# Patient Record
Sex: FEMALE | Race: WHITE | NOT HISPANIC OR LATINO | Employment: OTHER | URBAN - METROPOLITAN AREA
[De-identification: names, ages, dates, MRNs, and addresses within clinical notes are randomized per-mention and may not be internally consistent; named-entity substitution may affect disease eponyms.]

---

## 2017-01-10 ENCOUNTER — HOSPITAL ENCOUNTER (EMERGENCY)
Facility: HOSPITAL | Age: 62
Discharge: HOME | End: 2017-01-10
Attending: EMERGENCY MEDICINE
Payer: MEDICAID

## 2017-01-10 ENCOUNTER — TELEPHONE (OUTPATIENT)
Dept: FAMILY MEDICINE | Facility: CLINIC | Age: 62
End: 2017-01-10

## 2017-01-10 VITALS
BODY MASS INDEX: 27.05 KG/M2 | TEMPERATURE: 98 F | HEART RATE: 61 BPM | SYSTOLIC BLOOD PRESSURE: 136 MMHG | RESPIRATION RATE: 16 BRPM | WEIGHT: 147 LBS | DIASTOLIC BLOOD PRESSURE: 65 MMHG | OXYGEN SATURATION: 100 % | HEIGHT: 62 IN

## 2017-01-10 DIAGNOSIS — R52 PAIN: ICD-10-CM

## 2017-01-10 DIAGNOSIS — M54.9 CHRONIC BILATERAL BACK PAIN, UNSPECIFIED BACK LOCATION: Primary | ICD-10-CM

## 2017-01-10 DIAGNOSIS — G89.29 CHRONIC BILATERAL BACK PAIN, UNSPECIFIED BACK LOCATION: Primary | ICD-10-CM

## 2017-01-10 PROCEDURE — 96372 THER/PROPH/DIAG INJ SC/IM: CPT

## 2017-01-10 PROCEDURE — 99283 EMERGENCY DEPT VISIT LOW MDM: CPT | Mod: 25

## 2017-01-10 PROCEDURE — 63600175 PHARM REV CODE 636 W HCPCS: Performed by: EMERGENCY MEDICINE

## 2017-01-10 RX ORDER — OXYCODONE AND ACETAMINOPHEN 5; 325 MG/1; MG/1
1 TABLET ORAL EVERY 4 HOURS PRN
Qty: 18 TABLET | Refills: 0 | Status: SHIPPED | OUTPATIENT
Start: 2017-01-10 | End: 2017-01-18 | Stop reason: SDUPTHER

## 2017-01-10 RX ORDER — METHOCARBAMOL 750 MG/1
1500 TABLET, FILM COATED ORAL 3 TIMES DAILY
Qty: 30 TABLET | Refills: 0 | Status: SHIPPED | OUTPATIENT
Start: 2017-01-10 | End: 2017-01-15

## 2017-01-10 RX ORDER — KETOROLAC TROMETHAMINE 30 MG/ML
30 INJECTION, SOLUTION INTRAMUSCULAR; INTRAVENOUS
Status: COMPLETED | OUTPATIENT
Start: 2017-01-10 | End: 2017-01-10

## 2017-01-10 RX ADMIN — KETOROLAC TROMETHAMINE 30 MG: 30 INJECTION, SOLUTION INTRAMUSCULAR at 03:01

## 2017-01-10 NOTE — DISCHARGE INSTRUCTIONS
Take your medications as prescribed.  Follow up with [facility] neurosurgery your scheduled appointment.  Follow up with your primary care physician in the interim.  Please refer to the additional material providing further information including when return to the emergency department.  Back Pain (Acute or Chronic)    Back pain is one of the most common problems. The good news is that most people feel better in 1 to 2 weeks, and most of the rest in 1 to 2 months. Most people can remain active.  People experience and describe pain differently; not everyone is the same.  · The pain can be sharp, stabbing, shooting, aching, cramping or burning.  · Movement, standing, bending, lifting, sitting, or walking may worsen pain.  · It can be localized to one spot or area, or it can be more generalized.  · It can spread or radiate upwards, to the front, or go down your arms or legs (sciatica).  · It can cause muscle spasm.  Most of the time, mechanical problems with the muscles or spine cause the pain. Mechanical problems are usually caused by an injury to the muscles or ligaments. While illness can cause back pain, it is usually not caused by a serious illness. Mechanical problems include:   · Physical activity such as sports, exercise, work, or normal activity  · Overexertion, lifting, pushing, pulling incorrectly or too aggressively  · Sudden twisting, bending, or stretching from an accident, or accidental movement  · Poor posture  · Stretching or moving wrong, without noticing pain at the time  · Poor coordination, lack of regular exercise (check with your doctor about this)  · Spinal disc disease or arthritis  · Stress  Pain can also be related to pregnancy, or illness like appendicitis, bladder or kidney infections, pelvic infections, and many other things.  Acute back pain usually gets better in 1 to 2 weeks. Back pain related to disk disease, arthritis in the spinal joints or spinal stenosis (narrowing of the spinal canal) can  become chronic and last for months or years.  Unless you had a physical injury (for example, a car accident or fall) X-rays are usually not needed for the initial evaluation of back pain. If pain continues and does not respond to medical treatment, X-rays and other tests may be needed.  Home care  Try these home care recommendations:  · When in bed, try to find a position of comfort. A firm mattress is best. Try lying flat on your back with pillows under your knees. You can also try lying on your side with your knees bent up towards your chest and a pillow between your knees.  · At first, do not try to stretch out the sore spots. If there is a strain, it is not like the good soreness you get after exercising without an injury. In this case, stretching may make it worse.  · Avoid prolong sitting, long car rides, or travel. This puts more stress on the lower back than standing or walking.  · During the first 24 to 72 hours after an acute injury or flare up of chronic back pain, apply an ice pack to the painful area for 20 minutes and then remove it for 20 minutes. Do this over a period of 60 to 90 minutes or several times a day. This will reduce swelling and pain. Wrap the ice pack in a thin towel or plastic to protect your skin.  · You can start with ice, then switch to heat. Heat (hot shower, hot bath, or heating pad) reduces pain and works well for muscle spasms. Heat can be applied to the painful area for 20 minutes then remove it for 20 minutes. Do this over a period of 60 to 90 minutes or several times a day. Do not sleep on a heating pad. It can lead to skin burns or tissue damage.  · You can alternate ice and heat therapy. Talk with your doctor about the best treatment for your back pain.  · Therapeutic massage can help relax the back muscles without stretching them.  · Be aware of safe lifting methods and do not lift anything without stretching first.  Medicines  Talk to your doctor before using medicine,  especially if you have other medical problems or are taking other medicines.  · You may use over-the-counter medicine as directed on the bottle to control pain, unless another pain medicine was prescribed. If you have chronic conditions like diabetes, liver or kidney disease, stomach ulcers, or gastrointestinal bleeding, or are taking blood thinners, talk to your doctor before taking any medicine.  · Be careful if you are given a prescription medicines, narcotics, or medicine for muscle spasms. They can cause drowsiness, affect your coordination, reflexes, and judgement. Do not drive or operate heavy machinery.  Follow-up care  Follow up with your healthcare provider, or as advised.   A radiologist will review any X-rays that were taken. Your provide will notify you of any new findings that may affect your care.  Call 911  Call emergency services if any of the following occur:  · Trouble breathing  · Confusion  · Very drowsy or trouble awakening  · Fainting or loss of consciousness  · Rapid or very slow heart rate  · Loss of bowel or bladder control  When to seek medical advice  Call your healthcare provider right away if any of these occur:   · Pain becomes worse or spreads to your legs  · Weakness or numbness in one or both legs  · Numbness in the groin or genital area  © 6741-1906 [employer]. [address] [place] [address], [place] [address]. All rights reserved. This information is not intended as a substitute for professional medical care. Always follow your healthcare professional's instructions.

## 2017-01-10 NOTE — TELEPHONE ENCOUNTER
----- Message from Shannon Covarrubias sent at 1/10/2017 10:40 AM CST -----  Contact: [phone] /self   Patient is in pain and would like to know if she needs to come in or go to the ER. Please advise.

## 2017-01-10 NOTE — ED PROVIDER NOTES
Encounter Date: 1/10/2017       History     Chief Complaint   Patient presents with    Back Pain     back pain radiating down legs for 3-4 years getting worse.     Review of patient's allergies indicates:   Allergen Reactions    Penicillins      Other reaction(s): Hives    Sulfa (sulfonamide antibiotics)      Other reaction(s): Hives     HPI Comments: CHIEF COMPLAINT: Low lumbar back pain    HISTORY OF PRESENT ILLNESS: This is a 61-year-old female who presents to the emergency department complaint of low lumbar back pain.  She has had issues with chronic back pain for several years.  She reports that her pain is getting worse.  It hurts more when she tried to get up.  She has no numbness or tingling in the legs.  The pain does radiate down both legs.  She has no bowel or bladder incontinence.  No sensation changes.  She has an appointment on the 30th with a spine doctor at [facility].  She takes [place] at home and is not getting any relief.  She does not take an anti-inflammatory because she says it makes her vomit. She called her primary care physician today who instructed her to come to the emergency department.    REVIEW OF SYSTEMS:  Constitutional: No fever, no chills.  Eyes: No discharge. No pain.  HENT: No nasal drainage. No ear ache. No sore throat.  Cardiovascular: No chest pain, no palpitations.  Respiratory: No cough, no shortness of breath.  Gastrointestinal: No abdominal pain, no vomiting. No diarrhea.  Genitourinary: No hematuria, dysuria, urgency.  Musculoskeletal: See above.  Skin: No rashes, no lesions.  Neurological: No headache, no focal weakness.    ALLERGIES reviewed  Family history reviewed  Home medications reviewed  Problem list reviewed        The history is provided by the patient.     Past Medical History   Diagnosis Date    Colon polyp     Diabetes mellitus     GERD (gastroesophageal reflux disease)     Hepatitis C      Hepatitis C, genotype 1a, treatment naive    Hip fracture      "Hypothyroidism     Macrocytosis     Pulmonary nodule      0.6 RLL nodule     No past medical history pertinent negatives.  Past Surgical History   Procedure Laterality Date     section      Cholecystectomy      Hysterectomy       Uterus and both ovaries    Incisional hernia repair       x 2    Total hip arthroplasty       left hip    Rotator cuff repair       right    Liver biopsy  2003     autoimmune hepatitis    Colonoscopy  3/31/10     2 polyps, tubular adenoma    Upper gastrointestinal endoscopy  3/24/10     normal    Stomach surgery       "took lymph node off of liver"     Family History   Problem Relation Age of Onset    Diabetes Mellitus Mother     Liver cancer Sister     Pancreatic cancer Brother     Liver disease Neg Hx      Social History   Substance Use Topics    Smoking status: Current Every Day Smoker     Packs/day: 2.00     Years: 45.00     Types: Cigarettes    Smokeless tobacco: Never Used    Alcohol use No      Comment: used to drink heavily, stopped in      Review of Systems   All other systems reviewed and are negative.      Physical Exam   Initial Vitals   BP Pulse Resp Temp SpO2   01/10/17 1417 01/10/17 1417 01/10/17 1417 01/10/17 1417 01/10/17 1417   147/73 77 16 98.2 °F (36.8 °C) 96 %     Physical Exam    Nursing note and vitals reviewed.  Constitutional: She appears well-developed and well-nourished.   HENT:   Head: Normocephalic and atraumatic.   Nose: Nose normal.   Mouth/Throat: Oropharynx is clear and moist.   Eyes: Conjunctivae and EOM are normal. Pupils are equal, round, and reactive to light. No scleral icterus.   Neck: Normal range of motion. Neck supple. No JVD present.   Cardiovascular: Normal rate, regular rhythm, normal heart sounds and intact distal pulses. Exam reveals no gallop and no friction rub.    No murmur heard.  Pulmonary/Chest: Breath sounds normal. No stridor. No respiratory distress. She has no wheezes. She exhibits no " tenderness.   Abdominal: Soft. Bowel sounds are normal. She exhibits no distension and no mass. There is no tenderness. There is no rebound and no guarding.   Musculoskeletal: Normal range of motion. She exhibits no edema.   The patient is nontender to palpation at the midline of the C, T, L, sacral spine.  No step-off.  No crepitus.  She has some paraspinal tenderness along the musculature of the right lower lumbar region.  She has full flexion and full rotation both directions at the waist.  Able to bear weight without any difficulty. + straight leg raise bilaterally.    Neurological: She is alert and oriented to person, place, and time. She has normal strength. No cranial nerve deficit.   Skin: Skin is warm and dry. No rash noted. No pallor.   Psychiatric: She has a normal mood and affect. Thought content normal.         ED Course   Procedures  Labs Reviewed - No data to display       X-Rays: Other Radiology Reports:   X-Ray Lumbar Spine Ap And Lateral (Final result) Result time: 01/10/17 17:02:04    Final result by Tiffanie Schulte MD (01/10/17 17:02:04)    Impression:        As above.      Electronically signed by: TIFFANIE SCHULTE MD  Date: 01/10/17  Time: 17:02       Narrative:      Lumbar spine radiographs    Images: 3    Comparison: 6/2/15    Findings:    Alignment: There is grade 1 anterolisthesis at L4-L5.    Vertebrae: Vertebral body heights are maintained.  No suspicious lytic or blastic lesions.  Discs and facets: Mild disc height loss noted throughout the lower lumbar spine.  There is severe lower lumbar facet arthropathy.  Soft tissues: Vascular calcifications and upper abdominal surgical clips noted.          Medical Decision Making:   Differential Diagnosis:   Strain, sprain, fracture, dislocation, degenerative disc disease, malignancy,  abscess, shingles, UTI.    Clinical Tests:   Radiological Study: Ordered and Reviewed  ED Management:  The patient presents to the emergency department stay with her  chronic back pain.  Her back pain has been with her for many years now beginning worse over the last 6 months.  X-ray today does not show any signs of any severe changes that would need emergent intervention.  She has an appointment with neurosurgery on the 30th which she will need to maintain.  She receives pain control through Dr. Dejesus.  Today we will change her over to Percocet in an attempt to see if this will help her better than her Norco and have her continue with her interventions as scheduled.  The patient is comfortable with this plan and comfortable going home at this time.                   ED Course    The patient has received a course of Toradol here in the emergency department.  Awaiting results of x-ray.    Past records reviewed.    I discussed the results with the patient.  I discussed management options including changing her over to Percocet at home from her [place].  The patient would like to do this.  I discussed with her that they need to follow up with neurosurgery as scheduled and that if she has any need for the interim she should follow up with her primary care physician.  I did also discuss with her the need to return if she develops any neurovascular compromise such as numbness or tingling to the legs, bowel or bladder incontinence, or any other concerns.  The patient understands and is comfortable going home at this time.  Clinical Impression:   The primary encounter diagnosis was Chronic bilateral back pain, unspecified back location. A diagnosis of Pain was also pertinent to this visit.    Disposition:   Disposition: Discharged  Condition: Stable       Ric Carlos MD  01/10/17 6023

## 2017-01-10 NOTE — ED NOTES
Pt c/o chronic back pain x 4-5 years.  Pt c/o lower back pain that radiates down bilateral lower legs.  Pt states that she takes her home meds for pain which usually controls pain.  Pt states pain worsened last night.

## 2017-01-10 NOTE — TELEPHONE ENCOUNTER
----- Message from Divine Roche sent at 1/10/2017  1:09 PM CST -----  Contact: [phone]  Patient states she left a message this morning and would like to speak with you,She would like to know which hospital she should go to

## 2017-01-10 NOTE — TELEPHONE ENCOUNTER
Returned patient's call. Patient stated she is currently on her way to the ER. She  Is having severe pain in her back and legs.

## 2017-01-10 NOTE — ED AVS SNAPSHOT
[facility]  [address]  [place] [address]               Thom Krishna   1/10/2017  2:45 PM   ED    Description:  Female : 1955   Department:  [facility]           Your Care was Coordinated By:     Provider Role From To    Ric Carlos MD Attending Provider 01/10/17 6378 --      Reason for Visit     Back Pain           Diagnoses this Visit        Comments    Chronic bilateral back pain, unspecified back location    -  Primary     Pain           ED Disposition     ED Disposition Condition Comment    Discharge             To Do List           Follow-up Information     Follow up with [facility].    Why:  Keep your appointment.     Contact information:    [facility]        Follow up with Brandy Dejesus MD.    Specialty:  Family Medicine    Why:  If symptoms worsen    Contact information:    [address]  [address]  [place] [address]  [phone]         These Medications        Disp Refills Start End    oxycodone-acetaminophen (PERCOCET) 5-325 mg per tablet 18 tablet 0 1/10/2017     Take 1 tablet by mouth every 4 (four) hours as needed for Pain. - Oral    [place] [address] [place] [address] Ph #: [phone]       methocarbamol (ROBAXIN) 750 MG Tab 30 tablet 0 1/10/2017 1/15/2017    Take 2 tablets (1,500 mg total) by mouth 3 (three) times daily. - Oral    [facility] [address] [facility] [address] Ph #: [phone]         [facility]     [facility] -  Assistance  Registered nurses in the [facility] provide clinical advisement, health education, appointment booking, and other advisory services.  Call for this free service at [phone].             Medications           Message regarding Medications     Verify the changes and/or additions to your medication regime listed below  are the same as discussed with your clinician today.  If any of these changes or additions are incorrect, please notify your healthcare provider.        START taking these NEW medications        Refills    oxycodone-acetaminophen (PERCOCET) 5-325 mg per tablet 0    Sig: Take 1 tablet by mouth every 4 (four) hours as needed for Pain.    Class: Print    Route: Oral    methocarbamol (ROBAXIN) 750 MG Tab 0    Sig: Take 2 tablets (1,500 mg total) by mouth 3 (three) times daily.    Class: Normal    Route: Oral      These medications were administered today        Dose Freq    ketorolac injection 30 mg 30 mg ED 1 Time    Sig: Inject 30 mg into the muscle ED 1 Time.    Class: Normal    Route: Intramuscular           Verify that the below list of medications is an accurate representation of the medications you are currently taking.  If none reported, the list may be blank. If incorrect, please contact your healthcare provider. Carry this list with you in case of emergency.           Current Medications     alprazolam (XANAX) 1 MG tablet TAKE 1 TABLET BY MOUTH TWICE DAILY AS NEEDED FOR ANXIETY    azathioprine (IMURAN) 50 mg Tab take 1 tablet by mouth once daily    blood-glucose meter (FREESTYLE LITE METER) kit Test tid please dispense test strips and lancets    hydrocodone-acetaminophen 10-325mg (NORCO)  mg Tab Take 1 tablet by mouth every 4 to 6 hours as needed.    levothyroxine (SYNTHROID) 50 MCG tablet Take 1 tablet (50 mcg total) by mouth once daily.    methocarbamol (ROBAXIN) 750 MG Tab Take 2 tablets (1,500 mg total) by mouth 3 (three) times daily.    ondansetron (ZOFRAN-ODT) 8 MG TbDL Take 1 tablet (8 mg total) by mouth 3 (three) times daily. Take as needed for nausea    oxycodone-acetaminophen (PERCOCET) 5-325 mg per tablet Take 1 tablet by mouth every 4 (four) hours as needed for Pain.    pantoprazole (PROTONIX) 40 MG tablet Take 1 tablet (40 mg total) by mouth once daily.           Clinical Reference  "Information           Your Vitals Were     BP Pulse Temp Resp Height Weight    136/65 (BP Location: Right arm, Patient Position: Sitting, BP Method: Automatic) 61 98.2 °F (36.8 °C) (Oral) 16 5' 2" (1.575 m) 66.7 kg (147 lb)    SpO2 BMI             100% 26.89 kg/m2         Allergies as of 1/10/2017        Reactions    Penicillins     Other reaction(s): Hives    Sulfa (Sulfonamide Antibiotics)     Other reaction(s): Hives      Immunizations Administered on Date of Encounter - 1/10/2017     None      ED Micro, Lab, POCT     None      ED Imaging Orders     Start Ordered       Status Ordering Provider    01/10/17 1604 01/10/17 1604    1 time imaging,   Status:  Canceled      Canceled     01/10/17 1502 01/10/17 1501  X-Ray Lumbar Spine Ap And Lateral  1 time imaging      Final result         Discharge Instructions       Take your medications as prescribed.  Follow up with [facility]surgery your scheduled appointment.  Follow up with your primary care physician in the interim.  Please refer to the additional material providing further information including when return to the emergency department.  Back Pain (Acute or Chronic)    Back pain is one of the most common problems. The good news is that most people feel better in 1 to 2 weeks, and most of the rest in 1 to 2 months. Most people can remain active.  People experience and describe pain differently; not everyone is the same.  · The pain can be sharp, stabbing, shooting, aching, cramping or burning.  · Movement, standing, bending, lifting, sitting, or walking may worsen pain.  · It can be localized to one spot or area, or it can be more generalized.  · It can spread or radiate upwards, to the front, or go down your arms or legs (sciatica).  · It can cause muscle spasm.  Most of the time, mechanical problems with the muscles or spine cause the pain. Mechanical problems are usually caused by an injury to the muscles or ligaments. While illness can cause back pain, it is " usually not caused by a serious illness. Mechanical problems include:   · Physical activity such as sports, exercise, work, or normal activity  · Overexertion, lifting, pushing, pulling incorrectly or too aggressively  · Sudden twisting, bending, or stretching from an accident, or accidental movement  · Poor posture  · Stretching or moving wrong, without noticing pain at the time  · Poor coordination, lack of regular exercise (check with your doctor about this)  · Spinal disc disease or arthritis  · Stress  Pain can also be related to pregnancy, or illness like appendicitis, bladder or kidney infections, pelvic infections, and many other things.  Acute back pain usually gets better in 1 to 2 weeks. Back pain related to disk disease, arthritis in the spinal joints or spinal stenosis (narrowing of the spinal canal) can become chronic and last for months or years.  Unless you had a physical injury (for example, a car accident or fall) X-rays are usually not needed for the initial evaluation of back pain. If pain continues and does not respond to medical treatment, X-rays and other tests may be needed.  Home care  Try these home care recommendations:  · When in bed, try to find a position of comfort. A firm mattress is best. Try lying flat on your back with pillows under your knees. You can also try lying on your side with your knees bent up towards your chest and a pillow between your knees.  · At first, do not try to stretch out the sore spots. If there is a strain, it is not like the good soreness you get after exercising without an injury. In this case, stretching may make it worse.  · Avoid prolong sitting, long car rides, or travel. This puts more stress on the lower back than standing or walking.  · During the first 24 to 72 hours after an acute injury or flare up of chronic back pain, apply an ice pack to the painful area for 20 minutes and then remove it for 20 minutes. Do this over a period of 60 to 90 minutes  or several times a day. This will reduce swelling and pain. Wrap the ice pack in a thin towel or plastic to protect your skin.  · You can start with ice, then switch to heat. Heat (hot shower, hot bath, or heating pad) reduces pain and works well for muscle spasms. Heat can be applied to the painful area for 20 minutes then remove it for 20 minutes. Do this over a period of 60 to 90 minutes or several times a day. Do not sleep on a heating pad. It can lead to skin burns or tissue damage.  · You can alternate ice and heat therapy. Talk with your doctor about the best treatment for your back pain.  · Therapeutic massage can help relax the back muscles without stretching them.  · Be aware of safe lifting methods and do not lift anything without stretching first.  Medicines  Talk to your doctor before using medicine, especially if you have other medical problems or are taking other medicines.  · You may use over-the-counter medicine as directed on the bottle to control pain, unless another pain medicine was prescribed. If you have chronic conditions like diabetes, liver or kidney disease, stomach ulcers, or gastrointestinal bleeding, or are taking blood thinners, talk to your doctor before taking any medicine.  · Be careful if you are given a prescription medicines, narcotics, or medicine for muscle spasms. They can cause drowsiness, affect your coordination, reflexes, and judgement. Do not drive or operate heavy machinery.  Follow-up care  Follow up with your healthcare provider, or as advised.   A radiologist will review any X-rays that were taken. Your provide will notify you of any new findings that may affect your care.  Call 911  Call emergency services if any of the following occur:  · Trouble breathing  · Confusion  · Very drowsy or trouble awakening  · Fainting or loss of consciousness  · Rapid or very slow heart rate  · Loss of bowel or bladder control  When to seek medical advice  Call your healthcare  provider right away if any of these occur:   · Pain becomes worse or spreads to your legs  · Weakness or numbness in one or both legs  · Numbness in the groin or genital area  © 3095-7983 [employer]. [address] [place] [address], [place] [address]. All rights reserved. This information is not intended as a substitute for professional medical care. Always follow your healthcare professional's instructions.          Your Scheduled Appointments     Jan 16, 2017  9:00 AM CST   Fasting Lab with APPOINTMENT LAB, TRINI LUNDBERG   [facility]    [address]  [place] [address] [phone]            Jan 27, 2017  8:40 AM CST   Established Patient Visit with Brandy Dejesus MD   [facility]    [address]  [place] [address] [phone]              [facility] Sign-Up     Activating your [facility] account is as easy as 1-2-3!     1) Visit [email or web address], select Sign Up Now, enter this activation code and your date of birth, then select Next.  [email or web address]  Expires: 2/24/2017  5:22 PM      2) Create a username and password to use when you visit [facility] in the future and select a security question in case you lose your password and select Next.    3) Enter your e-mail address and click Sign Up!    Additional Information  If you have questions, please e-mail [email or web address] or call [phone] to talk to our [facility] staff. Remember, [facility] is NOT to be used for urgent needs. For medical emergencies, dial 911.         Smoking Cessation     If you would like to quit smoking:   You may be eligible for free services if you are a [place] resident and started smoking cigarettes before September 1, 1988.  Call the Smoking Cessation Trust (SCT) toll free at (019) 512-2858 or (790) 280-7096.   Call 6-878-QUIT-NOW if you do not meet the above criteria.             [facility] complies with  applicable Federal civil rights laws and does not discriminate on the basis of race, color, national origin, age, disability, or sex.        Language Assistance Services     ATTENTION: Language assistance services are available, free of charge. Please call [phone].      ATENCIÓN: Si habla tejal, tiene a singh disposición servicios gratuitos de asistencia lingüística. Llame al [phone].     CHÚ Ý: N?u b?n nói Ti?ng Vi?t, có các d?ch v? h? tr? ngôn ng? mi?n phí dành cho b?n. G?i s? [phone].

## 2017-01-11 ENCOUNTER — TELEPHONE (OUTPATIENT)
Dept: FAMILY MEDICINE | Facility: CLINIC | Age: 62
End: 2017-01-11

## 2017-01-11 NOTE — TELEPHONE ENCOUNTER
----- Message from Tessie Meeks sent at 1/11/2017  8:59 AM CST -----  Contact: [phone]  self  Patient wants to inform you of what the doctor said at the hospital. Patient is requesting a return call.

## 2017-01-12 ENCOUNTER — TELEPHONE (OUTPATIENT)
Dept: FAMILY MEDICINE | Facility: CLINIC | Age: 62
End: 2017-01-12

## 2017-01-12 NOTE — TELEPHONE ENCOUNTER
Returned patients call. She stated at the ER she had xrays taken and he told her she needs surgery. Patient stated he still has her appointment on the 30th with christina. She also stated she was prescribes a stronger pain medication for the pain.

## 2017-01-17 ENCOUNTER — LAB VISIT (OUTPATIENT)
Dept: LAB | Facility: HOSPITAL | Age: 62
End: 2017-01-17
Attending: FAMILY MEDICINE
Payer: MEDICAID

## 2017-01-17 ENCOUNTER — TELEPHONE (OUTPATIENT)
Dept: FAMILY MEDICINE | Facility: CLINIC | Age: 62
End: 2017-01-17

## 2017-01-17 DIAGNOSIS — E11.49 DIABETES MELLITUS TYPE 2 WITH NEUROLOGICAL MANIFESTATIONS: ICD-10-CM

## 2017-01-17 DIAGNOSIS — M54.12 CERVICAL RADICULOPATHY: ICD-10-CM

## 2017-01-17 DIAGNOSIS — E03.9 ACQUIRED HYPOTHYROIDISM: ICD-10-CM

## 2017-01-17 DIAGNOSIS — K75.4 AUTOIMMUNE HEPATITIS: ICD-10-CM

## 2017-01-17 DIAGNOSIS — G89.29 OTHER CHRONIC PAIN: Primary | ICD-10-CM

## 2017-01-17 LAB
ALBUMIN SERPL BCP-MCNC: 3.4 G/DL
ALP SERPL-CCNC: 141 U/L
ALT SERPL W/O P-5'-P-CCNC: 50 U/L
ANION GAP SERPL CALC-SCNC: 7 MMOL/L
AST SERPL-CCNC: 44 U/L
BASOPHILS # BLD AUTO: 0.02 K/UL
BASOPHILS NFR BLD: 0.4 %
BILIRUB SERPL-MCNC: 0.5 MG/DL
BUN SERPL-MCNC: 12 MG/DL
CALCIUM SERPL-MCNC: 9.1 MG/DL
CHLORIDE SERPL-SCNC: 107 MMOL/L
CHOLEST/HDLC SERPL: 3.7 {RATIO}
CO2 SERPL-SCNC: 26 MMOL/L
CREAT SERPL-MCNC: 0.9 MG/DL
DIFFERENTIAL METHOD: ABNORMAL
EOSINOPHIL # BLD AUTO: 0.2 K/UL
EOSINOPHIL NFR BLD: 3.6 %
ERYTHROCYTE [DISTWIDTH] IN BLOOD BY AUTOMATED COUNT: 15.3 %
EST. GFR  (AFRICAN AMERICAN): >60 ML/MIN/1.73 M^2
EST. GFR  (NON AFRICAN AMERICAN): >60 ML/MIN/1.73 M^2
ESTIMATED AVG GLUCOSE: 103 MG/DL
GLUCOSE SERPL-MCNC: 89 MG/DL
HBA1C MFR BLD HPLC: 5.2 %
HCT VFR BLD AUTO: 44.6 %
HDL/CHOLESTEROL RATIO: 27.2 %
HDLC SERPL-MCNC: 184 MG/DL
HDLC SERPL-MCNC: 50 MG/DL
HGB BLD-MCNC: 14.5 G/DL
LDLC SERPL CALC-MCNC: 112.6 MG/DL
LYMPHOCYTES # BLD AUTO: 1.7 K/UL
LYMPHOCYTES NFR BLD: 38.3 %
MCH RBC QN AUTO: 33.7 PG
MCHC RBC AUTO-ENTMCNC: 32.5 %
MCV RBC AUTO: 104 FL
MONOCYTES # BLD AUTO: 0.6 K/UL
MONOCYTES NFR BLD: 13.5 %
NEUTROPHILS # BLD AUTO: 2 K/UL
NEUTROPHILS NFR BLD: 44.2 %
NONHDLC SERPL-MCNC: 134 MG/DL
PLATELET # BLD AUTO: 184 K/UL
PMV BLD AUTO: 11.6 FL
POTASSIUM SERPL-SCNC: 4.2 MMOL/L
PROT SERPL-MCNC: 6.8 G/DL
RBC # BLD AUTO: 4.3 M/UL
SODIUM SERPL-SCNC: 140 MMOL/L
T4 FREE SERPL-MCNC: 1.2 NG/DL
TRIGL SERPL-MCNC: 107 MG/DL
TSH SERPL DL<=0.005 MIU/L-ACNC: 4.42 UIU/ML
WBC # BLD AUTO: 4.46 K/UL

## 2017-01-17 PROCEDURE — 85025 COMPLETE CBC W/AUTO DIFF WBC: CPT

## 2017-01-17 PROCEDURE — 84439 ASSAY OF FREE THYROXINE: CPT

## 2017-01-17 PROCEDURE — 36415 COLL VENOUS BLD VENIPUNCTURE: CPT

## 2017-01-17 PROCEDURE — 80053 COMPREHEN METABOLIC PANEL: CPT

## 2017-01-17 PROCEDURE — 84443 ASSAY THYROID STIM HORMONE: CPT

## 2017-01-17 PROCEDURE — 83036 HEMOGLOBIN GLYCOSYLATED A1C: CPT

## 2017-01-17 PROCEDURE — 80061 LIPID PANEL: CPT

## 2017-01-17 NOTE — TELEPHONE ENCOUNTER
----- Message from Alba Govea sent at 1/17/2017 10:55 AM CST -----  Contact: self, [phone]  Patient called in requesting to speak with you regarding her hospital visit.  Please advise.

## 2017-01-17 NOTE — TELEPHONE ENCOUNTER
Returned patients call. Patient stated she went to the ER at [facility]. She was given a Toradol shot and muscle relaxer's. She stated she needs an earlier appointment. She tried to talk with neurology and they will not move her appt up sooner than the 30 th due to no availability. She stated she was told at [facility] she needed a stronger pain med to help manage the pain. She was given Percocet at that time and they helped better than the Norco. She wants to know if those pain meds can be called in until she is able to see the [facility] physician. Please advise.

## 2017-01-18 RX ORDER — OXYCODONE AND ACETAMINOPHEN 5; 325 MG/1; MG/1
1 TABLET ORAL EVERY 4 HOURS PRN
Qty: 50 TABLET | Refills: 0 | Status: SHIPPED | OUTPATIENT
Start: 2017-01-18 | End: 2017-01-27 | Stop reason: SINTOL

## 2017-01-18 NOTE — TELEPHONE ENCOUNTER
Returned patients call. Patient stated she is in severe pain and she is having trouble walking. She wants to know if her new pain medication will be approved prior to her upcoming appointment. Please advise.

## 2017-01-18 NOTE — TELEPHONE ENCOUNTER
Yes I am printing a prescription for percocet thanks. please tell her I said good luck with her neurosurgery appointment!

## 2017-01-18 NOTE — TELEPHONE ENCOUNTER
----- Message from Mathew Middleton sent at 1/18/2017  1:16 PM CST -----  Contact: [phone]/self   Pt requesting to speak with the nurse. Pt states she needs a call back ASAP.    Please advise

## 2017-01-18 NOTE — TELEPHONE ENCOUNTER
----- Message from Julia Morales sent at 1/18/2017  9:52 AM CST -----  Contact: [phone]/ self   Pt would like to follow up on the conversation she had with the nurse yesterday . Please advise

## 2017-01-21 ENCOUNTER — TELEPHONE (OUTPATIENT)
Dept: FAMILY MEDICINE | Facility: CLINIC | Age: 62
End: 2017-01-21

## 2017-01-21 DIAGNOSIS — E03.9 ACQUIRED HYPOTHYROIDISM: Primary | ICD-10-CM

## 2017-01-21 DIAGNOSIS — R74.8 ELEVATED LIVER ENZYMES: ICD-10-CM

## 2017-01-21 RX ORDER — LEVOTHYROXINE SODIUM 75 UG/1
75 TABLET ORAL DAILY
Qty: 90 TABLET | Refills: 3 | Status: SHIPPED | OUTPATIENT
Start: 2017-01-21 | End: 2018-01-12 | Stop reason: SDUPTHER

## 2017-01-21 NOTE — TELEPHONE ENCOUNTER
Please notify of 2 issues with labs    1) need to mildly increase thyroid medication-- I'm assuming she's been taking her 50 mcg dose daily, so I am increasing to the 75 mcg dose    2) increase in liver enzymes that we will discuss in detail at her upcoming office visit.     I am sending in the new dose of thyroid medication and ordering follow up labs for thyroid and liver enzymes to be done in 8 weeks, and we will discuss at her office visit thanks.

## 2017-01-23 ENCOUNTER — TELEPHONE (OUTPATIENT)
Dept: FAMILY MEDICINE | Facility: CLINIC | Age: 62
End: 2017-01-23

## 2017-01-23 DIAGNOSIS — F41.9 ANXIETY: ICD-10-CM

## 2017-01-23 RX ORDER — ALPRAZOLAM 1 MG/1
1 TABLET ORAL 2 TIMES DAILY
Qty: 45 TABLET | Refills: 5 | Status: SHIPPED | OUTPATIENT
Start: 2017-01-23 | End: 2017-08-03 | Stop reason: SDUPTHER

## 2017-01-23 RX ORDER — ALPRAZOLAM 1 MG/1
TABLET ORAL
Qty: 45 TABLET | Refills: 0 | Status: SHIPPED | OUTPATIENT
Start: 2017-01-23 | End: 2017-01-23 | Stop reason: SDUPTHER

## 2017-01-24 ENCOUNTER — TELEPHONE (OUTPATIENT)
Dept: FAMILY MEDICINE | Facility: CLINIC | Age: 62
End: 2017-01-24

## 2017-01-24 NOTE — TELEPHONE ENCOUNTER
----- Message from Dorene Fierro sent at 1/24/2017  3:43 PM CST -----  Contact: Self [phone]  Patient is calling to talk to nurse concerning her medication why was it denied. Please advice

## 2017-01-24 NOTE — TELEPHONE ENCOUNTER
Called patient to inform her that her prescription has been sent to the pharmacy. Patient verbalized understanding.

## 2017-01-26 ENCOUNTER — TELEPHONE (OUTPATIENT)
Dept: FAMILY MEDICINE | Facility: CLINIC | Age: 62
End: 2017-01-26

## 2017-01-27 ENCOUNTER — OFFICE VISIT (OUTPATIENT)
Dept: FAMILY MEDICINE | Facility: CLINIC | Age: 62
End: 2017-01-27
Payer: MEDICAID

## 2017-01-27 ENCOUNTER — TELEPHONE (OUTPATIENT)
Dept: FAMILY MEDICINE | Facility: CLINIC | Age: 62
End: 2017-01-27

## 2017-01-27 VITALS
SYSTOLIC BLOOD PRESSURE: 131 MMHG | HEART RATE: 60 BPM | WEIGHT: 143.5 LBS | TEMPERATURE: 98 F | BODY MASS INDEX: 26.41 KG/M2 | DIASTOLIC BLOOD PRESSURE: 69 MMHG | HEIGHT: 62 IN

## 2017-01-27 DIAGNOSIS — M48.02 FORAMINAL STENOSIS OF CERVICAL REGION: ICD-10-CM

## 2017-01-27 DIAGNOSIS — G89.29 OTHER CHRONIC PAIN: ICD-10-CM

## 2017-01-27 DIAGNOSIS — F41.9 ANXIETY AND DEPRESSION: ICD-10-CM

## 2017-01-27 DIAGNOSIS — F32.A ANXIETY AND DEPRESSION: ICD-10-CM

## 2017-01-27 DIAGNOSIS — B19.20 HEPATITIS C VIRUS INFECTION WITHOUT HEPATIC COMA, UNSPECIFIED CHRONICITY: ICD-10-CM

## 2017-01-27 DIAGNOSIS — K75.4 AUTOIMMUNE HEPATITIS: ICD-10-CM

## 2017-01-27 DIAGNOSIS — F17.200 SMOKER UNMOTIVATED TO QUIT: ICD-10-CM

## 2017-01-27 DIAGNOSIS — R74.8 ELEVATED LIVER ENZYMES: ICD-10-CM

## 2017-01-27 DIAGNOSIS — K21.9 GASTROESOPHAGEAL REFLUX DISEASE, ESOPHAGITIS PRESENCE NOT SPECIFIED: ICD-10-CM

## 2017-01-27 DIAGNOSIS — E11.49 DIABETES MELLITUS TYPE 2 WITH NEUROLOGICAL MANIFESTATIONS: ICD-10-CM

## 2017-01-27 DIAGNOSIS — M51.36 DDD (DEGENERATIVE DISC DISEASE), LUMBAR: ICD-10-CM

## 2017-01-27 DIAGNOSIS — E03.9 ACQUIRED HYPOTHYROIDISM: ICD-10-CM

## 2017-01-27 DIAGNOSIS — M50.30 DDD (DEGENERATIVE DISC DISEASE), CERVICAL: Primary | ICD-10-CM

## 2017-01-27 PROCEDURE — 99213 OFFICE O/P EST LOW 20 MIN: CPT | Mod: PBBFAC,PO | Performed by: FAMILY MEDICINE

## 2017-01-27 PROCEDURE — 99999 PR PBB SHADOW E&M-EST. PATIENT-LVL III: CPT | Mod: PBBFAC,,, | Performed by: FAMILY MEDICINE

## 2017-01-27 PROCEDURE — 99214 OFFICE O/P EST MOD 30 MIN: CPT | Mod: S$PBB,,, | Performed by: FAMILY MEDICINE

## 2017-01-27 RX ORDER — HYDROCODONE BITARTRATE AND ACETAMINOPHEN 10; 325 MG/1; MG/1
1 TABLET ORAL EVERY 6 HOURS PRN
Qty: 120 TABLET | Refills: 0 | Status: SHIPPED | OUTPATIENT
Start: 2017-01-27 | End: 2017-02-20 | Stop reason: SDUPTHER

## 2017-01-27 NOTE — PROGRESS NOTES
" Office Visit    Patient Name: Thom Krishna    : 1955  MRN: [id number]    Subjective:  Thom is a 61 y.o. female who presents today for:    Follow-up (3 month)    Thom is here for 3 month follow up.  Last seen by me  10/28/2016 and recent labs showed elevated TSH so synthroid increased from 50--> 75 mcg daily.  LFTs also elevated.  Repeat labs ordered to have TSH and LFTs ordered for in 3 months. She is on chronic narcotics for significant pain related to DDD disc disease of the spine, especially C-spine and she has and appointment with [facility] 2017.  Called and confirmed that appointment and they stated that nothing was needed from us, no imaging reports etc. Needs to get back in with her liver specialist Dr Jesus through [facility] to discuss treatment of her autoimmune hepatitis and hepatitis C especially in light of recently elevated liver enzymes.     Past Medical History  Past Medical History   Diagnosis Date    Colon polyp     Diabetes mellitus     GERD (gastroesophageal reflux disease)     Hepatitis C      Hepatitis C, genotype 1a, treatment naive    Hip fracture     Hypothyroidism     Macrocytosis     Pulmonary nodule      0.6 RLL nodule       Past Surgical History  Past Surgical History   Procedure Laterality Date     section      Cholecystectomy      Hysterectomy       Uterus and both ovaries    Incisional hernia repair       x 2    Total hip arthroplasty       left hip    Rotator cuff repair       right    Liver biopsy  2003     autoimmune hepatitis    Colonoscopy  3/31/10     2 polyps, tubular adenoma    Upper gastrointestinal endoscopy  3/24/10     normal    Stomach surgery       "took lymph node off of liver"       Family History  Family History   Problem Relation Age of Onset    Diabetes Mellitus Mother     Liver cancer Sister     Pancreatic cancer Brother     Liver disease Neg Hx        Social History  Social History " "    Social History    Marital status: Significant Other     Spouse name: N/A    Number of children: N/A    Years of education: N/A     Occupational History    Not on file.     Social History Main Topics    Smoking status: Current Every Day Smoker     Packs/day: 2.00     Years: 45.00     Types: Cigarettes    Smokeless tobacco: Never Used    Alcohol use No      Comment: used to drink heavily, stopped in 1990's    Drug use: No    Sexual activity: Not on file     Other Topics Concern    Not on file     Social History Narrative       Current Medications  Medications reviewed and updated.     Allergies   Review of patient's allergies indicates:   Allergen Reactions    Penicillins      Other reaction(s): Hives    Sulfa (sulfonamide antibiotics)      Other reaction(s): Hives       Review of Systems (Pertinent positives)  Review of Systems   Gastrointestinal: Positive for nausea (from percocet-- requests to go back to norco).   Musculoskeletal: Positive for back pain and neck pain.   Psychiatric/Behavioral: The patient is nervous/anxious.        Visit Vitals    /69 (BP Location: Right arm, Patient Position: Sitting, BP Method: Automatic)    Pulse 60    Temp 98 °F (36.7 °C) (Oral)    Ht 5' 2" (1.575 m)    Wt 65.1 kg (143 lb 8.3 oz)    BMI 26.25 kg/m2       Physical Exam   Constitutional: She is oriented to person, place, and time. She appears well-developed and well-nourished. No distress.   HENT:   Head: Normocephalic and atraumatic.   Eyes: Conjunctivae are normal.   Cardiovascular: Normal rate and regular rhythm.    Pulmonary/Chest: Effort normal and breath sounds normal.   Musculoskeletal: She exhibits no edema.   Neurological: She is alert and oriented to person, place, and time.   Skin: Skin is warm and dry.   Psychiatric: Her mood appears anxious.   Vitals reviewed.        Assessment/Plan:  Thom Krishna is a 61 y.o. female who presents today for :    Thom was seen today for " follow-up.    Diagnoses and all orders for this visit:    DDD (degenerative disc disease), cervical  Comments:  has appointment with Neurosurgery January 30, 2017  Orders:  -     hydrocodone-acetaminophen 10-325mg (NORCO)  mg Tab; Take 1 tablet by mouth every 6 (six) hours as needed for Pain.    DDD (degenerative disc disease), lumbar  -     hydrocodone-acetaminophen 10-325mg (NORCO)  mg Tab; Take 1 tablet by mouth every 6 (six) hours as needed for Pain.    Foraminal stenosis of cervical region    Elevated liver enzymes  Comments:  will contact Dr Perez with [facility] regarding further treatment of Hepatitis C and autoimmune hepatitis    Autoimmune hepatitis    Acquired hypothyroidism  Comments:  on higher dose of thyroid med and will rechek in 3 months    Hepatitis C virus infection without hepatic coma, unspecified chronicity    Gastroesophageal reflux disease, esophagitis presence not specified    Anxiety and depression    Diabetes mellitus type 2 with neurological manifestations  Comments:  no medications required, A1c monitoring    Other chronic pain  Comments:  would prefer Norco over Percocet  Orders:  -     hydrocodone-acetaminophen 10-325mg (NORCO)  mg Tab; Take 1 tablet by mouth every 6 (six) hours as needed for Pain.    Smoker unmotivated to quit            ICD-10-CM ICD-9-CM    1. DDD (degenerative disc disease), cervical M50.30 722.4 hydrocodone-acetaminophen 10-325mg (NORCO)  mg Tab    has appointment with Neurosurgery January 30, 2017   2. DDD (degenerative disc disease), lumbar M51.36 722.52 hydrocodone-acetaminophen 10-325mg (NORCO)  mg Tab   3. Foraminal stenosis of cervical region M99.81 723.0    4. Elevated liver enzymes R74.8 790.5     will contact Dr Perez with [facility] regarding further treatment of Hepatitis C and autoimmune hepatitis   5. Autoimmune hepatitis K75.4 571.42    6. Acquired hypothyroidism E03.9 244.9     on higher dose of thyroid med and will  rechek in 3 months   7. Hepatitis C virus infection without hepatic coma, unspecified chronicity B19.20 070.70    8. Gastroesophageal reflux disease, esophagitis presence not specified K21.9 530.81    9. Anxiety and depression F41.9 300.00     F32.9 311    10. Diabetes mellitus type 2 with neurological manifestations E11.49 250.60     no medications required, A1c monitoring   11. Other chronic pain G89.29 338.29 hydrocodone-acetaminophen 10-325mg (NORCO)  mg Tab    would prefer Norco over Percocet   12. Smoker unmotivated to quit F17.200 305.1        Return in about 8 weeks (around 3/24/2017) for to follow up on lab results-- thryoid and liver enzymes and on specialist appointments .

## 2017-01-27 NOTE — TELEPHONE ENCOUNTER
----- Message from Alba Xuan sent at 1/27/2017  1:18 PM CST -----  Contact: self, [phone]  Patient called in requesting to speak with you regarding her NORCO prescription. States her insurance is waiting for your office to call in order for her to get her refill. Please advise.

## 2017-01-27 NOTE — MR AVS SNAPSHOT
[address]  [place] [address]  Phone: [phone]  Fax: [phone]                  Thom Krishna   2017 8:40 AM   Office Visit    Description:  Female : 1955   Provider:  Brandy Dejesus MD   Department:  [facility]           Reason for Visit     Follow-up           Diagnoses this Visit        Comments    DDD (degenerative disc disease), cervical    -  Primary has appointment with Neurosurgery 2017    DDD (degenerative disc disease), lumbar         Foraminal stenosis of cervical region         Elevated liver enzymes     will contact Dr Perez with [facility] regarding further treatment of Hepatitis C and autoimmune hepatitis    Autoimmune hepatitis         Acquired hypothyroidism     on higher dose of thyroid med and will rechek in 3 months    Gastroesophageal reflux disease, esophagitis presence not specified         Anxiety and depression         Diabetes mellitus type 2 with neurological manifestations     no medications required, A1c monitoring    Other chronic pain     would prefer Norco over Percocet    Smoker unmotivated to quit         Hepatitis C virus infection without hepatic coma, unspecified chronicity                To Do List           Future Appointments        Provider Department Dept Phone    3/16/2017 9:00 AM APPOINTMENT LAB, [facility] [phone]    3/29/2017 10:20 AM Brandy Dejesus MD [facility] [phone]      Goals (5 Years of Data)     None      Follow-Up and Disposition     Return in about 8 weeks (around 3/24/2017) for to follow up on lab results-- thryoid and liver enzymes and on specialist appointments .       These Medications        Disp Refills Start End    hydrocodone-acetaminophen 10-325mg (NORCO)  mg Tab 120 tablet 0 2017     Take 1 tablet by mouth every 6 (six) hours as needed for Pain. - Oral    Pharmacy: [facility]. - [place] [facility] - [address] [facility].  #: [phone]         [facility]     [facility] Nurse Care Line - 24/7 Assistance  Registered nurses in the [facility] Center provide clinical advisement, health education, appointment booking, and other advisory services.  Call for this free service at [phone].             Medications           Message regarding Medications     Verify the changes and/or additions to your medication regime listed below are the same as discussed with your clinician today.  If any of these changes or additions are incorrect, please notify your healthcare provider.        START taking these NEW medications        Refills    hydrocodone-acetaminophen 10-325mg (NORCO)  mg Tab 0    Sig: Take 1 tablet by mouth every 6 (six) hours as needed for Pain.    Class: Print    Route: Oral      STOP taking these medications     oxycodone-acetaminophen (PERCOCET) 5-325 mg per tablet Take 1 tablet by mouth every 4 (four) hours as needed for Pain.           Verify that the below list of medications is an accurate representation of the medications you are currently taking.  If none reported, the list may be blank. If incorrect, please contact your healthcare provider. Carry this list with you in case of emergency.           Current Medications     alprazolam (XANAX) 1 MG tablet Take 1 tablet (1 mg total) by mouth 2 (two) times daily.    azathioprine (IMURAN) 50 mg Tab take 1 tablet by mouth once daily    blood-glucose meter (FREESTYLE LITE METER) kit Test tid please dispense test strips and lancets    levothyroxine (SYNTHROID) 75 MCG tablet Take 1 tablet (75 mcg total) by mouth once daily.    ondansetron (ZOFRAN-ODT) 8 MG TbDL Take 1 tablet (8 mg total) by mouth 3 (three) times daily. Take as needed for nausea    pantoprazole (PROTONIX) 40 MG tablet Take 1 tablet (40 mg total) by mouth once daily.    hydrocodone-acetaminophen 10-325mg (NORCO)  mg  "Tab Take 1 tablet by mouth every 6 (six) hours as needed for Pain.           Clinical Reference Information           Vital Signs - Last Recorded  Most recent update: 1/27/2017  8:28 AM by Alexus Wyman LPN    BP Pulse Temp Ht Wt BMI    131/69 (BP Location: Right arm, Patient Position: Sitting, BP Method: Automatic) 60 98 °F (36.7 °C) (Oral) 5' 2" (1.575 m) 65.1 kg (143 lb 8.3 oz) 26.25 kg/m2      Blood Pressure          Most Recent Value    BP  131/69      Allergies as of 1/27/2017     Penicillins    Sulfa (Sulfonamide Antibiotics)      Immunizations Administered on Date of Encounter - 1/27/2017     None      [facility] Sign-Up     Activating your [facility] account is as easy as 1-2-3!     1) Visit [employer] ([email or web address], select Sign Up Now, enter this activation code and your date of birth, then select Next.  [id number]  Expires: 2/24/2017  5:22 PM      2) Create a username and password to use when you visit [facility] in the future and select a security question in case you lose your password and select Next.    3) Enter your e-mail address and click Sign Up!    Additional Information  If you have questions, please e-mail [email or web address] or call [phone] to talk to our [facility] staff. Remember, [facility] is NOT to be used for urgent needs. For medical emergencies, dial 911.         Smoking Cessation     If you would like to quit smoking:   You may be eligible for free services if you are a [place] resident and started smoking cigarettes before September 1, 1988.  Call the Smoking Cessation Trust (SCT) toll free at (613) 402-7163 or (423) 866-3695.   Call 8-800QUIT-NOW if you do not meet the above criteria.            "

## 2017-01-27 NOTE — TELEPHONE ENCOUNTER
Called patient her sister answered the phone. She gave me another number to call the patient on. I spoke with the patient and informed her the prior authorization will be submitted today.

## 2017-01-27 NOTE — TELEPHONE ENCOUNTER
Called patient to inform her that the prior authorization has been submitted. It has been put in as urgent so she will know something within 24hrs. She verbalized understanding.

## 2017-01-30 ENCOUNTER — TELEPHONE (OUTPATIENT)
Dept: FAMILY MEDICINE | Facility: CLINIC | Age: 62
End: 2017-01-30

## 2017-01-30 NOTE — TELEPHONE ENCOUNTER
----- Message from Fabiola Moise sent at 1/30/2017  2:36 PM CST -----  Patient no. [phone]   Patient returned your call.

## 2017-01-30 NOTE — TELEPHONE ENCOUNTER
returned patients call. Patient stated she went to [facility] for her appointment. She has to go back on the 27th of next month for additional imaging. March 13th she is going back for a follow up. They informed her they were perform surgery, she will have a dheeraj put in her back. They told her she will be on bed rest 4 weeks after surgery.

## 2017-01-30 NOTE — TELEPHONE ENCOUNTER
----- Message from Mathew Middleton sent at 1/30/2017 10:30 AM CST -----  Contact: [phone]/self  Pt would like to speak with the nurse about test results being faxed to another Dr.  Please advise

## 2017-02-01 ENCOUNTER — TELEPHONE (OUTPATIENT)
Dept: FAMILY MEDICINE | Facility: CLINIC | Age: 62
End: 2017-02-01

## 2017-02-01 NOTE — TELEPHONE ENCOUNTER
----- Message from Julia Morales sent at 2/1/2017  8:07 AM CST -----  Contact: [phone]/ self   Pt called starting the correct fax number for the liver doctor its [phone]. Please advise

## 2017-02-20 DIAGNOSIS — M50.30 DDD (DEGENERATIVE DISC DISEASE), CERVICAL: ICD-10-CM

## 2017-02-20 DIAGNOSIS — G89.29 OTHER CHRONIC PAIN: ICD-10-CM

## 2017-02-20 DIAGNOSIS — M51.36 DDD (DEGENERATIVE DISC DISEASE), LUMBAR: ICD-10-CM

## 2017-02-20 RX ORDER — HYDROCODONE BITARTRATE AND ACETAMINOPHEN 10; 325 MG/1; MG/1
1 TABLET ORAL EVERY 6 HOURS PRN
Qty: 120 TABLET | Refills: 0 | Status: SHIPPED | OUTPATIENT
Start: 2017-02-25 | End: 2017-03-20 | Stop reason: SDUPTHER

## 2017-02-20 NOTE — TELEPHONE ENCOUNTER
Called patient to inform her that her prescription is ready for . Patient verbalized understanding.

## 2017-02-20 NOTE — TELEPHONE ENCOUNTER
Patient is requesting a refill on her pain medication. I informed her that she is 6 days early. She stated she understood. She would like to have it post dated. She just won't have transportation later to pick it up. Please advise

## 2017-03-02 RX ORDER — AZATHIOPRINE 50 MG/1
TABLET ORAL
Qty: 30 TABLET | Refills: 3 | Status: SHIPPED | OUTPATIENT
Start: 2017-03-02 | End: 2017-07-17 | Stop reason: SDUPTHER

## 2017-03-13 ENCOUNTER — TELEPHONE (OUTPATIENT)
Dept: FAMILY MEDICINE | Facility: CLINIC | Age: 62
End: 2017-03-13

## 2017-03-13 DIAGNOSIS — F17.200 SMOKER UNMOTIVATED TO QUIT: Primary | ICD-10-CM

## 2017-03-13 NOTE — TELEPHONE ENCOUNTER
----- Message from Alba Govea sent at 3/13/2017 10:33 AM CDT -----  Contact: self, [phone]  Patient called in requesting to speak with you regarding her surgery.  Please advise.

## 2017-03-13 NOTE — TELEPHONE ENCOUNTER
Returned patients call. Patient stated she is having the back surgery on the 27th of next month. She was told she has to quit smoking prior to the surgery. She would like to be referred to the smoking cessation program. Please advise. DANIEL: The patient gave the phone to her mother and she said Anurag went to the ER last week. He had an infection in his colon and bladder. He has been prescribed antibiotics and pain meds and is doing much better. She just wanted to inform you.

## 2017-03-20 ENCOUNTER — TELEPHONE (OUTPATIENT)
Dept: FAMILY MEDICINE | Facility: CLINIC | Age: 62
End: 2017-03-20

## 2017-03-20 DIAGNOSIS — M51.36 DDD (DEGENERATIVE DISC DISEASE), LUMBAR: ICD-10-CM

## 2017-03-20 DIAGNOSIS — M50.30 DDD (DEGENERATIVE DISC DISEASE), CERVICAL: ICD-10-CM

## 2017-03-20 DIAGNOSIS — G89.29 OTHER CHRONIC PAIN: ICD-10-CM

## 2017-03-20 RX ORDER — HYDROCODONE BITARTRATE AND ACETAMINOPHEN 10; 325 MG/1; MG/1
1 TABLET ORAL EVERY 6 HOURS PRN
Qty: 120 TABLET | Refills: 0 | Status: SHIPPED | OUTPATIENT
Start: 2017-03-20 | End: 2017-07-06
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
## 2017-03-20 NOTE — TELEPHONE ENCOUNTER
----- Message from Aniyah Dumont sent at 3/20/2017  8:23 AM CDT -----  Contact: self/[phone]  Patient is requesting a refill on hydrocodone-acetaminophen 10-325mg (NORCO)  mg Tab.  Please advise

## 2017-03-27 ENCOUNTER — LAB VISIT (OUTPATIENT)
Dept: LAB | Facility: HOSPITAL | Age: 62
End: 2017-03-27
Attending: FAMILY MEDICINE
Payer: MEDICAID

## 2017-03-27 ENCOUNTER — TELEPHONE (OUTPATIENT)
Dept: FAMILY MEDICINE | Facility: CLINIC | Age: 62
End: 2017-03-27

## 2017-03-27 ENCOUNTER — OFFICE VISIT (OUTPATIENT)
Dept: FAMILY MEDICINE | Facility: CLINIC | Age: 62
End: 2017-03-27
Payer: MEDICAID

## 2017-03-27 VITALS
HEIGHT: 62 IN | SYSTOLIC BLOOD PRESSURE: 111 MMHG | OXYGEN SATURATION: 97 % | BODY MASS INDEX: 27.18 KG/M2 | HEART RATE: 81 BPM | DIASTOLIC BLOOD PRESSURE: 67 MMHG | WEIGHT: 147.69 LBS

## 2017-03-27 DIAGNOSIS — B19.20 HEPATITIS C VIRUS INFECTION WITHOUT HEPATIC COMA, UNSPECIFIED CHRONICITY: ICD-10-CM

## 2017-03-27 DIAGNOSIS — G89.29 OTHER CHRONIC PAIN: ICD-10-CM

## 2017-03-27 DIAGNOSIS — F32.A ANXIETY AND DEPRESSION: ICD-10-CM

## 2017-03-27 DIAGNOSIS — F41.9 ANXIETY AND DEPRESSION: ICD-10-CM

## 2017-03-27 DIAGNOSIS — M51.36 DDD (DEGENERATIVE DISC DISEASE), LUMBAR: Primary | ICD-10-CM

## 2017-03-27 DIAGNOSIS — E03.9 ACQUIRED HYPOTHYROIDISM: ICD-10-CM

## 2017-03-27 DIAGNOSIS — Z72.0 TOBACCO ABUSE: ICD-10-CM

## 2017-03-27 DIAGNOSIS — K75.4 AUTOIMMUNE HEPATITIS: ICD-10-CM

## 2017-03-27 DIAGNOSIS — R74.8 ELEVATED LIVER ENZYMES: ICD-10-CM

## 2017-03-27 DIAGNOSIS — M50.30 DDD (DEGENERATIVE DISC DISEASE), CERVICAL: ICD-10-CM

## 2017-03-27 LAB
ALBUMIN SERPL BCP-MCNC: 3.9 G/DL
ALP SERPL-CCNC: 112 U/L
ALT SERPL W/O P-5'-P-CCNC: 23 U/L
ANION GAP SERPL CALC-SCNC: 10 MMOL/L
AST SERPL-CCNC: 30 U/L
BILIRUB SERPL-MCNC: 0.3 MG/DL
BUN SERPL-MCNC: 14 MG/DL
CALCIUM SERPL-MCNC: 9 MG/DL
CHLORIDE SERPL-SCNC: 108 MMOL/L
CO2 SERPL-SCNC: 23 MMOL/L
CREAT SERPL-MCNC: 1 MG/DL
EST. GFR  (AFRICAN AMERICAN): >60 ML/MIN/1.73 M^2
EST. GFR  (NON AFRICAN AMERICAN): >60 ML/MIN/1.73 M^2
GLUCOSE SERPL-MCNC: 88 MG/DL
POTASSIUM SERPL-SCNC: 4 MMOL/L
PROT SERPL-MCNC: 7.6 G/DL
SODIUM SERPL-SCNC: 141 MMOL/L
T4 FREE SERPL-MCNC: 1.48 NG/DL
TSH SERPL DL<=0.005 MIU/L-ACNC: 1.18 UIU/ML

## 2017-03-27 PROCEDURE — 84439 ASSAY OF FREE THYROXINE: CPT

## 2017-03-27 PROCEDURE — 36415 COLL VENOUS BLD VENIPUNCTURE: CPT

## 2017-03-27 PROCEDURE — 99213 OFFICE O/P EST LOW 20 MIN: CPT | Mod: PBBFAC,PO | Performed by: FAMILY MEDICINE

## 2017-03-27 PROCEDURE — 80053 COMPREHEN METABOLIC PANEL: CPT

## 2017-03-27 PROCEDURE — 99214 OFFICE O/P EST MOD 30 MIN: CPT | Mod: S$PBB,,, | Performed by: FAMILY MEDICINE

## 2017-03-27 PROCEDURE — 99999 PR PBB SHADOW E&M-EST. PATIENT-LVL III: CPT | Mod: PBBFAC,,, | Performed by: FAMILY MEDICINE

## 2017-03-27 PROCEDURE — 84443 ASSAY THYROID STIM HORMONE: CPT
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
## 2017-03-27 NOTE — MR AVS SNAPSHOT
[address]  [place] [address]  Phone: [phone]  Fax: [phone]                  Thom Krishna   3/27/2017 2:40 PM   Office Visit    Description:  Female : 1955   Provider:  Brandy Dejesus MD   Department:  [facility]           Reason for Visit     Follow-up           Diagnoses this Visit        Comments    DDD (degenerative disc disease), lumbar    -  [facility]    DDD (degenerative disc disease), cervical         Autoimmune hepatitis         Hepatitis C virus infection without hepatic coma, unspecified chronicity     plans treatment with [facility] after her back surgery    Acquired hypothyroidism     will have labs rechecked today on new dose of medications    Anxiety and depression         Tobacco abuse     about to start smoking cessation classes-- needs to quit prior to back surgery           To Do List           Future Appointments        Provider Department Dept Phone    3/27/2017 3:40 PM APPOINTMENT LAB, [place] [facility] [phone]      Goals (5 Years of Data)     None      [facility]     [facility] Care [facility] -  Assistance  Registered nurses in the [facility] provide clinical advisement, health education, appointment booking, and other advisory services.  Call for this free service at [phone].             Medications           Message regarding Medications     Verify the changes and/or additions to your medication regime listed below are the same as discussed with your clinician today.  If any of these changes or additions are incorrect, please notify your healthcare provider.             Verify that the below list of medications is an accurate representation of the medications you are currently taking.  If none reported, the list may be blank. If incorrect, please contact your healthcare provider.  "Carry this list with you in case of emergency.           Current Medications     alprazolam (XANAX) 1 MG tablet Take 1 tablet (1 mg total) by mouth 2 (two) times daily.    azathioprine (IMURAN) 50 mg Tab take 1 tablet by mouth once daily    hydrocodone-acetaminophen 10-325mg (NORCO)  mg Tab Take 1 tablet by mouth every 6 (six) hours as needed for Pain.    levothyroxine (SYNTHROID) 75 MCG tablet Take 1 tablet (75 mcg total) by mouth once daily.    ondansetron (ZOFRAN-ODT) 8 MG TbDL Take 1 tablet (8 mg total) by mouth 3 (three) times daily. Take as needed for nausea    pantoprazole (PROTONIX) 40 MG tablet Take 1 tablet (40 mg total) by mouth once daily.    blood-glucose meter (FREESTYLE LITE METER) kit Test tid please dispense test strips and lancets           Clinical Reference Information           Your Vitals Were     BP Pulse Height Weight SpO2 BMI    111/67 81 5' 2" (1.575 m) 67 kg (147 lb 11.3 oz) 97% 27.02 kg/m2      Blood Pressure          Most Recent Value    BP  111/67      Allergies as of 3/27/2017     Penicillins    Sulfa (Sulfonamide Antibiotics)      Immunizations Administered on Date of Encounter - 3/27/2017     None      [facility] Sign-Up     Activating your [facility] account is as easy as 1-2-3!     1) Visit [email or web address], select Sign Up Now, enter this activation code and your date of birth, then select Next.  [email or web address]  Expires: 5/11/2017  2:58 PM      2) Create a username and password to use when you visit [facility] in the future and select a security question in case you lose your password and select Next.    3) Enter your e-mail address and click Sign Up!    Additional Information  If you have questions, please e-mail [email or web address] or call [phone] to talk to our [facility] staff. Remember, [facility] is NOT to be used for urgent needs. For medical emergencies, dial 911.         Smoking Cessation     If you would like to quit smoking:   You may be eligible for free " services if you are a [place] resident and started smoking cigarettes before September 1, 1988.  Call the Smoking Cessation Trust (SCT) toll free at (378) 804-9072 or (347) 958-1433.   Call 1-800-QUIT-NOW if you do not meet the above criteria.            Language Assistance Services     ATTENTION: Language assistance services are available, free of charge. Please call [phone].      ATENCIÓN: Si habla jonathanañol, tiene a singh disposición servicios gratuitos de asistencia lingüística. Llame al [phone].     CHÚ Ý: N?u b?n nói Ti?ng Vi?t, có các d?ch v? h? tr? ngôn ng? mi?n phí dành cho b?n. G?i s? [phone].         [facility] complies with applicable Federal civil rights laws and does not discriminate on the basis of race, color, national origin, age, disability, or sex.

## 2017-04-06 ENCOUNTER — TELEPHONE (OUTPATIENT)
Dept: FAMILY MEDICINE | Facility: CLINIC | Age: 62
End: 2017-04-06

## 2017-04-11 ENCOUNTER — TELEPHONE (OUTPATIENT)
Dept: FAMILY MEDICINE | Facility: CLINIC | Age: 62
End: 2017-04-11
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
## 2017-04-17 ENCOUNTER — TELEPHONE (OUTPATIENT)
Dept: FAMILY MEDICINE | Facility: CLINIC | Age: 62
End: 2017-04-17

## 2017-04-17 NOTE — TELEPHONE ENCOUNTER
Returned patient's call. She stated she is scheduled to have the back surgery on the 27th. She is requesting stronger pain medication in the meantime. Please advise.

## 2017-04-19 RX ORDER — OXYCODONE AND ACETAMINOPHEN 5; 325 MG/1; MG/1
1 TABLET ORAL EVERY 4 HOURS PRN
Qty: 30 TABLET | Refills: 0 | Status: SHIPPED | OUTPATIENT
Start: 2017-04-19 | End: 2017-05-02 | Stop reason: SDUPTHER

## 2017-04-20 ENCOUNTER — TELEPHONE (OUTPATIENT)
Dept: FAMILY MEDICINE | Facility: CLINIC | Age: 62
End: 2017-04-20

## 2017-04-20 NOTE — TELEPHONE ENCOUNTER
Called patient to inform her that she can  her prescription tomorrow. Left a voicemail requesting a call back

## 2017-04-20 NOTE — TELEPHONE ENCOUNTER
----- Message from Julia Morales sent at 4/20/2017  9:18 AM CDT -----  Contact: [phone]/ self   Pt requesting to speak with you in regarding her medication  Please advise

## 2017-04-21 ENCOUNTER — TELEPHONE (OUTPATIENT)
Dept: FAMILY MEDICINE | Facility: CLINIC | Age: 62
End: 2017-04-21

## 2017-04-24 RX ORDER — ONDANSETRON 8 MG/1
TABLET, ORALLY DISINTEGRATING ORAL
Qty: 20 TABLET | Refills: 2 | Status: SHIPPED | OUTPATIENT
Start: 2017-04-24 | End: 2017-12-29 | Stop reason: SDUPTHER

## 2017-04-26 ENCOUNTER — TELEPHONE (OUTPATIENT)
Dept: FAMILY MEDICINE | Facility: CLINIC | Age: 62
End: 2017-04-26

## 2017-04-26 NOTE — TELEPHONE ENCOUNTER
----- Message from Fabiola Moise sent at 4/26/2017  9:59 AM CDT -----  No. [phone]   Patient's surgery for Thursday, 4/27/17 at [facility] is on hold because of insurance.  Patient would like you to call the hospital.  She said she really needs to have the surgery.

## 2017-05-01 ENCOUNTER — TELEPHONE (OUTPATIENT)
Dept: FAMILY MEDICINE | Facility: CLINIC | Age: 62
End: 2017-05-01

## 2017-05-01 DIAGNOSIS — M50.30 DDD (DEGENERATIVE DISC DISEASE), CERVICAL: ICD-10-CM

## 2017-05-01 DIAGNOSIS — G89.29 OTHER CHRONIC PAIN: ICD-10-CM

## 2017-05-01 DIAGNOSIS — M51.36 DDD (DEGENERATIVE DISC DISEASE), LUMBAR: ICD-10-CM

## 2017-05-01 RX ORDER — HYDROCODONE BITARTRATE AND ACETAMINOPHEN 10; 325 MG/1; MG/1
1 TABLET ORAL EVERY 6 HOURS PRN
Qty: 120 TABLET | Refills: 0 | Status: CANCELLED | OUTPATIENT
Start: 2017-05-01

## 2017-05-02 RX ORDER — OXYCODONE AND ACETAMINOPHEN 5; 325 MG/1; MG/1
1 TABLET ORAL EVERY 4 HOURS PRN
Qty: 30 TABLET | Refills: 0 | Status: SHIPPED | OUTPATIENT
Start: 2017-05-02 | End: 2017-07-06 | Stop reason: SDUPTHER

## 2017-05-02 NOTE — TELEPHONE ENCOUNTER
----- Message from Divine Roche sent at 5/2/2017  8:21 AM CDT -----  Contact: [phone]  Patient would like to know the status of her pain meds. That was requested

## 2017-05-02 NOTE — TELEPHONE ENCOUNTER
Spoke with the patient and informed her that her prescription is ready for . I informed her that this will be the last prescription for Percocet. She verbalized understanding. She stated her surgery is scheduled for June 1st. It was pushed pack due to issues with the insurance.

## 2017-05-29 ENCOUNTER — CLINICAL SUPPORT (OUTPATIENT)
Dept: SMOKING CESSATION | Facility: CLINIC | Age: 62
End: 2017-05-29
Payer: COMMERCIAL

## 2017-05-29 DIAGNOSIS — F17.200 NICOTINE DEPENDENCE: Primary | ICD-10-CM

## 2017-05-29 PROCEDURE — 99404 PREV MED CNSL INDIV APPRX 60: CPT | Mod: S$GLB,,,

## 2017-05-29 PROCEDURE — 99999 PR PBB SHADOW E&M-EST. PATIENT-LVL I: CPT | Mod: PBBFAC,,,

## 2017-05-29 RX ORDER — IBUPROFEN 200 MG
1 TABLET ORAL DAILY
Qty: 28 PATCH | Refills: 0 | Status: SHIPPED | OUTPATIENT
Start: 2017-05-29 | End: 2017-06-28

## 2017-05-29 RX ORDER — DM/P-EPHED/ACETAMINOPH/DOXYLAM 30-7.5/3
2 LIQUID (ML) ORAL
Qty: 108 LOZENGE | Refills: 0 | Status: SHIPPED | OUTPATIENT
Start: 2017-05-29 | End: 2017-06-28

## 2017-05-29 NOTE — PROGRESS NOTES
See Tobacco Cessation Intake Form for patient assessment and recommendations.  Exhaled carbon monoxide level was 9 ppm per [employer].

## 2017-05-29 NOTE — Clinical Note
Pt seen at intake today. She currently smokes 20 cigs/day. Discussed tobacco cessation medication of 14 mg nicotine patch QD and 2 mg nicotine lozenge PRN (1-2 per hour in place of cigarettes). Pt started on rate reduction and wait time of 15 min prior to smoking. Exhaled carbon monoxide level was 9 (0-6 non-smoker). Will follow up by phone she in having surgery this Thursday.

## 2017-05-30 ENCOUNTER — TELEPHONE (OUTPATIENT)
Dept: SMOKING CESSATION | Facility: CLINIC | Age: 62
End: 2017-05-30

## 2017-05-30 NOTE — TELEPHONE ENCOUNTER
Pt called regarding her nicotine patches. She was worried that they would cancel her surgery if she was using them. I told they would not, but be sure to tell them she is using them. She verbalized understanding.

## 2017-06-08 ENCOUNTER — TELEPHONE (OUTPATIENT)
Dept: FAMILY MEDICINE | Facility: CLINIC | Age: 62
End: 2017-06-08

## 2017-06-08 NOTE — TELEPHONE ENCOUNTER
----- Message from Kat Medley sent at 6/8/2017 10:18 AM CDT -----  Contact: Gabriel from [employer]/[phone], ext. [phone]  Gabriel called to check the status of a request that was sent over to your office for the patient's diabetic shoes.    Please call her at [phone], ext. [phone] to advise.

## 2017-06-08 NOTE — TELEPHONE ENCOUNTER
Returned Gabriel's call with [employer]. I informed her that I did not receive the request for the diabetic shoes. She stated she will fax it again.

## 2017-06-17 ENCOUNTER — TELEPHONE (OUTPATIENT)
Dept: FAMILY MEDICINE | Facility: CLINIC | Age: 62
End: 2017-06-17

## 2017-06-17 NOTE — TELEPHONE ENCOUNTER
----- Message from Jessie Baum sent at 6/15/2017  4:46 PM CDT -----  Contact: Lyla from [employer]/[phone] Ext.[phone]  Lyla would like to speak to you regarding patient's diabetic shoes. Please advise

## 2017-06-21 RX ORDER — OXYCODONE AND ACETAMINOPHEN 10; 325 MG/1; MG/1
TABLET ORAL
Refills: 0 | COMMUNITY
Start: 2017-06-05 | End: 2017-07-06 | Stop reason: SDUPTHER

## 2017-06-21 RX ORDER — DIAZEPAM 5 MG/1
TABLET ORAL
Refills: 0 | COMMUNITY
Start: 2017-06-05 | End: 2017-08-04

## 2017-06-21 RX ORDER — ELBASVIR AND GRAZOPREVIR 50; 100 MG/1; MG/1
TABLET, FILM COATED ORAL
COMMUNITY
Start: 2017-05-18 | End: 2017-12-29

## 2017-06-22 ENCOUNTER — TELEPHONE (OUTPATIENT)
Dept: FAMILY MEDICINE | Facility: CLINIC | Age: 62
End: 2017-06-22

## 2017-06-22 NOTE — TELEPHONE ENCOUNTER
----- Message from Mathew Middleton sent at 6/22/2017 11:04 AM CDT -----  Contact: [phone]/self  Patient is returning your call. Please advise

## 2017-06-22 NOTE — TELEPHONE ENCOUNTER
Called patient to inform her that the request for Pt orders has been sent to Dr. Dejesus. I was unable to leave a voicemail due to one not being set up.

## 2017-06-22 NOTE — TELEPHONE ENCOUNTER
Returned patient's call. She stated that [facility] is too far for her to go for the therapy. She would [facility]. i informed her that the request has been forwarded to the doctor. She verbalized understanding.

## 2017-06-23 ENCOUNTER — TELEPHONE (OUTPATIENT)
Dept: FAMILY MEDICINE | Facility: CLINIC | Age: 62
End: 2017-06-23

## 2017-06-23 DIAGNOSIS — G89.29 CHRONIC NECK AND BACK PAIN: ICD-10-CM

## 2017-06-23 DIAGNOSIS — M50.30 DDD (DEGENERATIVE DISC DISEASE), CERVICAL: Primary | ICD-10-CM

## 2017-06-23 DIAGNOSIS — M54.2 CHRONIC NECK AND BACK PAIN: ICD-10-CM

## 2017-06-23 DIAGNOSIS — M54.9 CHRONIC NECK AND BACK PAIN: ICD-10-CM

## 2017-06-23 DIAGNOSIS — M51.36 DDD (DEGENERATIVE DISC DISEASE), LUMBAR: ICD-10-CM

## 2017-06-23 NOTE — TELEPHONE ENCOUNTER
----- Message from Zina Shukla MA sent at 6/22/2017 11:01 AM CDT -----  Contact: self, [phone]  Can you put in PT orders  ----- Message -----  From: Alba Govea  Sent: 6/22/2017  10:18 AM  To: [facility] Staff    Patient called in requesting to speak with you, would like to know what she needs to do to go to physical therapy at [facility]. Please advise.

## 2017-06-27 ENCOUNTER — TELEPHONE (OUTPATIENT)
Dept: FAMILY MEDICINE | Facility: CLINIC | Age: 62
End: 2017-06-27

## 2017-06-29 ENCOUNTER — TELEPHONE (OUTPATIENT)
Dept: FAMILY MEDICINE | Facility: CLINIC | Age: 62
End: 2017-06-29

## 2017-06-29 NOTE — TELEPHONE ENCOUNTER
----- Message from Julia Morales sent at 6/29/2017 11:34 AM CDT -----  Contact: [phone]/ext Diego/Janel with [employer]   Ms Islas its requesting pt's chart notes and doctors orders for pt's diabetic shoes fax [phone] . Please advise

## 2017-07-03 ENCOUNTER — TELEPHONE (OUTPATIENT)
Dept: FAMILY MEDICINE | Facility: CLINIC | Age: 62
End: 2017-07-03

## 2017-07-03 NOTE — TELEPHONE ENCOUNTER
----- Message from Malathi Atwood sent at 7/3/2017  9:56 AM CDT -----  Contact: Self/ [phone]  Patient would like to speak with you about a personal matter. Please advise.

## 2017-07-03 NOTE — TELEPHONE ENCOUNTER
----- Message from Dorene Fierro sent at 7/3/2017  9:36 AM CDT -----  Contact: Lyla Calling from [facility] [phone] Ext [phone]  Calling to get Diabetics foot orders and is requesting to see if you received the fax they sent. Please advice

## 2017-07-03 NOTE — TELEPHONE ENCOUNTER
Returned patient's call. Patient stated she was given a prescription of pain medication that stated take 1 every 4 hours. It was written for 60 pill on the 19th of June. She called in a refill with the  who performed the surgery. He stated she is only to take one a day. She stated there is no way due to the pain from the surgery. She doesn't have an appointment with him until the 07/24. She is asking that she have medication called in until her appointment.  Please advise.

## 2017-07-05 ENCOUNTER — CLINICAL SUPPORT (OUTPATIENT)
Dept: REHABILITATION | Facility: HOSPITAL | Age: 62
End: 2017-07-05
Attending: FAMILY MEDICINE
Payer: MEDICAID

## 2017-07-05 DIAGNOSIS — M50.30 DDD (DEGENERATIVE DISC DISEASE), CERVICAL: ICD-10-CM

## 2017-07-05 DIAGNOSIS — G89.29 OTHER CHRONIC PAIN: ICD-10-CM

## 2017-07-05 DIAGNOSIS — M51.36 DDD (DEGENERATIVE DISC DISEASE), LUMBAR: ICD-10-CM

## 2017-07-05 DIAGNOSIS — R26.2 DIFFICULTY WALKING: Primary | ICD-10-CM

## 2017-07-05 DIAGNOSIS — R53.1 WEAKNESS: ICD-10-CM

## 2017-07-05 PROCEDURE — 97110 THERAPEUTIC EXERCISES: CPT

## 2017-07-05 PROCEDURE — 97162 PT EVAL MOD COMPLEX 30 MIN: CPT

## 2017-07-05 PROCEDURE — G8979 MOBILITY GOAL STATUS: HCPCS | Mod: CK

## 2017-07-05 PROCEDURE — G8978 MOBILITY CURRENT STATUS: HCPCS | Mod: CL

## 2017-07-05 NOTE — TELEPHONE ENCOUNTER
I spoke to patient telling her that she has been receiving her scripts for Percocet from physician at [facility] and that she can only receive from one provider. She explained that the physician there is refusing to fill her script until July 24th. She said she is completely out and wants either Percocet or Norco.

## 2017-07-05 NOTE — TELEPHONE ENCOUNTER
----- Message from Dorene Fierro sent at 7/5/2017 11:06 AM CDT -----  Contact: Self [phone]  Patient is calling to get a written RX on her medication oxycodone-acetaminophen (PERCOCET)  mg per tablet

## 2017-07-05 NOTE — PROGRESS NOTES
Physical Therapy Evaluation    Name: Thom Krishna  Clinic Number: [id number]    Diagnosis:   Encounter Diagnoses   Name Primary?   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    azathioprine (IMURAN) 50 mg Tab take 1 tablet by mouth once daily    blood-glucose meter (FREESTYLE LITE METER) kit Test tid please dispense test strips and lancets    diazePAM (VALIUM) 5 MG tablet take 1 tablet by mouth every 6 hours if needed for muscle spasm FOR UP TO 10 DAYS    hydrocodone-acetaminophen 10-325mg (NORCO)  mg Tab Take 1 tablet by mouth every 6 (six) hours as needed for Pain.    levothyroxine (SYNTHROID) 75 MCG tablet Take 1 tablet (75 mcg total) by mouth once daily.    ondansetron (ZOFRAN-ODT) 8 MG TbDL dissolve 1 tablet ON TONGUE three times a day if needed for nausea    oxycodone-acetaminophen (PERCOCET)  mg per tablet take 1 tablet by mouth every 6 hours if needed for pain FOR UP TO 10 DAYS    oxycodone-acetaminophen (PERCOCET) 5-325 mg per tablet Take 1 tablet by mouth every 4 (four) hours as needed for Pain.    pantoprazole (PROTONIX) 40 MG tablet Take 1 tablet (40 mg total) by mouth once daily.    ZEPATIER  mg Tab      No current facility-administered medications for this visit.      Review of patient's allergies indicates:   Allergen Reactions    Penicillins      Other reaction(s): Hives    Sulfa  (sulfonamide antibiotics)      Other reaction(s): Hives       Precautions: No bending, lifting, twisting, driving    Evaluation Date: 7/5/176  Visit # authorized: ***  Authorization period: ***  Plan of care Expiration: ***    Subjective   PSH: 2015: left CARLA  PLOF: sedentary lifestyle    Occupation: Pt is disabled    Primary concern/ Chief complaints:  Thom is a 62 y.o. female that presents to [facility] secondary to neck/back pain. Injury/surgery occurred approximately: ~4/2017; 6/1/17: lumbar spine sx. Pt. presents with the following co-morbidities and personal factors that directly impact *** plan of care: ***.   X-ray/MRI was taken and revealed Mild disc height loss noted throughout the lower lumbar spine.  There is severe lower lumbar facet arthropathy. Pt reports numbness and tingling in bilateral LEs right greater than left.  Pt denies SOB and/or pain radiating from back to front or along rib cage.    Red flags:  Pt. denies bowel/bladder symptoms (urinary retention/fecal incontinence). Recent weight loss? No; Constant/Night pain that is unchanging with change of position? No; PMH of CA? No    Vertebral artery symptoms: denies symptoms    Onset/KELECHI: sudden: 6/1/17: pt. underwent lumbar spine sx: no records found, fusion sx per pt. report; indicated for HNP    Previous treatment: medication.    Pain Scale: Thom rates pain on a scale of 0-10 to be 8 at worst; 8 currently; 6 at best .    Aggravating Factors:***  Relieving Factors: sidelying, medication    ADLs: Pt has a decrease ability to perform ADLs such as ***.    Patient Goals: Pt would like to decrease pain and increase function so *** can return to *** normal daily activities.    Objective     Observation: supine: right ilial anterior/left ilial posterior     Posture: subcranial hyperextension, cervical flattened lordosis, FHP, mild Tspine kyphosis, rolled anterior/IR shoulders, shoulder protraction, and excessive hip/knee  flexion    Cervical ROM: (measured in degrees) ***   Degrees Quality   Flexion 55    Extension     Right Rotation 45    Left Rotation 40    Right SB 25 bilateral ERP   Left SB 25 bilateral ERP     Shoulder Active/ Passive ROM: (measured in degrees) ***  Shoulder Right UE Left UE   Flexion  limited as follows: 115 limited as follows: 115   Abduction limited as follows: 90 limited as follows: 90   ER limited as follows: C6 limited as follows: unable to due to pain   IR limited as follows: L5 limited as follows: L1     Lumbar ROM: (measured in degrees) ***   Degrees Quality   Flexion 10      Extension       Left Side Bending 5    Right Side Bending 5    Left rotation     Right Rotation       Active/Passive Hip ROM: (measured in degrees) ***   RLE LLE   Flexion / /   Abduction / /   Extension / /   ER / /   IR / /   Sensation: Dermatomes: Intact bilateral UEs    Dermatomes: (impaired/normal)     RLE LLE   L2 Intact Intact   L3 Intact Impaired: numbness   L4 Intact Intact   L5 Intact Intact   S1 Intact; pain Intact     Reflexes: C5/T1/L3/S1: 2 bilaterally    Strength: (measured in neutral spine, gradin-5 out of 5) ***  Cervical MMT   Flexion 3/5   Extension 3/5   Right Side Bend 3/5   Left Side Bend 3/5   Upper trap. 3/5     Upper Extremity Strength: (grading 1-5 out of 5) ***     Right UE Left UE   Shoulder Flexion: 3-/5 3-/5   Shoulder Abduction: 3-/5 3-/5   Shoulder ER: by side 3-/5 3-/5   Shoulder IR: by side 3-/5 3-/5        Elbow Flexion: 4-/5 4-/5   Elbow Extension: 4-/5 4-/5        Wrist flexion: {AMB PT VESTIBULAR STRENGTH:89136} {AMB PT VESTIBULAR STRENGTH:50811}   Wrist extension: {AMB PT VESTIBULAR STRENGTH:39008} {AMB PT VESTIBULAR STRENGTH:59958}         #1      #2      #3     EPL: 3+/5 3+/5        Lower Trap: seated {AMB PT VESTIBULAR STRENGTH:84783} {AMB PT VESTIBULAR STRENGTH:35499}   Middle Trap: seated 3/5 3/5   Rhomboids: 3/5 3/5     Lower Extremity Strength (graded 0-5 out of 5)   RLE  LLE   Hip flexion 3-/5 3-5   Hip ER 3-/5 3-/5   Hip IR 3-/5 3-/5   Knee extension 4-/5 4-/5   Ankle dorsiflexion 4-/5 4-/5   Great toe extension 4/5 4/5   Posterior fibers of gluteus medius 3/5 3/5   Hip extension 3-/5 3-/5   Knee flexion 3+/5 3+/5   Plantarflexion unable to assess unable to assess     Cervical Spine Special Tests: ((+): positive; (-): negative) ***  Compression    Spurling's A    Spurling's B    Distraction    Deep neck flexor test    Lateral Flexion Alar Ligament    First Rib      Lumbar Spine Special Tests: ((+): pos.; (-): neg.) ***  · Quadrant test:    · Slump Test:   · SLR Test:   · ASLR Test:   · Pirformis Test:     Flexibility: ***    Cervical musculature flexibility: (restriction: mild, mod.: moderate, severe grading) ***  Muscle Flexibility   suboccipitals    upper trapezius    levator scapula    middle scalenes    posterior scalenes      LE flexibility: (measured in degrees)  · Popliteal Angle: R = 60 degrees ; L = 60 degrees  · Kenny's test: R =  ; L =   · Rah test: R =  ; L =   · Ely's test: R =  dgrees ; L =  degrees    Palpation for condition: ***  · Position:       · Warmth:   · Swelling:   · Texture:     Joint Mobility: (graded 0-6 out of 6) ***    Outcome measures:   Functional Status Measures:    Intake Score     Pts Physical FS Primary Measure      ***                          Risk Adjustment Statistical FOTO     ***        PT reviewed FOTO scores for Thom Krishna on 07/05/2017.   FOTO scores were entered into EPIC - see media section.    PT Evaluation Completed? {YES:79102}  Discussed Plan of Care with patient: {YES:74877}    History  Co-morbidities and personal factors that may impact the plan of care Examination  Body Structures and Functions, activity limitations and participation restrictions that may impact the plan of care Clinical Presentation   Decision Making/ Complexity Score   Co-morbidities:   ***            Personal Factors:   *** Body Regions: ***    Body  Systems: ***      Activity limitations: ***      Participation Restrictions: ***       ***   ***     Clinical Presentation/complexity category  ***    TREATMENT:  Therapeutic exercise: Thom received therapeutic exercises to develop strength and endurance, flexibility for *** minutes including: ***    Manual therapy: Thom  received the following manual therapy techniques x *** min. to include Joint and Soft tissue Mobilization were applied to the: *** to include: ***     Modalities: Pt. received cold pack x 10 min. to *** following treatment.    Pt. Education: Instructed pt. regarding: HEP including proper form/technique; body mechanics, and posture re-education x ***min.. Pt demonstrated and verbalized good understanding of the education provided. Thom demonstrated good return demonstration of activities.  · No cultural, environmental, or spiritual barriers identified to treatment or learning.    Medical necessity is demonstrated by the following IMPAIRMENTS/PROBLEM LIST:   1) Pain limiting function   2) Postural dysfunction   3) Longus colli/suboccipital tightness   4) Upper trapezius/levator scapula tightness   5) Longus colli/scapula stabilizer/core/lumbar/hip weakness ***   6) Decreased cervical/shoulder/lumbar/hip ROM ***   7) Decreased cervical/thoracic/lumbar/hip joint mobility***   8) Decreased UT/levator scapula/scalenes soft tissue extensibility***   9) Decreased piriformis/HS/hip flexor extensibility   10) Lack of HEP   11) *** paresthesia     GOALS:   Short Term Goals: *** weeks  1. Pt. to report decreased *** pain  </=  ***/10 at worst to increase tolerance for upright unsupported sitting  2. Pt. to demonstrate proper cervical and scapula retraction requiring min. to no verbal cues from PT  3. Increase *** lower cervical joint mobility to ***/6 to promote greater ease with self care skills  4. Increase thoracic joint mobility to ***/6 to promote greater ease with self care skills  5. Increase  cervical rotation AROM to *** degrees to promote greater ease with driving  6. Pt. to demonstrate increased MMT for cervical paraspinals to ***/5 to improve endurance with upright unsupported sitting posture.  7. Pt. to demonstrate increased MMT for bilateral sh. flexion to ***/5 to improve tolerance for reaching/lifting [place]  8. Pt. to demonstrate increased MMT for mid-trap/lower trap strength to ***/5 to improve scapula stability during ADL and work activities.   9. Increased MMT for core/lumbar paraspinals to ***/5 to increase endurance during prolonged sitting/standing.  10. Increased MMT for gluteus medius to ***/5 to increase hip stability during ADL and work activities.   11. Pt to tolerate HEP to improve ROM and independence with ADLs  12 Pt. to report a decrease in UE paresthesia by 25% ***    Long Term Goals: *** weeks  1. Pt. to report decreased *** pain </ =  ***/10 at worst to increase tolerance for upright unsupported sitting posture  2. Pt. to demonstrate proper cervical and scapula retraction requiring no verbal cues from PT  3. Increase *** lower cervical joint mobility to ***/6 to promote greater ease with self care skills  4. Increase thoracic joint mobility to ***/6 to promote greater ease with self care skills  5. Increase cervical rotation AROM to *** degrees to promote greater ease with driving  6. Pt. to demonstrate increased MMT for cervical paraspinals to ***/5 to improve endurance during upright unsupported sitting posture  7. Pt. to demonstrate increased MMT for B UE elevation to ***/5 to improve tolerance for ADL and work activities.   8. Pt. to demonstrate increased MMT for mid-trap/lower trap strength to ***/5 to improve scapula stability during ADL and work activities.   9. Increased MMT for core/lumbar paraspinals to ***/5 to increase endurance during prolonged sitting/standing.  10. Increased MMT for gluteus medius to ***/5 to increase tolerance for ADL and work activities.   11. Pt  to be independent with HEP to improve ROM and independence with ADL's  12. Pt. to report a decrease in UE paresthesia by at least 50% ***    Assessment     This is a 62 y.o. female referred to outpatient physical therapy who presents with a medical diagnosis of *** and PT diagnosis of *** demonstrating joint dysfunction and functional limitation as described above. Level of complexity is ***;  based on patient's past medical history including the above co-morbidities and personal factors; functional limitations, and clinical presentation directly impacting his/her plan of care.     Patient was in agreement with set goals. Pt was given a HEP. Pt verbally understood the instructions and demonstrated proper form/technique. Pt was advised to perform these exercises free of pain and to discontinue use if symptoms persist/worsen. Pt will benefit from physical therapy services in order to maximize painfree functional independence.    Plan     Pt will be treated by physical therapy 1-3 times a week for *** weeks for pt. education, HEP, therapeutic exercises, neuromuscular re-education, joint/soft tissue mobilizations, and modalities prn to achieve established goals. Thom may at times be seen by a PTA as part of the Rehab Team.     I certify the need for these services furnished under this plan of treatment and while under my care.______________________________ Physician/Referring Practitioner  Date of Signature

## 2017-07-05 NOTE — PROGRESS NOTES
Physical Therapy Evaluation    Name: Thom Krishna  Clinic Number: [id number]    Diagnosis:   Encounter Diagnoses   Name Primary?    Difficulty walking Yes    Weakness      Physician: Brandy Dejesus MD  Treatment Orders: PT Eval and Treat    History     Past Medical History:   Diagnosis Date    Anxiety and depression 7/21/2015    Cervical radiculopathy 4/8/2016    Colon polyps 4/27/2015    Diabetes mellitus type 2 with neurological manifestations 11/6/2015    no current medications, only one episode of elevated sugars with acute illness, will monitor     Hip fracture     Macrocytosis 7/21/2015     Current Outpatient Prescriptions   Medication Sig    alprazolam (XANAX) 1 MG tablet Take 1 tablet (1 mg total) by mouth 2 (two) times daily.    azathioprine (IMURAN) 50 mg Tab take 1 tablet by mouth once daily    blood-glucose meter (FREESTYLE LITE METER) kit Test tid please dispense test strips and lancets    diazePAM (VALIUM) 5 MG tablet take 1 tablet by mouth every 6 hours if needed for muscle spasm FOR UP TO 10 DAYS    hydrocodone-acetaminophen 10-325mg (NORCO)  mg Tab Take 1 tablet by mouth every 6 (six) hours as needed for Pain.    levothyroxine (SYNTHROID) 75 MCG tablet Take 1 tablet (75 mcg total) by mouth once daily.    ondansetron (ZOFRAN-ODT) 8 MG TbDL dissolve 1 tablet ON TONGUE three times a day if needed for nausea    oxycodone-acetaminophen (PERCOCET)  mg per tablet take 1 tablet by mouth every 6 hours if needed for pain FOR UP TO 10 DAYS    oxycodone-acetaminophen (PERCOCET) 5-325 mg per tablet Take 1 tablet by mouth every 4 (four) hours as needed for Pain.    pantoprazole (PROTONIX) 40 MG tablet Take 1 tablet (40 mg total) by mouth once daily.    ZEPATIER  mg Tab      No current facility-administered medications for this visit.      Review of patient's allergies indicates:   Allergen Reactions    Penicillins      Other reaction(s): Hives    Sulfa (sulfonamide  antibiotics)      Other reaction(s): Hives       Precautions: No bending, lifting, twisting, driving    Evaluation Date: 7/5/176  Visit # authorized: 1/1  Authorization period: 12/31/17  Plan of care Expiration: 9/4/17    Subjective   PSH: 2015: left CARLA  PLOF: sedentary lifestyle    Occupation: Pt is disabled    Primary concern/ Chief complaints:  Thom is a 62 y.o. female that presents to [facility] secondary to neck/back pain. Injury/surgery occurred approximately: ~4/2017; 6/1/17: lumbar spine sx. Pt. presents with the following co-morbidities and personal factors that directly impact her plan of care: low pain david. and PMH of left CRALA.   X-ray/MRI was taken and revealed Mild disc height loss noted throughout the lower lumbar spine.  There is severe lower lumbar facet arthropathy. Pt reports numbness and tingling in bilateral LEs right greater than left.  Pt denies SOB and/or pain radiating from back to front or along rib cage.    Red flags:  Pt. denies bowel/bladder symptoms (urinary retention/fecal incontinence). Recent weight loss? No; Constant/Night pain that is unchanging with change of position? No; PMH of CA? No    Vertebral artery symptoms: denies symptoms    Onset/KELECHI: sudden: 6/1/17: pt. underwent lumbar spine sx: no records found, fusion sx per pt. report; indicated for HNP    Previous treatment: medication.    Pain Scale: Thom rates pain on a scale of 0-10 to be 8 at worst; 8 currently; 6 at best .    Aggravating Factors:quite a bit of difficulty: looking up to see a bird, lowering a lightweight object from th top of shelf of closet, looking down to see shoes; moderate difficulty: combing/brushing hair and performing personal activities like washing hair  Relieving Factors: sidelying, medication    ADLs: Pt has a decrease ability to perform ADLs such as see above.    Patient Goals: Pt would like to decrease pain and increase function so she can return to her normal daily  activities with manageable symptoms    Objective     Observation: supine: right ilial anterior/left ilial posterior     Posture: subcranial hyperextension, cervical flattened lordosis, FHP, mild Tspine kyphosis, rolled anterior/IR shoulders, shoulder protraction, and excessive hip/knee flexion    Cervical ROM: (measured in degrees)    Degrees Quality   Flexion 55    Extension     Right Rotation 45    Left Rotation 40    Right SB 25 bilateral ERP   Left SB 25 bilateral ERP     Shoulder Active/ Passive ROM: (measured in degrees)   Shoulder Right UE Left UE   Flexion  limited as follows: 115 limited as follows: 115   Abduction limited as follows: 90 limited as follows: 90   ER limited as follows: C6 limited as follows: unable to due to pain   IR limited as follows: L5 limited as follows: L1     Lumbar ROM: (measured in degrees)    Degrees Quality   Flexion 10      Left Side Bending 5    Right Side Bending 5      Sensation: Dermatomes: Intact bilateral UEs    Dermatomes: (impaired/normal)     RLE LLE   L2 Intact Intact   L3 Intact Impaired: numbness   L4 Intact Intact   L5 Intact Intact   S1 Intact; pain Intact     Reflexes: C5/T1/L3/S1: 2 bilaterally    Strength: (measured in neutral spine, gradin-5 out of 5)   Cervical MMT   Flexion 3/5   Extension 3/5   Right Side Bend 3/5   Left Side Bend 3/5   Upper trap. 3/5     Upper Extremity Strength: (grading 1-5 out of 5)      Right UE Left UE   Shoulder Flexion: 3-/5 3-/5   Shoulder Abduction: 3-/5 3-/5   Shoulder ER: by side 3-/5 3-/5   Shoulder IR: by side 3-/5 3-/5        Elbow Flexion: 4-/5 4-/5   Elbow Extension: 4-/5 4-/5             Gross grasp 3 3   EPL: 3+/5 3+/5        Lower Trap: seated NT NT   Middle Trap: seated 3/5 3/5   Rhomboids: 3/5 3/5     Lower Extremity Strength (graded 0-5 out of 5)   RLE LLE   Hip flexion 3-/5 3-5   Hip ER 3-/5 3-/5   Hip IR 3-/5 3-/5   Knee extension 4-/5 4-/5   Ankle dorsiflexion 4-/5 4-/5   Great toe extension 4/5 4/5   Posterior  fibers of gluteus medius 3/5 3/5   Hip extension 3-/5 3-/5   Knee flexion 3+/5 3+/5   Plantarflexion unable to assess unable to assess       Lumbar Spine Special Tests: ((+): pos.; (-): neg.)   · Slump Test: +right  · SLR Test: 60 deg. bilaterally  · ASLR Test: +  · Pirformis Test: moderate restriction    Flexibility:     Cervical musculature flexibility: (restriction: mild, mod.: moderate, severe grading)   Muscle Flexibility   suboccipitals mod.   upper trapezius mod.   levator scapula mod.   middle scalenes mod.   posterior scalenes mod.     LE flexibility: (measured in degrees)  · Popliteal Angle: R = 60 degrees ; L = 60 degrees    Palpation for condition: see observation  · Position: see above  · Warmth: normal  · Swelling: none  · Texture: hypertonic piriformis/hamstrings/sciatic nerve    Joint Mobility: (graded 0-6 out of 6) NT    Outcome measures:   Functional Status Measures:    Intake Score     Pts Physical FS Primary Measure      37                          Risk Adjustment Statistical FOTO     36        PT reviewed FOTO scores for Thom Krishna on 07/05/2017.   FOTO scores were entered into EPIC - see media section.    PT Evaluation Completed? Yes  Discussed Plan of Care with patient: Yes    History  Co-morbidities and personal factors that may impact the plan of care Examination  Body Structures and Functions, activity limitations and participation restrictions that may impact the plan of care Clinical Presentation   Decision Making/ Complexity Score   Co-morbidities:   left CARLA            Personal Factors:   poor pain david. Body Regions: hip/SIJ/lumbar spine/cervical    Body Systems: Decreased AROM; pelvic dysfunction; core hip lumbopelvic weakness; poor posture; pain with transitional activities, decrease exercise ability, and pain with ADLs.     Activity limitations: prolonged sitting/standing    Participation Restrictions: community ambulation evolving with changing clinical characteristics   moderate      Clinical Presentation/complexity category  Moderate complexity category: pt. has 1-2 personal factors and/or co-morbidities directly  impacting POC, 3 or more body system impairments/functional limitations/participation restrictions; as well as, condition is evolving with changing clinical characteristics.    TREATMENT:  Therapeutic exercise: Thom received therapeutic exercises to develop strength and endurance, flexibility for 10 minutes including: heel slides, chin tucks, posture education    Pt. Education: Instructed pt. regarding: HEP including proper form/technique; body mechanics, and posture re-education x 5min.. Pt demonstrated and verbalized good understanding of the education provided. Thom demonstrated good return demonstration of activities.  · No cultural, environmental, or spiritual barriers identified to treatment or learning.    Medical necessity is demonstrated by the following IMPAIRMENTS/PROBLEM LIST:   1) Pain limiting function   2) Postural dysfunction   3) Longus colli/suboccipital tightness   4) Upper trapezius/levator scapula tightness   5) Longus colli/scapula stabilizer/core/lumbar/hip weakness    6) Decreased cervical/shoulder/lumbar/hip ROM    7) Decreased cervical/thoracic/lumbar/hip joint mobility   8) Decreased UT/levator scapula/scalenes soft tissue extensibility   9) Decreased piriformis/HS/hip flexor extensibility   10) Lack of HEP   11) bilateral LE paresthesia     GOALS:   Short Term Goals: 4 weeks  1. Pt. to report decreased cervical/lumbar pain  </=  5/10 at worst to increase tolerance for upright unsupported sitting  2. Pt. to demonstrate proper cervical and scapula retraction requiring min. to no verbal cues from PT  3. Increase bilateral lower cervical joint mobility to 2+/6 to promote greater ease with self care skills  4. Increase thoracic joint mobility to 2+/6 to promote greater ease with self care skills  5. Increase bilateral cervical rotation AROM to 55 degrees  to promote greater ease with driving  6. Pt. to demonstrate increased MMT for cervical paraspinals to 3/5 to improve endurance with upright unsupported sitting posture.  7. Pt. to demonstrate increased MMT for bilateral sh. flexion to 3/5 to improve tolerance for reaching/lifting OH  8. Increased MMT for core/lumbar paraspinals to 3/5 to increase endurance during prolonged sitting/standing.  9. Increased MMT for bilateral gluteus medius to 3+/5 to increase hip stability during ADL and work activities.   10. Pt to tolerate HEP to improve ROM and independence with ADLs  11 Pt. to report a decrease in bilateral LE paresthesia by 25% community ambulation    Long Term Goals: 8 weeks  1. Pt. to report decreased cervical/lumbar pain </ =  2/10 at worst to increase tolerance for upright unsupported sitting posture  2. Pt. to demonstrate proper cervical and scapula retraction requiring no verbal cues from PT  3. Increase bilateral lower cervical joint mobility to 3-/6 to promote greater ease with self care skills  4. Increase thoracic joint mobility to 3-/6 to promote greater ease with self care skills  5. Increase bilateral cervical rotation AROM to 65 degrees to promote greater ease with driving  6. Pt. to demonstrate increased MMT for cervical paraspinals to 3+/5 to improve endurance during upright unsupported sitting posture  7. Pt. to demonstrate increased MMT for B UE elevation to 4-/5 to improve tolerance for ADL and work activities.   8. Pt. to demonstrate increased MMT for mid-trap/lower trap strength to 3/5 to improve scapula stability during ADL and work activities.   9. Increased MMT for core/lumbar paraspinals to 3+/5 to increase endurance during prolonged sitting/standing.  10. Increased MMT for bilateral gluteus medius to 4/5 to increase tolerance for ADL and work activities.   11. Pt to be independent with HEP to improve ROM and independence with ADL's  12. Pt. to report a decrease in bilateral LE paresthesia  by at least 50% moderate lifting tasks    Assessment     This is a 62 y.o. female referred to outpatient physical therapy who presents with a medical diagnosis of chronic cervical/lumbar spine pain and PT diagnosis of weakness, difficulty walking demonstrating joint dysfunction and functional limitation as described above. Level of complexity is moderate;  based on patient's past medical history including the above co-morbidities and personal factors; functional limitations, and clinical presentation directly impacting his/her plan of care.     Patient was in agreement with set goals. Pt was given a HEP. Pt verbally understood the instructions and demonstrated proper form/technique. Pt was advised to perform these exercises free of pain and to discontinue use if symptoms persist/worsen. Pt will benefit from physical therapy services in order to maximize painfree functional independence.    Plan     Pt will be treated by physical therapy 1-3 times a week for 8 weeks for pt. education, HEP, therapeutic exercises, neuromuscular re-education, joint/soft tissue mobilizations, and modalities prn to achieve established goals. Thom may at times be seen by a PTA as part of the Rehab Team.     I certify the need for these services furnished under this plan of treatment and while under my care.______________________________ Physician/Referring Practitioner  Date of Signature

## 2017-07-05 NOTE — TELEPHONE ENCOUNTER
----- Message from Alba Govea sent at 7/5/2017  1:05 PM CDT -----  Contact: self, [phone]  Patient called in requesting to speak with you about why her doctor will not provide her with more medication. States she was told she had to go to the emergency room every time. Please advise.

## 2017-07-06 RX ORDER — OXYCODONE AND ACETAMINOPHEN 5; 325 MG/1; MG/1
1 TABLET ORAL EVERY 4 HOURS PRN
Qty: 20 TABLET | Refills: 0 | Status: SHIPPED | OUTPATIENT
Start: 2017-07-06 | End: 2017-07-14 | Stop reason: SDUPTHER

## 2017-07-06 NOTE — PLAN OF CARE
Physical Therapy Evaluation    Name: Thom Krishna  Clinic Number: [id number]    Diagnosis:   Encounter Diagnoses   Name Primary?    Difficulty walking Yes    Weakness      Physician: Brandy Dejesus MD  Treatment Orders: PT Eval and Treat    History     Past Medical History:   Diagnosis Date    Anxiety and depression 7/21/2015    Cervical radiculopathy 4/8/2016    Colon polyps 4/27/2015    Diabetes mellitus type 2 with neurological manifestations 11/6/2015    no current medications, only one episode of elevated sugars with acute illness, will monitor     Hip fracture     Macrocytosis 7/21/2015     Current Outpatient Prescriptions   Medication Sig    alprazolam (XANAX) 1 MG tablet Take 1 tablet (1 mg total) by mouth 2 (two) times daily.    azathioprine (IMURAN) 50 mg Tab take 1 tablet by mouth once daily    blood-glucose meter (FREESTYLE LITE METER) kit Test tid please dispense test strips and lancets    diazePAM (VALIUM) 5 MG tablet take 1 tablet by mouth every 6 hours if needed for muscle spasm FOR UP TO 10 DAYS    hydrocodone-acetaminophen 10-325mg (NORCO)  mg Tab Take 1 tablet by mouth every 6 (six) hours as needed for Pain.    levothyroxine (SYNTHROID) 75 MCG tablet Take 1 tablet (75 mcg total) by mouth once daily.    ondansetron (ZOFRAN-ODT) 8 MG TbDL dissolve 1 tablet ON TONGUE three times a day if needed for nausea    oxycodone-acetaminophen (PERCOCET)  mg per tablet take 1 tablet by mouth every 6 hours if needed for pain FOR UP TO 10 DAYS    oxycodone-acetaminophen (PERCOCET) 5-325 mg per tablet Take 1 tablet by mouth every 4 (four) hours as needed for Pain.    pantoprazole (PROTONIX) 40 MG tablet Take 1 tablet (40 mg total) by mouth once daily.    ZEPATIER  mg Tab      No current facility-administered medications for this visit.      Review of patient's allergies indicates:   Allergen Reactions    Penicillins      Other reaction(s): Hives    Sulfa (sulfonamide  antibiotics)      Other reaction(s): Hives       Precautions: No bending, lifting, twisting, driving    Evaluation Date: 7/5/176  Visit # authorized: 1/1  Authorization period: 12/31/17  Plan of care Expiration: 9/4/17    Subjective   PSH: 2015: left CARLA  PLOF: sedentary lifestyle    Occupation: Pt is disabled    Primary concern/ Chief complaints:  Thom is a 62 y.o. female that presents to [facility] secondary to neck/back pain. Injury/surgery occurred approximately: ~4/2017; 6/1/17: lumbar spine sx. Pt. presents with the following co-morbidities and personal factors that directly impact her plan of care: low pain david. and PMH of left CARLA.   X-ray/MRI was taken and revealed Mild disc height loss noted throughout the lower lumbar spine.  There is severe lower lumbar facet arthropathy. Pt reports numbness and tingling in bilateral LEs right greater than left.  Pt denies SOB and/or pain radiating from back to front or along rib cage.    Red flags:  Pt. denies bowel/bladder symptoms (urinary retention/fecal incontinence). Recent weight loss? No; Constant/Night pain that is unchanging with change of position? No; PMH of CA? No    Vertebral artery symptoms: denies symptoms    Onset/KELECHI: sudden: 6/1/17: pt. underwent lumbar spine sx: no records found, fusion sx per pt. report; indicated for HNP    Previous treatment: medication.    Pain Scale: Thom rates pain on a scale of 0-10 to be 8 at worst; 8 currently; 6 at best .    Aggravating Factors:quite a bit of difficulty: looking up to see a bird, lowering a lightweight object from th top of shelf of closet, looking down to see shoes; moderate difficulty: combing/brushing hair and performing personal activities like washing hair  Relieving Factors: sidelying, medication    ADLs: Pt has a decrease ability to perform ADLs such as see above.    Patient Goals: Pt would like to decrease pain and increase function so she can return to her normal daily  activities with manageable symptoms    Objective     Observation: supine: right ilial anterior/left ilial posterior     Posture: subcranial hyperextension, cervical flattened lordosis, FHP, mild Tspine kyphosis, rolled anterior/IR shoulders, shoulder protraction, and excessive hip/knee flexion    Cervical ROM: (measured in degrees)    Degrees Quality   Flexion 55    Extension     Right Rotation 45    Left Rotation 40    Right SB 25 bilateral ERP   Left SB 25 bilateral ERP     Shoulder Active/ Passive ROM: (measured in degrees)   Shoulder Right UE Left UE   Flexion  limited as follows: 115 limited as follows: 115   Abduction limited as follows: 90 limited as follows: 90   ER limited as follows: C6 limited as follows: unable to due to pain   IR limited as follows: L5 limited as follows: L1     Lumbar ROM: (measured in degrees)    Degrees Quality   Flexion 10      Left Side Bending 5    Right Side Bending 5      Sensation: Dermatomes: Intact bilateral UEs    Dermatomes: (impaired/normal)     RLE LLE   L2 Intact Intact   L3 Intact Impaired: numbness   L4 Intact Intact   L5 Intact Intact   S1 Intact; pain Intact     Reflexes: C5/T1/L3/S1: 2 bilaterally    Strength: (measured in neutral spine, gradin-5 out of 5)   Cervical MMT   Flexion 3/5   Extension 3/5   Right Side Bend 3/5   Left Side Bend 3/5   Upper trap. 3/5     Upper Extremity Strength: (grading 1-5 out of 5)      Right UE Left UE   Shoulder Flexion: 3-/5 3-/5   Shoulder Abduction: 3-/5 3-/5   Shoulder ER: by side 3-/5 3-/5   Shoulder IR: by side 3-/5 3-/5        Elbow Flexion: 4-/5 4-/5   Elbow Extension: 4-/5 4-/5             Gross grasp 3 3   EPL: 3+/5 3+/5        Lower Trap: seated NT NT   Middle Trap: seated 3/5 3/5   Rhomboids: 3/5 3/5     Lower Extremity Strength (graded 0-5 out of 5)   RLE LLE   Hip flexion 3-/5 3-5   Hip ER 3-/5 3-/5   Hip IR 3-/5 3-/5   Knee extension 4-/5 4-/5   Ankle dorsiflexion 4-/5 4-/5   Great toe extension 4/5 4/5   Posterior  fibers of gluteus medius 3/5 3/5   Hip extension 3-/5 3-/5   Knee flexion 3+/5 3+/5   Plantarflexion unable to assess unable to assess       Lumbar Spine Special Tests: ((+): pos.; (-): neg.)   · Slump Test: +right  · SLR Test: 60 deg. bilaterally  · ASLR Test: +  · Pirformis Test: moderate restriction    Flexibility:     Cervical musculature flexibility: (restriction: mild, mod.: moderate, severe grading)   Muscle Flexibility   suboccipitals mod.   upper trapezius mod.   levator scapula mod.   middle scalenes mod.   posterior scalenes mod.     LE flexibility: (measured in degrees)  · Popliteal Angle: R = 60 degrees ; L = 60 degrees    Palpation for condition: see observation  · Position: see above  · Warmth: normal  · Swelling: none  · Texture: hypertonic piriformis/hamstrings/sciatic nerve    Joint Mobility: (graded 0-6 out of 6) NT    Outcome measures:   Functional Status Measures:    Intake Score     Pts Physical FS Primary Measure      37                          Risk Adjustment Statistical FOTO     36        PT reviewed FOTO scores for Thom Krishna on 07/05/2017.   FOTO scores were entered into EPIC - see media section.    PT Evaluation Completed? Yes  Discussed Plan of Care with patient: Yes    History  Co-morbidities and personal factors that may impact the plan of care Examination  Body Structures and Functions, activity limitations and participation restrictions that may impact the plan of care Clinical Presentation   Decision Making/ Complexity Score   Co-morbidities:   left CARLA            Personal Factors:   poor pain david. Body Regions: hip/SIJ/lumbar spine/cervical    Body Systems: Decreased AROM; pelvic dysfunction; core hip lumbopelvic weakness; poor posture; pain with transitional activities, decrease exercise ability, and pain with ADLs.     Activity limitations: prolonged sitting/standing    Participation Restrictions: community ambulation evolving with changing clinical characteristics   moderate      Clinical Presentation/complexity category  Moderate complexity category: pt. has 1-2 personal factors and/or co-morbidities directly  impacting POC, 3 or more body system impairments/functional limitations/participation restrictions; as well as, condition is evolving with changing clinical characteristics.    TREATMENT:  Therapeutic exercise: Thom received therapeutic exercises to develop strength and endurance, flexibility for 10 minutes including: heel slides, chin tucks, posture education    Pt. Education: Instructed pt. regarding: HEP including proper form/technique; body mechanics, and posture re-education x 5min.. Pt demonstrated and verbalized good understanding of the education provided. Thom demonstrated good return demonstration of activities.  · No cultural, environmental, or spiritual barriers identified to treatment or learning.    Medical necessity is demonstrated by the following IMPAIRMENTS/PROBLEM LIST:   1) Pain limiting function   2) Postural dysfunction   3) Longus colli/suboccipital tightness   4) Upper trapezius/levator scapula tightness   5) Longus colli/scapula stabilizer/core/lumbar/hip weakness    6) Decreased cervical/shoulder/lumbar/hip ROM    7) Decreased cervical/thoracic/lumbar/hip joint mobility   8) Decreased UT/levator scapula/scalenes soft tissue extensibility   9) Decreased piriformis/HS/hip flexor extensibility   10) Lack of HEP   11) bilateral LE paresthesia     GOALS:   Short Term Goals: 4 weeks  1. Pt. to report decreased cervical/lumbar pain  </=  5/10 at worst to increase tolerance for upright unsupported sitting  2. Pt. to demonstrate proper cervical and scapula retraction requiring min. to no verbal cues from PT  3. Increase bilateral lower cervical joint mobility to 2+/6 to promote greater ease with self care skills  4. Increase thoracic joint mobility to 2+/6 to promote greater ease with self care skills  5. Increase bilateral cervical rotation AROM to 55 degrees  to promote greater ease with driving  6. Pt. to demonstrate increased MMT for cervical paraspinals to 3/5 to improve endurance with upright unsupported sitting posture.  7. Pt. to demonstrate increased MMT for bilateral sh. flexion to 3/5 to improve tolerance for reaching/lifting OH  8. Increased MMT for core/lumbar paraspinals to 3/5 to increase endurance during prolonged sitting/standing.  9. Increased MMT for bilateral gluteus medius to 3+/5 to increase hip stability during ADL and work activities.   10. Pt to tolerate HEP to improve ROM and independence with ADLs  11 Pt. to report a decrease in bilateral LE paresthesia by 25% community ambulation    Long Term Goals: 8 weeks  1. Pt. to report decreased cervical/lumbar pain </ =  2/10 at worst to increase tolerance for upright unsupported sitting posture  2. Pt. to demonstrate proper cervical and scapula retraction requiring no verbal cues from PT  3. Increase bilateral lower cervical joint mobility to 3-/6 to promote greater ease with self care skills  4. Increase thoracic joint mobility to 3-/6 to promote greater ease with self care skills  5. Increase bilateral cervical rotation AROM to 65 degrees to promote greater ease with driving  6. Pt. to demonstrate increased MMT for cervical paraspinals to 3+/5 to improve endurance during upright unsupported sitting posture  7. Pt. to demonstrate increased MMT for B UE elevation to 4-/5 to improve tolerance for ADL and work activities.   8. Pt. to demonstrate increased MMT for mid-trap/lower trap strength to 3/5 to improve scapula stability during ADL and work activities.   9. Increased MMT for core/lumbar paraspinals to 3+/5 to increase endurance during prolonged sitting/standing.  10. Increased MMT for bilateral gluteus medius to 4/5 to increase tolerance for ADL and work activities.   11. Pt to be independent with HEP to improve ROM and independence with ADL's  12. Pt. to report a decrease in bilateral LE paresthesia  by at least 50% moderate lifting tasks    Assessment     This is a 62 y.o. female referred to outpatient physical therapy who presents with a medical diagnosis of chronic cervical/lumbar spine pain and PT diagnosis of weakness, difficulty walking demonstrating joint dysfunction and functional limitation as described above. Level of complexity is moderate;  based on patient's past medical history including the above co-morbidities and personal factors; functional limitations, and clinical presentation directly impacting his/her plan of care.     Patient was in agreement with set goals. Pt was given a HEP. Pt verbally understood the instructions and demonstrated proper form/technique. Pt was advised to perform these exercises free of pain and to discontinue use if symptoms persist/worsen. Pt will benefit from physical therapy services in order to maximize painfree functional independence.    Plan     Pt will be treated by physical therapy 1-3 times a week for 8 weeks for pt. education, HEP, therapeutic exercises, neuromuscular re-education, joint/soft tissue mobilizations, and modalities prn to achieve established goals. Thom may at times be seen by a PTA as part of the Rehab Team.     I certify the need for these services furnished under this plan of treatment and while under my care.______________________________ Physician/Referring Practitioner  Date of Signature

## 2017-07-06 NOTE — TELEPHONE ENCOUNTER
Please let Thom know how important it is that she discuss her level of pain with her surgeon.  She may need to also try some non-narcotic pain medications like gabapentin to help with her pain. Also things like Elavil can help with chronic pain and sleep.  I didn't get to review everything yesterday, but I have reviewed the situation/  and am ok with #20 percocet to get her through until her appointment with [facility].  I am sending this to Grecia to see if she can sign off on the RX today since I am not in the office to print the RX thanks.     Cinnamon can you please fill this for Thom Krishna for me? Last favor, I promise :)

## 2017-07-07 ENCOUNTER — TELEPHONE (OUTPATIENT)
Dept: FAMILY MEDICINE | Facility: CLINIC | Age: 62
End: 2017-07-07

## 2017-07-07 NOTE — TELEPHONE ENCOUNTER
----- Message from Kat Medley sent at 7/7/2017 10:32 AM CDT -----  Contact: Bonita from [employer]/[phone], ext. [phone]  Bonita called to find out status of request sent over for doctor's chart notes and orders for diabetic shoes.    Please call and advise.

## 2017-07-07 NOTE — TELEPHONE ENCOUNTER
Called Bonita, patient referred to podiatry in 2016 and never followed through. We have no documentation that she followed through. We have to DX to qualify her for Diabetic shoes.

## 2017-07-12 ENCOUNTER — TELEPHONE (OUTPATIENT)
Dept: FAMILY MEDICINE | Facility: CLINIC | Age: 62
End: 2017-07-12

## 2017-07-12 DIAGNOSIS — G89.29 CHRONIC NECK AND BACK PAIN: Primary | ICD-10-CM

## 2017-07-12 DIAGNOSIS — M54.9 CHRONIC NECK AND BACK PAIN: Primary | ICD-10-CM

## 2017-07-12 DIAGNOSIS — M54.2 CHRONIC NECK AND BACK PAIN: Primary | ICD-10-CM

## 2017-07-12 NOTE — TELEPHONE ENCOUNTER
Spoke to patient-patient stated she was told her insurance would not cover her visits through [facility]. Patient stated she was advised to schedule therapy at  [facility]. Upon further research, patient's referral  shows authorization for 5 visits. It also showed appointments scheduled. Informed patient she was approved through her insurance.

## 2017-07-12 NOTE — TELEPHONE ENCOUNTER
"Thom is requesting a referral to continue physical therapy at "[facility]." following her recent neck  Surgery.     I am assuming that she is referring to the [facility] system.      Oma, I have never referred someone for physical therapy through them, so could you please look at this order and see if it looks okay?  Should I add anything to the text of the order or does it need to be changed?      Thank you!  "

## 2017-07-12 NOTE — TELEPHONE ENCOUNTER
----- Message from Bárbara Medley sent at 7/12/2017 10:34 AM CDT -----  Contact: [phone]  Pt requesting a nurse call back. Please advise.

## 2017-07-12 NOTE — TELEPHONE ENCOUNTER
Returned patients call. She stated she tried to have PT switched to [facility] but there was a problem with coverage through her insurance company. She has to stick with [facility] but they are requesting a referral. She also is requesting a refill on her pain meds. She stated they aren't prescribing her the medication. She stated with the recent surgery she needs something. She would like to continue with you for the management of her pain. Please advise.

## 2017-07-13 ENCOUNTER — TELEPHONE (OUTPATIENT)
Dept: FAMILY MEDICINE | Facility: CLINIC | Age: 62
End: 2017-07-13

## 2017-07-13 ENCOUNTER — TELEPHONE (OUTPATIENT)
Dept: REHABILITATION | Facility: HOSPITAL | Age: 62
End: 2017-07-13

## 2017-07-13 DIAGNOSIS — G89.29 OTHER CHRONIC PAIN: ICD-10-CM

## 2017-07-13 DIAGNOSIS — M50.30 DDD (DEGENERATIVE DISC DISEASE), CERVICAL: ICD-10-CM

## 2017-07-13 DIAGNOSIS — M51.36 DDD (DEGENERATIVE DISC DISEASE), LUMBAR: ICD-10-CM

## 2017-07-14 ENCOUNTER — CLINICAL SUPPORT (OUTPATIENT)
Dept: REHABILITATION | Facility: HOSPITAL | Age: 62
End: 2017-07-14
Attending: FAMILY MEDICINE
Payer: MEDICAID

## 2017-07-14 DIAGNOSIS — R53.1 WEAKNESS: ICD-10-CM

## 2017-07-14 DIAGNOSIS — R26.2 DIFFICULTY WALKING: Primary | ICD-10-CM

## 2017-07-14 PROCEDURE — 97110 THERAPEUTIC EXERCISES: CPT

## 2017-07-14 RX ORDER — OXYCODONE AND ACETAMINOPHEN 5; 325 MG/1; MG/1
1 TABLET ORAL 2 TIMES DAILY PRN
Qty: 60 TABLET | Refills: 0 | Status: SHIPPED | OUTPATIENT
Start: 2017-07-14 | End: 2017-07-14 | Stop reason: SDUPTHER

## 2017-07-14 RX ORDER — OXYCODONE AND ACETAMINOPHEN 5; 325 MG/1; MG/1
1 TABLET ORAL 2 TIMES DAILY PRN
Qty: 60 TABLET | Refills: 0 | Status: SHIPPED | OUTPATIENT
Start: 2017-08-13 | End: 2017-08-07 | Stop reason: SDUPTHER

## 2017-07-14 NOTE — PROGRESS NOTES
"                                                    Physical Therapy Progress Note     Name: Thom Krishna  Clinic Number: [id number]  Diagnosis:   Encounter Diagnoses   Name Primary?    Difficulty walking Yes    Weakness      Physician: Brandy Dejesus MD  Treatment Orders: PT Evaluation and Treatment  Past Medical History:   Diagnosis Date    Anxiety and depression 7/21/2015    Cervical radiculopathy 4/8/2016    Colon polyps 4/27/2015    Diabetes mellitus type 2 with neurological manifestations 11/6/2015    no current medications, only one episode of elevated sugars with acute illness, will monitor     Hip fracture     Macrocytosis 7/21/2015       Precautions: standard    Evaluation date: 7/05/17  Visit #: 2/6  Authorization period expiration: 8/19/17    Subjective     Pt reports: "I feel pain from my head to my feet."    Pain Scale: before treatment: 8/10 currently; after treatment: NT    Objective   Therapeutic exercise: Thom Krishna received therapeutic exercises to develop LLE strengthening/flexibility and hip stabilization for 50 minutes including:    Supine:  Chink tucks  Cervical rotation 15x  DKTC w/ physio ball 20x  PPT w/ TA activation 10x  Knee fall outs w/ TA activation 8x    Sitting:  Scapular retractions 15x      Manual therapy: Thom Krishna received the following manual therapy techniques for 0 minutes: np      Written Home Exercises Provided:   Pt demo good understanding of the education provided during the initial evaluation. Thom Krishna demonstrated good return demonstration of activities.      Pt. education:  · Posture reeducation, body mechanics, HEP,activity modification/avoidance  · No spiritual or educational barriers to learning provided  · Pt has no cultural, educational or language barriers to learning provided.    Assessment   Pt arrived with significant amount of pain from neck extending to distal BLEs.  Pt decline various lower level therapeutic exercises secondary to pain.  " Pt sched to see PT of record during the next tx session. Pt will continue to benefit from skilled outpatient physical therapy to address the remaining functional deficits, provide pt/family education, and to maximize pt's level of independence in the home and community environment.      Anticipated barriers to physical therapy: None    Medical necessity is demonstrated by the following IMPAIRMENTS/PROBLEM LIST:                        1) Pain limiting function                        2) Postural dysfunction                        3) Longus colli/suboccipital tightness                        4) Upper trapezius/levator scapula tightness                        5) Longus colli/scapula stabilizer/core/lumbar/hip weakness                         6) Decreased cervical/shoulder/lumbar/hip ROM                         7) Decreased cervical/thoracic/lumbar/hip joint mobility                        8) Decreased UT/levator scapula/scalenes soft tissue extensibility                        9) Decreased piriformis/HS/hip flexor extensibility                        10) Lack of HEP                        11) bilateral LE paresthesia      GOALS:   Short Term Goals: 4 weeks  1. Pt. to report decreased cervical/lumbar pain  </=  5/10 at worst to increase tolerance for upright unsupported sitting  2. Pt. to demonstrate proper cervical and scapula retraction requiring min. to no verbal cues from PT  3. Increase bilateral lower cervical joint mobility to 2+/6 to promote greater ease with self care skills  4. Increase thoracic joint mobility to 2+/6 to promote greater ease with self care skills  5. Increase bilateral cervical rotation AROM to 55 degrees to promote greater ease with driving  6. Pt. to demonstrate increased MMT for cervical paraspinals to 3/5 to improve endurance with upright unsupported sitting posture.  7. Pt. to demonstrate increased MMT for bilateral sh. flexion to 3/5 to improve tolerance for reaching/lifting OH  8.  Increased MMT for core/lumbar paraspinals to 3/5 to increase endurance during prolonged sitting/standing.  9. Increased MMT for bilateral gluteus medius to 3+/5 to increase hip stability during ADL and work activities.   10. Pt to tolerate HEP to improve ROM and independence with ADLs  11 Pt. to report a decrease in bilateral LE paresthesia by 25% community ambulation     Long Term Goals: 8 weeks  1. Pt. to report decreased cervical/lumbar pain </ =  2/10 at worst to increase tolerance for upright unsupported sitting posture  2. Pt. to demonstrate proper cervical and scapula retraction requiring no verbal cues from PT  3. Increase bilateral lower cervical joint mobility to 3-/6 to promote greater ease with self care skills  4. Increase thoracic joint mobility to 3-/6 to promote greater ease with self care skills  5. Increase bilateral cervical rotation AROM to 65 degrees to promote greater ease with driving  6. Pt. to demonstrate increased MMT for cervical paraspinals to 3+/5 to improve endurance during upright unsupported sitting posture  7. Pt. to demonstrate increased MMT for B UE elevation to 4-/5 to improve tolerance for ADL and work activities.   8. Pt. to demonstrate increased MMT for mid-trap/lower trap strength to 3/5 to improve scapula stability during ADL and work activities.   9. Increased MMT for core/lumbar paraspinals to 3+/5 to increase endurance during prolonged sitting/standing.  10. Increased MMT for bilateral gluteus medius to 4/5 to increase tolerance for ADL and work activities.   11. Pt to be independent with HEP to improve ROM and independence with ADL's  12. Pt. to report a decrease in bilateral LE paresthesia by at least 50% moderate lifting tasks       · Pt's spiritual, cultural and educational needs considered and pt agreeable to plan of care and goals as stated below:        Plan   Pt will be treated by physical therapy 1-3 times a week for 8 weeks for pt. education, HEP, therapeutic  exercises, neuromuscular re-education, joint/soft tissue mobilizations, and modalities prn to achieve established goals. Thom may at times be seen by a PTA as part of the Rehab Team.

## 2017-07-17 RX ORDER — AZATHIOPRINE 50 MG/1
TABLET ORAL
Qty: 30 TABLET | Refills: 5 | Status: SHIPPED | OUTPATIENT
Start: 2017-07-17 | End: 2018-01-02 | Stop reason: SDUPTHER

## 2017-07-21 ENCOUNTER — CLINICAL SUPPORT (OUTPATIENT)
Dept: REHABILITATION | Facility: HOSPITAL | Age: 62
End: 2017-07-21
Attending: FAMILY MEDICINE
Payer: MEDICAID

## 2017-07-21 DIAGNOSIS — R53.1 WEAKNESS: ICD-10-CM

## 2017-07-21 DIAGNOSIS — R26.2 DIFFICULTY WALKING: Primary | ICD-10-CM

## 2017-07-21 PROCEDURE — 97110 THERAPEUTIC EXERCISES: CPT

## 2017-07-21 NOTE — PROGRESS NOTES
"                                                    Physical Therapy Progress Note     Name: Thom Krishna  Clinic Number: [id number]  Diagnosis:   Encounter Diagnoses   Name Primary?    Difficulty walking Yes    Weakness      Physician: Brandy Dejesus MD  Treatment Orders: PT Evaluation and Treatment  Past Medical History:   Diagnosis Date    Anxiety and depression 7/21/2015    Cervical radiculopathy 4/8/2016    Colon polyps 4/27/2015    Diabetes mellitus type 2 with neurological manifestations 11/6/2015    no current medications, only one episode of elevated sugars with acute illness, will monitor     Hip fracture     Macrocytosis 7/21/2015       Precautions: standard    Evaluation date: 7/05/17  Visit #: 3/6  Authorization period expiration: 8/19/17    Subjective     Pt reports: "I'm still in a lot of pain; less than last visit, because I didn't take my medication.    Pain Scale: before treatment: 7/10 currently; after treatment: NT    Objective   Therapeutic exercise: Thom Krishna received therapeutic exercises to develop LLE strengthening/flexibility and hip stabilization for 50 minutes including:    Supine:  · Chink tucks: 2x8  · Cervical rotation with beach ball: 2x8  · wand flexion AAROM: 2x8  · PPT w/ TA activation with ball hip adduction: 10x  · bilateral piriformis stretch: 3x30 sec. (could not do due to pain)  · SKTC: 3x20 sec.  · bilateral sciatic nerve glide: 3x10  · LTR with diaphragmatic breathing: 2x8  · Knee fall outs w/ TA activation and diaphragmatic breathing: 2x8  Sitting:  Scapular retractions 15x    Manual therapy: Thom Krishna received the following manual therapy techniques for 0 minutes: np    Modalities: Pt. received heat pack x 10 min. to lumbar spine following treatment.    Written Home Exercises Provided:   Pt demo good understanding of the education provided during the initial evaluation. Thom Krishna demonstrated good return demonstration of activities.      Pt. " education:  · Posture reeducation, body mechanics, HEP,activity modification/avoidance  · No spiritual or educational barriers to learning provided  · Pt has no cultural, educational or language barriers to learning provided.    Assessment   Pt. still demonstrated poor david. to exercise. Pt. required recurrent verbal/tactile cues for proper exercise technique due to poor david. Will continue to progress as david.    Pt will continue to benefit from skilled outpatient physical therapy to address the remaining functional deficits, provide pt/family education, and to maximize pt's level of independence in the home and community environment.      Anticipated barriers to physical therapy:poor pain david.  Pt's spiritual, cultural and educational needs considered and pt agreeable to plan of care and goals as stated below:   Medical necessity is demonstrated by the following IMPAIRMENTS/PROBLEM LIST:                        1) Pain limiting function                        2) Postural dysfunction                        3) Longus colli/suboccipital tightness                        4) Upper trapezius/levator scapula tightness                        5) Longus colli/scapula stabilizer/core/lumbar/hip weakness                         6) Decreased cervical/shoulder/lumbar/hip ROM                         7) Decreased cervical/thoracic/lumbar/hip joint mobility                        8) Decreased UT/levator scapula/scalenes soft tissue extensibility                        9) Decreased piriformis/HS/hip flexor extensibility                        10) Lack of HEP                        11) bilateral LE paresthesia      GOALS:   Short Term Goals: 4 weeks  1. Pt. to report decreased cervical/lumbar pain  </=  5/10 at worst to increase tolerance for upright unsupported sitting  2. Pt. to demonstrate proper cervical and scapula retraction requiring min. to no verbal cues from PT  3. Increase bilateral lower cervical joint mobility to 2+/6 to  promote greater ease with self care skills  4. Increase thoracic joint mobility to 2+/6 to promote greater ease with self care skills  5. Increase bilateral cervical rotation AROM to 55 degrees to promote greater ease with driving  6. Pt. to demonstrate increased MMT for cervical paraspinals to 3/5 to improve endurance with upright unsupported sitting posture.  7. Pt. to demonstrate increased MMT for bilateral sh. flexion to 3/5 to improve tolerance for reaching/lifting OH  8. Increased MMT for core/lumbar paraspinals to 3/5 to increase endurance during prolonged sitting/standing.  9. Increased MMT for bilateral gluteus medius to 3+/5 to increase hip stability during ADL and work activities.   10. Pt to tolerate HEP to improve ROM and independence with ADLs  11 Pt. to report a decrease in bilateral LE paresthesia by 25% community ambulation     Long Term Goals: 8 weeks  1. Pt. to report decreased cervical/lumbar pain </ =  2/10 at worst to increase tolerance for upright unsupported sitting posture  2. Pt. to demonstrate proper cervical and scapula retraction requiring no verbal cues from PT  3. Increase bilateral lower cervical joint mobility to 3-/6 to promote greater ease with self care skills  4. Increase thoracic joint mobility to 3-/6 to promote greater ease with self care skills  5. Increase bilateral cervical rotation AROM to 65 degrees to promote greater ease with driving  6. Pt. to demonstrate increased MMT for cervical paraspinals to 3+/5 to improve endurance during upright unsupported sitting posture  7. Pt. to demonstrate increased MMT for B UE elevation to 4-/5 to improve tolerance for ADL and work activities.   8. Pt. to demonstrate increased MMT for mid-trap/lower trap strength to 3/5 to improve scapula stability during ADL and work activities.   9. Increased MMT for core/lumbar paraspinals to 3+/5 to increase endurance during prolonged sitting/standing.  10. Increased MMT for bilateral gluteus medius  to 4/5 to increase tolerance for ADL and work activities.   11. Pt to be independent with HEP to improve ROM and independence with ADL's  12. Pt. to report a decrease in bilateral LE paresthesia by at least 50% moderate lifting tasks      Plan   Pt will be treated by physical therapy 1-3 times a week for 8 weeks for pt. education, HEP, therapeutic exercises, neuromuscular re-education, joint/soft tissue mobilizations, and modalities prn to achieve established goals. Thom may at times be seen by a PTA as part of the Rehab Team.

## 2017-07-25 ENCOUNTER — CLINICAL SUPPORT (OUTPATIENT)
Dept: REHABILITATION | Facility: HOSPITAL | Age: 62
End: 2017-07-25
Attending: FAMILY MEDICINE
Payer: MEDICAID

## 2017-07-25 DIAGNOSIS — R26.2 DIFFICULTY WALKING: Primary | ICD-10-CM

## 2017-07-25 DIAGNOSIS — R53.1 WEAKNESS: ICD-10-CM

## 2017-07-25 PROCEDURE — 97110 THERAPEUTIC EXERCISES: CPT

## 2017-07-25 NOTE — PROGRESS NOTES
"                                                    Physical Therapy Progress Note     Name: Thom Krishna  Clinic Number: [id number]  Diagnosis:   Encounter Diagnoses   Name Primary?    Difficulty walking Yes    Weakness      Physician: Brandy Dejesus MD  Treatment Orders: PT Evaluation and Treatment  Past Medical History:   Diagnosis Date    Anxiety and depression 7/21/2015    Cervical radiculopathy 4/8/2016    Colon polyps 4/27/2015    Diabetes mellitus type 2 with neurological manifestations 11/6/2015    no current medications, only one episode of elevated sugars with acute illness, will monitor     Hip fracture     Macrocytosis 7/21/2015       Precautions: standard    Evaluation date: 7/05/17  Visit #: 4/6  Authorization period expiration: 8/19/17    Subjective     Pt reports: "I"m still in a lot of pain even with the medication." States she is really active with intermittent compliance with exercise regimen.    Pain Scale: before treatment: 7/10 currently; after treatment: NT    Objective   Therapeutic exercise: Thom Krishna received therapeutic exercises to develop LLE strengthening/flexibility and hip stabilization for 50 minutes including:    Supine:  · Chink tucks: 2x8  · Cervical rotation with beach ball: 2x8  · wand flexion AAROM: 2x8  · SKTC: 3x20 sec.  · PPT w/ TA activation with ball hip adduction: 10x  · bilateral piriformis stretch: 3x30 sec. (could not do due to pain)  · bilateral sciatic nerve glide: 3x10 (could not do due to pain)  · heel slides: 2x10 ea. leg  · LTR with diaphragmatic breathing: 2x9  · Knee fall outs w/ TA activation and diaphragmatic breathing: 2x9  Sitting:  Scapular retractions 20x    Manual therapy: Thom Krishna received the following manual therapy techniques for 10 minutes:   Joint mobilization:  · long axis hip distraction with oscillations during passive hip abduction/adduction bilaterally    Modalities: Pt. received heat pack x 10 min. to lumbar spine following " treatment.    Written Home Exercises Provided:   Pt demo good understanding of the education provided during the initial evaluation. Thom Krishna demonstrated good return demonstration of activities.      Pt. education:  · Posture reeducation, body mechanics, HEP,activity modification/avoidance  · No spiritual or educational barriers to learning provided  · Pt has no cultural, educational or language barriers to learning provided.    Assessment   Pt. questionable about what she is doing in therapy. She did not understand the purpose of supine exercises and the goals we would like to achieve. PT explained in great detail the need for slow progression starting in supine due to her extremely poor david. to exercise. Pt. c/o pain throughout tx and states that exercises cause her pain. PT explained the need to condition her muscles in hopes to achieve increased endurance, provide stability, exercise/activity david., and shock absorption. PT also informed her that if she didn't do exercise she will further deteriorate in strength and joint protection. Pt. did not full understand PT's education and will need reinforcement to understood the purpose and goals set out by PT. Will continue to progress as david.    Pt will continue to benefit from skilled outpatient physical therapy to address the remaining functional deficits, provide pt/family education, and to maximize pt's level of independence in the home and community environment.      Anticipated barriers to physical therapy:poor pain david.  Pt's spiritual, cultural and educational needs considered and pt agreeable to plan of care and goals as stated below:   Medical necessity is demonstrated by the following IMPAIRMENTS/PROBLEM LIST:                        1) Pain limiting function                        2) Postural dysfunction                        3) Longus colli/suboccipital tightness                        4) Upper trapezius/levator scapula tightness                         5) Longus colli/scapula stabilizer/core/lumbar/hip weakness                         6) Decreased cervical/shoulder/lumbar/hip ROM                         7) Decreased cervical/thoracic/lumbar/hip joint mobility                        8) Decreased UT/levator scapula/scalenes soft tissue extensibility                        9) Decreased piriformis/HS/hip flexor extensibility                        10) Lack of HEP                        11) bilateral LE paresthesia      GOALS:   Short Term Goals: 4 weeks  1. Pt. to report decreased cervical/lumbar pain  </=  5/10 at worst to increase tolerance for upright unsupported sitting  2. Pt. to demonstrate proper cervical and scapula retraction requiring min. to no verbal cues from PT  3. Increase bilateral lower cervical joint mobility to 2+/6 to promote greater ease with self care skills  4. Increase thoracic joint mobility to 2+/6 to promote greater ease with self care skills  5. Increase bilateral cervical rotation AROM to 55 degrees to promote greater ease with driving  6. Pt. to demonstrate increased MMT for cervical paraspinals to 3/5 to improve endurance with upright unsupported sitting posture.  7. Pt. to demonstrate increased MMT for bilateral sh. flexion to 3/5 to improve tolerance for reaching/lifting OH  8. Increased MMT for core/lumbar paraspinals to 3/5 to increase endurance during prolonged sitting/standing.  9. Increased MMT for bilateral gluteus medius to 3+/5 to increase hip stability during ADL and work activities.   10. Pt to tolerate HEP to improve ROM and independence with ADLs  11 Pt. to report a decrease in bilateral LE paresthesia by 25% community ambulation     Long Term Goals: 8 weeks  1. Pt. to report decreased cervical/lumbar pain </ =  2/10 at worst to increase tolerance for upright unsupported sitting posture  2. Pt. to demonstrate proper cervical and scapula retraction requiring no verbal cues from PT  3. Increase bilateral lower cervical joint  mobility to 3-/6 to promote greater ease with self care skills  4. Increase thoracic joint mobility to 3-/6 to promote greater ease with self care skills  5. Increase bilateral cervical rotation AROM to 65 degrees to promote greater ease with driving  6. Pt. to demonstrate increased MMT for cervical paraspinals to 3+/5 to improve endurance during upright unsupported sitting posture  7. Pt. to demonstrate increased MMT for B UE elevation to 4-/5 to improve tolerance for ADL and work activities.   8. Pt. to demonstrate increased MMT for mid-trap/lower trap strength to 3/5 to improve scapula stability during ADL and work activities.   9. Increased MMT for core/lumbar paraspinals to 3+/5 to increase endurance during prolonged sitting/standing.  10. Increased MMT for bilateral gluteus medius to 4/5 to increase tolerance for ADL and work activities.   11. Pt to be independent with HEP to improve ROM and independence with ADL's  12. Pt. to report a decrease in bilateral LE paresthesia by at least 50% moderate lifting tasks      Plan   Pt will be treated by physical therapy 1-3 times a week for 8 weeks for pt. education, HEP, therapeutic exercises, neuromuscular re-education, joint/soft tissue mobilizations, and modalities prn to achieve established goals. Thom may at times be seen by a PTA as part of the Rehab Team.

## 2017-07-27 DIAGNOSIS — E11.9 TYPE 2 DIABETES MELLITUS WITHOUT COMPLICATION: ICD-10-CM

## 2017-07-28 ENCOUNTER — CLINICAL SUPPORT (OUTPATIENT)
Dept: REHABILITATION | Facility: HOSPITAL | Age: 62
End: 2017-07-28
Attending: FAMILY MEDICINE
Payer: MEDICAID

## 2017-07-28 DIAGNOSIS — R26.2 DIFFICULTY WALKING: Primary | ICD-10-CM

## 2017-07-28 DIAGNOSIS — R53.1 WEAKNESS: ICD-10-CM

## 2017-07-28 PROCEDURE — 97110 THERAPEUTIC EXERCISES: CPT

## 2017-07-28 PROCEDURE — 97140 MANUAL THERAPY 1/> REGIONS: CPT

## 2017-07-28 NOTE — PROGRESS NOTES
Physical Therapy Progress Note     Name: Thom Krishna  Clinic Number: [id number]  Diagnosis:   Encounter Diagnoses   Name Primary?    Difficulty walking Yes    Weakness      Physician: Brandy Dejesus MD  Treatment Orders: PT Evaluation and Treatment  Past Medical History:   Diagnosis Date    Anxiety and depression 2015    Cervical radiculopathy 2016    Colon polyps 2015    Diabetes mellitus type 2 with neurological manifestations 2015    no current medications, only one episode of elevated sugars with acute illness, will monitor     Hip fracture     Macrocytosis 2015       Precautions: standard    Evaluation date: 17  Visit #:   Authorization period expiration: 17    Subjective     Pt states having some neck and back pn described as 5/10.      Pain Scale: before treatment: 5/10 currently; after treatment: NT    Objective   Therapeutic exercise: Thom Krishna received therapeutic exercises to develop LLE strengthening/flexibility and hip stabilization for 30 minutes including:    Supine:  · Chink tucks: 3x10  · Cervical rotation with beach ball: 3x10  · wand flexion AAROM: 2 x 10  · SKTC: 3x20 sec. np 2* pn   · PPT w/ TA activation with ball hip adduction: 3 x 10  · bilateral piriformis stretch: 3x30 sec. (could not do due to pain) NP  · bilateral sciatic nerve glide: 3x10 (could not do due to pain) NP  · heel slides: 2x10 ea. leg  · LTR with diaphragmatic breathin min as david (increased pn)  · Knee fall outs w/ TA activation and diaphragmatic breathing: 2x9 NP 2* pn w/ LTR   Sitting:  Scapular retractions 30x    Manual therapy: Thom Krishna received the following manual therapy techniques for 10 minutes:   Joint mobilization:  · long axis hip distraction with oscillations during passive hip abduction/adduction bilaterally    Modalities: Pt. received heat pack x 10 min. to lumbar spine following treatment.    Written  Home Exercises Provided:   Pt demo good understanding of the education provided during the initial evaluation. Thom Krishna demonstrated good return demonstration of activities.      Pt. education:  · Posture reeducation, body mechanics, HEP,activity modification/avoidance  · No spiritual or educational barriers to learning provided  · Pt has no cultural, educational or language barriers to learning provided.    Assessment   Pt david to tx was fair.  Pt displayed increased endurance during therex w/ Vcs for technique.  Pt edu on and instructed to cont HEP.  Cont to progress as david.      Pt will continue to benefit from skilled outpatient physical therapy to address the remaining functional deficits, provide pt/family education, and to maximize pt's level of independence in the home and community environment.      Anticipated barriers to physical therapy:poor pain david.  Pt's spiritual, cultural and educational needs considered and pt agreeable to plan of care and goals as stated below:   Medical necessity is demonstrated by the following IMPAIRMENTS/PROBLEM LIST:                        1) Pain limiting function                        2) Postural dysfunction                        3) Longus colli/suboccipital tightness                        4) Upper trapezius/levator scapula tightness                        5) Longus colli/scapula stabilizer/core/lumbar/hip weakness                         6) Decreased cervical/shoulder/lumbar/hip ROM                         7) Decreased cervical/thoracic/lumbar/hip joint mobility                        8) Decreased UT/levator scapula/scalenes soft tissue extensibility                        9) Decreased piriformis/HS/hip flexor extensibility                        10) Lack of HEP                        11) bilateral LE paresthesia      GOALS:   Short Term Goals: 4 weeks  1. Pt. to report decreased cervical/lumbar pain  </=  5/10 at worst to increase tolerance for upright unsupported  sitting  2. Pt. to demonstrate proper cervical and scapula retraction requiring min. to no verbal cues from PT  3. Increase bilateral lower cervical joint mobility to 2+/6 to promote greater ease with self care skills  4. Increase thoracic joint mobility to 2+/6 to promote greater ease with self care skills  5. Increase bilateral cervical rotation AROM to 55 degrees to promote greater ease with driving  6. Pt. to demonstrate increased MMT for cervical paraspinals to 3/5 to improve endurance with upright unsupported sitting posture.  7. Pt. to demonstrate increased MMT for bilateral sh. flexion to 3/5 to improve tolerance for reaching/lifting OH  8. Increased MMT for core/lumbar paraspinals to 3/5 to increase endurance during prolonged sitting/standing.  9. Increased MMT for bilateral gluteus medius to 3+/5 to increase hip stability during ADL and work activities.   10. Pt to tolerate HEP to improve ROM and independence with ADLs  11 Pt. to report a decrease in bilateral LE paresthesia by 25% community ambulation     Long Term Goals: 8 weeks  1. Pt. to report decreased cervical/lumbar pain </ =  2/10 at worst to increase tolerance for upright unsupported sitting posture  2. Pt. to demonstrate proper cervical and scapula retraction requiring no verbal cues from PT  3. Increase bilateral lower cervical joint mobility to 3-/6 to promote greater ease with self care skills  4. Increase thoracic joint mobility to 3-/6 to promote greater ease with self care skills  5. Increase bilateral cervical rotation AROM to 65 degrees to promote greater ease with driving  6. Pt. to demonstrate increased MMT for cervical paraspinals to 3+/5 to improve endurance during upright unsupported sitting posture  7. Pt. to demonstrate increased MMT for B UE elevation to 4-/5 to improve tolerance for ADL and work activities.   8. Pt. to demonstrate increased MMT for mid-trap/lower trap strength to 3/5 to improve scapula stability during ADL and  work activities.   9. Increased MMT for core/lumbar paraspinals to 3+/5 to increase endurance during prolonged sitting/standing.  10. Increased MMT for bilateral gluteus medius to 4/5 to increase tolerance for ADL and work activities.   11. Pt to be independent with HEP to improve ROM and independence with ADL's  12. Pt. to report a decrease in bilateral LE paresthesia by at least 50% moderate lifting tasks      Plan   Pt will be treated by physical therapy 1-3 times a week for 8 weeks for pt. education, HEP, therapeutic exercises, neuromuscular re-education, joint/soft tissue mobilizations, and modalities prn to achieve established goals. Thom may at times be seen by a PTA as part of the Rehab Team.

## 2017-08-03 DIAGNOSIS — F41.9 ANXIETY: ICD-10-CM

## 2017-08-03 RX ORDER — ALPRAZOLAM 1 MG/1
TABLET ORAL
Qty: 45 TABLET | Refills: 5 | Status: SHIPPED | OUTPATIENT
Start: 2017-08-03 | End: 2017-08-04 | Stop reason: SDUPTHER

## 2017-08-04 DIAGNOSIS — F41.9 ANXIETY: ICD-10-CM

## 2017-08-04 RX ORDER — ALPRAZOLAM 1 MG/1
TABLET ORAL
Qty: 45 TABLET | Refills: 5 | Status: SHIPPED | OUTPATIENT
Start: 2017-08-04 | End: 2018-01-22 | Stop reason: SDUPTHER

## 2017-08-04 NOTE — TELEPHONE ENCOUNTER
----- Message from Shannon Covarrubias sent at 8/4/2017 12:17 PM CDT -----  Contact: [phone]/ self   .Patient says that the pharmacist did not get her prescription for xanax and she would like it to be called it again. Please advise.

## 2017-08-04 NOTE — TELEPHONE ENCOUNTER
----- Message from Esther Dejesus sent at 8/3/2017  4:30 PM CDT -----  Contact: [phone]/self  Patient requesting to speak with you regarding medication. Please advise.

## 2017-08-04 NOTE — TELEPHONE ENCOUNTER
Called pharmacy to confirm receipt of prescription for xanax. Pharmacy stated there was an issue with there fax.  I gave a verbal.

## 2017-08-07 DIAGNOSIS — G89.29 OTHER CHRONIC PAIN: ICD-10-CM

## 2017-08-07 DIAGNOSIS — M51.36 DDD (DEGENERATIVE DISC DISEASE), LUMBAR: ICD-10-CM

## 2017-08-07 DIAGNOSIS — M50.30 DDD (DEGENERATIVE DISC DISEASE), CERVICAL: ICD-10-CM

## 2017-08-07 RX ORDER — OXYCODONE AND ACETAMINOPHEN 5; 325 MG/1; MG/1
1 TABLET ORAL 2 TIMES DAILY PRN
Qty: 60 TABLET | Refills: 0 | Status: SHIPPED | OUTPATIENT
Start: 2017-08-13 | End: 2017-08-08 | Stop reason: SDUPTHER

## 2017-08-07 NOTE — TELEPHONE ENCOUNTER
Returned patient's call. Patient stated due to the back pain she has been taking 2 pain pills a day instead of the one. She is due Saturday for her refill she would like to know if she can receive it sooner. Please advise.

## 2017-08-08 ENCOUNTER — TELEPHONE (OUTPATIENT)
Dept: FAMILY MEDICINE | Facility: CLINIC | Age: 62
End: 2017-08-08

## 2017-08-08 ENCOUNTER — CLINICAL SUPPORT (OUTPATIENT)
Dept: REHABILITATION | Facility: HOSPITAL | Age: 62
End: 2017-08-08
Attending: FAMILY MEDICINE
Payer: MEDICAID

## 2017-08-08 DIAGNOSIS — M51.36 DDD (DEGENERATIVE DISC DISEASE), LUMBAR: ICD-10-CM

## 2017-08-08 DIAGNOSIS — R53.1 WEAKNESS: ICD-10-CM

## 2017-08-08 DIAGNOSIS — M50.30 DDD (DEGENERATIVE DISC DISEASE), CERVICAL: ICD-10-CM

## 2017-08-08 DIAGNOSIS — G89.29 OTHER CHRONIC PAIN: ICD-10-CM

## 2017-08-08 DIAGNOSIS — R26.2 DIFFICULTY WALKING: Primary | ICD-10-CM

## 2017-08-08 PROCEDURE — G8979 MOBILITY GOAL STATUS: HCPCS | Mod: CK

## 2017-08-08 PROCEDURE — G8978 MOBILITY CURRENT STATUS: HCPCS | Mod: CL

## 2017-08-08 PROCEDURE — 97110 THERAPEUTIC EXERCISES: CPT

## 2017-08-08 RX ORDER — OXYCODONE AND ACETAMINOPHEN 5; 325 MG/1; MG/1
1 TABLET ORAL 2 TIMES DAILY PRN
Qty: 60 TABLET | Refills: 0 | Status: SHIPPED | OUTPATIENT
Start: 2017-08-08 | End: 2017-08-28 | Stop reason: ALTCHOICE

## 2017-08-08 NOTE — TELEPHONE ENCOUNTER
----- Message from Ann-Marie Caldwell sent at 8/8/2017  3:20 PM CDT -----  Contact: self/[phone]  She is calling to see if you have gotten the authorizations for her medications.

## 2017-08-08 NOTE — TELEPHONE ENCOUNTER
Returned patient's call. She was calling to see the pharmacy sent a prior authorization for her medication.

## 2017-08-08 NOTE — TELEPHONE ENCOUNTER
Returned Franck's call with [facility] pharmacy. He was calling to verify that it was okay to fill the patient's prescription.

## 2017-08-08 NOTE — TELEPHONE ENCOUNTER
----- Message from Shamika Deal sent at 8/8/2017  3:38 PM CDT -----  Contact: [phone] / Franck Elizalde  Requesting to speak with you regarding the patients prescription for percocet. Please advise.

## 2017-08-08 NOTE — TELEPHONE ENCOUNTER
----- Message from Ann-Marie Caldwell sent at 8/8/2017  2:23 PM CDT -----  Contact: [facility]/[phone]  Patient states that the doctor approved an early refill on the oxycodone-acetaminophen (PERCOCET) 5-325 mg per tablet.

## 2017-08-08 NOTE — PROGRESS NOTES
Physical Therapy Reassessment     Name: Thom Krishna  Clinic Number: [id number]  Diagnosis:   Encounter Diagnoses   Name Primary?    Difficulty walking Yes    Weakness      Physician: Brandy Dejesus MD  Treatment Orders: PT Evaluation and Treatment  Past Medical History:   Diagnosis Date    Anxiety and depression 7/21/2015    Cervical radiculopathy 4/8/2016    Colon polyps 4/27/2015    Diabetes mellitus type 2 with neurological manifestations 11/6/2015    no current medications, only one episode of elevated sugars with acute illness, will monitor     Hip fracture     Macrocytosis 7/21/2015       Precautions: standard    Evaluation date: 7/05/17  Visit #: 6/6  Authorization period expiration: 8/19/17    Subjective     Pt states that her pain is 9/10 in both neck/low back. Pt. notes minimal change in function and pain management since SOC.    Pain Scale: before treatment: 9/10 currently; after treatment: NT    Objective   Functional Status Measures:    Intake Score   8/8/17  Pts Physical FS Primary Measure     37    30                           Risk Adjustment Statistical FOTO  36        PT reviewed FOTO scores for Thom Krishna on 8/8/17.   FOTO scores were entered into [facility] - see media section.    Therapeutic exercise: Thom Krishna received therapeutic exercises to develop LLE strengthening/flexibility and hip stabilization for 30 minutes including:  Cervical ROM: (measured in degrees)     Degrees Quality   Flexion 45  decreased   Extension       Right Rotation 45     Left Rotation 40     Right SB 10 decreased    Left SB 20 decreased       Shoulder Active/ Passive ROM: (measured in degrees)   Shoulder Right UE Left UE   Flexion  limited as follows: 115 limited as follows: 115   Abduction limited as follows: 90 limited as follows: 90   ER limited as follows: C6 limited as follows: unable to due to pain   IR limited as follows: L5 limited as follows: L1       Lumbar ROM: (measured in degrees)     Degrees Quality   Flexion 10     Left Side Bending 5     Right Side Bending 5        Sensation: Dermatomes: Intact bilateral UEs     Dermatomes: (impaired/normal)      RLE LLE   L2 Intact Intact   L3 Intact Intact   L4 Intact Intact   L5 Intact Intact   S1 Intact Intact      Reflexes: C5/T1/L3/S1: 2 bilaterally     Strength: (measured in neutral spine, gradin-5 out of 5)   Cervical MMT   Flexion 3/5   Extension 3/5   Right Side Bend 3/5   Left Side Bend 3/5   Upper trap. 3/5      Upper Extremity Strength: (grading 1-5 out of 5)        Right UE Left UE   Shoulder Flexion: 3-/5 pain 3-/5 pain   Shoulder Abduction: 3-/5 pain 3-/5 pain   Shoulder ER: by side 3-/5 3-/5   Shoulder IR: by side 3-/5 3-/5           Elbow Flexion: 4-/5 4-/5   Elbow Extension: 4-/5 4-/5                   Gross grasp 4 4   EPL: 3+/5 3+/5           Lower Trap: seated NT NT   Middle Trap: seated 3/5 pain 3/5 pain   Rhomboids: 3/5 pain 3/5 pain      Lower Extremity Strength (graded 0-5 out of 5)    RLE LLE   Hip flexion 3-/5 3-5   Hip ER 3-/5 3-/5   Hip IR 3/5 increased 3/5 increased   Knee extension 4/5 increased 4/5 increased   Ankle dorsiflexion 4/5 increased 4/5 increased   Great toe extension 4/5 4/5   Posterior fibers of gluteus medius 3/5 3/5   Hip extension 3-/5 3-/5   Knee flexion 3+/5 3+/5   Plantarflexion unable to assess unable to assess         Lumbar Spine Special Tests: ((+): pos.; (-): neg.)   · Slump Test: +right  · SLR Test: 60 deg. bilaterally  · ASLR Test: +  · Pirformis Test: moderate restriction     Flexibility:      Cervical musculature flexibility: (restriction: mild, mod.: moderate, severe grading)   Muscle Flexibility   suboccipitals mod.   upper trapezius mod.   levator scapula mod.   middle scalenes mod.   posterior scalenes mod.      LE flexibility: (measured in degrees)  · Popliteal Angle: R = 60 degrees ; L = 60 degrees     Palpation for condition: see  observation  · Position: see above  · Warmth: normal  · Swelling: none  · Texture: hypertonic piriformis/hamstrings/sciatic nerve     Joint Mobility: (graded 0-6 out of 6) NT    Supine:  · Chink tucks: 3x10  · Cervical rotation with beach ball: 3x10  · wand flexion AAROM: 2 x 10  · SKTC: 3x20 sec. np 2* pn   · PPT w/ TA activation with ball hip adduction: 3 x 10  · bilateral piriformis stretch: 3x30 sec. (could not do due to pain)   · bilateral sciatic nerve glide: 3x10 (could not do due to pain)   · heel slides: 2x10 ea. bilaterally  · LTR with diaphragmatic breathin min as david (increased pn)  · Knee fall outs w/ TA activation and diaphragmatic breathing with peach TB 2x10  Sitting:  · sit to stand: 2x8  · Scapular retractions 30x    Manual therapy: Thom Krishna received the following manual therapy techniques for 10 minutes: NP  Joint mobilization:  · long axis hip distraction with oscillations during passive hip abduction/adduction bilaterally    Modalities: Pt. received heat pack x 10 min. to lumbar spine following treatment.    Written Home Exercises Provided:   Pt demo good understanding of the education provided during the initial evaluation. Thom Krishna demonstrated good return demonstration of activities.      Pt. education:  · Posture reeducation, body mechanics, HEP,activity modification/avoidance  · No spiritual or educational barriers to learning provided  · Pt has no cultural, educational or language barriers to learning provided.    Assessment   Pt is greatly challenged with exercise progression due to lack of david. Pt. reports pain with most exercise and resists initiation of additional exercise. Pt. states she is motivated to get better and wants to comply with MD recommendations of continuing tx; however, her pain limits her ability to participate. Pt. reports her FOTO survey score to be lower than IE; however, she notes that she has made some progress with tx. Pt. is not a candidate for  aquatic therapy due to her PMH of Hepatitis C; which may have been a more david. option for her. PT may consider referring back to MD for pain management if pt.'s david. does not improve next visit.    Pt will continue to benefit from skilled outpatient physical therapy to address the remaining functional deficits, provide pt/family education, assist with symptom management, and to maximize pt's level of independence in the home and community environment.      Anticipated barriers to physical therapy:poor pain david.  Pt's spiritual, cultural and educational needs considered and pt agreeable to plan of care and goals as stated below:   Medical necessity is demonstrated by the following IMPAIRMENTS/PROBLEM LIST:                        1) Pain limiting function                        2) Postural dysfunction                        3) Longus colli/suboccipital tightness                        4) Upper trapezius/levator scapula tightness                        5) Longus colli/scapula stabilizer/core/lumbar/hip weakness                         6) Decreased cervical/shoulder/lumbar/hip ROM                         7) Decreased cervical/thoracic/lumbar/hip joint mobility                        8) Decreased UT/levator scapula/scalenes soft tissue extensibility                        9) Decreased piriformis/HS/hip flexor extensibility                        10) Lack of HEP                        11) bilateral LE paresthesia      GOALS:   Short Term Goals: 4 weeks  1. Pt. to report decreased cervical/lumbar pain  </=  5/10 at worst to increase tolerance for upright unsupported sitting  2. Pt. to demonstrate proper cervical and scapula retraction requiring min. to no verbal cues from PT  3. Increase bilateral lower cervical joint mobility to 2+/6 to promote greater ease with self care skills  4. Increase thoracic joint mobility to 2+/6 to promote greater ease with self care skills  5. Increase bilateral cervical rotation AROM to 55  degrees to promote greater ease with driving  6. Pt. to demonstrate increased MMT for cervical paraspinals to 3/5 to improve endurance with upright unsupported sitting posture.  7. Pt. to demonstrate increased MMT for bilateral sh. flexion to 3/5 to improve tolerance for reaching/lifting [place]  8. Increased MMT for core/lumbar paraspinals to 3/5 to increase endurance during prolonged sitting/standing.  9. Increased MMT for bilateral gluteus medius to 3+/5 to increase hip stability during ADL and work activities.   10. Pt to tolerate HEP to improve ROM and independence with ADLs  11 Pt. to report a decrease in bilateral LE paresthesia by 25% community ambulation     Long Term Goals: 8 weeks  1. Pt. to report decreased cervical/lumbar pain </ =  2/10 at worst to increase tolerance for upright unsupported sitting posture  2. Pt. to demonstrate proper cervical and scapula retraction requiring no verbal cues from PT  3. Increase bilateral lower cervical joint mobility to 3-/6 to promote greater ease with self care skills  4. Increase thoracic joint mobility to 3-/6 to promote greater ease with self care skills  5. Increase bilateral cervical rotation AROM to 65 degrees to promote greater ease with driving  6. Pt. to demonstrate increased MMT for cervical paraspinals to 3+/5 to improve endurance during upright unsupported sitting posture  7. Pt. to demonstrate increased MMT for B UE elevation to 4-/5 to improve tolerance for ADL and work activities.   8. Pt. to demonstrate increased MMT for mid-trap/lower trap strength to 3/5 to improve scapula stability during ADL and work activities.   9. Increased MMT for core/lumbar paraspinals to 3+/5 to increase endurance during prolonged sitting/standing.  10. Increased MMT for bilateral gluteus medius to 4/5 to increase tolerance for ADL and work activities.   11. Pt to be independent with HEP to improve ROM and independence with ADL's  12. Pt. to report a decrease in bilateral LE  paresthesia by at least 50% moderate lifting tasks      Plan   Pt will be treated by physical therapy 1-3 times a week for 8 weeks for pt. education, HEP, therapeutic exercises, neuromuscular re-education, joint/soft tissue mobilizations, and modalities prn to achieve established goals. Thom may at times be seen by a PTA as part of the Rehab Team.

## 2017-08-08 NOTE — TELEPHONE ENCOUNTER
Called patient to inform her the requested prescription is ready for . She verbalized understanding.

## 2017-08-10 ENCOUNTER — TELEPHONE (OUTPATIENT)
Dept: FAMILY MEDICINE | Facility: CLINIC | Age: 62
End: 2017-08-10

## 2017-08-28 ENCOUNTER — TELEPHONE (OUTPATIENT)
Dept: FAMILY MEDICINE | Facility: CLINIC | Age: 62
End: 2017-08-28

## 2017-08-28 DIAGNOSIS — M50.30 DDD (DEGENERATIVE DISC DISEASE), CERVICAL: ICD-10-CM

## 2017-08-28 DIAGNOSIS — M54.9 CHRONIC NECK AND BACK PAIN: Primary | ICD-10-CM

## 2017-08-28 DIAGNOSIS — G89.29 CHRONIC NECK AND BACK PAIN: Primary | ICD-10-CM

## 2017-08-28 DIAGNOSIS — Z98.890 H/O CERVICAL SPINE SURGERY: ICD-10-CM

## 2017-08-28 DIAGNOSIS — M54.2 CHRONIC NECK AND BACK PAIN: Primary | ICD-10-CM

## 2017-08-28 RX ORDER — HYDROCODONE BITARTRATE AND ACETAMINOPHEN 10; 325 MG/1; MG/1
1 TABLET ORAL 2 TIMES DAILY PRN
Qty: 60 TABLET | Refills: 0 | Status: SHIPPED | OUTPATIENT
Start: 2017-08-28 | End: 2017-09-20 | Stop reason: ALTCHOICE

## 2017-08-28 NOTE — TELEPHONE ENCOUNTER
----- Message from Divine Roche sent at 8/28/2017 10:15 AM CDT -----  Contact: [phone]  Patient would like to speak with you regarding her meds.

## 2017-08-28 NOTE — TELEPHONE ENCOUNTER
Called patient to inform her that her prescription is ready for . She would like additional orders for PT. Please advise.

## 2017-08-28 NOTE — TELEPHONE ENCOUNTER
I printed 1 month of the twice daily Norco to try. She should let us know how this works for her. Does she need a new PT order?  Maybe this would help too?

## 2017-08-28 NOTE — TELEPHONE ENCOUNTER
Returned patients call. She stated the Percocet 5 mg twice a day isn't hasn't been helping her pain. She would like to know if she can go back to taking the Norco 10 mg. Please advise.

## 2017-09-14 ENCOUNTER — CLINICAL SUPPORT (OUTPATIENT)
Dept: REHABILITATION | Facility: HOSPITAL | Age: 62
End: 2017-09-14
Attending: FAMILY MEDICINE
Payer: MEDICAID

## 2017-09-14 DIAGNOSIS — M54.42 CHRONIC BILATERAL LOW BACK PAIN WITH BILATERAL SCIATICA: Primary | ICD-10-CM

## 2017-09-14 DIAGNOSIS — M54.41 CHRONIC BILATERAL LOW BACK PAIN WITH BILATERAL SCIATICA: Primary | ICD-10-CM

## 2017-09-14 DIAGNOSIS — G89.29 CHRONIC NECK PAIN: ICD-10-CM

## 2017-09-14 DIAGNOSIS — R53.1 DECREASED STRENGTH: ICD-10-CM

## 2017-09-14 DIAGNOSIS — M54.2 CHRONIC NECK PAIN: ICD-10-CM

## 2017-09-14 DIAGNOSIS — G89.29 CHRONIC BILATERAL LOW BACK PAIN WITH BILATERAL SCIATICA: Primary | ICD-10-CM

## 2017-09-14 DIAGNOSIS — R29.3 POOR POSTURE: ICD-10-CM

## 2017-09-14 PROCEDURE — 97163 PT EVAL HIGH COMPLEX 45 MIN: CPT | Mod: PN

## 2017-09-14 NOTE — PLAN OF CARE
"  TIME RECORD    Date: 09/14/2017    Start Time:  1012  Stop Time:  1155    PROCEDURES:    TIMED  Procedure Min.   TE 3' NC                     UNTIMED  Procedure Min.   IE 43'         Total Timed Minutes:  0  Total Timed Units:    Total Untimed Units:  1  Charges Billed/# of units:  HCE-1    OUTPATIENT PHYSICAL THERAPY   PATIENT EVALUATION  Onset Date: June 2017  Primary Diagnosis:   1. Chronic bilateral low back pain with bilateral sciatica     2. Chronic neck pain     3. Poor posture     4. Decreased strength       Treatment Diagnosis: Chronic B radicular low back pain, Chronic B radicular neck pain, poor posture, decreased LE and UE strength  Past Medical History:   Diagnosis Date    Anxiety and depression 7/21/2015    Cervical radiculopathy 4/8/2016    Colon polyps 4/27/2015    Diabetes mellitus type 2 with neurological manifestations 11/6/2015    no current medications, only one episode of elevated sugars with acute illness, will monitor     Hip fracture     Macrocytosis 7/21/2015     Precautions: Standard, Hep C, Allergies-Penicillins  Prior Therapy: ~1 month ago for neck and low back at [facility].  Medications: Thom Krishna has a current medication list which includes the following prescription(s): alprazolam, azathioprine, blood-glucose meter, hydrocodone-acetaminophen 10-325mg, levothyroxine, ondansetron, pantoprazole, and zepatier.  Nutrition:  Overweight  History of Present Illness: Chronic low back and neck pain  Prior Level of Function: Independent  Social History: Disability  Place of Residence (Steps/Adaptations): Pt lives with 86 y.o. sister and nephew; pt owns SPC and RW, uses [employer] for community ambulation  Functional Deficits Leading to Referral/Nature of Injury: Chronic low back and neck pain  Patient Therapy Goals: Decrease pain, improve mobility    Subjective     Thom Krishna reports pain in low back and neck, > in low back. Pt had surgery in June 2017, pt reports "rods placed in low " "back." Since then, low back pain radiating into legs has gotten worse. Pt experiences numbness and tingling through B LE to feet as well as a burning sensation in this area. Pain increases with walking, standing, and prolonged sitting. Pt unable to give a distance limit of walking, but reports trouble with walking from lobby to treatment room (~200 ft). Pt also reports being unable to drive long distances due to long sitting periods. Pain decreases with pain medication and lying on L side. Pt reports neck pain that travels to low back and to B fingers. Neck pain increases with neck movement, lifting and overarm movements. B UE experience numbness/tingling with increased neck pain. Medicine improves neck pain.    No recent falls reported within the last 6 months. No reports of incontinence, bowel or bladder issues.    Pain:  Location: low back and B LE  Description: Burning and Sharp  Pain Scale: 4/10 at best 8/10 now  10/10 at worst  Location: neck  Description: Burning and Sharp  Pain Scale: 7/10 at best 7/10 now  9/10 at worst    Objective     Posture: Sitting: forward head, slumped sitting, forward head  Standing: forward and L lateral lean  Palpation: TTP to B UT, levator scap; pt yelled with light touch along spine, greatest pain over thoracic spine  Sensation: Impaired L4-S1 L  DTRs:  Range of Motion/Strength:     Cervical AROM: Pain/Dysfunction with Movement:   Flexion 50 deg pain in midline of lower cervical spine   Extension 40 deg pain in B shoulders   Right side bending 40 deg pulling in neck; combined with rotation   Left side bending 30 deg pulling in neck, reported as most significant pain; combined with rotation   Right rotation 48 deg pain lateral to lower cervical spine B; combined with flexion   Left rotation 44 deg pain lateral to lower cervical spine B; combined with flexion     UE Myotomes:   Shoulder elevation: 4+/5 pain in neck and low back B  Shoulder flexion: 4-/5 B, pain in neck and " shoulders B  Shoulder abduction: 4-5 pain in shoulder to proximal arm B  Elbow flexion: 4+/5 R 4/5 L with pain in bicep  Elbow extension: 4+/5 R, 4/5 L with pain in bicep  Wrist flexion: 4+/5 R, 4/5 L with pain in forearm  Wrist extension: 5/5 B  Finger abduction: 4+/5 B  Thumb extension: 4+/5 R, 4/5 L    Lumbar AROM: Pain/Dysfunction with Movement:   Flexion NT   Extension NT   Right side bending NT   Left side bending NT   Right rotation NT   Left rotation NT     LE Myotomes:  Hip flexion: 4/5 B, extreme pain in B low back and LE   Knee flexion: 4-/5 L, extreme pain in B low back and L LE resulting pt crying  Knee extension: NT  Ankle DF: NT  Ankle eversion: NT  Great toe extension: NT    Gait: without AD  Analysis: forward and lateral trunk lean, decreased stance time on L LE, waddling gait pattern  Transfers: Independent  Special Tests: NT    Functional Limitation Reports: G codes  Tool: FOTO Neck SURVEY  Category: Changing & maintaining body position ()  Limitation: 61%  Current: CL = least 60% but < 80% impaired, limited or restricted  Goal: CJ = at least 20% but < 40% impaired, limited or restricted    Functional Limitation Reports: G codes  Tool: FOTO Lumbar Spine SURVEY  Category: Mobility ()  Limitation: 70%  Current: CL 60%<80%  Goal: CK 40%<60%    Treatment: Pt instructed on deep breathing at end of objective measurements x3' NC    Assessment       Initial Assessment (Pertinent finding, problem list and factors affecting outcome): Pt is a 63 yo female presenting to PT with pain, decreased cervical ROM, decreased UE and LE myotome strength, impaired sensation, impaired balance and gait, and functional deficits with standing, walking, and sitting. Pt had extreme pain with minimal palpation, ROM tests, and strength testing with resistance increasing to moderate at most. Pain with L knee extension myotome test resulted in increased emotional lability and prompted termination of formal objective  testing. Pt not suitable for outpatient PT at this time as it may be too aggressive with current levels of pain. Pt may benefit from referral to pain management MD to address pain dominance in both neck and low back and it's impact on function.      History  Co-morbidities and personal factors that may impact the plan of care Examination  Body Structures and Functions, activity limitations and participation restrictions that may impact the plan of care Clinical Presentation   Decision Making/ Complexity Score   Co-morbidities:   Type 2 Diabetes  Hep C  HTN  GI disease    Personal Factors:   Anxiety  Depression    high Body Regions: , neck, UE, trunk, back, LE    Body Systems: musculoskeletal - posture, ROM, strength; neuromuscular - sensation, balance, gait    Activity limitations:     Participation Restrictions:     high     FOTO Lumbar Spine Survey: 70% limitation    Unstable clinical presentation with unpredictable clinical characteristics     high   high       Rehab Potiential: poor  Barriers to Rehab: Pain dominance, comorbidities    Plan     Certification Period: 9/14/17 to 9/14/17  Recommended Treatment Plan: D/c from outpatient PT  Other Recommendations: Refer to pain management specialist      Therapist: Sherita Santiago, PT    I CERTIFY THE NEED FOR THESE SERVICES FURNISHED UNDER THIS PLAN OF TREATMENT AND WHILE UNDER MY CARE    Physician's comments: ________________________________________________________________________________________________________________________________________________      Physician's Name: ___________________________________

## 2017-09-15 ENCOUNTER — TELEPHONE (OUTPATIENT)
Dept: FAMILY MEDICINE | Facility: CLINIC | Age: 62
End: 2017-09-15

## 2017-09-15 PROBLEM — M54.41 CHRONIC BILATERAL LOW BACK PAIN WITH BILATERAL SCIATICA: Status: RESOLVED | Noted: 2017-09-14 | Resolved: 2017-09-15

## 2017-09-15 PROBLEM — G89.29 CHRONIC BILATERAL LOW BACK PAIN WITH BILATERAL SCIATICA: Status: RESOLVED | Noted: 2017-09-14 | Resolved: 2017-09-15

## 2017-09-15 PROBLEM — M54.2 CHRONIC NECK PAIN: Status: RESOLVED | Noted: 2017-09-14 | Resolved: 2017-09-15

## 2017-09-15 PROBLEM — R53.1 DECREASED STRENGTH: Status: RESOLVED | Noted: 2017-09-14 | Resolved: 2017-09-15

## 2017-09-15 PROBLEM — G89.29 CHRONIC NECK PAIN: Status: RESOLVED | Noted: 2017-09-14 | Resolved: 2017-09-15

## 2017-09-15 PROBLEM — R29.3 POOR POSTURE: Status: RESOLVED | Noted: 2017-09-14 | Resolved: 2017-09-15

## 2017-09-15 PROBLEM — M54.42 CHRONIC BILATERAL LOW BACK PAIN WITH BILATERAL SCIATICA: Status: RESOLVED | Noted: 2017-09-14 | Resolved: 2017-09-15

## 2017-09-15 NOTE — TELEPHONE ENCOUNTER
----- Message from Malathi Atwood sent at 9/15/2017 12:40 PM CDT -----  Contact: Self/ [phone]  Patient would like to speak with you about getting an appointment for the same day she has for her nephew and sister. Please advise.

## 2017-09-15 NOTE — TELEPHONE ENCOUNTER
Returned patient's call, she stated she went to therapy yesterday. She was told that she is in bad shape. The informed her that they were going to contact her PCP to inform her that therapy isn't beneficial right now. She stated the spine doctor gave her a prescription for pain medication. She hasn't filled it because she is seeing Dr. Dejesus for pain medication. He wrote the prescription for percocet 10 MG, quantity 60. She is currently prescribed Norco. She would like to know if she can switch to Percocet. If so she wants to know if she should fill the prescription he gave or have another written from Dr. Dejesus. She was advised that there may be something else going on with her spine. She is scheduled for another MRI. Please advise.

## 2017-09-20 ENCOUNTER — TELEPHONE (OUTPATIENT)
Dept: FAMILY MEDICINE | Facility: CLINIC | Age: 62
End: 2017-09-20

## 2017-09-20 RX ORDER — OXYCODONE AND ACETAMINOPHEN 10; 325 MG/1; MG/1
1 TABLET ORAL 2 TIMES DAILY PRN
Qty: 60 TABLET | Refills: 0 | Status: SHIPPED | OUTPATIENT
Start: 2017-09-20 | End: 2017-10-06 | Stop reason: SDUPTHER

## 2017-09-20 NOTE — TELEPHONE ENCOUNTER
I am okay with changing from [place] to Percocet.  I printed a prescription for 60 Percocet, but she should try to limit their use to only as needed.  I would also like for you to please schedule her for a 40 minute office visit so we can see how she is doing, as I understand a lot has happened to her since her spine surgery.  I would like to discuss whether alternative medications to help with nerve pain and mood may benefit if taken in addition to the Percocet.  I believe her insurance is a type of Medicaid, and sadly they do not cover her pain management services, because I do think that this would be a good idea as did her physical therapist.

## 2017-09-20 NOTE — TELEPHONE ENCOUNTER
Spoke with patient and informed her that her prescription is ready for  also scheduled for an appointment to discuss other options.

## 2017-09-20 NOTE — TELEPHONE ENCOUNTER
----- Message from Mathew Middleton sent at 9/20/2017 10:48 AM CDT -----  Contact: [phone]  Pt calling in - she's not sure if you called her or not. Please call and advise.

## 2017-09-20 NOTE — TELEPHONE ENCOUNTER
Returned patient's call, she kept the appointment on 10/06 so she can be the same day as her brother. The other appointment was cancelled.

## 2017-09-22 ENCOUNTER — TELEPHONE (OUTPATIENT)
Dept: FAMILY MEDICINE | Facility: CLINIC | Age: 62
End: 2017-09-22

## 2017-09-22 NOTE — TELEPHONE ENCOUNTER
Returned patient's pharmacy's call. Was informed it is 4 days to soon for the patient to obtain her pain medication. I informed the pharmacist that I advised the patient to wait until the due date to fill the medication because she still had the other pain medication. Pharmacist verbalized understanding.

## 2017-09-22 NOTE — TELEPHONE ENCOUNTER
----- Message from Dorene Fierro sent at 9/21/2017 11:58 AM CDT -----  Contact: Latasha Calling from [facility] [phone]  Calling to talk to nurse concerning her controled medication. Please advice

## 2017-09-24 ENCOUNTER — HOSPITAL ENCOUNTER (EMERGENCY)
Facility: HOSPITAL | Age: 62
Discharge: HOME | End: 2017-09-24
Attending: EMERGENCY MEDICINE
Payer: MEDICAID

## 2017-09-24 VITALS
OXYGEN SATURATION: 97 % | SYSTOLIC BLOOD PRESSURE: 132 MMHG | RESPIRATION RATE: 16 BRPM | HEART RATE: 60 BPM | DIASTOLIC BLOOD PRESSURE: 68 MMHG

## 2017-09-24 DIAGNOSIS — R11.2 NON-INTRACTABLE VOMITING WITH NAUSEA, UNSPECIFIED VOMITING TYPE: ICD-10-CM

## 2017-09-24 DIAGNOSIS — R07.9 CHEST PAIN, UNSPECIFIED TYPE: Primary | ICD-10-CM

## 2017-09-24 DIAGNOSIS — R10.9 ABDOMINAL PAIN, UNSPECIFIED LOCATION: ICD-10-CM

## 2017-09-24 LAB
ALBUMIN SERPL BCP-MCNC: 3.9 G/DL
ALP SERPL-CCNC: 154 U/L
ALT SERPL W/O P-5'-P-CCNC: 10 U/L
ANION GAP SERPL CALC-SCNC: 13 MMOL/L
AST SERPL-CCNC: 20 U/L
BASOPHILS # BLD AUTO: 0.02 K/UL
BASOPHILS NFR BLD: 0.5 %
BILIRUB SERPL-MCNC: 0.4 MG/DL
BNP SERPL-MCNC: 111 PG/ML
BUN SERPL-MCNC: 14 MG/DL
CALCIUM SERPL-MCNC: 9.2 MG/DL
CHLORIDE SERPL-SCNC: 105 MMOL/L
CO2 SERPL-SCNC: 21 MMOL/L
CREAT SERPL-MCNC: 1 MG/DL
DIFFERENTIAL METHOD: ABNORMAL
EOSINOPHIL # BLD AUTO: 0.1 K/UL
EOSINOPHIL NFR BLD: 1.4 %
ERYTHROCYTE [DISTWIDTH] IN BLOOD BY AUTOMATED COUNT: 14.9 %
EST. GFR  (AFRICAN AMERICAN): >60 ML/MIN/1.73 M^2
EST. GFR  (NON AFRICAN AMERICAN): 60 ML/MIN/1.73 M^2
GLUCOSE SERPL-MCNC: 91 MG/DL
HCT VFR BLD AUTO: 41.7 %
HGB BLD-MCNC: 14 G/DL
LIPASE SERPL-CCNC: 17 U/L
LYMPHOCYTES # BLD AUTO: 1.7 K/UL
LYMPHOCYTES NFR BLD: 37.3 %
MCH RBC QN AUTO: 32.6 PG
MCHC RBC AUTO-ENTMCNC: 33.6 G/DL
MCV RBC AUTO: 97 FL
MONOCYTES # BLD AUTO: 0.4 K/UL
MONOCYTES NFR BLD: 9.7 %
NEUTROPHILS # BLD AUTO: 2.3 K/UL
NEUTROPHILS NFR BLD: 51.1 %
PLATELET # BLD AUTO: 161 K/UL
PMV BLD AUTO: 10.7 FL
POTASSIUM SERPL-SCNC: 3.8 MMOL/L
PROT SERPL-MCNC: 8 G/DL
RBC # BLD AUTO: 4.3 M/UL
SODIUM SERPL-SCNC: 139 MMOL/L
TROPONIN I SERPL DL<=0.01 NG/ML-MCNC: 0.05 NG/ML
TROPONIN I SERPL DL<=0.01 NG/ML-MCNC: 0.06 NG/ML
WBC # BLD AUTO: 4.42 K/UL

## 2017-09-24 PROCEDURE — 83690 ASSAY OF LIPASE: CPT

## 2017-09-24 PROCEDURE — 96365 THER/PROPH/DIAG IV INF INIT: CPT

## 2017-09-24 PROCEDURE — 93005 ELECTROCARDIOGRAM TRACING: CPT

## 2017-09-24 PROCEDURE — 83880 ASSAY OF NATRIURETIC PEPTIDE: CPT

## 2017-09-24 PROCEDURE — 94640 AIRWAY INHALATION TREATMENT: CPT

## 2017-09-24 PROCEDURE — 84484 ASSAY OF TROPONIN QUANT: CPT | Mod: 91

## 2017-09-24 PROCEDURE — 25000242 PHARM REV CODE 250 ALT 637 W/ HCPCS: Performed by: EMERGENCY MEDICINE

## 2017-09-24 PROCEDURE — 99285 EMERGENCY DEPT VISIT HI MDM: CPT | Mod: 25

## 2017-09-24 PROCEDURE — 25500020 PHARM REV CODE 255: Performed by: EMERGENCY MEDICINE

## 2017-09-24 PROCEDURE — 85025 COMPLETE CBC W/AUTO DIFF WBC: CPT

## 2017-09-24 PROCEDURE — 96375 TX/PRO/DX INJ NEW DRUG ADDON: CPT

## 2017-09-24 PROCEDURE — 25000003 PHARM REV CODE 250: Performed by: EMERGENCY MEDICINE

## 2017-09-24 PROCEDURE — 93010 ELECTROCARDIOGRAM REPORT: CPT | Mod: ,,, | Performed by: INTERNAL MEDICINE

## 2017-09-24 PROCEDURE — 80053 COMPREHEN METABOLIC PANEL: CPT

## 2017-09-24 PROCEDURE — 63600175 PHARM REV CODE 636 W HCPCS: Performed by: EMERGENCY MEDICINE

## 2017-09-24 PROCEDURE — 93010 ELECTROCARDIOGRAM REPORT: CPT | Mod: 77,,, | Performed by: INTERNAL MEDICINE

## 2017-09-24 RX ORDER — PROMETHAZINE HYDROCHLORIDE 25 MG/1
25 TABLET ORAL EVERY 6 HOURS PRN
Qty: 20 TABLET | Refills: 0 | Status: SHIPPED | OUTPATIENT
Start: 2017-09-24 | End: 2017-10-06

## 2017-09-24 RX ORDER — ASPIRIN 325 MG
325 TABLET ORAL
Status: COMPLETED | OUTPATIENT
Start: 2017-09-24 | End: 2017-09-24

## 2017-09-24 RX ORDER — ONDANSETRON 2 MG/ML
4 INJECTION INTRAMUSCULAR; INTRAVENOUS
Status: COMPLETED | OUTPATIENT
Start: 2017-09-24 | End: 2017-09-24

## 2017-09-24 RX ORDER — IPRATROPIUM BROMIDE AND ALBUTEROL SULFATE 2.5; .5 MG/3ML; MG/3ML
3 SOLUTION RESPIRATORY (INHALATION)
Status: COMPLETED | OUTPATIENT
Start: 2017-09-24 | End: 2017-09-24

## 2017-09-24 RX ADMIN — PROMETHAZINE HYDROCHLORIDE 12.5 MG: 25 INJECTION INTRAMUSCULAR; INTRAVENOUS at 09:09

## 2017-09-24 RX ADMIN — IPRATROPIUM BROMIDE AND ALBUTEROL SULFATE 3 ML: .5; 3 SOLUTION RESPIRATORY (INHALATION) at 09:09

## 2017-09-24 RX ADMIN — ONDANSETRON 4 MG: 2 INJECTION INTRAMUSCULAR; INTRAVENOUS at 07:09

## 2017-09-24 RX ADMIN — IOHEXOL 75 ML: 350 INJECTION, SOLUTION INTRAVENOUS at 09:09

## 2017-09-24 RX ADMIN — ASPIRIN 325 MG ORAL TABLET 325 MG: 325 PILL ORAL at 05:09

## 2017-09-24 NOTE — ED PROVIDER NOTES
"Encounter Date: 2017       History     Chief Complaint   Patient presents with    Chest Pain     that began at 1300 this afternoon, described as sharp and intermitent     The patient presents with chest pain, nausea and shortness of breath.  The symptoms started approximately 1 PM today, around 4 and half hours ago.  The pain is intermittent.  It is described as sharp.  It is left-sided and radiates down the right arm.  She denied abdominal pain, but reported abdominal tenderness on my exam during initial evaluation.  She denies diarrhea.  No fevers.  No cough.  She reports intermittent similar chest pain off-and-on for the past few months, but has not discussed this with her physician, and the pain was worse today.      The history is provided by the patient.     Review of patient's allergies indicates:   Allergen Reactions    Penicillins      Other reaction(s): Hives    Sulfa (sulfonamide antibiotics)      Other reaction(s): Hives     Past Medical History:   Diagnosis Date    Anxiety and depression 2015    Cervical radiculopathy 2016    Colon polyps 2015    Diabetes mellitus type 2 with neurological manifestations 2015    no current medications, only one episode of elevated sugars with acute illness, will monitor     Hip fracture     Macrocytosis 2015     Past Surgical History:   Procedure Laterality Date     SECTION      CHOLECYSTECTOMY      COLONOSCOPY  3/31/10    2 polyps, tubular adenoma    HYSTERECTOMY      Uterus and both ovaries    INCISIONAL HERNIA REPAIR      x 2    LIVER BIOPSY  2003    autoimmune hepatitis    ROTATOR CUFF REPAIR      right    STOMACH SURGERY      "took lymph node off of liver"    TOTAL HIP ARTHROPLASTY      left hip    UPPER GASTROINTESTINAL ENDOSCOPY  3/24/10    normal     Family History   Problem Relation Age of Onset    Diabetes Mellitus Mother     Liver cancer Sister     Pancreatic cancer Brother     Liver " disease Neg Hx      Social History   Substance Use Topics    Smoking status: Current Every Day Smoker     Packs/day: 2.00     Years: 45.00     Types: Cigarettes    Smokeless tobacco: Never Used    Alcohol use No      Comment: used to drink heavily, stopped in 1990's     Review of Systems   Constitutional: Negative for chills and fever.   HENT: Negative for sore throat.    Respiratory: Positive for shortness of breath. Negative for cough.    Cardiovascular: Positive for chest pain.   Gastrointestinal: Positive for abdominal pain and nausea.   Genitourinary: Negative for dysuria.   Musculoskeletal: Negative for back pain.   Skin: Negative for rash.   Neurological: Negative for weakness.   Hematological: Does not bruise/bleed easily.       Physical Exam     Initial Vitals   BP Pulse Resp Temp SpO2   -- -- -- -- --      MAP       --         Physical Exam    Nursing note and vitals reviewed.  Constitutional: She appears well-developed and well-nourished. She is not diaphoretic. She appears distressed.   HENT:   Mouth/Throat: Oropharynx is clear and moist.   Eyes: EOM are normal. Pupils are equal, round, and reactive to light. No scleral icterus.   Neck: Normal range of motion.   Cardiovascular: Normal rate, regular rhythm, normal heart sounds and intact distal pulses. Exam reveals no gallop and no friction rub.    No murmur heard.  Pulmonary/Chest: Breath sounds normal. No respiratory distress. She has no wheezes. She has no rhonchi. She has no rales. She exhibits no tenderness.   Abdominal: Soft. Bowel sounds are normal. She exhibits no distension. There is tenderness. There is guarding. There is no rebound.   There is tenderness and guarding in the epigastrium and left mid quadrant.   Musculoskeletal: Normal range of motion.   Neurological: She is alert and oriented to person, place, and time. She has normal strength. No cranial nerve deficit or sensory deficit.   Skin: Skin is warm and dry. Rash noted. No erythema.    Purpura on the bilateral lower extremities.   Psychiatric: She has a normal mood and affect.         ED Course   Procedures  Labs Reviewed   CBC W/ AUTO DIFFERENTIAL - Abnormal; Notable for the following:        Result Value    MCH 32.6 (*)     RDW 14.9 (*)     All other components within normal limits   COMPREHENSIVE METABOLIC PANEL - Abnormal; Notable for the following:     CO2 21 (*)     Alkaline Phosphatase 154 (*)     All other components within normal limits   TROPONIN I - Abnormal; Notable for the following:     Troponin I 0.057 (*)     All other components within normal limits   TROPONIN I - Abnormal; Notable for the following:     Troponin I 0.047 (*)     All other components within normal limits   B-TYPE NATRIURETIC PEPTIDE - Abnormal; Notable for the following:      (*)     All other components within normal limits   LIPASE     EKG Readings: (Independently Interpreted)   Normal sinus rhythm, rate 94, no acute ischemic changes.       X-Rays:   Independently Interpreted Readings:   Chest X-Ray: No acute process     Medical Decision Making:   Initial Assessment:   My differential includes acute MI, ACS, peptic ulcer disease, AAA.              Attending Attestation:             Attending ED Notes:   6:33 PM   The patient had a recurrent episode of chest pain.  Repeat EKG at that time is unchanged.  Heart rate was 73.  No acute ischemia.    10:26 PM   The patient's initial troponin was slightly elevated, but on review of her medical records it is always slightly elevated.  Repeat troponin 3 hours later was actually decreased.  I do not think this patient is ACS or acute MI.  2 EKGs showed no ischemia.  She continued to complain of some chest pain and had mild left upper quadrant tenderness without rebound or guarding.  I performed an abdominal CT.  There is a small hiatal hernia with gastritis, but no other acute findings.  The patient will be discharged home with chest pain and abdominal pain  precautions and Zofran for her nausea.  She already has protonic sent home.  I recommended dietary modification and close follow-up.          ED Course      Clinical Impression:   The primary encounter diagnosis was Chest pain, unspecified type. Diagnoses of Abdominal pain, unspecified location and Non-intractable vomiting with nausea, unspecified vomiting type were also pertinent to this visit.                           Emerson Butler MD  09/24/17 [id number]

## 2017-09-24 NOTE — ED TRIAGE NOTES
"The patient complains of chest pain, nausea and shortness of breath.  The symptoms started approximately 1 PM today, .  The pain is intermittent.  It is described as sharp.  It is left-sided and radiates down the right arm.  She denied abdominal pain, but reported abdominal on  Palpation .  She denies diarrhea.  No fevers.  No cough.  She reports intermittent similar chest pain off-and-on for the past few months, but has not discussed this with her physician, and the pain was worse today.pt took zofran and states " once zofran wore off pain increased"   "

## 2017-09-25 NOTE — ED NOTES
Pt family at bedside, pt became angry at RN after Rn suggested pt not drive own car home due to medications administered, RN spoke to pt's son, whom said he would come get pt. Pt daughter in law at bedside.  Pt walked out of ED with slow but steady gait

## 2017-09-26 DIAGNOSIS — R07.9 CHEST PAIN: Primary | ICD-10-CM

## 2017-10-05 RX ORDER — OXYCODONE AND ACETAMINOPHEN 5; 325 MG/1; MG/1
1 TABLET ORAL 2 TIMES DAILY
Refills: 0 | COMMUNITY
Start: 2017-07-14 | End: 2017-10-06 | Stop reason: SDUPTHER

## 2017-10-05 RX ORDER — HYDROCODONE BITARTRATE AND ACETAMINOPHEN 10; 325 MG/1; MG/1
1 TABLET ORAL 2 TIMES DAILY
Refills: 0 | COMMUNITY
Start: 2017-08-29 | End: 2017-10-06

## 2017-10-06 ENCOUNTER — LAB VISIT (OUTPATIENT)
Dept: LAB | Facility: HOSPITAL | Age: 62
End: 2017-10-06
Attending: FAMILY MEDICINE
Payer: MEDICAID

## 2017-10-06 ENCOUNTER — OFFICE VISIT (OUTPATIENT)
Dept: FAMILY MEDICINE | Facility: CLINIC | Age: 62
End: 2017-10-06
Payer: MEDICAID

## 2017-10-06 VITALS
HEIGHT: 62 IN | HEART RATE: 73 BPM | SYSTOLIC BLOOD PRESSURE: 144 MMHG | OXYGEN SATURATION: 98 % | BODY MASS INDEX: 28.2 KG/M2 | DIASTOLIC BLOOD PRESSURE: 81 MMHG | WEIGHT: 153.25 LBS

## 2017-10-06 DIAGNOSIS — E03.9 ACQUIRED HYPOTHYROIDISM: ICD-10-CM

## 2017-10-06 DIAGNOSIS — E53.8 VITAMIN B12 DEFICIENCY: ICD-10-CM

## 2017-10-06 DIAGNOSIS — G89.4 CHRONIC PAIN SYNDROME: ICD-10-CM

## 2017-10-06 DIAGNOSIS — E11.49 DIABETES MELLITUS TYPE 2 WITH NEUROLOGICAL MANIFESTATIONS: ICD-10-CM

## 2017-10-06 DIAGNOSIS — M54.2 CHRONIC NECK AND BACK PAIN: Primary | ICD-10-CM

## 2017-10-06 DIAGNOSIS — E78.1 HYPERTRIGLYCERIDEMIA: ICD-10-CM

## 2017-10-06 DIAGNOSIS — Z98.1 HISTORY OF FUSION OF LUMBAR SPINE: ICD-10-CM

## 2017-10-06 DIAGNOSIS — G89.29 CHRONIC NECK AND BACK PAIN: Primary | ICD-10-CM

## 2017-10-06 DIAGNOSIS — F41.9 ANXIETY AND DEPRESSION: ICD-10-CM

## 2017-10-06 DIAGNOSIS — E11.9 TYPE 2 DIABETES MELLITUS WITHOUT COMPLICATION: ICD-10-CM

## 2017-10-06 DIAGNOSIS — F32.A ANXIETY AND DEPRESSION: ICD-10-CM

## 2017-10-06 DIAGNOSIS — Z79.899 MEDICATION MANAGEMENT: ICD-10-CM

## 2017-10-06 DIAGNOSIS — K75.4 AUTOIMMUNE HEPATITIS: ICD-10-CM

## 2017-10-06 DIAGNOSIS — B19.20 HEPATITIS C VIRUS INFECTION WITHOUT HEPATIC COMA, UNSPECIFIED CHRONICITY: ICD-10-CM

## 2017-10-06 DIAGNOSIS — Z72.0 TOBACCO ABUSE: ICD-10-CM

## 2017-10-06 DIAGNOSIS — G47.01 INSOMNIA DUE TO MEDICAL CONDITION: ICD-10-CM

## 2017-10-06 DIAGNOSIS — M54.9 CHRONIC NECK AND BACK PAIN: Primary | ICD-10-CM

## 2017-10-06 DIAGNOSIS — K21.9 GASTROESOPHAGEAL REFLUX DISEASE, ESOPHAGITIS PRESENCE NOT SPECIFIED: ICD-10-CM

## 2017-10-06 LAB
25(OH)D3+25(OH)D2 SERPL-MCNC: 7 NG/ML
CHOLEST SERPL-MCNC: 192 MG/DL
CHOLEST/HDLC SERPL: 3.7 {RATIO}
ESTIMATED AVG GLUCOSE: 105 MG/DL
ESTIMATED AVG GLUCOSE: 105 MG/DL
HBA1C MFR BLD HPLC: 5.3 %
HBA1C MFR BLD HPLC: 5.3 %
HDLC SERPL-MCNC: 52 MG/DL
HDLC SERPL: 27.1 %
LDLC SERPL CALC-MCNC: 120 MG/DL
NONHDLC SERPL-MCNC: 140 MG/DL
TRIGL SERPL-MCNC: 100 MG/DL
TSH SERPL DL<=0.005 MIU/L-ACNC: 1.41 UIU/ML
VIT B12 SERPL-MCNC: 395 PG/ML

## 2017-10-06 PROCEDURE — 36415 COLL VENOUS BLD VENIPUNCTURE: CPT

## 2017-10-06 PROCEDURE — 99214 OFFICE O/P EST MOD 30 MIN: CPT | Mod: PBBFAC,PO | Performed by: FAMILY MEDICINE

## 2017-10-06 PROCEDURE — 84443 ASSAY THYROID STIM HORMONE: CPT

## 2017-10-06 PROCEDURE — 82306 VITAMIN D 25 HYDROXY: CPT

## 2017-10-06 PROCEDURE — 83036 HEMOGLOBIN GLYCOSYLATED A1C: CPT

## 2017-10-06 PROCEDURE — 82607 VITAMIN B-12: CPT

## 2017-10-06 PROCEDURE — 80061 LIPID PANEL: CPT

## 2017-10-06 PROCEDURE — 99999 PR PBB SHADOW E&M-EST. PATIENT-LVL IV: CPT | Mod: PBBFAC,,, | Performed by: FAMILY MEDICINE

## 2017-10-06 PROCEDURE — 99214 OFFICE O/P EST MOD 30 MIN: CPT | Mod: S$PBB,,, | Performed by: FAMILY MEDICINE

## 2017-10-06 RX ORDER — OXYCODONE AND ACETAMINOPHEN 10; 325 MG/1; MG/1
1 TABLET ORAL 2 TIMES DAILY PRN
Qty: 60 TABLET | Refills: 0 | Status: SHIPPED | OUTPATIENT
Start: 2017-10-20 | End: 2017-10-06 | Stop reason: SDUPTHER

## 2017-10-06 RX ORDER — OXYCODONE AND ACETAMINOPHEN 10; 325 MG/1; MG/1
1 TABLET ORAL 3 TIMES DAILY PRN
Qty: 60 TABLET | Refills: 0 | Status: SHIPPED | OUTPATIENT
Start: 2017-11-20 | End: 2017-10-25 | Stop reason: SDUPTHER

## 2017-10-06 RX ORDER — AMITRIPTYLINE HYDROCHLORIDE 50 MG/1
50 TABLET, FILM COATED ORAL NIGHTLY
Qty: 30 TABLET | Refills: 11 | Status: SHIPPED | OUTPATIENT
Start: 2017-10-06 | End: 2017-12-29

## 2017-10-06 NOTE — PATIENT INSTRUCTIONS
Add 50 mg of amitriptyline (Elavil) nightly to help with chronic pain management and hopefully decrease need for Percocet.  Continue Percocet as needed and follow-up with [place] neurosurgery due to ongoing neck and back pain with outstanding imaging studies.  Referral placed for functional restoration clinic at [facility], but unsure if patient can be accepted due to Medicaid.  Blood work today for monitoring of chronic conditions.

## 2017-10-06 NOTE — PROGRESS NOTES
Office Visit    Patient Name: Thom Krishna    : 1955  MRN: [id number]    Subjective:  Thom is a 62 y.o. female who presents today for:    Follow-up    62-year-old patient of mine with degenerative disc disease of the spine, status post lumbar spine fusion by [place] here for six-month follow-up.  She has had trouble with chronic pain management ever since her lumbar fusion she is requiring daily opioids to control the pain.  Currently taking Percocet.  She has tried outpatient physical therapy but most recently when she attempted to do this, the therapist felt that she was in too much pain and to emotionally distraught to engage in a meaningful way.  We have had trouble getting records from [facility], but the patient reports no benefit to her back pain or sciatica from her lumbar fusion, which of course she finds very depressing.  She reports that she has multiple MRIs outstanding and has a follow-up pending with neurosurgery.  She is reporting trouble sleeping due to the pain and also reports that 2 Percocets daily as  often not enough for adequate pain control.  Due to her Medicaid insurance, we have been unable to procure her pain management services.    Additional medical problems include hepatitis C for which she is being treated by Dr. Valery Sevilla.  Also has underlying anxiety and depression, hypothyroidism, and history of previous diabetes and B12 deficiency, though recent labs for these conditions have been unremarkable.    Seen in ER 2017 for chest pain,  with fortunately unremarkable cardiac evaluation.     Past Medical History  Past Medical History:   Diagnosis Date    Anxiety and depression 2015    Cervical radiculopathy 2016    Colon polyps 2015    Diabetes mellitus type 2 with neurological manifestations 2015    no current medications, only one episode of elevated sugars with acute illness, will monitor     Hip fracture     Macrocytosis 2015  "      Past Surgical History  Past Surgical History:   Procedure Laterality Date     SECTION      CHOLECYSTECTOMY      COLONOSCOPY  3/31/10    2 polyps, tubular adenoma    HYSTERECTOMY      Uterus and both ovaries    INCISIONAL HERNIA REPAIR      x 2    LIVER BIOPSY  2003    autoimmune hepatitis    ROTATOR CUFF REPAIR      right    STOMACH SURGERY      "took lymph node off of liver"    TOTAL HIP ARTHROPLASTY      left hip    UPPER GASTROINTESTINAL ENDOSCOPY  3/24/10    normal       Family History  Family History   Problem Relation Age of Onset    Diabetes Mellitus Mother     Liver cancer Sister     Pancreatic cancer Brother     Liver disease Neg Hx        Social History  Social History     Social History    Marital status: Significant Other     Spouse name: N/A    Number of children: N/A    Years of education: N/A     Occupational History    Not on file.     Social History Main Topics    Smoking status: Current Every Day Smoker     Packs/day: 2.00     Years: 45.00     Types: Cigarettes    Smokeless tobacco: Never Used    Alcohol use No      Comment: used to drink heavily, stopped in     Drug use: No    Sexual activity: Not on file     Other Topics Concern    Not on file     Social History Narrative    No narrative on file       Current Medications  Medications reviewed and updated.     Allergies   Review of patient's allergies indicates:   Allergen Reactions    Penicillins      Other reaction(s): Hives    Sulfa (sulfonamide antibiotics)      Other reaction(s): Hives       Review of Systems (Pertinent positives)  Review of Systems   Musculoskeletal: Positive for back pain and neck pain.   Psychiatric/Behavioral: Positive for dysphoric mood. The patient is nervous/anxious.        BP (!) 144/81   Pulse 73   Ht 5' 2" (1.575 m)   Wt 69.5 kg (153 lb 3.5 oz)   SpO2 98%   BMI 28.02 kg/m²     Physical Exam   Constitutional: She is oriented to person, place, and time. " She appears well-developed and well-nourished. No distress.   HENT:   Head: Normocephalic and atraumatic.   Eyes: Conjunctivae are normal.   Cardiovascular: Normal rate and regular rhythm.    Pulmonary/Chest: Effort normal and breath sounds normal.   Musculoskeletal: She exhibits no edema.   Neurological: She is alert and oriented to person, place, and time.   Skin: Skin is warm and dry.   Psychiatric: She exhibits a depressed mood.   Vitals reviewed.        Assessment/Plan:  Thom Krishna is a 62 y.o. female who presents today for :    Thom was seen today for follow-up.    Diagnoses and all orders for this visit:    Chronic neck and back pain  -     Ambulatory referral to [facility]  -     Discontinue: oxycodone-acetaminophen (PERCOCET)  mg per tablet; Take 1 tablet by mouth 2 (two) times daily as needed for Pain.  -     oxycodone-acetaminophen (PERCOCET)  mg per tablet; Take 1 tablet by mouth 3 (three) times daily as needed for Pain.    Acquired hypothyroidism  -     TSH; Future    Hypertriglyceridemia  -     Lipid panel; Future    Hepatitis C virus infection without hepatic coma, unspecified chronicity    Gastroesophageal reflux disease, esophagitis presence not specified    Diabetes mellitus type 2 with neurological manifestations  -     Hemoglobin A1c; Future  -     Lipid panel; Future    Anxiety and depression  -     Ambulatory referral to [facility]    Autoimmune hepatitis    Vitamin B12 deficiency  -     Vitamin B12; Future    Tobacco abuse    Medication management  -     Hemoglobin A1c; Future  -     Lipid panel; Future  -     TSH; Future  -     Vitamin D; Future  -     Vitamin B12; Future    History of fusion of lumbar spine  -     Ambulatory referral to [facility]  -     Discontinue: oxycodone-acetaminophen (PERCOCET)  mg per tablet; Take 1 tablet by mouth 2 (two) times daily as needed for Pain.  -     oxycodone-acetaminophen  (PERCOCET)  mg per tablet; Take 1 tablet by mouth 3 (three) times daily as needed for Pain.    Chronic pain syndrome  -     amitriptyline (ELAVIL) 50 MG tablet; Take 1 tablet (50 mg total) by mouth every evening.    Insomnia due to medical condition  -     amitriptyline (ELAVIL) 50 MG tablet; Take 1 tablet (50 mg total) by mouth every evening.            ICD-10-CM ICD-9-CM    1. Chronic neck and back pain M54.2 723.1 Ambulatory referral to [facility]    M54.9 724.5 oxycodone-acetaminophen (PERCOCET)  mg per tablet      DISCONTINUED: oxycodone-acetaminophen (PERCOCET)  mg per tablet   2. Acquired hypothyroidism E03.9 244.9 TSH   3. Hypertriglyceridemia E78.1 272.1 Lipid panel   4. Hepatitis C virus infection without hepatic coma, unspecified chronicity B19.20 070.70    5. Gastroesophageal reflux disease, esophagitis presence not specified K21.9 530.81    6. Diabetes mellitus type 2 with neurological manifestations E11.49 250.60 Hemoglobin A1c      Lipid panel   7. Anxiety and depression F41.8 300.00 Ambulatory referral to [facility]     311    8. Autoimmune hepatitis K75.4 571.42    9. Vitamin B12 deficiency E53.8 266.2 Vitamin B12   10. Tobacco abuse Z72.0 305.1    11. Medication management Z79.899 V58.69 Hemoglobin A1c      Lipid panel      TSH      Vitamin D      Vitamin B12   12. History of fusion of lumbar spine Z98.1 V45.4 Ambulatory referral to [facility]      oxycodone-acetaminophen (PERCOCET)  mg per tablet      DISCONTINUED: oxycodone-acetaminophen (PERCOCET)  mg per tablet   13. Chronic pain syndrome G89.4 338.4 amitriptyline (ELAVIL) 50 MG tablet   14. Insomnia due to medical condition G47.01 327.01 amitriptyline (ELAVIL) 50 MG tablet       Patient Instructions   Add 50 mg of amitriptyline (Elavil) nightly to help with chronic pain management and hopefully decrease need for Percocet.  Continue Percocet as needed and  follow-up with [place] neurosurgery due to ongoing neck and back pain with outstanding imaging studies.  Referral placed for functional restoration clinic at [facility], but unsure if patient can be accepted due to Medicaid.  Blood work today for monitoring of chronic conditions.        Return in about 6 months (around 4/6/2018) for return as needed for new concerns.

## 2017-10-08 ENCOUNTER — TELEPHONE (OUTPATIENT)
Dept: FAMILY MEDICINE | Facility: CLINIC | Age: 62
End: 2017-10-08

## 2017-10-08 DIAGNOSIS — E55.9 VITAMIN D DEFICIENCY: ICD-10-CM

## 2017-10-08 RX ORDER — ERGOCALCIFEROL 1.25 MG/1
50000 CAPSULE ORAL
Qty: 15 CAPSULE | Refills: 3 | Status: SHIPPED | OUTPATIENT
Start: 2017-10-08 | End: 2018-05-08

## 2017-10-08 NOTE — TELEPHONE ENCOUNTER
Please notify that labs all look good except for very low vitamin D. I have sent a prescription weekly supplement to take every week to [facility]. Otherwise no concerns

## 2017-10-09 ENCOUNTER — TELEPHONE (OUTPATIENT)
Dept: FAMILY MEDICINE | Facility: CLINIC | Age: 62
End: 2017-10-09

## 2017-10-09 NOTE — TELEPHONE ENCOUNTER
----- Message from Alba Govea sent at 10/9/2017  2:02 PM CDT -----  Contact: self, [phone]  Patient called in returning your call. Please advise.

## 2017-10-11 NOTE — TELEPHONE ENCOUNTER
----- Message from Divine Roche sent at 4/17/2017 10:07 AM CDT -----  Contact: [phone]  Patient is requesting to speak with you regarding meds.   16

## 2017-10-19 ENCOUNTER — TELEPHONE (OUTPATIENT)
Dept: FAMILY MEDICINE | Facility: CLINIC | Age: 62
End: 2017-10-19

## 2017-10-19 NOTE — TELEPHONE ENCOUNTER
----- Message from Malathi Atwood sent at 10/19/2017  2:10 PM CDT -----  Contact: Self/ [phone]  Patient would like a pre authorization sent to the pharmacy for a refill of oxycodone-acetaminophen (PERCOCET)  mg per tablet. Please advise.

## 2017-10-23 ENCOUNTER — TELEPHONE (OUTPATIENT)
Dept: FAMILY MEDICINE | Facility: CLINIC | Age: 62
End: 2017-10-23

## 2017-10-23 DIAGNOSIS — Z98.1 HISTORY OF FUSION OF LUMBAR SPINE: ICD-10-CM

## 2017-10-23 DIAGNOSIS — M54.2 CHRONIC NECK AND BACK PAIN: ICD-10-CM

## 2017-10-23 DIAGNOSIS — M54.9 CHRONIC NECK AND BACK PAIN: ICD-10-CM

## 2017-10-23 DIAGNOSIS — G89.29 CHRONIC NECK AND BACK PAIN: ICD-10-CM

## 2017-10-23 NOTE — TELEPHONE ENCOUNTER
----- Message from Julia Morales sent at 10/23/2017 11:38 AM CDT -----  Contact: [phone]/ self   Pt requesting to speak with you in regarding her medications  . Please advise

## 2017-10-23 NOTE — TELEPHONE ENCOUNTER
Pharmacy will not fill her Percocet until the 27th, she would like for you to override this request so she can get it now.

## 2017-10-25 RX ORDER — OXYCODONE AND ACETAMINOPHEN 10; 325 MG/1; MG/1
1 TABLET ORAL 3 TIMES DAILY PRN
Qty: 60 TABLET | Refills: 0 | Status: SHIPPED | OUTPATIENT
Start: 2017-10-25 | End: 2017-12-08 | Stop reason: SDUPTHER

## 2017-10-30 ENCOUNTER — TELEPHONE (OUTPATIENT)
Dept: FAMILY MEDICINE | Facility: CLINIC | Age: 62
End: 2017-10-30

## 2017-10-30 NOTE — TELEPHONE ENCOUNTER
Called patient to inform her that her prescription is ready for , she stated she already picked up the prescription.

## 2017-10-31 DIAGNOSIS — K21.9 GASTROESOPHAGEAL REFLUX DISEASE, ESOPHAGITIS PRESENCE NOT SPECIFIED: ICD-10-CM

## 2017-10-31 RX ORDER — PANTOPRAZOLE SODIUM 40 MG/1
TABLET, DELAYED RELEASE ORAL
Qty: 90 TABLET | Refills: 3 | Status: SHIPPED | OUTPATIENT
Start: 2017-10-31 | End: 2018-09-12 | Stop reason: SDUPTHER

## 2017-11-03 DIAGNOSIS — E11.9 TYPE 2 DIABETES MELLITUS WITHOUT COMPLICATION: ICD-10-CM

## 2017-12-08 ENCOUNTER — TELEPHONE (OUTPATIENT)
Dept: OBSTETRICS AND GYNECOLOGY | Facility: CLINIC | Age: 62
End: 2017-12-08

## 2017-12-08 DIAGNOSIS — G89.29 CHRONIC NECK AND BACK PAIN: ICD-10-CM

## 2017-12-08 DIAGNOSIS — M54.9 CHRONIC NECK AND BACK PAIN: ICD-10-CM

## 2017-12-08 DIAGNOSIS — M54.2 CHRONIC NECK AND BACK PAIN: ICD-10-CM

## 2017-12-08 DIAGNOSIS — Z98.1 HISTORY OF FUSION OF LUMBAR SPINE: ICD-10-CM

## 2017-12-08 NOTE — TELEPHONE ENCOUNTER
----- Message from Shannon Covarrubias sent at 12/7/2017  4:07 PM CST -----  Contact: [phone]/self   Patient requesting to speak with you regarding getting a new prescription written for percocet  mg per tablet. Please call patient when it is ready for pickup. Please advise.

## 2017-12-11 ENCOUNTER — TELEPHONE (OUTPATIENT)
Dept: FAMILY MEDICINE | Facility: CLINIC | Age: 62
End: 2017-12-11

## 2017-12-11 RX ORDER — OXYCODONE AND ACETAMINOPHEN 10; 325 MG/1; MG/1
1 TABLET ORAL 3 TIMES DAILY PRN
Qty: 60 TABLET | Refills: 0 | Status: SHIPPED | OUTPATIENT
Start: 2017-12-11 | End: 2018-01-26

## 2017-12-11 NOTE — TELEPHONE ENCOUNTER
----- Message from Bryan Flynn sent at 12/11/2017  1:04 PM CST -----  Contact: [phone] self  Patient is requesting to have a new prescription written for oxyCODONE-acetaminophen (PERCOCET). Please call patient when it is ready for pickup. Please advise.

## 2017-12-29 ENCOUNTER — LAB VISIT (OUTPATIENT)
Dept: LAB | Facility: HOSPITAL | Age: 62
End: 2017-12-29
Attending: FAMILY MEDICINE
Payer: MEDICAID

## 2017-12-29 ENCOUNTER — TELEPHONE (OUTPATIENT)
Dept: FAMILY MEDICINE | Facility: CLINIC | Age: 62
End: 2017-12-29

## 2017-12-29 ENCOUNTER — OFFICE VISIT (OUTPATIENT)
Dept: FAMILY MEDICINE | Facility: CLINIC | Age: 62
End: 2017-12-29
Payer: MEDICAID

## 2017-12-29 VITALS
HEART RATE: 73 BPM | WEIGHT: 151.88 LBS | SYSTOLIC BLOOD PRESSURE: 144 MMHG | DIASTOLIC BLOOD PRESSURE: 73 MMHG | TEMPERATURE: 99 F | OXYGEN SATURATION: 99 % | BODY MASS INDEX: 27.78 KG/M2

## 2017-12-29 DIAGNOSIS — M50.30 DDD (DEGENERATIVE DISC DISEASE), CERVICAL: ICD-10-CM

## 2017-12-29 DIAGNOSIS — K21.9 GASTROESOPHAGEAL REFLUX DISEASE, ESOPHAGITIS PRESENCE NOT SPECIFIED: ICD-10-CM

## 2017-12-29 DIAGNOSIS — K52.9 GASTROENTERITIS: ICD-10-CM

## 2017-12-29 DIAGNOSIS — G89.29 CHRONIC NECK AND BACK PAIN: Primary | ICD-10-CM

## 2017-12-29 DIAGNOSIS — F11.20 NARCOTIC DEPENDENCE: ICD-10-CM

## 2017-12-29 DIAGNOSIS — M54.2 CHRONIC NECK AND BACK PAIN: Primary | ICD-10-CM

## 2017-12-29 DIAGNOSIS — M51.36 DDD (DEGENERATIVE DISC DISEASE), LUMBAR: ICD-10-CM

## 2017-12-29 DIAGNOSIS — E11.9 TYPE 2 DIABETES MELLITUS WITHOUT COMPLICATION: ICD-10-CM

## 2017-12-29 DIAGNOSIS — M54.9 CHRONIC NECK AND BACK PAIN: Primary | ICD-10-CM

## 2017-12-29 LAB
CREAT UR-MCNC: 33 MG/DL
MICROALBUMIN UR DL<=1MG/L-MCNC: <2.5 UG/ML
MICROALBUMIN/CREATININE RATIO: NORMAL UG/MG

## 2017-12-29 PROCEDURE — 99214 OFFICE O/P EST MOD 30 MIN: CPT | Mod: S$PBB,,, | Performed by: FAMILY MEDICINE

## 2017-12-29 PROCEDURE — 99999 PR PBB SHADOW E&M-EST. PATIENT-LVL III: CPT | Mod: PBBFAC,,, | Performed by: FAMILY MEDICINE

## 2017-12-29 PROCEDURE — 82043 UR ALBUMIN QUANTITATIVE: CPT

## 2017-12-29 PROCEDURE — 99213 OFFICE O/P EST LOW 20 MIN: CPT | Mod: PBBFAC,PO | Performed by: FAMILY MEDICINE

## 2017-12-29 RX ORDER — HYDROCODONE BITARTRATE AND ACETAMINOPHEN 10; 325 MG/1; MG/1
1 TABLET ORAL EVERY 4 HOURS PRN
Qty: 90 TABLET | Refills: 0 | Status: SHIPPED | OUTPATIENT
Start: 2017-12-29 | End: 2018-01-26 | Stop reason: SDUPTHER

## 2017-12-29 RX ORDER — ONDANSETRON 8 MG/1
TABLET, ORALLY DISINTEGRATING ORAL
Qty: 20 TABLET | Refills: 2 | Status: SHIPPED | OUTPATIENT
Start: 2017-12-29 | End: 2018-11-23 | Stop reason: SDUPTHER

## 2017-12-29 NOTE — TELEPHONE ENCOUNTER
----- Message from Alba Govea sent at 12/29/2017  9:06 AM CST -----  Contact: self, [phone]  Patient requests to be seen today. Appointment was offered with another provider but patient states she just sees you. Please advise.

## 2017-12-29 NOTE — PROGRESS NOTES
" Office Visit    Patient Name: Thom Krishna    : 1955  MRN: [id number]    Subjective:  Thom is a 62 y.o. female who presents today for:    Abdominal Pain (also has pain in back); Diarrhea; and Medication Refill    63 yo female with history of Hep C and cirrhosis, chronic pain with Narcotic dependence, anxiety and depression here with GI concerns over the last 1-2 days.     Abdominal pain and diarrhea since yesterday and then also had a previous episode like this 1 month ago. Has very loose stools with fecal incontinence. Took imodium last night and then again this morning but her diarrhea has persisted-- has had 8 watery stools since this started 24 hours ago.  Her symptoms are associated with nausea and generalized malaise.  Some generalized abdominal cramping and gas/bloating.  She has had an exacerbation of her acid reflux with this recent diarrhea, but she continues to take Protonix as prescribed.  No fevers. No sick contacts.  No recent antibiotic use.    Past Medical History  Past Medical History:   Diagnosis Date    Anxiety and depression 2015    Cervical radiculopathy 2016    Colon polyps 2015    Diabetes mellitus type 2 with neurological manifestations 2015    no current medications, only one episode of elevated sugars with acute illness, will monitor     Hip fracture     Macrocytosis 2015       Past Surgical History  Past Surgical History:   Procedure Laterality Date     SECTION      CHOLECYSTECTOMY      COLONOSCOPY  3/31/10    2 polyps, tubular adenoma    HYSTERECTOMY      Uterus and both ovaries    INCISIONAL HERNIA REPAIR      x 2    LIVER BIOPSY  2003    autoimmune hepatitis    ROTATOR CUFF REPAIR      right    STOMACH SURGERY      "took lymph node off of liver"    TOTAL HIP ARTHROPLASTY      left hip    UPPER GASTROINTESTINAL ENDOSCOPY  3/24/10    normal       Family History  Family History   Problem Relation Age of Onset    Diabetes " Mellitus Mother     Liver cancer Sister     Pancreatic cancer Brother     Liver disease Neg Hx        Social History  Social History     Social History    Marital status: Significant Other     Spouse name: N/A    Number of children: N/A    Years of education: N/A     Occupational History    Not on file.     Social History Main Topics    Smoking status: Current Every Day Smoker     Packs/day: 2.00     Years: 45.00     Types: Cigarettes    Smokeless tobacco: Never Used    Alcohol use No      Comment: used to drink heavily, stopped in 1990's    Drug use: No    Sexual activity: Not on file     Other Topics Concern    Not on file     Social History Narrative    No narrative on file       Current Medications  Medications reviewed and updated.     Allergies   Review of patient's allergies indicates:   Allergen Reactions    Penicillins      Other reaction(s): Hives    Sulfa (sulfonamide antibiotics)      Other reaction(s): Hives       Review of Systems (Pertinent positives)  Review of Systems   Constitutional: Positive for fatigue. Negative for fever.   HENT: Negative for sore throat.    Respiratory: Negative for cough.    Gastrointestinal: Positive for abdominal distention, abdominal pain, diarrhea and nausea.   Musculoskeletal: Positive for back pain and neck pain.       BP (!) 144/73 (BP Location: Right arm, Patient Position: Sitting)   Pulse 73   Temp 98.5 °F (36.9 °C) (Oral)   Wt 68.9 kg (151 lb 14.4 oz)   SpO2 99%   BMI 27.78 kg/m²     Physical Exam   Constitutional: She is oriented to person, place, and time. She appears well-developed and well-nourished. No distress.   HENT:   Head: Normocephalic and atraumatic.   Eyes: Conjunctivae are normal.   Cardiovascular: Normal rate and regular rhythm.    Pulmonary/Chest: Effort normal and breath sounds normal.   Abdominal: Soft. Bowel sounds are normal. She exhibits distension. There is tenderness. There is no rebound and no guarding.   Musculoskeletal:  She exhibits no edema.   Neurological: She is alert and oriented to person, place, and time.   Skin: Skin is warm and dry.   Psychiatric: She has a normal mood and affect.   Vitals reviewed.        Assessment/Plan:  Thom Krishna is a 62 y.o. female who presents today for :    Thom was seen today for abdominal pain, diarrhea and medication refill.    Diagnoses and all orders for this visit:    Chronic neck and back pain  Comments:  Status post lumbar fusion by [facility].  She has completed physical therapy.  She is dependent on chronic narcotics for management of chronic pain.  Orders:  -     hydrocodone-acetaminophen 10-325mg (NORCO)  mg Tab; Take 1 tablet by mouth every 4 (four) hours as needed for Pain.    DDD (degenerative disc disease), cervical  -     hydrocodone-acetaminophen 10-325mg (NORCO)  mg Tab; Take 1 tablet by mouth every 4 (four) hours as needed for Pain.    DDD (degenerative disc disease), lumbar  -     hydrocodone-acetaminophen 10-325mg (NORCO)  mg Tab; Take 1 tablet by mouth every 4 (four) hours as needed for Pain.    Gastroenteritis  Comments:  Advised Imodium, Zofran, liquids, rest.  Observe symptoms over the next 5-7 days, but notify if symptoms do not improve or worsen. CBC/CMP ordered  Orders:  -     CBC auto differential; Future  -     Comprehensive metabolic panel; Future  -     ondansetron (ZOFRAN-ODT) 8 MG TbDL; dissolve 1 tablet ON TONGUE three times a day if needed for nausea    Gastroesophageal reflux disease, esophagitis presence not specified  Comments:  continue protonix    Narcotic dependence  Comments:  She has not run out of her pain medication, and I therefore do not suspect that withdrawal is contributing to her gastrointestinal distress at this time.            ICD-10-CM ICD-9-CM    1. Chronic neck and back pain M54.2 723.1 hydrocodone-acetaminophen 10-325mg (NORCO)  mg Tab    M54.9 724.5     Status post lumbar fusion by [facility].  She has completed physical  therapy.  She is dependent on chronic narcotics for management of chronic pain.   2. DDD (degenerative disc disease), cervical M50.30 722.4 hydrocodone-acetaminophen 10-325mg (NORCO)  mg Tab   3. DDD (degenerative disc disease), lumbar M51.36 722.52 hydrocodone-acetaminophen 10-325mg (NORCO)  mg Tab   4. Gastroenteritis K52.9 558.9 CBC auto differential      Comprehensive metabolic panel      ondansetron (ZOFRAN-ODT) 8 MG TbDL    Advised Imodium, Zofran, liquids, rest.  Observe symptoms over the next 5-7 days, but notify if symptoms do not improve or worsen. CBC/CMP ordered   5. Gastroesophageal reflux disease, esophagitis presence not specified K21.9 530.81     continue protonix   6. Narcotic dependence F11.20 304.90     She has not run out of her pain medication, and I therefore do not suspect that withdrawal is contributing to her gastrointestinal distress at this time.       There are no Patient Instructions on file for this visit.      Return for return as needed for new concerns.

## 2018-01-02 ENCOUNTER — TELEPHONE (OUTPATIENT)
Dept: FAMILY MEDICINE | Facility: CLINIC | Age: 63
End: 2018-01-02

## 2018-01-02 RX ORDER — AZATHIOPRINE 50 MG/1
TABLET ORAL
Qty: 30 TABLET | Refills: 5 | Status: SHIPPED | OUTPATIENT
Start: 2018-01-02 | End: 2018-08-21 | Stop reason: ALTCHOICE

## 2018-01-02 NOTE — TELEPHONE ENCOUNTER
Please notify that liver enzymes look good and there is no white blood count elevation (which could indicate a more severe infection). Please ensure that she is starting to feel better because if her diarrhea is persisting then we will do stool testing.  Patient stated that she is no longer experiencing the diarrhea.  Patient stated stomach still bothers her from time to time.

## 2018-01-08 DIAGNOSIS — Z13.5 DIABETIC RETINOPATHY SCREENING: ICD-10-CM

## 2018-01-12 RX ORDER — LEVOTHYROXINE SODIUM 75 UG/1
TABLET ORAL
Qty: 90 TABLET | Refills: 3 | Status: SHIPPED | OUTPATIENT
Start: 2018-01-12 | End: 2019-03-13 | Stop reason: SDUPTHER

## 2018-01-22 DIAGNOSIS — F41.9 ANXIETY: ICD-10-CM

## 2018-01-23 RX ORDER — ALPRAZOLAM 1 MG/1
1 TABLET ORAL 2 TIMES DAILY PRN
Qty: 45 TABLET | Refills: 5 | Status: SHIPPED | OUTPATIENT
Start: 2018-01-23 | End: 2018-07-11 | Stop reason: SDUPTHER

## 2018-01-26 DIAGNOSIS — M51.36 DDD (DEGENERATIVE DISC DISEASE), LUMBAR: ICD-10-CM

## 2018-01-26 DIAGNOSIS — M54.9 CHRONIC NECK AND BACK PAIN: ICD-10-CM

## 2018-01-26 DIAGNOSIS — M54.2 CHRONIC NECK AND BACK PAIN: ICD-10-CM

## 2018-01-26 DIAGNOSIS — M50.30 DDD (DEGENERATIVE DISC DISEASE), CERVICAL: ICD-10-CM

## 2018-01-26 DIAGNOSIS — G89.29 CHRONIC NECK AND BACK PAIN: ICD-10-CM

## 2018-01-26 RX ORDER — HYDROCODONE BITARTRATE AND ACETAMINOPHEN 10; 325 MG/1; MG/1
1 TABLET ORAL EVERY 4 HOURS PRN
Qty: 90 TABLET | Refills: 0 | Status: SHIPPED | OUTPATIENT
Start: 2018-01-26 | End: 2018-01-30 | Stop reason: SDUPTHER

## 2018-01-26 NOTE — TELEPHONE ENCOUNTER
----- Message from Bryan Flynn sent at 1/25/2018 10:49 AM CST -----  Contact: [phone]  Patient is requesting to have a new prescription written for hydrocodone-acetaminophen 10-325mg (NORCO)  mg Tab. Please call patient when it is ready for pickup. Please advise.

## 2018-01-29 RX ORDER — OXYCODONE AND ACETAMINOPHEN 10; 325 MG/1; MG/1
TABLET ORAL
Refills: 0 | COMMUNITY
Start: 2017-12-14 | End: 2018-01-30

## 2018-01-30 ENCOUNTER — TELEPHONE (OUTPATIENT)
Dept: FAMILY MEDICINE | Facility: CLINIC | Age: 63
End: 2018-01-30

## 2018-01-30 ENCOUNTER — LAB VISIT (OUTPATIENT)
Dept: LAB | Facility: HOSPITAL | Age: 63
End: 2018-01-30
Attending: FAMILY MEDICINE
Payer: MEDICAID

## 2018-01-30 ENCOUNTER — OFFICE VISIT (OUTPATIENT)
Dept: FAMILY MEDICINE | Facility: CLINIC | Age: 63
End: 2018-01-30
Payer: MEDICAID

## 2018-01-30 ENCOUNTER — OUTPATIENT CASE MANAGEMENT (OUTPATIENT)
Dept: ADMINISTRATIVE | Facility: OTHER | Age: 63
End: 2018-01-30

## 2018-01-30 VITALS
DIASTOLIC BLOOD PRESSURE: 76 MMHG | TEMPERATURE: 98 F | HEART RATE: 64 BPM | OXYGEN SATURATION: 98 % | SYSTOLIC BLOOD PRESSURE: 132 MMHG | HEIGHT: 62 IN | BODY MASS INDEX: 27.3 KG/M2 | WEIGHT: 148.38 LBS

## 2018-01-30 DIAGNOSIS — M54.2 CHRONIC NECK AND BACK PAIN: ICD-10-CM

## 2018-01-30 DIAGNOSIS — F41.9 ANXIETY AND DEPRESSION: ICD-10-CM

## 2018-01-30 DIAGNOSIS — M54.12 LEFT CERVICAL RADICULOPATHY: ICD-10-CM

## 2018-01-30 DIAGNOSIS — F32.A ANXIETY AND DEPRESSION: ICD-10-CM

## 2018-01-30 DIAGNOSIS — G89.29 CHRONIC NECK AND BACK PAIN: ICD-10-CM

## 2018-01-30 DIAGNOSIS — R10.84 GENERALIZED ABDOMINAL PAIN: Primary | ICD-10-CM

## 2018-01-30 DIAGNOSIS — K59.09 CHRONIC CONSTIPATION: ICD-10-CM

## 2018-01-30 DIAGNOSIS — Z59.9 FINANCIAL PROBLEMS: ICD-10-CM

## 2018-01-30 DIAGNOSIS — M51.36 DDD (DEGENERATIVE DISC DISEASE), LUMBAR: ICD-10-CM

## 2018-01-30 DIAGNOSIS — M54.9 CHRONIC NECK AND BACK PAIN: ICD-10-CM

## 2018-01-30 DIAGNOSIS — E11.49 DIABETES MELLITUS TYPE 2 WITH NEUROLOGICAL MANIFESTATIONS: ICD-10-CM

## 2018-01-30 DIAGNOSIS — K75.4 AUTOIMMUNE HEPATITIS: ICD-10-CM

## 2018-01-30 DIAGNOSIS — R31.9 HEMATURIA, UNSPECIFIED TYPE: ICD-10-CM

## 2018-01-30 DIAGNOSIS — M50.30 DDD (DEGENERATIVE DISC DISEASE), CERVICAL: ICD-10-CM

## 2018-01-30 LAB
BILIRUB UR QL STRIP: NEGATIVE
CLARITY UR: CLEAR
COLOR UR: YELLOW
GLUCOSE UR QL STRIP: NEGATIVE
HGB UR QL STRIP: NEGATIVE
KETONES UR QL STRIP: NEGATIVE
LEUKOCYTE ESTERASE UR QL STRIP: NEGATIVE
NITRITE UR QL STRIP: NEGATIVE
PH UR STRIP: 6 [PH] (ref 5–8)
PROT UR QL STRIP: NEGATIVE
SP GR UR STRIP: <=1.005 (ref 1–1.03)
URN SPEC COLLECT METH UR: ABNORMAL
UROBILINOGEN UR STRIP-ACNC: NEGATIVE EU/DL

## 2018-01-30 PROCEDURE — 99999 PR PBB SHADOW E&M-EST. PATIENT-LVL IV: CPT | Mod: PBBFAC,,, | Performed by: FAMILY MEDICINE

## 2018-01-30 PROCEDURE — 99214 OFFICE O/P EST MOD 30 MIN: CPT | Mod: S$PBB,,, | Performed by: FAMILY MEDICINE

## 2018-01-30 PROCEDURE — 81003 URINALYSIS AUTO W/O SCOPE: CPT

## 2018-01-30 PROCEDURE — 87086 URINE CULTURE/COLONY COUNT: CPT

## 2018-01-30 PROCEDURE — 99214 OFFICE O/P EST MOD 30 MIN: CPT | Mod: PBBFAC,PO | Performed by: FAMILY MEDICINE

## 2018-01-30 RX ORDER — HYDROCODONE BITARTRATE AND ACETAMINOPHEN 10; 325 MG/1; MG/1
1 TABLET ORAL EVERY 4 HOURS PRN
Qty: 90 TABLET | Refills: 0 | Status: SHIPPED | OUTPATIENT
Start: 2018-02-26 | End: 2018-01-30 | Stop reason: SDUPTHER

## 2018-01-30 RX ORDER — AMOXICILLIN 250 MG
1 CAPSULE ORAL 3 TIMES DAILY
Qty: 90 TABLET | Refills: 3 | Status: SHIPPED | OUTPATIENT
Start: 2018-01-30 | End: 2018-05-08

## 2018-01-30 RX ORDER — GABAPENTIN 300 MG/1
300 CAPSULE ORAL 3 TIMES DAILY PRN
Qty: 90 CAPSULE | Refills: 11 | Status: SHIPPED | OUTPATIENT
Start: 2018-01-30 | End: 2018-05-08

## 2018-01-30 RX ORDER — VENLAFAXINE HYDROCHLORIDE 75 MG/1
75 CAPSULE, EXTENDED RELEASE ORAL DAILY
Qty: 90 CAPSULE | Refills: 3 | Status: SHIPPED | OUTPATIENT
Start: 2018-01-30 | End: 2018-08-03 | Stop reason: SINTOL

## 2018-01-30 RX ORDER — HYDROCODONE BITARTRATE AND ACETAMINOPHEN 10; 325 MG/1; MG/1
1 TABLET ORAL EVERY 4 HOURS PRN
Qty: 90 TABLET | Refills: 0 | Status: SHIPPED | OUTPATIENT
Start: 2018-03-26 | End: 2018-04-23 | Stop reason: SDUPTHER

## 2018-01-30 SDOH — SOCIAL DETERMINANTS OF HEALTH (SDOH): PROBLEM RELATED TO HOUSING AND ECONOMIC CIRCUMSTANCES, UNSPECIFIED: Z59.9

## 2018-01-30 NOTE — TELEPHONE ENCOUNTER
----- Message from Kat Medley sent at 1/30/2018  8:29 AM CST -----  Contact: self/[phone]  Patient called to speak with your office about her appointment today.      Please call and advise as soon as possible.

## 2018-01-30 NOTE — PROGRESS NOTES
Office Visit    Patient Name: Thom Krishna    : 1955  MRN: [id number]    Subjective:  Thom is a 62 y.o. female who presents today for:    Follow-up and Abdominal Pain    62-year-old patient of mine here for follow-up of chronic pain and to discuss some ongoing abdominal pain.  She saw me one month ago for some very severe diarrhea and abdominal pain.  It was felt at that time that she likely had some gastroenteritis.  CBC and CMP performed at that time were unremarkable and fortunately the diarrhea did subside. Today she is complaining of some persistent right upper quadrant pain.  She has a history of autoimmune hepatitis and cirrhosis that is being followed by Dr. Valery Sevilla hepatology.  She has received treatment for hepatitis C.  She is suffering from chronic constipation associated with long-term use of opioid pain medication.  He has some nausea that is relieved with Zofran but no vomiting.  She denies symptoms of ascites.    In terms of her chronic pain, if he does have a history of degenerative disc disease of the cervical and lumbar spine.  He reports worsening neck pain with burning pain that is extending down her left arm and into her hand.  She reports some numbness and weakness in her hand as well.  She has Medicaid and no Medicare supplemental insurance at this time, she has been unable to go to pain management to be evaluated for the potential effectiveness of injections.  She is on long-term opioids, but we have kept her on a max of 3 Norco pills daily which she is adhering to.  She is open to trying gabapentin for her nerve pain, though in the past it has not helped that much.    She is also complaining today of some intermittent) vaginal discharge that only seems to occur after urinating.  Previous workup has included urine studies and a pelvic ultrasound that was fortunately unremarkable.  She has had a hysterectomy with oophorectomy.  She denies dysuria, vaginal discharge.  She does  "have some vaginal dryness but she does not believe that this is associated with the bleeding.    Because of all of these above issues and especially her worsening chronic pain she is experiencing depression and anxiety.  She has been on antidepressants and antianxiety medications in the past and is open to restarting one.  She thinks he may have tried Effexor in the past that is open to a repeat trial as this is my recommendation.    Past Medical History  Past Medical History:   Diagnosis Date    Anxiety and depression 2015    Cervical radiculopathy 2016    Colon polyps 2015    Diabetes mellitus type 2 with neurological manifestations 2015    no current medications, only one episode of elevated sugars with acute illness, will monitor     Hip fracture     Macrocytosis 2015       Past Surgical History  Past Surgical History:   Procedure Laterality Date     SECTION      CHOLECYSTECTOMY      COLONOSCOPY  3/31/10    2 polyps, tubular adenoma    HYSTERECTOMY      Uterus and both ovaries    INCISIONAL HERNIA REPAIR      x 2    LIVER BIOPSY  2003    autoimmune hepatitis    ROTATOR CUFF REPAIR      right    STOMACH SURGERY      "took lymph node off of liver"    TOTAL HIP ARTHROPLASTY      left hip    UPPER GASTROINTESTINAL ENDOSCOPY  3/24/10    normal       Family History  Family History   Problem Relation Age of Onset    Diabetes Mellitus Mother     Liver cancer Sister     Pancreatic cancer Brother     Liver disease Neg Hx        Social History  Social History     Social History    Marital status: Significant Other     Spouse name: N/A    Number of children: N/A    Years of education: N/A     Occupational History    Not on file.     Social History Main Topics    Smoking status: Current Every Day Smoker     Packs/day: 2.00     Years: 45.00     Types: Cigarettes    Smokeless tobacco: Never Used    Alcohol use No      Comment: used to drink heavily, " "stopped in 1990's    Drug use: No    Sexual activity: Not on file     Other Topics Concern    Not on file     Social History Narrative    No narrative on file       Current Medications  Medications reviewed and updated.     Allergies   Review of patient's allergies indicates:   Allergen Reactions    Penicillins      Other reaction(s): Hives    Sulfa (sulfonamide antibiotics)      Other reaction(s): Hives       Review of Systems (Pertinent positives)  Review of Systems   Constitutional: Negative for fever.   Gastrointestinal: Positive for abdominal pain, constipation and nausea. Negative for vomiting.   Genitourinary: Positive for hematuria and vaginal discharge (unclear source of blood tinged discharg). Negative for dysuria.   Musculoskeletal: Positive for back pain and neck pain.   Psychiatric/Behavioral: Positive for dysphoric mood. The patient is nervous/anxious.        /76 (BP Location: Right arm, Patient Position: Sitting)   Pulse 64   Temp 98 °F (36.7 °C) (Oral)   Ht 5' 2" (1.575 m)   Wt 67.3 kg (148 lb 5.9 oz)   SpO2 98%   BMI 27.14 kg/m²     Physical Exam   Constitutional: She is oriented to person, place, and time. She appears well-developed and well-nourished. No distress.   HENT:   Head: Normocephalic and atraumatic.   Eyes: Conjunctivae are normal.   Cardiovascular: Normal rate and regular rhythm.    Pulmonary/Chest: Effort normal and breath sounds normal.   Abdominal: Soft. Normal appearance. She exhibits distension. She exhibits no fluid wave. There is generalized tenderness and tenderness in the right upper quadrant. There is no rebound and no guarding.   Musculoskeletal: She exhibits no edema.   Decreased  strength of left hand compared to the right 4/5 compared to 5/5   Neurological: She is alert and oriented to person, place, and time.   Skin: Skin is warm and dry.   Psychiatric: She exhibits a depressed mood.   Vitals reviewed.        Assessment/Plan:  Thom Krishna is a 62 " y.o. female who presents today for :    Thom was seen today for follow-up and abdominal pain.    Diagnoses and all orders for this visit:    Generalized abdominal pain  Comments:  FORTUNATELY DIARRHEA RESOLVED BUT SHE IS NOW CONSTIPATED FROM HER CHRONIC PAIN PILLS. TREAT THIS & F/U w/ Hepatology as scheduled   Orders:  -     senna-docusate 8.6-50 mg (SENNA WITH DOCUSATE SODIUM) 8.6-50 mg per tablet; Take 1 tablet by mouth 3 (three) times daily. TAKE 3 TIMES DAILY WITH PAIN PILLS    Chronic neck and back pain  -     gabapentin (NEURONTIN) 300 MG capsule; Take 1 capsule (300 mg total) by mouth 3 (three) times daily as needed.  -     Discontinue: hydrocodone-acetaminophen 10-325mg (NORCO)  mg Tab; Take 1 tablet by mouth every 4 (four) hours as needed for Pain.  -     hydrocodone-acetaminophen 10-325mg (NORCO)  mg Tab; Take 1 tablet by mouth every 4 (four) hours as needed for Pain.  -     Ambulatory referral to Outpatient Case Management    Left cervical radiculopathy  -     gabapentin (NEURONTIN) 300 MG capsule; Take 1 capsule (300 mg total) by mouth 3 (three) times daily as needed.  -     Ambulatory referral to Outpatient Case Management    Diabetes mellitus type 2 with neurological manifestations  -     gabapentin (NEURONTIN) 300 MG capsule; Take 1 capsule (300 mg total) by mouth 3 (three) times daily as needed.    Anxiety and depression  -     Ambulatory referral to Outpatient Case Management  -     venlafaxine (EFFEXOR-XR) 75 MG 24 hr capsule; Take 1 capsule (75 mg total) by mouth once daily.    Autoimmune hepatitis  Comments:  FOLLOW UP WITH LIVER SPECIALIST AT [facility] DR GUEVARA TOMORROW AS SCHEDULED  Orders:  -     Ambulatory referral to Outpatient Case Management    Chronic constipation  -     senna-docusate 8.6-50 mg (SENNA WITH DOCUSATE SODIUM) 8.6-50 mg per tablet; Take 1 tablet by mouth 3 (three) times daily. TAKE 3 TIMES DAILY WITH PAIN PILLS    Chronic neck and back pain  Comments:  Status  post lumbar fusion by [facility].  She has completed physical therapy.  She is dependent on chronic narcotics for management of chronic pain.  Orders:  -     gabapentin (NEURONTIN) 300 MG capsule; Take 1 capsule (300 mg total) by mouth 3 (three) times daily as needed.  -     Discontinue: hydrocodone-acetaminophen 10-325mg (NORCO)  mg Tab; Take 1 tablet by mouth every 4 (four) hours as needed for Pain.  -     hydrocodone-acetaminophen 10-325mg (NORCO)  mg Tab; Take 1 tablet by mouth every 4 (four) hours as needed for Pain.  -     Ambulatory referral to Outpatient Case Management    DDD (degenerative disc disease), cervical  -     Discontinue: hydrocodone-acetaminophen 10-325mg (NORCO)  mg Tab; Take 1 tablet by mouth every 4 (four) hours as needed for Pain.  -     hydrocodone-acetaminophen 10-325mg (NORCO)  mg Tab; Take 1 tablet by mouth every 4 (four) hours as needed for Pain.  -     Ambulatory referral to Outpatient Case Management    DDD (degenerative disc disease), lumbar  -     Discontinue: hydrocodone-acetaminophen 10-325mg (NORCO)  mg Tab; Take 1 tablet by mouth every 4 (four) hours as needed for Pain.  -     hydrocodone-acetaminophen 10-325mg (NORCO)  mg Tab; Take 1 tablet by mouth every 4 (four) hours as needed for Pain.  -     Ambulatory referral to Outpatient Case Management    Financial problems  -     Ambulatory referral to Outpatient Case Management    Hematuria, unspecified type  Comments:  Start with UA/culture.  If this is unremarkable the next step is a pelvic exam as unclear source of the bloody discharg Previous pelvic ultrasound unremarkable.  Orders:  -     Urinalysis; Future  -     Urine culture; Future            ICD-10-CM ICD-9-CM    1. Generalized abdominal pain R10.84 789.07 senna-docusate 8.6-50 mg (SENNA WITH DOCUSATE SODIUM) 8.6-50 mg per tablet    FORTUNATELY DIARRHEA RESOLVED BUT SHE IS NOW CONSTIPATED FROM HER CHRONIC PAIN PILLS. TREAT THIS & F/U w/  Hepatology as scheduled    2. Chronic neck and back pain M54.2 723.1 gabapentin (NEURONTIN) 300 MG capsule    M54.9 724.5 hydrocodone-acetaminophen 10-325mg (NORCO)  mg Tab      Ambulatory referral to Outpatient Case Management      DISCONTINUED: hydrocodone-acetaminophen 10-325mg (NORCO)  mg Tab   3. Left cervical radiculopathy M54.12 723.4 gabapentin (NEURONTIN) 300 MG capsule      Ambulatory referral to Outpatient Case Management   4. Diabetes mellitus type 2 with neurological manifestations E11.49 250.60 gabapentin (NEURONTIN) 300 MG capsule   5. Anxiety and depression F41.8 300.00 Ambulatory referral to Outpatient Case Management     311 venlafaxine (EFFEXOR-XR) 75 MG 24 hr capsule   6. Autoimmune hepatitis K75.4 571.42 Ambulatory referral to Outpatient Case Management    FOLLOW UP WITH LIVER SPECIALIST AT [facility] DR GUEVARA TOMORROW AS SCHEDULED   7. Chronic constipation K59.09 564.00 senna-docusate 8.6-50 mg (SENNA WITH DOCUSATE SODIUM) 8.6-50 mg per tablet   8. Chronic neck and back pain M54.2 723.1 gabapentin (NEURONTIN) 300 MG capsule    M54.9 724.5 hydrocodone-acetaminophen 10-325mg (NORCO)  mg Tab      Ambulatory referral to Outpatient Case Management      DISCONTINUED: hydrocodone-acetaminophen 10-325mg (NORCO)  mg Tab    Status post lumbar fusion by [facility].  She has completed physical therapy.  She is dependent on chronic narcotics for management of chronic pain.   9. DDD (degenerative disc disease), cervical M50.30 722.4 hydrocodone-acetaminophen 10-325mg (NORCO)  mg Tab      Ambulatory referral to Outpatient Case Management      DISCONTINUED: hydrocodone-acetaminophen 10-325mg (NORCO)  mg Tab   10. DDD (degenerative disc disease), lumbar M51.36 722.52 hydrocodone-acetaminophen 10-325mg (NORCO)  mg Tab      Ambulatory referral to Outpatient Case Management      DISCONTINUED: hydrocodone-acetaminophen 10-325mg (NORCO)  mg Tab   11. Financial problems  Z59.8 V60.2 Ambulatory referral to Outpatient Case Management   12. Hematuria, unspecified type R31.9 599.70 Urinalysis      Urine culture    Start with UA/culture.  If this is unremarkable the next step is a pelvic exam as unclear source of the bloody discharg Previous pelvic ultrasound unremarkable.       Patient Instructions   START TAKING PERICOLACE PILL EVERY TIME A PAIN PILL IS TAKING TO TREAT CONSTIPATION.  Effexor daily with breakfast to help with depression and anxiety.  Gabapentin as needed for nerve pain.        Follow-up in about 3 months (around 4/30/2018) for return as needed for new concerns.

## 2018-01-30 NOTE — PATIENT INSTRUCTIONS
START TAKING PERICOLACE PILL EVERY TIME A PAIN PILL IS TAKING TO TREAT CONSTIPATION.  Effexor daily with breakfast to help with depression and anxiety.  Gabapentin as needed for nerve pain.

## 2018-01-30 NOTE — PROGRESS NOTES
Please note the following patients information has been forwarded to Medicaid for Case Management.    Please contact Outpatient Complex Care Management at ext [phone] with any questions.    Thank you,    Marci Montgomery, [facility] [place]  Outpatient Complex Care Mgmt  Ext [phone]

## 2018-01-30 NOTE — TELEPHONE ENCOUNTER
----- Message from Marci Montgomery sent at 1/30/2018  4:10 PM CST -----  Please note the following patients information has been forwarded to Medicaid for Case Management.     Please contact Outpatient Complex Care Management at ext [phone] with any questions.     Thank you,     Marci Montgomery, [facility] [place]  [place] Mgmt  Ext [phone]

## 2018-01-31 LAB
BACTERIA UR CULT: NORMAL
BACTERIA UR CULT: NORMAL

## 2018-02-05 ENCOUNTER — TELEPHONE (OUTPATIENT)
Dept: FAMILY MEDICINE | Facility: CLINIC | Age: 63
End: 2018-02-05

## 2018-02-05 NOTE — TELEPHONE ENCOUNTER
Called patient to review results: no evidence of blood in urine-- will need pelvic exam if any bleeding from her vaginal area persists. Left a message requesting a call back.

## 2018-02-20 ENCOUNTER — DOCUMENTATION ONLY (OUTPATIENT)
Dept: REHABILITATION | Facility: HOSPITAL | Age: 63
End: 2018-02-20

## 2018-02-20 DIAGNOSIS — R53.1 WEAKNESS: ICD-10-CM

## 2018-02-20 DIAGNOSIS — R26.2 DIFFICULTY WALKING: Primary | ICD-10-CM

## 2018-02-20 NOTE — PROGRESS NOTES
PHYSICAL THERAPY DISCHARGE SUMMARY     Name: Thom Krishna  Clinic Number: [id number]    Diagnosis:   Encounter Diagnoses   Name Primary?    Difficulty walking Yes    Weakness      Physician: Brandy Dejesus  Treatment Orders: Therapeutic exercise  Past Medical History:   Diagnosis Date    Anxiety and depression 7/21/2015    Cervical radiculopathy 4/8/2016    Colon polyps 4/27/2015    Diabetes mellitus type 2 with neurological manifestations 11/6/2015    no current medications, only one episode of elevated sugars with acute illness, will monitor     Hip fracture     Macrocytosis 7/21/2015       Initial visit: 7/5/17  Date of Last visit: 8/8/17  Date of Discharge Note:  2/20/18  Total Visits Received: 6  Missed Visits: 4  ASSESSMENT   Status Towards Goals Met:  Not met due to non-compliance    Goals Not achieved and why: see above    Discharge reason : Pt has not re-scheduled further follow-up sessions    PLAN   This patient is discharged from Physical Therapy Services.       Medical necessity is demonstrated by the following IMPAIRMENTS/PROBLEM LIST:                        1) Pain limiting function                        2) Postural dysfunction                        3) Longus colli/suboccipital tightness                        4) Upper trapezius/levator scapula tightness                        5) Longus colli/scapula stabilizer/core/lumbar/hip weakness                         6) Decreased cervical/shoulder/lumbar/hip ROM                         7) Decreased cervical/thoracic/lumbar/hip joint mobility                        8) Decreased UT/levator scapula/scalenes soft tissue extensibility                        9) Decreased piriformis/HS/hip flexor extensibility                        10) Lack of HEP                        11) bilateral LE paresthesia      GOALS:   Short Term Goals: 4 weeks  1. Pt. to report decreased cervical/lumbar pain  </=  5/10 at worst to increase tolerance for upright unsupported  sitting  2. Pt. to demonstrate proper cervical and scapula retraction requiring min. to no verbal cues from PT  3. Increase bilateral lower cervical joint mobility to 2+/6 to promote greater ease with self care skills  4. Increase thoracic joint mobility to 2+/6 to promote greater ease with self care skills  5. Increase bilateral cervical rotation AROM to 55 degrees to promote greater ease with driving  6. Pt. to demonstrate increased MMT for cervical paraspinals to 3/5 to improve endurance with upright unsupported sitting posture.  7. Pt. to demonstrate increased MMT for bilateral sh. flexion to 3/5 to improve tolerance for reaching/lifting [place]  8. Increased MMT for core/lumbar paraspinals to 3/5 to increase endurance during prolonged sitting/standing.  9. Increased MMT for bilateral gluteus medius to 3+/5 to increase hip stability during ADL and work activities.   10. Pt to tolerate HEP to improve ROM and independence with ADLs  11 Pt. to report a decrease in bilateral LE paresthesia by 25% community ambulation     Long Term Goals: 8 weeks  1. Pt. to report decreased cervical/lumbar pain </ =  2/10 at worst to increase tolerance for upright unsupported sitting posture  2. Pt. to demonstrate proper cervical and scapula retraction requiring no verbal cues from PT  3. Increase bilateral lower cervical joint mobility to 3-/6 to promote greater ease with self care skills  4. Increase thoracic joint mobility to 3-/6 to promote greater ease with self care skills  5. Increase bilateral cervical rotation AROM to 65 degrees to promote greater ease with driving  6. Pt. to demonstrate increased MMT for cervical paraspinals to 3+/5 to improve endurance during upright unsupported sitting posture  7. Pt. to demonstrate increased MMT for B UE elevation to 4-/5 to improve tolerance for ADL and work activities.   8. Pt. to demonstrate increased MMT for mid-trap/lower trap strength to 3/5 to improve scapula stability during ADL and  work activities.   9. Increased MMT for core/lumbar paraspinals to 3+/5 to increase endurance during prolonged sitting/standing.  10. Increased MMT for bilateral gluteus medius to 4/5 to increase tolerance for ADL and work activities.   11. Pt to be independent with HEP to improve ROM and independence with ADL's  12. Pt. to report a decrease in bilateral LE paresthesia by at least 50% moderate lifting tasks

## 2018-03-14 ENCOUNTER — TELEPHONE (OUTPATIENT)
Dept: FAMILY MEDICINE | Facility: CLINIC | Age: 63
End: 2018-03-14

## 2018-03-14 NOTE — TELEPHONE ENCOUNTER
----- Message from Billie Breaux sent at 3/14/2018 10:49 AM CDT -----  Pt asked to change pharmacy.   [facility]  [address] [place] [id number]  Phone # [phone]  Fax # [phone]

## 2018-04-14 ENCOUNTER — CLINICAL SUPPORT (OUTPATIENT)
Dept: SMOKING CESSATION | Facility: CLINIC | Age: 63
End: 2018-04-14
Payer: COMMERCIAL

## 2018-04-14 DIAGNOSIS — F17.200 NICOTINE DEPENDENCE: Primary | ICD-10-CM

## 2018-04-14 PROCEDURE — 99407 BEHAV CHNG SMOKING > 10 MIN: CPT | Mod: S$GLB,,,

## 2018-04-18 ENCOUNTER — TELEPHONE (OUTPATIENT)
Dept: FAMILY MEDICINE | Facility: CLINIC | Age: 63
End: 2018-04-18

## 2018-04-18 DIAGNOSIS — M54.2 CHRONIC NECK AND BACK PAIN: ICD-10-CM

## 2018-04-18 DIAGNOSIS — M51.36 DDD (DEGENERATIVE DISC DISEASE), LUMBAR: ICD-10-CM

## 2018-04-18 DIAGNOSIS — G89.29 CHRONIC NECK AND BACK PAIN: ICD-10-CM

## 2018-04-18 DIAGNOSIS — M50.30 DDD (DEGENERATIVE DISC DISEASE), CERVICAL: ICD-10-CM

## 2018-04-18 DIAGNOSIS — M54.9 CHRONIC NECK AND BACK PAIN: ICD-10-CM

## 2018-04-18 NOTE — TELEPHONE ENCOUNTER
Called pt to inform her it is too soon for her Norco prescription. I was unable to leave a voicemail. Phone was continuously busy, called numerous times.

## 2018-04-18 NOTE — TELEPHONE ENCOUNTER
----- Message from Alba Xuan sent at 4/18/2018 12:06 PM CDT -----  Contact: self, [phone]  Patient requests her [place] prescription refill sent to pharmacy. Please advise.

## 2018-04-23 DIAGNOSIS — M51.36 DDD (DEGENERATIVE DISC DISEASE), LUMBAR: ICD-10-CM

## 2018-04-23 DIAGNOSIS — M54.9 CHRONIC NECK AND BACK PAIN: ICD-10-CM

## 2018-04-23 DIAGNOSIS — M54.2 CHRONIC NECK AND BACK PAIN: ICD-10-CM

## 2018-04-23 DIAGNOSIS — G89.29 CHRONIC NECK AND BACK PAIN: ICD-10-CM

## 2018-04-23 DIAGNOSIS — M50.30 DDD (DEGENERATIVE DISC DISEASE), CERVICAL: ICD-10-CM

## 2018-04-23 RX ORDER — HYDROCODONE BITARTRATE AND ACETAMINOPHEN 10; 325 MG/1; MG/1
1 TABLET ORAL EVERY 4 HOURS PRN
Qty: 90 TABLET | Refills: 0 | Status: SHIPPED | OUTPATIENT
Start: 2018-04-23 | End: 2018-05-08 | Stop reason: SDUPTHER

## 2018-04-23 NOTE — TELEPHONE ENCOUNTER
----- Message from Tatyana Ashraf sent at 4/23/2018  8:46 AM CDT -----  Contact: [phone]/self  Patient is requesting to have a refill of hydrocodone-acetaminophen 10-325mg (NORCO)  mg Tab. Please call her when rx is ready to .

## 2018-04-23 NOTE — TELEPHONE ENCOUNTER
----- Message from Julia Morales sent at 4/23/2018  1:57 PM CDT -----  Contact: [phone]/ self   Pt wants to know if rx Norco its ready . Please advise

## 2018-05-04 DIAGNOSIS — E11.9 TYPE 2 DIABETES MELLITUS WITHOUT COMPLICATION: ICD-10-CM

## 2018-05-08 ENCOUNTER — OFFICE VISIT (OUTPATIENT)
Dept: FAMILY MEDICINE | Facility: CLINIC | Age: 63
End: 2018-05-08
Payer: MEDICAID

## 2018-05-08 VITALS
TEMPERATURE: 98 F | OXYGEN SATURATION: 99 % | BODY MASS INDEX: 26.98 KG/M2 | HEART RATE: 58 BPM | HEIGHT: 62 IN | SYSTOLIC BLOOD PRESSURE: 140 MMHG | DIASTOLIC BLOOD PRESSURE: 70 MMHG | WEIGHT: 146.63 LBS

## 2018-05-08 DIAGNOSIS — M50.30 DDD (DEGENERATIVE DISC DISEASE), CERVICAL: ICD-10-CM

## 2018-05-08 DIAGNOSIS — M54.41 CHRONIC BILATERAL LOW BACK PAIN WITH BILATERAL SCIATICA: Primary | ICD-10-CM

## 2018-05-08 DIAGNOSIS — G89.29 CHRONIC NECK AND BACK PAIN: ICD-10-CM

## 2018-05-08 DIAGNOSIS — E55.9 VITAMIN D DEFICIENCY: ICD-10-CM

## 2018-05-08 DIAGNOSIS — M54.9 CHRONIC NECK AND BACK PAIN: ICD-10-CM

## 2018-05-08 DIAGNOSIS — G89.29 CHRONIC BILATERAL LOW BACK PAIN WITH BILATERAL SCIATICA: Primary | ICD-10-CM

## 2018-05-08 DIAGNOSIS — M51.36 DDD (DEGENERATIVE DISC DISEASE), LUMBAR: ICD-10-CM

## 2018-05-08 DIAGNOSIS — M54.2 CHRONIC NECK AND BACK PAIN: ICD-10-CM

## 2018-05-08 DIAGNOSIS — M54.42 CHRONIC BILATERAL LOW BACK PAIN WITH BILATERAL SCIATICA: Primary | ICD-10-CM

## 2018-05-08 DIAGNOSIS — Z12.31 ENCOUNTER FOR SCREENING MAMMOGRAM FOR BREAST CANCER: ICD-10-CM

## 2018-05-08 DIAGNOSIS — M81.0 POSTMENOPAUSAL OSTEOPOROSIS: ICD-10-CM

## 2018-05-08 PROCEDURE — 99214 OFFICE O/P EST MOD 30 MIN: CPT | Mod: PBBFAC,PO | Performed by: FAMILY MEDICINE

## 2018-05-08 PROCEDURE — 99999 PR PBB SHADOW E&M-EST. PATIENT-LVL IV: CPT | Mod: PBBFAC,,, | Performed by: FAMILY MEDICINE

## 2018-05-08 PROCEDURE — 99214 OFFICE O/P EST MOD 30 MIN: CPT | Mod: S$PBB,,, | Performed by: FAMILY MEDICINE

## 2018-05-08 RX ORDER — HYDROCODONE BITARTRATE AND ACETAMINOPHEN 10; 325 MG/1; MG/1
1 TABLET ORAL EVERY 4 HOURS PRN
Qty: 90 TABLET | Refills: 0 | Status: ON HOLD | OUTPATIENT
Start: 2018-07-23 | End: 2018-07-07 | Stop reason: HOSPADM

## 2018-05-08 RX ORDER — KETOROLAC TROMETHAMINE 30 MG/ML
30 INJECTION, SOLUTION INTRAMUSCULAR; INTRAVENOUS
Status: COMPLETED | OUTPATIENT
Start: 2018-05-08 | End: 2018-05-08

## 2018-05-08 RX ORDER — HYDROCODONE BITARTRATE AND ACETAMINOPHEN 10; 325 MG/1; MG/1
1 TABLET ORAL EVERY 4 HOURS PRN
Qty: 90 TABLET | Refills: 0 | Status: SHIPPED | OUTPATIENT
Start: 2018-06-23 | End: 2018-05-08 | Stop reason: SDUPTHER

## 2018-05-08 RX ORDER — ERGOCALCIFEROL 1.25 MG/1
50000 CAPSULE ORAL
Qty: 15 CAPSULE | Refills: 3 | Status: SHIPPED | OUTPATIENT
Start: 2018-05-08 | End: 2019-06-26

## 2018-05-08 RX ORDER — HYDROCODONE BITARTRATE AND ACETAMINOPHEN 10; 325 MG/1; MG/1
1 TABLET ORAL EVERY 4 HOURS PRN
Qty: 90 TABLET | Refills: 0 | Status: SHIPPED | OUTPATIENT
Start: 2018-05-23 | End: 2018-05-08 | Stop reason: SDUPTHER

## 2018-05-08 RX ADMIN — KETOROLAC TROMETHAMINE 30 MG: 30 INJECTION, SOLUTION INTRAMUSCULAR; INTRAVENOUS at 10:05

## 2018-05-08 NOTE — PROGRESS NOTES
Office Visit    Patient Name: Thom Krishna    : 1955  MRN: [id number]    Subjective:  Thom is a 62 y.o. female who presents today for:    Follow-up    62-year-old female patient of mine with history of significant chronic pain that is primarily secondary to degenerative disc issues of the lumbar spine that is causing bilateral sciatic nerve issues who is here today to discuss ongoing pain management.  She did have a low back surgery through [facility] but unfortunately this did not help with any of her symptoms.  She is continuing to report significant low back pain radiating into both legs, that she describes as burning and at times nearly  intolerable.  The NORCO does help to take the edge off but it is not working as well.  We have tried a wide variety of adjunct medications including Neurontin, amitriptyline, muscle relaxers, antidepressant the Cymbalta that also have a chronic pain medication, without good results and patient reports that often these medications make her feel ill with nausea and vomiting.  Unfortunately she did not tolerate the Effexor that I recently prescribed with the hopes that it would help with her mood and chronic pain.    In the next few months she should qualify for Medicare, and the hope is that she can then get an updated MRI of her lumbar spine and follow-up with [facility] pain management.     She has osteoporosis of the lumbar spine that has not been followed up on in a long time.  She is open to an updated DEXA scan.  She has not been compliant with vitamin D and her last level was very low at 7.      Past Medical History  Past Medical History:   Diagnosis Date    Anxiety and depression 2015    Cervical radiculopathy 2016    Colon polyps 2015    Diabetes mellitus type 2 with neurological manifestations 2015    no current medications, only one episode of elevated sugars with acute illness, will monitor     Hip fracture     Macrocytosis 2015       Past  "Surgical History  Past Surgical History:   Procedure Laterality Date     SECTION      CHOLECYSTECTOMY      COLONOSCOPY  3/31/10    2 polyps, tubular adenoma    HYSTERECTOMY      Uterus and both ovaries    INCISIONAL HERNIA REPAIR      x 2    LIVER BIOPSY  2003    autoimmune hepatitis    ROTATOR CUFF REPAIR      right    STOMACH SURGERY      "took lymph node off of liver"    TOTAL HIP ARTHROPLASTY      left hip    UPPER GASTROINTESTINAL ENDOSCOPY  3/24/10    normal       Family History  Family History   Problem Relation Age of Onset    Diabetes Mellitus Mother     Liver cancer Sister     Pancreatic cancer Brother     Liver disease Neg Hx        Social History  Social History     Social History    Marital status: Significant Other     Spouse name: N/A    Number of children: N/A    Years of education: N/A     Occupational History    Not on file.     Social History Main Topics    Smoking status: Current Every Day Smoker     Packs/day: 2.00     Years: 45.00     Types: Cigarettes    Smokeless tobacco: Never Used    Alcohol use No      Comment: used to drink heavily, stopped in     Drug use: No    Sexual activity: Not on file     Other Topics Concern    Not on file     Social History Narrative    No narrative on file       Current Medications  Medications reviewed and updated.     Allergies   Review of patient's allergies indicates:   Allergen Reactions    Penicillins      Other reaction(s): Hives    Sulfa (sulfonamide antibiotics)      Other reaction(s): Hives       Review of Systems (Pertinent positives)  Review of Systems   Gastrointestinal: Negative for constipation.   Musculoskeletal: Positive for back pain.   Psychiatric/Behavioral: Positive for dysphoric mood and sleep disturbance. The patient is nervous/anxious.        BP (!) 140/70   Pulse (!) 58   Temp 98.1 °F (36.7 °C) (Oral)   Ht 5' 2" (1.575 m)   Wt 66.5 kg (146 lb 9.7 oz)   SpO2 99%   BMI 26.81 kg/m² "     Physical Exam   Constitutional: She is oriented to person, place, and time. She appears well-developed and well-nourished. No distress.   HENT:   Head: Normocephalic and atraumatic.   Eyes: Conjunctivae are normal.   Cardiovascular: Normal rate and regular rhythm.    Pulmonary/Chest: Effort normal and breath sounds normal.   Musculoskeletal: She exhibits no edema.   Neurological: She is alert and oriented to person, place, and time.   Skin: Skin is warm and dry.   Psychiatric: She exhibits a depressed mood.   Vitals reviewed.        Assessment/Plan:  Thom Krishna is a 62 y.o. female who presents today for :    Thom was seen today for follow-up.    Diagnoses and all orders for this visit:    Chronic bilateral low back pain with bilateral sciatica  -     ketorolac injection 30 mg; Inject 1 mL (30 mg total) into the muscle one time.    DDD (degenerative disc disease), cervical  -     Discontinue: hydrocodone-acetaminophen 10-325mg (NORCO)  mg Tab; Take 1 tablet by mouth every 4 (four) hours as needed for Pain.  -     Discontinue: hydrocodone-acetaminophen 10-325mg (NORCO)  mg Tab; Take 1 tablet by mouth every 4 (four) hours as needed for Pain.  -     hydrocodone-acetaminophen 10-325mg (NORCO)  mg Tab; Take 1 tablet by mouth every 4 (four) hours as needed for Pain.    DDD (degenerative disc disease), lumbar  -     Discontinue: hydrocodone-acetaminophen 10-325mg (NORCO)  mg Tab; Take 1 tablet by mouth every 4 (four) hours as needed for Pain.  -     Discontinue: hydrocodone-acetaminophen 10-325mg (NORCO)  mg Tab; Take 1 tablet by mouth every 4 (four) hours as needed for Pain.  -     hydrocodone-acetaminophen 10-325mg (NORCO)  mg Tab; Take 1 tablet by mouth every 4 (four) hours as needed for Pain.    Chronic neck and back pain  -     Discontinue: hydrocodone-acetaminophen 10-325mg (NORCO)  mg Tab; Take 1 tablet by mouth every 4 (four) hours as needed for Pain.  -      Discontinue: hydrocodone-acetaminophen 10-325mg (NORCO)  mg Tab; Take 1 tablet by mouth every 4 (four) hours as needed for Pain.  -     hydrocodone-acetaminophen 10-325mg (NORCO)  mg Tab; Take 1 tablet by mouth every 4 (four) hours as needed for Pain.  -     ketorolac injection 30 mg; Inject 1 mL (30 mg total) into the muscle one time.    Postmenopausal osteoporosis  -     DXA Bone Density Spine And Hip; Future  -     ergocalciferol (ERGOCALCIFEROL) 50,000 unit Cap; Take 1 capsule (50,000 Units total) by mouth every 7 days.    Encounter for screening mammogram for breast cancer  -     Mammo Digital Screening Bilat with CAD; Future    Vitamin D deficiency  -     ergocalciferol (ERGOCALCIFEROL) 50,000 unit Cap; Take 1 capsule (50,000 Units total) by mouth every 7 days.            ICD-10-CM ICD-9-CM    1. Chronic bilateral low back pain with bilateral sciatica M54.42 724.2 ketorolac injection 30 mg    M54.41 724.3     G89.29 338.29    2. DDD (degenerative disc disease), cervical M50.30 722.4 hydrocodone-acetaminophen 10-325mg (NORCO)  mg Tab      DISCONTINUED: hydrocodone-acetaminophen 10-325mg (NORCO)  mg Tab      DISCONTINUED: hydrocodone-acetaminophen 10-325mg (NORCO)  mg Tab   3. DDD (degenerative disc disease), lumbar M51.36 722.52 hydrocodone-acetaminophen 10-325mg (NORCO)  mg Tab      DISCONTINUED: hydrocodone-acetaminophen 10-325mg (NORCO)  mg Tab      DISCONTINUED: hydrocodone-acetaminophen 10-325mg (NORCO)  mg Tab   4. Chronic neck and back pain M54.2 723.1 hydrocodone-acetaminophen 10-325mg (NORCO)  mg Tab    M54.9 724.5 ketorolac injection 30 mg      DISCONTINUED: hydrocodone-acetaminophen 10-325mg (NORCO)  mg Tab      DISCONTINUED: hydrocodone-acetaminophen 10-325mg (NORCO)  mg Tab   5. Postmenopausal osteoporosis M81.0 733.01 DXA Bone Density Spine And Hip      ergocalciferol (ERGOCALCIFEROL) 50,000 unit Cap   6. Encounter for screening  mammogram for breast cancer Z12.31 V76.12 Mammo Digital Screening Bilat with CAD   7. Vitamin D deficiency E55.9 268.9 ergocalciferol (ERGOCALCIFEROL) 50,000 unit Cap       Patient Instructions   Start daily calcium tablet CITRICAL PLUS D   2 Pills daily with breakfast PLUS ADD WEEKLY VITAMIN D.  MAMMOGRAM AND BONE DENSITY AS ORDERED AND FOLLOW UP IN 3 MONTHS.         Follow-up in about 3 months (around 8/8/2018) for return as needed for new concerns.

## 2018-05-08 NOTE — PATIENT INSTRUCTIONS
Start daily calcium tablet CITRICAL PLUS D   2 Pills daily with breakfast PLUS ADD WEEKLY VITAMIN D.  MAMMOGRAM AND BONE DENSITY AS ORDERED AND FOLLOW UP IN 3 MONTHS.

## 2018-06-26 ENCOUNTER — TELEPHONE (OUTPATIENT)
Dept: FAMILY MEDICINE | Facility: CLINIC | Age: 63
End: 2018-06-26

## 2018-06-26 DIAGNOSIS — M54.42 CHRONIC BILATERAL LOW BACK PAIN WITH BILATERAL SCIATICA: Primary | ICD-10-CM

## 2018-06-26 DIAGNOSIS — G89.29 CHRONIC BILATERAL LOW BACK PAIN WITH BILATERAL SCIATICA: Primary | ICD-10-CM

## 2018-06-26 DIAGNOSIS — M54.12 CERVICAL RADICULOPATHY: ICD-10-CM

## 2018-06-26 DIAGNOSIS — M54.41 CHRONIC BILATERAL LOW BACK PAIN WITH BILATERAL SCIATICA: Primary | ICD-10-CM

## 2018-06-26 DIAGNOSIS — M54.2 NECK PAIN: ICD-10-CM

## 2018-06-26 NOTE — TELEPHONE ENCOUNTER
Here is referral to Dr Gu.  She has MRIs from 2016 that I think should be good for initial consult.  He can order updated ones if he feels that it necessary. Thanks

## 2018-06-26 NOTE — TELEPHONE ENCOUNTER
patient stated she would like to take your advise and no longer go to [facility]. She is requesting a new pain management referral, please advise.

## 2018-06-26 NOTE — TELEPHONE ENCOUNTER
----- Message from Kat Medley sent at 6/26/2018 10:22 AM CDT -----  Contact: self/[phone]  Patient called to speak with your office about a pain management doctor the doctor told her about previously.    Please call and advise.

## 2018-06-27 ENCOUNTER — TELEPHONE (OUTPATIENT)
Dept: FAMILY MEDICINE | Facility: CLINIC | Age: 63
End: 2018-06-27

## 2018-06-27 NOTE — TELEPHONE ENCOUNTER
Called pt to inform the patient that pain management does not except her insurance. She said she no longer has medicaid and was informed they do except medicare. She said she would call and see.

## 2018-06-28 ENCOUNTER — TELEPHONE (OUTPATIENT)
Dept: FAMILY MEDICINE | Facility: CLINIC | Age: 63
End: 2018-06-28

## 2018-06-28 NOTE — TELEPHONE ENCOUNTER
Spoke to pt and states that she now has Medicare and her Medicare was effective on Friday. Pt requesting a new referral for Pain Management since her previous referral was closed. Pls advise.

## 2018-06-28 NOTE — TELEPHONE ENCOUNTER
----- Message from Yulissa Toro sent at 6/28/2018  9:51 AM CDT -----  Contact: Self/ [phone]  Patient called in to speak about getting a referral to see pain management.    Please call and advise.

## 2018-06-29 ENCOUNTER — TELEPHONE (OUTPATIENT)
Dept: FAMILY MEDICINE | Facility: CLINIC | Age: 63
End: 2018-06-29

## 2018-06-29 NOTE — TELEPHONE ENCOUNTER
----- Message from Yulissa Toro sent at 6/29/2018  1:07 PM CDT -----  Contact: Self/ [phone]  Patient called in returning your call. Please call and advise.

## 2018-06-29 NOTE — TELEPHONE ENCOUNTER
Left msg for CB to inform pt to call and update her insurance information and let Dr. Dejesus know when she does so that she can put in a new referral for Pain Management under his new insurance Medicare.

## 2018-07-03 ENCOUNTER — TELEPHONE (OUTPATIENT)
Dept: FAMILY MEDICINE | Facility: CLINIC | Age: 63
End: 2018-07-03

## 2018-07-03 DIAGNOSIS — G89.29 CHRONIC NECK AND BACK PAIN: ICD-10-CM

## 2018-07-03 DIAGNOSIS — M54.2 CHRONIC NECK AND BACK PAIN: ICD-10-CM

## 2018-07-03 DIAGNOSIS — G89.29 CHRONIC BILATERAL LOW BACK PAIN WITH BILATERAL SCIATICA: Primary | ICD-10-CM

## 2018-07-03 DIAGNOSIS — M54.12 LEFT CERVICAL RADICULOPATHY: ICD-10-CM

## 2018-07-03 DIAGNOSIS — M54.41 CHRONIC BILATERAL LOW BACK PAIN WITH BILATERAL SCIATICA: Primary | ICD-10-CM

## 2018-07-03 DIAGNOSIS — M54.42 CHRONIC BILATERAL LOW BACK PAIN WITH BILATERAL SCIATICA: Primary | ICD-10-CM

## 2018-07-03 DIAGNOSIS — M50.30 DDD (DEGENERATIVE DISC DISEASE), CERVICAL: ICD-10-CM

## 2018-07-03 DIAGNOSIS — M54.9 CHRONIC NECK AND BACK PAIN: ICD-10-CM

## 2018-07-03 NOTE — TELEPHONE ENCOUNTER
----- Message from Esther Dejesus sent at 7/3/2018  9:33 AM CDT -----  Contact: [phone]/self  Patient called in returning your call. Please advise.

## 2018-07-06 ENCOUNTER — TELEPHONE (OUTPATIENT)
Dept: FAMILY MEDICINE | Facility: CLINIC | Age: 63
End: 2018-07-06

## 2018-07-06 DIAGNOSIS — Z12.11 COLON CANCER SCREENING: Primary | ICD-10-CM

## 2018-07-06 PROBLEM — K59.09 OTHER CONSTIPATION: Status: ACTIVE | Noted: 2018-07-06

## 2018-07-06 PROBLEM — R10.13 EPIGASTRIC PAIN: Status: ACTIVE | Noted: 2018-07-06

## 2018-07-06 NOTE — TELEPHONE ENCOUNTER
Patient is requesting orders for a colonoscopy to be done at an [facility] facility on the [facility].  Please advise.

## 2018-07-06 NOTE — TELEPHONE ENCOUNTER
----- Message from Alice Arita sent at 7/6/2018 10:02 AM CDT -----  Contact: [phone]/self  Patient requesting to speak with you regarding a referral to have her endoscopy on the [facility]. Please call and advise.

## 2018-07-11 DIAGNOSIS — F41.9 ANXIETY: ICD-10-CM

## 2018-07-11 RX ORDER — ALPRAZOLAM 1 MG/1
TABLET ORAL
Qty: 45 TABLET | Refills: 5 | Status: SHIPPED | OUTPATIENT
Start: 2018-07-11 | End: 2018-11-24 | Stop reason: SDUPTHER

## 2018-07-17 ENCOUNTER — TELEPHONE (OUTPATIENT)
Dept: FAMILY MEDICINE | Facility: CLINIC | Age: 63
End: 2018-07-17

## 2018-07-17 DIAGNOSIS — Z12.11 SCREENING FOR COLON CANCER: Primary | ICD-10-CM

## 2018-07-18 NOTE — TELEPHONE ENCOUNTER
----- Message from Zina Shukla MA sent at 7/17/2018  2:19 PM CDT -----  Do you want to see her prior to her scheduled appt?  ----- Message -----  From: Fabiola Moise  Sent: 7/17/2018  11:26 AM  To: Oleg HERNÁNDEZ Staff    No. [phone]    Patient was discharged from the hospital on 7/7/18.  She has an appointment on 8/21/18.  Does she need a hospital follow up sooner.   Also, patient is ready to schedule a colonoscopy.    Please call.

## 2018-07-18 NOTE — TELEPHONE ENCOUNTER
I don't need to see her sooner if she's feeling ok, but it would be good if she could get the colonoscopy before that august appointment with me. I went ahead and put in orders for her to have this done in [place] since she is living on the [facility] now, but she can request a different location. Let me know if I need to change the order thanks

## 2018-07-19 ENCOUNTER — TELEPHONE (OUTPATIENT)
Dept: FAMILY MEDICINE | Facility: CLINIC | Age: 63
End: 2018-07-19

## 2018-07-19 NOTE — TELEPHONE ENCOUNTER
Returned patient's call to review recommendations, she verbalized understanding. She also said she needs a referral for gastro, please advise.

## 2018-07-19 NOTE — TELEPHONE ENCOUNTER
----- Message from Malathi Atwood sent at 7/19/2018  3:01 PM CDT -----  Contact: Self. [phone]  Patient is returning your call.  Please advise.

## 2018-07-20 NOTE — TELEPHONE ENCOUNTER
She should have her colonoscopy first and then I will place a referral to follow up with the gastro doctor who does her scope.
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
## 2018-07-27 ENCOUNTER — OFFICE VISIT (OUTPATIENT)
Dept: PAIN MEDICINE | Facility: CLINIC | Age: 63
End: 2018-07-27
Payer: MEDICARE

## 2018-07-27 VITALS — BODY MASS INDEX: 26.52 KG/M2 | WEIGHT: 145 LBS

## 2018-07-27 DIAGNOSIS — G89.4 CHRONIC PAIN SYNDROME: ICD-10-CM

## 2018-07-27 DIAGNOSIS — F41.9 ANXIETY AND DEPRESSION: ICD-10-CM

## 2018-07-27 DIAGNOSIS — M54.2 CHRONIC NECK AND BACK PAIN: Primary | ICD-10-CM

## 2018-07-27 DIAGNOSIS — G89.29 CHRONIC NECK AND BACK PAIN: Primary | ICD-10-CM

## 2018-07-27 DIAGNOSIS — M96.1 FAILED BACK SURGICAL SYNDROME: ICD-10-CM

## 2018-07-27 DIAGNOSIS — M43.10 SPONDYLOLISTHESIS, UNSPECIFIED SPINAL REGION: ICD-10-CM

## 2018-07-27 DIAGNOSIS — F32.A ANXIETY AND DEPRESSION: ICD-10-CM

## 2018-07-27 DIAGNOSIS — M54.9 CHRONIC NECK AND BACK PAIN: Primary | ICD-10-CM

## 2018-07-27 PROCEDURE — 99212 OFFICE O/P EST SF 10 MIN: CPT | Mod: PBBFAC,PO | Performed by: PAIN MEDICINE

## 2018-07-27 PROCEDURE — 99204 OFFICE O/P NEW MOD 45 MIN: CPT | Mod: S$PBB,,, | Performed by: PAIN MEDICINE

## 2018-07-27 PROCEDURE — 99999 PR PBB SHADOW E&M-EST. PATIENT-LVL II: CPT | Mod: PBBFAC,,, | Performed by: PAIN MEDICINE

## 2018-07-27 RX ORDER — HYDROCODONE BITARTRATE AND ACETAMINOPHEN 10; 325 MG/1; MG/1
1 TABLET ORAL EVERY 6 HOURS PRN
COMMUNITY
End: 2018-08-21 | Stop reason: SDUPTHER

## 2018-07-27 NOTE — LETTER
July 27, 2018      Gerry Díaz MD  [address]  [address]  [facility] [address]           [facility]  [address]  [facility] [address]  Phone: [phone]          Patient: Thom Krishna   MR Number: [id number]   YOB: 1955   Date of Visit: 7/27/2018       Dear Dr. Gerry Díaz:    Thank you for referring Thom Krishna to me for evaluation. Attached you will find relevant portions of my assessment and plan of care.    If you have questions, please do not hesitate to call me. I look forward to following Thom Krishna along with you.    Sincerely,    Roxana Gu Jr., MD    Enclosure  CC:  No Recipients    If you would like to receive this communication electronically, please contact [email or web address] or [phone] to request more information on [employer] Link access.    For providers and/or their staff who would like to refer a patient to [facility], please contact us through our one-stop-shop provider referral line, [facility], at [phone].    If you feel you have received this communication in error or would no longer like to receive these types of communications, please e-mail [email or web address]

## 2018-07-30 ENCOUNTER — TELEPHONE (OUTPATIENT)
Dept: PAIN MEDICINE | Facility: CLINIC | Age: 63
End: 2018-07-30

## 2018-07-30 NOTE — TELEPHONE ENCOUNTER
----- Message from Billie Quiñones MA sent at 7/30/2018  8:39 AM CDT -----  This patient wants to be scheduled in [place].    1st:  Cervical NORMAN, Caudal with catheter to target L4-5  2nd:  Cervical NORMAN    These consents have been scanned together into MEDIA,    Please reach out to her for scheduling.     Thanks

## 2018-07-31 DIAGNOSIS — M54.16 LUMBAR RADICULOPATHY: Primary | ICD-10-CM

## 2018-07-31 RX ORDER — ALPRAZOLAM 0.5 MG/1
1 TABLET, ORALLY DISINTEGRATING ORAL ONCE AS NEEDED
Status: CANCELLED | OUTPATIENT
Start: 2018-07-31 | End: 2018-07-31

## 2018-08-03 RX ORDER — BISACODYL 5 MG
5 TABLET, DELAYED RELEASE (ENTERIC COATED) ORAL DAILY PRN
COMMUNITY
End: 2020-06-26

## 2018-08-06 DIAGNOSIS — M51.36 DDD (DEGENERATIVE DISC DISEASE), LUMBAR: Primary | ICD-10-CM

## 2018-08-07 ENCOUNTER — HOSPITAL ENCOUNTER (OUTPATIENT)
Facility: HOSPITAL | Age: 63
Discharge: HOME | End: 2018-08-07
Attending: PAIN MEDICINE | Admitting: PAIN MEDICINE
Payer: MEDICARE

## 2018-08-07 ENCOUNTER — HOSPITAL ENCOUNTER (OUTPATIENT)
Dept: RADIOLOGY | Facility: HOSPITAL | Age: 63
Discharge: HOME | End: 2018-08-07
Attending: PAIN MEDICINE
Payer: MEDICARE

## 2018-08-07 ENCOUNTER — SURGERY (OUTPATIENT)
Age: 63
End: 2018-08-07

## 2018-08-07 VITALS
HEIGHT: 62 IN | TEMPERATURE: 98 F | RESPIRATION RATE: 18 BRPM | DIASTOLIC BLOOD PRESSURE: 58 MMHG | WEIGHT: 145 LBS | HEART RATE: 62 BPM | SYSTOLIC BLOOD PRESSURE: 106 MMHG | BODY MASS INDEX: 26.68 KG/M2 | OXYGEN SATURATION: 100 %

## 2018-08-07 DIAGNOSIS — M51.36 DDD (DEGENERATIVE DISC DISEASE), LUMBAR: ICD-10-CM

## 2018-08-07 DIAGNOSIS — M54.16 LUMBAR RADICULOPATHY: Primary | ICD-10-CM

## 2018-08-07 PROCEDURE — 25500020 PHARM REV CODE 255: Mod: PO | Performed by: PAIN MEDICINE

## 2018-08-07 PROCEDURE — 62323 NJX INTERLAMINAR LMBR/SAC: CPT | Mod: ,,, | Performed by: PAIN MEDICINE

## 2018-08-07 PROCEDURE — 76000 FLUOROSCOPY <1 HR PHYS/QHP: CPT | Mod: TC,PO

## 2018-08-07 PROCEDURE — A4216 STERILE WATER/SALINE, 10 ML: HCPCS | Mod: PO | Performed by: PAIN MEDICINE

## 2018-08-07 PROCEDURE — 25000003 PHARM REV CODE 250: Mod: PO | Performed by: PAIN MEDICINE

## 2018-08-07 PROCEDURE — 62323 NJX INTERLAMINAR LMBR/SAC: CPT | Mod: PO | Performed by: PAIN MEDICINE

## 2018-08-07 PROCEDURE — 63600175 PHARM REV CODE 636 W HCPCS: Mod: PO | Performed by: PAIN MEDICINE

## 2018-08-07 RX ORDER — ALPRAZOLAM 0.5 MG/1
1 TABLET, ORALLY DISINTEGRATING ORAL ONCE AS NEEDED
Status: DISCONTINUED | OUTPATIENT
Start: 2018-08-07 | End: 2018-08-07

## 2018-08-07 RX ORDER — LIDOCAINE HYDROCHLORIDE 20 MG/ML
INJECTION, SOLUTION EPIDURAL; INFILTRATION; INTRACAUDAL; PERINEURAL
Status: DISCONTINUED | OUTPATIENT
Start: 2018-08-07 | End: 2018-08-07 | Stop reason: HOSPADM

## 2018-08-07 RX ORDER — MIDAZOLAM HYDROCHLORIDE 1 MG/ML
INJECTION INTRAMUSCULAR; INTRAVENOUS
Status: DISCONTINUED | OUTPATIENT
Start: 2018-08-07 | End: 2018-08-07 | Stop reason: HOSPADM

## 2018-08-07 RX ORDER — SODIUM CHLORIDE 9 MG/ML
INJECTION, SOLUTION INTRAMUSCULAR; INTRAVENOUS; SUBCUTANEOUS
Status: DISCONTINUED | OUTPATIENT
Start: 2018-08-07 | End: 2018-08-07 | Stop reason: HOSPADM

## 2018-08-07 RX ORDER — SODIUM CHLORIDE, SODIUM LACTATE, POTASSIUM CHLORIDE, CALCIUM CHLORIDE 600; 310; 30; 20 MG/100ML; MG/100ML; MG/100ML; MG/100ML
INJECTION, SOLUTION INTRAVENOUS CONTINUOUS
Status: DISCONTINUED | OUTPATIENT
Start: 2018-08-07 | End: 2018-08-07 | Stop reason: HOSPADM

## 2018-08-07 RX ORDER — LIDOCAINE HYDROCHLORIDE 10 MG/ML
1 INJECTION, SOLUTION EPIDURAL; INFILTRATION; INTRACAUDAL; PERINEURAL ONCE
Status: COMPLETED | OUTPATIENT
Start: 2018-08-07 | End: 2018-08-07

## 2018-08-07 RX ORDER — DEXAMETHASONE SODIUM PHOSPHATE 4 MG/ML
INJECTION, SOLUTION INTRA-ARTICULAR; INTRALESIONAL; INTRAMUSCULAR; INTRAVENOUS; SOFT TISSUE
Status: DISCONTINUED | OUTPATIENT
Start: 2018-08-07 | End: 2018-08-07 | Stop reason: HOSPADM

## 2018-08-07 RX ORDER — FENTANYL CITRATE 50 UG/ML
INJECTION, SOLUTION INTRAMUSCULAR; INTRAVENOUS
Status: DISCONTINUED | OUTPATIENT
Start: 2018-08-07 | End: 2018-08-07 | Stop reason: HOSPADM

## 2018-08-07 RX ADMIN — LIDOCAINE HYDROCHLORIDE 5 ML: 20 INJECTION, SOLUTION EPIDURAL; INFILTRATION; INTRACAUDAL; PERINEURAL at 10:08

## 2018-08-07 RX ADMIN — LIDOCAINE HYDROCHLORIDE: 10 INJECTION, SOLUTION EPIDURAL; INFILTRATION; INTRACAUDAL; PERINEURAL at 10:08

## 2018-08-07 RX ADMIN — SODIUM CHLORIDE, SODIUM LACTATE, POTASSIUM CHLORIDE, AND CALCIUM CHLORIDE: .6; .31; .03; .02 INJECTION, SOLUTION INTRAVENOUS at 10:08

## 2018-08-07 RX ADMIN — SODIUM CHLORIDE 5 ML: 9 INJECTION INTRAMUSCULAR; INTRAVENOUS; SUBCUTANEOUS at 10:08

## 2018-08-07 RX ADMIN — MIDAZOLAM HYDROCHLORIDE 1 MG: 1 INJECTION, SOLUTION INTRAMUSCULAR; INTRAVENOUS at 10:08

## 2018-08-07 RX ADMIN — IOHEXOL 3 ML: 300 INJECTION, SOLUTION INTRAVENOUS at 10:08

## 2018-08-07 RX ADMIN — DEXAMETHASONE SODIUM PHOSPHATE 20 MG: 4 INJECTION, SOLUTION INTRAMUSCULAR; INTRAVENOUS at 10:08

## 2018-08-07 RX ADMIN — FENTANYL CITRATE 25 MCG: 50 INJECTION, SOLUTION INTRAMUSCULAR; INTRAVENOUS at 10:08

## 2018-08-07 NOTE — OP NOTE
"Procedure Note    Pre-operative Diagnosis: Lumbar radiculopathy  Post-operative Diagnosis: Lumbar radiculopathy  Procedure Date: 08/07/2018  Procedure: (1) Caudal Epidural Steroid Injection and (2) Intraoperative fluoroscopy        Anesthesia: None    Indications: To alleviate pain and suffering, and reduce functional impairment associated with Lumbar radiculopathy.      The patients history and physical exam were reviewed. The risks, benefits and alternatives to the procedure were discussed, and all questions were answered to the patients satisfaction. Special attention was given to her bleeding risk during the consenting process due to her use of warfarin, which was not stopped for this procedure.  The patient agreed to proceed, and written informed consent was verified.    Procedure in Detail: The patient was brought into the procedure room and placed in the prone position on the fluoroscopy table. The area of the sacrum and coccyx was prepped with Chloraprep and draped in a sterile manner. The sacral hiatus was identified by palpation and lateral fluoroscopy.  The skin and subcutaneous tissues were anesthetized with 1 mL of Lidocaine 1%.  A 22G 3.5" needle was inserted inferiorly to the sacral hiatus with cephalad angulation and advanced into through the sacro-coxxygeal ligament.  Injection of 1 mL of iodinated contrast revealed appropriate sacral epidurogram without evidence of direct vascular uptake.    A 10 mL mixture consisting of saline, 5 mL 1% Lidocaine and 20 mg of Dexamethasone was injected slowly and without resistance.  The needle was removed and a bandage applied to puncture site.    Disposition: The patient tolerated the procedure well, and there were no apparent complications. Vital signs remained stable throughout the procedure. The patient was taken to the recovery area where written discharge instructions for the procedure were given.     EBL: nil    Follow-up: in clinic as " scheduled      Roxana Gu Jr, MD  Interventional Pain Medicine / Anesthesiology

## 2018-08-07 NOTE — PLAN OF CARE
Vital signs stable. All questions answered. Denies recent fever or illness. Patient states ready for planned procedure.

## 2018-08-07 NOTE — DISCHARGE SUMMARY
[facility]  Discharge Note  Short Stay     Admit Date: 8/7/2018    Discharge Date: 8/7/2018     Attending Physician: Roxana Gu Jr, MD    Diagnoses:  Active Hospital Problems    Diagnosis  POA    *Lumbar radiculopathy [M54.16]  Yes      Resolved Hospital Problems    Diagnosis Date Resolved POA   No resolved problems to display.     Discharged Condition: Good     Hospital Course: Patient was admitted for an outpatient interventional pain management procedure and tolerated the procedure well with no complications.     Final Diagnoses: Same as principal problem.     Disposition: Home or Self Care     Follow up/Patient Instructions:    Follow-up Information     Roxana Gu Jr, MD. Go in 1 month.    Specialty:  Pain Medicine  Why:  Post-procedural Follow Up As Scheduled, Call to make an appointment if you do not have one  Contact information:  [address] [facility] [address] [place] [address]  [phone]                   Reconciled Medications:     Medication List      CONTINUE taking these medications    ALPRAZolam 1 MG tablet  Commonly known as:  XANAX  TAKE ONE TABLET BY MOUTH TWICE DAILY AS NEEDED     azaTHIOprine 50 mg Tab  Commonly known as:  IMURAN  take 1 tablet by mouth once daily     bisacodyl 5 mg EC tablet  Commonly known as:  DULCOLAX  Take 5 mg by mouth daily as needed for Constipation.     ergocalciferol 50,000 unit Cap  Commonly known as:  ERGOCALCIFEROL  Take 1 capsule (50,000 Units total) by mouth every 7 days.     HYDROcodone-acetaminophen  mg per tablet  Commonly known as:  NORCO  Take 1 tablet by mouth every 6 (six) hours as needed for Pain.     levothyroxine 75 MCG tablet  Commonly known as:  SYNTHROID  take 1 tablet by mouth once daily     ondansetron 8 MG Tbdl  Commonly known as:  ZOFRAN-ODT  dissolve 1 tablet ON TONGUE three times a day if needed for nausea     pantoprazole 40 MG tablet  Commonly known as:  PROTONIX  take 1 tablet by mouth once daily     polyethylene glycol  17 gram/dose powder  Commonly known as:  GLYCOLAX  Take 17 g by mouth daily as needed (constipation).            Discharge Procedure Orders (must include Diet, Follow-up, Activity)  Call MD for:  temperature >100.4     Call MD for:  severe uncontrolled pain     Call MD for:  redness, tenderness, or signs of infection (pain, swelling, redness, odor or green/yellow discharge around incision site)     Call MD for:  difficulty breathing or increased cough     Call MD for:  severe persistent headache     Call MD for:  worsening rash     Remove dressing in 24 hours         Roxana Gu Jr, MD  Interventional Pain Medicine / Anesthesiology

## 2018-08-21 ENCOUNTER — OFFICE VISIT (OUTPATIENT)
Dept: FAMILY MEDICINE | Facility: CLINIC | Age: 63
End: 2018-08-21
Payer: MEDICARE

## 2018-08-21 VITALS
HEIGHT: 62 IN | WEIGHT: 147.5 LBS | DIASTOLIC BLOOD PRESSURE: 69 MMHG | BODY MASS INDEX: 27.14 KG/M2 | OXYGEN SATURATION: 99 % | HEART RATE: 62 BPM | SYSTOLIC BLOOD PRESSURE: 113 MMHG | TEMPERATURE: 98 F

## 2018-08-21 DIAGNOSIS — M54.16 LUMBAR RADICULOPATHY: ICD-10-CM

## 2018-08-21 DIAGNOSIS — E78.1 HYPERTRIGLYCERIDEMIA: ICD-10-CM

## 2018-08-21 DIAGNOSIS — Z12.31 ENCOUNTER FOR SCREENING MAMMOGRAM FOR BREAST CANCER: ICD-10-CM

## 2018-08-21 DIAGNOSIS — K75.4 AUTOIMMUNE HEPATITIS: ICD-10-CM

## 2018-08-21 DIAGNOSIS — E11.49 DIABETES MELLITUS TYPE 2 WITH NEUROLOGICAL MANIFESTATIONS: ICD-10-CM

## 2018-08-21 DIAGNOSIS — M54.2 CHRONIC NECK AND BACK PAIN: Primary | ICD-10-CM

## 2018-08-21 DIAGNOSIS — K63.5 POLYP OF COLON, UNSPECIFIED PART OF COLON, UNSPECIFIED TYPE: ICD-10-CM

## 2018-08-21 DIAGNOSIS — B19.20 HEPATITIS C VIRUS INFECTION WITHOUT HEPATIC COMA, UNSPECIFIED CHRONICITY: ICD-10-CM

## 2018-08-21 DIAGNOSIS — F11.288 OPIOID DEPENDENCE WITH OTHER OPIOID-INDUCED DISORDER: ICD-10-CM

## 2018-08-21 DIAGNOSIS — Z72.0 TOBACCO ABUSE: ICD-10-CM

## 2018-08-21 DIAGNOSIS — K21.9 GASTROESOPHAGEAL REFLUX DISEASE, ESOPHAGITIS PRESENCE NOT SPECIFIED: ICD-10-CM

## 2018-08-21 DIAGNOSIS — M54.9 CHRONIC NECK AND BACK PAIN: Primary | ICD-10-CM

## 2018-08-21 DIAGNOSIS — G89.29 CHRONIC NECK AND BACK PAIN: Primary | ICD-10-CM

## 2018-08-21 DIAGNOSIS — Z78.0 POSTMENOPAUSAL: ICD-10-CM

## 2018-08-21 DIAGNOSIS — M96.1 FAILED BACK SURGICAL SYNDROME: ICD-10-CM

## 2018-08-21 DIAGNOSIS — Z79.899 MEDICATION MANAGEMENT: ICD-10-CM

## 2018-08-21 DIAGNOSIS — F41.9 ANXIETY AND DEPRESSION: ICD-10-CM

## 2018-08-21 DIAGNOSIS — F32.A ANXIETY AND DEPRESSION: ICD-10-CM

## 2018-08-21 DIAGNOSIS — E55.9 VITAMIN D DEFICIENCY: ICD-10-CM

## 2018-08-21 DIAGNOSIS — G89.4 CHRONIC PAIN SYNDROME: ICD-10-CM

## 2018-08-21 DIAGNOSIS — K59.09 OTHER CONSTIPATION: ICD-10-CM

## 2018-08-21 PROCEDURE — 99213 OFFICE O/P EST LOW 20 MIN: CPT | Mod: PBBFAC,PO | Performed by: FAMILY MEDICINE

## 2018-08-21 PROCEDURE — 99214 OFFICE O/P EST MOD 30 MIN: CPT | Mod: S$GLB,,, | Performed by: FAMILY MEDICINE

## 2018-08-21 PROCEDURE — 99999 PR PBB SHADOW E&M-EST. PATIENT-LVL III: CPT | Mod: PBBFAC,,, | Performed by: FAMILY MEDICINE

## 2018-08-21 RX ORDER — HYDROCODONE BITARTRATE AND ACETAMINOPHEN 10; 325 MG/1; MG/1
1 TABLET ORAL EVERY 6 HOURS PRN
Qty: 90 TABLET | Refills: 0 | Status: SHIPPED | OUTPATIENT
Start: 2018-09-20 | End: 2018-08-21 | Stop reason: SDUPTHER

## 2018-08-21 RX ORDER — HYDROCODONE BITARTRATE AND ACETAMINOPHEN 10; 325 MG/1; MG/1
1 TABLET ORAL EVERY 6 HOURS PRN
Qty: 90 TABLET | Refills: 0 | Status: SHIPPED | OUTPATIENT
Start: 2018-08-21 | End: 2018-08-21 | Stop reason: SDUPTHER

## 2018-08-21 RX ORDER — HYDROCODONE BITARTRATE AND ACETAMINOPHEN 10; 325 MG/1; MG/1
1 TABLET ORAL EVERY 6 HOURS PRN
Qty: 90 TABLET | Refills: 0 | Status: SHIPPED | OUTPATIENT
Start: 2018-10-20 | End: 2018-11-27 | Stop reason: SDUPTHER

## 2018-08-21 NOTE — PROGRESS NOTES
Office Visit    Patient Name: Thom Krishna    : 1955  MRN: [id number]    Subjective:  Thom is a 63 y.o. female who presents today for:    Leg Pain and Back Pain    2018    62-year-old female patient of mine most recently seen by me on 2018 and with history of significant chronic pain that is primarily secondary to degenerative disc issues of the lumbar spine that is causing bilateral sciatic nerve issues who is here today to for follow up of pain management.  She did have a low back surgery through [facility] but unfortunately this did not help with any of her symptoms.  She is continued to report significant low back pain radiating into both legs, that she described as burning and at times nearly  intolerable.  NORCO has helped to take the edge off but it's effectiveness has been decreasing.     We have tried a wide variety of adjunct medications including Neurontin, amitriptyline, muscle relaxers, antidepressant the Cymbalta that also have a chronic pain medication, without good results and patient reports that often these medications make her feel ill with nausea and vomiting.  Unfortunately she did not tolerate the Effexor that I recently prescribed with the hopes that it would help with her mood and chronic pain.    Saw pain management 2018:   Procedures (NORMAN performed 2018):               - Caudal NORMAN c catheter to target L4-5 level              - Handout given to patient for SCS stim trial ([facility])     She has osteoporosis of the lumbar spine that has not been followed up on in a long time.  She is open to an updated DEXA scan.  She has not been compliant with vitamin D and her last level was very low at 7.    She had her injection with Dr Gu on 2018 and had some temporary relief of the burning in her legs but this has returned.  She does not have a follow-up with him scheduled, and she would like to see if she can see him in [place].  She is also due for EGD and colonoscopy (h/o  "colon polyps) and has had trouble scheduling those-she would like those in coming down as well if possible.  She does continue to suffer with some opioid induced constipation.  Following with Dr. Perez with [facility] for her autoimmune hepatitis with history of hepatitis C and cirrhosis of the liver. She is not drinking any alcohol but she is continuing to smoke 1-2 ppd.           Past Medical History  Past Medical History:   Diagnosis Date    Anxiety and depression 2015    Arthritis     Cervical radiculopathy 2016    Cirrhosis of liver     Colon polyps 2015    Diabetes mellitus type 2 with neurological manifestations 2015    no current medications, only one episode of elevated sugars with acute illness, will monitor     Encounter for blood transfusion     Hip fracture     Macrocytosis 2015    Thyroid disease     Transfusion reaction     hep c       Past Surgical History  Past Surgical History:   Procedure Laterality Date    APPENDECTOMY      BACK SURGERY       SECTION      CHOLECYSTECTOMY      COLONOSCOPY  3/31/10    2 polyps, tubular adenoma    HERNIA REPAIR      HYSTERECTOMY      Uterus and both ovaries    INCISIONAL HERNIA REPAIR      x 2    JOINT REPLACEMENT      LIVER BIOPSY  2003    autoimmune hepatitis    ROTATOR CUFF REPAIR      right    STOMACH SURGERY      "took lymph node off of liver"    TOTAL HIP ARTHROPLASTY      left hip    UPPER GASTROINTESTINAL ENDOSCOPY  3/24/10    normal       Family History  Family History   Problem Relation Age of Onset    Diabetes Mellitus Mother     Liver cancer Sister     Pancreatic cancer Brother     Liver disease Neg Hx        Social History  Social History     Socioeconomic History    Marital status: Significant Other     Spouse name: Not on file    Number of children: Not on file    Years of education: Not on file    Highest education level: Not on file   Social Needs    Financial resource " "strain: Not on file    Food insecurity - worry: Not on file    Food insecurity - inability: Not on file    Transportation needs - medical: Not on file    Transportation needs - non-medical: Not on file   Occupational History    Not on file   Tobacco Use    Smoking status: Current Every Day Smoker     Packs/day: 2.00     Years: 45.00     Pack years: 90.00     Types: Cigarettes    Smokeless tobacco: Never Used   Substance and Sexual Activity    Alcohol use: No     Comment: used to drink heavily, stopped in 1990's    Drug use: No    Sexual activity: Not on file   Other Topics Concern    Not on file   Social History Narrative    Not on file       Current Medications  Medications reviewed and updated.     Allergies   Review of patient's allergies indicates:   Allergen Reactions    Penicillins      Other reaction(s): Hives    Sulfa (sulfonamide antibiotics)      Other reaction(s): Hives       Review of Systems (Pertinent positives)  Review of Systems   Constitutional: Negative for fever and unexpected weight change.   Gastrointestinal: Positive for constipation. Negative for vomiting.   Musculoskeletal: Positive for arthralgias and back pain.       /69 (BP Location: Right arm, Patient Position: Sitting, BP Method: Large (Automatic))   Pulse 62   Temp 97.5 °F (36.4 °C) (Oral)   Ht 5' 2" (1.575 m)   Wt 66.9 kg (147 lb 7.8 oz)   SpO2 99%   BMI 26.98 kg/m²     Physical Exam   Constitutional: She is oriented to person, place, and time. She appears well-developed and well-nourished. No distress.   HENT:   Head: Normocephalic and atraumatic.   Eyes: Conjunctivae are normal.   Cardiovascular: Normal rate and regular rhythm.   Pulmonary/Chest: Effort normal and breath sounds normal.   Musculoskeletal: She exhibits no edema.   Neurological: She is alert and oriented to person, place, and time.   Skin: Skin is warm and dry.   Psychiatric: She has a normal mood and affect.   Vitals " reviewed.        Assessment/Plan:  Thom Krishna is a 63 y.o. female who presents today for :    Thom was seen today for leg pain and back pain.    Diagnoses and all orders for this visit:    Chronic neck and back pain  -     Discontinue: HYDROcodone-acetaminophen (NORCO)  mg per tablet; Take 1 tablet by mouth every 6 (six) hours as needed for Pain.  -     Discontinue: HYDROcodone-acetaminophen (NORCO)  mg per tablet; Take 1 tablet by mouth every 6 (six) hours as needed for Pain.  -     HYDROcodone-acetaminophen (NORCO)  mg per tablet; Take 1 tablet by mouth every 6 (six) hours as needed for Pain.    Lumbar radiculopathy  -     Discontinue: HYDROcodone-acetaminophen (NORCO)  mg per tablet; Take 1 tablet by mouth every 6 (six) hours as needed for Pain.  -     Discontinue: HYDROcodone-acetaminophen (NORCO)  mg per tablet; Take 1 tablet by mouth every 6 (six) hours as needed for Pain.  -     HYDROcodone-acetaminophen (NORCO)  mg per tablet; Take 1 tablet by mouth every 6 (six) hours as needed for Pain.    Failed back surgical syndrome  -     Discontinue: HYDROcodone-acetaminophen (NORCO)  mg per tablet; Take 1 tablet by mouth every 6 (six) hours as needed for Pain.  -     Discontinue: HYDROcodone-acetaminophen (NORCO)  mg per tablet; Take 1 tablet by mouth every 6 (six) hours as needed for Pain.  -     HYDROcodone-acetaminophen (NORCO)  mg per tablet; Take 1 tablet by mouth every 6 (six) hours as needed for Pain.    Tobacco abuse    Opioid dependence with other opioid-induced disorder  -     Discontinue: HYDROcodone-acetaminophen (NORCO)  mg per tablet; Take 1 tablet by mouth every 6 (six) hours as needed for Pain.  -     Discontinue: HYDROcodone-acetaminophen (NORCO)  mg per tablet; Take 1 tablet by mouth every 6 (six) hours as needed for Pain.  -     HYDROcodone-acetaminophen (NORCO)  mg per tablet; Take 1 tablet by mouth every 6 (six) hours as  needed for Pain.    Other constipation    Hepatitis C virus infection without hepatic coma, unspecified chronicity    Autoimmune hepatitis  -     Comprehensive metabolic panel; Future  -     Lipid panel; Future  -     CBC auto differential; Future    Chronic pain syndrome  -     Ambulatory referral to Gastroenterology    Anxiety and depression  -     TSH; Future    Polyp of colon, unspecified part of colon, unspecified type  -     Ambulatory referral to Gastroenterology    Hypertriglyceridemia  -     Comprehensive metabolic panel; Future  -     Lipid panel; Future  -     TSH; Future    Medication management  -     Hemoglobin A1c; Future  -     Comprehensive metabolic panel; Future  -     Lipid panel; Future  -     CBC auto differential; Future  -     TSH; Future  -     Vitamin D; Future    Gastroesophageal reflux disease, esophagitis presence not specified  -     Ambulatory referral to Gastroenterology    Diabetes mellitus type 2 with neurological manifestations  -     Ambulatory referral to Podiatry  -     Hemoglobin A1c; Future  -     Comprehensive metabolic panel; Future  -     Lipid panel; Future    Postmenopausal  -     DXA Bone Density Spine And Hip; Future    Encounter for screening mammogram for breast cancer  -     Mammo Digital Screening Bilat with CAD; Future    Vitamin D deficiency  -     Vitamin D; Future            ICD-10-CM ICD-9-CM    1. Chronic neck and back pain M54.2 723.1 HYDROcodone-acetaminophen (NORCO)  mg per tablet    M54.9 724.5 DISCONTINUED: HYDROcodone-acetaminophen (NORCO)  mg per tablet      DISCONTINUED: HYDROcodone-acetaminophen (NORCO)  mg per tablet   2. Lumbar radiculopathy M54.16 724.4 HYDROcodone-acetaminophen (NORCO)  mg per tablet      DISCONTINUED: HYDROcodone-acetaminophen (NORCO)  mg per tablet      DISCONTINUED: HYDROcodone-acetaminophen (NORCO)  mg per tablet   3. Failed back surgical syndrome M96.1 722.80 HYDROcodone-acetaminophen  (NORCO)  mg per tablet      DISCONTINUED: HYDROcodone-acetaminophen (NORCO)  mg per tablet      DISCONTINUED: HYDROcodone-acetaminophen (NORCO)  mg per tablet   4. Tobacco abuse Z72.0 305.1    5. Opioid dependence with other opioid-induced disorder F11.288 292.89 HYDROcodone-acetaminophen (NORCO)  mg per tablet     304.00 DISCONTINUED: HYDROcodone-acetaminophen (NORCO)  mg per tablet      DISCONTINUED: HYDROcodone-acetaminophen (NORCO)  mg per tablet   6. Other constipation K59.09 564.09    7. Hepatitis C virus infection without hepatic coma, unspecified chronicity B19.20 070.70    8. Autoimmune hepatitis K75.4 571.42 Comprehensive metabolic panel      Lipid panel      CBC auto differential   9. Chronic pain syndrome G89.4 338.4 Ambulatory referral to Gastroenterology   10. Anxiety and depression F41.9 300.00 TSH    F32.9 311    11. Polyp of colon, unspecified part of colon, unspecified type K63.5 211.3 Ambulatory referral to Gastroenterology   12. Hypertriglyceridemia E78.1 272.1 Comprehensive metabolic panel      Lipid panel      TSH   13. Medication management Z79.899 V58.69 Hemoglobin A1c      Comprehensive metabolic panel      Lipid panel      CBC auto differential      TSH      Vitamin D   14. Gastroesophageal reflux disease, esophagitis presence not specified K21.9 530.81 Ambulatory referral to Gastroenterology   15. Diabetes mellitus type 2 with neurological manifestations E11.49 250.60 Ambulatory referral to Podiatry      Hemoglobin A1c      Comprehensive metabolic panel      Lipid panel   16. Postmenopausal Z78.0 V49.81 DXA Bone Density Spine And Hip   17. Encounter for screening mammogram for breast cancer Z12.31 V76.12 Mammo Digital Screening Bilat with CAD   18. Vitamin D deficiency E55.9 268.9 Vitamin D       There are no Patient Instructions on file for this visit.      Follow-up in about 3 months (around 11/21/2018) for to follow up on lab results, return as needed  for new concerns.

## 2018-09-12 ENCOUNTER — OFFICE VISIT (OUTPATIENT)
Dept: GASTROENTEROLOGY | Facility: CLINIC | Age: 63
End: 2018-09-12
Payer: MEDICARE

## 2018-09-12 ENCOUNTER — OFFICE VISIT (OUTPATIENT)
Dept: PODIATRY | Facility: CLINIC | Age: 63
End: 2018-09-12
Payer: MEDICARE

## 2018-09-12 ENCOUNTER — HOSPITAL ENCOUNTER (OUTPATIENT)
Dept: RADIOLOGY | Facility: HOSPITAL | Age: 63
Discharge: HOME | End: 2018-09-12
Attending: NURSE PRACTITIONER
Payer: MEDICARE

## 2018-09-12 VITALS
DIASTOLIC BLOOD PRESSURE: 66 MMHG | WEIGHT: 150.38 LBS | HEIGHT: 62 IN | BODY MASS INDEX: 27.67 KG/M2 | SYSTOLIC BLOOD PRESSURE: 148 MMHG | HEART RATE: 62 BPM

## 2018-09-12 VITALS — BODY MASS INDEX: 27.67 KG/M2 | HEIGHT: 62 IN | WEIGHT: 150.38 LBS

## 2018-09-12 DIAGNOSIS — R20.2 PARESTHESIA OF BOTH FEET: ICD-10-CM

## 2018-09-12 DIAGNOSIS — K59.00 CONSTIPATION, UNSPECIFIED CONSTIPATION TYPE: ICD-10-CM

## 2018-09-12 DIAGNOSIS — R11.0 NAUSEA: ICD-10-CM

## 2018-09-12 DIAGNOSIS — K63.9 DISORDER OF JEJUNUM: ICD-10-CM

## 2018-09-12 DIAGNOSIS — K44.9 HIATAL HERNIA: ICD-10-CM

## 2018-09-12 DIAGNOSIS — F11.20 OPIOID DEPENDENCE, DAILY USE: ICD-10-CM

## 2018-09-12 DIAGNOSIS — R10.13 EPIGASTRIC PAIN: Primary | ICD-10-CM

## 2018-09-12 DIAGNOSIS — K21.9 GASTROESOPHAGEAL REFLUX DISEASE, ESOPHAGITIS PRESENCE NOT SPECIFIED: ICD-10-CM

## 2018-09-12 DIAGNOSIS — E11.42 DIABETIC POLYNEUROPATHY ASSOCIATED WITH TYPE 2 DIABETES MELLITUS: Primary | ICD-10-CM

## 2018-09-12 DIAGNOSIS — Z86.19 HISTORY OF HEPATITIS: ICD-10-CM

## 2018-09-12 DIAGNOSIS — Z86.010 HISTORY OF COLON POLYPS: ICD-10-CM

## 2018-09-12 DIAGNOSIS — R10.13 EPIGASTRIC PAIN: ICD-10-CM

## 2018-09-12 DIAGNOSIS — B35.1 ONYCHOMYCOSIS DUE TO DERMATOPHYTE: ICD-10-CM

## 2018-09-12 PROCEDURE — 99999 PR PBB SHADOW E&M-EST. PATIENT-LVL III: CPT | Mod: PBBFAC,,, | Performed by: PODIATRIST

## 2018-09-12 PROCEDURE — 74019 RADEX ABDOMEN 2 VIEWS: CPT | Mod: TC,FY,PO

## 2018-09-12 PROCEDURE — 3044F HG A1C LEVEL LT 7.0%: CPT | Mod: CPTII,,, | Performed by: PODIATRIST

## 2018-09-12 PROCEDURE — 99999 PR PBB SHADOW E&M-EST. PATIENT-LVL IV: CPT | Mod: PBBFAC,,, | Performed by: NURSE PRACTITIONER

## 2018-09-12 PROCEDURE — 3008F BODY MASS INDEX DOCD: CPT | Mod: CPTII,,, | Performed by: PODIATRIST

## 2018-09-12 PROCEDURE — 3077F SYST BP >= 140 MM HG: CPT | Mod: CPTII,,, | Performed by: PODIATRIST

## 2018-09-12 PROCEDURE — 1101F PT FALLS ASSESS-DOCD LE1/YR: CPT | Mod: CPTII,,, | Performed by: PODIATRIST

## 2018-09-12 PROCEDURE — 11721 DEBRIDE NAIL 6 OR MORE: CPT | Mod: PBBFAC,PN | Performed by: PODIATRIST

## 2018-09-12 PROCEDURE — 99214 OFFICE O/P EST MOD 30 MIN: CPT | Mod: PBBFAC,25,27,PO | Performed by: NURSE PRACTITIONER

## 2018-09-12 PROCEDURE — 74019 RADEX ABDOMEN 2 VIEWS: CPT | Mod: 26,,, | Performed by: RADIOLOGY

## 2018-09-12 PROCEDURE — 99204 OFFICE O/P NEW MOD 45 MIN: CPT | Mod: S$PBB,,, | Performed by: NURSE PRACTITIONER

## 2018-09-12 PROCEDURE — 11721 DEBRIDE NAIL 6 OR MORE: CPT | Mod: S$PBB,Q9,, | Performed by: PODIATRIST

## 2018-09-12 PROCEDURE — 99213 OFFICE O/P EST LOW 20 MIN: CPT | Mod: PBBFAC,25,PN | Performed by: PODIATRIST

## 2018-09-12 PROCEDURE — 3078F DIAST BP <80 MM HG: CPT | Mod: CPTII,,, | Performed by: PODIATRIST

## 2018-09-12 PROCEDURE — 99203 OFFICE O/P NEW LOW 30 MIN: CPT | Mod: 25,S$PBB,, | Performed by: PODIATRIST

## 2018-09-12 RX ORDER — LUBIPROSTONE 24 UG/1
24 CAPSULE ORAL 2 TIMES DAILY WITH MEALS
Qty: 60 CAPSULE | Refills: 3 | Status: SHIPPED | OUTPATIENT
Start: 2018-09-12 | End: 2018-10-12

## 2018-09-12 RX ORDER — PANTOPRAZOLE SODIUM 40 MG/1
40 TABLET, DELAYED RELEASE ORAL 2 TIMES DAILY
Qty: 60 TABLET | Refills: 1 | Status: SHIPPED | OUTPATIENT
Start: 2018-09-12 | End: 2018-10-30 | Stop reason: SDUPTHER

## 2018-09-12 NOTE — LETTER
September 14, 2018      Brandy Dejesus MD  [address]  [address]  [facility] [address]           [facility]  [address] [facility] [address] [place] [address]  Phone: [phone]          Patient: Thom Krishna   MR Number: [id number]   YOB: 1955   Date of Visit: 9/12/2018       Dear Dr. Brandy Dejesus:    Thank you for referring Thom Krishna to me for evaluation. Attached you will find relevant portions of my assessment and plan of care.    If you have questions, please do not hesitate to call me. I look forward to following Thom Krishna along with you.    Sincerely,    Shamika Walter, [facility]    Enclosure  CC:  No Recipients    If you would like to receive this communication electronically, please contact [email or web address] or [phone] to request more information on [employer] Link access.    For providers and/or their staff who would like to refer a patient to [facility], please contact us through our one-stop-shop provider referral line, [facility] , at [phone].    If you feel you have received this communication in error or would no longer like to receive these types of communications, please e-mail [email or web address]

## 2018-09-12 NOTE — LETTER
September 12, 2018      Brandy Dejesus MD  [address]  [address]  [facility] [address]           [facility]  [address] [facility] [address] [place] [address]  Phone: [phone]          Patient: Thom Krishna   MR Number: [id number]   YOB: 1955   Date of Visit: 9/12/2018       Dear Dr. Brandy eDjesus:    Thank you for referring Thom Krishna to me for evaluation. Attached you will find relevant portions of my assessment and plan of care.    If you have questions, please do not hesitate to call me. I look forward to following Thom Krishna along with you.    Sincerely,    Lei Denise DPM    Enclosure  CC:  No Recipients    If you would like to receive this communication electronically, please contact [email or web address] or [phone] to request more information on [employer] Link access.    For providers and/or their staff who would like to refer a patient to [facility], please contact us through our one-stop-shop provider referral line, [facility] , at [phone].    If you feel you have received this communication in error or would no longer like to receive these types of communications, please e-mail [email or web address]

## 2018-09-12 NOTE — PATIENT INSTRUCTIONS
"  GERD (Adult)    The esophagus is a tube that carries food from the mouth to the stomach. A valve at the lower end of the esophagus prevents stomach acid from flowing upward. When this valve doesn't work properly, stomach contents may repeatedly flow back up (reflux) into the esophagus. This is called gastroesophageal reflux disease (GERD). GERD can irritate the esophagus. It can cause problems with swallowing or breathing. In severe cases, GERD can cause recurrent pneumonia or other serious problems.  Symptoms of reflux include burning, pressure or sharp pain in the upper abdomen or mid to lower chest. The pain can spread to the neck, back, or shoulder. There may be belching, an acid taste in the back of the throat, chronic cough, or sore throat or hoarseness. GERD symptoms often occur during the day after a big meal. They can also occur at night when lying down.   Home care  Lifestyle changes can help reduce symptoms. If needed, medicines may be prescribed. Symptoms often improve with treatment, but if treatment is stopped, the symptoms often return after a few months. So most persons with GERD will need to continue treatment.  Lifestyle changes  · Limit or avoid fatty, fried, and spicy foods, as well as coffee, chocolate, mint, and foods with high acid content such as tomatoes and citrus fruit and juices (orange, grapefruit, lemon).  · Dont eat large meals, especially at night. Frequent, smaller meals are best. Do not lie down right after eating. And dont eat anything 3 hours before going to bed.  · Avoid drinking alcohol and smoking. As much as possible, stay away from second hand smoke.  · If you are overweight, losing weight will reduce symptoms.   · Avoid wearing tight clothing around your stomach area.  · If your symptoms occur during sleep, use a foam wedge to elevate your upper body (not just your head.) Or, place 4" blocks under the head of your bed.  Medicines  If needed, medicines can help relieve " the symptoms of GERD and prevent damage to the esophagus. Discuss a medicine plan with your healthcare provider. This may include one or more of the following medicines:  · Antacids to help neutralize the normal acids in your stomach.  · Acid blockers (H2 blockers) to decrease acid production.  · Acid inhibitors (PPIs) to decrease acid production in a different way than the blockers. They may work better, but can take a little longer to take effect.  Take an antacid 30-60 minutes after eating and at bedtime, but not at the same time as an acid blocker.  Try not to take medicines such as ibuprofen and aspirin. If you are taking aspirin for your heart or other medical reasons, talk to your healthcare provider about stopping it.  Follow-up care  Follow up with your healthcare provider or as advised by our staff.  When to seek medical advice  Call your healthcare provider if any of the following occur:  · Stomach pain gets worse or moves to the lower right abdomen (appendix area)  · Chest pain appears or gets worse, or spreads to the back, neck, shoulder, or arm  · Frequent vomiting (cant keep down liquids)  · Blood in the stool or vomit (red or black in color)  · Feeling weak or dizzy  · Fever of 100.4ºF (38ºC) or higher, or as directed by your healthcare provider  Date Last Reviewed: 6/23/2015 © 2000-2017 [employer]. [address] [place] [address], [place] [address]. All rights reserved. This information is not intended as a substitute for professional medical care. Always follow your healthcare professional's instructions.        Constipation (Adult)  Constipation means that you have bowel movements that are less frequent than usual. Stools often become very hard and difficult to pass.  Constipation is very common. At some point in life it affects almost everyone. Since everyone's bowel habits are different, what is constipation to one person may not be to another. Your healthcare provider may do tests to  diagnose constipation. It depends on what he or she finds when evaluating you.    Symptoms of constipation include:  · Abdominal pain  · Bloating  · Vomiting  · Painful bowel movements  · Itching, swelling, bleeding, or pain around the anus  Causes  Constipation can have many causes. These include:  · Diet low in fiber  · Too much dairy  · Not drinking enough liquids  · Lack of exercise or physical activity. This is especially true for older adults.  · Changes in lifestyle or daily routine, including pregnancy, aging, work, and travel  · Frequent use or misuse of laxatives  · Ignoring the urge to have a bowel movement or delaying it until later  · Medicines, such as certain prescription pain medicines, iron supplements, antacids, certain antidepressants, and calcium supplements  · Diseases like irritable bowel syndrome, bowel obstructions, stroke, diabetes, thyroid disease, Parkinson disease, hemorrhoids, and colon cancer  Complications  Potential complications of constipation can include:  · Hemorrhoids  · Rectal bleeding from hemorrhoids or anal fissures (skin tears)  · Hernias  · Dependency on laxatives  · Chronic constipation  · Fecal impaction  · Bowel obstruction or perforation  Home care  All treatment should be done after talking with your healthcare provider. This is especially true if you have another medical problems, are taking prescription medicines, or are an older adult. Treatment most often involves lifestyle changes. You may also need medicines. Your healthcare provider will tell you which will work best for you. Follow the advice below to help avoid this problem in the future.  Lifestyle changes  These lifestyle changes can help prevent constipation:  · Diet. Eat a high-fiber diet, with fresh fruit and vegetables, and reduce dairy intake, meats, and processed foods  · Fluids. It's important to get enough fluids each day. Drink plenty of water when you eat more fiber. If you are on diet that limits  the amount of fluid you can have, talk about this with your healthcare provider.  · Regular exercise. Check with your healthcare provider first.  Medications  Take any medicines as directed. Some laxatives are safe to use only every now and then. Others can be taken on a regular basis. Talk with your doctor or pharmacist if you have questions.  Prescription pain medicines can cause constipation. If you are taking this kind of medicine, ask your healthcare provider if you should also take a stool softener.  Medicines you may take to treat constipation include:  · Fiber supplements  · Stool softeners  · Laxatives  · Enemas  · Rectal suppositories  Follow-up care  Follow up with your healthcare provider if symptoms don't get better in the next few days. You may need to have more tests or see a specialist.  Call 911  Call 911 if any of these occur:  · Trouble breathing  · Stiff, rigid abdomen that is severely painful to touch  · Confusion  · Fainting or loss of consciousness  · Rapid heart rate  · Chest pain  When to seek medical advice  Call your healthcare provider right away if any of these occur:  · Fever over 100.4°F (38°C)  · Failure to resume normal bowel movements  · Pain in your abdomen or back gets worse  · Nausea or vomiting  · Swelling in your abdomen  · Blood in the stool  · Black, tarry stool  · Involuntary weight loss  · Weakness  Date Last Reviewed: 12/30/2015  © 8922-8407 [employer]. [address] [place] [address], [place] [address]. All rights reserved. This information is not intended as a substitute for professional medical care. Always follow your healthcare professional's instructions.

## 2018-09-12 NOTE — PROGRESS NOTES
"Subjective:       Patient ID: Thom Krishna is a 63 y.o. female Body mass index is 27.5 kg/m².    Chief Complaint: Nausea (abd pain, colon polyps wants Egd/colon)    This patient is new to me.  Referring Provider:  Dr. Dejesus for colon polyps & GERD.  Established patient of Dr. Washington.    Reviewed hospital discharge summary: "Admission Date: 7/6/2018  Hospital Length of Stay: 0 days  Discharge Date and Time: 7/7/2018  4:42 PM  Attending Physician: No att. 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  Patient presents tonight with worsening abdominal pain, vomiting and nausea.  Patient states she ate a shrimp sandwich that worsened the abdominal pain.  She states she has had intermittent diarrhea over the past two weeks as well.  She states that the vomit is foul smelling.  She states no fevers noted.  She denies hematochezia, melena, hematemesis, coffee ground emesis.  She states at times she is intermittently constipated.  CT scan revealed moderate to large amount of retained fecal material and gas in the coloin.  No colitis.  Hospital medicine was consulted for observation and administration of laxatives.    * No surgery found *    Hospital Course:   Patient admitted with severe constipation nausea and vomiting. Patient given laxatives with relief of symptoms. Patient eventually discharged home with close follow up."      GI Problem   The primary symptoms include abdominal pain and nausea (zofran prn). Primary symptoms do not include fever, weight loss, fatigue, vomiting, diarrhea, melena, hematemesis, hematochezia or dysuria.   The abdominal pain began more than 2 days ago (started a few months ago). The abdominal pain is located in the epigastric region (described as pressure). The " severity of the abdominal pain is 7/10.   The illness is also significant for constipation (bowel movements twice a week, prior was once every 2-3 weeks; dulcolax prn; OTC suppository prn; PAST: miralax no relief) and back pain (chronic back pain; taking norco 3-4 times a day). The illness does not include chills, dysphagia, odynophagia or bloating. Significant associated medical issues include GERD (frequent; protonix 40 mg once daily; PAST TREATMENT: prevacid, prilosec and nexium) and liver disease (seeing Dr. Perez at [facility] for history of autoimmune hepatitis and hepatitis C). Associated medical issues do not include inflammatory bowel disease.     Review of Systems   Constitutional: Negative for appetite change, chills, fatigue, fever and weight loss.   HENT: Negative for sore throat and trouble swallowing.    Respiratory: Negative for cough, choking and shortness of breath.    Cardiovascular: Negative for chest pain.   Gastrointestinal: Positive for abdominal pain, constipation (bowel movements twice a week, prior was once every 2-3 weeks; dulcolax prn; OTC suppository prn; PAST: miralax no relief) and nausea (zofran prn). Negative for anal bleeding, bloating, blood in stool, diarrhea, dysphagia, hematemesis, hematochezia, melena, rectal pain and vomiting.   Genitourinary: Negative for difficulty urinating, dysuria and flank pain.   Musculoskeletal: Positive for back pain (chronic back pain; taking norco 3-4 times a day).   Neurological: Negative for weakness.   Psychiatric/Behavioral: The patient is nervous/anxious (history of anxiety).        Past Medical History:   Diagnosis Date    Anxiety and depression 7/21/2015    Arthritis     Cervical radiculopathy 4/8/2016    Cirrhosis of liver     Colon polyps 4/27/2015    Diabetes mellitus type 2 with neurological manifestations 11/6/2015    no current medications, only one episode of elevated sugars with acute illness, will monitor      "Encounter for blood transfusion     Hepatitis C     s/p treatment per patient report; managed by Dr. Perez    Hip fracture     Macrocytosis 2015    Thyroid disease     Transfusion reaction     hep c     Past Surgical History:   Procedure Laterality Date    APPENDECTOMY      BACK SURGERY      CAUDAL EPIDURAL STEROID INJECTION N/A 2018    Procedure: Injection-steroid-epidural-caudal;  Surgeon: Roxana Gu Jr., MD;  Location: [facility] OR;  Service: Pain Management;  Laterality: N/A;  with cath target L4-5     SECTION      CHOLECYSTECTOMY      COLONOSCOPY  3/31/10    2 polyps, tubular adenoma    COLONOSCOPY  2015    Dr. Washington    COLONOSCOPY N/A 2013    Performed [facility] at [facility] ([facility]TH FLR)    COLONOSCOPY N/A 2013    [facility] at [facility] (4[facility] FLR)    EGD (ESOPHAGOGASTRODUODENOSCOPY) N/A 2013    Performed by [facility] at [facility] (4TH FLR)    EGD and colonoscopy same day N/A 2015    Per[facility] Kd [facility] at [facility]    ESOPHAGOGASTRODUODENOSCOPY (EGD) N/A 2015    Performed by Kd Washington [facility] at [facility] END[facility]    HERNIA REPAIR      HYSTERECTOMY      Uterus and both ovaries    INCISIONAL HERNIA REPAIR      x 2    Injection-steroid-epidural-caudal N/A 2018    Performed by Roxana Gu Jr., MD at [facility] OR    JOINT REPLACEMENT      LIVER BIOPSY  2003    autoimmune hepatitis    ROTATOR CUFF REPAIR      right    STOMACH SURGERY      "took lymph node off of liver"    TOTAL HIP ARTHROPLASTY      left hip    UPPER GASTROINTESTINAL ENDOSCOPY  3/24/10    normal    UPPER GASTROINTESTINAL ENDOSCOPY  2015    Dr. Washington     Family History   Problem Relation Age of Onset    Diabetes Mellitus Mother     Liver cancer Sister     Pancreatic cancer Brother     Lung cancer Brother     Brain cancer Brother     Stomach cancer Other     Throat " cancer Brother     Liver disease Neg Hx     Colon cancer Neg Hx      Wt Readings from Last 10 Encounters:   09/12/18 68.2 kg (150 lb 5.7 oz)   09/12/18 68.2 kg (150 lb 5.7 oz)   08/21/18 66.9 kg (147 lb 7.8 oz)   08/03/18 65.8 kg (145 lb)   07/27/18 65.8 kg (145 lb)   07/06/18 67.8 kg (149 lb 7.6 oz)   05/08/18 66.5 kg (146 lb 9.7 oz)   01/30/18 67.3 kg (148 lb 5.9 oz)   12/29/17 68.9 kg (151 lb 14.4 oz)   10/06/17 69.5 kg (153 lb 3.5 oz)     Lab Results   Component Value Date    WBC 6.00 07/06/2018    HGB 14.3 07/06/2018    HCT 42.1 07/06/2018    MCV 96 07/06/2018     07/06/2018     CMP  Sodium   Date Value Ref Range Status   07/07/2018 147 (H) 136 - 145 mmol/L Final     Potassium   Date Value Ref Range Status   07/07/2018 4.7 3.5 - 5.1 mmol/L Final     Chloride   Date Value Ref Range Status   07/07/2018 114 (H) 95 - 110 mmol/L Final     CO2   Date Value Ref Range Status   07/07/2018 23 22 - 31 mmol/L Final     Glucose   Date Value Ref Range Status   07/07/2018 75 70 - 110 mg/dL Final     Comment:     The ADA recommends the following guidelines for fasting glucose:  Normal:       less than 100 mg/dL  Prediabetes:  100 mg/dL to 125 mg/dL  Diabetes:     126 mg/dL or higher       BUN, Bld   Date Value Ref Range Status   07/07/2018 17 7 - 18 mg/dL Final     Creatinine   Date Value Ref Range Status   07/07/2018 0.88 0.50 - 1.40 mg/dL Final     Calcium   Date Value Ref Range Status   07/07/2018 8.7 8.4 - 10.2 mg/dL Final     Total Protein   Date Value Ref Range Status   07/06/2018 8.5 (H) 6.0 - 8.4 g/dL Final     Albumin   Date Value Ref Range Status   07/06/2018 4.6 3.5 - 5.2 g/dL Final     Total Bilirubin   Date Value Ref Range Status   07/06/2018 0.3 0.2 - 1.3 mg/dL Final     Alkaline Phosphatase   Date Value Ref Range Status   07/06/2018 159 (H) 38 - 145 U/L Final     AST   Date Value Ref Range Status   07/06/2018 21 14 - 36 U/L Final     ALT   Date Value Ref Range Status   07/06/2018 25 10 - 44 U/L Final  "    Anion Gap   Date Value Ref Range Status   07/07/2018 10 8 - 16 mmol/L Final     eGFR if    Date Value Ref Range Status   07/07/2018 >60 >60 mL/min/1.73 m^2 Final     eGFR if non    Date Value Ref Range Status   07/07/2018 >60 >60 mL/min/1.73 m^2 Final     Comment:     Calculation used to obtain the estimated glomerular filtration  rate (eGFR) is the CKD-EPI equation.        Lab Results   Component Value Date    AMYLASE 81 03/24/2011     Lab Results   Component Value Date    LIPASE 17 09/24/2017     Lab Results   Component Value Date    LIPASERES 86 07/06/2018     Lab Results   Component Value Date    TSH 1.414 10/06/2017     Lab Results   Component Value Date    HEPAIGM Negative 11/19/2012    HEPBIGM Negative 11/19/2012    HEPCAB Positive (A) 11/19/2012     Reviewed prior medical records including radiology report of 7/8/16 ct abdomen pelvis; 9/25/13 abdominal ultrasound; & endoscopy history (see surgical history).    4/27/2015 EGD was reviewed and procedure report states:   " Findings:       The esophagus was normal.       Patchy mild inflammation characterized by erythema was found in the        gastric antrum. Biopsies were taken with a cold forceps for        histology.       The examined duodenum was slightly deformed with a small duodenal        bulb associated with a sharp turn into the 2nd portion of the        duodenum.  Impression:           - Normal esophagus.                        - Gastritis. Biopsied.                        - Minor deformity of the duodenal bulb which is                         likely a normal variant but might possibly explain                         this patient's symptoms of chronic post prandial                         vomiting                        - Chronic post-prandial vomiting for many years                         with negative evaluations including endoscopy and                         gastric emptying study - likely functional in " "nature  Recommendation:       - Await pathology results                        - Consider repeat GES in the future                        - Proceed with colonoscopy today                        - Treatment of hepatitis C and routine GI follow up                         per [facility] hepatology as previously established".  Biopsy results:   "Supplemental Diagnosis  Immunohistochemical stain for Helicobacter pylori is negative. Control stained appropriately.  (Electronically Signed: 2015-05-01 10:12:32 )  Diagnosed by: Cole Dos Santos M.D.  FINAL PATHOLOGIC DIAGNOSIS  STOMACH, BIOPSY:  -Gastric mucosa with changes of chemical/reactive gastropathy.  -Immunohistochemical stain for Helicobacter pylori is pending and will be reported in an addendum."    4/27/2015 Colonoscopy was reviewed and procedure report states:   " Findings:       A moderate amount of semi-liquid stool was found in the entire        colon, interfering with visualization.       The exam was otherwise without abnormality.  Impression:           - Normal colonoscopy although bowel prep was                         inadequate for routine CRC surveillance                        - Personal history of colon polyps                        - Personal history of hepatitis C infection  Recommendation:       Repeat colonoscopy once completed treatment for                         chronic Hepatitis C.                        Discharge to home                        Condition stable                        Resume previous diet                        I will follow up pathology results as indicated                        Patient has a contact number available for                         emergencies. The signs and symptoms of potential                         delayed complications were discussed with the                         patient. Return to normal activities tomorrow.                         Written discharge instructions were provided to the                        " " patient. ".    Objective:      Physical Exam   Constitutional: She is oriented to person, place, and time. She appears well-developed and well-nourished. No distress.   HENT:   Mouth/Throat: Oropharynx is clear and moist and mucous membranes are normal. No oral lesions. No oropharyngeal exudate.   Eyes: Conjunctivae are normal. Pupils are equal, round, and reactive to light. No scleral icterus.   Cardiovascular: Normal rate.   Pulmonary/Chest: Effort normal and breath sounds normal. No respiratory distress. She has no wheezes.   Abdominal: Soft. Normal appearance and bowel sounds are normal. She exhibits no distension, no abdominal bruit and no mass. There is no hepatosplenomegaly. There is tenderness (mild) in the epigastric area, periumbilical area and left upper quadrant. There is no rigidity, no rebound, no guarding, no tenderness at McBurney's point and negative Diaz's sign. No hernia.   Well-healed surgical scars noted.   Neurological: She is alert and oriented to person, place, and time.   Skin: Skin is warm and dry. No rash noted. She is not diaphoretic. No erythema. No pallor.   Non-jaundiced   Psychiatric: She has a normal mood and affect. Her behavior is normal. Judgment and thought content normal.   Nursing note and vitals reviewed.      Assessment:       1. Epigastric pain    2. Nausea    3. History of colon polyps    4. History of hepatitis    5. Disorder of jejunum    6. Hiatal hernia    7. Constipation, unspecified constipation type    8. Opioid dependence, daily use    9. Gastroesophageal reflux disease, esophagitis presence not specified        Plan:       Epigastric pain & Nausea  -     X-Ray Abdomen Flat And Erect; Future; Expected date: 09/12/2018  - schedule EGD, discussed procedure with patient, patient verbalized understanding  - continue zofran prn as directed  - Possible GASTRIC EMPTYING STUDY pending results of testing and if symptoms persist    History of colon polyps  - schedule " Colonoscopy, discussed procedure with the patient, patient verbalized understanding    History of hepatitis  Recommend follow-up with hepatology, Dr. Perez at [facility], for continued evaluation and management.    Disorder of jejunum  - schedule EGD, discussed procedure with patient, patient verbalized understanding    Hiatal hernia  - schedule EGD, discussed procedure with patient, patient verbalized understanding  -discussed diagnosis with patient & that it is usually managed by controlling reflux symptoms, surgery is an option, but usually performed if reflux is uncontrolled by medication management and lifestyle/dietary modifications; if symptoms persist despite medication management and lifestyle/dietary modifications, we can refer to general surgery to consult about surgical options, patient verbalized understanding    Constipation, unspecified constipation type  -     X-Ray Abdomen Flat And Erect; Future; Expected date: 09/12/2018  -  START   lubiprostone (AMITIZA) 24 MCG Cap; Take 1 capsule (24 mcg total) by mouth 2 (two) times daily with meals.  Dispense: 60 capsule; Refill: 3  - schedule Colonoscopy, discussed procedure with the patient, patient verbalized understanding  Recommended daily exercise as tolerated, adequate water intake (six 8-oz glasses of water daily), and high fiber diet. OTC fiber supplements are recommended if diet does not reach daily fiber goal (25 grams daily), such as Metamucil, Citrucel, or FiberCon (take as directed, separate from other oral medications by >2 hours).  -Recommend taking an OTC stool softener such as Colace as directed to avoid hard stools and straining with bowel movements PRN  - If no improvement with above recommendations, try intermittently dosed Dulcolax OTC as directed (every 3-4  days) PRN to facilitate bowel movements  -If still no improvement with these measures, call/follow-up    Opioid dependence, daily use  -  START   lubiprostone (AMITIZA) 24 MCG Cap;  Take 1 capsule (24 mcg total) by mouth 2 (two) times daily with meals.  Dispense: 60 capsule; Refill: 3  - discussed with patient that a side effect of narcotic pain medications is constipation, advised patient to talk to provider who manages pain medication and to try to stop or decrease use of narcotics, patient verbalized understanding    Gastroesophageal reflux disease, esophagitis presence not specified  -  INCREASE TO   pantoprazole (PROTONIX) 40 MG tablet; Take 1 tablet (40 mg total) by mouth 2 (two) times daily.  Dispense: 60 tablet; Refill: 1, - take in the morning 30-60 minutes before breakfast, discussed about possible long term use of medication (prefer to use lowest effective dose or discontinuing if possible), pt verbalized understanding  -discussed about the different types of medications used to treat reflux and how to use them, antacids can be used PRN for breakthrough heartburn symptoms by reducing stomach acid that is already produced, H2 blockers (zantac) work by limiting the amount acid production, & PPI's work to block acid production and are taken daily, patient verbalized understanding.  -Educated patient on lifestyle modifications to help control/reduce reflux/abdominal pain including: avoid large meals, avoid eating within 2-3 hours of bedtime (avoid late night eating & lying down soon after eating), elevate head of bed if nocturnal symptoms are present, smoking cessation (if current smoker), & weight loss (if overweight).   -Educated to avoid known foods which trigger reflux symptoms & to minimize/avoid high-fat foods, chocolate, caffeine, citrus, alcohol, & tomato products.  -Advised to avoid/limit use of NSAID's, since they can cause GI upset, bleeding, and/or ulcers. If needed, take with food.   - schedule EGD, discussed procedure with patient, patient verbalized understanding    Follow-up in about 1 month (around 10/12/2018), or if symptoms worsen or fail to improve.      If no  improvement in symptoms or symptoms worsen, call/follow-up at clinic or go to ER.

## 2018-09-14 ENCOUNTER — TELEPHONE (OUTPATIENT)
Dept: GASTROENTEROLOGY | Facility: CLINIC | Age: 63
End: 2018-09-14

## 2018-09-14 DIAGNOSIS — K59.00 CONSTIPATION, UNSPECIFIED CONSTIPATION TYPE: Primary | ICD-10-CM

## 2018-09-14 RX ORDER — POLYETHYLENE GLYCOL 3350, SODIUM SULFATE ANHYDROUS, SODIUM BICARBONATE, SODIUM CHLORIDE, POTASSIUM CHLORIDE 236; 22.74; 6.74; 5.86; 2.97 G/4L; G/4L; G/4L; G/4L; G/4L
4 POWDER, FOR SOLUTION ORAL ONCE
Qty: 4000 ML | Refills: 0 | Status: SHIPPED | OUTPATIENT
Start: 2018-09-14 | End: 2018-09-14

## 2018-09-14 NOTE — TELEPHONE ENCOUNTER
Please call to inform & review the results with the patient- radiology report of the Abdominal x-ray showed unremarkable bowel gas pattern, no signs of intestinal obstruction and large amounts of stool in the colon which is consistent with/suggest history of constipation.  I recommend taking a course of Golytely to help clear the stool out of the colon and then continue with previous recommendations.    If no improvement in symptoms or symptoms worsen, call/follow-up at clinic or go to ER.  Please release results to patient's mychart once you have discussed results and recommendations with patient.  Thanks,  Shamika TODD

## 2018-10-09 NOTE — H&P
"History & Physical - Short Stay  Gastroenterology      SUBJECTIVE:     Procedure: Gastroscopy and Colonoscopy    Chief Complaint/Indication for Procedure: Epigastric pain.  Hx of colon polyps.    History of Present Illness:  Office Visit     9/12/2018  [place] - Gastroenterology      [facility]   Gastroenterology   Epigastric pain +8 more   Dx   Nausea   ; Referred by Brandy Dejesus MD   Reason for Visit        Progress Notes        Subjective:       Patient ID: Thom Krishna is a 63 y.o. female Body mass index is 27.5 kg/m².     Chief Complaint: Nausea (abd pain, colon polyps wants Egd/colon)     This patient is new to me.  Referring Provider:  Dr. Dejesus for colon polyps & GERD.  Established patient of Dr. Washington.     Reviewed hospital discharge summary: "Admission Date: 7/6/2018  Hospital Length of Stay: 0 days  Discharge Date and Time: 7/7/2018  4:42 PM  Attending Physician: No att. 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  Patient presents tonight with worsening abdominal pain, vomiting and nausea.  Patient states she ate a shrimp sandwich that worsened the abdominal pain.  She states she has had intermittent diarrhea over the past two weeks as well.  She states that the vomit is foul smelling.  She states no fevers noted.  She denies hematochezia, melena, hematemesis, coffee ground emesis.  She states at times she is intermittently constipated.  CT scan revealed moderate to large amount of retained fecal material and gas in the coloin.  No colitis.  Hospital medicine was consulted for observation and administration of laxatives.    * No surgery found *    Hospital Course:   Patient admitted with severe constipation nausea and vomiting. Patient given laxatives " "with relief of symptoms. Patient eventually discharged home with close follow up."        GI Problem   The primary symptoms include abdominal pain and nausea (zofran prn). Primary symptoms do not include fever, weight loss, fatigue, vomiting, diarrhea, melena, hematemesis, hematochezia or dysuria.   The abdominal pain began more than 2 days ago (started a few months ago). The abdominal pain is located in the epigastric region (described as pressure). The severity of the abdominal pain is 7/10.   The illness is also significant for constipation (bowel movements twice a week, prior was once every 2-3 weeks; dulcolax prn; OTC suppository prn; PAST: miralax no relief) and back pain (chronic back pain; taking norco 3-4 times a day). The illness does not include chills, dysphagia, odynophagia or bloating. Significant associated medical issues include GERD (frequent; protonix 40 mg once daily; PAST TREATMENT: prevacid, prilosec and nexium) and liver disease (seeing Dr. Perez at [facility] for history of autoimmune hepatitis and hepatitis C). Associated medical issues do not include inflammatory bowel disease.     4/27/2015 EGD was reviewed and procedure report states:   " Findings:       The esophagus was normal.       Patchy mild inflammation characterized by erythema was found in the        gastric antrum. Biopsies were taken with a cold forceps for        histology.       The examined duodenum was slightly deformed with a small duodenal        bulb associated with a sharp turn into the 2nd portion of the        duodenum.  Impression:   - Normal esophagus.                        - Gastritis. Biopsied.                        - Minor deformity of the duodenal bulb which is                         likely a normal variant but might possibly explain                         this patient's symptoms of chronic post prandial                         vomiting                        - Chronic post-prandial vomiting for " "many years                         with negative evaluations including endoscopy and                         gastric emptying study - likely functional in nature  Recommendation:       - Await pathology results                        - Consider repeat GES in the future                        - Proceed with colonoscopy today                        - Treatment of hepatitis C and routine GI follow up                         per [facility] hepatology as previously established".  Biopsy results:   "Supplemental Diagnosis  Immunohistochemical stain for Helicobacter pylori is negative. Control stained appropriately.  (Electronically Signed: 2015-05-01 10:12:32 )  Diagnosed by: Cole Dos Santos M.D.  FINAL PATHOLOGIC DIAGNOSIS  STOMACH, BIOPSY:  -Gastric mucosa with changes of chemical/reactive gastropathy.  -Immunohistochemical stain for Helicobacter pylori is pending and will be reported in an addendum."     4/27/2015 Colonoscopy was reviewed and procedure report states:   " Findings:       A moderate amount of semi-liquid stool was found in the entire        colon, interfering with visualization.       The exam was otherwise without abnormality.  Impression:   - Normal colonoscopy although bowel prep was                         inadequate for routine CRC surveillance                        - Personal history of colon polyps                        - Personal history of hepatitis C infection  Recommendation:       Repeat colonoscopy once completed treatment for                         chronic Hepatitis C.                        Discharge to home                        Condition stable                        Resume previous diet                        I will follow up pathology results as indicated    Assessment:       1. Epigastric pain    2. Nausea    3. History of colon polyps    4. History of hepatitis    5. Disorder of jejunum    6. Hiatal hernia    7. Constipation, unspecified constipation type    8. Opioid dependence, daily " use    9. Gastroesophageal reflux disease, esophagitis presence not specified        Plan:       Epigastric pain & Nausea  -     X-Ray Abdomen Flat And Erect; Future; Expected date: 09/12/2018  - schedule EGD, discussed procedure with patient, patient verbalized understanding  - continue zofran prn as directed  - Possible GASTRIC EMPTYING STUDY pending results of testing and if symptoms persist     History of colon polyps  - schedule Colonoscopy, discussed procedure with the patient, patient verbalized understanding     History of hepatitis  Recommend follow-up with hepatology, Dr. Perez at [facility], for continued evaluation and management.     Disorder of jejunum  - schedule EGD, discussed procedure with patient, patient verbalized understanding     Hiatal hernia  - schedule EGD, discussed procedure with patient, patient verbalized understanding  -discussed diagnosis with patient & that it is usually managed by controlling reflux symptoms, surgery is an option, but usually performed if reflux is uncontrolled by medication management and lifestyle/dietary modifications; if symptoms persist despite medication management and lifestyle/dietary modifications, we can refer to general surgery to consult about surgical options, patient verbalized understanding     Constipation, unspecified constipation type  -     X-Ray Abdomen Flat And Erect; Future; Expected date: 09/12/2018  -  START   lubiprostone (AMITIZA) 24 MCG Cap; Take 1 capsule (24 mcg total) by mouth 2 (two) times daily with meals.  Dispense: 60 capsule; Refill: 3  - schedule Colonoscopy, discussed procedure with the patient, patient verbalized understanding  Recommended daily exercise as tolerated, adequate water intake (six 8-oz glasses of water daily), and high fiber diet. OTC fiber supplements are recommended if diet does not reach daily fiber goal (25 grams daily), such as Metamucil, Citrucel, or FiberCon (take as directed, separate from other oral  medications by >2 hours).  -Recommend taking an OTC stool softener such as Colace as directed to avoid hard stools and straining with bowel movements PRN  - If no improvement with above recommendations, try intermittently dosed Dulcolax OTC as directed (every 3-4  days) PRN to facilitate bowel movements  -If still no improvement with these measures, call/follow-up     Opioid dependence, daily use  -  START   lubiprostone (AMITIZA) 24 MCG Cap; Take 1 capsule (24 mcg total) by mouth 2 (two) times daily with meals.  Dispense: 60 capsule; Refill: 3  - discussed with patient that a side effect of narcotic pain medications is constipation, advised patient to talk to provider who manages pain medication and to try to stop or decrease use of narcotics, patient verbalized understanding     Gastroesophageal reflux disease, esophagitis presence not specified  -  INCREASE TO   pantoprazole (PROTONIX) 40 MG tablet; Take 1 tablet (40 mg total) by mouth 2 (two) times daily.  Dispense: 60 tablet; Refill: 1, - take in the morning 30-60 minutes before breakfast, discussed about possible long term use of medication (prefer to use lowest effective dose or discontinuing if possible), pt verbalized understanding  -discussed about the different types of medications used to treat reflux and how to use them, antacids can be used PRN for breakthrough heartburn symptoms by reducing stomach acid that is already produced, H2 blockers (zantac) work by limiting the amount acid production, & PPI's work to block acid production and are taken daily, patient verbalized understanding.  -Educated patient on lifestyle modifications to help control/reduce reflux/abdominal pain including: avoid large meals, avoid eating within 2-3 hours of bedtime (avoid late night eating & lying down soon after eating), elevate head of bed if nocturnal symptoms are present, smoking cessation (if current smoker), & weight loss (if overweight).   -Educated to avoid known  foods which trigger reflux symptoms & to minimize/avoid high-fat foods, chocolate, caffeine, citrus, alcohol, & tomato products.  -Advised to avoid/limit use of NSAID's, since they can cause GI upset, bleeding, and/or ulcers. If needed, take with food.   - schedule EGD, discussed procedure with patient, patient verbalized understanding     Follow-up in about 1 month (around 10/12/2018), or if symptoms worsen or fail to improve.                  PTA Medications   Medication Sig    ALPRAZolam (XANAX) 1 MG tablet TAKE ONE TABLET BY MOUTH TWICE DAILY AS NEEDED    bisacodyl (DULCOLAX) 5 mg EC tablet Take 5 mg by mouth daily as needed for Constipation.    ergocalciferol (ERGOCALCIFEROL) 50,000 unit Cap Take 1 capsule (50,000 Units total) by mouth every 7 days.    [START ON 10/20/2018] HYDROcodone-acetaminophen (NORCO)  mg per tablet Take 1 tablet by mouth every 6 (six) hours as needed for Pain.    HYDROcodone-acetaminophen (NORCO)  mg per tablet Take 1 tablet by mouth every 6 (six) hours as needed for pain.    levothyroxine (SYNTHROID) 75 MCG tablet take 1 tablet by mouth once daily    ondansetron (ZOFRAN-ODT) 8 MG TbDL dissolve 1 tablet ON TONGUE three times a day if needed for nausea    pantoprazole (PROTONIX) 40 MG tablet Take 1 tablet (40 mg total) by mouth 2 (two) times daily.    lubiprostone (AMITIZA) 24 MCG Cap Take 1 capsule (24 mcg total) by mouth 2 (two) times daily with meals.       Review of patient's allergies indicates:   Allergen Reactions    Penicillins      Other reaction(s): Hives    Sulfa (sulfonamide antibiotics)      Other reaction(s): Hives        Past Medical History:   Diagnosis Date    Anxiety and depression 7/21/2015    Arthritis     Cervical radiculopathy 4/8/2016    Cirrhosis of liver     Colon polyps 4/27/2015    Diabetes mellitus type 2 with neurological manifestations 11/6/2015    no current medications, only one episode of elevated sugars with acute illness,  "will monitor     Encounter for blood transfusion     Hepatitis C     s/p treatment per patient report; managed by Dr. Perez    Hip fracture     Macrocytosis 2015    Thyroid disease     Transfusion reaction     hep c     Past Surgical History:   Procedure Laterality Date    APPENDECTOMY      BACK SURGERY      CAUDAL EPIDURAL STEROID INJECTION N/A 2018    Procedure: Injection-steroid-epidural-caudal;  Surgeon: Roxana Gu Jr., MD;  Location: [facility] OR;  Service: Pain Management;  Laterality: N/A;  with cath target L4-5     SECTION      CHOLECYSTECTOMY      COLONOSCOPY  3/31/10    2 polyps, tubular adenoma    COLONOSCOPY  2015    Dr. Washington    COLONOSCOPY N/A 2013    [facility] ([facility]    COLONOSCOPY N/A 2013    [facility] at [facility] ([facility] F[facility])    EGD (ESOPHAGOGASTRODUODENOSCOPY) N/A 2013    [facility] ([facility]    EGD and colonoscopy same day N/A 2015    [facility] Kd [facility] at [facility]    ESOPHAGOGASTRODUODENOSCOPY (EGD) N/A 2015    Performed by Kd Washington MD at [facility] ENDO    HERNIA REPAIR      HYSTERECTOMY      Uterus and both ovaries    INCISIONAL HERNIA REPAIR      x 2    Injection-steroid-epidural-caudal N/A 2018    Per[facility] Roxana [facility] Jr[facility] at [facility] OR    JOINT REPLACEMENT      LIVER BIOPSY  2003    autoimmune hepatitis    ROTATOR CUFF REPAIR      right    STOMACH SURGERY      "took lymph node off of liver"    TOTAL HIP ARTHROPLASTY      left hip    UPPER GASTROINTESTINAL ENDOSCOPY  3/24/10    normal    UPPER GASTROINTESTINAL ENDOSCOPY  2015    Dr. Washington     Family History   Problem Relation Age of Onset    Diabetes Mellitus Mother     Liver cancer Sister     Pancreatic cancer Brother     Lung cancer Brother     Colon cancer Brother     Brain cancer " "Brother     Stomach cancer Other     Throat cancer Brother     Liver disease Neg Hx      Social History     Tobacco Use    Smoking status: Current Every Day Smoker     Packs/day: 2.00     Years: 45.00     Pack years: 90.00     Types: Cigarettes    Smokeless tobacco: Never Used   Substance Use Topics    Alcohol use: No     Comment: used to drink heavily, stopped in 1990's    Drug use: No         OBJECTIVE:     Vital Signs (Most Recent)  Temp: 97.2 °F (36.2 °C) (10/10/18 1206)  Pulse: 66 (10/10/18 1206)  Resp: 17 (10/10/18 1206)  BP: (!) 120/56 (10/10/18 1206)  SpO2: 99 % (10/10/18 1206)    Physical Exam:  :Ht 5' 2" (1.575 m)   Wt 68.2 kg (150 lb 5.7 oz)   BMI 27.50 kg/m²                                                         GENERAL:  Comfortable, in no acute distress.                                 HEENT EXAM:  Nonicteric.  No adenopathy.  Oropharynx is clear.               NECK:  Supple.                                                               LUNGS:  Clear.                                                               CARDIAC:  Regular rate and rhythm.  S1, S2.  No murmur.                      ABDOMEN:  Soft, positive bowel sounds, nontender.  No hepatosplenomegaly or masses.  No rebound or guarding.                                             EXTREMITIES:  No edema.     MENTAL STATUS:  Alert and oriented.    ASSESSMENT/PLAN:     Assessment: Epigastric pain.  Hx of colon polyps.    Plan: Gastroscopy and Colonoscopy    Anesthesia Plan:   MAC / General Anaesthesia    ASA Grade: ASA 2 - Patient with mild systemic disease with no functional limitations    MALLAMPATI SCORE: I (soft palate, uvula, fauces, and tonsillar pillars visible)    "

## 2018-10-10 ENCOUNTER — ANESTHESIA (OUTPATIENT)
Dept: ENDOSCOPY | Facility: HOSPITAL | Age: 63
End: 2018-10-10
Payer: MEDICARE

## 2018-10-10 ENCOUNTER — ANESTHESIA EVENT (OUTPATIENT)
Dept: ENDOSCOPY | Facility: HOSPITAL | Age: 63
End: 2018-10-10
Payer: MEDICARE

## 2018-10-10 ENCOUNTER — HOSPITAL ENCOUNTER (OUTPATIENT)
Facility: HOSPITAL | Age: 63
Discharge: HOME | End: 2018-10-10
Attending: INTERNAL MEDICINE | Admitting: INTERNAL MEDICINE
Payer: MEDICARE

## 2018-10-10 VITALS
BODY MASS INDEX: 27.6 KG/M2 | DIASTOLIC BLOOD PRESSURE: 68 MMHG | RESPIRATION RATE: 16 BRPM | HEIGHT: 62 IN | HEART RATE: 68 BPM | WEIGHT: 150 LBS | TEMPERATURE: 97 F | SYSTOLIC BLOOD PRESSURE: 130 MMHG | OXYGEN SATURATION: 98 %

## 2018-10-10 DIAGNOSIS — R10.13 EPIGASTRIC PAIN: ICD-10-CM

## 2018-10-10 LAB — H PYLORI INDEX VALUE: NEGATIVE

## 2018-10-10 PROCEDURE — 63600175 PHARM REV CODE 636 W HCPCS: Mod: PO | Performed by: NURSE ANESTHETIST, CERTIFIED REGISTERED

## 2018-10-10 PROCEDURE — 00813 ANES UPR LWR GI NDSC PX: CPT | Mod: PO | Performed by: INTERNAL MEDICINE

## 2018-10-10 PROCEDURE — 45385 COLONOSCOPY W/LESION REMOVAL: CPT | Mod: PT,,, | Performed by: INTERNAL MEDICINE

## 2018-10-10 PROCEDURE — 88305 TISSUE EXAM BY PATHOLOGIST: CPT | Performed by: PATHOLOGY

## 2018-10-10 PROCEDURE — 43239 EGD BIOPSY SINGLE/MULTIPLE: CPT | Mod: PO | Performed by: INTERNAL MEDICINE

## 2018-10-10 PROCEDURE — 37000008 HC ANESTHESIA 1ST 15 MINUTES: Mod: PO | Performed by: INTERNAL MEDICINE

## 2018-10-10 PROCEDURE — 25000003 PHARM REV CODE 250: Mod: PO | Performed by: INTERNAL MEDICINE

## 2018-10-10 PROCEDURE — 27201089 HC SNARE, DISP (ANY): Mod: PO | Performed by: INTERNAL MEDICINE

## 2018-10-10 PROCEDURE — 37000009 HC ANESTHESIA EA ADD 15 MINS: Mod: PO | Performed by: INTERNAL MEDICINE

## 2018-10-10 PROCEDURE — 88305 TISSUE EXAM BY PATHOLOGIST: CPT | Mod: 26,,, | Performed by: PATHOLOGY

## 2018-10-10 PROCEDURE — 27201012 HC FORCEPS, HOT/COLD, DISP: Mod: PO | Performed by: INTERNAL MEDICINE

## 2018-10-10 PROCEDURE — D9220A PRA ANESTHESIA: Mod: CRNA,,, | Performed by: NURSE ANESTHETIST, CERTIFIED REGISTERED

## 2018-10-10 PROCEDURE — D9220A PRA ANESTHESIA: Mod: ANES,,, | Performed by: ANESTHESIOLOGY

## 2018-10-10 PROCEDURE — 43239 EGD BIOPSY SINGLE/MULTIPLE: CPT | Mod: 51,,, | Performed by: INTERNAL MEDICINE

## 2018-10-10 PROCEDURE — 45385 COLONOSCOPY W/LESION REMOVAL: CPT | Mod: PO | Performed by: INTERNAL MEDICINE

## 2018-10-10 PROCEDURE — 87449 NOS EACH ORGANISM AG IA: CPT | Mod: PO | Performed by: INTERNAL MEDICINE

## 2018-10-10 PROCEDURE — 25000003 PHARM REV CODE 250: Mod: PO | Performed by: NURSE ANESTHETIST, CERTIFIED REGISTERED

## 2018-10-10 RX ORDER — LIDOCAINE HYDROCHLORIDE 10 MG/ML
1 INJECTION, SOLUTION EPIDURAL; INFILTRATION; INTRACAUDAL; PERINEURAL ONCE
Status: DISCONTINUED | OUTPATIENT
Start: 2018-10-10 | End: 2018-10-10 | Stop reason: HOSPADM

## 2018-10-10 RX ORDER — LIDOCAINE HCL/PF 100 MG/5ML
SYRINGE (ML) INTRAVENOUS
Status: DISCONTINUED | OUTPATIENT
Start: 2018-10-10 | End: 2018-10-10

## 2018-10-10 RX ORDER — PROPOFOL 10 MG/ML
VIAL (ML) INTRAVENOUS
Status: DISCONTINUED | OUTPATIENT
Start: 2018-10-10 | End: 2018-10-10

## 2018-10-10 RX ORDER — GLYCOPYRROLATE 0.2 MG/ML
INJECTION INTRAMUSCULAR; INTRAVENOUS
Status: DISCONTINUED | OUTPATIENT
Start: 2018-10-10 | End: 2018-10-10

## 2018-10-10 RX ORDER — SODIUM CHLORIDE 0.9 % (FLUSH) 0.9 %
3 SYRINGE (ML) INJECTION
Status: DISCONTINUED | OUTPATIENT
Start: 2018-10-10 | End: 2018-10-10 | Stop reason: HOSPADM

## 2018-10-10 RX ORDER — SODIUM CHLORIDE, SODIUM LACTATE, POTASSIUM CHLORIDE, CALCIUM CHLORIDE 600; 310; 30; 20 MG/100ML; MG/100ML; MG/100ML; MG/100ML
INJECTION, SOLUTION INTRAVENOUS CONTINUOUS
Status: DISCONTINUED | OUTPATIENT
Start: 2018-10-10 | End: 2018-10-10 | Stop reason: HOSPADM

## 2018-10-10 RX ADMIN — GLYCOPYRROLATE 0.2 MG: 0.2 INJECTION, SOLUTION INTRAMUSCULAR; INTRAVENOUS at 12:10

## 2018-10-10 RX ADMIN — PROPOFOL 50 MG: 10 INJECTION, EMULSION INTRAVENOUS at 02:10

## 2018-10-10 RX ADMIN — PROPOFOL 50 MG: 10 INJECTION, EMULSION INTRAVENOUS at 01:10

## 2018-10-10 RX ADMIN — LIDOCAINE HYDROCHLORIDE 100 MG: 20 INJECTION, SOLUTION INTRAVENOUS at 01:10

## 2018-10-10 RX ADMIN — SODIUM CHLORIDE, SODIUM LACTATE, POTASSIUM CHLORIDE, AND CALCIUM CHLORIDE: .6; .31; .03; .02 INJECTION, SOLUTION INTRAVENOUS at 12:10

## 2018-10-10 RX ADMIN — PROPOFOL 100 MG: 10 INJECTION, EMULSION INTRAVENOUS at 01:10

## 2018-10-10 NOTE — TRANSFER OF CARE
"Anesthesia Transfer of Care Note    Patient: Thom Krishna    Procedure(s) Performed: Procedure(s) (LRB):  EGD (ESOPHAGOGASTRODUODENOSCOPY) (N/A)  COLONOSCOPY (N/A)    Patient location: PACU    Anesthesia Type: general    Transport from OR: Transported from OR on 2-3 L/min O2 by NC with adequate spontaneous ventilation    Post pain: adequate analgesia    Post assessment: no apparent anesthetic complications    Post vital signs: stable    Level of consciousness: responds to stimulation    Nausea/Vomiting: no nausea/vomiting    Complications: none    Transfer of care protocol was followed      Last vitals:   Visit Vitals  BP (!) 120/56 (BP Location: Right arm, Patient Position: Lying)   Pulse 66   Temp 36.2 °C (97.2 °F) (Skin)   Resp 17   Ht 5' 2" (1.575 m)   Wt 68 kg (150 lb)   SpO2 99%   Breastfeeding? No   BMI 27.44 kg/m²     "

## 2018-10-10 NOTE — [employer] PATIENT INSTRUCTIONS
Discharge Summary/Instructions after an Endoscopic Procedure  Patient Name: Thom Krishna  Patient MRN: [id number]  Patient YOB: 1955     Wednesday, October 10, 2018  Reuben Miller MD  RESTRICTIONS:  During your procedure today, you received medications for sedation.  These   medications may affect your judgment, balance and coordination.  Therefore,   for 24 hours, you have the following restrictions:   - DO NOT drive a car, operate machinery, make legal/financial decisions,   sign important papers or drink alcohol.    ACTIVITY:  Today: no heavy lifting, straining or running due to procedural   sedation/anesthesia.  The following day: return to full activity including work.  DIET:  Eat and drink normally unless instructed otherwise.     TREATMENT FOR COMMON SIDE EFFECTS:  - Mild abdominal pain, nausea, belching, bloating or excessive gas:  rest,   eat lightly and use a heating pad.  - Sore Throat: treat with throat lozenges and/or gargle with warm salt   water.  - Because air was used during the procedure, expelling large amounts of air   from your rectum or belching is normal.  - If a bowel prep was taken, you may not have a bowel movement for 1-3 days.    This is normal.  SYMPTOMS TO WATCH FOR AND REPORT TO YOUR PHYSICIAN:  1. Abdominal pain or bloating, other than gas cramps.  2. Chest pain.  3. Back pain.  4. Signs of infection such as: chills or fever occurring within 24 hours   after the procedure.  5. Rectal bleeding, which would show as bright red, maroon, or black stools.   (A tablespoon of blood from the rectum is not serious, especially if   hemorrhoids are present.)  6. Vomiting.  7. Weakness or dizziness.  GO DIRECTLY TO THE NEAREST EMERGENCY ROOM IF YOU HAVE ANY OF THE FOLLOWING:      Difficulty breathing              Chills and/or fever over 101 F   Persistent vomiting and/or vomiting blood   Severe abdominal pain   Severe chest pain   Black, tarry stools   Bleeding- more than one  tablespoon   Any other symptom or condition that you feel may need urgent attention  Your doctor recommends these additional instructions:  If any biopsies were taken, your doctors clinic will contact you in 1 to 2   weeks with any results.  Follow an antireflux regimen.  This includes:       - Do not lie down for at least 3 to 4 hours after meals.        - Raise the head of the bed 4 to 6 inches.        - Decrease excess weight.        - Avoid citrus juices and other acidic foods, alcohol, chocolate, mints,   coffee and other caffeinated beverages, carbonated beverages, fatty and   fried foods.        - Avoid tight-fitting clothing.        - Avoid cigarettes and other tobacco products.   Continue your present medications.   Take Protonix (pantoprazole) 40 mg by mouth once a day.   Add Zantac (ranitidine) 300 mg by mouth every night.   We may have to refer you to see a specialty endoscopist for removal of the   duodenal polyp.  For questions, problems or results please call your physician - Reuben Miller MD at Work:  [phone].  EMERGENCY PHONE NUMBER: [phone], LAB RESULTS: [phone]  IF A COMPLICATION OR EMERGENCY SITUATION ARISES AND YOU ARE UNABLE TO REACH   YOUR PHYSICIAN - GO DIRECTLY TO THE EMERGENCY ROOM.  ___________________________________________  Nurse Signature  ___________________________________________  Patient/Designated Responsible Party Signature  Reuben Miller MD  10/10/2018 6:23:04 PM  This report has been verified and signed electronically.  [employer]

## 2018-10-10 NOTE — ANESTHESIA PREPROCEDURE EVALUATION
10/10/2018  Thom Krishna is a 63 y.o., female.    Anesthesia Evaluation    I have reviewed the Patient Summary Reports.    I have reviewed the Nursing Notes.   I have reviewed the Medications.     Review of Systems  Hepatic/GI:  Hepatic/GI Symptoms:  Liver Disease, Hepatitis  Epigastric pain (R10.13)      Hiatal hernia (K44.9)      Heartburn (R12)      Hepatic cirrhosis, unspecified hepatic cirrhosis type, unspecified whether ascites present        Physical Exam  General:  Well nourished, Obesity    Airway/Jaw/Neck:  Airway Findings: Mouth Opening: Normal Tongue: Normal  General Airway Assessment: Adult, Good  Mallampati: II  Improves to II with phonation.  TM Distance: 4-6 cm      Dental:  Dental Findings: In tact   Chest/Lungs:  Chest/Lungs Findings: Clear to auscultation, Normal Respiratory Rate     Heart/Vascular:  Heart Findings: Rate: Normal  Rhythm: Regular Rhythm  Sounds: Normal  Heart murmur: negative       Mental Status:  Mental Status Findings:  Cooperative, Alert and Oriented         Anesthesia Plan  Type of Anesthesia, risks & benefits discussed:  Anesthesia Type:  general  Patient's Preference:   Intra-op Monitoring Plan: standard ASA monitors  Intra-op Monitoring Plan Comments:   Post Op Pain Control Plan:   Post Op Pain Control Plan Comments:   Induction:   IV  Beta Blocker:  Patient is not currently on a Beta-Blocker (No further documentation required).       Informed Consent: Patient understands risks and agrees with Anesthesia plan.  Questions answered. Anesthesia consent signed with patient.  ASA Score: 2     Day of Surgery Review of History & Physical: I have interviewed and examined the patient. I have reviewed the patient's H&P dated:  There are no significant changes.  H&P update referred to the surgeon.         Ready For Surgery From Anesthesia Perspective.

## 2018-10-10 NOTE — BRIEF OP NOTE
Discharge Note  Short Stay      SUMMARY     Admit Date: 10/10/2018    Attending Physician: No att. providers found     Discharge Physician: No att. providers found    Discharge Date: 10/10/2018 6:25 PM    Final Diagnosis: Epigastric pain [R10.13]  Hiatal hernia [K44.9]  Heartburn [R12]  Hepatic cirrhosis, unspecified hepatic cirrhosis type, unspecified whether ascites present [K74.60]  Hx of colonic polyps [Z86.010]    Impression:          - Normal oropharynx.                       - GERD, as evident by free flow of gastric contents                        into the esophagus.                       - Normal esophagus.                       - Z-line regular, 35 cm from the incisors.                       - Patulous lower esophageal sphincter.                       - Non-erosive esophageal reflux (NERD) disease                        present.                       - Minimal gastritis/antritis. Biopsied.                       - Normal stomach otherwise.                       - A single duodenal polyp. Biopsied.                       - Normal examined duodenum otherwise.  Recommendation:      - Perform a colonoscopy as previously scheduled.                       - Discharge patient to home after that.                       - Await pathology and CLOtest results.                       - Follow an antireflux regimen.                       - Continue present medications.                       - Use Protonix (pantoprazole) 40 mg PO daily.                       - Add Zantac (ranitidine) 300 mg PO nightly.                       - If path is an adenoma, refer to an EMR                        gastroenterologist at [facility].    Impression:          - A few 2 to 5 mm polyps in the transverse colon,                        removed with a cold snare. Resected and retrieved.                       - Non-bleeding internal hemorrhoids.                       - Redundant colon.                       - Stool in the colon. !!! USE GOLYTELY  PREP FOR                        FUTURE EXAMS. !!!                       - The examination was otherwise normal.                       - The examined portion of the ileum was normal.                       - The entire examined colon is normal.  Recommendation:      - Discharge patient to home.                       - Await pathology results.                       - Repeat colonoscopy in 3 years for surveillance.                       - High fiber diet.                       - Use fiber, for example Citrucel, Fibercon, Konsyl                        or Metamucil.                       - Take a PROBIOTIC, such as a carton of GREEK YOGURT                        (Chobani or Oikos, or Activia or Dannon); or tablets                        of ALIGN or CULTURELLE or KAUSHIK-Q (all                        non-prescription), every day for a month.                       - Call the G.[facility] clinic in 2 weeks for reports (if                        you haven't heard from us sooner) [phone].                       - !!! USE GOLYTELY PREP FOR FUTURE EXAMS. !!!                       .                       - Continue present medications.    Reuben Miller MD  10/10/2018  Disposition: HOME OR SELF CARE    Patient Instructions:   Discharge Medication List as of 10/10/2018  2:48 PM      START taking these medications    Details   ranitidine (ZANTAC) 300 MG tablet Take 1 tablet (300 mg total) by mouth every evening. , about 30-60 minutes before bedtime., Starting Wed 10/10/2018, Until Thu 10/10/2019, Normal         CONTINUE these medications which have NOT CHANGED    Details   ALPRAZolam (XANAX) 1 MG tablet TAKE ONE TABLET BY MOUTH TWICE DAILY AS NEEDED, Print      bisacodyl (DULCOLAX) 5 mg EC tablet Take 5 mg by mouth daily as needed for Constipation., Historical Med      ergocalciferol (ERGOCALCIFEROL) 50,000 unit Cap Take 1 capsule (50,000 Units total) by mouth every 7 days., Starting Tue 5/8/2018, Normal      !!  HYDROcodone-acetaminophen (NORCO)  mg per tablet Take 1 tablet by mouth every 6 (six) hours as needed for Pain., Starting Sat 10/20/2018, Print      !! HYDROcodone-acetaminophen (NORCO)  mg per tablet Take 1 tablet by mouth every 6 (six) hours as needed for pain., Starting Tue 8/21/2018, Normal      levothyroxine (SYNTHROID) 75 MCG tablet take 1 tablet by mouth once daily, Normal      lubiprostone (AMITIZA) 24 MCG Cap Take 1 capsule (24 mcg total) by mouth 2 (two) times daily with meals., Starting Wed 9/12/2018, Until Fri 10/12/2018, Normal      ondansetron (ZOFRAN-ODT) 8 MG TbDL dissolve 1 tablet ON TONGUE three times a day if needed for nausea, Normal      pantoprazole (PROTONIX) 40 MG tablet Take 1 tablet (40 mg total) by mouth 2 (two) times daily., Starting Wed 9/12/2018, Until Fri 10/12/2018, Normal       !! - Potential duplicate medications found. Please discuss with provider.          Discharge Procedure Orders (must include Diet, Follow-up, Activity)    Follow Up:  Follow up with PCP as per your routine.  Please follow an anti reflux diet and a high fiber diet.  Activity as tolerated.    No driving day of procedure.    PROBIOTICS:  Now that your colon is so cleaned out, now is a good time for a round of PROBIOTICS.  Take a  Probiotic product such as Align or Culturelle or Tomasa-Q, every day for a month.                  (The products listed are non-prescription, but you may need to ask the pharmacist for their location.)  Repeat 4-6 times a year.

## 2018-10-10 NOTE — ANESTHESIA POSTPROCEDURE EVALUATION
"Anesthesia Post Evaluation    Patient: [place] Krishna    Procedure(s) Performed: Procedure(s) (LRB):  EGD (ESOPHAGOGASTRODUODENOSCOPY) (N/A)  COLONOSCOPY (N/A)    Final Anesthesia Type: general  Patient location during evaluation: PACU  Patient participation: Yes- Able to Participate  Level of consciousness: awake and alert and oriented  Post-procedure vital signs: reviewed and stable  Pain management: adequate  Airway patency: patent  PONV status at discharge: No PONV  Anesthetic complications: no      Cardiovascular status: blood pressure returned to baseline  Respiratory status: unassisted, spontaneous ventilation and room air  Hydration status: euvolemic  Follow-up not needed.        Visit Vitals  BP (!) 120/56 (BP Location: Right arm, Patient Position: Lying)   Pulse 66   Temp 36.2 °C (97.2 °F) (Skin)   Resp 17   Ht 5' 2" (1.575 m)   Wt 68 kg (150 lb)   SpO2 99%   Breastfeeding? No   BMI 27.44 kg/m²       Pain/More Score: Pain Assessment Performed: Yes (10/10/2018 12:00 PM)  Presence of Pain: complains of pain/discomfort (10/10/2018 12:00 PM)        "

## 2018-10-10 NOTE — DISCHARGE INSTRUCTIONS
Recovery After Procedural Sedation (Adult)  You have been given medicine by vein to make you sleep during your surgery. This may have included both a pain medicine and sleeping medicine. Most of the effects have worn off. But you may still have some drowsiness for the next 6 to 8 hours.  Home care  Follow these guidelines when you get home:  · For the next 8 hours, you should be watched by a responsible adult. This person should make sure your condition is not getting worse.  · Don't drink any alcohol for the next 24 hours.  · Don't drive, operate dangerous machinery, or make important business or personal decisions during the next 24 hours.  Note: Your healthcare provider may tell you not to take any medicine by mouth for pain or sleep in the next 4 hours. These medicines may react with the medicines you were given in the hospital. This could cause a much stronger response than usual.  Follow-up care  Follow up with your healthcare provider if you are not alert and back to your usual level of activity within 12 hours.  When to seek medical advice  Call your healthcare provider right away if any of these occur:  · Drowsiness gets worse  · Weakness or dizziness gets worse  · Repeated vomiting  · You can't be awakened   Date Last Reviewed: 10/18/2016  © 0479-0551 [employer]. [address] [place] [address], [place] [address]. All rights reserved. This information is not intended as a substitute for professional medical care. Always follow your healthcare professional's instructions.      PROBIOTICS:  Now that your colon is so cleaned out, now is a good time for a round of PROBIOTICS.  Take a similar Probiotic product such as Align or Culturelle or Tomasa-Q, every day for a month.                  (The products listed are non-prescription, but you may need to ask the pharmacist for their location.)  Repeat this at least 5-6  times a year.        Tips to Control Acid Reflux    To control acid reflux, youll need  to make some basic diet and lifestyle changes. The simple steps outlined below may be all youll need to ease discomfort.  Watch what you eat  · Avoid fatty foods and spicy foods.  · Eat fewer acidic foods, such as citrus and tomato-based foods. These can increase symptoms.  · Limit drinking alcohol, caffeine, and fizzy beverages. All increase acid reflux.  · Try limiting chocolate, peppermint, and spearmint. These can worsen acid reflux in some people.  Watch when you eat  · Avoid lying down for 3 hours after eating.  · Do not snack before going to bed.  Raise your head  Raising your head and upper body by 4 to 6 inches helps limit reflux when youre lying down. Put blocks under the head of your bed frame to raise it.  Other changes  · Lose weight, if you need to  · Dont exercise near bedtime  · Avoid tight-fitting clothes  · Limit aspirin and ibuprofen  · Stop smoking   Date Last Reviewed: 7/1/2016  © 0125-9471 [employer]. [address] [place] [address], [place] [address]. All rights reserved. This information is not intended as a substitute for professional medical care. Always follow your healthcare professional's instructions.        High-Fiber Diet  Fiber is in fruits, vegetables, cereals, and grains. Fiber passes through your body undigested. A high-fiber diet helps food move through your intestinal tract. The added bulk is helpful in preventing constipation. In people with diverticulosis, fiber helps clean out the pouches along the colon wall. It also prevents new pouches from forming. A high-fiber diet reduces the risk of colon cancer. It also lowers blood cholesterol and prevents high blood sugar in people with diabetes.    The fiber-rich foods listed below should be part of your diet. If you are not used to high-fiber foods, start with 1 or 2 foods from this list. Every 3 to 4 days add a new one to your diet. Do this until you are eating 4 high-fiber foods per day. This should give you 20 to 35  grams of fiber a day. It is also important to drink a lot of water when you are on this diet. You should have 6 to 8 glasses of water a day. Water makes the fiber swell and increases the benefit.  Foods high in dietary fiber  The following foods are high in dietary fiber:  · Breads. Breads made with 100% whole-wheat flour; bryce, wheat, or rye crackers; whole-grain tortillas, bran muffins.  · Cereals. Whole-grain and bran cereals with bran (shredded wheat, wheat flakes, raisin bran, corn bran); oatmeal, rolled oats, granola, and brown rice.  · Fruits. Fresh fruits and their edible skins (pears, prunes, raisins, berries, apples, and apricots); bananas, citrus fruit, mangoes, pineapple; and prune juice.  · Nuts. Any nuts and seeds.  · Vegetables. Best served raw or lightly cooked. All types, especially: green peas, celery, eggplant, potatoes, spinach, broccoli, [place] sprouts, winter squash, carrots, cauliflower, soybeans, lentils, and fresh and dried beans of all kinds.  · Other. Popcorn, any spices.  Date Last Reviewed: 8/1/2016  © 8982-1997 [employer]. [address] [place] [address]. All rights reserved. This information is not intended as a substitute for professional medical care. Always follow your healthcare professional's instructions.

## 2018-10-10 NOTE — [employer] PATIENT INSTRUCTIONS
Discharge Summary/Instructions for after Colonoscopy with   Biopsy/Polypectomy  [place] Krishna    Wednesday, October 10, 2018  Reuben Miller MD  RESTRICTIONS ON ACTIVITY:  - Do not drive a car or operate machinery until the day after the procedure.      - The following day: return to full activity including work.  - For  3 days: No heavy lifting, straining or running.  - Diet: You can have solid foods, but no gassy foods (i.e. beans, broccoli,   cabbage, etc).  TREATMENT FOR COMMON SIDE EFFECTS:  - Mild abdominal pain and bloating or excessive gas: rest, eat lightly and   use a heating pad.  SYMPTOMS TO WATCH FOR AND REPORT TO YOUR PHYSICIAN:  1. Severe abdominal pain.  2. Fever within 24 hours after a procedure.  3. A large amount of rectal bleeding. (A small amount of blood from the   rectum is not serious, especially if hemorrhoids are present.  3.  Because air was put into your colon during the procedure, expelling   large amounts of air from your rectum is normal.  4.  You may not have a bowel movement for 1-3 days because of the   colonoscopy prep.  This is normal.  5.  Call immediately if you notice any of the following:   Chills and/or fever over 101   Persistent vomiting   Severe abdominal pain, other than gas cramps   Severe chest pain   Black, tarry stools   Any bleeding - exceeding one tablespoon  Your doctor recommends these additional instructions:  We are waiting for your pathology results.   Your physician has recommended a repeat colonoscopy in 3 years for   surveillance.   Eat a high fiber diet.   Take a fiber supplement, for example Citrucel, Fibercon, Konsyl or   Metamucil.   Take a PROBIOTIC, such as a carton of GREEK YOGURT (Chobani or Oikos,  or   Activia or Dannon);  or tablets of ALIGN or CULTURELLE or KAUSHIK-Q (all   non-prescription), every day for a month.   And repeat this 5-6 times a   year.  Call the [facility] clinic in 2 weeks for reports (if you haven't heard from us   sooner)   [phone].  None  If you have any questions or problems, please call your physician.  EMERGENCY PHONE NUMBER: [phone]  LAB RESULTS: Call in two (2) weeks for lab results, [phone]  ___________________________________________  Nurse Signature  ___________________________________________  Patient/Designated Responsible Party Signature  Reuben Miller MD  10/10/2018 6:15:06 PM  This report has been verified and signed electronically.  [employer]

## 2018-10-17 ENCOUNTER — TELEPHONE (OUTPATIENT)
Dept: GASTROENTEROLOGY | Facility: CLINIC | Age: 63
End: 2018-10-17

## 2018-10-17 NOTE — TELEPHONE ENCOUNTER
Pt notified Dr. Miller would like duodenal polyp removed completely, to be done at [facility]. Someone will call her to schedule. Pt verbs understanding

## 2018-10-17 NOTE — TELEPHONE ENCOUNTER
Pt has a duodenal polyp that Dr. Miller wants removed at [facility].  Can you reach out to pt and help with this? Thanks

## 2018-10-30 DIAGNOSIS — K21.9 GASTROESOPHAGEAL REFLUX DISEASE, ESOPHAGITIS PRESENCE NOT SPECIFIED: ICD-10-CM

## 2018-10-30 RX ORDER — PANTOPRAZOLE SODIUM 40 MG/1
TABLET, DELAYED RELEASE ORAL
Qty: 90 TABLET | Refills: 4 | Status: SHIPPED | OUTPATIENT
Start: 2018-10-30 | End: 2019-12-16 | Stop reason: SDUPTHER

## 2018-11-08 ENCOUNTER — TELEPHONE (OUTPATIENT)
Dept: GASTROENTEROLOGY | Facility: CLINIC | Age: 63
End: 2018-11-08

## 2018-11-08 DIAGNOSIS — D13.2 ADENOMATOUS DUODENAL POLYP: Primary | ICD-10-CM

## 2018-11-16 ENCOUNTER — TELEPHONE (OUTPATIENT)
Dept: ENDOSCOPY | Facility: HOSPITAL | Age: 63
End: 2018-11-16

## 2018-11-16 RX ORDER — HYDROCODONE BITARTRATE AND ACETAMINOPHEN 10; 325 MG/1; MG/1
TABLET ORAL EVERY 6 HOURS PRN
Qty: 90 TABLET | Refills: 0 | Status: SHIPPED | OUTPATIENT
Start: 2018-11-16 | End: 2018-11-27 | Stop reason: SDUPTHER

## 2018-11-16 NOTE — TELEPHONE ENCOUNTER
Spoke with patient. EGD/EMR scheduled for 12/10 at 9:30a. Reviewed prep instructions. Ms Krishna verbalized understanding.

## 2018-11-16 NOTE — TELEPHONE ENCOUNTER
----- Message from Malathi Atwood sent at 11/16/2018  8:03 AM CST -----  Contact: Self [phone]  Patient would like a refill for HYDROcodone-acetaminophen (NORCO)  mg per tablet. Patient will pick it up today from the office. Please advise.

## 2018-11-16 NOTE — TELEPHONE ENCOUNTER
----- Message from Yulissa Toro sent at 11/16/2018 12:25 PM CST -----  Contact: Self/ [phone]  Patient asked to call her once prescription is ready for  since she is in the area.    HYDROcodone-acetaminophen (NORCO)  mg per tablet

## 2018-11-19 ENCOUNTER — HOSPITAL ENCOUNTER (OUTPATIENT)
Dept: RADIOLOGY | Facility: HOSPITAL | Age: 63
Discharge: HOME | End: 2018-11-19
Attending: FAMILY MEDICINE
Payer: MEDICARE

## 2018-11-19 DIAGNOSIS — Z78.0 POSTMENOPAUSAL: ICD-10-CM

## 2018-11-19 DIAGNOSIS — Z12.31 ENCOUNTER FOR SCREENING MAMMOGRAM FOR BREAST CANCER: ICD-10-CM

## 2018-11-19 PROCEDURE — 77080 DXA BONE DENSITY AXIAL: CPT | Mod: 26,,, | Performed by: RADIOLOGY

## 2018-11-19 PROCEDURE — 77063 BREAST TOMOSYNTHESIS BI: CPT | Mod: TC,PO

## 2018-11-19 PROCEDURE — 77067 SCR MAMMO BI INCL CAD: CPT | Mod: 26,,, | Performed by: RADIOLOGY

## 2018-11-19 PROCEDURE — 77080 DXA BONE DENSITY AXIAL: CPT | Mod: TC,PO

## 2018-11-19 PROCEDURE — 77063 BREAST TOMOSYNTHESIS BI: CPT | Mod: 26,,, | Performed by: RADIOLOGY

## 2018-11-23 DIAGNOSIS — K52.9 GASTROENTERITIS: ICD-10-CM

## 2018-11-24 DIAGNOSIS — F41.9 ANXIETY: ICD-10-CM

## 2018-11-24 RX ORDER — ONDANSETRON 8 MG/1
TABLET, ORALLY DISINTEGRATING ORAL
Qty: 20 TABLET | Refills: 11 | Status: SHIPPED | OUTPATIENT
Start: 2018-11-24 | End: 2020-01-24 | Stop reason: SDUPTHER

## 2018-11-25 PROBLEM — M81.0 POSTMENOPAUSAL OSTEOPOROSIS: Status: ACTIVE | Noted: 2018-11-25

## 2018-11-25 RX ORDER — ALPRAZOLAM 1 MG/1
TABLET ORAL
Qty: 45 TABLET | Refills: 5 | Status: SHIPPED | OUTPATIENT
Start: 2018-11-25 | End: 2018-11-26 | Stop reason: SDUPTHER

## 2018-11-26 RX ORDER — ALPRAZOLAM 1 MG/1
1 TABLET ORAL 2 TIMES DAILY PRN
Qty: 45 TABLET | Refills: 5 | Status: SHIPPED | OUTPATIENT
Start: 2018-11-26 | End: 2019-04-22 | Stop reason: SDUPTHER

## 2018-11-27 ENCOUNTER — TELEPHONE (OUTPATIENT)
Dept: FAMILY MEDICINE | Facility: CLINIC | Age: 63
End: 2018-11-27

## 2018-11-27 ENCOUNTER — OFFICE VISIT (OUTPATIENT)
Dept: FAMILY MEDICINE | Facility: CLINIC | Age: 63
End: 2018-11-27
Payer: MEDICARE

## 2018-11-27 VITALS
OXYGEN SATURATION: 98 % | HEART RATE: 68 BPM | WEIGHT: 147.25 LBS | SYSTOLIC BLOOD PRESSURE: 133 MMHG | BODY MASS INDEX: 27.1 KG/M2 | HEIGHT: 62 IN | DIASTOLIC BLOOD PRESSURE: 76 MMHG

## 2018-11-27 DIAGNOSIS — D12.3 ADENOMATOUS POLYP OF TRANSVERSE COLON: ICD-10-CM

## 2018-11-27 DIAGNOSIS — M81.0 POSTMENOPAUSAL OSTEOPOROSIS: Primary | ICD-10-CM

## 2018-11-27 DIAGNOSIS — K59.09 OTHER CONSTIPATION: ICD-10-CM

## 2018-11-27 DIAGNOSIS — M54.2 CHRONIC NECK AND BACK PAIN: ICD-10-CM

## 2018-11-27 DIAGNOSIS — K75.4 AUTOIMMUNE HEPATITIS: ICD-10-CM

## 2018-11-27 DIAGNOSIS — D13.2 ADENOMATOUS DUODENAL POLYP: ICD-10-CM

## 2018-11-27 DIAGNOSIS — E03.9 ACQUIRED HYPOTHYROIDISM: ICD-10-CM

## 2018-11-27 DIAGNOSIS — F41.9 ANXIETY AND DEPRESSION: ICD-10-CM

## 2018-11-27 DIAGNOSIS — M54.9 CHRONIC NECK AND BACK PAIN: ICD-10-CM

## 2018-11-27 DIAGNOSIS — Z86.19 HISTORY OF HEPATITIS C: ICD-10-CM

## 2018-11-27 DIAGNOSIS — E53.8 VITAMIN B12 DEFICIENCY: ICD-10-CM

## 2018-11-27 DIAGNOSIS — K21.9 GASTROESOPHAGEAL REFLUX DISEASE, ESOPHAGITIS PRESENCE NOT SPECIFIED: ICD-10-CM

## 2018-11-27 DIAGNOSIS — E55.9 VITAMIN D DEFICIENCY: ICD-10-CM

## 2018-11-27 DIAGNOSIS — Z72.0 TOBACCO ABUSE: ICD-10-CM

## 2018-11-27 DIAGNOSIS — G89.29 CHRONIC NECK AND BACK PAIN: ICD-10-CM

## 2018-11-27 DIAGNOSIS — F32.A ANXIETY AND DEPRESSION: ICD-10-CM

## 2018-11-27 PROCEDURE — 99214 OFFICE O/P EST MOD 30 MIN: CPT | Mod: S$GLB,,, | Performed by: FAMILY MEDICINE

## 2018-11-27 PROCEDURE — 1101F PT FALLS ASSESS-DOCD LE1/YR: CPT | Mod: CPTII,S$GLB,, | Performed by: FAMILY MEDICINE

## 2018-11-27 PROCEDURE — 3075F SYST BP GE 130 - 139MM HG: CPT | Mod: CPTII,S$GLB,, | Performed by: FAMILY MEDICINE

## 2018-11-27 PROCEDURE — 3078F DIAST BP <80 MM HG: CPT | Mod: CPTII,S$GLB,, | Performed by: FAMILY MEDICINE

## 2018-11-27 PROCEDURE — 3008F BODY MASS INDEX DOCD: CPT | Mod: CPTII,S$GLB,, | Performed by: FAMILY MEDICINE

## 2018-11-27 PROCEDURE — 99999 PR PBB SHADOW E&M-EST. PATIENT-LVL IV: CPT | Mod: PBBFAC,,, | Performed by: FAMILY MEDICINE

## 2018-11-27 RX ORDER — HYDROCODONE BITARTRATE AND ACETAMINOPHEN 10; 325 MG/1; MG/1
TABLET ORAL EVERY 6 HOURS PRN
Qty: 90 TABLET | Refills: 0 | Status: SHIPPED | OUTPATIENT
Start: 2018-12-16 | End: 2019-01-14 | Stop reason: SDUPTHER

## 2018-11-27 NOTE — PROGRESS NOTES
Office Visit    Patient Name: Thom Krishna    : 1955  MRN: [id number]    Subjective:  Thom is a 63 y.o. female who presents today for:    Follow-up (3 month; patient also stated she is having a polyp removal on the .)      63-year-old female patient of mine most recently seen by me on 2018 and with history of significant chronic pain that is primarily secondary to degenerative disc issues of the lumbar spine that is causing bilateral sciatic nerve issues who is here today to for follow up of pain management.  She did have a low back surgery through [facility] but unfortunately this did not help with any of her symptoms.  She is continued to report significant low back pain radiating into both legs, that she described as burning and at times nearly intolerable.  NORCO has helped to take the edge off but it's effectiveness has been decreasing. We have tried a wide variety of adjunct medications including Neurontin, amitriptyline, muscle relaxers, antidepressant the Cymbalta that also have a chronic pain medication, without good results and patient reports that often these medications make her feel ill with nausea and vomiting.  Saw pain management 2018:   Procedures (NORMAN performed 2018):               - Caudal NORMAN c catheter to target L4-5 level              - Handout given to patient for SCS stim trial ([place])  She had her injection with Dr Gu on 2018 and had some temporary relief of the burning in her legs but this has returned.  She does not have a follow-up with him scheduled, and would like to go back to discuss follow up treatments.       She has osteoporosis of the lumbar spine, re-evaluated with DEXA 18 showing significant osteoporosis.  She is open to trial of PROLIA. She has recently been more compliant with weekly vitamin D and her most recent level 18 improved to 15. Other labs drawn at that time unremarkable including CBC/TSH/CMP/Lipid.      Had EGD on the [place] 10/10/2018 showing GERD, NERD, gastritis/antritis.  She had several biopsies of colon polyps with pathology positive for adenomas. She is scheduled for removal of a single duodenal polyp (Biopsied: adenoma) at [facility] on 12/10/2018.  Continues to have severe GERD/ stomach burning despite use of Protonix every morning and Zantac at night.     Following with Dr. Perez with [facility] for her autoimmune hepatitis with history of hepatitis C and cirrhosis of the liver. She has some tests coming up on .  She is not drinking any alcohol but she is continuing to smoke 1-2 ppd.     Past Medical History  Past Medical History:   Diagnosis Date    Anxiety and depression 2015    Arthritis     Cervical radiculopathy 2016    Cirrhosis of liver     Colon polyps 2015    Diabetes mellitus type 2 with neurological manifestations 2015    no current medications, only one episode of elevated sugars with acute illness, will monitor     Encounter for blood transfusion     Hepatitis C     s/p treatment per patient report; managed by Dr. Perez    Hip fracture     Macrocytosis 2015    Thyroid disease     Transfusion reaction     hep c       Past Surgical History  Past Surgical History:   Procedure Laterality Date    APPENDECTOMY      BACK SURGERY      CAUDAL EPIDURAL STEROID INJECTION N/A 2018    Procedure: Injection-steroid-epidural-caudal;  Surgeon: Roxana Gu Jr., MD;  Location: [facility] OR;  Service: Pain Management;  Laterality: N/A;  with cath target L4-5     SECTION      CHOLECYSTECTOMY      COLONOSCOPY  3/31/10    2 polyps, tubular adenoma    COLONOSCOPY  2015    Dr. Washington    COLONOSCOPY N/A 10/10/2018    Procedure: COLONOSCOPY;  Surgeon: Reuben Miller Jr., MD;  Location: [facility] ENDO;  Service: Endoscopy;  Laterality: N/A;    COLONOSCOPY N/A 10/10/2018    Performed by Reuben Miller Jr., MD at [facility] ENDO    COLONOSCOPY N/A 2013     "Performed by Seamus Dukes MD at [facility] ENDO (4TH FLR)    COLONOSCOPY N/A 5/21/2013    Performed by Seamus Dukes MD at [facility] ENDO (4TH FLR)    EGD (ESOPHAGOGASTRODUODENOSCOPY) N/A 10/10/2018    Performed by Reuben Miller Jr., MD at [facility] ENDO    EGD (ESOPHAGOGASTRODUODENOSCOPY) N/A 5/21/2013    Performed by Seamus Dukes MD at [facility] ENDO (4TH FLR)    EGD and colonoscopy same day N/A 4/27/2015    Performed by Kd Washington MD at [facility] ENDO    ESOPHAGOGASTRODUODENOSCOPY N/A 10/10/2018    Procedure: EGD (ESOPHAGOGASTRODUODENOSCOPY);  Surgeon: Reuben Miller Jr., MD;  Location: [facility];  Service: Endoscopy;  Laterality: N/A;    ESOPHAGOGASTRODUODENOSCOPY (EGD) N/A 4/27/2015    Performed by Kd Washington MD at [facility] ENDO    HERNIA REPAIR      HYSTERECTOMY  1989    Uterus and both ovaries    INCISIONAL HERNIA REPAIR      x 2    Injection-steroid-epidural-caudal N/A 8/7/2018    Performed by Roxana Gu Jr., MD at [facility] OR    JOINT REPLACEMENT      LIVER BIOPSY  12/2003    autoimmune hepatitis    ROTATOR CUFF REPAIR      right    STOMACH SURGERY  1980's    "took lymph node off of liver"    TOTAL HIP ARTHROPLASTY      left hip    UPPER GASTROINTESTINAL ENDOSCOPY  3/24/10    normal    UPPER GASTROINTESTINAL ENDOSCOPY  04/27/2015    Dr. Washington       Family History  Family History   Problem Relation Age of Onset    Diabetes Mellitus Mother     Liver cancer Sister     Breast cancer Sister     Pancreatic cancer Brother     Lung cancer Brother     Colon cancer Brother     Brain cancer Brother     Stomach cancer Other     Throat cancer Brother     Liver disease Neg Hx        Social History  Social History     Socioeconomic History    Marital status: Significant Other     Spouse name: Not on file    Number of children: Not on file    Years of education: Not on file    Highest education level: Not on file   Social Needs    Financial resource strain: Not on file " "   Food insecurity - worry: Not on file    Food insecurity - inability: Not on file    Transportation needs - medical: Not on file    Transportation needs - non-medical: Not on file   Occupational History    Not on file   Tobacco Use    Smoking status: Current Every Day Smoker     Packs/day: 2.00     Years: 45.00     Pack years: 90.00     Types: Cigarettes    Smokeless tobacco: Never Used   Substance and Sexual Activity    Alcohol use: No     Comment: used to drink heavily, stopped in 1990's    Drug use: No    Sexual activity: Not on file   Other Topics Concern    Not on file   Social History Narrative    Youngest out of 16 children to her parents       Current Medications  Medications reviewed and updated.     Allergies   Review of patient's allergies indicates:   Allergen Reactions    Penicillins      Other reaction(s): Hives    Sulfa (sulfonamide antibiotics)      Other reaction(s): Hives       Review of Systems (Pertinent positives)  Review of Systems   Gastrointestinal: Positive for abdominal pain, constipation and nausea.   Musculoskeletal: Positive for back pain and neck pain.   Psychiatric/Behavioral: Positive for dysphoric mood.       /76 (BP Location: Right arm, Patient Position: Sitting)   Pulse 68   Ht 5' 2" (1.575 m)   Wt 66.8 kg (147 lb 4.3 oz)   SpO2 98%   BMI 26.94 kg/m²     Physical Exam   Constitutional: She is oriented to person, place, and time. She appears well-developed and well-nourished. No distress.   HENT:   Head: Normocephalic and atraumatic.   Eyes: Conjunctivae are normal.   Cardiovascular: Normal rate and regular rhythm.   Pulmonary/Chest: Effort normal and breath sounds normal.   Musculoskeletal: She exhibits no edema.   Neurological: She is alert and oriented to person, place, and time.   Skin: Skin is warm and dry.   Psychiatric: She has a normal mood and affect.   Vitals reviewed.        Assessment/Plan:  Thom Krishna is a 63 y.o. female who presents today " for :    Thom was seen today for follow-up.    Diagnoses and all orders for this visit:    Postmenopausal osteoporosis  Comments:  see DEXA 8/21/2018. continue weekly vitamin D, infusion center contacted re: PROLIA therapy plan    Vitamin D deficiency  Comments:  continue weekly RX ergocalciferol. may also advise calcium/d3 supplement at level is still low & pt w/ severe OP but her stomach is sensitive to supplements    Chronic neck and back pain  Comments:  has failed/not tolerated other medications to help w/ pain mgmt. on chronic NOCO, and RX's written for the next 2 months. referral back to pain mgmt for F/U  Orders:  -     Ambulatory referral to Pain Clinic  -     HYDROcodone-acetaminophen (NORCO)  mg per tablet; Take 1 tablet by mouth every 6 (six) hours as needed for pain.    Autoimmune hepatitis  Comments:  follows up with Di (Dr Sahu) on December 20, 2018    History of hepatitis C    Vitamin B12 deficiency  Comments:  will check with next labs given ongonig PPI use, normal on labs about 1 yr ago, no current anemia    Tobacco abuse  Comments:  pre-contemplative in terms of cessation    Other constipation  Comments:  minimize opiods, high fiber diet/ increase hydration     Acquired hypothyroidism  Comments:  TSH normal on synthroid 75 mcg daily    Adenomatous duodenal polyp  Comments:  scheduled for removal at main campus    Adenomatous polyp of transverse colon    Gastroesophageal reflux disease, esophagitis presence not specified  Comments:  continue PPI/ Zantac, follow up with GI    Anxiety and depression  Comments:  xanax PRN             ICD-10-CM ICD-9-CM    1. Postmenopausal osteoporosis M81.0 733.01     see DEXA 8/21/2018. continue weekly vitamin D, infusion center contacted re: PROLIA therapy plan   2. Vitamin D deficiency E55.9 268.9     continue weekly RX ergocalciferol. may also advise calcium/d3 supplement at level is still low & pt w/ severe OP but her stomach is sensitive to  supplements   3. Chronic neck and back pain M54.2 723.1 Ambulatory referral to Pain Clinic    M54.9 724.5 HYDROcodone-acetaminophen (NORCO)  mg per tablet    G89.29      has failed/not tolerated other medications to help w/ pain mgmt. on chronic NOCO, and RX's written for the next 2 months. referral back to pain [facility] for F/U   4. Autoimmune hepatitis K75.4 571.42     follows up with Di (Dr Sahu) on December 20, 2018   5. History of hepatitis C Z86.19 V12.09    6. Vitamin B12 deficiency E53.8 266.2     will check with next labs given ongonig PPI use, normal on labs about 1 yr ago, no current anemia   7. Tobacco abuse Z72.0 305.1     pre-contemplative in terms of cessation   8. Other constipation K59.09 564.09     minimize opiods, high fiber diet/ increase hydration    9. Acquired hypothyroidism E03.9 244.9     TSH normal on synthroid 75 mcg daily   10. Adenomatous duodenal polyp D13.2 211.2     scheduled for removal at [facility]   11. Adenomatous polyp of transverse colon D12.3 211.3    12. Gastroesophageal reflux disease, esophagitis presence not specified K21.9 530.81     continue PPI/ Zantac, follow up with GI   13. Anxiety and depression F41.9 300.00     F32.9 311     xanax PRN        There are no Patient Instructions on file for this visit.      Follow-up in about 3 months (around 2/27/2019) for return as needed for new concerns.

## 2018-11-27 NOTE — LETTER
November 28, 2018      Other  [address]  [place]  [place] [address]           [address]  [place] [address]  Phone: [phone]  Fax: [phone]          Patient: Thom Krishna   MR Number: [id number]   YOB: 1955   Date of Visit: 11/27/2018       Dear Other:    Thank you for referring Thom Krishna to me for evaluation. Attached you will find relevant portions of my assessment and plan of care.    If you have questions, please do not hesitate to call me. I look forward to following Thom Krishna along with you.    Sincerely,    Brandy Dejesus MD    Enclosure  CC:  No Recipients    If you would like to receive this communication electronically, please contact [email or web address] or [phone] to request more information on [facility] Link access.    For providers and/or their staff who would like to refer a patient to [facility], please contact us through our one-stop-shop provider referral line, [facility], at [phone].    If you feel you have received this communication in error or would no longer like to receive these types of communications, please e-mail [email or web address]

## 2018-12-10 ENCOUNTER — ANESTHESIA EVENT (OUTPATIENT)
Dept: ENDOSCOPY | Facility: HOSPITAL | Age: 63
End: 2018-12-10
Payer: MEDICARE

## 2018-12-10 ENCOUNTER — ANESTHESIA (OUTPATIENT)
Dept: ENDOSCOPY | Facility: HOSPITAL | Age: 63
End: 2018-12-10
Payer: MEDICARE

## 2018-12-10 ENCOUNTER — HOSPITAL ENCOUNTER (OUTPATIENT)
Facility: HOSPITAL | Age: 63
Discharge: HOME | End: 2018-12-10
Attending: INTERNAL MEDICINE | Admitting: INTERNAL MEDICINE
Payer: MEDICARE

## 2018-12-10 VITALS
TEMPERATURE: 98 F | HEART RATE: 47 BPM | RESPIRATION RATE: 20 BRPM | HEIGHT: 62 IN | WEIGHT: 145 LBS | BODY MASS INDEX: 26.68 KG/M2 | OXYGEN SATURATION: 97 % | DIASTOLIC BLOOD PRESSURE: 58 MMHG | SYSTOLIC BLOOD PRESSURE: 119 MMHG

## 2018-12-10 DIAGNOSIS — D13.2 ADENOMATOUS DUODENAL POLYP: ICD-10-CM

## 2018-12-10 DIAGNOSIS — D13.2 DUODENAL ADENOMA: Primary | ICD-10-CM

## 2018-12-10 LAB — POCT GLUCOSE: 180 MG/DL (ref 70–110)

## 2018-12-10 PROCEDURE — 88305 TISSUE EXAM BY PATHOLOGIST: CPT | Mod: 26,,, | Performed by: PATHOLOGY

## 2018-12-10 PROCEDURE — 27201089 HC SNARE, DISP (ANY): Performed by: INTERNAL MEDICINE

## 2018-12-10 PROCEDURE — 37000009 HC ANESTHESIA EA ADD 15 MINS: Performed by: INTERNAL MEDICINE

## 2018-12-10 PROCEDURE — 27202363 HC INJECTION AGENT, SUBMUCOSAL, ANY: Performed by: INTERNAL MEDICINE

## 2018-12-10 PROCEDURE — 43251 EGD REMOVE LESION SNARE: CPT | Mod: ,,, | Performed by: INTERNAL MEDICINE

## 2018-12-10 PROCEDURE — 43254 EGD ENDO MUCOSAL RESECTION: CPT

## 2018-12-10 PROCEDURE — 27202087 HC PROBE, APC: Performed by: INTERNAL MEDICINE

## 2018-12-10 PROCEDURE — 88305 TISSUE EXAM BY PATHOLOGIST: CPT | Performed by: PATHOLOGY

## 2018-12-10 PROCEDURE — 43251 EGD REMOVE LESION SNARE: CPT | Performed by: INTERNAL MEDICINE

## 2018-12-10 PROCEDURE — 25000003 PHARM REV CODE 250: Performed by: NURSE ANESTHETIST, CERTIFIED REGISTERED

## 2018-12-10 PROCEDURE — 27200997: Performed by: INTERNAL MEDICINE

## 2018-12-10 PROCEDURE — D9220A PRA ANESTHESIA: Mod: ANES,,, | Performed by: ANESTHESIOLOGY

## 2018-12-10 PROCEDURE — 27201028 HC NEEDLE, SCLERO: Performed by: INTERNAL MEDICINE

## 2018-12-10 PROCEDURE — 63600175 PHARM REV CODE 636 W HCPCS: Performed by: INTERNAL MEDICINE

## 2018-12-10 PROCEDURE — 63600175 PHARM REV CODE 636 W HCPCS: Performed by: ANESTHESIOLOGY

## 2018-12-10 PROCEDURE — 37000008 HC ANESTHESIA 1ST 15 MINUTES: Performed by: INTERNAL MEDICINE

## 2018-12-10 PROCEDURE — D9220A PRA ANESTHESIA: Mod: CRNA,,, | Performed by: NURSE ANESTHETIST, CERTIFIED REGISTERED

## 2018-12-10 PROCEDURE — 63600175 PHARM REV CODE 636 W HCPCS: Mod: JG | Performed by: NURSE ANESTHETIST, CERTIFIED REGISTERED

## 2018-12-10 PROCEDURE — 00731 ANES UPR GI NDSC PX NOS: CPT | Performed by: INTERNAL MEDICINE

## 2018-12-10 PROCEDURE — 82962 GLUCOSE BLOOD TEST: CPT | Performed by: INTERNAL MEDICINE

## 2018-12-10 RX ORDER — PHENYLEPHRINE HYDROCHLORIDE 10 MG/ML
INJECTION INTRAVENOUS
Status: DISCONTINUED | OUTPATIENT
Start: 2018-12-10 | End: 2018-12-10

## 2018-12-10 RX ORDER — CIPROFLOXACIN 500 MG/1
500 TABLET ORAL 2 TIMES DAILY
Qty: 10 TABLET | Refills: 0 | Status: SHIPPED | OUTPATIENT
Start: 2018-12-10 | End: 2018-12-15

## 2018-12-10 RX ORDER — GLYCOPYRROLATE 0.2 MG/ML
INJECTION INTRAMUSCULAR; INTRAVENOUS
Status: DISCONTINUED | OUTPATIENT
Start: 2018-12-10 | End: 2018-12-10

## 2018-12-10 RX ORDER — GLUCAGON 1 MG
KIT INJECTION
Status: DISCONTINUED | OUTPATIENT
Start: 2018-12-10 | End: 2018-12-10

## 2018-12-10 RX ORDER — SODIUM CHLORIDE 0.9 % (FLUSH) 0.9 %
3 SYRINGE (ML) INJECTION
Status: DISCONTINUED | OUTPATIENT
Start: 2018-12-10 | End: 2018-12-10 | Stop reason: HOSPADM

## 2018-12-10 RX ORDER — LIDOCAINE HYDROCHLORIDE 10 MG/ML
INJECTION, SOLUTION INTRAVENOUS
Status: DISCONTINUED | OUTPATIENT
Start: 2018-12-10 | End: 2018-12-10

## 2018-12-10 RX ORDER — PROPOFOL 10 MG/ML
VIAL (ML) INTRAVENOUS CONTINUOUS PRN
Status: DISCONTINUED | OUTPATIENT
Start: 2018-12-10 | End: 2018-12-10

## 2018-12-10 RX ORDER — PROPOFOL 10 MG/ML
VIAL (ML) INTRAVENOUS
Status: DISCONTINUED | OUTPATIENT
Start: 2018-12-10 | End: 2018-12-10

## 2018-12-10 RX ORDER — SODIUM CHLORIDE 9 MG/ML
INJECTION, SOLUTION INTRAVENOUS CONTINUOUS
Status: DISCONTINUED | OUTPATIENT
Start: 2018-12-10 | End: 2018-12-10 | Stop reason: HOSPADM

## 2018-12-10 RX ORDER — HYDROMORPHONE HYDROCHLORIDE 1 MG/ML
0.2 INJECTION, SOLUTION INTRAMUSCULAR; INTRAVENOUS; SUBCUTANEOUS EVERY 5 MIN PRN
Status: DISCONTINUED | OUTPATIENT
Start: 2018-12-10 | End: 2018-12-10 | Stop reason: HOSPADM

## 2018-12-10 RX ORDER — CIPROFLOXACIN 2 MG/ML
INJECTION, SOLUTION INTRAVENOUS
Status: DISCONTINUED | OUTPATIENT
Start: 2018-12-10 | End: 2018-12-10

## 2018-12-10 RX ORDER — SODIUM CHLORIDE 9 MG/ML
INJECTION, SOLUTION INTRAVENOUS CONTINUOUS PRN
Status: DISCONTINUED | OUTPATIENT
Start: 2018-12-10 | End: 2018-12-10

## 2018-12-10 RX ORDER — EPINEPHRINE 0.1 MG/ML
INJECTION INTRAVENOUS
Status: COMPLETED | OUTPATIENT
Start: 2018-12-10 | End: 2018-12-10

## 2018-12-10 RX ADMIN — CIPROFLOXACIN 400 MG: 2 INJECTION, SOLUTION INTRAVENOUS at 09:12

## 2018-12-10 RX ADMIN — LIDOCAINE HYDROCHLORIDE 100 MG: 10 INJECTION, SOLUTION INTRAVENOUS at 08:12

## 2018-12-10 RX ADMIN — SODIUM CHLORIDE: 0.9 INJECTION, SOLUTION INTRAVENOUS at 08:12

## 2018-12-10 RX ADMIN — GLYCOPYRROLATE 0.2 MG: 0.2 INJECTION, SOLUTION INTRAMUSCULAR; INTRAVENOUS at 09:12

## 2018-12-10 RX ADMIN — HYDROMORPHONE HYDROCHLORIDE 0.2 MG: 1 INJECTION, SOLUTION INTRAMUSCULAR; INTRAVENOUS; SUBCUTANEOUS at 10:12

## 2018-12-10 RX ADMIN — EPINEPHRINE 0.1 MG: 0.1 INJECTION, SOLUTION ENDOTRACHEAL; INTRACARDIAC; INTRAVENOUS at 09:12

## 2018-12-10 RX ADMIN — PHENYLEPHRINE HYDROCHLORIDE 200 MCG: 10 INJECTION INTRAVENOUS at 09:12

## 2018-12-10 RX ADMIN — GLUCAGON HYDROCHLORIDE 0.5 MG: KIT at 08:12

## 2018-12-10 RX ADMIN — PROPOFOL 80 MG: 10 INJECTION, EMULSION INTRAVENOUS at 08:12

## 2018-12-10 RX ADMIN — PROPOFOL 150 MCG/KG/MIN: 10 INJECTION, EMULSION INTRAVENOUS at 08:12

## 2018-12-10 NOTE — PROVATION PATIENT INSTRUCTIONS
Discharge Summary/Instructions after an Endoscopic Procedure  Patient Name: Thom Krishna  Patient MRN: [id number]  Patient YOB: 1955     Monday, December 10, 2018  Belle Kuo MD  RESTRICTIONS:  During your procedure today, you received medications for sedation.  These   medications may affect your judgment, balance and coordination.  Therefore,   for 24 hours, you have the following restrictions:   - DO NOT drive a car, operate machinery, make legal/financial decisions,   sign important papers or drink alcohol.    ACTIVITY:  Today: no heavy lifting, straining or running due to procedural   sedation/anesthesia.  The following day: return to full activity including work.  DIET:  Eat and drink normally unless instructed otherwise.     TREATMENT FOR COMMON SIDE EFFECTS:  - Mild abdominal pain, nausea, belching, bloating or excessive gas:  rest,   eat lightly and use a heating pad.  - Sore Throat: treat with throat lozenges and/or gargle with warm salt   water.  - Because air was used during the procedure, expelling large amounts of air   from your rectum or belching is normal.  - If a bowel prep was taken, you may not have a bowel movement for 1-3 days.    This is normal.  SYMPTOMS TO WATCH FOR AND REPORT TO YOUR PHYSICIAN:  1. Abdominal pain or bloating, other than gas cramps.  2. Chest pain.  3. Back pain.  4. Signs of infection such as: chills or fever occurring within 24 hours   after the procedure.  5. Rectal bleeding, which would show as bright red, maroon, or black stools.   (A tablespoon of blood from the rectum is not serious, especially if   hemorrhoids are present.)  6. Vomiting.  7. Weakness or dizziness.  GO DIRECTLY TO THE NEAREST EMERGENCY ROOM IF YOU HAVE ANY OF THE FOLLOWING:      Difficulty breathing              Chills and/or fever over 101 F   Persistent vomiting and/or vomiting blood   Severe abdominal pain   Severe chest pain   Black, tarry stools   Bleeding- more than one  tablespoon   Any other symptom or condition that you feel may need urgent attention  Your doctor recommends these additional instructions:  If any biopsies were taken, your doctors clinic will contact you in 1 to 2   weeks with any results.  - Discharge patient to home.   - Clear liquid diet for 1 day.   - Continue present medications.   - Return to referring physician.   - Await pathology results.   - Repeat upper endoscopy in 3 months for surveillance.  For questions, problems or results please call your physician - Belle Kuo MD at Work:  [phone].  [facility], EMERGENCY ROOM PHONE NUMBER: [phone]  IF A COMPLICATION OR EMERGENCY SITUATION ARISES AND YOU ARE UNABLE TO REACH   YOUR PHYSICIAN - GO DIRECTLY TO THE EMERGENCY ROOM.  Belle Kuo MD  12/10/2018 10:05:55 AM  This report has been verified and signed electronically.  [employer]

## 2018-12-10 NOTE — ANESTHESIA PREPROCEDURE EVALUATION
12/10/2018  Thom Krishna is a 63 y.o., female scheduled for EGD with possible EMR due to adenomatous duodenal polyp.    Past Medical History:   Diagnosis Date    Anxiety and depression 7/21/2015    Arthritis     Cervical radiculopathy 4/8/2016    Cirrhosis of liver     Colon polyps 4/27/2015    Diabetes mellitus type 2 with neurological manifestations 11/6/2015    no current medications, only one episode of elevated sugars with acute illness, will monitor     Encounter for blood transfusion     Hepatitis C     s/p treatment per patient report; managed by Dr. Perez    Hip fracture     Macrocytosis 7/21/2015    Thyroid disease     Transfusion reaction     hep c     Lab Results   Component Value Date    WBC 5.45 11/19/2018    HGB 13.1 11/19/2018    HCT 41.5 11/19/2018    MCV 99 (H) 11/19/2018     11/19/2018       Chemistry        Component Value Date/Time     11/19/2018 0927    K 4.1 11/19/2018 0927     11/19/2018 0927    CO2 24 11/19/2018 0927    BUN 16 11/19/2018 0927    CREATININE 0.9 11/19/2018 0927    GLU 85 11/19/2018 0927        Component Value Date/Time    CALCIUM 9.3 11/19/2018 0927    ALKPHOS 139 (H) 11/19/2018 0927    AST 16 11/19/2018 0927    ALT 9 (L) 11/19/2018 0927    BILITOT 0.3 11/19/2018 0927    ESTGFRAFRICA >60.0 11/19/2018 0927    EGFRNONAA >60.0 11/19/2018 0927            Pre-op Assessment    I have reviewed the Patient Summary Reports.     I have reviewed the Nursing Notes.   I have reviewed the Medications.     Review of Systems  Anesthesia Hx:  No problems with previous Anesthesia    Social:  Smoker    Cardiovascular:  Cardiovascular Normal     Hepatic/GI:   GERD Liver Disease, Hepatitis  Hepatic/GI Symptoms:  Liver Disease, Hepatitis  Epigastric pain (R10.13)      Hiatal hernia (K44.9)      Heartburn (R12)      Hepatic cirrhosis, unspecified hepatic  cirrhosis type, unspecified whether ascites present    Musculoskeletal:   Arthritis     Endocrine:   Diabetes, well controlled Hypothyroidism Per pt diabetes is resolved       Physical Exam  General:  Well nourished, Obesity    Airway/Jaw/Neck:  Airway Findings: Mouth Opening: Normal Tongue: Normal  General Airway Assessment: Adult, Good  Mallampati: II  Improves to II with phonation.  TM Distance: 4-6 cm      Dental:  Dental Findings: In tact   Chest/Lungs:  Chest/Lungs Findings: Clear to auscultation, Normal Respiratory Rate     Heart/Vascular:  Heart Findings: Rate: Normal  Rhythm: Regular Rhythm  Sounds: Normal  Heart murmur: negative       Mental Status:  Mental Status Findings:  Cooperative, Alert and Oriented         Anesthesia Plan  Type of Anesthesia, risks & benefits discussed:  Anesthesia Type:  general  Patient's Preference:   Intra-op Monitoring Plan: standard ASA monitors  Intra-op Monitoring Plan Comments:   Post Op Pain Control Plan:   Post Op Pain Control Plan Comments:   Induction:   IV  Beta Blocker:  Patient is not currently on a Beta-Blocker (No further documentation required).       Informed Consent: Patient understands risks and agrees with Anesthesia plan.  Questions answered. Anesthesia consent signed with patient.  ASA Score: 3     Day of Surgery Review of History & Physical: I have interviewed and examined the patient. I have reviewed the patient's H&P dated:  There are no significant changes.  H&P update referred to the surgeon.         Ready For Surgery From Anesthesia Perspective.

## 2018-12-10 NOTE — PROGRESS NOTES
Dr Martinez Kuo at bedside to see pt.  Pt informed that abd pain is getting better and a little bit nauseated and nurse informed that pt had received 1mg of dilaudid.  Dr Martinez Kuo verbalized understanding and informed of new orders

## 2018-12-10 NOTE — DISCHARGE INSTRUCTIONS
Anesthesia: General Anesthesia     You are watched continuously during your procedure by your anesthesia provider.     Youre due to have surgery. During surgery, youll be given medicine called anesthesia or anesthetic. This will keep you comfortable and pain-free. Your anesthesia provider will use general anesthesia.  What is general anesthesia?  General anesthesia puts you into a state like deep sleep. It goes into the bloodstream (IV anesthetics), into the lungs (gas anesthetics), or both. You feel nothing during the procedure. You will not remember it. During the procedure, the anesthesia provider monitors you continuously. He or she checks your heart rate and rhythm, blood pressure, breathing, and blood oxygen.  · IV anesthetics. IV anesthetics are given through an IV line in your arm. Theyre often given first. This is so you are asleep before a gas anesthetic is started. Some kinds of IV anesthetics relieve pain. Others relax you. Your doctor will decide which kind is best in your case.  · Gas anesthetics. Gas anesthetics are breathed into the lungs. They are often used to keep you asleep. They can be given through a facemask or a tube placed in your larynx or trachea (breathing tube).  ¨ If you have a facemask, your anesthesia provider will most likely place it over your nose and mouth while youre still awake. Youll breathe oxygen through the mask as your IV anesthetic is started. Gas anesthetic may be added through the mask.  ¨ If you have a tube in the larynx or trachea, it will be inserted into your throat after youre asleep.  Anesthesia tools and medicines  You will likely have:  · IV anesthetics. These are put into an IV line into your bloodstream.  · Gas anesthetics. You breathe these anesthetics into your lungs, where they pass into your bloodstream.  · Pulse oximeter. This is a small clip that is attached to the end of your finger. This measures your blood oxygen level.  · Electrocardiography  leads (electrodes). These are small sticky pads that are placed on your chest. They record your heart rate and rhythm.  · Blood pressure cuff. This reads your blood pressure.  Risks and possible complications  General anesthesia has some risks. These include:  · Breathing problems  · Nausea and vomiting  · Sore throat or hoarseness (usually temporary)  · Allergic reaction to the anesthetic  · Irregular heartbeat (rare)  · Cardiac arrest (rare)   Anesthesia safety  · Follow all instructions you are given for how long not to eat or drink before your procedure.  · Be sure your doctor knows what medicines and drugs you take. This includes over-the-counter medicines, herbs, supplements, alcohol or other drugs. You will be asked when those were last taken.  · Have an adult family member or friend drive you home after the procedure.  · For the first 24 hours after your surgery:  ¨ Do not drive or use heavy equipment.  ¨ Do not make important decisions or sign legal documents. If important decisions or signing legal documents is necessary during the first 24 hours after surgery, have a trusted family member or spouse act on your behalf.  ¨ Avoid alcohol.  ¨ Have a responsible adult stay with you. He or she can watch for problems and help keep you safe.  Date Last Reviewed: 12/1/2016  © 9213-3521 [employer]. [address] [place] [address], [place] [address]. All rights reserved. This information is not intended as a substitute for professional medical care. Always follow your healthcare professional's instructions.

## 2018-12-10 NOTE — TRANSFER OF CARE
"Anesthesia Transfer of Care Note    Patient: Thom Krishna    Procedure(s) Performed: Procedure(s) (LRB):  EGD (ESOPHAGOGASTRODUODENOSCOPY)/poss emr (N/A)    Patient location: PACU    Anesthesia Type: general    Transport from OR: Transported from OR on room air with adequate spontaneous ventilation. Transported from OR on 6-10 L/min O2 by face mask with adequate spontaneous ventilation    Post pain: adequate analgesia    Post assessment: no apparent anesthetic complications    Post vital signs: stable    Level of consciousness: awake and alert    Nausea/Vomiting: no nausea/vomiting    Complications: none    Transfer of care protocol was followed      Last vitals:   Visit Vitals  /64 (BP Location: Left arm, Patient Position: Sitting)   Pulse 60   Temp 36.8 °C (98.3 °F) (Oral)   Resp 18   Ht 5' 2" (1.575 m)   Wt 65.8 kg (145 lb)   SpO2 96%   Breastfeeding? No   BMI 26.52 kg/m²     "

## 2018-12-10 NOTE — H&P
History & Physical - Short Stay  Gastroenterology      SUBJECTIVE:     Procedure: EGD    Chief Complaint/Indication for Procedure: duodenal polyp    History of Present Illness:  Patient is a 63 y.o. female with duodenal polyp coming for EMR.     PTA Medications   Medication Sig    ALPRAZolam (XANAX) 1 MG tablet Take 1 tablet (1 mg total) by mouth 2 (two) times daily as needed.    bisacodyl (DULCOLAX) 5 mg EC tablet Take 5 mg by mouth daily as needed for Constipation.    ergocalciferol (ERGOCALCIFEROL) 50,000 unit Cap Take 1 capsule (50,000 Units total) by mouth every 7 days.    [START ON 12/16/2018] HYDROcodone-acetaminophen (NORCO)  mg per tablet Take 1 tablet by mouth every 6 (six) hours as needed for pain.    levothyroxine (SYNTHROID) 75 MCG tablet take 1 tablet by mouth once daily    ondansetron (ZOFRAN-ODT) 8 MG TbDL TAKE ONE TABLET UNDER THE TONGUE THREE TIMES DAILY AS NEEDED FOR NAUSEA    pantoprazole (PROTONIX) 40 MG tablet TAKE ONE TABLET BY MOUTH EVERY DAY    ranitidine (ZANTAC) 300 MG tablet Take 1 tablet (300 mg total) by mouth every evening. , about 30-60 minutes before bedtime.       Review of patient's allergies indicates:   Allergen Reactions    Penicillins      Other reaction(s): Hives    Sulfa (sulfonamide antibiotics)      Other reaction(s): Hives        Past Medical History:   Diagnosis Date    Anxiety and depression 7/21/2015    Arthritis     Cervical radiculopathy 4/8/2016    Cirrhosis of liver     Colon polyps 4/27/2015    Diabetes mellitus type 2 with neurological manifestations 11/6/2015    no current medications, only one episode of elevated sugars with acute illness, will monitor     Encounter for blood transfusion     Hepatitis C     s/p treatment per patient report; managed by Dr. Perez    Hip fracture     Macrocytosis 7/21/2015    Thyroid disease     Transfusion reaction     hep c     Past Surgical History:   Procedure Laterality Date    APPENDECTOMY    "   BACK SURGERY      CAUDAL EPIDURAL STEROID INJECTION N/A 2018    Procedure: Injection-steroid-epidural-caudal;  Surgeon: Roxana Gu Jr., MD;  Location: [facility] OR;  Service: Pain Management;  Laterality: N/A;  with cath target L4-5     SECTION      CHOLECYSTECTOMY      COLONOSCOPY  3/31/10    2 polyps, tubular adenoma    COLONOSCOPY  2015    Dr. Washington    COLONOSCOPY N/A 10/10/2018    Procedure: COLONOSCOPY;  Surgeon: Reuben Miller Jr., MD;  Location: [facility];  Service: Endoscopy;  Laterality: N/A;    COLONOSCOPY N/A 10/10/2018    Performed by Reuben Miller Jr., MD at [facility]    COLONOSCOPY N/A 2013    [facility]formed by [facility] at [facility] ([facility])    COLONOSCOPY N/A 2013    [facility] by [facility] at [facility] ([facility])    EGD (ESOPHAGOGASTRODUODENOSCOPY) N/A 10/10/2018    Performed by Reuben Miller Jr., MD at [facility]    EGD (ESOPHAGOGASTRODUODENOSCOPY) N/A 2013    Performed by [facility] at [facility] (4[facility] FLR)    EGD and colonoscopy same day N/A 2015    [facility] Kd [facility] ENDO    ESOPHAGOGASTRODUODENOSCOPY N/A 10/10/2018    Procedure: EGD (ESOPHAGOGASTRODUODENOSCOPY);  Surgeon: Reuben Miller Jr., MD;  Location: [facility];  Service: Endoscopy;  Laterality: N/A;    ESOPHAGOGASTRODUODENOSCOPY (EGD) N/A 2015    Performed by Kd Washington MD at [facility] ENDO    HERNIA REPAIR      HYSTERECTOMY      Uterus and both ovaries    INCISIONAL HERNIA REPAIR      x 2    Injection-steroid-epidural-caudal N/A 2018    Performed by Roxana Gu Jr., MD at [facility] OR    JOINT REPLACEMENT      LIVER BIOPSY  2003    autoimmune hepatitis    ROTATOR CUFF REPAIR      right    STOMACH SURGERY      "took lymph node off of liver"    TOTAL HIP ARTHROPLASTY      left hip    UPPER GASTROINTESTINAL ENDOSCOPY  3/24/10    normal    UPPER GASTROINTESTINAL ENDOSCOPY  " 04/27/2015    Dr. Washington     Family History   Problem Relation Age of Onset    Diabetes Mellitus Mother     Liver cancer Sister     Breast cancer Sister     Pancreatic cancer Brother     Lung cancer Brother     Colon cancer Brother     Brain cancer Brother     Stomach cancer Other     Throat cancer Brother     Liver disease Neg Hx      Social History     Tobacco Use    Smoking status: Current Every Day Smoker     Packs/day: 2.00     Years: 45.00     Pack years: 90.00     Types: Cigarettes    Smokeless tobacco: Never Used   Substance Use Topics    Alcohol use: No     Comment: used to drink heavily, stopped in 1990's    Drug use: No          OBJECTIVE:     Vital Signs (Most Recent)  Temp: 98.3 °F (36.8 °C) (12/10/18 0742)  Pulse: 60 (12/10/18 0742)  Resp: 18 (12/10/18 0742)  BP: 131/64 (12/10/18 0742)  SpO2: 96 % (12/10/18 0742)       ASSESSMENT/PLAN:     Patient is a 63 y.o. female with duodenal polyp coming for EMR.     Plan: EGD    Anesthesia Plan: Moderate Sedation    ASA Grade: ASA 2 - Patient with mild systemic disease with no functional limitations

## 2018-12-10 NOTE — ANESTHESIA POSTPROCEDURE EVALUATION
"Anesthesia Post Evaluation    Patient: Thom Krishna    Procedure(s) Performed: Procedure(s) (LRB):  EGD (ESOPHAGOGASTRODUODENOSCOPY)/poss emr (N/A)    Final Anesthesia Type: general  Patient location during evaluation: PACU  Patient participation: Yes- Able to Participate  Level of consciousness: awake and alert and oriented  Post-procedure vital signs: reviewed and stable  Pain management: adequate  Airway patency: patent  PONV status at discharge: No PONV  Anesthetic complications: no      Cardiovascular status: blood pressure returned to baseline and hemodynamically stable  Respiratory status: unassisted and spontaneous ventilation  Hydration status: euvolemic  Follow-up not needed.        Visit Vitals  BP (!) 130/57 (BP Location: Left arm, Patient Position: Lying)   Pulse (!) 58   Temp 36.4 °C (97.5 °F) (Temporal)   Resp 20   Ht 5' 2" (1.575 m)   Wt 65.8 kg (145 lb)   SpO2 100%   Breastfeeding? No   BMI 26.52 kg/m²       Pain/More Score: Pain Rating Prior to Med Admin: 6 (12/10/2018 10:35 AM)        "

## 2018-12-10 NOTE — PROGRESS NOTES
Dr Martinez Kuo at bedside to see pt.  Pt complaining of abd pain and discomfort.  Dr Martinez Kuo aware of pt's discomfort

## 2018-12-17 ENCOUNTER — TELEPHONE (OUTPATIENT)
Dept: ENDOSCOPY | Facility: HOSPITAL | Age: 63
End: 2018-12-17

## 2018-12-17 NOTE — TELEPHONE ENCOUNTER
----- Message from Belle Kuo MD sent at 12/17/2018  2:51 PM CST -----  Precancerous lesion removed. Repeat EGD in 5 months.

## 2018-12-31 ENCOUNTER — INFUSION (OUTPATIENT)
Dept: INFUSION THERAPY | Facility: HOSPITAL | Age: 63
End: 2018-12-31
Attending: FAMILY MEDICINE
Payer: MEDICARE

## 2018-12-31 VITALS — WEIGHT: 145 LBS | HEIGHT: 62 IN | BODY MASS INDEX: 26.68 KG/M2

## 2018-12-31 DIAGNOSIS — M81.0 POSTMENOPAUSAL OSTEOPOROSIS: Primary | ICD-10-CM

## 2018-12-31 PROCEDURE — 63600175 PHARM REV CODE 636 W HCPCS: Mod: JG | Performed by: FAMILY MEDICINE

## 2018-12-31 PROCEDURE — 96372 THER/PROPH/DIAG INJ SC/IM: CPT

## 2018-12-31 RX ADMIN — DENOSUMAB 60 MG: 60 INJECTION SUBCUTANEOUS at 01:12

## 2019-01-03 ENCOUNTER — TELEPHONE (OUTPATIENT)
Dept: FAMILY MEDICINE | Facility: CLINIC | Age: 64
End: 2019-01-03

## 2019-01-13 ENCOUNTER — HOSPITAL ENCOUNTER (EMERGENCY)
Facility: HOSPITAL | Age: 64
Discharge: HOME | End: 2019-01-14
Attending: EMERGENCY MEDICINE
Payer: MEDICARE

## 2019-01-13 VITALS
WEIGHT: 145 LBS | DIASTOLIC BLOOD PRESSURE: 64 MMHG | TEMPERATURE: 98 F | SYSTOLIC BLOOD PRESSURE: 126 MMHG | BODY MASS INDEX: 26.68 KG/M2 | HEIGHT: 62 IN | HEART RATE: 98 BPM | RESPIRATION RATE: 18 BRPM | OXYGEN SATURATION: 100 %

## 2019-01-13 DIAGNOSIS — G89.29 CHRONIC RIGHT-SIDED LOW BACK PAIN WITH RIGHT-SIDED SCIATICA: Primary | ICD-10-CM

## 2019-01-13 DIAGNOSIS — M54.41 CHRONIC RIGHT-SIDED LOW BACK PAIN WITH RIGHT-SIDED SCIATICA: Primary | ICD-10-CM

## 2019-01-13 DIAGNOSIS — M54.2 NECK PAIN: ICD-10-CM

## 2019-01-13 PROCEDURE — 99284 EMERGENCY DEPT VISIT MOD MDM: CPT | Mod: 25

## 2019-01-13 PROCEDURE — 96372 THER/PROPH/DIAG INJ SC/IM: CPT

## 2019-01-13 PROCEDURE — 63600175 PHARM REV CODE 636 W HCPCS

## 2019-01-13 PROCEDURE — 99284 EMERGENCY DEPT VISIT MOD MDM: CPT | Mod: ,,, | Performed by: EMERGENCY MEDICINE

## 2019-01-13 PROCEDURE — 99284 PR EMERGENCY DEPT VISIT,LEVEL IV: ICD-10-PCS | Mod: ,,, | Performed by: EMERGENCY MEDICINE

## 2019-01-13 PROCEDURE — 25000003 PHARM REV CODE 250

## 2019-01-13 RX ORDER — KETOROLAC TROMETHAMINE 30 MG/ML
10 INJECTION, SOLUTION INTRAMUSCULAR; INTRAVENOUS
Status: COMPLETED | OUTPATIENT
Start: 2019-01-13 | End: 2019-01-13

## 2019-01-13 RX ORDER — MORPHINE SULFATE 15 MG/1
15 TABLET ORAL ONCE
Status: COMPLETED | OUTPATIENT
Start: 2019-01-13 | End: 2019-01-13

## 2019-01-13 RX ORDER — METHOCARBAMOL 500 MG/1
500 TABLET, FILM COATED ORAL 3 TIMES DAILY
Qty: 15 TABLET | Refills: 0 | Status: SHIPPED | OUTPATIENT
Start: 2019-01-13 | End: 2019-01-18

## 2019-01-13 RX ORDER — METHOCARBAMOL 500 MG/1
1000 TABLET, FILM COATED ORAL
Status: COMPLETED | OUTPATIENT
Start: 2019-01-13 | End: 2019-01-13

## 2019-01-13 RX ADMIN — KETOROLAC TROMETHAMINE 10 MG: 30 INJECTION, SOLUTION INTRAMUSCULAR at 10:01

## 2019-01-13 RX ADMIN — MORPHINE SULFATE 15 MG: 15 TABLET ORAL at 11:01

## 2019-01-13 RX ADMIN — METHOCARBAMOL TABLETS 1000 MG: 500 TABLET, COATED ORAL at 10:01

## 2019-01-14 DIAGNOSIS — M54.9 CHRONIC NECK AND BACK PAIN: ICD-10-CM

## 2019-01-14 DIAGNOSIS — G89.29 CHRONIC NECK AND BACK PAIN: ICD-10-CM

## 2019-01-14 DIAGNOSIS — M54.2 CHRONIC NECK AND BACK PAIN: ICD-10-CM

## 2019-01-14 RX ORDER — HYDROCODONE BITARTRATE AND ACETAMINOPHEN 10; 325 MG/1; MG/1
TABLET ORAL EVERY 6 HOURS PRN
Qty: 90 TABLET | Refills: 0 | Status: SHIPPED | OUTPATIENT
Start: 2019-01-14 | End: 2019-02-11 | Stop reason: SDUPTHER

## 2019-01-14 NOTE — TELEPHONE ENCOUNTER
----- Message from Bryan Flynn sent at 1/14/2019  9:57 AM CST -----  Contact: [phone]  Patient is requesting to have a new prescription written for HYDROcodone-acetaminophen (NORCO)  mg per tablet. Please call patient when it is ready for pickup.

## 2019-01-14 NOTE — ED PROVIDER NOTES
Encounter Date: 2019       History     Chief Complaint   Patient presents with    Leg Pain     C/o stabbing/shooting pain in RLE for few days    Neck Pain     chronic     HPI   Pt is a 62yo F with h/o cervical radiculopathy, chronic back pain, hep C, cirrhosis who presents with rt leg pain and neck pain. Pt states the right leg pain is severe and burning sensation down the entire leg, worse on the lateral side. She has had this pain for years but it has been worse over the previous few weeks. She follows with pain management for her back pain and has an appointment on 1/15, but has been having problems sleeping due to the pain. She also has worsening cervical spine pain with pain radiating down her left arm for the past few days. She takes norco and xanax for her pain and associated anxiety. The norco has had decreased effect recently. No urinary or fecal incontinence, no saddle parasthesia, no fevers. She is able to walk but with severe pain down the leg.    Review of patient's allergies indicates:   Allergen Reactions    Penicillins      Other reaction(s): Hives    Sulfa (sulfonamide antibiotics)      Other reaction(s): Hives     Past Medical History:   Diagnosis Date    Anxiety and depression 2015    Arthritis     Cervical radiculopathy 2016    Cirrhosis of liver     Colon polyps 2015    Diabetes mellitus type 2 with neurological manifestations 2015    no current medications, only one episode of elevated sugars with acute illness, will monitor     Encounter for blood transfusion     Hepatitis C     s/p treatment per patient report; managed by Dr. Perez    Hip fracture     Macrocytosis 2015    Thyroid disease     Transfusion reaction     hep c     Past Surgical History:   Procedure Laterality Date    APPENDECTOMY      BACK SURGERY       SECTION      CHOLECYSTECTOMY      COLONOSCOPY  3/31/10    2 polyps, tubular adenoma    COLONOSCOPY  2015     "Dr. Washington    COLONOSCOPY N/A 10/10/2018    Performed by [facility] at [facility] ENDO    COLONOSCOPY N/A 6/24/2013    Performed by Se[facility]ukes MD at [facility] ENDO (4TH FLR)    COLONOSCOPY N/A 5/21/2013    Performed by [facility] at [facility] ENDO (4TH FLR)    EGD (ESOPHAGOGASTRODUODENOSCOPY) N/A 10/10/2018    Performed by Reuben Miller Jr., [facility] at [facility] ENDO    EGD (ESOPHAGOGASTRODUODENOSCOPY) N/A 5/21/2013    Performed by [facility]ukes [facility] at [facility] ENDO (4TH FLR)    EGD (ESOPHAGOGASTRODUODENOSCOPY)/poss emr N/A 12/10/2018    Performed by Belle Kuo MD at [facility] ENDO (2ND FLR)    EGD and colonoscopy same day N/A 4/27/2015    Performed by Kd Washington MD at [facility] ENDO    ESOPHAGOGASTRODUODENOSCOPY (EGD) N/A 4/27/2015    Performed by Kd Washington MD at [facility] ENDO    HERNIA REPAIR      HYSTERECTOMY  1989    Uterus and both ovaries    INCISIONAL HERNIA REPAIR      x 2    Injection-steroid-epidural-caudal N/A 8/7/2018    Performed by Roxana Gu Jr., [facility] at [facility] OR    JOINT REPLACEMENT      LIVER BIOPSY  12/2003    autoimmune hepatitis    ROTATOR CUFF REPAIR      right    STOMACH SURGERY  1980's    "took lymph node off of liver"    TOTAL HIP ARTHROPLASTY      left hip    UPPER GASTROINTESTINAL ENDOSCOPY  3/24/10    normal    UPPER GASTROINTESTINAL ENDOSCOPY  04/27/2015    Dr. Washington     Family History   Problem Relation Age of Onset    Diabetes Mellitus Mother     Liver cancer Sister     Breast cancer Sister     Pancreatic cancer Brother     Lung cancer Brother     Colon cancer Brother     Brain cancer Brother     Stomach cancer Other     Throat cancer Brother     Liver disease Neg Hx      Social History     Tobacco Use    Smoking status: Current Every Day Smoker     Packs/day: 2.00     Years: 45.00     Pack years: 90.00     Types: Cigarettes    Smokeless tobacco: Never Used   Substance Use Topics    Alcohol use: No     Comment: used to " drink heavily, stopped in 1990's    Drug use: No     Review of Systems   Constitutional: Negative for fever.   HENT: Negative for sore throat.    Respiratory: Negative for shortness of breath.    Cardiovascular: Negative for chest pain.   Gastrointestinal: Negative for nausea.   Genitourinary: Negative for dysuria.   Musculoskeletal: Positive for back pain and neck pain.        Right LE pain   Skin: Negative for rash.   Neurological: Negative for weakness.   Hematological: Does not bruise/bleed easily.       Physical Exam     Initial Vitals [01/13/19 2130]   BP Pulse Resp Temp SpO2   (!) 160/71 67 18 97.9 °F (36.6 °C) 99 %      MAP       --         Physical Exam    Constitutional: She appears well-developed and well-nourished. She is not diaphoretic. No distress.   HENT:   Head: Normocephalic and atraumatic.   Eyes: EOM are normal. Pupils are equal, round, and reactive to light. Right eye exhibits no discharge. Left eye exhibits no discharge.   Neck: Normal range of motion.   There is full ROM of the neck but with pain in all directions. Her cervical spine is tender to palpation. Nostep offs or deformities. There is tenderness diffusely along the b/l paraspinal muscles.   Cardiovascular: Normal rate, regular rhythm and normal heart sounds. Exam reveals no gallop and no friction rub.    No murmur heard.  Pulmonary/Chest: Breath sounds normal. No respiratory distress. She has no wheezes. She has no rhonchi. She has no rales.   Abdominal: Soft. She exhibits no distension. There is no tenderness. There is no rebound and no guarding.   Musculoskeletal: She exhibits tenderness. She exhibits no edema.   Strength in b/l UE is 5/5, sensation to light touch in tact. There is pain on aROM and pROM of the rt LE. There is pain to palpation of the entire rt LE. Strength is 5/5 in b/l LE but with pain on any movement of the rt LE. No swelling or erythema of the rt LE. Sensation to light touch in tact in b/l LE.   Neurological:  She is alert and oriented to person, place, and time.   Skin: Skin is warm and dry.   Psychiatric: She has a normal mood and affect. Thought content normal.         ED Course   Procedures  Labs Reviewed - No data to display       Imaging Results    None       MDM PGY2  Pt is a 64yo F who struggles with chronic neck and back pain who presents with worsening cervical and rt LE pain.   Given the gradual worsening of pain over weeks, in the setting of chronic pain, I do not suspect DVT. On exam and history, there is no red flag symptom that would require emergent imaging or surgical intervention tonight. The history suggests this is a nerve related pain, and fits a picture of radiculopathy in both the cervical pain radiating to the left arm, and the burning pain in the rt LE. She has tried back surgery, PT, multiple medications per chart review. Given she takes narcotics, I will attempt multi modal pain control with toradol and muscle relaxer with robaxin. She has a pain clinic appointment Tuesday.     Polo Mcnulty MD  Resident, PGY-2  1/13/2019 10:45 PM    PGY2 UPDATE  Pt states her pain improved from 8/10 to 7/10 with toradol and robaxin. I have administered morphine IR 15mg to help with this current flare of her chronic pain. I will additionally send the pt with robaxin for 5 days, and she will keep her appointment on 1/15 with pain management.    Polo Mcnulty MD  Resident, PGY-2  1/13/2019 11:30 PM    PGY2 UPDATE:  Pt feels improved, she is asking for discharge. She is able to ambulate with steady gait. She is here with a friend who is driving. She will f/u with pain clinic on Tuesday and I have recommended PCP f/u as well.    Polo Mcnulty MD  Resident, PGY-2  1/14/2019 12:23 AM                            Clinical Impression:   The primary encounter diagnosis was Chronic right-sided low back pain with right-sided sciatica. A diagnosis of Neck pain was also pertinent to this visit.                              Polo Mcnulty MD  Resident  01/14/19 0023

## 2019-01-14 NOTE — ED TRIAGE NOTES
Thom Krishna, a 63 y.o. female presents to the ED w/ complaint of stabbing/burning pain from her groin to her toes, pt also reports neck pain x2 weeks. Pt reports her pain medicine for her chronic pain is no longer working. Pt denies CP, SOB, n/v/d, fever/chills.     Triage note:  Chief Complaint   Patient presents with    Leg Pain     C/o stabbing/shooting pain in RLE for few days    Neck Pain     chronic     Review of patient's allergies indicates:   Allergen Reactions    Penicillins      Other reaction(s): Hives    Sulfa (sulfonamide antibiotics)      Other reaction(s): Hives     Past Medical History:   Diagnosis Date    Anxiety and depression 7/21/2015    Arthritis     Cervical radiculopathy 4/8/2016    Cirrhosis of liver     Colon polyps 4/27/2015    Diabetes mellitus type 2 with neurological manifestations 11/6/2015    no current medications, only one episode of elevated sugars with acute illness, will monitor     Encounter for blood transfusion     Hepatitis C     s/p treatment per patient report; managed by Dr. Perez    Hip fracture     Macrocytosis 7/21/2015    Thyroid disease     Transfusion reaction     hep c

## 2019-01-14 NOTE — ED NOTES
Patient identifiers verified and correct.  LOC: The patient is awake, alert and aware of environment with an appropriate affect, the patient is oriented x 3 and speaking appropriately.   APPEARANCE: Patient appears comfortable and in no acute distress, patient is clean and well groomed.  SKIN: The skin is warm and dry, color consistent with ethnicity, patient has normal skin turgor and WDL.   MUSCULOSKELETAL: Pt reports leg pain from groin to toes, and neck pain. Pt reports chronic pain meds are no longer working.  RESPIRATORY: Airway is open and patent, respirations are spontaneous, patient has a normal effort and rate, no accessory muscle use noted.  CARDIAC: Pt has normal R&R, cap refill <3 sec.  ABDOMEN: Pt denies any changes in BM. Abd WDL.  : Pt denies frequency or burning with urination.  NEURO: PERRL, opens eyes spontaneously, equal bilateral hand strength, follows commands, equal facial symmetry, normal sensation in all extremities when touched with a finger.              Pt identity confirmed; pt educated on course of action in and purpose of intake area; chair in lowest position and adjusted for maximum comfort; pt provided w/ remote and educated on its use, as well as to not vertically elevate chair without a member of the staff present; pt advised to call for assistance when needed; pt educated on reasons for holding PO intake until lab work and imaging have resulted; Pt informed on nearest restroom's location; pt verbalized understanding & agreed

## 2019-01-15 ENCOUNTER — TELEPHONE (OUTPATIENT)
Dept: FAMILY MEDICINE | Facility: CLINIC | Age: 64
End: 2019-01-15

## 2019-01-15 NOTE — TELEPHONE ENCOUNTER
----- Message from Fabiola Moise sent at 1/15/2019  8:28 AM CST -----  No. [phone]    Is the Norco script ready to be picked up yet.   Please call.   She will be in the area around 11:00 today.

## 2019-01-16 ENCOUNTER — OFFICE VISIT (OUTPATIENT)
Dept: PAIN MEDICINE | Facility: CLINIC | Age: 64
End: 2019-01-16
Payer: MEDICARE

## 2019-01-16 ENCOUNTER — TELEPHONE (OUTPATIENT)
Dept: PAIN MEDICINE | Facility: CLINIC | Age: 64
End: 2019-01-16

## 2019-01-16 VITALS
WEIGHT: 145.94 LBS | SYSTOLIC BLOOD PRESSURE: 133 MMHG | BODY MASS INDEX: 26.69 KG/M2 | HEART RATE: 72 BPM | DIASTOLIC BLOOD PRESSURE: 83 MMHG

## 2019-01-16 DIAGNOSIS — M54.16 LUMBAR RADICULOPATHY: Primary | ICD-10-CM

## 2019-01-16 DIAGNOSIS — G89.29 CHRONIC NECK AND BACK PAIN: ICD-10-CM

## 2019-01-16 DIAGNOSIS — G89.4 CHRONIC PAIN SYNDROME: ICD-10-CM

## 2019-01-16 DIAGNOSIS — M60.9 MYOSITIS, UNSPECIFIED MYOSITIS TYPE, UNSPECIFIED SITE: ICD-10-CM

## 2019-01-16 DIAGNOSIS — M54.2 CHRONIC NECK AND BACK PAIN: ICD-10-CM

## 2019-01-16 DIAGNOSIS — M54.9 CHRONIC NECK AND BACK PAIN: ICD-10-CM

## 2019-01-16 DIAGNOSIS — F32.A ANXIETY AND DEPRESSION: ICD-10-CM

## 2019-01-16 DIAGNOSIS — M79.18 MYOFASCIAL MUSCLE PAIN: ICD-10-CM

## 2019-01-16 DIAGNOSIS — M96.1 FAILED BACK SURGICAL SYNDROME: ICD-10-CM

## 2019-01-16 DIAGNOSIS — F41.9 ANXIETY AND DEPRESSION: ICD-10-CM

## 2019-01-16 DIAGNOSIS — M43.10 SPONDYLOLISTHESIS, UNSPECIFIED SPINAL REGION: ICD-10-CM

## 2019-01-16 PROCEDURE — 20553 NJX 1/MLT TRIGGER POINTS 3/>: CPT | Mod: S$GLB,,, | Performed by: NURSE PRACTITIONER

## 2019-01-16 PROCEDURE — 3075F PR MOST RECENT SYSTOLIC BLOOD PRESS GE 130-139MM HG: ICD-10-PCS | Mod: CPTII,S$GLB,, | Performed by: NURSE PRACTITIONER

## 2019-01-16 PROCEDURE — 20553 PR INJECT TRIGGER POINTS, > 3: ICD-10-PCS | Mod: S$GLB,,, | Performed by: NURSE PRACTITIONER

## 2019-01-16 PROCEDURE — 3079F PR MOST RECENT DIASTOLIC BLOOD PRESSURE 80-89 MM HG: ICD-10-PCS | Mod: CPTII,S$GLB,, | Performed by: NURSE PRACTITIONER

## 2019-01-16 PROCEDURE — 3079F DIAST BP 80-89 MM HG: CPT | Mod: CPTII,S$GLB,, | Performed by: NURSE PRACTITIONER

## 2019-01-16 PROCEDURE — 3008F PR BODY MASS INDEX (BMI) DOCUMENTED: ICD-10-PCS | Mod: CPTII,S$GLB,, | Performed by: NURSE PRACTITIONER

## 2019-01-16 PROCEDURE — 3008F BODY MASS INDEX DOCD: CPT | Mod: CPTII,S$GLB,, | Performed by: NURSE PRACTITIONER

## 2019-01-16 PROCEDURE — 99214 OFFICE O/P EST MOD 30 MIN: CPT | Mod: 25,S$GLB,, | Performed by: NURSE PRACTITIONER

## 2019-01-16 PROCEDURE — 1101F PT FALLS ASSESS-DOCD LE1/YR: CPT | Mod: CPTII,S$GLB,, | Performed by: NURSE PRACTITIONER

## 2019-01-16 PROCEDURE — 3075F SYST BP GE 130 - 139MM HG: CPT | Mod: CPTII,S$GLB,, | Performed by: NURSE PRACTITIONER

## 2019-01-16 PROCEDURE — 99999 PR PBB SHADOW E&M-EST. PATIENT-LVL III: CPT | Mod: PBBFAC,,, | Performed by: NURSE PRACTITIONER

## 2019-01-16 PROCEDURE — 99214 PR OFFICE/OUTPT VISIT, EST, LEVL IV, 30-39 MIN: ICD-10-PCS | Mod: 25,S$GLB,, | Performed by: NURSE PRACTITIONER

## 2019-01-16 PROCEDURE — 1101F PR PT FALLS ASSESS DOC 0-1 FALLS W/OUT INJ PAST YR: ICD-10-PCS | Mod: CPTII,S$GLB,, | Performed by: NURSE PRACTITIONER

## 2019-01-16 PROCEDURE — 99999 PR PBB SHADOW E&M-EST. PATIENT-LVL III: ICD-10-PCS | Mod: PBBFAC,,, | Performed by: NURSE PRACTITIONER

## 2019-01-16 RX ORDER — TRIAMCINOLONE ACETONIDE 40 MG/ML
40 INJECTION, SUSPENSION INTRA-ARTICULAR; INTRAMUSCULAR
Status: COMPLETED | OUTPATIENT
Start: 2019-01-16 | End: 2019-01-16

## 2019-01-16 RX ADMIN — TRIAMCINOLONE ACETONIDE 40 MG: 40 INJECTION, SUSPENSION INTRA-ARTICULAR; INTRAMUSCULAR at 12:01
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
## 2019-01-16 NOTE — LETTER
January 16, 2019      Brandy Dejesus MD  [address]  [address]  [facility] [address]           [place] [facility]  [address]  [facility] [address]  Phone: [phone]          Patient: Thom Krishna   MR Number: [id number]   YOB: 1955   Date of Visit: 1/16/2019       Dear Dr. Brandy Dejesus:    Thank you for referring Thom Krishna to me for evaluation. Attached you will find relevant portions of my assessment and plan of care.    If you have questions, please do not hesitate to call me. I look forward to following Thom Krishna along with you.    Sincerely,    Cedric Damon, [facility]    Enclosure  CC:  No Recipients    If you would like to receive this communication electronically, please contact [email or web address] or [phone] to request more information on [employer] Link access.    For providers and/or their staff who would like to refer a patient to [facility], please contact us through our one-stop-shop provider referral line, [facility], at [phone].    If you feel you have received this communication in error or would no longer like to receive these types of communications, please e-mail [email or web address]
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
## 2019-01-17 ENCOUNTER — TELEPHONE (OUTPATIENT)
Dept: ADMINISTRATIVE | Facility: OTHER | Age: 64
End: 2019-01-17

## 2019-01-25 NOTE — DISCHARGE INSTRUCTIONS
Home Care Instructions Pain Management:    1.  DIET:    You may resume your normal diet today.    2.  BATHING:    You may shower with luke warm water.    3.  DRESSING:    You may remove your bandage today.    4.  ACTIVITY LEVEL:      You may resume your normal activities 24 hours after your procedure.    5.  MEDICATIONS:    You may resume your normal medications today.    6.  SPECIAL INSTRUCTIONS:    No heat to the injection site for 24 hours including bath or shower, heating pad, moist heat or hot tubs.    Use an ice pack to the injection site for any pain or discomfort.  Apply ice packs for 20 minute intervals as needed.    If you have received any sedatives by mouth today, you can not drive for 12 hours.    If you have received sedation through an IV, you can not drive for 24 hours.    PLEASE CALL YOUR DOCTOR FOR THE FOLLOWIN.  Redness or swelling around the injection site.  2.  Fever of 101 degrees.  3.  Drainage (pus) from the injection site.  4.  For any continuous bleeding (some dried blood over the incision is normal.)    FOR EMERGENCIES:    If any unusual problems or difficulties occur during clinic hours, call [phone] or dial [phone].    Follow up with with your physician in 2-3 weeks.

## 2019-01-28 ENCOUNTER — TELEPHONE (OUTPATIENT)
Dept: ENDOSCOPY | Facility: HOSPITAL | Age: 64
End: 2019-01-28

## 2019-01-28 ENCOUNTER — TELEPHONE (OUTPATIENT)
Dept: PAIN MEDICINE | Facility: HOSPITAL | Age: 64
End: 2019-01-28

## 2019-01-28 DIAGNOSIS — K31.7 GASTRIC POLYP: Primary | ICD-10-CM

## 2019-01-28 NOTE — TELEPHONE ENCOUNTER
Spoke with pt and given arrival time of 1330 for procedure on 1/29/2019.  Instructed to remain NPO 8 hrs prior to procedure and have someone available to drive her home after procedure.

## 2019-01-29 ENCOUNTER — HOSPITAL ENCOUNTER (OUTPATIENT)
Facility: HOSPITAL | Age: 64
Discharge: HOME | End: 2019-01-29
Attending: PAIN MEDICINE | Admitting: PAIN MEDICINE
Payer: MEDICARE

## 2019-01-29 ENCOUNTER — TELEPHONE (OUTPATIENT)
Dept: NEUROSURGERY | Facility: CLINIC | Age: 64
End: 2019-01-29

## 2019-01-29 VITALS
BODY MASS INDEX: 26.68 KG/M2 | WEIGHT: 145 LBS | DIASTOLIC BLOOD PRESSURE: 62 MMHG | HEART RATE: 62 BPM | RESPIRATION RATE: 18 BRPM | OXYGEN SATURATION: 100 % | TEMPERATURE: 99 F | SYSTOLIC BLOOD PRESSURE: 140 MMHG | HEIGHT: 62 IN

## 2019-01-29 DIAGNOSIS — G89.29 CHRONIC PAIN: ICD-10-CM

## 2019-01-29 DIAGNOSIS — M54.16 LUMBAR RADICULOPATHY: Primary | ICD-10-CM

## 2019-01-29 PROCEDURE — 25500020 PHARM REV CODE 255: Performed by: PAIN MEDICINE

## 2019-01-29 PROCEDURE — 62323 NJX INTERLAMINAR LMBR/SAC: CPT | Performed by: PAIN MEDICINE

## 2019-01-29 PROCEDURE — 62327 PR INJ/INFUS, LUMBAR/SACRAL INDWELL CATH, W/GUIDANCE: ICD-10-PCS | Mod: ,,, | Performed by: PAIN MEDICINE

## 2019-01-29 PROCEDURE — 25000003 PHARM REV CODE 250: Performed by: PAIN MEDICINE

## 2019-01-29 PROCEDURE — 62327 NJX INTERLAMINAR LMBR/SAC: CPT | Mod: ,,, | Performed by: PAIN MEDICINE

## 2019-01-29 PROCEDURE — 63600175 PHARM REV CODE 636 W HCPCS: Performed by: PAIN MEDICINE

## 2019-01-29 RX ORDER — LIDOCAINE HYDROCHLORIDE 10 MG/ML
INJECTION, SOLUTION EPIDURAL; INFILTRATION; INTRACAUDAL; PERINEURAL
Status: DISCONTINUED | OUTPATIENT
Start: 2019-01-29 | End: 2019-01-29 | Stop reason: HOSPADM

## 2019-01-29 RX ORDER — ALPRAZOLAM 0.5 MG/1
0.5 TABLET, ORALLY DISINTEGRATING ORAL ONCE AS NEEDED
Status: COMPLETED | OUTPATIENT
Start: 2019-01-29 | End: 2019-01-29

## 2019-01-29 RX ORDER — METHYLPREDNISOLONE ACETATE 80 MG/ML
INJECTION, SUSPENSION INTRA-ARTICULAR; INTRALESIONAL; INTRAMUSCULAR; SOFT TISSUE
Status: DISCONTINUED | OUTPATIENT
Start: 2019-01-29 | End: 2019-01-29 | Stop reason: HOSPADM

## 2019-01-29 RX ADMIN — ALPRAZOLAM 0.5 MG: 0.5 TABLET, ORALLY DISINTEGRATING ORAL at 02:01

## 2019-01-29 NOTE — OP NOTE
"Procedure Note    Pre-operative Diagnosis: Lumbar radiculopathy  Post-operative Diagnosis: Lumbar radiculopathy  Procedure Date: 01/29/2019  Procedure:  (1) Caudal Epidural Steroid Injection with Epidural Catheter    (2) Intraoperative fluoroscopy      Indications: To alleviate pain and suffering, and reduce functional impairment associated with lumbar radiculopathy.      The patients history and physical exam were reviewed. The risks, benefits and alternatives to the procedure were discussed, and all questions were answered to the patients satisfaction. Special attention was given to her bleeding risk during the consenting process due to her use of warfarin, which was not stopped for this procedure.  The patient agreed to proceed, and written informed consent was verified.    Procedure in Detail: The patient was brought into the procedure room and placed in the prone position on the fluoroscopy table. The area of the sacrum and coccyx was prepped with Chloraprep and draped in a sterile manner. The sacral hiatus was identified by palpation and lateral fluoroscopy.  The skin and subcutaneous tissues were anesthetized with 1 mL of Lidocaine 1% using a 25G 1.25" needle.  A 16G 3.5" Touhy needle was inserted inferiorly to the sacral hiatus with cephalad angulation and advanced into through the sacro-coxxygeal ligament.  Entry into the epidural space was confirmed on latera fluoroscopy by advancement of a 19G styletted catheter through the needle and into the epidural space.  Live and intermittent AP fluoroscopy was then used to guide the catheter to the level of L5.  Injection of 1 mL of iodinated contrast revealed appropriate epidurogram without evidence of direct vascular uptake.    A 5 mL containing Depomedrol 80 mg (1 mL) and saline (4mL) was injected slowly and without resistance.  The needle and catheter were removed and a bandage applied to puncture site.    Disposition: The patient tolerated the procedure " well, and there were no apparent complications. Vital signs remained stable throughout the procedure. The patient was taken to the recovery area where written discharge instructions for the procedure were given.     EBL: nil    Findings: Catheter and needle placement were consistent with successful caudal epidural steroid injection with catheter.    Follow-up: in clinic as scheduled      Roxana Gu Jr, MD  Interventional Pain Medicine / Anesthesiology

## 2019-01-29 NOTE — TELEPHONE ENCOUNTER
----- Message from Myla Caldwell sent at 1/29/2019  9:07 AM CST -----  Contact: [phone]/self  Pt called stating she has a procedure today and was wondering if she can take her pain medication before her procedure. Also states she hasn't eaten since midnight.  Please call and advise.

## 2019-01-29 NOTE — PLAN OF CARE
VSS. Pt is angry and states this was a bad experience and the injections hurt. Dressing WDL.  Discharge instructions given including temperatures to avoid today, no alcohol no driving today(24 hours) and s/s t call for. Staff assisted with dressing and brought pt out to discharge area in wheelchair.

## 2019-01-29 NOTE — DISCHARGE SUMMARY
[facility]  Discharge Note  Short Stay     Admit Date: 1/29/2019    Discharge Date: 1/29/2019     Attending Physician: Roxana Gu Jr, MD    Diagnoses:  Active Hospital Problems    Diagnosis  POA    *Lumbar radiculopathy [M54.16]  Yes    Chronic pain [G89.29]  Yes      Resolved Hospital Problems   No resolved problems to display.     Discharged Condition: Good     Hospital Course: Patient was admitted for an outpatient interventional pain management procedure and tolerated the procedure well with no complications.     Final Diagnoses: Same as principal problem.     Disposition: Home or Self Care     Follow up/Patient Instructions:    Follow-up Information     Roxana Gu Jr, MD. Go in 2 weeks.    Specialty:  Pain Medicine  Why:  Post-procedural Follow Up As Scheduled, Call to make an appointment if you do not have one  Contact information:  [address]  [address]  [facility] [address]  [phone]                   Reconciled Medications:     Medication List      CONTINUE taking these medications    ALPRAZolam 1 MG tablet  Commonly known as:  XANAX  Take 1 tablet (1 mg total) by mouth 2 (two) times daily as needed.     bisacodyl 5 mg EC tablet  Commonly known as:  DULCOLAX  Take 5 mg by mouth daily as needed for Constipation.     ergocalciferol 50,000 unit Cap  Commonly known as:  ERGOCALCIFEROL  Take 1 capsule (50,000 Units total) by mouth every 7 days.     HYDROcodone-acetaminophen  mg per tablet  Commonly known as:  NORCO  Take 1 tablet by mouth every 6 (six) hours as needed for pain.     levothyroxine 75 MCG tablet  Commonly known as:  SYNTHROID  take 1 tablet by mouth once daily     ondansetron 8 MG Tbdl  Commonly known as:  ZOFRAN-ODT  TAKE ONE TABLET UNDER THE TONGUE THREE TIMES DAILY AS NEEDED FOR NAUSEA     pantoprazole 40 MG tablet  Commonly known as:  PROTONIX  TAKE ONE TABLET BY MOUTH EVERY DAY     ranitidine 300 MG tablet  Commonly known as:  ZANTAC  Take 1 tablet (300  mg total) by mouth every evening. , about 30-60 minutes before bedtime.           Discharge Procedure Orders (must include Diet, Follow-up, Activity)   Call MD for:  temperature >100.4     Call MD for:  severe uncontrolled pain     Call MD for:  redness, tenderness, or signs of infection (pain, swelling, redness, odor or green/yellow discharge around incision site)     Call MD for:  difficulty breathing or increased cough     Call MD for:  severe persistent headache     Call MD for:  worsening rash     Remove dressing in 24 hours       Roxana Gu Jr, MD  Interventional Pain Medicine / Anesthesiology

## 2019-01-29 NOTE — TELEPHONE ENCOUNTER
Message   Received: Today   Message Contents   MD Glory Obregon MA   Caller: Unspecified (Yesterday,  7:58 PM)             OK for EGD for duodenal polyp follow up. Can be in [place] or [place].   Jeffrey Jackson MD

## 2019-02-04 ENCOUNTER — TELEPHONE (OUTPATIENT)
Dept: FAMILY MEDICINE | Facility: CLINIC | Age: 64
End: 2019-02-04

## 2019-02-04 NOTE — TELEPHONE ENCOUNTER
Returned patient's call. She said she received the back injection and her pain is worse. She said the norco isn't helping the pain. She would like to know if you have any recommendations.

## 2019-02-04 NOTE — TELEPHONE ENCOUNTER
----- Message from Malathi Atwood sent at 2/4/2019  1:53 PM CST -----  Contact: Self [phone]  Patient would like to speak with you about her medication not helping her after her procedure. Please advise

## 2019-02-05 ENCOUNTER — TELEPHONE (OUTPATIENT)
Dept: FAMILY MEDICINE | Facility: CLINIC | Age: 64
End: 2019-02-05

## 2019-02-05 NOTE — TELEPHONE ENCOUNTER
Spoke with pt and informed her message was sent to provider and I will notify her of recommendations, she verbalized understanding.

## 2019-02-05 NOTE — TELEPHONE ENCOUNTER
----- Message from Karine Medley sent at 2/5/2019  9:38 AM CST -----  Contact: [phone]/ self   Patient requesting to speak with you regarding back pain she's experiencing. Please call to further discuss and advise.

## 2019-02-05 NOTE — TELEPHONE ENCOUNTER
She has a follow-up appointment with pain management on February 18th, and she will need to discuss her pain issues with them, as she just received a procedure/injection from Dr. Wall.  If the injection actually did increase her pain, it is important for them to know about this and make the necessary adjustments to her treatment regimen.  Thank you

## 2019-02-11 ENCOUNTER — TELEPHONE (OUTPATIENT)
Dept: FAMILY MEDICINE | Facility: CLINIC | Age: 64
End: 2019-02-11

## 2019-02-11 DIAGNOSIS — G89.29 CHRONIC NECK AND BACK PAIN: ICD-10-CM

## 2019-02-11 DIAGNOSIS — M54.2 CHRONIC NECK AND BACK PAIN: ICD-10-CM

## 2019-02-11 DIAGNOSIS — M54.9 CHRONIC NECK AND BACK PAIN: ICD-10-CM

## 2019-02-11 RX ORDER — HYDROCODONE BITARTRATE AND ACETAMINOPHEN 10; 325 MG/1; MG/1
TABLET ORAL EVERY 6 HOURS PRN
Qty: 90 TABLET | Refills: 0 | Status: SHIPPED | OUTPATIENT
Start: 2019-02-11 | End: 2019-02-27 | Stop reason: SDUPTHER

## 2019-02-11 NOTE — TELEPHONE ENCOUNTER
----- Message from Fabiola Moise sent at 2/11/2019 12:08 PM CST -----  No. [phone]    Patient needs a script for Norco 10-325mg.   Please call patient when it is ready to be picked up.

## 2019-02-19 ENCOUNTER — TELEPHONE (OUTPATIENT)
Dept: ENDOSCOPY | Facility: HOSPITAL | Age: 64
End: 2019-02-19

## 2019-02-22 DIAGNOSIS — E11.9 TYPE 2 DIABETES MELLITUS WITHOUT COMPLICATION, UNSPECIFIED WHETHER LONG TERM INSULIN USE: ICD-10-CM

## 2019-02-27 ENCOUNTER — OFFICE VISIT (OUTPATIENT)
Dept: FAMILY MEDICINE | Facility: CLINIC | Age: 64
End: 2019-02-27
Payer: MEDICARE

## 2019-02-27 ENCOUNTER — TELEPHONE (OUTPATIENT)
Dept: FAMILY MEDICINE | Facility: CLINIC | Age: 64
End: 2019-02-27

## 2019-02-27 VITALS
OXYGEN SATURATION: 98 % | WEIGHT: 143.75 LBS | BODY MASS INDEX: 26.45 KG/M2 | TEMPERATURE: 98 F | HEIGHT: 62 IN | SYSTOLIC BLOOD PRESSURE: 130 MMHG | HEART RATE: 100 BPM | DIASTOLIC BLOOD PRESSURE: 81 MMHG

## 2019-02-27 DIAGNOSIS — F41.9 ANXIETY AND DEPRESSION: ICD-10-CM

## 2019-02-27 DIAGNOSIS — E11.49 DIABETES MELLITUS TYPE 2 WITH NEUROLOGICAL MANIFESTATIONS: ICD-10-CM

## 2019-02-27 DIAGNOSIS — M51.36 DDD (DEGENERATIVE DISC DISEASE), LUMBAR: ICD-10-CM

## 2019-02-27 DIAGNOSIS — Z79.899 ENCOUNTER FOR MONITORING LONG-TERM PROTON PUMP INHIBITOR THERAPY: ICD-10-CM

## 2019-02-27 DIAGNOSIS — M81.0 POSTMENOPAUSAL OSTEOPOROSIS: ICD-10-CM

## 2019-02-27 DIAGNOSIS — E03.9 ACQUIRED HYPOTHYROIDISM: ICD-10-CM

## 2019-02-27 DIAGNOSIS — E55.9 VITAMIN D DEFICIENCY: ICD-10-CM

## 2019-02-27 DIAGNOSIS — E53.8 VITAMIN B12 DEFICIENCY: ICD-10-CM

## 2019-02-27 DIAGNOSIS — Z51.81 ENCOUNTER FOR MONITORING LONG-TERM PROTON PUMP INHIBITOR THERAPY: ICD-10-CM

## 2019-02-27 DIAGNOSIS — K59.09 OTHER CONSTIPATION: ICD-10-CM

## 2019-02-27 DIAGNOSIS — Z79.899 MEDICATION MANAGEMENT: ICD-10-CM

## 2019-02-27 DIAGNOSIS — E78.1 HYPERTRIGLYCERIDEMIA: ICD-10-CM

## 2019-02-27 DIAGNOSIS — M50.30 DDD (DEGENERATIVE DISC DISEASE), CERVICAL: ICD-10-CM

## 2019-02-27 DIAGNOSIS — F32.A ANXIETY AND DEPRESSION: ICD-10-CM

## 2019-02-27 DIAGNOSIS — G89.29 CHRONIC NECK AND BACK PAIN: ICD-10-CM

## 2019-02-27 DIAGNOSIS — K75.4 AUTOIMMUNE HEPATITIS: ICD-10-CM

## 2019-02-27 DIAGNOSIS — M54.2 CHRONIC NECK AND BACK PAIN: ICD-10-CM

## 2019-02-27 DIAGNOSIS — K21.9 GASTROESOPHAGEAL REFLUX DISEASE, ESOPHAGITIS PRESENCE NOT SPECIFIED: ICD-10-CM

## 2019-02-27 DIAGNOSIS — Z86.19 HISTORY OF HEPATITIS C: ICD-10-CM

## 2019-02-27 DIAGNOSIS — D13.2 ADENOMATOUS DUODENAL POLYP: Primary | ICD-10-CM

## 2019-02-27 DIAGNOSIS — Z72.0 TOBACCO ABUSE: ICD-10-CM

## 2019-02-27 DIAGNOSIS — M54.16 LUMBAR RADICULOPATHY: ICD-10-CM

## 2019-02-27 DIAGNOSIS — M54.9 CHRONIC NECK AND BACK PAIN: ICD-10-CM

## 2019-02-27 PROCEDURE — 3075F PR MOST RECENT SYSTOLIC BLOOD PRESS GE 130-139MM HG: ICD-10-PCS | Mod: CPTII,S$GLB,, | Performed by: FAMILY MEDICINE

## 2019-02-27 PROCEDURE — 3079F DIAST BP 80-89 MM HG: CPT | Mod: CPTII,S$GLB,, | Performed by: FAMILY MEDICINE

## 2019-02-27 PROCEDURE — 99214 OFFICE O/P EST MOD 30 MIN: CPT | Mod: S$GLB,,, | Performed by: FAMILY MEDICINE

## 2019-02-27 PROCEDURE — 3008F PR BODY MASS INDEX (BMI) DOCUMENTED: ICD-10-PCS | Mod: CPTII,S$GLB,, | Performed by: FAMILY MEDICINE

## 2019-02-27 PROCEDURE — 3044F HG A1C LEVEL LT 7.0%: CPT | Mod: CPTII,S$GLB,, | Performed by: FAMILY MEDICINE

## 2019-02-27 PROCEDURE — 3075F SYST BP GE 130 - 139MM HG: CPT | Mod: CPTII,S$GLB,, | Performed by: FAMILY MEDICINE

## 2019-02-27 PROCEDURE — 3008F BODY MASS INDEX DOCD: CPT | Mod: CPTII,S$GLB,, | Performed by: FAMILY MEDICINE

## 2019-02-27 PROCEDURE — 1101F PT FALLS ASSESS-DOCD LE1/YR: CPT | Mod: CPTII,S$GLB,, | Performed by: FAMILY MEDICINE

## 2019-02-27 PROCEDURE — 3044F PR MOST RECENT HEMOGLOBIN A1C LEVEL <7.0%: ICD-10-PCS | Mod: CPTII,S$GLB,, | Performed by: FAMILY MEDICINE

## 2019-02-27 PROCEDURE — 99214 PR OFFICE/OUTPT VISIT, EST, LEVL IV, 30-39 MIN: ICD-10-PCS | Mod: S$GLB,,, | Performed by: FAMILY MEDICINE

## 2019-02-27 PROCEDURE — 99999 PR PBB SHADOW E&M-EST. PATIENT-LVL IV: CPT | Mod: PBBFAC,,, | Performed by: FAMILY MEDICINE

## 2019-02-27 PROCEDURE — 1101F PR PT FALLS ASSESS DOC 0-1 FALLS W/OUT INJ PAST YR: ICD-10-PCS | Mod: CPTII,S$GLB,, | Performed by: FAMILY MEDICINE

## 2019-02-27 PROCEDURE — 99999 PR PBB SHADOW E&M-EST. PATIENT-LVL IV: ICD-10-PCS | Mod: PBBFAC,,, | Performed by: FAMILY MEDICINE

## 2019-02-27 PROCEDURE — 3079F PR MOST RECENT DIASTOLIC BLOOD PRESSURE 80-89 MM HG: ICD-10-PCS | Mod: CPTII,S$GLB,, | Performed by: FAMILY MEDICINE

## 2019-02-27 RX ORDER — HYDROCODONE BITARTRATE AND ACETAMINOPHEN 10; 325 MG/1; MG/1
TABLET ORAL EVERY 6 HOURS PRN
Qty: 90 TABLET | Refills: 0 | Status: SHIPPED | OUTPATIENT
Start: 2019-03-11 | End: 2019-02-27 | Stop reason: SDUPTHER

## 2019-02-27 RX ORDER — HYDROCODONE BITARTRATE AND ACETAMINOPHEN 10; 325 MG/1; MG/1
TABLET ORAL EVERY 6 HOURS PRN
Qty: 90 TABLET | Refills: 0 | Status: SHIPPED | OUTPATIENT
Start: 2019-05-11 | End: 2019-02-27 | Stop reason: SDUPTHER

## 2019-02-27 RX ORDER — HYDROCODONE BITARTRATE AND ACETAMINOPHEN 10; 325 MG/1; MG/1
TABLET ORAL EVERY 6 HOURS PRN
Qty: 90 TABLET | Refills: 0 | Status: SHIPPED | OUTPATIENT
Start: 2019-03-11 | End: 2019-05-29 | Stop reason: SDUPTHER

## 2019-02-27 RX ORDER — HYDROCODONE BITARTRATE AND ACETAMINOPHEN 10; 325 MG/1; MG/1
TABLET ORAL EVERY 6 HOURS PRN
Qty: 90 TABLET | Refills: 0 | Status: SHIPPED | OUTPATIENT
Start: 2019-04-11 | End: 2019-02-27 | Stop reason: SDUPTHER

## 2019-02-27 NOTE — TELEPHONE ENCOUNTER
----- Message from Glory Hairston MA sent at 2/27/2019  3:06 PM CST -----  Regarding: RE: follow up EGD  Ok thanks  ----- Message -----  From: Brandy Dejesus MD  Sent: 2/27/2019   2:32 PM  To: [facility]  Subject: follow up EGD                                    I saw Ms Thom Krishna in the office today and reminded her about need to schedule follow up EGD around may due to her h/o of removal of precancerous polyp.  She is now aware of this and will be prepared to take a call regarding scheduling this procedure.    I saw the note aobut being previously unable to reach her but please try again, thank you,   Dr Dejesus, PCP for Thom Krishna

## 2019-02-27 NOTE — PROGRESS NOTES
Office Visit    Patient Name: Thom Krishna    : 1955  MRN: [id number]    Subjective:  Thom is a 63 y.o. female who presents today for:    Follow-up (3mo f/u, foot exam, lung screen, eye exam, pain contract, new pain referral(doesn't want to see physician here))    63-year-old female patient of mine most recently seen by me on 2018 and with history of significant chronic pain that is primarily secondary to degenerative disc issues of the cervical and lumbar spine that is causing bilateral sciatic nerve issues who is here today to for follow up of pain management.  She did have a low back surgery through [facility] but unfortunately this did not help with any of her symptoms.  She is continued to report significant low back pain radiating into both legs, that she described as burning and at times nearly intolerable.  NORCO has helped to take the edge off but it's effectiveness has been decreasing. We have tried a wide variety of adjunct medications including Neurontin, amitriptyline, muscle relaxers, antidepressant the Cymbalta that also have a chronic pain medication without good results and patient reports that often these medications make her feel ill with nausea and vomiting.  She has been following up with pain management and has now had 2 series of injection procedures and feels that she is overall worse than previous.  She reports that she was not happy with her most recent injections through pain management and requests a 2nd opinion through an external referral.  She has a history of underlying diabetes but her A1c has been in the normal range despite being off of all diabetic medications, and this is unlikely to be contributing significantly to her nerve pain at this time     She has osteoporosis of the lumbar spine, re-evaluated with DEXA 18 showing significant osteoporosis.  She has recently been more compliant with weekly vitamin D and her most recent level 18 improved to 15. Other labs  drawn at that time unremarkable including CBC/TSH/CMP/Lipid.  she reports that she did receive her 1st Prolia injection through the infusion center and is open to continuing this medication.     Had EGD on the [place] 10/10/2018 showing GERD, NERD, gastritis/antritis.  She had several biopsies of colon polyps with pathology positive for adenomas.  She had an EGD and removal of a tubular adenoma from her duodenum at [facility] 12/10/2018.  Due to the fact that a precancerous lesion was removed, she is advised to have a repeat the EGD in 5 months.  Dr. Kuo's staff has reached out to her but been unable to contact her. GERD recently more tolerable with use of Protonix every morning and Zantac at night.  Continues to struggle with some chronic constipation but is not very compliant with use of stool softeners along with narcotic pain meds.     Following with Dr. Perez with [facility] for her autoimmune hepatitis with history of hepatitis C and cirrhosis of the liver. She is not drinking any alcohol but she is continuing to smoke 1-2 ppd.  She reports that her next follow-up with hepatology is this summer 2019.       Past Medical History  Past Medical History:   Diagnosis Date    Anxiety and depression 2015    Arthritis     Cervical radiculopathy 2016    Cirrhosis of liver     Colon polyps 2015    Diabetes mellitus type 2 with neurological manifestations 2015    no current medications, only one episode of elevated sugars with acute illness, will monitor     Encounter for blood transfusion     Hepatitis C     s/p treatment per patient report; managed by Dr. Perez    Hip fracture     Macrocytosis 2015    Thyroid disease     Transfusion reaction     hep c       Past Surgical History  Past Surgical History:   Procedure Laterality Date    APPENDECTOMY      BACK SURGERY       SECTION      CHOLECYSTECTOMY      COLONOSCOPY  3/31/10    2 polyps, tubular adenoma     "COLONOSCOPY  04/27/2015    Dr. Washington    COLONOSCOPY N/A 10/10/2018    [facility] ENDO    COLONOSCOPY N/A 6/24/2013    [facility]O (4TH FLR)    COLONOSCOPY N/A 5/21/2013    [facility] ENDO (4TH FLR)    EGD (ESOPHAGOGASTRODUODENOSCOPY) N/A 10/10/2018    Per[facility] ENDO    EGD (ESOPHAGOGASTRODUODENOSCOPY) N/A 5/21/2013    [facility] ENDO (4TH FLR)    EGD (ESOPHAGOGASTRODUODENOSCOPY)/poss emr N/A 12/10/2018    Performed by Belle Kuo [facility] at [facility] ENDO (2ND FLR)    EGD and colonoscopy same day N/A 4/27/2015    [facility] END[facility]    ESOPHAGOGASTRODUODENOSCOPY (EGD) N/A 4/27/2015    [facility] ENDO    HERNIA REPAIR      HYSTERECTOMY  1989    Uterus and both ovaries    INCISIONAL HERNIA REPAIR      x 2    Injection-steroid-epidural-caudal N/A 1/29/2019    Per[facility] Roxana Gu Jr[facility] PAIN MGT    Injection-steroid-epidural-caudal N/A 8/7/2018    [facility] OR    JOINT REPLACEMENT      LIVER BIOPSY  12/2003    autoimmune hepatitis    ROTATOR CUFF REPAIR      right    STOMACH SURGERY  1980's    "took lymph node off of liver"    TOTAL HIP ARTHROPLASTY      left hip    UPPER GASTROINTESTINAL ENDOSCOPY  3/24/10    normal    UPPER GASTROINTESTINAL ENDOSCOPY  04/27/2015    Dr. Washington       Family History  Family History   Problem Relation Age of Onset    Diabetes Mellitus Mother     Liver cancer Sister     Breast cancer Sister     Pancreatic cancer Brother     Lung cancer Brother     Colon cancer Brother     Brain cancer Brother     Stomach cancer Other     Throat cancer Brother     Liver disease Neg Hx        Social History  Social History     Socioeconomic History    Marital status: Significant Other " "    Spouse name: Not on file    Number of children: Not on file    Years of education: Not on file    Highest education level: Not on file   Social Needs    Financial resource strain: Not on file    Food insecurity - worry: Not on file    Food insecurity - inability: Not on file    Transportation needs - medical: Not on file    Transportation needs - non-medical: Not on file   Occupational History    Not on file   Tobacco Use    Smoking status: Current Every Day Smoker     Packs/day: 2.00     Years: 45.00     Pack years: 90.00     Types: Cigarettes    Smokeless tobacco: Never Used   Substance and Sexual Activity    Alcohol use: No     Comment: used to drink heavily, stopped in 1990's    Drug use: No    Sexual activity: Not on file   Other Topics Concern    Not on file   Social History Narrative    Youngest out of 16 children to her parents       Current Medications  Medications reviewed and updated.     Allergies   Review of patient's allergies indicates:   Allergen Reactions    Penicillins      Other reaction(s): Hives    Sulfa (sulfonamide antibiotics)      Other reaction(s): Hives       Review of Systems (Pertinent positives)  Review of Systems   Constitutional: Positive for fatigue. Negative for chills and fever.   Cardiovascular: Negative for chest pain and leg swelling.   Gastrointestinal: Positive for constipation.   Musculoskeletal: Positive for back pain.       /81   Pulse 100   Temp 97.9 °F (36.6 °C)   Ht 5' 2" (1.575 m)   Wt 65.2 kg (143 lb 11.8 oz)   SpO2 98%   BMI 26.29 kg/m²     Physical Exam   Constitutional: She is oriented to person, place, and time. She appears well-developed and well-nourished. No distress.   HENT:   Head: Normocephalic and atraumatic.   Eyes: Conjunctivae are normal.   Cardiovascular: Normal rate and regular rhythm.   Pulmonary/Chest: Effort normal and breath sounds normal.   Musculoskeletal: She exhibits no edema.   Neurological: She is alert and " oriented to person, place, and time. She exhibits normal muscle tone.   Overall normal strength of bilateral lower extremities, she is limited secondary to reported severe pain   Skin: Skin is warm and dry.   Psychiatric: She has a normal mood and affect.   Vitals reviewed.        Assessment/Plan:  Thom Krishna is a 63 y.o. female who presents today for :    Thom was seen today for follow-up.    Diagnoses and all orders for this visit:    Adenomatous duodenal polyp  Comments:  s/p removal and needs follow up EGD in may 2019    Gastroesophageal reflux disease, esophagitis presence not specified  Comments:  Continuing daily Protonix and Zantac, ppi labs ordered, has follow-up EGD planned for May 2019    Encounter for monitoring long-term proton pump inhibitor therapy  -     Comprehensive metabolic panel; Future  -     Vitamin D; Future  -     Vitamin B12; Future  -     Magnesium; Future    Other constipation  Comments:  Constipation has been overall better, remind her to take a stool softener every time she takes a narcotic pain med    History of hepatitis C  Comments:  [facility]ahu next follow-up July 2019    Autoimmune hepatitis  -     Comprehensive metabolic panel; Future  -     CBC auto differential; Future  -     TSH; Future    Acquired hypothyroidism    Vitamin B12 deficiency  -     Vitamin B12; Future    Diabetes mellitus type 2 with neurological manifestations  Comments:  Has been stable off of medications, repeat A1c ordered  Orders:  -     Hemoglobin A1c; Future  -     Comprehensive metabolic panel; Future  -     Lipid panel; Future    Hypertriglyceridemia  -     Comprehensive metabolic panel; Future  -     Lipid panel; Future  -     CBC auto differential; Future    Tobacco abuse    DDD (degenerative disc disease), lumbar  -     Ambulatory referral to Pain Clinic  -     Discontinue: HYDROcodone-acetaminophen (NORCO)  mg per tablet; Take 1 tablet by mouth every 6  (six) hours as needed for pain.  -     Discontinue: HYDROcodone-acetaminophen (NORCO)  mg per tablet; Take 1 tablet by mouth every 6 (six) hours as needed for pain.  -     Discontinue: HYDROcodone-acetaminophen (NORCO)  mg per tablet; Take 1 tablet by mouth every 6 (six) hours as needed for pain.  -     Discontinue: HYDROcodone-acetaminophen (NORCO)  mg per tablet; Take 1 tablet by mouth every 6 (six) hours as needed for pain.  -     HYDROcodone-acetaminophen (NORCO)  mg per tablet; Take 1 tablet by mouth every 6 (six) hours as needed for pain.    DDD (degenerative disc disease), cervical  -     Ambulatory referral to Pain Clinic  -     Discontinue: HYDROcodone-acetaminophen (NORCO)  mg per tablet; Take 1 tablet by mouth every 6 (six) hours as needed for pain.  -     Discontinue: HYDROcodone-acetaminophen (NORCO)  mg per tablet; Take 1 tablet by mouth every 6 (six) hours as needed for pain.  -     Discontinue: HYDROcodone-acetaminophen (NORCO)  mg per tablet; Take 1 tablet by mouth every 6 (six) hours as needed for pain.  -     Discontinue: HYDROcodone-acetaminophen (NORCO)  mg per tablet; Take 1 tablet by mouth every 6 (six) hours as needed for pain.  -     HYDROcodone-acetaminophen (NORCO)  mg per tablet; Take 1 tablet by mouth every 6 (six) hours as needed for pain.    Chronic neck and back pain  -     Ambulatory referral to Pain Clinic  -     Discontinue: HYDROcodone-acetaminophen (NORCO)  mg per tablet; Take 1 tablet by mouth every 6 (six) hours as needed for pain.  -     Discontinue: HYDROcodone-acetaminophen (NORCO)  mg per tablet; Take 1 tablet by mouth every 6 (six) hours as needed for pain.  -     Discontinue: HYDROcodone-acetaminophen (NORCO)  mg per tablet; Take 1 tablet by mouth every 6 (six) hours as needed for pain.  -     Discontinue: HYDROcodone-acetaminophen (NORCO)  mg per tablet; Take 1 tablet by mouth every 6 (six)  hours as needed for pain.  -     Discontinue: HYDROcodone-acetaminophen (NORCO)  mg per tablet; Take 1 tablet by mouth every 6 (six) hours as needed for pain.  -     HYDROcodone-acetaminophen (NORCO)  mg per tablet; Take 1 tablet by mouth every 6 (six) hours as needed for pain.    Lumbar radiculopathy  -     Ambulatory referral to Pain Clinic  -     Discontinue: HYDROcodone-acetaminophen (NORCO)  mg per tablet; Take 1 tablet by mouth every 6 (six) hours as needed for pain.  -     Discontinue: HYDROcodone-acetaminophen (NORCO)  mg per tablet; Take 1 tablet by mouth every 6 (six) hours as needed for pain.  -     Discontinue: HYDROcodone-acetaminophen (NORCO)  mg per tablet; Take 1 tablet by mouth every 6 (six) hours as needed for pain.  -     Discontinue: HYDROcodone-acetaminophen (NORCO)  mg per tablet; Take 1 tablet by mouth every 6 (six) hours as needed for pain.  -     HYDROcodone-acetaminophen (NORCO)  mg per tablet; Take 1 tablet by mouth every 6 (six) hours as needed for pain.    Anxiety and depression    Postmenopausal osteoporosis  Comments:  Continuing calcium/vitamin-D supplementation, Received her 1st Prolia shot and was counseled on need to continue every 6 month injections.  Repeat DEXA 11/2020    Vitamin D deficiency  -     Vitamin D; Future    Medication management  -     Hemoglobin A1c; Future  -     Comprehensive metabolic panel; Future  -     Lipid panel; Future  -     CBC auto differential; Future  -     TSH; Future  -     Vitamin D; Future  -     Vitamin B12; Future  -     Magnesium; Future            ICD-10-CM ICD-9-CM    1. Adenomatous duodenal polyp D13.2 211.2     s/p removal and needs follow up EGD in may 2019   2. Gastroesophageal reflux disease, esophagitis presence not specified K21.9 530.81     Continuing daily Protonix and Zantac, ppi labs ordered, has follow-up EGD planned for May 2019   3. Encounter for monitoring long-term proton pump  inhibitor therapy Z51.81 V58.83 Comprehensive metabolic panel    Z79.899 V58.69 Vitamin D      Vitamin B12      Magnesium   4. Other constipation K59.09 564.09     Constipation has been overall better, remind her to take a stool softener every time she takes a narcotic pain med   5. History of hepatitis C Z86.19 V12.09     Following with to [facility] hepatology Dr. Sahu next follow-up July 2019   6. Autoimmune hepatitis K75.4 571.42 Comprehensive metabolic panel      CBC auto differential      TSH   7. Acquired hypothyroidism E03.9 244.9    8. Vitamin B12 deficiency E53.8 266.2 Vitamin B12   9. Diabetes mellitus type 2 with neurological manifestations E11.49 250.60 Hemoglobin A1c      Comprehensive metabolic panel      Lipid panel    Has been stable off of medications, repeat A1c ordered   10. Hypertriglyceridemia E78.1 272.1 Comprehensive metabolic panel      Lipid panel      CBC auto differential   11. Tobacco abuse Z72.0 305.1    12. DDD (degenerative disc disease), lumbar M51.36 722.52 Ambulatory referral to Pain Clinic      HYDROcodone-acetaminophen (NORCO)  mg per tablet      DISCONTINUED: HYDROcodone-acetaminophen (NORCO)  mg per tablet      DISCONTINUED: HYDROcodone-acetaminophen (NORCO)  mg per tablet      DISCONTINUED: HYDROcodone-acetaminophen (NORCO)  mg per tablet      DISCONTINUED: HYDROcodone-acetaminophen (NORCO)  mg per tablet   13. DDD (degenerative disc disease), cervical M50.30 722.4 Ambulatory referral to Pain Clinic      HYDROcodone-acetaminophen (NORCO)  mg per tablet      DISCONTINUED: HYDROcodone-acetaminophen (NORCO)  mg per tablet      DISCONTINUED: HYDROcodone-acetaminophen (NORCO)  mg per tablet      DISCONTINUED: HYDROcodone-acetaminophen (NORCO)  mg per tablet      DISCONTINUED: HYDROcodone-acetaminophen (NORCO)  mg per tablet   14. Chronic neck and back pain M54.2 723.1 Ambulatory referral to Pain Clinic    M54.9 724.5  HYDROcodone-acetaminophen (NORCO)  mg per tablet    G89.29  DISCONTINUED: HYDROcodone-acetaminophen (NORCO)  mg per tablet      DISCONTINUED: HYDROcodone-acetaminophen (NORCO)  mg per tablet      DISCONTINUED: HYDROcodone-acetaminophen (NORCO)  mg per tablet      DISCONTINUED: HYDROcodone-acetaminophen (NORCO)  mg per tablet      DISCONTINUED: HYDROcodone-acetaminophen (NORCO)  mg per tablet   15. Lumbar radiculopathy M54.16 724.4 Ambulatory referral to Pain Clinic      HYDROcodone-acetaminophen (NORCO)  mg per tablet      DISCONTINUED: HYDROcodone-acetaminophen (NORCO)  mg per tablet      DISCONTINUED: HYDROcodone-acetaminophen (NORCO)  mg per tablet      DISCONTINUED: HYDROcodone-acetaminophen (NORCO)  mg per tablet      DISCONTINUED: HYDROcodone-acetaminophen (NORCO)  mg per tablet   16. Anxiety and depression F41.9 300.00     F32.9 311    17. Postmenopausal osteoporosis M81.0 733.01     Continuing calcium/vitamin-D supplementation, Received her 1st Prolia shot and was counseled on need to continue every 6 month injections.  Repeat DEXA 11/2020   18. Vitamin D deficiency E55.9 268.9 Vitamin D   19. Medication management Z79.899 V58.69 Hemoglobin A1c      Comprehensive metabolic panel      Lipid panel      CBC auto differential      TSH      Vitamin D      Vitamin B12      Magnesium       There are no Patient Instructions on file for this visit.      Follow-up in about 3 months (around 5/27/2019) for to follow up on lab results, return as needed for new concerns.

## 2019-02-28 DIAGNOSIS — E11.9 TYPE 2 DIABETES MELLITUS WITHOUT COMPLICATION: ICD-10-CM

## 2019-03-01 ENCOUNTER — TELEPHONE (OUTPATIENT)
Dept: ENDOSCOPY | Facility: HOSPITAL | Age: 64
End: 2019-03-01

## 2019-03-04 NOTE — TELEPHONE ENCOUNTER
EGD Prep Instructions    [facility] [address] Clinic Office [phone]  Endoscopy Lab [phone]    You are scheduled for an EGD with Dr. Keyes on 03/12/2019 at 930am at [facility].  You will check in at Admit on the first floor of the hospital.    You may not have nothing to eat after 7pm and nothing to drink after midnight.  You can drink clear liquids between 7pm and midnight.    You MAY take your blood pressure, heart, and seizure medication on the morning of the procedure, with a SIP of water.  Hold ALL other medications until after the procedure.    If you are on blood thinners THAT YOU HAVE BEEN INSTRUCTED TO HOLD BY YOUR DOCTOR FOR THIS PROCEDURE, then do NOT take this the morning of your EGD.  Do NOT stop these medications on your own, they must be approved to be held by your doctor.  Your EGD can NOT be done if you are on these medications.  Examples of blood thinners include: Coumadin, Aggrenox, Plavix, Pradaxa, Reapro, Pletal, Xarelto, Ticagrelor, Brilinta, Eliquis, and high dose aspirin (325 mg).  You do not have to stop baby aspirin 81 mg.

## 2019-03-07 ENCOUNTER — TELEPHONE (OUTPATIENT)
Dept: ENDOSCOPY | Facility: HOSPITAL | Age: 64
End: 2019-03-07

## 2019-03-07 NOTE — TELEPHONE ENCOUNTER
Spoke with patient about arrival time @ *0930.     NPO status reviewed: Patient must have nothing to eat after midnight.  Pt may have CLEAR liquids ONLY until completely NPO 3 hrs @ *0630    Medications: Do not take Insulin or oral diabetic medications the day of the procedure.  Take as prescribed: heart, seizure and blood pressure medication in the morning with a sip of water (less than an ounce).  Take any breathing medications and bring inhalers to hospital with you Leave all valuables and jewelry at home.     Wear comfortable clothes to procedure to change into hospital gown You cannot drive for 24 hours after your procedure because you will receive sedation for your procedure to make you comfortable.  A ride must be provided at discharge.                .

## 2019-03-12 ENCOUNTER — HOSPITAL ENCOUNTER (OUTPATIENT)
Facility: HOSPITAL | Age: 64
Discharge: HOME | End: 2019-03-12
Attending: INTERNAL MEDICINE | Admitting: INTERNAL MEDICINE
Payer: MEDICARE

## 2019-03-12 ENCOUNTER — ANESTHESIA EVENT (OUTPATIENT)
Dept: ENDOSCOPY | Facility: HOSPITAL | Age: 64
End: 2019-03-12
Payer: MEDICARE

## 2019-03-12 ENCOUNTER — ANESTHESIA (OUTPATIENT)
Dept: ENDOSCOPY | Facility: HOSPITAL | Age: 64
End: 2019-03-12
Payer: MEDICARE

## 2019-03-12 VITALS
SYSTOLIC BLOOD PRESSURE: 107 MMHG | TEMPERATURE: 98 F | RESPIRATION RATE: 16 BRPM | HEART RATE: 52 BPM | OXYGEN SATURATION: 99 % | DIASTOLIC BLOOD PRESSURE: 65 MMHG

## 2019-03-12 DIAGNOSIS — D13.2 ADENOMATOUS DUODENAL POLYP: ICD-10-CM

## 2019-03-12 LAB — POCT GLUCOSE: 73 MG/DL (ref 70–110)

## 2019-03-12 PROCEDURE — 43235 EGD DIAGNOSTIC BRUSH WASH: CPT | Mod: ,,, | Performed by: INTERNAL MEDICINE

## 2019-03-12 PROCEDURE — 25000003 PHARM REV CODE 250: Performed by: NURSE ANESTHETIST, CERTIFIED REGISTERED

## 2019-03-12 PROCEDURE — 25000003 PHARM REV CODE 250: Performed by: INTERNAL MEDICINE

## 2019-03-12 PROCEDURE — 43235 EGD DIAGNOSTIC BRUSH WASH: CPT | Performed by: INTERNAL MEDICINE

## 2019-03-12 PROCEDURE — 63600175 PHARM REV CODE 636 W HCPCS: Performed by: NURSE ANESTHETIST, CERTIFIED REGISTERED

## 2019-03-12 PROCEDURE — 37000008 HC ANESTHESIA 1ST 15 MINUTES: Performed by: INTERNAL MEDICINE

## 2019-03-12 PROCEDURE — 43235 PR EGD, FLEX, DIAGNOSTIC: ICD-10-PCS | Mod: ,,, | Performed by: INTERNAL MEDICINE

## 2019-03-12 PROCEDURE — 00731 ANES UPR GI NDSC PX NOS: CPT | Performed by: INTERNAL MEDICINE

## 2019-03-12 RX ORDER — SODIUM CHLORIDE 9 MG/ML
INJECTION, SOLUTION INTRAVENOUS CONTINUOUS
Status: DISCONTINUED | OUTPATIENT
Start: 2019-03-12 | End: 2019-03-12 | Stop reason: HOSPADM

## 2019-03-12 RX ORDER — SODIUM CHLORIDE 9 MG/ML
INJECTION, SOLUTION INTRAVENOUS CONTINUOUS PRN
Status: DISCONTINUED | OUTPATIENT
Start: 2019-03-12 | End: 2019-03-12

## 2019-03-12 RX ORDER — SODIUM CHLORIDE 0.9 % (FLUSH) 0.9 %
3 SYRINGE (ML) INJECTION
Status: DISCONTINUED | OUTPATIENT
Start: 2019-03-12 | End: 2019-03-12 | Stop reason: HOSPADM

## 2019-03-12 RX ORDER — LIDOCAINE HCL/PF 100 MG/5ML
SYRINGE (ML) INTRAVENOUS
Status: DISCONTINUED | OUTPATIENT
Start: 2019-03-12 | End: 2019-03-12

## 2019-03-12 RX ORDER — PROPOFOL 10 MG/ML
VIAL (ML) INTRAVENOUS
Status: DISCONTINUED | OUTPATIENT
Start: 2019-03-12 | End: 2019-03-12

## 2019-03-12 RX ORDER — FENTANYL CITRATE 50 UG/ML
INJECTION, SOLUTION INTRAMUSCULAR; INTRAVENOUS
Status: DISCONTINUED | OUTPATIENT
Start: 2019-03-12 | End: 2019-03-12

## 2019-03-12 RX ADMIN — FENTANYL CITRATE 100 MCG: 50 INJECTION, SOLUTION INTRAMUSCULAR; INTRAVENOUS at 11:03

## 2019-03-12 RX ADMIN — LIDOCAINE HYDROCHLORIDE 50 MG: 20 INJECTION, SOLUTION INTRAVENOUS at 11:03

## 2019-03-12 RX ADMIN — SODIUM CHLORIDE: 9 INJECTION, SOLUTION INTRAVENOUS at 11:03

## 2019-03-12 RX ADMIN — SODIUM CHLORIDE: 0.9 INJECTION, SOLUTION INTRAVENOUS at 10:03

## 2019-03-12 RX ADMIN — PROPOFOL 70 MG: 10 INJECTION, EMULSION INTRAVENOUS at 11:03

## 2019-03-12 RX ADMIN — PROPOFOL 30 MG: 10 INJECTION, EMULSION INTRAVENOUS at 11:03

## 2019-03-12 NOTE — PLAN OF CARE
.Admission process complete. Patient ready for procedure. Plan of care reviewed with patient. Preoperative fasting appropriate for procedure and sedation. Call light in reach and bed in lowest position.

## 2019-03-12 NOTE — [employer] PATIENT INSTRUCTIONS
Discharge Summary/Instructions after an Endoscopic Procedure  Patient Name: Thom Krishna  Patient MRN: [id number]  Patient YOB: 1955 Tuesday, March 12, 2019  Polo Keyes MD  RESTRICTIONS:  During your procedure today, you received medications for sedation.  These   medications may affect your judgment, balance and coordination.  Therefore,   for 24 hours, you have the following restrictions:   - DO NOT drive a car, operate machinery, make legal/financial decisions,   sign important papers or drink alcohol.    ACTIVITY:  Today: no heavy lifting, straining or running due to procedural   sedation/anesthesia.  The following day: return to full activity including work.  DIET:  Eat and drink normally unless instructed otherwise.     TREATMENT FOR COMMON SIDE EFFECTS:  - Mild abdominal pain, nausea, belching, bloating or excessive gas:  rest,   eat lightly and use a heating pad.  - Sore Throat: treat with throat lozenges and/or gargle with warm salt   water.  - Because air was used during the procedure, expelling large amounts of air   from your rectum or belching is normal.  - If a bowel prep was taken, you may not have a bowel movement for 1-3 days.    This is normal.  SYMPTOMS TO WATCH FOR AND REPORT TO YOUR PHYSICIAN:  1. Abdominal pain or bloating, other than gas cramps.  2. Chest pain.  3. Back pain.  4. Signs of infection such as: chills or fever occurring within 24 hours   after the procedure.  5. Rectal bleeding, which would show as bright red, maroon, or black stools.   (A tablespoon of blood from the rectum is not serious, especially if   hemorrhoids are present.)  6. Vomiting.  7. Weakness or dizziness.  GO DIRECTLY TO THE NEAREST EMERGENCY ROOM IF YOU HAVE ANY OF THE FOLLOWING:      Difficulty breathing              Chills and/or fever over 101 F   Persistent vomiting and/or vomiting blood   Severe abdominal pain   Severe chest pain   Black, tarry stools   Bleeding- more than one tablespoon   Any  other symptom or condition that you feel may need urgent attention  Your doctor recommends these additional instructions:  If any biopsies were taken, your doctors clinic will contact you in 1 to 2   weeks with any results.  - Discharge patient to home.   - Resume previous diet.   - Continue present medications.   - Repeat upper endoscopy in 1 year for surveillance.  For questions, problems or results please call your physician - Polo Keyes MD at Work:  [phone].  EMERGENCY PHONE NUMBER: [phone],  LAB RESULTS: [phone]  IF A COMPLICATION OR EMERGENCY SITUATION ARISES AND YOU ARE UNABLE TO REACH   YOUR PHYSICIAN - GO DIRECTLY TO THE EMERGENCY ROOM.  Polo Keyes MD  3/12/2019 11:22:11 AM  This report has been verified and signed electronically.  [employer]

## 2019-03-12 NOTE — ANESTHESIA POSTPROCEDURE EVALUATION
Anesthesia Post Evaluation    Patient: Thom Krishna    Procedure(s) Performed: Procedure(s) (LRB):  EGD (ESOPHAGOGASTRODUODENOSCOPY) (N/A)    Final Anesthesia Type: MAC  Patient location during evaluation: OPS  Patient participation: Yes- Able to Participate  Level of consciousness: awake and alert  Post-procedure vital signs: reviewed and stable  Airway patency: patent  PONV status at discharge: No PONV  Anesthetic complications: no      Cardiovascular status: blood pressure returned to baseline and hemodynamically stable  Respiratory status: spontaneous ventilation  Hydration status: euvolemic  Follow-up not needed.        Visit Vitals  Breastfeeding? No       Pain/More Score: No Data Recorded

## 2019-03-12 NOTE — H&P
Short Stay Endoscopy History and Physical    PCP - Brandy Dejesus MD  Referring Physician - Reuben Miller Jr., MD  [address] [place] [address]    Procedure - egd  ASA - per anesthesia  Mallampati - per anesthesia  History of Anesthesia problems - no  Family history Anesthesia problems -  no   Plan of anesthesia - General    HPI:  This is a 63 y.o. female here for evaluation of: duodenal polyp    Reflux - no  Dysphagia - no  Abdominal pain - no  Diarrhea - no    ROS:  Constitutional: No fevers, chills, No weight loss  CV: No chest pain  Pulm: No cough, No shortness of breath  Ophtho: No vision changes  GI: see HPI  Derm: No rash    Medical History:  has a past medical history of Anxiety and depression (2015), Arthritis, Cervical radiculopathy (2016), Cirrhosis of liver, Colon polyps (2015), Diabetes mellitus type 2 with neurological manifestations (2015), Encounter for blood transfusion, Hepatitis C, Hip fracture, Macrocytosis (2015), Thyroid disease, and Transfusion reaction.    Surgical History:  has a past surgical history that includes  section; Cholecystectomy; Hysterectomy (); Incisional hernia repair; Total hip arthroplasty; Rotator cuff repair; Liver biopsy (2003); Stomach surgery (); Hernia repair; Joint replacement; Appendectomy; Back surgery; Caudal epidural steroid injection (N/A, 2018); Colonoscopy (3/31/10); Colonoscopy (2015); Upper gastrointestinal endoscopy (3/24/10); Upper gastrointestinal endoscopy (2015); Esophagogastroduodenoscopy (N/A, 10/10/2018); Colonoscopy (N/A, 10/10/2018); Esophagogastroduodenoscopy (N/A, 12/10/2018); and Caudal epidural steroid injection (N/A, 2019).    Family History: family history includes Brain cancer in her brother; Breast cancer in her sister; Colon cancer in her brother; Diabetes Mellitus in her mother; Liver cancer in her sister; Lung cancer in her brother; Pancreatic cancer in  her brother; Stomach cancer in her other; Throat cancer in her brother..    Social History:  reports that she has been smoking cigarettes.  She has a 90.00 pack-year smoking history. she has never used smokeless tobacco. She reports that she does not drink alcohol or use drugs.    Review of patient's allergies indicates:   Allergen Reactions    Penicillins      Other reaction(s): Hives    Sulfa (sulfonamide antibiotics)      Other reaction(s): Hives       Medications:   Medications Prior to Admission   Medication Sig Dispense Refill Last Dose    ALPRAZolam (XANAX) 1 MG tablet Take 1 tablet (1 mg total) by mouth 2 (two) times daily as needed. 45 tablet 5 3/11/2019 at Unknown time    HYDROcodone-acetaminophen (NORCO)  mg per tablet Take 1 tablet by mouth every 6 (six) hours as needed for pain. 90 tablet 0 3/12/2019 at Unknown time    levothyroxine (SYNTHROID) 75 MCG tablet take 1 tablet by mouth once daily 90 tablet 3 Past Week at Unknown time    pantoprazole (PROTONIX) 40 MG tablet TAKE ONE TABLET BY MOUTH EVERY DAY 90 tablet 4 3/11/2019 at Unknown time    ranitidine (ZANTAC) 300 MG tablet Take 1 tablet (300 mg total) by mouth every evening. , about 30-60 minutes before bedtime. 90 tablet 3 Past Week at Unknown time    bisacodyl (DULCOLAX) 5 mg EC tablet Take 5 mg by mouth daily as needed for Constipation.   Unknown at Unknown time    ergocalciferol (ERGOCALCIFEROL) 50,000 unit Cap Take 1 capsule (50,000 Units total) by mouth every 7 days. 15 capsule 3 Taking    ondansetron (ZOFRAN-ODT) 8 MG TbDL TAKE ONE TABLET UNDER THE TONGUE THREE TIMES DAILY AS NEEDED FOR NAUSEA 20 tablet 11 Unknown at Unknown time       Physical Exam:    Vital Signs: There were no vitals filed for this visit.    General Appearance: Well appearing in no acute distress    Labs:  Lab Results   Component Value Date    WBC 5.45 11/19/2018    HGB 13.1 11/19/2018    HCT 41.5 11/19/2018     11/19/2018    CHOL 187 11/19/2018     TRIG 121 11/19/2018    HDL 44 11/19/2018    ALT 9 (L) 11/19/2018    AST 16 11/19/2018     11/19/2018    K 4.1 11/19/2018     11/19/2018    CREATININE 0.9 11/19/2018    BUN 16 11/19/2018    CO2 24 11/19/2018    TSH 1.778 11/19/2018    INR 1.0 06/03/2013    HGBA1C 5.1 11/19/2018       I have explained the risks and benefits of this endoscopic procedure to the patient including but not limited to bleeding, inflammation, infection, perforation, and death.      Polo Keyes MD

## 2019-03-12 NOTE — ANESTHESIA PREPROCEDURE EVALUATION
03/12/2019  Thom Krishna is a 63 y.o., female scheduled for EGD with possible EMR due to adenomatous duodenal polyp.    Past Medical History:   Diagnosis Date    Anxiety and depression 7/21/2015    Arthritis     Cervical radiculopathy 4/8/2016    Cirrhosis of liver     Colon polyps 4/27/2015    Diabetes mellitus type 2 with neurological manifestations 11/6/2015    no current medications, only one episode of elevated sugars with acute illness, will monitor     Encounter for blood transfusion     Hepatitis C     s/p treatment per patient report; managed by Dr. Perez    Hip fracture     Macrocytosis 7/21/2015    Thyroid disease     Transfusion reaction     hep c     Lab Results   Component Value Date    WBC 5.45 11/19/2018    HGB 13.1 11/19/2018    HCT 41.5 11/19/2018    MCV 99 (H) 11/19/2018     11/19/2018       Chemistry        Component Value Date/Time     11/19/2018 0927    K 4.1 11/19/2018 0927     11/19/2018 0927    CO2 24 11/19/2018 0927    BUN 16 11/19/2018 0927    CREATININE 0.9 11/19/2018 0927    GLU 85 11/19/2018 0927        Component Value Date/Time    CALCIUM 9.3 11/19/2018 0927    ALKPHOS 139 (H) 11/19/2018 0927    AST 16 11/19/2018 0927    ALT 9 (L) 11/19/2018 0927    BILITOT 0.3 11/19/2018 0927    ESTGFRAFRICA >60.0 11/19/2018 0927    EGFRNONAA >60.0 11/19/2018 0927            Pre-op Assessment    I have reviewed the Patient Summary Reports.     I have reviewed the Nursing Notes.   I have reviewed the Medications.     Review of Systems  Anesthesia Hx:  No problems with previous Anesthesia    Social:  Smoker    Cardiovascular:  Cardiovascular Normal   Functional Capacity good / => 4 METS    Hepatic/GI:   GERD Liver Disease, Hepatitis  Hepatic/GI Symptoms:  Liver Disease, Hepatitis  Epigastric pain (R10.13)      Hiatal hernia (K44.9)      Heartburn (R12)       Hepatic cirrhosis, unspecified hepatic cirrhosis type, unspecified whether ascites present    Musculoskeletal:   Arthritis     Neurological:   Neuromuscular Disease,    Endocrine:   Diabetes, well controlled Hypothyroidism Per pt diabetes is resolved       Physical Exam  General:  Well nourished, Obesity    Airway/Jaw/Neck:  Airway Findings: Mouth Opening: Normal Tongue: Normal  General Airway Assessment: Adult, Good  Mallampati: II  Improves to II with phonation.  TM Distance: 4-6 cm  Jaw/Neck Findings:     Neck ROM: Normal ROM      Dental:  Dental Findings: In tact   Chest/Lungs:  Chest/Lungs Findings: Clear to auscultation, Normal Respiratory Rate     Heart/Vascular:  Heart Findings: Rate: Normal  Rhythm: Regular Rhythm  Sounds: Normal  Heart murmur: negative       Mental Status:  Mental Status Findings:  Cooperative, Alert and Oriented         Anesthesia Plan  Type of Anesthesia, risks & benefits discussed:  Anesthesia Type:  general  Patient's Preference: MAC  Intra-op Monitoring Plan: standard ASA monitors  Intra-op Monitoring Plan Comments:   Post Op Pain Control Plan:   Post Op Pain Control Plan Comments:   Induction:   IV  Beta Blocker:  Patient is not currently on a Beta-Blocker (No further documentation required).       Informed Consent: Patient understands risks and agrees with Anesthesia plan.  Questions answered. Anesthesia consent signed with patient.  ASA Score: 3     Day of Surgery Review of History & Physical: I have interviewed and examined the patient. I have reviewed the patient's H&P dated:  There are no significant changes.  H&P update referred to the surgeon.         Ready For Surgery From Anesthesia Perspective.

## 2019-03-12 NOTE — TRANSFER OF CARE
Anesthesia Transfer of Care Note    Patient: Thom Krishna    Procedure(s) Performed: Procedure(s) (LRB):  EGD (ESOPHAGOGASTRODUODENOSCOPY) (N/A)    Patient location: GI    Anesthesia Type: MAC    Transport from OR: Transported from OR on room air with adequate spontaneous ventilation    Post pain: adequate analgesia    Post assessment: no apparent anesthetic complications    Post vital signs: stable    Level of consciousness: awake and alert    Nausea/Vomiting: no nausea/vomiting    Complications: none    Transfer of care protocol was followed      Last vitals:   Visit Vitals  Breastfeeding? No

## 2019-03-13 RX ORDER — LEVOTHYROXINE SODIUM 75 UG/1
75 TABLET ORAL DAILY
Qty: 90 TABLET | Refills: 3 | Status: SHIPPED | OUTPATIENT
Start: 2019-03-13 | End: 2020-02-28 | Stop reason: SDUPTHER

## 2019-03-13 NOTE — TELEPHONE ENCOUNTER
Pt called in and asked about her synthroid medication, stated that humberto told her there were no refills but their are 3, will call and give verbal.

## 2019-03-18 ENCOUNTER — TELEPHONE (OUTPATIENT)
Dept: GASTROENTEROLOGY | Facility: CLINIC | Age: 64
End: 2019-03-18

## 2019-03-18 NOTE — TELEPHONE ENCOUNTER
Post procedure Instructions: Repeat upper endoscopy in 1 year for surveillance. Please place on 1 year recall.

## 2019-04-12 ENCOUNTER — OFFICE VISIT (OUTPATIENT)
Dept: URGENT CARE | Facility: CLINIC | Age: 64
End: 2019-04-12
Payer: MEDICARE

## 2019-04-12 VITALS
HEIGHT: 62 IN | SYSTOLIC BLOOD PRESSURE: 166 MMHG | OXYGEN SATURATION: 99 % | HEART RATE: 74 BPM | BODY MASS INDEX: 26.31 KG/M2 | DIASTOLIC BLOOD PRESSURE: 76 MMHG | TEMPERATURE: 98 F | RESPIRATION RATE: 18 BRPM | WEIGHT: 143 LBS

## 2019-04-12 DIAGNOSIS — R07.89 CHEST WALL PAIN: Primary | ICD-10-CM

## 2019-04-12 DIAGNOSIS — R03.0 ELEVATED BLOOD PRESSURE READING: ICD-10-CM

## 2019-04-12 PROCEDURE — 71046 XR CHEST PA AND LATERAL: ICD-10-PCS | Mod: FY,S$GLB,, | Performed by: RADIOLOGY

## 2019-04-12 PROCEDURE — 99214 OFFICE O/P EST MOD 30 MIN: CPT | Mod: S$GLB,,, | Performed by: PHYSICIAN ASSISTANT

## 2019-04-12 PROCEDURE — 93005 EKG 12-LEAD: ICD-10-PCS | Mod: S$GLB,,, | Performed by: PHYSICIAN ASSISTANT

## 2019-04-12 PROCEDURE — 99214 PR OFFICE/OUTPT VISIT, EST, LEVL IV, 30-39 MIN: ICD-10-PCS | Mod: S$GLB,,, | Performed by: PHYSICIAN ASSISTANT

## 2019-04-12 PROCEDURE — 93005 ELECTROCARDIOGRAM TRACING: CPT | Mod: S$GLB,,, | Performed by: PHYSICIAN ASSISTANT

## 2019-04-12 PROCEDURE — 93010 EKG 12-LEAD: ICD-10-PCS | Mod: S$GLB,,, | Performed by: INTERNAL MEDICINE

## 2019-04-12 PROCEDURE — 71046 X-RAY EXAM CHEST 2 VIEWS: CPT | Mod: FY,S$GLB,, | Performed by: RADIOLOGY

## 2019-04-12 PROCEDURE — 93010 ELECTROCARDIOGRAM REPORT: CPT | Mod: S$GLB,,, | Performed by: INTERNAL MEDICINE

## 2019-04-12 NOTE — PROGRESS NOTES
"Subjective:       Patient ID: Thom Krishna is a 63 y.o. female.    Vitals:  height is 5' 2" (1.575 m) and weight is 64.9 kg (143 lb). Her temperature is 98 °F (36.7 °C). Her blood pressure is 166/76 (abnormal) and her pulse is 74. Her respiration is 18 and oxygen saturation is 99%.     Chief Complaint: Chest Pain    Patient presents to urgent care for chest wall pain x 1 week. Patient reports that "she received a back injection last Friday and that was the first time she was put to sleep for this procedure". Patient reports that whenever she got home from the procedure last Friday (x 7 days ago), her left leg went numb and she fell forward and caught herself with her hands, right knee and chest. Patient reports that she thinks she bruised her chest wall, but the pain has gotten progressively worse over the week, so she wanted to come in today for further evaluation. Patient currently denies fever, chills, SOB, wheezing, abdominal pain, N/V/D/C, headache, blurry vision or dizziness.    Chest Pain    This is a new problem. The current episode started 1 to 4 weeks ago (1 week). The onset quality is gradual. The problem occurs constantly. The problem has been gradually worsening. The pain is at a severity of 2/10. The pain is mild. The quality of the pain is described as heavy. The pain radiates to the left arm. Pertinent negatives include no abdominal pain, back pain, cough, diaphoresis, dizziness, fever, headaches, hemoptysis, nausea, numbness, palpitations, shortness of breath, sputum production, syncope, vomiting or weakness. The pain is aggravated by nothing. She has tried nothing for the symptoms. The treatment provided no relief.   Pertinent negatives for past medical history include no seizures.       Constitution: Negative for chills, sweating, fatigue and fever.   HENT: Negative for ear pain, ear discharge, foreign body in ear, tinnitus, congestion, nosebleeds, foreign body in nose, postnasal drip, sinus pain, " sinus pressure, sore throat, trouble swallowing and voice change.    Neck: Negative for neck pain, neck stiffness, painful lymph nodes and neck swelling.   Cardiovascular: Positive for chest pain. Negative for chest trauma, leg swelling, palpitations, sob on exertion and passing out.   Eyes: Negative for foreign body in eye, eye discharge, eye itching, eye pain, eye redness, photophobia, vision loss, double vision, blurred vision and eyelid swelling.   Respiratory: Negative for chest tightness, cough, sputum production, bloody sputum, shortness of breath, stridor and wheezing.    Gastrointestinal: Negative for abdominal pain, abdominal bloating, nausea, vomiting, constipation, diarrhea and heartburn.   Genitourinary: Negative for dysuria, frequency, urgency, flank pain, hematuria, history of kidney stones and pelvic pain.   Musculoskeletal: Negative for joint pain, joint swelling, back pain, muscle cramps and muscle ache.   Skin: Negative for rash and erythema.   Neurological: Negative for dizziness, history of vertigo, light-headedness, passing out, facial drooping, speech difficulty, coordination disturbances, loss of balance, headaches, history of migraines, disorientation, altered mental status, loss of consciousness, numbness, tingling, seizures and tremors.   Hematologic/Lymphatic: Negative for swollen lymph nodes and history of blood clots.   Psychiatric/Behavioral: Negative for altered mental status, disorientation and nervous/anxious. The patient is not nervous/anxious.        Objective:      Physical Exam   Constitutional: She is oriented to person, place, and time. She appears well-developed and well-nourished. She is cooperative.  Non-toxic appearance. She does not have a sickly appearance. She does not appear ill. No distress.   Patient is stable, seated comfortably and in NAD.   HENT:   Head: Normocephalic and atraumatic.   Right Ear: Hearing, tympanic membrane, external ear and ear canal normal.    Left Ear: Hearing, tympanic membrane, external ear and ear canal normal.   Nose: Nose normal. No mucosal edema, rhinorrhea or nasal deformity. No epistaxis. Right sinus exhibits no maxillary sinus tenderness and no frontal sinus tenderness. Left sinus exhibits no maxillary sinus tenderness and no frontal sinus tenderness.   Mouth/Throat: Uvula is midline, oropharynx is clear and moist and mucous membranes are normal. No trismus in the jaw. Normal dentition. No uvula swelling. No oropharyngeal exudate, posterior oropharyngeal edema or posterior oropharyngeal erythema.   Eyes: Pupils are equal, round, and reactive to light. Conjunctivae, EOM and lids are normal. Right eye exhibits no discharge. Left eye exhibits no discharge. No scleral icterus.   Sclera clear bilat   Neck: Trachea normal, normal range of motion, full passive range of motion without pain and phonation normal. Neck supple.   Cardiovascular: Normal rate, regular rhythm, normal heart sounds, intact distal pulses and normal pulses.   Pulmonary/Chest: Effort normal and breath sounds normal. No accessory muscle usage or stridor. No respiratory distress. She has no decreased breath sounds. She has no wheezes. She has no rhonchi. She has no rales. Chest wall is not dull to percussion. She exhibits tenderness. She exhibits no mass, no bony tenderness, no laceration, no crepitus, no edema, no deformity, no swelling and no retraction.   Chest wall pain is reproducible upon palpation.   Abdominal: Soft. Normal appearance and bowel sounds are normal. She exhibits no distension, no pulsatile midline mass and no mass. There is no tenderness. There is no rigidity, no rebound, no guarding, no CVA tenderness, no tenderness at McBurney's point and negative Diaz's sign. No hernia.   Musculoskeletal: Normal range of motion. She exhibits no edema or deformity.   FROM to upper and lower extremities bilateral. 5/5 strength and full sensation bilateral. 2+ pulses  bilateral. No numbness or tingling. Negative straight leg raise. Able to ambulate without difficulty.   Lymphadenopathy:     She has no cervical adenopathy.   Neurological: She is alert and oriented to person, place, and time. She has normal strength and normal reflexes. No cranial nerve deficit or sensory deficit. She exhibits normal muscle tone. She displays a negative Romberg sign. Coordination normal.   Skin: Skin is warm, dry and intact. Capillary refill takes less than 2 seconds. No abrasion, no bruising, no burn, no ecchymosis, no laceration, no lesion, no petechiae and no rash noted. She is not diaphoretic. No erythema. No pallor.   Psychiatric: She has a normal mood and affect. Her speech is normal and behavior is normal. Judgment and thought content normal. Cognition and memory are normal.   Nursing note and vitals reviewed.      EKG done in clinic today - Normal sinus rhythm. NO ST Elevations. HR 66pm, TX Interval 148 ms, QRS duration 78 ms.     Xr Chest Pa And Lateral    Result Date: 4/12/2019  EXAMINATION: XR CHEST PA AND LATERAL CLINICAL HISTORY: Chest pain, unspecified TECHNIQUE: PA and lateral views of the chest were performed. COMPARISON: July 6, 2018 FINDINGS: Lungs are clear.  No effusion or pneumothorax. Unremarkable cardiomediastinal silhouette.  No acute bone abnormality.     No acute abnormality. Electronically signed by: Fareed Foster MD Date:    04/12/2019 Time:    12:59    Assessment:       1. Chest wall pain    2. Elevated blood pressure reading        Plan:         Chest wall pain  -     XR CHEST PA AND LATERAL; Future; Expected date: 04/12/2019  -     EKG 12-lead; Future    Elevated blood pressure reading      Patient Instructions   You must understand that you've received an Urgent Care treatment only and that you may be released before all your medical problems are known or treated. You, the patient, will arrange for follow up care as instructed.  Follow up with your PCP or specialty  clinic as directed if not improved or as needed. You can call [phone] to schedule an appointment with the appropriate provider.  If your condition worsens we recommend that you receive another evaluation at the Emergency Department for any concerns or worsening of condition.  Patient aware and verbalized understanding.    Patient's BP is elevated today. No headache or SOB. Patient has been advised to monitor the BP for the next few days. If it stays elevated, patient should contact their PCP.  Patient is stable, seated comfortably and in NAD upon discharge.  Patient aware and verbalized understanding.    Chest Wall Pain   Discussed EKG and Chest XRAY results with patient.   Continue to take Norco RX as prescribed for back/chest pain.   Advised patient to please go to the Emergency Department for any concerns or worsening of condition such as:  · Shortness of breath, difficulty breathing, or breathing fast  · Chest pain gets worse when you breathe  · Severe pain that comes on suddenly or lasts more than an hour  · Dizziness, weakness, or fainting  · Fever   Follow up with your PCP in the next 2-3 days for no improvement in symptoms.    Avoid any heavy lifting, pushing heavy objects or weight training during this time of present symptoms. Avoid any strenuous activity that causes aggravation to the affected area.  Cool compresses to affected area are helpful.  Can use a pillow to brace area when sneezing or coughing.   Please follow up with your primary care doctor or specialist in the next 48-72hrs as needed.    If you  smoke, please stop smoking.  STRICT ER PRECAUTIONS GIVEN TO PATIENT.  Patient aware and verbalized understanding.    Uncertain Causes of Chest Pain    Chest pain can happen for a number of reasons. Sometimes the cause can't be determined. If your condition does not seem serious, and your pain does not appear to be coming from your heart, your healthcare provider may recommend watching it  closely. Sometimes the signs of a serious problem take more time to appear. Many problems not related to your heart can cause chest pain.These include:  · Musculoskeletal. Costochondritis, an inflammation of the tissues around the ribs that can occur from trauma or overuse injuries  · Respiratory. Pneumonia, pneumothorax, or pneumonitis (inflammation of the lining of the chest and lungs)  · Gastrointestinal. Esophageal reflux, heartburn, or gallbladder disease  · Anxiety and panic disorders  · Nerve compression and neuritis  · Miscellaneous problems such as aortic aneurysm or pulmonary embolism (a blood clot in the lungs)  Home care  After your visit, follow these recommendations:  · Rest today and avoid strenuous activity.  · Take any prescribed medicine as directed.  · Be aware of any recurrent chest pain and notice any changes  Follow-up care  Follow up with your healthcare provider if you do not start to feel better within 24 hours, or as advised.  Call 911  Call 911 if any of these occur:  · A change in the type of pain: if it feels different, becomes more severe, lasts longer, or begins to spread into your shoulder, arm, neck, jaw or back  · Shortness of breath or increased pain with breathing  · Weakness, dizziness, or fainting  · Rapid heart beat  · Crushing sensation in your chest  When to seek medical advice  Call your healthcare provider right away if any of the following occur:  · Cough with dark colored sputum (phlegm) or blood  · Fever of 100.4ºF (38ºC) or higher, or as directed by your healthcare provider  · Swelling, pain or redness in one leg  · Shortness of breath  Date Last Reviewed: 12/30/2015 © 2000-2017 [employer]. [address] [place] [address]. All rights reserved. This information is not intended as a substitute for professional medical care. Always follow your healthcare professional's instructions.    Chest Wall Pain: Costochondritis    The chest pain that you  have had today is caused by costochondritis. This condition is caused by an inflammation of the cartilage joining your ribs to your breastbone. It is not caused by heart or lung problems. Your healthcare team has made sure that the chest pain you feel is not from a life threatening cause of chest pain such as heart attack, collapsed lung, blood clot in the lung, tear in the aorta, or esophageal rupture. The inflammation may have been brought on by a blow to the chest, lifting heavy objects, intense exercise, or an illness that made you cough and sneeze a lot. It often occurs during times of emotional stress. It can be painful, but it is not dangerous. It usually goes away in 1 to 2 weeks. But it may happen again. Rarely, a more serious condition may cause symptoms similar to costochondritis. Thats why its important to watch for the warning signs listed below.  Home care  Follow these guidelines when caring for yourself at home:  · If you feel that emotional stress is a cause of your condition, try to figure out the sources of that stress. It may not be obvious. Learn ways to deal with the stress in your life. This can include regular exercise, muscle relaxation, meditation, or simply taking time out for yourself.  · You may use acetaminophen, ibuprofen, or naproxen to control pain, unless another pain medicine was prescribed. If you have liver or kidney disease or ever had a stomach ulcer, talk with your healthcare provider before using these medicines.  · You can also help ease pain by using a hot, wet compress or heating pad. Use this with or without a medicated skin cream that helps relieves pain.  · Do stretching exercise as advised by your provider.  · Take any prescribed medicines as directed.  Follow-up care  Follow up with your healthcare provider, or as advised, if you do not start to get better in the next 2 days.  When to seek medical advice  Call your healthcare provider right away if any of these  occur:  · A change in the type of pain. Call if it feels different, becomes more serious, lasts longer, or spreads into your shoulder, arm, neck, jaw, or back.  · Shortness of breath or pain gets worse when you breathe  · Weakness, dizziness, or fainting  · Cough with dark-colored sputum (phlegm) or blood  · Abdominal pain  · Dark red or black stools  · Fever of 100.4ºF (38ºC) or higher, or as directed by your healthcare provider  Date Last Reviewed: 12/1/2016  © 9987-6404 [employer]. [address] [place] [address]. All rights reserved. This information is not intended as a substitute for professional medical care. Always follow your healthcare professional's instructions.

## 2019-04-12 NOTE — PATIENT INSTRUCTIONS
You must understand that you've received an Urgent Care treatment only and that you may be released before all your medical problems are known or treated. You, the patient, will arrange for follow up care as instructed.  Follow up with your PCP or specialty clinic as directed if not improved or as needed. You can call [phone] to schedule an appointment with the appropriate provider.  If your condition worsens we recommend that you receive another evaluation at the Emergency Department for any concerns or worsening of condition.  Patient aware and verbalized understanding.    Patient's BP is elevated today. No headache or SOB. Patient has been advised to monitor the BP for the next few days. If it stays elevated, patient should contact their PCP.  Patient is stable, seated comfortably and in NAD upon discharge.  Patient aware and verbalized understanding.    Chest Wall Pain   Discussed EKG and Chest XRAY results with patient.   Continue to take Norco RX as prescribed for back/chest pain.   Advised patient to please go to the Emergency Department for any concerns or worsening of condition such as:  · Shortness of breath, difficulty breathing, or breathing fast  · Chest pain gets worse when you breathe  · Severe pain that comes on suddenly or lasts more than an hour  · Dizziness, weakness, or fainting  · Fever   Follow up with your PCP in the next 2-3 days for no improvement in symptoms.    Avoid any heavy lifting, pushing heavy objects or weight training during this time of present symptoms. Avoid any strenuous activity that causes aggravation to the affected area.  Cool compresses to affected area are helpful.  Can use a pillow to brace area when sneezing or coughing.   Please follow up with your primary care doctor or specialist in the next 48-72hrs as needed.    If you  smoke, please stop smoking.  STRICT ER PRECAUTIONS GIVEN TO PATIENT.  Patient aware and verbalized understanding.    Uncertain Causes of  Chest Pain    Chest pain can happen for a number of reasons. Sometimes the cause can't be determined. If your condition does not seem serious, and your pain does not appear to be coming from your heart, your healthcare provider may recommend watching it closely. Sometimes the signs of a serious problem take more time to appear. Many problems not related to your heart can cause chest pain.These include:  · Musculoskeletal. Costochondritis, an inflammation of the tissues around the ribs that can occur from trauma or overuse injuries  · Respiratory. Pneumonia, pneumothorax, or pneumonitis (inflammation of the lining of the chest and lungs)  · Gastrointestinal. Esophageal reflux, heartburn, or gallbladder disease  · Anxiety and panic disorders  · Nerve compression and neuritis  · Miscellaneous problems such as aortic aneurysm or pulmonary embolism (a blood clot in the lungs)  Home care  After your visit, follow these recommendations:  · Rest today and avoid strenuous activity.  · Take any prescribed medicine as directed.  · Be aware of any recurrent chest pain and notice any changes  Follow-up care  Follow up with your healthcare provider if you do not start to feel better within 24 hours, or as advised.  Call 911  Call 911 if any of these occur:  · A change in the type of pain: if it feels different, becomes more severe, lasts longer, or begins to spread into your shoulder, arm, neck, jaw or back  · Shortness of breath or increased pain with breathing  · Weakness, dizziness, or fainting  · Rapid heart beat  · Crushing sensation in your chest  When to seek medical advice  Call your healthcare provider right away if any of the following occur:  · Cough with dark colored sputum (phlegm) or blood  · Fever of 100.4ºF (38ºC) or higher, or as directed by your healthcare provider  · Swelling, pain or redness in one leg  · Shortness of breath  Date Last Reviewed: 12/30/2015  © 5600-6014 [employer]. [address] [place] [address]  [place] [address]. All rights reserved. This information is not intended as a substitute for professional medical care. Always follow your healthcare professional's instructions.    Chest Wall Pain: Costochondritis    The chest pain that you have had today is caused by costochondritis. This condition is caused by an inflammation of the cartilage joining your ribs to your breastbone. It is not caused by heart or lung problems. Your healthcare team has made sure that the chest pain you feel is not from a life threatening cause of chest pain such as heart attack, collapsed lung, blood clot in the lung, tear in the aorta, or esophageal rupture. The inflammation may have been brought on by a blow to the chest, lifting heavy objects, intense exercise, or an illness that made you cough and sneeze a lot. It often occurs during times of emotional stress. It can be painful, but it is not dangerous. It usually goes away in 1 to 2 weeks. But it may happen again. Rarely, a more serious condition may cause symptoms similar to costochondritis. Thats why its important to watch for the warning signs listed below.  Home care  Follow these guidelines when caring for yourself at home:  · If you feel that emotional stress is a cause of your condition, try to figure out the sources of that stress. It may not be obvious. Learn ways to deal with the stress in your life. This can include regular exercise, muscle relaxation, meditation, or simply taking time out for yourself.  · You may use acetaminophen, ibuprofen, or naproxen to control pain, unless another pain medicine was prescribed. If you have liver or kidney disease or ever had a stomach ulcer, talk with your healthcare provider before using these medicines.  · You can also help ease pain by using a hot, wet compress or heating pad. Use this with or without a medicated skin cream that helps relieves pain.  · Do stretching exercise as advised by your provider.  · Take any  prescribed medicines as directed.  Follow-up care  Follow up with your healthcare provider, or as advised, if you do not start to get better in the next 2 days.  When to seek medical advice  Call your healthcare provider right away if any of these occur:  · A change in the type of pain. Call if it feels different, becomes more serious, lasts longer, or spreads into your shoulder, arm, neck, jaw, or back.  · Shortness of breath or pain gets worse when you breathe  · Weakness, dizziness, or fainting  · Cough with dark-colored sputum (phlegm) or blood  · Abdominal pain  · Dark red or black stools  · Fever of 100.4ºF (38ºC) or higher, or as directed by your healthcare provider  Date Last Reviewed: 12/1/2016  © 0387-7841 [employer]. [address] [place] [address], [place] [address]. All rights reserved. This information is not intended as a substitute for professional medical care. Always follow your healthcare professional's instructions.

## 2019-04-16 ENCOUNTER — TELEPHONE (OUTPATIENT)
Dept: FAMILY MEDICINE | Facility: CLINIC | Age: 64
End: 2019-04-16

## 2019-04-16 NOTE — TELEPHONE ENCOUNTER
----- Message from Dorene Romero sent at 4/16/2019  1:55 PM CDT -----  Contact: Self [phone]  Patient Returning Your Phone Call

## 2019-04-16 NOTE — TELEPHONE ENCOUNTER
Attempted to contact patient.  Someone picked up the phone and said hello then call disconnected after stating 'hello' multiple times.

## 2019-04-16 NOTE — TELEPHONE ENCOUNTER
----- Message from Tatyana Rankin sent at 4/15/2019  8:39 AM CDT -----  Contact: [phone]/self  Patient is requesting a call back. She went to the ER yesterday and they changed her medication. She would like to discuss her pain management issues. Thanks

## 2019-04-22 DIAGNOSIS — F41.9 ANXIETY: ICD-10-CM

## 2019-04-22 RX ORDER — ALPRAZOLAM 1 MG/1
1 TABLET ORAL 2 TIMES DAILY PRN
Qty: 45 TABLET | Refills: 5 | Status: SHIPPED | OUTPATIENT
Start: 2019-04-22 | End: 2019-08-31 | Stop reason: SDUPTHER

## 2019-04-22 NOTE — TELEPHONE ENCOUNTER
----- Message from Mathew Middleton sent at 4/22/2019  7:11 AM CDT -----  Contact: [phone]/self  Refill request for ALPRAZolam (XANAX) 1 MG tablet  Pharmacy: [facility] [id number] [facility] [address] [facility] AT [facility] & [facility]

## 2019-05-28 ENCOUNTER — TELEPHONE (OUTPATIENT)
Dept: FAMILY MEDICINE | Facility: CLINIC | Age: 64
End: 2019-05-28

## 2019-05-28 NOTE — TELEPHONE ENCOUNTER
I understand that she is hesitant to undergo surgery. I also cannot see her results since I believe she end up going outside of [facility] for this consultation. I am not able to prescribe any additional pain medications. She will need to discuss her situation with pain management as this is their specialty and part of the reason I referred her to pain management is that her level of pain and narcotic regimen is beyond my level of comfort as her primary care provider, thanks.    PS I am fine with   Neurontin(gabapentin), Cymbalta, Elavil, if she would like to add on any of these-- I know that she has tried some in the the past, but if she is interested in stronger narcotics then I cannot prescribe those. Thanks

## 2019-05-28 NOTE — TELEPHONE ENCOUNTER
----- Message from Dorene Romero sent at 5/28/2019  2:46 PM CDT -----  Contact: Self [phone]  Patient is calling to talk to nurse in regards to questions on her pain medication.

## 2019-05-28 NOTE — TELEPHONE ENCOUNTER
Returned pta  s call. She went to [facility] management. She had an MRI of her spine she was told she have bone rubbing on bone. She was advised  That she needs surgery and She is very worried collin bernal not wanting to do the surgery based on her last experience. She is requesting stronger pain meds to help with the pain. Please advise

## 2019-05-29 ENCOUNTER — TELEPHONE (OUTPATIENT)
Dept: FAMILY MEDICINE | Facility: CLINIC | Age: 64
End: 2019-05-29

## 2019-05-29 DIAGNOSIS — M54.16 LUMBAR RADICULOPATHY: ICD-10-CM

## 2019-05-29 DIAGNOSIS — M51.36 DDD (DEGENERATIVE DISC DISEASE), LUMBAR: ICD-10-CM

## 2019-05-29 DIAGNOSIS — M54.9 CHRONIC NECK AND BACK PAIN: ICD-10-CM

## 2019-05-29 DIAGNOSIS — M50.30 DDD (DEGENERATIVE DISC DISEASE), CERVICAL: ICD-10-CM

## 2019-05-29 DIAGNOSIS — G89.29 CHRONIC NECK AND BACK PAIN: ICD-10-CM

## 2019-05-29 DIAGNOSIS — M54.2 CHRONIC NECK AND BACK PAIN: ICD-10-CM

## 2019-05-29 RX ORDER — HYDROCODONE BITARTRATE AND ACETAMINOPHEN 10; 325 MG/1; MG/1
TABLET ORAL EVERY 6 HOURS PRN
Qty: 90 TABLET | Refills: 0 | Status: SHIPPED | OUTPATIENT
Start: 2019-05-29 | End: 2019-06-26 | Stop reason: SDUPTHER

## 2019-05-29 NOTE — TELEPHONE ENCOUNTER
----- Message from Mathew Middleton sent at 5/29/2019 11:14 AM CDT -----  Contact: [phone]/self  Patient requesting to speak with you concerning her medication.  Please call back to assist at [phone]

## 2019-05-29 NOTE — TELEPHONE ENCOUNTER
I can continue to refill the Norco that we have been prescribing, but I just cannot increase the dose, refill was printed

## 2019-05-29 NOTE — TELEPHONE ENCOUNTER
"Returned pt phone call, She wanted to follow up about her call from yesterday with laina. I let her know what Dr. Dejesus said "I understand that she is hesitant to undergo surgery. I also cannot see her results since I believe she end up going outside of [facility] for this consultation. I am not able to prescribe any additional pain medications. She will need to discuss her situation with pain management as this is their specialty and part of the reason I referred her to pain management is that her level of pain and narcotic regimen is beyond my level of comfort as her primary care provider, thanks.     PS I am fine with   Neurontin(gabapentin), Cymbalta, Elavil, if she would like to add on any of these-- I know that she has tried some in the the past, but if she is interested in stronger narcotics then I cannot prescribe those. Thanks", she verbalized understanding. She also wanted to know if the NORCO she is already taking is something that Dr. Dejesus can continue to fill or if she needs to go through pain management for that. I let her know I would sent the message to find out. Pt verbalized understanding. Please Advise.  "

## 2019-05-30 ENCOUNTER — TELEPHONE (OUTPATIENT)
Dept: FAMILY MEDICINE | Facility: CLINIC | Age: 64
End: 2019-05-30

## 2019-05-30 NOTE — TELEPHONE ENCOUNTER
Message   Received: Today   Message Contents   MD Glory Cerda MA   Caller: Unspecified (4 days ago,  2:55 PM)             Ok for EGD with EMR.      Please sign order   
Dr. Miller would like this pt seen for removal of duodoenal polyp. Wants done ASAP please. Can you help?  
Please review and advise  
No

## 2019-05-30 NOTE — TELEPHONE ENCOUNTER
Called pt and let her know that Dr Dejesus stated she can continue to fill her NORCO that she has now but cant go up on dosage. I let her know that the RX was ready for . Pt verbalized understanding.

## 2019-06-24 ENCOUNTER — TELEPHONE (OUTPATIENT)
Dept: FAMILY MEDICINE | Facility: CLINIC | Age: 64
End: 2019-06-24

## 2019-06-24 ENCOUNTER — OFFICE VISIT (OUTPATIENT)
Dept: FAMILY MEDICINE | Facility: CLINIC | Age: 64
End: 2019-06-24
Payer: MEDICARE

## 2019-06-24 ENCOUNTER — HOSPITAL ENCOUNTER (EMERGENCY)
Facility: HOSPITAL | Age: 64
Discharge: HOME | End: 2019-06-24
Attending: EMERGENCY MEDICINE
Payer: MEDICARE

## 2019-06-24 VITALS
BODY MASS INDEX: 26.13 KG/M2 | HEIGHT: 62 IN | OXYGEN SATURATION: 99 % | SYSTOLIC BLOOD PRESSURE: 142 MMHG | TEMPERATURE: 98 F | WEIGHT: 142 LBS | DIASTOLIC BLOOD PRESSURE: 70 MMHG | HEART RATE: 88 BPM

## 2019-06-24 VITALS
BODY MASS INDEX: 25.95 KG/M2 | HEART RATE: 63 BPM | OXYGEN SATURATION: 97 % | HEIGHT: 62 IN | RESPIRATION RATE: 18 BRPM | DIASTOLIC BLOOD PRESSURE: 64 MMHG | SYSTOLIC BLOOD PRESSURE: 146 MMHG | WEIGHT: 141 LBS | TEMPERATURE: 98 F

## 2019-06-24 DIAGNOSIS — R11.2 NAUSEA AND VOMITING, INTRACTABILITY OF VOMITING NOT SPECIFIED, UNSPECIFIED VOMITING TYPE: ICD-10-CM

## 2019-06-24 DIAGNOSIS — Z86.19 HISTORY OF HEPATITIS C: ICD-10-CM

## 2019-06-24 DIAGNOSIS — G89.29 CHRONIC NECK AND BACK PAIN: ICD-10-CM

## 2019-06-24 DIAGNOSIS — R19.7 VOMITING AND DIARRHEA: Primary | ICD-10-CM

## 2019-06-24 DIAGNOSIS — K75.4 AUTOIMMUNE HEPATITIS: ICD-10-CM

## 2019-06-24 DIAGNOSIS — K74.60 HEPATIC CIRRHOSIS, UNSPECIFIED HEPATIC CIRRHOSIS TYPE, UNSPECIFIED WHETHER ASCITES PRESENT: ICD-10-CM

## 2019-06-24 DIAGNOSIS — R11.10 VOMITING AND DIARRHEA: Primary | ICD-10-CM

## 2019-06-24 DIAGNOSIS — R10.9 ABDOMINAL PAIN, UNSPECIFIED ABDOMINAL LOCATION: Primary | ICD-10-CM

## 2019-06-24 DIAGNOSIS — K59.09 OTHER CONSTIPATION: ICD-10-CM

## 2019-06-24 DIAGNOSIS — M54.2 CHRONIC NECK AND BACK PAIN: ICD-10-CM

## 2019-06-24 DIAGNOSIS — M54.9 CHRONIC NECK AND BACK PAIN: ICD-10-CM

## 2019-06-24 DIAGNOSIS — N39.0 URINARY TRACT INFECTION WITHOUT HEMATURIA, SITE UNSPECIFIED: ICD-10-CM

## 2019-06-24 LAB
ALBUMIN SERPL BCP-MCNC: 4 G/DL (ref 3.5–5.2)
ALP SERPL-CCNC: 82 U/L (ref 55–135)
ALT SERPL W/O P-5'-P-CCNC: 12 U/L (ref 10–44)
ANION GAP SERPL CALC-SCNC: 11 MMOL/L (ref 8–16)
AST SERPL-CCNC: 17 U/L (ref 10–40)
BASOPHILS # BLD AUTO: 0.03 K/UL (ref 0–0.2)
BASOPHILS NFR BLD: 0.5 % (ref 0–1.9)
BILIRUB SERPL-MCNC: 0.5 MG/DL (ref 0.1–1)
BILIRUB UR QL STRIP: NEGATIVE
BUN SERPL-MCNC: 14 MG/DL (ref 8–23)
CALCIUM SERPL-MCNC: 9.7 MG/DL (ref 8.7–10.5)
CHLORIDE SERPL-SCNC: 108 MMOL/L (ref 95–110)
CLARITY UR: ABNORMAL
CO2 SERPL-SCNC: 22 MMOL/L (ref 23–29)
COLOR UR: YELLOW
CREAT SERPL-MCNC: 0.9 MG/DL (ref 0.5–1.4)
DIFFERENTIAL METHOD: ABNORMAL
EOSINOPHIL # BLD AUTO: 0.1 K/UL (ref 0–0.5)
EOSINOPHIL NFR BLD: 2.3 % (ref 0–8)
ERYTHROCYTE [DISTWIDTH] IN BLOOD BY AUTOMATED COUNT: 14.3 % (ref 11.5–14.5)
EST. GFR  (AFRICAN AMERICAN): >60 ML/MIN/1.73 M^2
EST. GFR  (NON AFRICAN AMERICAN): >60 ML/MIN/1.73 M^2
GLUCOSE SERPL-MCNC: 69 MG/DL (ref 70–110)
GLUCOSE UR QL STRIP: NEGATIVE
HCT VFR BLD AUTO: 42.7 % (ref 37–48.5)
HGB BLD-MCNC: 14 G/DL (ref 12–16)
HGB UR QL STRIP: ABNORMAL
KETONES UR QL STRIP: NEGATIVE
LEUKOCYTE ESTERASE UR QL STRIP: ABNORMAL
LIPASE SERPL-CCNC: 13 U/L (ref 4–60)
LYMPHOCYTES # BLD AUTO: 1.4 K/UL (ref 1–4.8)
LYMPHOCYTES NFR BLD: 22.8 % (ref 18–48)
MCH RBC QN AUTO: 32.4 PG (ref 27–31)
MCHC RBC AUTO-ENTMCNC: 32.8 G/DL (ref 32–36)
MCV RBC AUTO: 99 FL (ref 82–98)
MICROSCOPIC COMMENT: ABNORMAL
MONOCYTES # BLD AUTO: 0.7 K/UL (ref 0.3–1)
MONOCYTES NFR BLD: 11.4 % (ref 4–15)
NEUTROPHILS # BLD AUTO: 3.9 K/UL (ref 1.8–7.7)
NEUTROPHILS NFR BLD: 63 % (ref 38–73)
NITRITE UR QL STRIP: NEGATIVE
PH UR STRIP: 6 [PH] (ref 5–8)
PLATELET # BLD AUTO: 177 K/UL (ref 150–350)
PLATELET BLD QL SMEAR: ABNORMAL
PMV BLD AUTO: 11 FL (ref 9.2–12.9)
POTASSIUM SERPL-SCNC: 4 MMOL/L (ref 3.5–5.1)
PROT SERPL-MCNC: 7.5 G/DL (ref 6–8.4)
PROT UR QL STRIP: NEGATIVE
RBC # BLD AUTO: 4.32 M/UL (ref 4–5.4)
RBC #/AREA URNS HPF: 5 /HPF (ref 0–4)
SODIUM SERPL-SCNC: 141 MMOL/L (ref 136–145)
SP GR UR STRIP: 1.02 (ref 1–1.03)
URN SPEC COLLECT METH UR: ABNORMAL
UROBILINOGEN UR STRIP-ACNC: NEGATIVE EU/DL
WBC # BLD AUTO: 6.13 K/UL (ref 3.9–12.7)
WBC #/AREA URNS HPF: 15 /HPF (ref 0–5)
YEAST URNS QL MICRO: ABNORMAL

## 2019-06-24 PROCEDURE — 1101F PT FALLS ASSESS-DOCD LE1/YR: CPT | Mod: CPTII,S$GLB,, | Performed by: FAMILY MEDICINE

## 2019-06-24 PROCEDURE — 85025 COMPLETE CBC W/AUTO DIFF WBC: CPT

## 2019-06-24 PROCEDURE — 99999 PR PBB SHADOW E&M-EST. PATIENT-LVL III: ICD-10-PCS | Mod: PBBFAC,,, | Performed by: FAMILY MEDICINE

## 2019-06-24 PROCEDURE — 99999 PR PBB SHADOW E&M-EST. PATIENT-LVL III: CPT | Mod: PBBFAC,,, | Performed by: FAMILY MEDICINE

## 2019-06-24 PROCEDURE — 3074F PR MOST RECENT SYSTOLIC BLOOD PRESSURE < 130 MM HG: ICD-10-PCS | Mod: CPTII,S$GLB,, | Performed by: FAMILY MEDICINE

## 2019-06-24 PROCEDURE — 3078F DIAST BP <80 MM HG: CPT | Mod: CPTII,S$GLB,, | Performed by: FAMILY MEDICINE

## 2019-06-24 PROCEDURE — 96376 TX/PRO/DX INJ SAME DRUG ADON: CPT

## 2019-06-24 PROCEDURE — 1101F PR PT FALLS ASSESS DOC 0-1 FALLS W/OUT INJ PAST YR: ICD-10-PCS | Mod: CPTII,S$GLB,, | Performed by: FAMILY MEDICINE

## 2019-06-24 PROCEDURE — 63600175 PHARM REV CODE 636 W HCPCS: Performed by: EMERGENCY MEDICINE

## 2019-06-24 PROCEDURE — 99215 OFFICE O/P EST HI 40 MIN: CPT | Mod: S$GLB,,, | Performed by: FAMILY MEDICINE

## 2019-06-24 PROCEDURE — 3074F SYST BP LT 130 MM HG: CPT | Mod: CPTII,S$GLB,, | Performed by: FAMILY MEDICINE

## 2019-06-24 PROCEDURE — 99215 PR OFFICE/OUTPT VISIT, EST, LEVL V, 40-54 MIN: ICD-10-PCS | Mod: S$GLB,,, | Performed by: FAMILY MEDICINE

## 2019-06-24 PROCEDURE — 96361 HYDRATE IV INFUSION ADD-ON: CPT

## 2019-06-24 PROCEDURE — 81000 URINALYSIS NONAUTO W/SCOPE: CPT

## 2019-06-24 PROCEDURE — 3078F PR MOST RECENT DIASTOLIC BLOOD PRESSURE < 80 MM HG: ICD-10-PCS | Mod: CPTII,S$GLB,, | Performed by: FAMILY MEDICINE

## 2019-06-24 PROCEDURE — 99285 EMERGENCY DEPT VISIT HI MDM: CPT | Mod: 25

## 2019-06-24 PROCEDURE — 87086 URINE CULTURE/COLONY COUNT: CPT

## 2019-06-24 PROCEDURE — 96374 THER/PROPH/DIAG INJ IV PUSH: CPT | Mod: 59

## 2019-06-24 PROCEDURE — 80053 COMPREHEN METABOLIC PANEL: CPT

## 2019-06-24 PROCEDURE — 25500020 PHARM REV CODE 255: Performed by: EMERGENCY MEDICINE

## 2019-06-24 PROCEDURE — 96375 TX/PRO/DX INJ NEW DRUG ADDON: CPT | Mod: 59

## 2019-06-24 PROCEDURE — 25000003 PHARM REV CODE 250: Performed by: EMERGENCY MEDICINE

## 2019-06-24 PROCEDURE — 83690 ASSAY OF LIPASE: CPT

## 2019-06-24 RX ORDER — NITROFURANTOIN 25; 75 MG/1; MG/1
100 CAPSULE ORAL 2 TIMES DAILY
Qty: 10 CAPSULE | Refills: 0 | Status: SHIPPED | OUTPATIENT
Start: 2019-06-24 | End: 2019-06-29

## 2019-06-24 RX ORDER — OMEPRAZOLE 40 MG/1
40 CAPSULE, DELAYED RELEASE ORAL
COMMUNITY
Start: 2013-04-26 | End: 2019-06-26

## 2019-06-24 RX ORDER — ONDANSETRON 4 MG/1
4 TABLET, FILM COATED ORAL EVERY 6 HOURS
Qty: 12 TABLET | Refills: 0 | Status: SHIPPED | OUTPATIENT
Start: 2019-06-24 | End: 2019-06-24 | Stop reason: SDUPTHER

## 2019-06-24 RX ORDER — INSULIN PUMP SYRINGE, 3 ML
EACH MISCELLANEOUS
COMMUNITY
Start: 2015-07-31

## 2019-06-24 RX ORDER — AMITRIPTYLINE HYDROCHLORIDE 50 MG/1
50 TABLET, FILM COATED ORAL
Status: ON HOLD | COMMUNITY
Start: 2017-10-06 | End: 2020-11-20 | Stop reason: CLARIF

## 2019-06-24 RX ORDER — PANTOPRAZOLE SODIUM 20 MG/1
40 TABLET, DELAYED RELEASE ORAL
COMMUNITY
End: 2020-06-26 | Stop reason: SDUPTHER

## 2019-06-24 RX ORDER — OXYCODONE AND ACETAMINOPHEN 7.5; 325 MG/1; MG/1
TABLET ORAL
Refills: 0 | COMMUNITY
Start: 2019-04-14 | End: 2019-06-26 | Stop reason: ALTCHOICE

## 2019-06-24 RX ORDER — PROMETHAZINE HYDROCHLORIDE 25 MG/1
TABLET ORAL
COMMUNITY
Start: 2013-04-27 | End: 2019-06-26

## 2019-06-24 RX ORDER — DIAZEPAM 5 MG/1
5 TABLET ORAL
COMMUNITY
Start: 2017-08-28 | End: 2020-06-26 | Stop reason: ALTCHOICE

## 2019-06-24 RX ORDER — NITROFURANTOIN 25; 75 MG/1; MG/1
100 CAPSULE ORAL 2 TIMES DAILY
Qty: 10 CAPSULE | Refills: 0 | Status: SHIPPED | OUTPATIENT
Start: 2019-06-24 | End: 2019-06-24 | Stop reason: SDUPTHER

## 2019-06-24 RX ORDER — LEVOTHYROXINE SODIUM 50 UG/1
50 TABLET ORAL
COMMUNITY
Start: 2013-04-27 | End: 2019-06-26

## 2019-06-24 RX ORDER — AZATHIOPRINE 100 MG/1
50 TABLET ORAL
COMMUNITY
Start: 2006-07-09 | End: 2019-06-26

## 2019-06-24 RX ORDER — METOCLOPRAMIDE 10 MG/1
10 TABLET ORAL
Status: COMPLETED | OUTPATIENT
Start: 2019-06-24 | End: 2019-06-24

## 2019-06-24 RX ORDER — DICLOFENAC SODIUM 10 MG/G
GEL TOPICAL
Refills: 1 | COMMUNITY
Start: 2019-05-23 | End: 2021-07-16 | Stop reason: SDUPTHER

## 2019-06-24 RX ORDER — DOCUSATE SODIUM 100 MG/1
CAPSULE, LIQUID FILLED ORAL
COMMUNITY
Start: 2013-04-27 | End: 2019-06-26

## 2019-06-24 RX ORDER — MORPHINE SULFATE 2 MG/ML
6 INJECTION, SOLUTION INTRAMUSCULAR; INTRAVENOUS
Status: COMPLETED | OUTPATIENT
Start: 2019-06-24 | End: 2019-06-24

## 2019-06-24 RX ORDER — AZATHIOPRINE 50 MG/1
50 TABLET ORAL
COMMUNITY
End: 2019-06-26

## 2019-06-24 RX ORDER — MORPHINE SULFATE 4 MG/ML
4 INJECTION, SOLUTION INTRAMUSCULAR; INTRAVENOUS
Status: COMPLETED | OUTPATIENT
Start: 2019-06-24 | End: 2019-06-24

## 2019-06-24 RX ORDER — ONDANSETRON 4 MG/1
4 TABLET, FILM COATED ORAL EVERY 6 HOURS
Qty: 12 TABLET | Refills: 0 | Status: SHIPPED | OUTPATIENT
Start: 2019-06-24 | End: 2021-12-20 | Stop reason: ALTCHOICE

## 2019-06-24 RX ORDER — METHOCARBAMOL 750 MG/1
1500 TABLET, FILM COATED ORAL
COMMUNITY
Start: 2017-01-10 | End: 2021-07-16

## 2019-06-24 RX ORDER — METHYLPREDNISOLONE 4 MG/1
TABLET ORAL
COMMUNITY
Start: 2014-03-20 | End: 2019-06-24

## 2019-06-24 RX ORDER — AMITRIPTYLINE HYDROCHLORIDE 25 MG/1
25 TABLET, FILM COATED ORAL
Status: ON HOLD | COMMUNITY
End: 2020-11-20 | Stop reason: CLARIF

## 2019-06-24 RX ORDER — TRAMADOL HYDROCHLORIDE 50 MG/1
50 TABLET ORAL EVERY 6 HOURS PRN
Qty: 12 TABLET | Refills: 0 | Status: SHIPPED | OUTPATIENT
Start: 2019-06-24 | End: 2019-11-08 | Stop reason: ALTCHOICE

## 2019-06-24 RX ORDER — ONDANSETRON 2 MG/ML
4 INJECTION INTRAMUSCULAR; INTRAVENOUS
Status: COMPLETED | OUTPATIENT
Start: 2019-06-24 | End: 2019-06-24

## 2019-06-24 RX ADMIN — ONDANSETRON 4 MG: 2 INJECTION INTRAMUSCULAR; INTRAVENOUS at 04:06

## 2019-06-24 RX ADMIN — METOCLOPRAMIDE 10 MG: 10 TABLET ORAL at 06:06

## 2019-06-24 RX ADMIN — SODIUM CHLORIDE 1000 ML: 0.9 INJECTION, SOLUTION INTRAVENOUS at 04:06

## 2019-06-24 RX ADMIN — MORPHINE SULFATE 4 MG: 4 INJECTION INTRAVENOUS at 06:06

## 2019-06-24 RX ADMIN — IOHEXOL 100 ML: 350 INJECTION, SOLUTION INTRAVENOUS at 05:06

## 2019-06-24 RX ADMIN — MORPHINE SULFATE 6 MG: 2 INJECTION, SOLUTION INTRAMUSCULAR; INTRAVENOUS at 04:06

## 2019-06-24 NOTE — PROGRESS NOTES
"(Portions of this note were dictated using voice recognition software and may contain dictation related errors in spelling/grammar/syntax not found on text review)    CC:   Chief Complaint   Patient presents with    Emesis     Pt states that symptoms started on yesterday.    Diarrhea    Medication Management     Pt is unsure on dosages she takes.        HPI: 64 y.o. female patient of Dr. Dejesus, new to me, here for urgent care for nausea and vomiting and diarrhea starting yesterday.  Medical history below including type 2 diabetes with neuropathy, cervical radiculopathy, hepatitis C, liver cirrhosis, hypothyroidism    She stated that nausea and vomiting and diarrhea started yesterday.  She woke up having had loss of stool, loose.  Nonbloody bowel movements.  She had had diarrhea frequently yesterday.  She subsequently started vomiting food products, nonbilious, nonbloody.  She denies any fevers or chills.  No known sick contacts.  No sore throat or chest pain or shortness of breath or coughing or congestion. Denies any new food products or anything ingested which was raw or undercooked recently.  She is not sure if this is a problem with her liver however given her multiple comorbidities.  Denies any skin rashes.  Complains of a lot of back pain with leg pain which is chronic for her.  She is looking for refill of her pain meds.  She also later in the interview states that she has some fairly chronic abdominal pain symptoms for which she takes pantoprazole, Zantac, also  ondansetron every night.  Has seen GI, had EGD on 03/12/2019, showing normal esophagus, stomach.  States that she has not followed up with GI since then because I guess they do not want to see me, I don't know."    She also complains of occasional blood in the urine, states that this is chronic as well.  Asked if she had brought this up to her PCP and stated I do not know I think I talked to her about it but she said she did not see " anything she had UA done last year in July which showed 1+ leukocytes otherwise negative.  Culture was nondiagnostic.  She states that she has never seen Urology before    Past Medical History:   Diagnosis Date    Anxiety and depression 2015    Arthritis     Cervical radiculopathy 2016    Cirrhosis of liver     Colon polyps 2015    Diabetes mellitus type 2 with neurological manifestations 2015    no current medications, only one episode of elevated sugars with acute illness, will monitor     Encounter for blood transfusion     Hepatitis C     s/p treatment per patient report; managed by Dr. Perez    Hip fracture     Macrocytosis 2015    Thyroid disease     Transfusion reaction     hep c       Past Surgical History:   Procedure Laterality Date    APPENDECTOMY      BACK SURGERY       SECTION      CHOLECYSTECTOMY      COLONOSCOPY  3/31/10    2 polyps, tubular adenoma    COLONOSCOPY  2015    Dr. Washington    COLONOSCOPY N/A 10/10/2018    [facility]    COLONOSCOPY N/A 2013    [facility] (4TH F[facility])    COLONOSCOPY N/A 2013    [facility] at [facility] (4TH FLR)    EGD (ESOPHAGOGASTRODUODENOSCOPY) N/A 3/12/2019    Per[facility] by Pol[facility] Keyes [facility] at [facility] ENDO    EGD (ESOPHAGOGASTRODUODENOSCOPY) N/A 10/10/2018    [facility]    EGD (ESOPHAGOGASTRODUODENOSCOPY) N/A 2013    Performed by [facility] at [facility] ENDO (4TH FLR)    EGD (ESOPHAGOGASTRODUODENOSCOPY)/poss emr N/A 12/10/2018    Performed by Belle Kuo MD at [facility] (2ND FLR)    EGD and colonoscopy same day N/A 2015    Performed by Kd Washington [facility] at [facility] ENDO    ESOPHAGOGASTRODUODENOSCOPY (EGD) N/A 2015    [facility]    HERNIA REPAIR      HYSTERECTOMY      Uterus and  "both ovaries    INCISIONAL HERNIA REPAIR      x 2    Injection-steroid-epidural-caudal N/A 1/29/2019    Performed by Roxana Gu Jr., [facility] at [facility] PAIN MGT    Injection-steroid-epidural-caudal N/A 8/7/2018    [facility] OR    JOINT REPLACEMENT      LIVER BIOPSY  12/2003    autoimmune hepatitis    ROTATOR CUFF REPAIR      right    STOMACH SURGERY  1980's    "took lymph node off of liver"    TOTAL HIP ARTHROPLASTY      left hip    UPPER GASTROINTESTINAL ENDOSCOPY  3/24/10    normal    UPPER GASTROINTESTINAL ENDOSCOPY  04/27/2015    Dr. Washington       Family History   Problem Relation Age of Onset    Diabetes Mellitus Mother     Liver cancer Sister     Breast cancer Sister     Pancreatic cancer Brother     Lung cancer Brother     Colon cancer Brother     Brain cancer Brother     Stomach cancer Other     Throat cancer Brother     Liver disease Neg Hx        Social History     Socioeconomic History    Marital status: Significant Other     Spouse name: Not on file    Number of children: Not on file    Years of education: Not on file    Highest education level: Not on file   Occupational History    Not on file   Social Needs    Financial resource strain: Not on file    Food insecurity:     Worry: Not on file     Inability: Not on file    Transportation needs:     Medical: Not on file     Non-medical: Not on file   Tobacco Use    Smoking status: Current Every Day Smoker     Packs/day: 2.00     Years: 45.00     Pack years: 90.00     Types: Cigarettes    Smokeless tobacco: Never Used   Substance and Sexual Activity    Alcohol use: No     Comment: used to drink heavily, stopped in 1990's    Drug use: No    Sexual activity: Not on file   Lifestyle    Physical activity:     Days per week: Not on file     Minutes per session: Not on file    Stress: Not on file   Relationships    Social connections:     Talks on phone: Not on file     Gets together: Not on " file     Attends Druze service: Not on file     Active member of club or organization: Not on file     Attends meetings of clubs or organizations: Not on file     Relationship status: Not on file   Other Topics Concern    Not on file   Social History Narrative    Youngest out of 16 children to her parents       Lab Results   Component Value Date    WBC 5.45 11/19/2018    HGB 13.1 11/19/2018    HCT 41.5 11/19/2018    MCV 99 (H) 11/19/2018     11/19/2018    CHOL 187 11/19/2018    TRIG 121 11/19/2018    HDL 44 11/19/2018    ALT 9 (L) 11/19/2018    AST 16 11/19/2018    BILITOT 0.3 11/19/2018    ALKPHOS 139 (H) 11/19/2018     11/19/2018    K 4.1 11/19/2018     11/19/2018    CREATININE 0.9 11/19/2018    CALCIUM 9.3 11/19/2018    ALBUMIN 3.7 11/19/2018    BUN 16 11/19/2018    CO2 24 11/19/2018    TSH 1.778 11/19/2018    INR 1.0 06/03/2013    HGBA1C 5.1 11/19/2018    LDLCALC 118.8 11/19/2018    GLU 85 11/19/2018           ROS:  GENERAL:  Above  SKIN: No rashes, no itching.  HEAD: No headaches.  EYES: No visual changes  EARS: No ear pain or changes in hearing.  NOSE: No congestion or rhinorrhea.  MOUTH & THROAT: No hoarseness, change in voice, or sore throat.  NODES: Denies swollen glands.  CHEST: Denies PERRY, cyanosis, wheezing, cough and sputum production.  CARDIOVASCULAR: Denies chest pain, PND, orthopnea.  ABDOMEN:  Above.  URINARY:  Above  PERIPHERAL VASCULAR: No claudication or cyanosis.  MUSCULOSKELETAL:  Above.  NEUROLOGIC:  Above    Vital signs reviewed  PE:   APPEARANCE: Well nourished, well developed, in no acute distress.    HEAD: Normocephalic, atraumatic.  EYES: PERRL. EOMI.   Conjunctivae noninjected.  No scleral icterus.  EARS: TM's intact. Light reflex normal. No retraction or perforation  NOSE: Mucosa pink. Airway clear.  MOUTH & THROAT: No tonsillar enlargement. No pharyngeal erythema or exudate.   NECK: Supple with no cervical lymphadenopathy.    CHEST: Good inspiratory effort.  Lungs clear to auscultation with no wheezes or crackles.  CARDIOVASCULAR: Normal S1, S2. No rubs, murmurs, or gallops.  ABDOMEN: Bowel sounds normal. Not distended. Soft.  Pain epigastrium, right upper quadrant, left upper quadrant, right lower quadrant  EXTREMITIES: No edema  SKIN:  No jaundice noted  PSYCHIATRIC:  Speech slowed    IMPRESSION  1. Vomiting and diarrhea    2. Chronic neck and back pain    3. Other constipation    4. Autoimmune hepatitis    5. History of hepatitis C    6. Hepatic cirrhosis, unspecified hepatic cirrhosis type, unspecified whether ascites present            PLAN  Acute onset vomiting and diarrhea.  Discussed possible differentials including gastroenteritis, constipation induced gastrointestinal symptoms given chronic constipation concerns, infections such as UTI given her urinary concerns above, exacerbation of her liver disease. Discussed options for treatment including trial of increasing Zofran temporarily, oral hydration at home, expected management for at least the next 24-48 hours with outpatient lab work and urine testing.  However, she feels unable to comply with this regimen and feels like she needs more urgent evaluation.  Therefore given her comorbidities and acute exacerbation of pain symptoms, I would recommend ER for further acute imaging, labs, IV fluid and further IV medication management as deemed necessary    She is also wanting to get her pain medications refilled, reviewed  and noticed 2 separate 90 pill prescriptions of hydrocodone  done over the month of May (typical amount is 90 per month).  Also she has a listing from pain management for oxycodone acetaminophen 20 in April of 2019 a few days after Dr. Dejesus had prescribed her 90 hydrocodone.  I asked her about this today and she was adamant that she did not get any pain medication filled from pain management, and became quite irritated with me for bring this up.  I discussed that this is purely with the   database had listed, and I am not making any judgment of which she has gotten.  I talked later Dr. Dejesus who believes that she may not have gotten that prescription from pain management filled per what the pharmacy had mentioned although not sure why that was listed in the  database since this was a pickup date, not a prescription date listed. (if truly erroneous entry pharmacy would have to remove this from  database.  I did recommend that she follow up with Dr. Dejesus to go over medication management for her chronic pain medication but I would not be refilling any medication today

## 2019-06-24 NOTE — TELEPHONE ENCOUNTER
Returned pt phone call, she is requesting to be seen today for diarrhea and vomiting. I got pt scheduled on today at 220PM with dr green. Pt verbalized understanding.

## 2019-06-24 NOTE — ED TRIAGE NOTES
Pt states she have been having severe abdominal pain along with vomiting and diarrhea since yesterday. Pt states she had diarrhea in her sleep. Pt states that she saw her primary doctor and that her doctor sent her to the ER for further testing.

## 2019-06-24 NOTE — TELEPHONE ENCOUNTER
----- Message from Myla Caldwell sent at 6/24/2019  3:30 PM CDT -----  Contact: [phone]/self  Patient called breathing very heavily, concerning medication that she states was by a dr she hasn't seen. States she was told to contact your office with this information, due to heavy breathing and what appeared to be shortness of breath, unable to confirm this information. Please call patient for more info. Reached out to office, no response. Patient hung up during hold, no confirmed callback number.

## 2019-06-24 NOTE — ED PROVIDER NOTES
Encounter Date: 2019    SCRIBE #1 NOTE: I, Randy Bliss, am scribing for, and in the presence of,  Dr. Nash. I have scribed the entire note.       History     Chief Complaint   Patient presents with    Abdominal Pain     Reports pain, N/V  since yesterday. Was seen by PCP and sent to ED for further eval. Reports has cirrhosis of liver and concerned my be related. Also complaining of pain to R hip radiating down RLE.      Thom Krishna is a 64 y.o. female who  has a past medical history of Anxiety and depression, Arthritis, Cervical radiculopathy, Cirrhosis of liver, Colon polyps, Diabetes mellitus type 2 with neurological manifestations, Encounter for blood transfusion, Hepatitis C, Hip fracture, Macrocytosis, Thyroid disease, and Transfusion reaction.    The patient presents to the ED due to abdominal pain with nausea, vomiting, and diarrhea since yesterday. She reports waking up covered in diarrhea yesterday morning, which continued throughout yesterday and today. The patient describes severe abdominal pain which occurs intermittently in the right lower and left upper quadrants of her abdomen. Patient was seen by her PCP for these issues and sent to the ED for further evaluation. She also reports chronic right hip pain which radiates down the right leg, for which she takes Norco. Patient does not report any other symptoms. She has a surgical history including appendectomy, cholecystectomy, , hysterectomy, liver biopsy, and hernia repair.    The history is provided by the patient.     Review of patient's allergies indicates:   Allergen Reactions    Penicillins      Other reaction(s): Hives    Sulfa (sulfonamide antibiotics) Other (See Comments)     Other reaction(s): Hives     Past Medical History:   Diagnosis Date    Anxiety and depression 2015    Arthritis     Cervical radiculopathy 2016    Cirrhosis of liver     Colon polyps 2015    Diabetes mellitus type 2 with  neurological manifestations 2015    no current medications, only one episode of elevated sugars with acute illness, will monitor     Encounter for blood transfusion     Hepatitis C     s/p treatment per patient report; managed by Dr. Perez    Hip fracture     Macrocytosis 2015    Thyroid disease     Transfusion reaction     hep c     Past Surgical History:   Procedure Laterality Date    APPENDECTOMY      BACK SURGERY       SECTION      CHOLECYSTECTOMY      COLONOSCOPY  3/31/10    2 polyps, tubular adenoma    COLONOSCOPY  2015    Dr. Washington    COLONOSCOPY N/A 10/10/2018    [facility]uben Miller Jr[facility] ENDO    COLONOSCOPY N/A 2013    Performed by Seamus Dukes MD at [facility] ENDO (4TH FLR)    COLONOSCOPY N/A 2013    Performed by [facility]ukes [facility] at [facility] ENDO (4TH FLR)    EGD (ESOPHAGOGASTRODUODENOSCOPY) N/A 3/12/2019    Performed by Polo Keyes [facility] at [facility] ENDO    EGD (ESOPHAGOGASTRODUODENOSCOPY) N/A 10/10/2018    Performed by Reuben Miller Jr[facility] ENDO    EGD (ESOPHAGOGASTRODUODENOSCOPY) N/A 2013    Performed by Seamus Dukes MD at [facility] ENDO (4TH FLR)    EGD (ESOPHAGOGASTRODUODENOSCOPY)/poss emr N/A 12/10/2018    Performed by Belle Kuo MD at [facility] ENDO (2ND FLR)    EGD and colonoscopy same day N/A 2015    Performed by Kd Washington [facility] at [facility] ENDO    ESOPHAGOGASTRODUODENOSCOPY (EGD) N/A 2015    Performed by Kd Washington [facility] at [facility] ENDO    HERNIA REPAIR      HYSTERECTOMY      Uterus and both ovaries    INCISIONAL HERNIA REPAIR      x 2    Injection-steroid-epidural-caudal N/A 2019    Performed by Roxana Gu Jr., MD at [facility] PAIN MGT    Injection-steroid-epidural-caudal N/A 2018    Per[facility]ana Gu Jr[facility] at [facility] OR    JOINT REPLACEMENT      LIVER BIOPSY  2003    autoimmune hepatitis    ROTATOR CUFF REPAIR      right    STOMACH  "SURGERY  1980's    "took lymph node off of liver"    TOTAL HIP ARTHROPLASTY      left hip    UPPER GASTROINTESTINAL ENDOSCOPY  3/24/10    normal    UPPER GASTROINTESTINAL ENDOSCOPY  04/27/2015    Dr. Washington     Family History   Problem Relation Age of Onset    Diabetes Mellitus Mother     Liver cancer Sister     Breast cancer Sister     Pancreatic cancer Brother     Lung cancer Brother     Colon cancer Brother     Brain cancer Brother     Stomach cancer Other     Throat cancer Brother     Liver disease Neg Hx      Social History     Tobacco Use    Smoking status: Current Every Day Smoker     Packs/day: 2.00     Years: 45.00     Pack years: 90.00     Types: Cigarettes    Smokeless tobacco: Never Used   Substance Use Topics    Alcohol use: No     Comment: used to drink heavily, stopped in 1990's    Drug use: No     Review of Systems   Constitutional: Negative for chills and fever.   HENT: Negative for facial swelling and trouble swallowing.    Eyes: Negative for redness.   Respiratory: Negative for shortness of breath.    Cardiovascular: Negative for chest pain.   Gastrointestinal: Positive for abdominal pain, diarrhea, nausea and vomiting.   Genitourinary: Negative for dysuria and hematuria.   Musculoskeletal: Positive for arthralgias (right hip). Negative for gait problem.   Skin: Negative for rash.   Neurological: Negative for facial asymmetry and speech difficulty.     Physical Exam     Initial Vitals [06/24/19 1523]   BP Pulse Resp Temp SpO2   136/71 69 16 98 °F (36.7 °C) 99 %      MAP       --         Physical Exam    Nursing note and vitals reviewed.  Constitutional: She appears well-developed and well-nourished. She is not diaphoretic. No distress.   HENT:   Head: Normocephalic and atraumatic.   Eyes: Conjunctivae and EOM are normal.   Neck: Normal range of motion. Neck supple.   Cardiovascular: Normal rate, regular rhythm and normal heart sounds.   Pulmonary/Chest: Breath sounds normal. No " respiratory distress.   Abdominal: Soft. There is tenderness. There is guarding.   Diffusely tender abdomen, most porminent in the RLQ and RUQ with voluntary guarding   Musculoskeletal: Normal range of motion. She exhibits no edema or tenderness.   Neurological: She is alert and oriented to person, place, and time. She has normal strength.   Skin: Skin is warm and dry. Capillary refill takes less than 2 seconds.       ED Course   Procedures  Labs Reviewed   CBC W/ AUTO DIFFERENTIAL - Abnormal; Notable for the following components:       Result Value    Mean Corpuscular Volume 99 (*)     Mean Corpuscular Hemoglobin 32.4 (*)     All other components within normal limits   COMPREHENSIVE METABOLIC PANEL - Abnormal; Notable for the following components:    CO2 22 (*)     Glucose 69 (*)     All other components within normal limits   URINALYSIS, REFLEX TO URINE CULTURE - Abnormal; Notable for the following components:    Appearance, UA Hazy (*)     Occult Blood UA Trace (*)     Leukocytes, UA 1+ (*)     All other components within normal limits    Narrative:     Preferred Collection Type->Urine, Clean Catch   URINALYSIS MICROSCOPIC - Abnormal; Notable for the following components:    RBC, UA 5 (*)     WBC, UA 15 (*)     Yeast, UA Rare (*)     All other components within normal limits    Narrative:     Preferred Collection Type->Urine, Clean Catch   CULTURE, URINE   LIPASE   URINALYSIS          X-Rays:   Independently Interpreted Readings:   Other Readings:  Reviewed by myself, read by radiology.     Imaging Results          CT Abdomen Pelvis With Contrast (Final result)  Result time 06/24/19 17:59:59    Final result by Albaro Almanzar MD (06/24/19 17:59:59)                 Impression:      1. Postsurgical changes of cholecystectomy mild worsening intrahepatic and extrahepatic biliary ductal dilatation.  2. Otherwise no acute intra-abdominal abnormalities identified.  3. Distention of the distal esophagus with stasis or  reflux of ingested material.  4. Postsurgical changes and additional findings as detailed above.      Electronically signed by: Albaro Almanzar MD  Date:    06/24/2019  Time:    17:59             Narrative:    EXAMINATION:  CT ABDOMEN PELVIS WITH CONTRAST    CLINICAL HISTORY:  Nausea, vomiting, diarrhea;    TECHNIQUE:  Low dose axial images, sagittal and coronal reformations were obtained from the lung bases to the pubic symphysis following the IV administration of 75 mL of Omnipaque 350 .  Oral contrast was not given.    COMPARISON:  CT abdomen and pelvis from July 2018.    FINDINGS:  The visualized portion of the heart is unremarkable.  Stable 6 mm right lower lobe pulmonary nodule is seen.  No evidence consolidation or pleural effusion.  There is distention of distal esophagus with stasis or reflux of ingested material seen.    No significant hepatic abnormalities are identified.  Gallbladder has been removed.  There is worsening intrahepatic and extrahepatic biliary ductal dilatation.  Common duct measures 17 mm in caliber.  Small calcified spleen granulomas are noted.  Stomach, pancreas, and adrenal glands are unremarkable.    Kidneys enhance normally with no evidence of hydronephrosis.  Urinary bladder is nondistended.  Uterus has been removed.  Metallic streak artifact is seen within the pelvis.    Appendix is not visualized, however no abnormalities or inflammatory changes are seen in the region.  The visualized loops of small and large bowel show no evidence of obstruction or inflammation.  No free air or free fluid.    Aorta tapers normally moderate atherosclerosis.    No acute osseous abnormality identified.  Postsurgical changes of left total hip arthroplasty are seen with additional postsurgical changes of L4-5 posterior instrumented lumbar fusion.  There is stable grade 1 anterolisthesis of L4 on L5 with stable mild superior endplate deformity of the L4 vertebral body.  Subcutaneous soft tissue  structures are unremarkable.                              Medical Decision Making:   Initial Assessment:   64-year-old female with a nondescript abdominal examination.  Will obtain CT abdomen and pelvis as well as labs of liver function testing markers for infection Excedrin.    Differential Diagnosis:   Abdominal pain in the Adult represents quite a broad differential diagnosis, many benign as well as insidious and sinister pathology is or represented, considerations include gastritis, ulcer, pancreatitis, cholecystitis, cholelithiasis, hepatitis, appendicitis, colitis, enteritis, constipation, ischemic bowel, AAA, nephrolithiasis among many others. This patient's evaluation seems consistent with gastroenteritis though other diagnoses have been considered and are possible.   Clinical Tests:   Lab Tests: Ordered and Reviewed  Radiological Study: Ordered and Reviewed  ED Management:  This patient with nausea vomiting and abdominal pain.  Was able tolerate p.o. in the emergency department had a reassuring abdominal examination following CT imaging which did not demonstrate any acute abnormality.  Has such current plan will be for patient undergo discharge return precautions and expectant management.                      Clinical Impression:       ICD-10-CM ICD-9-CM   1. Abdominal pain, unspecified abdominal location R10.9 789.00   2. Nausea and vomiting, intractability of vomiting not specified, unspecified vomiting type R11.2 787.01   3. Urinary tract infection without hematuria, site unspecified N39.0 599.0       Disposition:   Disposition: Discharged  Condition: Stable      Scribe attestation -scribe helped in creating this medical record however all statements reflect to the best as reasonably possible the opinion and impression of Dr. Rah Nash attending physician     Rah Nash MD  06/24/19 8413

## 2019-06-24 NOTE — TELEPHONE ENCOUNTER
----- Message from Fabiola Moise sent at 6/24/2019 12:38 PM CDT -----  No. [phone]   Patient called earlier.   Patient has diarrhea and is throwing up.  She has back and leg pain.   She would like an appointment today to see Dr. Dejesus.    Please call.

## 2019-06-25 ENCOUNTER — TELEPHONE (OUTPATIENT)
Dept: FAMILY MEDICINE | Facility: CLINIC | Age: 64
End: 2019-06-25

## 2019-06-25 LAB
BACTERIA UR CULT: NORMAL
BACTERIA UR CULT: NORMAL

## 2019-06-25 NOTE — TELEPHONE ENCOUNTER
----- Message from Mathew Middleton sent at 6/25/2019  8:22 AM CDT -----  Contact: [phone]/self  Patient states she needs to be seen within 3 days for a ER follow-up  Please call back to assist at [phone]

## 2019-06-25 NOTE — ED NOTES
Pt states her pain is better; sitting in bed resting comfortably watching TV. Pt denies needing anything at this time; call light within reach.

## 2019-06-25 NOTE — TELEPHONE ENCOUNTER
----- Message from Milana Castaneda sent at 6/25/2019  2:04 PM CDT -----  Contact: self  Type:  Patient states was advised to follow up PCP in 3 days        Patient states was seen in ER patient states was advised to see Urology for bladder and urinary infection  Patient requesting referral for Urology   Patient states need to see doctor tomorrow   Patient states will be out of medication  June 30th.  Patient states need Norco     Who Called:Patient     Best Called Back Phone [phone] or  [phone]

## 2019-06-25 NOTE — TELEPHONE ENCOUNTER
Returned pt phone call, let her know that before a urology referral would be placed. She stated she will try to call back tomorrow morning to get one of dr estrada's urgent appt.

## 2019-06-25 NOTE — ED NOTES
Pt states her pain is getting worse again; notified MD. Pt denies needing anything at this time; call light within reach, will continue to monitor.

## 2019-06-25 NOTE — TELEPHONE ENCOUNTER
Returned pt phone call, I let her know that I could schedule her a hospital follow up within 3 days with another physician but you did not have any avaiability.    She then changed the discussion to her pain medication. I explained to her that a refill of her pain medication would not be issued due to the fact that it is too early. She again stated that she did not receive her norco this month and that she needs her medication. I let her know that all I can do is send a message to dr estrada letting her know and we would have to go by what you say. She verbalized understanding.

## 2019-06-26 ENCOUNTER — OFFICE VISIT (OUTPATIENT)
Dept: FAMILY MEDICINE | Facility: CLINIC | Age: 64
End: 2019-06-26
Payer: MEDICARE

## 2019-06-26 ENCOUNTER — TELEPHONE (OUTPATIENT)
Dept: FAMILY MEDICINE | Facility: CLINIC | Age: 64
End: 2019-06-26

## 2019-06-26 VITALS
DIASTOLIC BLOOD PRESSURE: 70 MMHG | SYSTOLIC BLOOD PRESSURE: 120 MMHG | HEART RATE: 73 BPM | OXYGEN SATURATION: 98 % | HEIGHT: 62 IN | WEIGHT: 142.63 LBS | BODY MASS INDEX: 26.25 KG/M2

## 2019-06-26 DIAGNOSIS — K21.9 GASTROESOPHAGEAL REFLUX DISEASE, ESOPHAGITIS PRESENCE NOT SPECIFIED: ICD-10-CM

## 2019-06-26 DIAGNOSIS — R19.7 NAUSEA VOMITING AND DIARRHEA: ICD-10-CM

## 2019-06-26 DIAGNOSIS — K75.4 AUTOIMMUNE HEPATITIS: ICD-10-CM

## 2019-06-26 DIAGNOSIS — M54.2 CHRONIC NECK AND BACK PAIN: ICD-10-CM

## 2019-06-26 DIAGNOSIS — M54.9 CHRONIC NECK AND BACK PAIN: ICD-10-CM

## 2019-06-26 DIAGNOSIS — Z72.0 TOBACCO ABUSE: ICD-10-CM

## 2019-06-26 DIAGNOSIS — E53.8 VITAMIN B12 DEFICIENCY: ICD-10-CM

## 2019-06-26 DIAGNOSIS — E03.9 ACQUIRED HYPOTHYROIDISM: ICD-10-CM

## 2019-06-26 DIAGNOSIS — M54.16 LUMBAR RADICULOPATHY: ICD-10-CM

## 2019-06-26 DIAGNOSIS — G89.29 CHRONIC NECK AND BACK PAIN: ICD-10-CM

## 2019-06-26 DIAGNOSIS — Z86.19 HISTORY OF HEPATITIS C: ICD-10-CM

## 2019-06-26 DIAGNOSIS — M50.30 DDD (DEGENERATIVE DISC DISEASE), CERVICAL: ICD-10-CM

## 2019-06-26 DIAGNOSIS — F11.20 OPIOID DEPENDENCE, DAILY USE: ICD-10-CM

## 2019-06-26 DIAGNOSIS — R11.2 NAUSEA VOMITING AND DIARRHEA: ICD-10-CM

## 2019-06-26 DIAGNOSIS — R31.9 HEMATURIA, UNSPECIFIED TYPE: Primary | ICD-10-CM

## 2019-06-26 DIAGNOSIS — M51.36 DDD (DEGENERATIVE DISC DISEASE), LUMBAR: ICD-10-CM

## 2019-06-26 DIAGNOSIS — R10.2 PELVIC PAIN: ICD-10-CM

## 2019-06-26 DIAGNOSIS — D13.2 ADENOMATOUS DUODENAL POLYP: ICD-10-CM

## 2019-06-26 PROCEDURE — 99999 PR PBB SHADOW E&M-EST. PATIENT-LVL III: CPT | Mod: PBBFAC,,, | Performed by: FAMILY MEDICINE

## 2019-06-26 PROCEDURE — 3078F DIAST BP <80 MM HG: CPT | Mod: CPTII,S$GLB,, | Performed by: FAMILY MEDICINE

## 2019-06-26 PROCEDURE — 3074F SYST BP LT 130 MM HG: CPT | Mod: CPTII,S$GLB,, | Performed by: FAMILY MEDICINE

## 2019-06-26 PROCEDURE — 99214 OFFICE O/P EST MOD 30 MIN: CPT | Mod: S$GLB,,, | Performed by: FAMILY MEDICINE

## 2019-06-26 PROCEDURE — 3074F PR MOST RECENT SYSTOLIC BLOOD PRESSURE < 130 MM HG: ICD-10-PCS | Mod: CPTII,S$GLB,, | Performed by: FAMILY MEDICINE

## 2019-06-26 PROCEDURE — 1101F PT FALLS ASSESS-DOCD LE1/YR: CPT | Mod: CPTII,S$GLB,, | Performed by: FAMILY MEDICINE

## 2019-06-26 PROCEDURE — 1101F PR PT FALLS ASSESS DOC 0-1 FALLS W/OUT INJ PAST YR: ICD-10-PCS | Mod: CPTII,S$GLB,, | Performed by: FAMILY MEDICINE

## 2019-06-26 PROCEDURE — 99214 PR OFFICE/OUTPT VISIT, EST, LEVL IV, 30-39 MIN: ICD-10-PCS | Mod: S$GLB,,, | Performed by: FAMILY MEDICINE

## 2019-06-26 PROCEDURE — 3078F PR MOST RECENT DIASTOLIC BLOOD PRESSURE < 80 MM HG: ICD-10-PCS | Mod: CPTII,S$GLB,, | Performed by: FAMILY MEDICINE

## 2019-06-26 PROCEDURE — 99999 PR PBB SHADOW E&M-EST. PATIENT-LVL III: ICD-10-PCS | Mod: PBBFAC,,, | Performed by: FAMILY MEDICINE

## 2019-06-26 RX ORDER — HYDROCODONE BITARTRATE AND ACETAMINOPHEN 10; 325 MG/1; MG/1
1 TABLET ORAL 2 TIMES DAILY PRN
Qty: 60 TABLET | Refills: 0 | Status: SHIPPED | OUTPATIENT
Start: 2019-07-11 | End: 2019-06-26 | Stop reason: SDUPTHER

## 2019-06-26 RX ORDER — HYDROCODONE BITARTRATE AND ACETAMINOPHEN 10; 325 MG/1; MG/1
1 TABLET ORAL 2 TIMES DAILY PRN
Qty: 60 TABLET | Refills: 0 | Status: SHIPPED | OUTPATIENT
Start: 2019-08-11 | End: 2019-06-26 | Stop reason: SDUPTHER

## 2019-06-26 RX ORDER — HYDROCODONE BITARTRATE AND ACETAMINOPHEN 10; 325 MG/1; MG/1
1 TABLET ORAL DAILY PRN
Qty: 16 TABLET | Refills: 0 | Status: SHIPPED | OUTPATIENT
Start: 2019-06-26 | End: 2019-06-26 | Stop reason: SDUPTHER

## 2019-06-26 NOTE — TELEPHONE ENCOUNTER
Please notify patient that prior to her coming in today I have reviewed her prescription history and used a tool to calculate the safety of pain medication dosing that is now required.     Our records indicate that she filled 2 pain med prescriptions written by me in the month of may-- 5/11/2019 and also 5/30/2019. Each of these prescriptions is designed to last a full month.  Because of this, she should not be out until 7/11/2019.     Because we did not formally sign a pain contract, I will accept partial responsibility for any confusion that may have occurred regarding her prescriptions and provide her with ONE NORCO per day to last from today until 7/11/2019-- the day she should normally be out.    We will also at that time starting on July 11, 2019 need to decrease her pain meds to a max of #60 norco 10/325 per month and I can provide her 3 post dated prescriptions for #60 to be filled on or after July 11, August 11, and Sept 11th.    We will outline this and sign a pain contract in the visit that goes over all of this and also puts in writing that she should not fill any narcotic prescriptions from any other providers if she is going to get pain medications through me.    This is non-negotiable-- the prescriptions have been printed this way and will not be changed, so if she does not agree with this plan for the pain meds she needs to find a different prescriber for them.     Also, they just did a urine culture in the ER and there was no bladder infection on the culture. She may need to increase hydration and this would hopefully feel better. We can recheck for infection in the office but it is not likely given this recent negative test on 6/24. Thanks.

## 2019-06-26 NOTE — TELEPHONE ENCOUNTER
Tried to call pt to let her know dr recommendations. No answer left message requesting a call back.

## 2019-06-26 NOTE — PROGRESS NOTES
Office Visit    Patient Name: Thom Krishna    : 1955  MRN: [id number]    Subjective:  Thom is a 64 y.o. female who presents today for:    No chief complaint on file.    64-year-old patient of mine who comes in today for ER follow-up, to discuss chronic pain, and to review recent urine results.    She is a 64-year-old patient of mine with multilevel degenerative disc disease and chronic pain of her neck and back.  She has had spine surgery through [facility] in the past and is currently following with Dr. Brown an outside pain management doctor.  She has severe chronic pain of her back that radiates into her legs and reports to me that he is advising that she seek a surgical consultation.  I do not have access to her MRIs as they were done at an outside facility.  Over the last couple of months there are issues regarding proper filling of her pain medication prescriptions.  We had not until today completed a pain contract and she reports that there was some confusion on her end about whether she could have pain medications from me and a pain management doctor, especially if the pain management prescriptions were more of a temporary prescription.  Review of the PDMP shows that patient did fill 2 one-month Norco prescriptions for 90 pills in the month of May in a very short time frame.     She was seen by Dr. Díaz on 2019 for an urgent care visit for nausea/vomiting/diarrhea and requested a refill of her pain medication at that time, which he declined to fill due to discrepancies in the PDMP and due to the fact that he is not her regular primary care doctor. She was very upset over this.     She reports that fortunately after her evaluation in the ER and administration of IV fluids she is feeling better and her nausea/vomiting/diarrhea is overall resolved.  She does not have any pain medication at the present time.  She reports being in severe intractable pain of her back.  She also reports ongoing  concerns about her bladder.  Review of ER testing shows that she did have blood on her urinalysis and was prescribed an antibiotic, but the follow-up culture did not show an actual UTI-multiple organisms grew out but none at a clinically significant level.  She reports intermittent episodes of visible blood in her urine and in her underwear.  She has had a hysterectomy as well as a transvaginal ultrasound with no identification of a source of bleeding.  She has had a CT scan of the abdomen and pelvis but no CT urogram study.  She has frequent UTI type symptoms without documentation of an actual infection.  I have brought up the possibility of interstitial cystitis in the past.    She continues to see to hepatology Dr. Perez for management of her history of hep C with autoimmune hepatitis.    Past Medical History  Past Medical History:   Diagnosis Date    Anxiety and depression 2015    Arthritis     Cervical radiculopathy 2016    Cirrhosis of liver     Colon polyps 2015    Diabetes mellitus type 2 with neurological manifestations 2015    no current medications, only one episode of elevated sugars with acute illness, will monitor     Encounter for blood transfusion     Hepatitis C     s/p treatment per patient report; managed by Dr. Perez    Hip fracture     Macrocytosis 2015    Thyroid disease     Transfusion reaction     hep c       Past Surgical History  Past Surgical History:   Procedure Laterality Date    APPENDECTOMY      BACK SURGERY       SECTION      CHOLECYSTECTOMY      COLONOSCOPY  3/31/10    2 polyps, tubular adenoma    COLONOSCOPY  2015    Dr. Washington    COLONOSCOPY N/A 10/10/2018    [facility] ENDO    COLONOSCOPY N/A 2013    [facility] ENDO (4TH FLR)    COLONOSCOPY N/A 2013    [facility] ENDO (4TH F[facility])    EGD  "(ESOPHAGOGASTRODUODENOSCOPY) N/A 3/12/2019    Per[facility] by Polo Keyes [facility] at [facility] ENDO    EGD (ESOPHAGOGASTRODUODENOSCOPY) N/A 10/10/2018    [facility] ENDO    EGD (ESOPHAGOGASTRODUODENOSCOPY) N/A 5/21/2013    [facility] ENDO (4TH FLR)    EGD (ESOPHAGOGASTRODUODENOSCOPY)/poss emr N/A 12/10/2018    Performed by Belle Kuo [facility] at [facility] ENDO (2ND FLR)    EGD and colonoscopy same day N/A 4/27/2015    Performed by Kd Washington MD at [facility] ENDO    ESOPHAGOGASTRODUODENOSCOPY (EGD) N/A 4/27/2015    Performed by Kd Washington MD at [facility] ENDO    HERNIA REPAIR      HYSTERECTOMY  1989    Uterus and both ovaries    INCISIONAL HERNIA REPAIR      x 2    Injection-steroid-epidural-caudal N/A 1/29/2019    Performed by Roxana Gu Jr., MD at [facility] PAIN MGT    Injection-steroid-epidural-caudal N/A 8/7/2018    [facility] OR    JOINT REPLACEMENT      LIVER BIOPSY  12/2003    autoimmune hepatitis    ROTATOR CUFF REPAIR      right    STOMACH SURGERY  1980's    "took lymph node off of liver"    TOTAL HIP ARTHROPLASTY      left hip    UPPER GASTROINTESTINAL ENDOSCOPY  3/24/10    normal    UPPER GASTROINTESTINAL ENDOSCOPY  04/27/2015    Dr. Washington       Family History  Family History   Problem Relation Age of Onset    Diabetes Mellitus Mother     Liver cancer Sister     Breast cancer Sister     Pancreatic cancer Brother     Lung cancer Brother     Colon cancer Brother     Brain cancer Brother     Stomach cancer Other     Throat cancer Brother     Liver disease Neg Hx        Social History  Social History     Socioeconomic History    Marital status: Significant Other     Spouse name: Not on file    Number of children: Not on file    Years of education: Not on file    Highest education level: Not on file   Occupational History    Not on file   Social Needs    Financial resource " "strain: Not on file    Food insecurity:     Worry: Not on file     Inability: Not on file    Transportation needs:     Medical: Not on file     Non-medical: Not on file   Tobacco Use    Smoking status: Current Every Day Smoker     Packs/day: 2.00     Years: 45.00     Pack years: 90.00     Types: Cigarettes    Smokeless tobacco: Never Used   Substance and Sexual Activity    Alcohol use: No     Comment: used to drink heavily, stopped in 1990's    Drug use: No    Sexual activity: Not on file   Lifestyle    Physical activity:     Days per week: Not on file     Minutes per session: Not on file    Stress: Not on file   Relationships    Social connections:     Talks on phone: Not on file     Gets together: Not on file     Attends [employer]ism service: Not on file     Active member of club or organization: Not on file     Attends meetings of clubs or organizations: Not on file     Relationship status: Not on file   Other Topics Concern    Not on file   Social History Narrative    Youngest out of 16 children to her parents       Current Medications  Medications reviewed and updated.     Allergies   Review of patient's allergies indicates:   Allergen Reactions    Penicillins      Other reaction(s): Hives    Sulfa (sulfonamide antibiotics) Other (See Comments)     Other reaction(s): Hives       Review of Systems (Pertinent positives)  Review of Systems   Constitutional: Negative for chills and fever.   Gastrointestinal: Positive for nausea. Negative for diarrhea (now resolved) and vomiting.   Genitourinary: Positive for hematuria. Negative for dysuria, vaginal bleeding and vaginal discharge.   Musculoskeletal: Positive for back pain and neck pain.       /70   Pulse 73   Ht 5' 2" (1.575 m)   Wt 64.7 kg (142 lb 10.2 oz)   SpO2 98%   BMI 26.09 kg/m²     Physical Exam   Constitutional: She is oriented to person, place, and time. She appears well-developed and well-nourished. No distress.   HENT:   Head: " Normocephalic and atraumatic.   Eyes: Conjunctivae are normal.   Cardiovascular: Normal rate and regular rhythm.   Pulmonary/Chest: Effort normal and breath sounds normal.   Abdominal: Soft. There is tenderness (generalized).   Musculoskeletal: She exhibits no edema.   Neurological: She is alert and oriented to person, place, and time.   Skin: Skin is warm and dry.   Psychiatric: She has a normal mood and affect.   Vitals reviewed.        Assessment/Plan:  Thom Krishna is a 64 y.o. female who presents today for :    Diagnoses and all orders for this visit:    Hematuria, unspecified type  Comments:  Blood seen in UA performed inER, Culture negative. Previous CT ABD/pelvis & TV US unremarkable as to source. Urology referral to discuss CYSTOSCOPY/ CT UROGRAM   Orders:  -     Ambulatory referral to Urology    Pelvic pain  -     Ambulatory referral to Urology    Nausea vomiting and diarrhea  Comments:  Now resolved, status post supportive care in the ER.  Discussed that this could be secondary to pain med withdrawals.  Pain contract updated    Lumbar radiculopathy  Comments:  Following with outside pain management doctor, in part due to insurance difficulties.  Has a referral to an outside neurosurgeon- had updated MRI last month    Chronic neck and back pain    Opioid dependence, daily use  Comments:  extensive conversation documented in tel call. have updated pain contract and decreased chronic daily opiod dose. PDMP reviewed    History of hepatitis C  Comments:  following with [facility] hepatology dr Perez    Autoimmune hepatitis    Tobacco abuse  Comments:  Declines referral to smoking cessation services    Gastroesophageal reflux disease, esophagitis presence not specified  Comments:  on Protonix but still significant, status post removal of duodenal polyp- precancerous adenoma, needs GI follow-up.  She is due about now for repeat EGD  Orders:  -     Ambulatory referral to Gastroenterology    Adenomatous duodenal  polyp  -     Ambulatory referral to Gastroenterology    Vitamin B12 deficiency    Acquired hypothyroidism  Comments:  Has orders on file to recheck labs prior to office visit in September, advised to keep that appointment            ICD-10-CM ICD-9-CM    1. Hematuria, unspecified type R31.9 599.70 Ambulatory referral to Urology    Blood seen in UA performed inER, Culture negative. Previous CT ABD/pelvis & TV US unremarkable as to source. Urology referral to discuss CYSTOSCOPY/ CT UROGRAM    2. Pelvic pain R10.2 [id number] Ambulatory referral to Urology   3. Nausea vomiting and diarrhea R11.2 787.91     R19.7 787.01     Now resolved, status post supportive care in the ER.  Discussed that this could be secondary to pain med withdrawals.  Pain contract updated   4. Lumbar radiculopathy M54.16 724.4     Following with outside pain management doctor, in part due to insurance difficulties.  Has a referral to an outside neurosurgeon- had updated MRI last month   5. Chronic neck and back pain M54.2 723.1     M54.9 724.5     G89.29     6. Opioid dependence, daily use F11.20 304.01     extensive conversation documented in tel call. have updated pain contract and decreased chronic daily opiod dose. PDMP reviewed   7. History of hepatitis C Z86.19 V12.09     following with [facility] hepatology dr Perez   8. Autoimmune hepatitis K75.4 571.42    9. Tobacco abuse Z72.0 305.1     Declines referral to smoking cessation services   10. Gastroesophageal reflux disease, esophagitis presence not specified K21.9 530.81 Ambulatory referral to Gastroenterology    on Protonix but still significant, status post removal of duodenal polyp- precancerous adenoma, needs GI follow-up.  She is due about now for repeat EGD   11. Adenomatous duodenal polyp D13.2 211.2 Ambulatory referral to Gastroenterology   12. Vitamin B12 deficiency E53.8 266.2    13. Acquired hypothyroidism E03.9 244.9     Has orders on file to recheck labs prior to office visit in  September, advised to keep that appointment       There are no Patient Instructions on file for this visit.      Follow up for to follow up on lab results, return as needed for new concerns.

## 2019-07-01 ENCOUNTER — TELEPHONE (OUTPATIENT)
Dept: FAMILY MEDICINE | Facility: CLINIC | Age: 64
End: 2019-07-01

## 2019-07-08 NOTE — TELEPHONE ENCOUNTER
Thanks for the update-- if you could please reach out to her one more time about scheduling her GI appointment and if she again declines to schedule we will just note that in the chart.     When calling please remind her that one of the reasons for the referral is to follow up on a precancerous polyp that was removed-- failure to follow up on these issues can result in gastrointestinal cancer. Thanks

## 2019-08-05 DIAGNOSIS — Z12.2 ENCOUNTER FOR SCREENING FOR LUNG CANCER: Primary | ICD-10-CM

## 2019-08-06 ENCOUNTER — TELEPHONE (OUTPATIENT)
Dept: FAMILY MEDICINE | Facility: CLINIC | Age: 64
End: 2019-08-06

## 2019-08-06 NOTE — TELEPHONE ENCOUNTER
Pt is requesting to be able to refill her norco RX early beings that the 11th falls on a Sunday and the  [facility] pharmacy is closed on Sunday, I let her know that she could not fill early. That her medication should last her till the 12th, she is only suppose to be taking it as needed. Pt verbalized understanding.

## 2019-08-06 NOTE — TELEPHONE ENCOUNTER
----- Message from Jazmyn Puckett sent at 8/6/2019 12:17 PM CDT -----  Contact: Patient/ [phone]  Patient is requesting a callback regarding prescription that was given by the doctor.    Please call

## 2019-08-12 ENCOUNTER — LAB VISIT (OUTPATIENT)
Dept: LAB | Facility: HOSPITAL | Age: 64
End: 2019-08-12
Attending: FAMILY MEDICINE
Payer: MEDICARE

## 2019-08-12 DIAGNOSIS — K75.4 AUTOIMMUNE HEPATITIS: ICD-10-CM

## 2019-08-12 DIAGNOSIS — Z51.81 ENCOUNTER FOR MONITORING LONG-TERM PROTON PUMP INHIBITOR THERAPY: ICD-10-CM

## 2019-08-12 DIAGNOSIS — E53.8 VITAMIN B12 DEFICIENCY: ICD-10-CM

## 2019-08-12 DIAGNOSIS — Z79.899 ENCOUNTER FOR MONITORING LONG-TERM PROTON PUMP INHIBITOR THERAPY: ICD-10-CM

## 2019-08-12 DIAGNOSIS — Z79.899 MEDICATION MANAGEMENT: ICD-10-CM

## 2019-08-12 DIAGNOSIS — E78.1 HYPERTRIGLYCERIDEMIA: ICD-10-CM

## 2019-08-12 DIAGNOSIS — E55.9 VITAMIN D DEFICIENCY: ICD-10-CM

## 2019-08-12 DIAGNOSIS — E11.49 DIABETES MELLITUS TYPE 2 WITH NEUROLOGICAL MANIFESTATIONS: ICD-10-CM

## 2019-08-12 LAB
25(OH)D3+25(OH)D2 SERPL-MCNC: 9 NG/ML (ref 30–96)
ALBUMIN SERPL BCP-MCNC: 3.9 G/DL (ref 3.5–5.2)
ALP SERPL-CCNC: 94 U/L (ref 55–135)
ALT SERPL W/O P-5'-P-CCNC: 12 U/L (ref 10–44)
ANION GAP SERPL CALC-SCNC: 9 MMOL/L (ref 8–16)
AST SERPL-CCNC: 16 U/L (ref 10–40)
BASOPHILS # BLD AUTO: 0.03 K/UL (ref 0–0.2)
BASOPHILS NFR BLD: 0.5 % (ref 0–1.9)
BILIRUB SERPL-MCNC: 0.4 MG/DL (ref 0.1–1)
BUN SERPL-MCNC: 15 MG/DL (ref 8–23)
CALCIUM SERPL-MCNC: 9.5 MG/DL (ref 8.7–10.5)
CHLORIDE SERPL-SCNC: 105 MMOL/L (ref 95–110)
CHOLEST SERPL-MCNC: 183 MG/DL (ref 120–199)
CHOLEST/HDLC SERPL: 3.5 {RATIO} (ref 2–5)
CO2 SERPL-SCNC: 27 MMOL/L (ref 23–29)
CREAT SERPL-MCNC: 1 MG/DL (ref 0.5–1.4)
DIFFERENTIAL METHOD: ABNORMAL
EOSINOPHIL # BLD AUTO: 0.2 K/UL (ref 0–0.5)
EOSINOPHIL NFR BLD: 3.7 % (ref 0–8)
ERYTHROCYTE [DISTWIDTH] IN BLOOD BY AUTOMATED COUNT: 14.3 % (ref 11.5–14.5)
EST. GFR  (AFRICAN AMERICAN): >60 ML/MIN/1.73 M^2
EST. GFR  (NON AFRICAN AMERICAN): 59 ML/MIN/1.73 M^2
ESTIMATED AVG GLUCOSE: 105 MG/DL (ref 68–131)
GLUCOSE SERPL-MCNC: 72 MG/DL (ref 70–110)
HBA1C MFR BLD HPLC: 5.3 % (ref 4–5.6)
HCT VFR BLD AUTO: 42.4 % (ref 37–48.5)
HDLC SERPL-MCNC: 53 MG/DL (ref 40–75)
HDLC SERPL: 29 % (ref 20–50)
HGB BLD-MCNC: 13.7 G/DL (ref 12–16)
LDLC SERPL CALC-MCNC: 104.6 MG/DL (ref 63–159)
LYMPHOCYTES # BLD AUTO: 2 K/UL (ref 1–4.8)
LYMPHOCYTES NFR BLD: 35.7 % (ref 18–48)
MAGNESIUM SERPL-MCNC: 2.2 MG/DL (ref 1.6–2.6)
MCH RBC QN AUTO: 32.3 PG (ref 27–31)
MCHC RBC AUTO-ENTMCNC: 32.3 G/DL (ref 32–36)
MCV RBC AUTO: 100 FL (ref 82–98)
MONOCYTES # BLD AUTO: 0.6 K/UL (ref 0.3–1)
MONOCYTES NFR BLD: 9.9 % (ref 4–15)
NEUTROPHILS # BLD AUTO: 2.8 K/UL (ref 1.8–7.7)
NEUTROPHILS NFR BLD: 50.2 % (ref 38–73)
NONHDLC SERPL-MCNC: 130 MG/DL
PLATELET # BLD AUTO: 184 K/UL (ref 150–350)
PMV BLD AUTO: 10.8 FL (ref 9.2–12.9)
POTASSIUM SERPL-SCNC: 4 MMOL/L (ref 3.5–5.1)
PROT SERPL-MCNC: 7.3 G/DL (ref 6–8.4)
RBC # BLD AUTO: 4.24 M/UL (ref 4–5.4)
SODIUM SERPL-SCNC: 141 MMOL/L (ref 136–145)
TRIGL SERPL-MCNC: 127 MG/DL (ref 30–150)
TSH SERPL DL<=0.005 MIU/L-ACNC: 2.64 UIU/ML (ref 0.4–4)
VIT B12 SERPL-MCNC: 297 PG/ML (ref 210–950)
WBC # BLD AUTO: 5.63 K/UL (ref 3.9–12.7)

## 2019-08-12 PROCEDURE — 82306 VITAMIN D 25 HYDROXY: CPT | Mod: HCNC

## 2019-08-12 PROCEDURE — 80061 LIPID PANEL: CPT | Mod: HCNC

## 2019-08-12 PROCEDURE — 85025 COMPLETE CBC W/AUTO DIFF WBC: CPT | Mod: HCNC

## 2019-08-12 PROCEDURE — 83036 HEMOGLOBIN GLYCOSYLATED A1C: CPT | Mod: HCNC

## 2019-08-12 PROCEDURE — 82607 VITAMIN B-12: CPT | Mod: HCNC

## 2019-08-12 PROCEDURE — 36415 COLL VENOUS BLD VENIPUNCTURE: CPT | Mod: HCNC

## 2019-08-12 PROCEDURE — 84443 ASSAY THYROID STIM HORMONE: CPT | Mod: HCNC

## 2019-08-12 PROCEDURE — 83735 ASSAY OF MAGNESIUM: CPT | Mod: HCNC

## 2019-08-12 PROCEDURE — 80053 COMPREHEN METABOLIC PANEL: CPT | Mod: HCNC

## 2019-08-31 DIAGNOSIS — F41.9 ANXIETY: ICD-10-CM

## 2019-08-31 NOTE — TELEPHONE ENCOUNTER
Called pharmacy to find out why they needed a new RX for pt's xanax, pt states that there were no more refills that the pt has been filling the RX early and the last time it was filled was on 8/9/2019, I called the pt and let the pt know that she could not get a refill until after the 9th of September. She is requesting a new refill of her xanax. I let her know that originally her xanax should have not been filled till the 22nd of each month. I spoke allision, she stated that she filled it 3 days early in may, she stated that  They allow the pt's to fill 2 days early. Pt seems to have been filling it as soon as it hits the 22nd day. Please Advise.

## 2019-08-31 NOTE — TELEPHONE ENCOUNTER
----- Message from Karen Kellogg sent at 8/31/2019 10:14 AM CDT -----  Contact: Self [phone]  She is needing a refill of her Xanax called in to the pharmacy on file. The pharmacy told her she is out of refills and the pt is out of medication. Please call her back to discuss once this has been done.

## 2019-09-03 ENCOUNTER — TELEPHONE (OUTPATIENT)
Dept: FAMILY MEDICINE | Facility: CLINIC | Age: 64
End: 2019-09-03

## 2019-09-03 RX ORDER — ALPRAZOLAM 1 MG/1
1 TABLET ORAL 2 TIMES DAILY PRN
Qty: 45 TABLET | Refills: 5 | Status: SHIPPED | OUTPATIENT
Start: 2019-09-03 | End: 2020-01-16 | Stop reason: SDUPTHER

## 2019-09-03 NOTE — TELEPHONE ENCOUNTER
Informed pt again that her medication was verbally called in to the pharmacy and her prescription is for every 30 days. No early refills. Pt verbalized understanding.

## 2019-09-03 NOTE — TELEPHONE ENCOUNTER
----- Message from Tatyana Rankin sent at 9/3/2019 11:29 AM CDT -----  Contact: [phone]/self  Type:  RX Refill Request    Who Called:  self  Refill or New Rx: refill  RX Name and Strength: ALPRAZolam (XANAX) 1 MG tablet  How is the patient currently taking it? (ex. 1XDay): Take 1 tablet (1 mg total) by mouth 2 (two) times daily as needed. - OralIs this a 30 day or 90 day RX: 45 pills/5 refills  Preferred Pharmacy with phone number: [id number] - [place] - [address] AT [address]  Local or Mail Order:   Ordering Provider:   Would the patient rather a call back or a response via [facility]?  call  Best Call Back Number:   Additional Information:  Please call her to explain why she can't get the rx filled.

## 2019-09-03 NOTE — TELEPHONE ENCOUNTER
----- Message from Maryanne Genao sent at 9/3/2019  3:56 PM CDT -----  Contact: pt- [phone]  MD Brandy Temple MD  Outpatient Medication Detail      Disp Refills Start End   ALPRAZolam (XANAX) 1 MG tablet 45 tablet 5 9/3/2019    Sig - Route: Take 1 tablet (1 mg total) by mouth 2 (two) times daily as needed. - Oral   Class: Print   Associated Diagnoses     Anxiety     Pharmacy     [id number] - [place], [place] - [address] AT [address]  Additional Information     Associated Reports  View Encounter

## 2019-09-03 NOTE — TELEPHONE ENCOUNTER
----- Message from Maxine Burch sent at 9/3/2019  3:18 PM CDT -----  Contact: Pt  Pt needs a refill on ALPRAZolam (XANAX) 1 MG tablet called into pharmacy at [facility]. Patient stated she is completely out    Pt can be reached at [phone]

## 2019-09-03 NOTE — TELEPHONE ENCOUNTER
I have printed a Xanax prescription that should last her for the next 6 months (30 d w/ 5 refills).  She will not be able to receive any additional Xanax prescriptions until March 3, 2019.  This will be strictly enforced, so she should ensure that she does not run out before then, thank you

## 2019-09-03 NOTE — TELEPHONE ENCOUNTER
----- Message from Malathi Atwood sent at 9/3/2019  3:32 PM CDT -----  Contact: Beatriz [phone]  Pharmacy would like to receive an authorization on a refill for ALPRAZolam (XANAX) 1 MG tablet. Please advise.

## 2019-09-13 ENCOUNTER — TELEPHONE (OUTPATIENT)
Dept: FAMILY MEDICINE | Facility: CLINIC | Age: 64
End: 2019-09-13

## 2019-09-13 NOTE — TELEPHONE ENCOUNTER
Returned pt phone call, pt stated they did a trial surgery for her back, on the 19th they are gonna take it  Out and they will then give her a date when to get the surgery, pt stated that she wants to talk to you about her medication. She states that pain management are gonna give her pain medication after the surgery and she doesn't want to go against her contract.

## 2019-09-13 NOTE — TELEPHONE ENCOUNTER
----- Message from Mathew Middleton sent at 9/13/2019  9:44 AM CDT -----  Contact: [phone]/self  Patient calling to speak with you concerning her upcoming procedure.  Please call back to assist at [phone]

## 2019-09-16 ENCOUNTER — TELEPHONE (OUTPATIENT)
Dept: FAMILY MEDICINE | Facility: CLINIC | Age: 64
End: 2019-09-16
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
## 2019-09-16 NOTE — TELEPHONE ENCOUNTER
I am happy that she communicated this to us, and I am also happy to hear that she is working with pain management.    When she has a surgery date she should let us know, and I will have her prescriptions from me end at the time of the surgery, and then she can get her ongoing prescriptions from pain management until if/when they release her back to me for her pain medication, thanks.

## 2019-09-25 ENCOUNTER — PATIENT OUTREACH (OUTPATIENT)
Dept: ADMINISTRATIVE | Facility: HOSPITAL | Age: 64
End: 2019-09-25

## 2019-09-26 ENCOUNTER — TELEPHONE (OUTPATIENT)
Dept: FAMILY MEDICINE | Facility: CLINIC | Age: 64
End: 2019-09-26

## 2019-09-26 DIAGNOSIS — E03.9 ACQUIRED HYPOTHYROIDISM: ICD-10-CM

## 2019-09-26 DIAGNOSIS — E11.49 DIABETES MELLITUS TYPE 2 WITH NEUROLOGICAL MANIFESTATIONS: Primary | ICD-10-CM

## 2019-09-26 DIAGNOSIS — Z01.00 ROUTINE EYE EXAM: ICD-10-CM

## 2019-09-26 NOTE — TELEPHONE ENCOUNTER
----- Message from Flory Worthy MA sent at 9/25/2019  2:24 PM CDT -----  Patient would like a referral to ophthalmology /optometry for routine eye exam.

## 2019-10-07 ENCOUNTER — TELEPHONE (OUTPATIENT)
Dept: FAMILY MEDICINE | Facility: CLINIC | Age: 64
End: 2019-10-07
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
## 2019-10-08 ENCOUNTER — TELEPHONE (OUTPATIENT)
Dept: FAMILY MEDICINE | Facility: CLINIC | Age: 64
End: 2019-10-08

## 2019-10-08 DIAGNOSIS — M47.9 OSTEOARTHRITIS OF SPINE, UNSPECIFIED SPINAL OSTEOARTHRITIS COMPLICATION STATUS, UNSPECIFIED SPINAL REGION: Primary | ICD-10-CM

## 2019-10-08 DIAGNOSIS — M43.10 SPONDYLOLISTHESIS, UNSPECIFIED SPINAL REGION: ICD-10-CM

## 2019-10-08 DIAGNOSIS — M54.16 LUMBAR RADICULOPATHY: ICD-10-CM

## 2019-10-08 DIAGNOSIS — E11.49 DIABETES MELLITUS TYPE 2 WITH NEUROLOGICAL MANIFESTATIONS: Primary | ICD-10-CM

## 2019-10-08 RX ORDER — HYDROCODONE BITARTRATE AND ACETAMINOPHEN 10; 325 MG/1; MG/1
TABLET ORAL
Qty: 60 TABLET | Refills: 0 | Status: SHIPPED | OUTPATIENT
Start: 2019-10-08 | End: 2019-11-08 | Stop reason: DRUGHIGH

## 2019-10-08 RX ORDER — HYDROCODONE BITARTRATE AND ACETAMINOPHEN 10; 325 MG/1; MG/1
TABLET ORAL
Qty: 60 TABLET | Refills: 0 | Status: SHIPPED | OUTPATIENT
Start: 2019-10-08 | End: 2019-10-08 | Stop reason: SDUPTHER

## 2019-10-08 NOTE — TELEPHONE ENCOUNTER
I am sorry that she has had so much pain and that she has had increased levels of pain, however, I am not able to increase her narcotic pain medications.  We need to try to get her in with a pain management provider, and I would also like to get notes from her providers [facility] so I can see what they are recommending in terms of her spine issues as this would allow me to better understand their plans for surgery, etc.

## 2019-10-08 NOTE — TELEPHONE ENCOUNTER
Called pt and let her know her RX is ready for , pt is requesting a referral to podiatry to cut her toe nails.

## 2019-10-08 NOTE — TELEPHONE ENCOUNTER
Dr Dejesus would like to know if you are able to refill the pt's norco while she is out of the office, her refill will come due on Friday. Please Advise.

## 2019-10-10 ENCOUNTER — PATIENT OUTREACH (OUTPATIENT)
Dept: ADMINISTRATIVE | Facility: OTHER | Age: 64
End: 2019-10-10

## 2019-10-11 ENCOUNTER — TELEPHONE (OUTPATIENT)
Dept: FAMILY MEDICINE | Facility: CLINIC | Age: 64
End: 2019-10-11

## 2019-10-11 NOTE — TELEPHONE ENCOUNTER
Pt called and stated that dr dougherty is redoing their building and she couldn't get the papers for us as of yet, I told her to just keep us updated, she verbalized understanding.

## 2019-10-11 NOTE — TELEPHONE ENCOUNTER
----- Message from Selena Callejas sent at 10/11/2019  1:24 PM CDT -----  Contact: self / [phone]  Needs to speak with you on paper work for pain management.  please advise

## 2019-10-15 ENCOUNTER — TELEPHONE (OUTPATIENT)
Dept: FAMILY MEDICINE | Facility: CLINIC | Age: 64
End: 2019-10-15

## 2019-10-15 ENCOUNTER — OFFICE VISIT (OUTPATIENT)
Dept: OPTOMETRY | Facility: CLINIC | Age: 64
End: 2019-10-15
Payer: MEDICARE

## 2019-10-15 DIAGNOSIS — Z13.5 GLAUCOMA SCREENING: ICD-10-CM

## 2019-10-15 DIAGNOSIS — H25.13 NUCLEAR SCLEROSIS, BILATERAL: Primary | ICD-10-CM

## 2019-10-15 DIAGNOSIS — H52.4 PRESBYOPIA: ICD-10-CM

## 2019-10-15 PROCEDURE — 92015 DETERMINE REFRACTIVE STATE: CPT | Mod: HCNC,S$GLB,, | Performed by: OPTOMETRIST

## 2019-10-15 PROCEDURE — 99999 PR PBB SHADOW E&M-EST. PATIENT-LVL II: ICD-10-PCS | Mod: PBBFAC,HCNC,, | Performed by: OPTOMETRIST

## 2019-10-15 PROCEDURE — 92015 PR REFRACTION: ICD-10-PCS | Mod: HCNC,S$GLB,, | Performed by: OPTOMETRIST

## 2019-10-15 PROCEDURE — 99999 PR PBB SHADOW E&M-EST. PATIENT-LVL II: CPT | Mod: PBBFAC,HCNC,, | Performed by: OPTOMETRIST

## 2019-10-15 PROCEDURE — 92004 COMPRE OPH EXAM NEW PT 1/>: CPT | Mod: HCNC,S$GLB,, | Performed by: OPTOMETRIST

## 2019-10-15 PROCEDURE — 92004 PR EYE EXAM, NEW PATIENT,COMPREHESV: ICD-10-PCS | Mod: HCNC,S$GLB,, | Performed by: OPTOMETRIST

## 2019-10-15 NOTE — PROGRESS NOTES
HPI     MARY ANNE: 2018--outside doctor  Pt states she cant see to good to read, and her dist is not where she   would like it to be. Pt states she has RX glasses but states she still has   trouble.   No f/f  No gtts  No sx  LBS: does not have to check   Hemoglobin A1C       Date                     Value               Ref Range             Status                08/12/2019               5.3                 4.0 - 5.6 %           Final                     11/19/2018               5.1                 4.0 - 5.6 %           Final                     10/06/2017               5.3                 4.0 - 5.6 %           Final                     10/06/2017               5.3                 4.0 - 5.6 %           Final                    Last edited by Gabriele Buitrago, OD on 10/15/2019  2:42 PM. (History)        ROS     Negative for: Constitutional, Gastrointestinal, Neurological, Skin,   Genitourinary, Musculoskeletal, HENT, Endocrine, Cardiovascular, Eyes,   Respiratory, Psychiatric, Allergic/Imm, Heme/Lymph    Last edited by Gabriele Buitrago, OD on 10/15/2019  2:42 PM. (History)        Assessment /Plan     For exam results, see Encounter Report.    Nuclear sclerosis, bilateral    Glaucoma screening    Presbyopia      Small cats OU--pt wishes new Rx    PLAN:    rtc 1 yr

## 2019-10-15 NOTE — TELEPHONE ENCOUNTER
----- Message from Kat Medley sent at 10/15/2019 10:57 AM CDT -----  Contact: patient  Patient called to leave a number for Dr. Brown's office for you to call to get her medical records.      It is [phone], ext [phone] and the fax number is [phone].    Please call patient to confirm.

## 2019-10-15 NOTE — LETTER
October 16, 2019      Brandy Dejesus MD  [address]  [address]  [place] [address]           [facility]  [address]  [place] [address]  Phone: [phone]  Fax: [phone]          Patient: Thom Krishna   MR Number: [id number]   YOB: 1955   Date of Visit: 10/15/2019       Dear Dr. Brandy Dejesus:    Thank you for referring Thom Krishna to me for evaluation. Attached you will find relevant portions of my assessment and plan of care.    If you have questions, please do not hesitate to call me. I look forward to following Thom Krishna along with you.    Sincerely,    Gabriele Buitrago, OD    Enclosure  CC:  No Recipients    If you would like to receive this communication electronically, please contact [email or web address] or [phone] to request more information on [employer] access.    For providers and/or their staff who would like to refer a patient to [facility], please contact us through our one-stop-shop provider referral line, [facility], at [phone].    If you feel you have received this communication in error or would no longer like to receive these types of communications, please e-mail [email or web address]

## 2019-10-15 NOTE — TELEPHONE ENCOUNTER
Called pt and let her know that she would have to come to office and sign a release of information for us to get that paperwork. She verbalized understanding.

## 2019-10-25 ENCOUNTER — PATIENT OUTREACH (OUTPATIENT)
Dept: ADMINISTRATIVE | Facility: HOSPITAL | Age: 64
End: 2019-10-25

## 2019-10-28 ENCOUNTER — PATIENT OUTREACH (OUTPATIENT)
Dept: ADMINISTRATIVE | Facility: OTHER | Age: 64
End: 2019-10-28

## 2019-11-08 ENCOUNTER — OFFICE VISIT (OUTPATIENT)
Dept: FAMILY MEDICINE | Facility: CLINIC | Age: 64
End: 2019-11-08
Payer: MEDICARE

## 2019-11-08 ENCOUNTER — LAB VISIT (OUTPATIENT)
Dept: LAB | Facility: HOSPITAL | Age: 64
End: 2019-11-08
Attending: FAMILY MEDICINE
Payer: MEDICARE

## 2019-11-08 VITALS
SYSTOLIC BLOOD PRESSURE: 118 MMHG | DIASTOLIC BLOOD PRESSURE: 60 MMHG | TEMPERATURE: 98 F | BODY MASS INDEX: 26.65 KG/M2 | HEIGHT: 62 IN | WEIGHT: 144.81 LBS | OXYGEN SATURATION: 97 % | HEART RATE: 74 BPM

## 2019-11-08 DIAGNOSIS — Z86.19 HISTORY OF HEPATITIS C: ICD-10-CM

## 2019-11-08 DIAGNOSIS — M81.0 POSTMENOPAUSAL OSTEOPOROSIS: ICD-10-CM

## 2019-11-08 DIAGNOSIS — E03.9 ACQUIRED HYPOTHYROIDISM: ICD-10-CM

## 2019-11-08 DIAGNOSIS — Z79.899 ENCOUNTER FOR MONITORING LONG-TERM PROTON PUMP INHIBITOR THERAPY: ICD-10-CM

## 2019-11-08 DIAGNOSIS — Z51.81 ENCOUNTER FOR MONITORING LONG-TERM PROTON PUMP INHIBITOR THERAPY: ICD-10-CM

## 2019-11-08 DIAGNOSIS — Z01.84 IMMUNITY STATUS TESTING: ICD-10-CM

## 2019-11-08 DIAGNOSIS — R53.83 FATIGUE, UNSPECIFIED TYPE: ICD-10-CM

## 2019-11-08 DIAGNOSIS — M79.604 LOW BACK PAIN RADIATING TO RIGHT LOWER EXTREMITY: ICD-10-CM

## 2019-11-08 DIAGNOSIS — Z72.0 TOBACCO ABUSE: ICD-10-CM

## 2019-11-08 DIAGNOSIS — M47.816 DEGENERATIVE JOINT DISEASE OF CERVICAL AND LUMBAR SPINE: ICD-10-CM

## 2019-11-08 DIAGNOSIS — K21.9 GASTROESOPHAGEAL REFLUX DISEASE, ESOPHAGITIS PRESENCE NOT SPECIFIED: ICD-10-CM

## 2019-11-08 DIAGNOSIS — E11.49 DIABETES MELLITUS TYPE 2 WITH NEUROLOGICAL MANIFESTATIONS: ICD-10-CM

## 2019-11-08 DIAGNOSIS — E55.9 VITAMIN D DEFICIENCY: ICD-10-CM

## 2019-11-08 DIAGNOSIS — E53.8 VITAMIN B12 DEFICIENCY: ICD-10-CM

## 2019-11-08 DIAGNOSIS — R79.9 ABNORMAL BLOOD CHEMISTRY: ICD-10-CM

## 2019-11-08 DIAGNOSIS — M54.12 RIGHT CERVICAL RADICULOPATHY: ICD-10-CM

## 2019-11-08 DIAGNOSIS — M47.812 DEGENERATIVE JOINT DISEASE OF CERVICAL AND LUMBAR SPINE: ICD-10-CM

## 2019-11-08 DIAGNOSIS — M81.0 POSTMENOPAUSAL OSTEOPOROSIS: Primary | ICD-10-CM

## 2019-11-08 DIAGNOSIS — M54.50 LOW BACK PAIN RADIATING TO RIGHT LOWER EXTREMITY: ICD-10-CM

## 2019-11-08 DIAGNOSIS — M48.02 CERVICAL SPINAL STENOSIS: ICD-10-CM

## 2019-11-08 LAB
25(OH)D3+25(OH)D2 SERPL-MCNC: 10 NG/ML (ref 30–96)
ALBUMIN SERPL BCP-MCNC: 4.1 G/DL (ref 3.5–5.2)
ALP SERPL-CCNC: 93 U/L (ref 55–135)
ALT SERPL W/O P-5'-P-CCNC: 13 U/L (ref 10–44)
ANION GAP SERPL CALC-SCNC: 12 MMOL/L (ref 8–16)
AST SERPL-CCNC: 19 U/L (ref 10–40)
BASOPHILS # BLD AUTO: 0.02 K/UL (ref 0–0.2)
BASOPHILS NFR BLD: 0.5 % (ref 0–1.9)
BILIRUB SERPL-MCNC: 0.4 MG/DL (ref 0.1–1)
BUN SERPL-MCNC: 12 MG/DL (ref 8–23)
CALCIUM SERPL-MCNC: 9.5 MG/DL (ref 8.7–10.5)
CHLORIDE SERPL-SCNC: 105 MMOL/L (ref 95–110)
CO2 SERPL-SCNC: 22 MMOL/L (ref 23–29)
CREAT SERPL-MCNC: 1.1 MG/DL (ref 0.5–1.4)
DIFFERENTIAL METHOD: ABNORMAL
EOSINOPHIL # BLD AUTO: 0.1 K/UL (ref 0–0.5)
EOSINOPHIL NFR BLD: 2.6 % (ref 0–8)
ERYTHROCYTE [DISTWIDTH] IN BLOOD BY AUTOMATED COUNT: 14.3 % (ref 11.5–14.5)
EST. GFR  (AFRICAN AMERICAN): >60 ML/MIN/1.73 M^2
EST. GFR  (NON AFRICAN AMERICAN): 52 ML/MIN/1.73 M^2
ESTIMATED AVG GLUCOSE: 103 MG/DL (ref 68–131)
FERRITIN SERPL-MCNC: 27 NG/ML (ref 20–300)
GLUCOSE SERPL-MCNC: 72 MG/DL (ref 70–110)
HBA1C MFR BLD HPLC: 5.2 % (ref 4–5.6)
HCT VFR BLD AUTO: 43 % (ref 37–48.5)
HGB BLD-MCNC: 13.8 G/DL (ref 12–16)
IRON SERPL-MCNC: 56 UG/DL (ref 30–160)
LYMPHOCYTES # BLD AUTO: 1.2 K/UL (ref 1–4.8)
LYMPHOCYTES NFR BLD: 31.2 % (ref 18–48)
MAGNESIUM SERPL-MCNC: 1.8 MG/DL (ref 1.6–2.6)
MCH RBC QN AUTO: 31.7 PG (ref 27–31)
MCHC RBC AUTO-ENTMCNC: 32.1 G/DL (ref 32–36)
MCV RBC AUTO: 99 FL (ref 82–98)
MONOCYTES # BLD AUTO: 0.4 K/UL (ref 0.3–1)
MONOCYTES NFR BLD: 9.9 % (ref 4–15)
NEUTROPHILS # BLD AUTO: 2.1 K/UL (ref 1.8–7.7)
NEUTROPHILS NFR BLD: 55.8 % (ref 38–73)
PLATELET # BLD AUTO: 180 K/UL (ref 150–350)
PMV BLD AUTO: 11.7 FL (ref 9.2–12.9)
POTASSIUM SERPL-SCNC: 4.2 MMOL/L (ref 3.5–5.1)
PROT SERPL-MCNC: 7.7 G/DL (ref 6–8.4)
RBC # BLD AUTO: 4.35 M/UL (ref 4–5.4)
SATURATED IRON: 12 % (ref 20–50)
SODIUM SERPL-SCNC: 139 MMOL/L (ref 136–145)
TOTAL IRON BINDING CAPACITY: 460 UG/DL (ref 250–450)
TRANSFERRIN SERPL-MCNC: 311 MG/DL (ref 200–375)
TSH SERPL DL<=0.005 MIU/L-ACNC: 0.94 UIU/ML (ref 0.4–4)
VIT B12 SERPL-MCNC: 302 PG/ML (ref 210–950)
WBC # BLD AUTO: 3.85 K/UL (ref 3.9–12.7)

## 2019-11-08 PROCEDURE — 86790 VIRUS ANTIBODY NOS: CPT | Mod: HCNC

## 2019-11-08 PROCEDURE — 82607 VITAMIN B-12: CPT | Mod: HCNC

## 2019-11-08 PROCEDURE — 3074F SYST BP LT 130 MM HG: CPT | Mod: HCNC,CPTII,S$GLB, | Performed by: FAMILY MEDICINE

## 2019-11-08 PROCEDURE — 87340 HEPATITIS B SURFACE AG IA: CPT | Mod: HCNC

## 2019-11-08 PROCEDURE — 3078F PR MOST RECENT DIASTOLIC BLOOD PRESSURE < 80 MM HG: ICD-10-PCS | Mod: HCNC,CPTII,S$GLB, | Performed by: FAMILY MEDICINE

## 2019-11-08 PROCEDURE — 3074F PR MOST RECENT SYSTOLIC BLOOD PRESSURE < 130 MM HG: ICD-10-PCS | Mod: HCNC,CPTII,S$GLB, | Performed by: FAMILY MEDICINE

## 2019-11-08 PROCEDURE — 83036 HEMOGLOBIN GLYCOSYLATED A1C: CPT | Mod: HCNC

## 2019-11-08 PROCEDURE — 84443 ASSAY THYROID STIM HORMONE: CPT | Mod: HCNC

## 2019-11-08 PROCEDURE — 1101F PR PT FALLS ASSESS DOC 0-1 FALLS W/OUT INJ PAST YR: ICD-10-PCS | Mod: HCNC,CPTII,S$GLB, | Performed by: FAMILY MEDICINE

## 2019-11-08 PROCEDURE — 99999 PR PBB SHADOW E&M-EST. PATIENT-LVL IV: CPT | Mod: PBBFAC,HCNC,, | Performed by: FAMILY MEDICINE

## 2019-11-08 PROCEDURE — 1101F PT FALLS ASSESS-DOCD LE1/YR: CPT | Mod: HCNC,CPTII,S$GLB, | Performed by: FAMILY MEDICINE

## 2019-11-08 PROCEDURE — 3044F PR MOST RECENT HEMOGLOBIN A1C LEVEL <7.0%: ICD-10-PCS | Mod: HCNC,CPTII,S$GLB, | Performed by: FAMILY MEDICINE

## 2019-11-08 PROCEDURE — 99499 UNLISTED E&M SERVICE: CPT | Mod: HCNC,S$GLB,, | Performed by: FAMILY MEDICINE

## 2019-11-08 PROCEDURE — 99999 PR PBB SHADOW E&M-EST. PATIENT-LVL IV: ICD-10-PCS | Mod: PBBFAC,HCNC,, | Performed by: FAMILY MEDICINE

## 2019-11-08 PROCEDURE — 83735 ASSAY OF MAGNESIUM: CPT | Mod: HCNC

## 2019-11-08 PROCEDURE — 99214 OFFICE O/P EST MOD 30 MIN: CPT | Mod: HCNC,S$GLB,, | Performed by: FAMILY MEDICINE

## 2019-11-08 PROCEDURE — 85025 COMPLETE CBC W/AUTO DIFF WBC: CPT | Mod: HCNC

## 2019-11-08 PROCEDURE — 99499 RISK ADDL DX/OHS AUDIT: ICD-10-PCS | Mod: HCNC,S$GLB,, | Performed by: FAMILY MEDICINE

## 2019-11-08 PROCEDURE — 82306 VITAMIN D 25 HYDROXY: CPT | Mod: HCNC

## 2019-11-08 PROCEDURE — 82728 ASSAY OF FERRITIN: CPT | Mod: HCNC

## 2019-11-08 PROCEDURE — 86706 HEP B SURFACE ANTIBODY: CPT | Mod: HCNC

## 2019-11-08 PROCEDURE — 36415 COLL VENOUS BLD VENIPUNCTURE: CPT | Mod: HCNC

## 2019-11-08 PROCEDURE — 83540 ASSAY OF IRON: CPT | Mod: HCNC

## 2019-11-08 PROCEDURE — 99214 PR OFFICE/OUTPT VISIT, EST, LEVL IV, 30-39 MIN: ICD-10-PCS | Mod: HCNC,S$GLB,, | Performed by: FAMILY MEDICINE

## 2019-11-08 PROCEDURE — 80053 COMPREHEN METABOLIC PANEL: CPT | Mod: HCNC

## 2019-11-08 PROCEDURE — 3044F HG A1C LEVEL LT 7.0%: CPT | Mod: HCNC,CPTII,S$GLB, | Performed by: FAMILY MEDICINE

## 2019-11-08 PROCEDURE — 3078F DIAST BP <80 MM HG: CPT | Mod: HCNC,CPTII,S$GLB, | Performed by: FAMILY MEDICINE

## 2019-11-08 RX ORDER — BUPROPION HYDROCHLORIDE 150 MG/1
150 TABLET ORAL DAILY
Qty: 90 TABLET | Refills: 4 | Status: SHIPPED | OUTPATIENT
Start: 2019-11-08 | End: 2020-06-26

## 2019-11-08 RX ORDER — PROMETHAZINE HYDROCHLORIDE 25 MG/1
25 TABLET ORAL
COMMUNITY
Start: 2013-04-27 | End: 2020-02-11 | Stop reason: ALTCHOICE

## 2019-11-08 RX ORDER — VIT C/E/ZN/COPPR/LUTEIN/ZEAXAN 250MG-90MG
5000 CAPSULE ORAL DAILY
Qty: 100 CAPSULE | Refills: 4 | Status: SHIPPED | OUTPATIENT
Start: 2019-11-08 | End: 2019-11-08 | Stop reason: SDUPTHER

## 2019-11-08 RX ORDER — TIZANIDINE 4 MG/1
TABLET ORAL
Refills: 2 | COMMUNITY
Start: 2019-09-19 | End: 2020-06-26 | Stop reason: ALTCHOICE

## 2019-11-08 RX ORDER — HYDROCODONE BITARTRATE AND ACETAMINOPHEN 10; 325 MG/1; MG/1
1 TABLET ORAL EVERY 6 HOURS PRN
Qty: 90 TABLET | Refills: 0 | Status: SHIPPED | OUTPATIENT
Start: 2019-11-08 | End: 2019-11-08 | Stop reason: SDUPTHER

## 2019-11-08 RX ORDER — NABUMETONE 750 MG/1
TABLET, FILM COATED ORAL
Refills: 2 | COMMUNITY
Start: 2019-09-19 | End: 2021-07-16

## 2019-11-08 RX ORDER — HYDROCODONE BITARTRATE AND ACETAMINOPHEN 10; 325 MG/1; MG/1
1 TABLET ORAL EVERY 6 HOURS PRN
Qty: 90 TABLET | Refills: 0 | Status: SHIPPED | OUTPATIENT
Start: 2020-01-07 | End: 2020-02-05 | Stop reason: SDUPTHER

## 2019-11-08 RX ORDER — VIT C/E/ZN/COPPR/LUTEIN/ZEAXAN 250MG-90MG
5000 CAPSULE ORAL DAILY
Qty: 100 CAPSULE | Refills: 11 | Status: SHIPPED | OUTPATIENT
Start: 2019-11-08 | End: 2020-04-07

## 2019-11-08 RX ORDER — VIT C/E/ZN/COPPR/LUTEIN/ZEAXAN 250MG-90MG
5000 CAPSULE ORAL DAILY
Qty: 100 CAPSULE | Refills: 4 | COMMUNITY
Start: 2019-11-08 | End: 2019-11-08 | Stop reason: SDUPTHER

## 2019-11-08 RX ORDER — HYDROCODONE BITARTRATE AND ACETAMINOPHEN 10; 325 MG/1; MG/1
1 TABLET ORAL EVERY 6 HOURS PRN
Qty: 90 TABLET | Refills: 0 | Status: SHIPPED | OUTPATIENT
Start: 2019-12-08 | End: 2019-11-08 | Stop reason: SDUPTHER
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
## 2019-11-08 NOTE — PATIENT INSTRUCTIONS
Further review of her documents from [place] [facility] show report of a cervical MRI from 04/25/2019 that reads as follows:  Variable mild and moderate spondylosis changes with some central canal and foraminal encroachment with mild anterior cord deformity present and degenerative changes mostly at C5/C6 with moderate to severe exit foramina encroachment bilaterally    Lumbar MRI from 04/25/2019 is as follows:  Post fusion at L4/L5 where mild grade 1 anterolisthesis is again present and with alignment otherwise normally maintained and with no tight central canal stenosis detected.  Chronic L4 superior endplate deformity noted. Significant slightly increased focal deep right exit foraminal encroachment at L4-L5 where there appears to be significant exiting nerve root contact including mild superior displacement.    Current pain medication includes Norco 10/325 t.i.d. prescribed by me.  Also diclofenac 1% gel topically, Zanaflex 4 mg q.h.s. p.r.n. muscle spasms, Nabumetone b.i.d. anti-inflammatory b.i.d. with meals

## 2019-11-11 ENCOUNTER — TELEPHONE (OUTPATIENT)
Dept: FAMILY MEDICINE | Facility: CLINIC | Age: 64
End: 2019-11-11

## 2019-11-11 LAB
HBV SURFACE AB SER-ACNC: NEGATIVE M[IU]/ML
HBV SURFACE AG SERPL QL IA: NEGATIVE
HEPATITIS A ANTIBODY, IGG: POSITIVE

## 2019-11-11 NOTE — TELEPHONE ENCOUNTER
----- Message from Brandy Dejesus MD sent at 11/10/2019 12:57 PM CST -----  Please notify that labs look good except for low Vitamin D-- we already discussed taking 4--6,000 IU of vitamin D daily.  Also, her kidney function is slightly decreased, so she needs to drink plenty of water daily.  Otherwise no concerns.  She should come to see me for a PRE OP if requested by her neurosurgeon prior to surgery.

## 2019-11-11 NOTE — TELEPHONE ENCOUNTER
----- Message from Carlos Eduardo Croft sent at 11/11/2019 12:22 PM CST -----  Contact: [phone]/ self   Patient called in returning your call. Please advise.

## 2019-11-12 ENCOUNTER — TELEPHONE (OUTPATIENT)
Dept: FAMILY MEDICINE | Facility: CLINIC | Age: 64
End: 2019-11-12

## 2019-11-12 DIAGNOSIS — Z23 NEED FOR HEPATITIS B VACCINATION: Primary | ICD-10-CM

## 2019-11-12 NOTE — TELEPHONE ENCOUNTER
----- Message from Brandy Dejesus MD sent at 11/12/2019  5:48 AM CST -----  Please notify that labs show Thom should receive vaccines to protect her against hepatitis B. This is very important since she has had hepatitis C. I am placing the order for Hep B #1 (of 3) to be done at nurse visit then the second should be done 2 months after the first. Thanks .

## 2019-11-12 NOTE — TELEPHONE ENCOUNTER
----- Message from Esther Dejesus sent at 11/12/2019  9:19 AM CST -----  Contact: [phone]/self  Patient called in returning your call. Please advise.

## 2019-11-18 ENCOUNTER — PATIENT OUTREACH (OUTPATIENT)
Dept: ADMINISTRATIVE | Facility: OTHER | Age: 64
End: 2019-11-18

## 2019-11-18 ENCOUNTER — TELEPHONE (OUTPATIENT)
Dept: FAMILY MEDICINE | Facility: CLINIC | Age: 64
End: 2019-11-18

## 2019-11-18 NOTE — TELEPHONE ENCOUNTER
Pt called to let us know that the surgeon told her it could not be coming from her spine or her back it could be coming from her leg so they are sending her to a dr gurrola. To get a second opinion. Just an FYI

## 2019-11-18 NOTE — TELEPHONE ENCOUNTER
----- Message from Cecilia Diez sent at 11/18/2019  9:34 AM CST -----  Contact: [phone]-self  Patient is requesting a call back concerning her surgery. Please call

## 2019-11-20 ENCOUNTER — OFFICE VISIT (OUTPATIENT)
Dept: PODIATRY | Facility: CLINIC | Age: 64
End: 2019-11-20
Payer: MEDICARE

## 2019-11-20 VITALS
WEIGHT: 144 LBS | HEIGHT: 62 IN | SYSTOLIC BLOOD PRESSURE: 127 MMHG | HEART RATE: 71 BPM | BODY MASS INDEX: 26.5 KG/M2 | DIASTOLIC BLOOD PRESSURE: 67 MMHG

## 2019-11-20 DIAGNOSIS — B35.1 ONYCHOMYCOSIS DUE TO DERMATOPHYTE: Primary | ICD-10-CM

## 2019-11-20 DIAGNOSIS — E11.49 DIABETES MELLITUS TYPE 2 WITH NEUROLOGICAL MANIFESTATIONS: ICD-10-CM

## 2019-11-20 DIAGNOSIS — Z72.0 TOBACCO ABUSE: ICD-10-CM

## 2019-11-20 PROCEDURE — 3074F PR MOST RECENT SYSTOLIC BLOOD PRESSURE < 130 MM HG: ICD-10-PCS | Mod: HCNC,CPTII,S$GLB, | Performed by: PODIATRIST

## 2019-11-20 PROCEDURE — 11721 PR DEBRIDEMENT OF NAILS, 6 OR MORE: ICD-10-PCS | Mod: Q9,HCNC,S$GLB, | Performed by: PODIATRIST

## 2019-11-20 PROCEDURE — 1101F PT FALLS ASSESS-DOCD LE1/YR: CPT | Mod: HCNC,CPTII,S$GLB, | Performed by: PODIATRIST

## 2019-11-20 PROCEDURE — 3074F SYST BP LT 130 MM HG: CPT | Mod: HCNC,CPTII,S$GLB, | Performed by: PODIATRIST

## 2019-11-20 PROCEDURE — 1126F PR PAIN SEVERITY QUANTIFIED, NO PAIN PRESENT: ICD-10-PCS | Mod: HCNC,S$GLB,, | Performed by: PODIATRIST

## 2019-11-20 PROCEDURE — 1126F AMNT PAIN NOTED NONE PRSNT: CPT | Mod: HCNC,S$GLB,, | Performed by: PODIATRIST

## 2019-11-20 PROCEDURE — 1101F PR PT FALLS ASSESS DOC 0-1 FALLS W/OUT INJ PAST YR: ICD-10-PCS | Mod: HCNC,CPTII,S$GLB, | Performed by: PODIATRIST

## 2019-11-20 PROCEDURE — 3078F DIAST BP <80 MM HG: CPT | Mod: HCNC,CPTII,S$GLB, | Performed by: PODIATRIST

## 2019-11-20 PROCEDURE — 99999 PR PBB SHADOW E&M-EST. PATIENT-LVL IV: CPT | Mod: PBBFAC,HCNC,, | Performed by: PODIATRIST

## 2019-11-20 PROCEDURE — 99213 PR OFFICE/OUTPT VISIT, EST, LEVL III, 20-29 MIN: ICD-10-PCS | Mod: 25,HCNC,S$GLB, | Performed by: PODIATRIST

## 2019-11-20 PROCEDURE — 3044F PR MOST RECENT HEMOGLOBIN A1C LEVEL <7.0%: ICD-10-PCS | Mod: HCNC,CPTII,S$GLB, | Performed by: PODIATRIST

## 2019-11-20 PROCEDURE — 3044F HG A1C LEVEL LT 7.0%: CPT | Mod: HCNC,CPTII,S$GLB, | Performed by: PODIATRIST

## 2019-11-20 PROCEDURE — 99499 UNLISTED E&M SERVICE: CPT | Mod: HCNC,S$GLB,, | Performed by: PODIATRIST

## 2019-11-20 PROCEDURE — 1159F PR MEDICATION LIST DOCUMENTED IN MEDICAL RECORD: ICD-10-PCS | Mod: HCNC,S$GLB,, | Performed by: PODIATRIST

## 2019-11-20 PROCEDURE — 11721 DEBRIDE NAIL 6 OR MORE: CPT | Mod: Q9,HCNC,S$GLB, | Performed by: PODIATRIST

## 2019-11-20 PROCEDURE — 99499 RISK ADDL DX/OHS AUDIT: ICD-10-PCS | Mod: HCNC,S$GLB,, | Performed by: PODIATRIST

## 2019-11-20 PROCEDURE — 99213 OFFICE O/P EST LOW 20 MIN: CPT | Mod: 25,HCNC,S$GLB, | Performed by: PODIATRIST

## 2019-11-20 PROCEDURE — 99999 PR PBB SHADOW E&M-EST. PATIENT-LVL IV: ICD-10-PCS | Mod: PBBFAC,HCNC,, | Performed by: PODIATRIST

## 2019-11-20 PROCEDURE — 1159F MED LIST DOCD IN RCRD: CPT | Mod: HCNC,S$GLB,, | Performed by: PODIATRIST

## 2019-11-20 PROCEDURE — 3078F PR MOST RECENT DIASTOLIC BLOOD PRESSURE < 80 MM HG: ICD-10-PCS | Mod: HCNC,CPTII,S$GLB, | Performed by: PODIATRIST

## 2019-11-20 NOTE — LETTER
November 20, 2019      Brandy Dejesus MD  [address]  [address]  [place] [address]           [facility]  [address]  [place] [address]  Phone: [phone]  Fax: [phone]          Patient: Thom Krishna   MR Number: [id number]   YOB: 1955   Date of Visit: 11/20/2019       Dear Dr. Brandy Dejesus:    Thank you for referring Thom Krishan to me for evaluation. Attached you will find relevant portions of my assessment and plan of care.    If you have questions, please do not hesitate to call me. I look forward to following Thom Krishna along with you.    Sincerely,    Moraima Ibarra, DANDRE    Enclosure  CC:  No Recipients    If you would like to receive this communication electronically, please contact [email or web address] or [phone] to request more information on [facility] [employer] access.    For providers and/or their staff who would like to refer a patient to [facility], please contact us through our one-stop-shop provider referral line, [facility], at [phone].    If you feel you have received this communication in error or would no longer like to receive these types of communications, please e-mail [email or web address]

## 2019-11-20 NOTE — PROGRESS NOTES
Subjective:      Patient ID: Thom Krishna is a 64 y.o. female.    Chief Complaint: Diabetes Mellitus (Dr. Dejesus 11/8/19) and Diabetic Foot Exam    Thom is a 64 y.o. female who presents to the clinic for evaluation and treatment of high risk feet. Thom has a past medical history of Anxiety and depression (7/21/2015), Arthritis, Cervical radiculopathy (4/8/2016), Cirrhosis of liver, Colon polyps (4/27/2015), Encounter for blood transfusion, Hepatitis C, Hip fracture, Macrocytosis (7/21/2015), Thyroid disease, and Transfusion reaction. The patient's chief complaint is long, thick toenails. This patient has documented high risk feet requiring routine maintenance secondary to diabetes mellitis and those secondary complications of diabetes, as mentioned..she smokes 2 packs a day. Complains of chronic numbness and weakness of the LE. Pt tells me she is not DM.       PCP: Brandy Dejesus MD    Date Last Seen by PCP: in epic     Current shoe gear:  Affected Foot: Slip-on shoes     Unaffected Foot: Slip-on shoes    Hemoglobin A1C   Date Value Ref Range Status   11/08/2019 5.2 4.0 - 5.6 % Final     Comment:     ADA Screening Guidelines:  5.7-6.4%  Consistent with prediabetes  >or=6.5%  Consistent with diabetes  High levels of fetal hemoglobin interfere with the HbA1C  assay. Heterozygous hemoglobin variants (HbS, HgC, etc)do  not significantly interfere with this assay.   However, presence of multiple variants may affect accuracy.     08/12/2019 5.3 4.0 - 5.6 % Final     Comment:     ADA Screening Guidelines:  5.7-6.4%  Consistent with prediabetes  >or=6.5%  Consistent with diabetes  High levels of fetal hemoglobin interfere with the HbA1C  assay. Heterozygous hemoglobin variants (HbS, HgC, etc)do  not significantly interfere with this assay.   However, presence of multiple variants may affect accuracy.     11/19/2018 5.1 4.0 - 5.6 % Final     Comment:     ADA Screening Guidelines:  5.7-6.4%  Consistent with  prediabetes  >or=6.5%  Consistent with diabetes  High levels of fetal hemoglobin interfere with the HbA1C  assay. Heterozygous hemoglobin variants (HbS, HgC, etc)do  not significantly interfere with this assay.   However, presence of multiple variants may affect accuracy.         Review of Systems   Constitution: Negative for chills, decreased appetite, fever and malaise/fatigue.   HENT: Negative for congestion, ear discharge and sore throat.    Eyes: Negative for discharge and pain.   Cardiovascular: Negative for chest pain, claudication and leg swelling.   Respiratory: Negative for cough and shortness of breath.    Skin: Positive for color change. Negative for nail changes and rash.   Musculoskeletal: Positive for arthritis, back pain, neck pain and stiffness. Negative for joint pain, joint swelling and muscle weakness.   Gastrointestinal: Negative for bloating, abdominal pain, diarrhea, nausea and vomiting.   Genitourinary: Negative for flank pain and hematuria.   Neurological: Positive for numbness and sensory change. Negative for headaches and weakness.   Psychiatric/Behavioral: Negative for altered mental status.             Past Medical History:   Diagnosis Date    Anxiety and depression 7/21/2015    Arthritis     Cervical radiculopathy 4/8/2016    Cirrhosis of liver     Colon polyps 4/27/2015    Diabetes mellitus type 2 with neurological manifestations 11/6/2015    no current medications, only one episode of elevated sugars with acute illness, will monitor     Encounter for blood transfusion     Hepatitis C     s/p treatment per patient report; managed by Dr. Perez    Hip fracture     Macrocytosis 7/21/2015    Thyroid disease     Transfusion reaction     hep c       Past Surgical History:   Procedure Laterality Date    APPENDECTOMY      BACK SURGERY      CAUDAL EPIDURAL STEROID INJECTION N/A 8/7/2018    Procedure: Injection-steroid-epidural-caudal;  Surgeon: Roxana Gu Jr., MD;   "Location: [facility] OR;  Service: Pain Management;  Laterality: N/A;  with cath target L4-5    CAUDAL EPIDURAL STEROID INJECTION N/A 2019    Procedure: Injection-steroid-epidural-caudal;  Surgeon: Roxana Gu Jr., MD;  Location: [facility] PAIN MGT;  Service: Pain Management;  Laterality: N/A;     SECTION      CHOLECYSTECTOMY      COLONOSCOPY  3/31/10    2 polyps, tubular adenoma    COLONOSCOPY  2015    Dr. Washington    COLONOSCOPY N/A 10/10/2018    Procedure: COLONOSCOPY;  Surgeon: Reuben Miller Jr., MD;  Location: [facility] ENDO;  Service: Endoscopy;  Laterality: N/A;    ESOPHAGOGASTRODUODENOSCOPY N/A 10/10/2018    Procedure: EGD (ESOPHAGOGASTRODUODENOSCOPY);  Surgeon: Reuben Miller Jr., MD;  Location: [facility];  Service: Endoscopy;  Laterality: N/A;    ESOPHAGOGASTRODUODENOSCOPY N/A 12/10/2018    Procedure: EGD (ESOPHAGOGASTRODUODENOSCOPY)/poss emr;  Surgeon: Belle Kuo MD;  Location: [facility] ([facility] F[facility]);  Service: Endoscopy;  Laterality: N/A;    ESOPHAGOGASTRODUODENOSCOPY N/A 3/12/2019    Procedure: EGD (ESOPHAGOGASTRODUODENOSCOPY);  Surgeon: Polo Keyes MD;  Location: [facility];  Service: Endoscopy;  Laterality: N/A;    HERNIA REPAIR      HYSTERECTOMY      Uterus and both ovaries    INCISIONAL HERNIA REPAIR      x 2    JOINT REPLACEMENT      LIVER BIOPSY  2003    autoimmune hepatitis    ROTATOR CUFF REPAIR      right    STOMACH SURGERY      "took lymph node off of liver"    TOTAL HIP ARTHROPLASTY      left hip    UPPER GASTROINTESTINAL ENDOSCOPY  3/24/10    normal    UPPER GASTROINTESTINAL ENDOSCOPY  2015    Dr. Washington       Family History   Problem Relation Age of Onset    Diabetes Mellitus Mother     Diabetes Mother     Liver cancer Sister     Breast cancer Sister     Cataracts Sister     Pancreatic cancer Brother     Diabetes Brother     Lung cancer Brother     Colon cancer Brother     Brain cancer Brother     Stomach cancer Other     " Diabetes Other     Throat cancer Brother     Cataracts Sister     Diabetes Son     Liver disease Neg Hx        Social History     Socioeconomic History    Marital status: Significant Other     Spouse name: Not on file    Number of children: Not on file    Years of education: Not on file    Highest education level: Not on file   Occupational History    Not on file   Social Needs    Financial resource strain: Not on file    Food insecurity:     Worry: Not on file     Inability: Not on file    Transportation needs:     Medical: Not on file     Non-medical: Not on file   Tobacco Use    Smoking status: Current Every Day Smoker     Packs/day: 2.00     Years: 45.00     Pack years: 90.00     Types: Cigarettes    Smokeless tobacco: Never Used   Substance and Sexual Activity    Alcohol use: No     Comment: used to drink heavily, stopped in 1990's    Drug use: No    Sexual activity: Not on file   Lifestyle    Physical activity:     Days per week: Not on file     Minutes per session: Not on file    Stress: Not on file   Relationships    Social connections:     Talks on phone: Not on file     Gets together: Not on file     Attends [employer] service: Not on file     Active member of club or organization: Not on file     Attends meetings of clubs or organizations: Not on file     Relationship status: Not on file   Other Topics Concern    Not on file   Social History Narrative    Youngest out of 16 children to her parents       Current Outpatient Medications   Medication Sig Dispense Refill    ALPRAZolam (XANAX) 1 MG tablet Take 1 tablet (1 mg total) by mouth 2 (two) times daily as needed. 45 tablet 5    amitriptyline (ELAVIL) 25 MG tablet Take 25 mg by mouth.      amitriptyline (ELAVIL) 50 MG tablet Take 50 mg by mouth.      bisacodyl (DULCOLAX) 5 mg EC tablet Take 5 mg by mouth daily as needed for Constipation.      blood-glucose meter kit Test tid please dispense test strips and lancets      buPROPion  (WELLBUTRIN XL) 150 MG TB24 tablet Take 1 tablet (150 mg total) by mouth once daily. 90 tablet 4    cholecalciferol, vitamin D3, (VITAMIN D3) 1,000 unit capsule Take 5 capsules (5,000 Units total) by mouth once daily. 100 capsule 11    diazePAM (VALIUM) 5 MG tablet Take 5 mg by mouth.      diclofenac sodium (VOLTAREN) 1 % Gel BETTYE 2 GRAMS AA QID  1    elbasvir-grazoprevir (ZEPATIER)  mg Tab       [START ON 1/7/2020] HYDROcodone-acetaminophen (NORCO)  mg per tablet Take 1 tablet by mouth every 6 (six) hours as needed for Pain. 90 tablet 0    HYDROcodone-acetaminophen (NORCO)  mg per tablet TAKE 1 TABLET BY MOUTH EVERY 6 HOURS AS NEEDED FOR PAIN 90 tablet 0    levothyroxine (SYNTHROID) 75 MCG tablet Take 1 tablet (75 mcg total) by mouth once daily. 90 tablet 3    methocarbamol (ROBAXIN) 750 MG Tab Take 1,500 mg by mouth.      nabumetone (RELAFEN) 750 MG tablet TK 1 T PO BID  2    ondansetron (ZOFRAN) 4 MG tablet Take 1 tablet (4 mg total) by mouth every 6 (six) hours. 12 tablet 0    ondansetron (ZOFRAN-ODT) 8 MG TbDL TAKE ONE TABLET UNDER THE TONGUE THREE TIMES DAILY AS NEEDED FOR NAUSEA 20 tablet 11    OPW TEST CLAIM - DO NOT FILL Take 1 tablet by mouth 2 (two) times daily as needed. OPW test claim. Do not fill. 30 tablet 0    pantoprazole (PROTONIX) 20 MG tablet Take 40 mg by mouth.      pantoprazole (PROTONIX) 40 MG tablet TAKE ONE TABLET BY MOUTH EVERY DAY 90 tablet 4    promethazine (PHENERGAN) 25 MG tablet Take 25 mg by mouth.      tiZANidine (ZANAFLEX) 4 MG tablet TK 1/2 TO 1 T PO HS  2    ranitidine (ZANTAC) 300 MG tablet Take 1 tablet (300 mg total) by mouth every evening. , about 30-60 minutes before bedtime. 90 tablet 3     No current facility-administered medications for this visit.        Review of patient's allergies indicates:   Allergen Reactions    Penicillins      Other reaction(s): Hives    Sulfa (sulfonamide antibiotics) Other (See Comments)     Other  "reaction(s): Hives       Vitals:    11/20/19 1031   BP: 127/67   Pulse: 71   Weight: 65.3 kg (144 lb)   Height: 5' 2" (1.575 m)   PainSc: 0-No pain       Objective:      Physical Exam   Constitutional: She is oriented to person, place, and time. She appears well-developed and well-nourished. No distress.   HENT:   Nose: Nose normal.   Eyes: Conjunctivae are normal.   Neck: Normal range of motion.   Pulmonary/Chest: Effort normal. She exhibits no tenderness.   Abdominal: There is no tenderness.   Neurological: She is alert and oriented to person, place, and time.   Psychiatric: She has a normal mood and affect. Her behavior is normal.       Vascular: Distal DP/PT pulses palpable 1/4. CRT < 3 sec to tips of toes. No vericosities noted to LEs.  warm to touch LE, No edema noted to LE.    Dermatologic: No open lesions, lacerations or wounds. Interdigital spaces clean, dry and intact. No erythema, rubor, calor noted LE  Toenails 1-5 bilaterally are elongated by 2-3 mm, thickened by 2-3 mm, discolored/yellowed, dystrophic, brittle with subungual debris. No incurvation. Mild xerosis noted, bilat.     Musculoskeletal: MMT 5/5 in DF/PF/Inv/Ev resistance with no reproduction of pain in any direction. Passive range of motion of ankle and pedal joints is painless. No calf tenderness LE, Compartments soft/compressible.     Neurological:   Light touch, proprioception, and sharp/dull sensation are decreased. Protective threshold with the Struthers-Wienstein monofilament is decreased. Vibratory sensation decreased.        Assessment:       Encounter Diagnoses   Name Primary?    Onychomycosis due to dermatophyte Yes    Diabetes mellitus type 2 with neurological manifestations     Tobacco abuse          Plan:       Thom was seen today for diabetes mellitus and diabetic foot exam.    Diagnoses and all orders for this visit:    Onychomycosis due to dermatophyte    Diabetes mellitus type 2 with neurological manifestations    Tobacco " abuse      I counseled the patient on her conditions, their implications and medical management.    64 y.o. female with diabetic foot risk assessment, painful nail x 10.    -nails x 10 sharply debrided with nail nipper. Tolerated well.   -It was discussed the importance of wearing shoes with adequate room in toe box to accommodate toe deformities. Recommended New Balance/[employer]ics shoe brands with adequate arch supports to alleviate abnormal pressure and improve stability of foot while walking. Avoid flat shoes and barefoot walking as these will exacerbate or worsen symptoms.   -Shoe inspection. General Foot Education. Patient reminded of the importance of good nutrition. Patient instructed on proper foot hygeine. We discussed wearing proper shoe gear, daily foot inspections, never walking without protective shoe gear, caution putting sharp instruments to feet. Discussed general foot care:  Wear comfortable, proper fitting shoes. Wash feet daily. Dry well. After drying, apply moisturizer to feet (no lotion to webspaces). Inspect feet daily for skin breaks, blisters, swelling, or redness. Wear cotton socks (preferably white)  Change socks every day. Do NOT walk barefoot. Do NOT use heating pads or warm/hot water soaks   -The nature of the condition, options for management, as well as potential risks and complications were discussed in detail with patient. Patient was amenable to my recommendations and left my office fully informed and will follow up as instructed or sooner if necessary.    -Patient was advised of signs and symptoms of infection including redness, drainage, purulence, odor, streaking, fever, chills and I advised patient to seek medical attention (ER or urgent care) if these symptoms arise.   -Discuss in detail the harmful effects of nicotine/tobacco/cigarette smoking, especially in relation to the lower extremity. Recommend consultation with primary care provider for further discussed of smoking cessation  methods. Smoking & Tobacco use cessation couseling was rendered at today's visit; intermediate, bewteen 3 and 10 minutes.  -f/u prn     Note dictated with voice recognition software, please excuse any grammatical errors.

## 2019-11-21 ENCOUNTER — TELEPHONE (OUTPATIENT)
Dept: FAMILY MEDICINE | Facility: CLINIC | Age: 64
End: 2019-11-21

## 2019-11-21 DIAGNOSIS — M54.12 CERVICAL RADICULOPATHY: ICD-10-CM

## 2019-11-21 DIAGNOSIS — R26.2 DIFFICULTY WALKING: ICD-10-CM

## 2019-11-21 DIAGNOSIS — M47.9 SPONDYLOSIS: ICD-10-CM

## 2019-11-21 DIAGNOSIS — M50.30 DDD (DEGENERATIVE DISC DISEASE), CERVICAL: Primary | ICD-10-CM

## 2019-11-21 DIAGNOSIS — M54.16 LUMBAR RADICULOPATHY: ICD-10-CM

## 2019-11-21 NOTE — TELEPHONE ENCOUNTER
Returned pt phone call, pt stated she went to the orthopedist. She stated that he told her that she needs back surgery ASAP, so the orthopedist is trying to find her a surgeon here at [facility]. I let her know that I would send the message over to dr estrada for her as an FYI.

## 2019-11-21 NOTE — TELEPHONE ENCOUNTER
----- Message from Fabiola Moise sent at 11/21/2019  9:28 AM CST -----  No. [phone]     Patient's orthopedist told patient that she needs spine surgery soon.   Patient would like to discuss this with Dr. Dejesus.  Please ask Dr. Dejesus to call patient.

## 2019-11-22 ENCOUNTER — TELEPHONE (OUTPATIENT)
Dept: NEUROSURGERY | Facility: CLINIC | Age: 64
End: 2019-11-22

## 2019-11-22 ENCOUNTER — TELEPHONE (OUTPATIENT)
Dept: FAMILY MEDICINE | Facility: CLINIC | Age: 64
End: 2019-11-22

## 2019-11-22 NOTE — TELEPHONE ENCOUNTER
I have placed a referral to Dr. Gandhi, neurosurgery hearing [facility], and I am also okay with her seeing his PA to expedite her evaluation.  Please ensure that she brings her imaging/that they are available for her consultation with them.  She may even want to go to their office prior to her appointment with her discs to ensure that they can upload images to maximize her office visit.

## 2019-11-22 NOTE — TELEPHONE ENCOUNTER
----- Message from Trish Galvan sent at 11/22/2019 11:10 AM CST -----  Contact: [phone]/self   Patient is requesting to speak with nurse to schedule a NP appt. Please advise.

## 2019-11-22 NOTE — TELEPHONE ENCOUNTER
Spoke with Ms. Krishna,  appt scheduled. Patient will bring in MRI disc- pt v/u this is needed.

## 2019-12-03 ENCOUNTER — TELEPHONE (OUTPATIENT)
Dept: FAMILY MEDICINE | Facility: CLINIC | Age: 64
End: 2019-12-03

## 2019-12-03 NOTE — TELEPHONE ENCOUNTER
Returned pt phone call, let her know that per the contract that she cannot fill her norco anywhere else. And that if she did dr estrada could not fill any more of her pain medication. Pt verbalized understanding.

## 2019-12-03 NOTE — TELEPHONE ENCOUNTER
----- Message from Myla Middleton sent at 12/3/2019  1:49 PM CST -----  Contact: Pt   Pt would like medication refill to be sent to another location     HYDROcodone-acetaminophen (NORCO)  mg per tablet  [address] [place] [address]    Pt can be reached at [phone]

## 2019-12-09 ENCOUNTER — PATIENT OUTREACH (OUTPATIENT)
Dept: ADMINISTRATIVE | Facility: OTHER | Age: 64
End: 2019-12-09

## 2019-12-11 ENCOUNTER — OFFICE VISIT (OUTPATIENT)
Dept: NEUROSURGERY | Facility: CLINIC | Age: 64
End: 2019-12-11
Payer: MEDICARE

## 2019-12-11 VITALS
BODY MASS INDEX: 26.98 KG/M2 | HEART RATE: 83 BPM | HEIGHT: 62 IN | DIASTOLIC BLOOD PRESSURE: 79 MMHG | WEIGHT: 146.63 LBS | SYSTOLIC BLOOD PRESSURE: 149 MMHG

## 2019-12-11 DIAGNOSIS — M51.36 DDD (DEGENERATIVE DISC DISEASE), LUMBAR: ICD-10-CM

## 2019-12-11 DIAGNOSIS — M25.551 PAIN OF RIGHT HIP JOINT: ICD-10-CM

## 2019-12-11 DIAGNOSIS — G89.4 CHRONIC PAIN SYNDROME: Primary | ICD-10-CM

## 2019-12-11 DIAGNOSIS — M47.26 OTHER SPONDYLOSIS WITH RADICULOPATHY, LUMBAR REGION: ICD-10-CM

## 2019-12-11 DIAGNOSIS — R26.89 ANTALGIC GAIT: ICD-10-CM

## 2019-12-11 DIAGNOSIS — M50.30 DDD (DEGENERATIVE DISC DISEASE), CERVICAL: ICD-10-CM

## 2019-12-11 PROCEDURE — 99205 PR OFFICE/OUTPT VISIT, NEW, LEVL V, 60-74 MIN: ICD-10-PCS | Mod: S$PBB,HCNC,, | Performed by: PHYSICIAN ASSISTANT

## 2019-12-11 PROCEDURE — 1159F PR MEDICATION LIST DOCUMENTED IN MEDICAL RECORD: ICD-10-PCS | Mod: HCNC,,, | Performed by: PHYSICIAN ASSISTANT

## 2019-12-11 PROCEDURE — 99205 OFFICE O/P NEW HI 60 MIN: CPT | Mod: S$PBB,HCNC,, | Performed by: PHYSICIAN ASSISTANT

## 2019-12-11 PROCEDURE — 99999 PR PBB SHADOW E&M-EST. PATIENT-LVL V: CPT | Mod: PBBFAC,HCNC,, | Performed by: PHYSICIAN ASSISTANT

## 2019-12-11 PROCEDURE — 1125F PR PAIN SEVERITY QUANTIFIED, PAIN PRESENT: ICD-10-PCS | Mod: HCNC,,, | Performed by: PHYSICIAN ASSISTANT

## 2019-12-11 PROCEDURE — 1159F MED LIST DOCD IN RCRD: CPT | Mod: HCNC,,, | Performed by: PHYSICIAN ASSISTANT

## 2019-12-11 PROCEDURE — 99999 PR PBB SHADOW E&M-EST. PATIENT-LVL V: ICD-10-PCS | Mod: PBBFAC,HCNC,, | Performed by: PHYSICIAN ASSISTANT

## 2019-12-11 PROCEDURE — 1125F AMNT PAIN NOTED PAIN PRSNT: CPT | Mod: HCNC,,, | Performed by: PHYSICIAN ASSISTANT

## 2019-12-11 NOTE — PROGRESS NOTES
History & Physical    SUBJECTIVE:     Chief Complaint:  Back pain and right leg pain    History of Present Illness:  Thom Krishna is 64 y.o.  female with history of previous L4-5 fusion outside hospital in 2005, left hip replacement in 2006, 2 ppd smoking since she was 12 years old, hypertension, hyperlipidemia, anxiety/depression, diabetes with neuropathy, GERD, cirrhosis of the liver and hepatitis-C, chronic pain who presents for neurosurgical evaluation, referred by Dr. Younger.  Patient is complaining of low back pain and pain radiating down front of her leg, right groin, and into her entire right foot that started before back surgery in 2006.  Back surgery did not improve any pain.  Back and leg pain are equal.  Pain is severe rated 10/10 and aching type pain with intermittent numbness/tingling sensation.  Pain worsens with walking, sitting, bending over, or lying in bed.  Pain is so severe that it limits her ADLs.  She has underwent multiple lumbar epidural injections with Dr. Younger this year with no significant relief.  She had spinal injection done yesterday with no significant relief.  She had spinal cord stimulator trial 4 months ago with Dr. Younger that worsened her pain.  She is currently taking Norco 10 mg every 4-5 hours per day and supplements with muscle relaxants.  She tried gabapentin Lyrica before but it made her very sick.  Denies any bladder/bowel incontinence    Of note, patient was referred here by presents to the orthopedic physician that recommended patient has spinal surgery.  She was also seen by spine surgeon at [facility] the did not recommend spinal surgery.  This is her 3rd opinion.      Low Back Pain Scale  R Low Back-Pain Score: 8  R Low Back-Pain Intensity: Pain killers give very little relief from pain  R Low Back-Pain Score: I can look after myself normally but it causes extra pain  Low Back-Lifting: I can only lift very light weights   Low Back-Walking: Pain prevents me walking  more than .25 mile   Low Back-Sitting: Pain prevents me from sitting more than 30 minutes   Low Back-Standing: I cannot stand for longer than 30 mins without increasing pain   Low Back-Sleeping: Pain prevents me from sleeping at all.   Low Back-Social Life: I have hardly any social life because of the pain   Low Back-Traveling: Pain restricts me to short necessary journeys under 30 minutes   Low Back-Changing Degree of Pain: my pain is rapidly worsening           Review of patient's allergies indicates:   Allergen Reactions    Penicillins      Other reaction(s): Hives    Sulfa (sulfonamide antibiotics) Other (See Comments)     Other reaction(s): Hives       Current Outpatient Medications   Medication Sig Dispense Refill    blood-glucose meter kit Test tid please dispense test strips and lancets      cholecalciferol, vitamin D3, (VITAMIN D3) 1,000 unit capsule Take 5 capsules (5,000 Units total) by mouth once daily. 100 capsule 11    HYDROcodone-acetaminophen (NORCO)  mg per tablet Take 1 tablet by mouth every 6 (six) hours as needed for pain 90 tablet 0    levothyroxine (SYNTHROID) 75 MCG tablet Take 1 tablet (75 mcg total) by mouth once daily. 90 tablet 3    nabumetone (RELAFEN) 750 MG tablet TK 1 T PO BID  2    ondansetron (ZOFRAN-ODT) 8 MG TbDL TAKE ONE TABLET UNDER THE TONGUE THREE TIMES DAILY AS NEEDED FOR NAUSEA 20 tablet 11    OPW TEST CLAIM - DO NOT FILL Take 1 tablet by mouth 2 (two) times daily as needed. OPW test claim. Do not fill. 30 tablet 0    pantoprazole (PROTONIX) 40 MG tablet TAKE ONE TABLET BY MOUTH EVERY DAY 90 tablet 4    ALPRAZolam (XANAX) 1 MG tablet Take 1 tablet (1 mg total) by mouth 2 (two) times daily as needed. (Patient not taking: Reported on 12/11/2019) 45 tablet 5    amitriptyline (ELAVIL) 25 MG tablet Take 25 mg by mouth.      amitriptyline (ELAVIL) 50 MG tablet Take 50 mg by mouth.      bisacodyl (DULCOLAX) 5 mg EC tablet Take 5 mg by mouth daily as needed for  Constipation.      buPROPion (WELLBUTRIN XL) 150 MG TB24 tablet Take 1 tablet (150 mg total) by mouth once daily. 90 tablet 4    diazePAM (VALIUM) 5 MG tablet Take 5 mg by mouth.      diclofenac sodium (VOLTAREN) 1 % Gel BETTYE 2 GRAMS AA QID  1    elbasvir-grazoprevir (ZEPATIER)  mg Tab       [START ON 1/7/2020] HYDROcodone-acetaminophen (NORCO)  mg per tablet Take 1 tablet by mouth every 6 (six) hours as needed for Pain. (Patient not taking: Reported on 12/11/2019) 90 tablet 0    methocarbamol (ROBAXIN) 750 MG Tab Take 1,500 mg by mouth.      ondansetron (ZOFRAN) 4 MG tablet Take 1 tablet (4 mg total) by mouth every 6 (six) hours. (Patient not taking: Reported on 12/11/2019) 12 tablet 0    pantoprazole (PROTONIX) 20 MG tablet Take 40 mg by mouth.      promethazine (PHENERGAN) 25 MG tablet Take 25 mg by mouth.      ranitidine (ZANTAC) 300 MG tablet Take 1 tablet (300 mg total) by mouth every evening. , about 30-60 minutes before bedtime. 90 tablet 3    tiZANidine (ZANAFLEX) 4 MG tablet TK 1/2 TO 1 T PO HS  2     No current facility-administered medications for this visit.        Past Medical History:   Diagnosis Date    Anxiety and depression 7/21/2015    Arthritis     Cervical radiculopathy 4/8/2016    Cirrhosis of liver     Colon polyps 4/27/2015    Diabetes mellitus type 2 with neurological manifestations 11/6/2015    no current medications, only one episode of elevated sugars with acute illness, will monitor     Encounter for blood transfusion     Hepatitis C     s/p treatment per patient report; managed by Dr. Perez    Hip fracture     Macrocytosis 7/21/2015    Thyroid disease     Transfusion reaction     hep c     Past Surgical History:   Procedure Laterality Date    APPENDECTOMY      BACK SURGERY      CAUDAL EPIDURAL STEROID INJECTION N/A 8/7/2018    Procedure: Injection-steroid-epidural-caudal;  Surgeon: Roxana Gu Jr., MD;  Location[facility];  Service: Pain  "Management;  Laterality: N/A;  with cath target L4-5    CAUDAL EPIDURAL STEROID INJECTION N/A 2019    Procedure: Injection-steroid-epidural-caudal;  Surgeon: Roxana Gu Jr., MD;  Location: [facility] PAIN MGT;  Service: Pain Management;  Laterality: N/A;     SECTION      CHOLECYSTECTOMY      COLONOSCOPY  3/31/10    2 polyps, tubular adenoma    COLONOSCOPY  2015    Dr. Washington    COLONOSCOPY N/A 10/10/2018    Procedure: COLONOSCOPY;  Surgeon: Reuben Miller Jr., MD;  Location: [facility];  Service: Endoscopy;  Laterality: N/A;    ESOPHAGOGASTRODUODENOSCOPY N/A 10/10/2018    Procedure: EGD (ESOPHAGOGASTRODUODENOSCOPY);  Surgeon: Reuben Miller Jr., MD;  Location: [facility];  Service: Endoscopy;  Laterality: N/A;    ESOPHAGOGASTRODUODENOSCOPY N/A 12/10/2018    Procedure: EGD (ESOPHAGOGASTRODUODENOSCOPY)/poss emr;  Surgeon: Belle Kuo MD;  Location: [facility] ([address] [place] [address]);  Service: Endoscopy;  Laterality: N/A;    ESOPHAGOGASTRODUODENOSCOPY N/A 3/12/2019    Procedure: EGD (ESOPHAGOGASTRODUODENOSCOPY);  Surgeon: Polo Keyes MD;  Location[facility];  Service: Endoscopy;  Laterality: N/A;    HERNIA REPAIR      HYSTERECTOMY      Uterus and both ovaries    INCISIONAL HERNIA REPAIR      x 2    JOINT REPLACEMENT      LIVER BIOPSY  2003    autoimmune hepatitis    ROTATOR CUFF REPAIR      right    STOMACH SURGERY      "took lymph node off of liver"    TOTAL HIP ARTHROPLASTY      left hip    UPPER GASTROINTESTINAL ENDOSCOPY  3/24/10    normal    UPPER GASTROINTESTINAL ENDOSCOPY  2015    Dr. Washington     Family History     Problem Relation (Age of Onset)    Brain cancer Brother    Breast cancer Sister    Cataracts Sister, Sister    Colon cancer Brother    Diabetes Mother, Brother, Other, Son    Diabetes Mellitus Mother    Liver cancer Sister    Lung cancer Brother    Pancreatic cancer Brother    Stomach cancer Other    Throat cancer Brother        Social History "     Socioeconomic History    Marital status: Significant Other     Spouse name: Not on file    Number of children: Not on file    Years of education: Not on file    Highest education level: Not on file   Occupational History    Not on file   Social Needs    Financial resource strain: Not on file    Food insecurity:     Worry: Not on file     Inability: Not on file    Transportation needs:     Medical: Not on file     Non-medical: Not on file   Tobacco Use    Smoking status: Current Every Day Smoker     Packs/day: 2.00     Years: 45.00     Pack years: 90.00     Types: Cigarettes    Smokeless tobacco: Never Used   Substance and Sexual Activity    Alcohol use: No     Comment: used to drink heavily, stopped in 1990's    Drug use: No    Sexual activity: Not on file   Lifestyle    Physical activity:     Days per week: Not on file     Minutes per session: Not on file    Stress: Not on file   Relationships    Social connections:     Talks on phone: Not on file     Gets together: Not on file     Attends Mosque service: Not on file     Active member of club or organization: Not on file     Attends meetings of clubs or organizations: Not on file     Relationship status: Not on file   Other Topics Concern    Not on file   Social History Narrative    Youngest out of 16 children to her parents       Review of Systems:  Review of Systems    Constitutional: no fever, chills or night sweats. No changes in weight   Eyes: no visual changes   ENT: no nasal congestion or sore throat   Respiratory: no cough or shortness of breath   Cardiovascular: no chest pain or palpitations   Gastrointestinal: no nausea or vomiting   Genitourinary: no hematuria or dysuria   Integument/Breast: no rash or pruritis   Hematologic/Lymphatic: no easy bruising or lymphadenopathy   Musculoskeletal: no arthralgias or myalgias.  Neurological: no seizures or tremors. No weakness or paresthesia.   Behavioral/Psych: no auditory or visual  "hallucinations   Endocrine: no heat or cold intolerance       OBJECTIVE:     Vital Signs  Pulse: 83  BP: (!) 149/79  Pain Score:   8  Height: 5' 2" (157.5 cm)  Weight: 66.5 kg (146 lb 9.7 oz)  Body mass index is 26.81 kg/m².      Physical Exam:  Neurosurgery Physical Exam    General: well developed, well nourished, no distress.  Emotionally labile.  Neurologic: Awake, alert and oriented x3. Thought content appropriate.  GCS: Motor: 6/Verbal: 5/Eyes: 4 GCS Total: 15  Cranial nerves: II-XII grossly intact. PERRLA. EOMI without nystagmus. Face symmetric and sensation intact to light touch, tongue midline, shoulder shrug symmetric bilaterally.  Hearing equal bilaterally to finger rub. Palate and uvula rise and fall normally in midline.    Language: no aphasia  Speech: no dysarthria   Neck: supple, without obvious masses    Sensory: intact to light touch B/L UE and LE  Motor Strength: Moves all extremities spontaneously with good tone. Full strength upper and lower extremities. No abnormal movements seen.    Strength  Deltoids Triceps Biceps Wrist Extension Wrist Flexion Hand    Upper: R 5/5 5/5 5/5 5/5 5/5 5/5    L 5/5 5/5 5/5 5/5 5/5 5/5     Iliopsoas Quadriceps Knee  Flexion Tibialis  anterior Gastro- cnemius EHL   Lower: R 5/5 5/5 5/5 5/5 5/5 5/5    L 5/5 5/5 5/5 5/5 5/5 5/5     Interossi muscle strength- 5/5    DTR's - 1 + and symmetric in UE and LE  Burgos's - Negative        Lhermitte's - Negative  Spurlings-   Negative         Ankle Clonus - Negative           Babinski  - Negative     SLR - positive for right   Gait - antalgic   Cervical ROM - full  Lumbar ROM - limited; pain with all ROM  No focal numbness or weakness  Generalized tenderness to palpation throughout her entire body  No difficulty transitioning from seated to standing position or vice versa.  ENT: normal hearing with finger rub  Heart: RRR, no cyanosis, pallor, or edema.   Lungs- normal respiratory effort  Abdomen-  soft, symmetric and " nontender  Skin: grossly intact in all 4 extremities without obvious rashes or lesions  Extremities: warm with no cyanosis or edema, or clubbing  Pulses: palpable distal pulses    SI Joint tenderness - unable to accurately assess as patient has generalized tenderness to palpation  Distraction test- n/a  Compression test- n/a      Gaenslen's test- n/a       Greater Trochanter Joint tenderness - Negative  Darion's Test - positive right   Pain on Hip ROM - severe pain with all hip limited ROM      Patient is leaning over standing posture        Diagnostic Results:  All imaging personally reviewed    Outside MRI lumbar and cervical spine 04/25/2019  L4-5 fusion with posterior instrumentation.  L4-5 grade 1 anterolisthesis.  No significant canal or neural foraminal stenosis.    Straightening of cervical lordosis.  Multilevel degenerative disc disease and uncovertebral spurring.  No significant canal stenosis or foraminal stenosis.                CT cervical and lumbar spine 10/24/2019          ASSESSMENT/PLAN:      64 y.o.  female with history of previous L4-5 fusion outside hospital in 2005, left hip replacement in 2006, 2 ppd smoking since she was 12 years old, hypertension, hyperlipidemia, anxiety/depression, diabetes with neuropathy, GERD, cirrhosis of the liver and hepatitis-C, chronic pain who presents with multiple complaints.    She has chronic back and right leg pain.  However, exam does not correlate with imaging.  MRI lumbar spine does not show any residual stenosis.  CT lumbar spine shows very minimal lucency around screws but there is significant fusion around facet joints at L4-5 and L5-S1.  There is no concern for hardware failure.  There is no acute neurosurgical intervention indicated.  We will up load her outside imaging to [facility] and return her CD back to her.  She can follow up with Neurosurgery on a p.r.n. Basis.    We had long discussion with patient regarding her symptoms/pain.  We reviewed imaging  "with her, examined her, and explained that there is no surgery that we can offer to alleviate her pain. Recommending gabapentin/Lyrica treatment and repeating spinal cord stimulator trial by her pain management doctor.  Patient became upset and adamantly demanding "spinal surgery" to fix her problem.  Later, patient proceeded to demeaning Dr. Gandhi and myself since we can offer any surgical treatment.      Cervical spondylosis:  No acute neurosurgical intervention.  Follow-up with pain management.    Referral to Orthopedics to evaluate her right hip pain.        All imaging were reviewed with patient and staff. All questions were answered.       Please call with any questions.    Discussed with Dr. Gandhi and he was in the room during the exam.    Aleksander Teixeira PA-C  Neurosurgery        I spent greater than 60 minutes reviewing patient records, examining, and counseling the patient with greater than 50% of the time spent with direct patient care, counseling, and coordination.       Note dictated with voice recognition software, please excuse any grammatical errors.  "

## 2019-12-11 NOTE — LETTER
December 11, 2019      Brandy Dejesus MD  [address]  [address]  [facility] [address]           [place] - Neurosurgery  [address]  [facility] [address]  Phone: [phone]          Patient: Thom Krsihna   MR Number: [id number]   YOB: 1955   Date of Visit: 12/11/2019       Dear Dr. Brandy Dejesus:    Thank you for referring Thom Krishna to me for evaluation. Attached you will find relevant portions of my assessment and plan of care.    If you have questions, please do not hesitate to call me. I look forward to following Thom Krishna along with you.    Sincerely,    Aleksander Teixeira PA-C    Enclosure  CC:  No Recipients    If you would like to receive this communication electronically, please contact [email or web address] or [phone] to request more information on [facility] access.    For providers and/or their staff who would like to refer a patient to [facility], please contact us through our one-stop-shop provider referral line, [facility] , at [phone].    If you feel you have received this communication in error or would no longer like to receive these types of communications, please e-mail [email or web address]

## 2019-12-13 ENCOUNTER — TELEPHONE (OUTPATIENT)
Dept: FAMILY MEDICINE | Facility: CLINIC | Age: 64
End: 2019-12-13

## 2019-12-13 NOTE — TELEPHONE ENCOUNTER
Returned pt phone call, pt stated that she would need to get cleared to go back to work. At [employer]. They told her that she would need a letter stating that she can go back. Please Advise.

## 2019-12-13 NOTE — TELEPHONE ENCOUNTER
----- Message from Malathi Atwood sent at 12/13/2019  1:30 PM CST -----  Contact: Self [phone]  Patient would like to speak with you about a personal matter. Please advise

## 2019-12-16 ENCOUNTER — TELEPHONE (OUTPATIENT)
Dept: FAMILY MEDICINE | Facility: CLINIC | Age: 64
End: 2019-12-16

## 2019-12-16 DIAGNOSIS — K21.9 GASTROESOPHAGEAL REFLUX DISEASE, ESOPHAGITIS PRESENCE NOT SPECIFIED: ICD-10-CM

## 2019-12-16 RX ORDER — PANTOPRAZOLE SODIUM 40 MG/1
40 TABLET, DELAYED RELEASE ORAL DAILY
Qty: 90 TABLET | Refills: 4 | Status: SHIPPED | OUTPATIENT
Start: 2019-12-16 | End: 2020-12-04

## 2019-12-16 NOTE — TELEPHONE ENCOUNTER
Called pt and let her know that dr estrada wrote her note for her to go back to work. Pt verbalized understanding

## 2019-12-18 ENCOUNTER — PATIENT OUTREACH (OUTPATIENT)
Dept: ADMINISTRATIVE | Facility: OTHER | Age: 64
End: 2019-12-18

## 2019-12-20 ENCOUNTER — HOSPITAL ENCOUNTER (OUTPATIENT)
Dept: RADIOLOGY | Facility: HOSPITAL | Age: 64
Discharge: HOME | End: 2019-12-20
Attending: ORTHOPAEDIC SURGERY
Payer: MEDICARE

## 2019-12-20 ENCOUNTER — OFFICE VISIT (OUTPATIENT)
Dept: ORTHOPEDICS | Facility: CLINIC | Age: 64
End: 2019-12-20
Payer: MEDICARE

## 2019-12-20 ENCOUNTER — TELEPHONE (OUTPATIENT)
Dept: NEUROSURGERY | Facility: CLINIC | Age: 64
End: 2019-12-20

## 2019-12-20 VITALS — BODY MASS INDEX: 26.7 KG/M2 | WEIGHT: 146 LBS

## 2019-12-20 DIAGNOSIS — M25.559 ARTHRALGIA OF HIP, UNSPECIFIED LATERALITY: ICD-10-CM

## 2019-12-20 DIAGNOSIS — M25.559 HIP PAIN: ICD-10-CM

## 2019-12-20 DIAGNOSIS — M79.2 NEUROGENIC PAIN OF LOWER EXTREMITY, RIGHT: Primary | ICD-10-CM

## 2019-12-20 DIAGNOSIS — M25.551 PAIN OF RIGHT HIP JOINT: ICD-10-CM

## 2019-12-20 DIAGNOSIS — M16.11 PRIMARY OSTEOARTHRITIS OF RIGHT HIP: ICD-10-CM

## 2019-12-20 PROCEDURE — 73502 X-RAY EXAM HIP UNI 2-3 VIEWS: CPT | Mod: 26,HCNC,RT, | Performed by: RADIOLOGY

## 2019-12-20 PROCEDURE — 99999 PR PBB SHADOW E&M-EST. PATIENT-LVL III: ICD-10-PCS | Mod: PBBFAC,HCNC,, | Performed by: ORTHOPAEDIC SURGERY

## 2019-12-20 PROCEDURE — 99999 PR PBB SHADOW E&M-EST. PATIENT-LVL III: CPT | Mod: PBBFAC,HCNC,, | Performed by: ORTHOPAEDIC SURGERY

## 2019-12-20 PROCEDURE — 1125F PR PAIN SEVERITY QUANTIFIED, PAIN PRESENT: ICD-10-PCS | Mod: HCNC,,, | Performed by: ORTHOPAEDIC SURGERY

## 2019-12-20 PROCEDURE — 1125F AMNT PAIN NOTED PAIN PRSNT: CPT | Mod: HCNC,,, | Performed by: ORTHOPAEDIC SURGERY

## 2019-12-20 PROCEDURE — 73502 X-RAY EXAM HIP UNI 2-3 VIEWS: CPT | Mod: TC,HCNC,PN,RT

## 2019-12-20 PROCEDURE — 73502 XR HIP 2 VIEW RIGHT: ICD-10-PCS | Mod: 26,HCNC,RT, | Performed by: RADIOLOGY

## 2019-12-20 PROCEDURE — 1159F PR MEDICATION LIST DOCUMENTED IN MEDICAL RECORD: ICD-10-PCS | Mod: HCNC,,, | Performed by: ORTHOPAEDIC SURGERY

## 2019-12-20 PROCEDURE — 1159F MED LIST DOCD IN RCRD: CPT | Mod: HCNC,,, | Performed by: ORTHOPAEDIC SURGERY

## 2019-12-20 PROCEDURE — 99202 PR OFFICE/OUTPT VISIT, NEW, LEVL II, 15-29 MIN: ICD-10-PCS | Mod: S$PBB,HCNC,, | Performed by: ORTHOPAEDIC SURGERY

## 2019-12-20 PROCEDURE — 99202 OFFICE O/P NEW SF 15 MIN: CPT | Mod: S$PBB,HCNC,, | Performed by: ORTHOPAEDIC SURGERY

## 2019-12-20 NOTE — PROGRESS NOTES
Subjective:      Patient ID: Thom Krishna is a 64 y.o. female.    Chief Complaint: Pain of the Right Hip    HPI      Thom Krishna is seen for evaluation and treatment of hip pain.  They have experienced problems with their right hip over the past 6 years.  The patient reports that her pain starts in her lower back radiates to a buttock down her thigh to her leg and foot. She also has groin pain. She experiences severe burning throughout the area. She also has intermittent episodes of tingling.  She denies weakness and incontinence.  Denies recent treatment. Symptoms started after a lumbar spine procedure.    Review of Systems   Constitution: Negative for fever and weight loss.   HENT: Negative for congestion.    Eyes: Negative for visual disturbance.   Cardiovascular: Negative for chest pain.   Respiratory: Negative for shortness of breath.    Hematologic/Lymphatic: Negative for bleeding problem. Does not bruise/bleed easily.   Skin: Negative for poor wound healing.   Musculoskeletal: Positive for back pain and joint pain.   Gastrointestinal: Negative for abdominal pain.   Genitourinary: Negative for dysuria.   Neurological: Negative for seizures.   Psychiatric/Behavioral: Negative for altered mental status.   Allergic/Immunologic: Negative for persistent infections.         Objective:            Ortho/SPM Exam      Right hip    The patient is not in acute distress.   Body habitus is:normal.   Sclerae normal  The patient walks with a limp.   Respiratory distress:  none  The skin over the hip is:intact.   There is:no local tenderness.   Range of motion- Flexion 90, External rotation 20, internal rotation 10.  Resisted SLR positive.  Pain with rotation positive with moaning and facial grimacing  Sciatic tension findings positive with moaning and facial grimacing  Shortening/lengthening compared to the contralateral side exam deferred.  Pulses DP present, PT present.  Motor normal 5/5 strength in all tested muscle  groups.   Sensory normal.    I reviewed the relevant radiographic images for the patient's condition:  Right hip films show mild joint space narrowing with good soft tissue calcification.  Lumbar spinal hardware noted        Assessment:       Encounter Diagnoses   Name Primary?    Pain of right hip joint     Neurogenic pain of lower extremity, right Yes    Arthralgia of hip, unspecified laterality     Primary osteoarthritis of right hip         Based on the nature of the pain in the timing of the symptomatology, my assessment is that most of the pain is neurogenic.      Plan:       Thom was seen today for pain.    Diagnoses and all orders for this visit:    Neurogenic pain of lower extremity, right    Pain of right hip joint  -     Ambulatory referral/consult to Orthopedics    Arthralgia of hip, unspecified laterality    Primary osteoarthritis of right hip      I explained my diagnostic impression and the reasoning behind it in detail, using layman's terms.  Models and/or pictures were used to help in the explanation.    I explained the total hip arthroplasty might address some of the groin pain, but would do nothing for the burning pain in the sciatic distribution her back.  Since this is her major complaint I recommend that she see a spinal surgeon of her choice for further care.

## 2019-12-20 NOTE — LETTER
December 20, 2019      Aleksander Teixeira PA-C  [address]  [address]  [facility] [address]           [place] - Orthopedics  [address]  [facility] [address]  Phone: [phone]          Patient: Thom Krishna   MR Number: [id number]   YOB: 1955   Date of Visit: 12/20/2019       Dear Aleksander Teixeira:    Thank you for referring Thom Krishna to me for evaluation. Attached you will find relevant portions of my assessment and plan of care.    If you have questions, please do not hesitate to call me. I look forward to following Thom Krishna along with you.    Sincerely,    Yasir Hardin MD    Enclosure  CC:  No Recipients    If you would like to receive this communication electronically, please contact [email or web address] or [phone] to request more information on [facility] Link access.    For providers and/or their staff who would like to refer a patient to [facility], please contact us through our one-stop-shop provider referral line, [facility], at [phone].    If you feel you have received this communication in error or would no longer like to receive these types of communications, please e-mail [email or web address]

## 2019-12-25 ENCOUNTER — TELEPHONE (OUTPATIENT)
Dept: ENDOSCOPY | Facility: HOSPITAL | Age: 64
End: 2019-12-25

## 2019-12-25 DIAGNOSIS — Z87.19 HISTORY OF BARRETT'S ESOPHAGUS: Primary | ICD-10-CM

## 2019-12-30 ENCOUNTER — TELEPHONE (OUTPATIENT)
Dept: FAMILY MEDICINE | Facility: CLINIC | Age: 64
End: 2019-12-30

## 2019-12-30 NOTE — TELEPHONE ENCOUNTER
----- Message from Melanie Moya sent at 12/30/2019 12:55 PM CST -----  Contact: Pt  Pt is requesting a call from the nurse    Please advise pt at [phone]

## 2019-12-30 NOTE — TELEPHONE ENCOUNTER
Returned pt phone call, pt stated that ortho said that the rods that she got put in her back that they need them to be taken out that it is causing her nerve damnage, she stated he is sending her to a surgeon. I let her know that I would sent the message to Dr. Dejesus and let her know.

## 2019-12-31 NOTE — TELEPHONE ENCOUNTER
I see that she is scheduled with spine specialist Dr. Ding  on January 13, 2020, I will look forward to his evaluation and recommendations.  Thank you for the heads up.

## 2020-01-02 ENCOUNTER — TELEPHONE (OUTPATIENT)
Dept: FAMILY MEDICINE | Facility: CLINIC | Age: 65
End: 2020-01-02

## 2020-01-02 NOTE — TELEPHONE ENCOUNTER
Spoke to pt and states that she having leg, neck and back pain. Pt has an appt with a surgeon on 1/13. Pt wanted the message sent to Dr. Dejesus since she knows her history. Pt states the medication is not working. Pls advise.

## 2020-01-02 NOTE — TELEPHONE ENCOUNTER
----- Message from Trish Galvan sent at 1/2/2020  9:02 AM CST -----  Contact: [phone]/self   Patient is requesting to speak with nurse regarding pain on her right leg , lower back and neck. She states she has not been able to get a Surgeon and does not know what do next. Please call and advise.

## 2020-01-03 NOTE — TELEPHONE ENCOUNTER
Informed pt that the covering physician for Dr. Dejesus stated that it sounds like she may need to see a pain management doc, but Dr. Swanson will leave it up to Dr. Dejesus to determine if she wanted to try a stronger narcotic. Pt stated that she is having a lot of leg pain and running a fever. Pt does not know what her temperature is. Pt was offered an appt for today, but declined. Stated that she only wanted to see Dr. Dejesus. Pt stated that she could not wait for the next available appt with Dr. Dejesus. Pt was informed that she could go to one of our Urgent Care Clinics or the ER. Pt stated that she was going to the ER.

## 2020-01-08 ENCOUNTER — PATIENT OUTREACH (OUTPATIENT)
Dept: ADMINISTRATIVE | Facility: OTHER | Age: 65
End: 2020-01-08

## 2020-01-09 ENCOUNTER — HOSPITAL ENCOUNTER (EMERGENCY)
Facility: HOSPITAL | Age: 65
Discharge: HOME | End: 2020-01-09
Attending: EMERGENCY MEDICINE
Payer: MEDICARE

## 2020-01-09 VITALS
HEART RATE: 88 BPM | SYSTOLIC BLOOD PRESSURE: 130 MMHG | DIASTOLIC BLOOD PRESSURE: 80 MMHG | WEIGHT: 143 LBS | TEMPERATURE: 98 F | RESPIRATION RATE: 18 BRPM | BODY MASS INDEX: 26.31 KG/M2 | OXYGEN SATURATION: 97 % | HEIGHT: 62 IN

## 2020-01-09 DIAGNOSIS — M54.9 BACK PAIN: ICD-10-CM

## 2020-01-09 DIAGNOSIS — G89.29 CHRONIC BILATERAL THORACIC BACK PAIN: Primary | ICD-10-CM

## 2020-01-09 DIAGNOSIS — M54.6 CHRONIC BILATERAL THORACIC BACK PAIN: Primary | ICD-10-CM

## 2020-01-09 PROCEDURE — 63600175 PHARM REV CODE 636 W HCPCS: Mod: HCNC | Performed by: PHYSICIAN ASSISTANT

## 2020-01-09 PROCEDURE — 25000003 PHARM REV CODE 250: Mod: HCNC | Performed by: EMERGENCY MEDICINE

## 2020-01-09 PROCEDURE — 96372 THER/PROPH/DIAG INJ SC/IM: CPT | Mod: HCNC

## 2020-01-09 PROCEDURE — 99284 EMERGENCY DEPT VISIT MOD MDM: CPT | Mod: 25,HCNC

## 2020-01-09 RX ORDER — LIDOCAINE 50 MG/G
1 PATCH TOPICAL
Status: DISCONTINUED | OUTPATIENT
Start: 2020-01-09 | End: 2020-01-09

## 2020-01-09 RX ORDER — LIDOCAINE 50 MG/G
1 PATCH TOPICAL
Status: DISCONTINUED | OUTPATIENT
Start: 2020-01-09 | End: 2020-01-09 | Stop reason: HOSPADM

## 2020-01-09 RX ORDER — KETOROLAC TROMETHAMINE 10 MG/1
10 TABLET, FILM COATED ORAL EVERY 6 HOURS
Qty: 12 TABLET | Refills: 0 | Status: SHIPPED | OUTPATIENT
Start: 2020-01-09 | End: 2020-01-12

## 2020-01-09 RX ORDER — LIDOCAINE 50 MG/G
1 PATCH TOPICAL DAILY
Qty: 7 PATCH | Refills: 0 | Status: SHIPPED | OUTPATIENT
Start: 2020-01-09 | End: 2020-01-16

## 2020-01-09 RX ORDER — DEXAMETHASONE SODIUM PHOSPHATE 4 MG/ML
8 INJECTION, SOLUTION INTRA-ARTICULAR; INTRALESIONAL; INTRAMUSCULAR; INTRAVENOUS; SOFT TISSUE
Status: COMPLETED | OUTPATIENT
Start: 2020-01-09 | End: 2020-01-09

## 2020-01-09 RX ORDER — KETOROLAC TROMETHAMINE 30 MG/ML
30 INJECTION, SOLUTION INTRAMUSCULAR; INTRAVENOUS
Status: COMPLETED | OUTPATIENT
Start: 2020-01-09 | End: 2020-01-09

## 2020-01-09 RX ADMIN — KETOROLAC TROMETHAMINE 30 MG: 30 INJECTION, SOLUTION INTRAMUSCULAR at 09:01

## 2020-01-09 RX ADMIN — LIDOCAINE 1 PATCH: 50 PATCH TOPICAL at 09:01

## 2020-01-09 RX ADMIN — DEXAMETHASONE SODIUM PHOSPHATE 8 MG: 4 INJECTION, SOLUTION INTRAMUSCULAR; INTRAVENOUS at 09:01

## 2020-01-09 NOTE — ED TRIAGE NOTES
Chronic back pain that worsened this morning. Pt reporting pain radiating down the left leg, and in the upper right back. Pt states she always has these pains, but are worsened today. Pt is alert and oriented, ambulatory respirations even unlabored. Will continue to monitor.

## 2020-01-09 NOTE — ED PROVIDER NOTES
Encounter Date: 1/9/2020    SCRIBE #1 NOTE: I, Michelle Ahumada, am scribing for, and in the presence of,  Sofie El PA-C. I have scribed the entire note.       History     Chief Complaint   Patient presents with    Back Pain     pt has chronic back pain that normally radiates down her right leg. Since this morning pain has also radiated down her left leg, and she states lower back pain is severe. Pain did not improve with a norco this morning     Thom Krishna is a 64 y.o. 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    The patient presents to the ED due to chronic back pain that is radiating to her legs bilaterally, pain worse this morning. Patient reports she had severe back pain last night and this morning she felt worsening pain to her left leg which is unusual for her. She notes her right leg pain is worse than her left leg. Her pain is worse with walking, certain movements, and with palpation. Patient received a steroid injection to her neck 2 days ago with pain management but says she had no relief. Denies any recent falls, injury, or trauma. She denies fever, chills, numbness, tingling, weakness, urinary or bowel incontinence, or any other complaints at this time. Patient has taken her prescribed Norco  MG as instructed but says she has no relief. Patient reports to have followed up with a surgeon at [facility] and was told to follow up with Dr. Gandhi. Per patient, she saw an orthopedic at [facility] where she was told she had arthritis in her back. She also says she had a trial done in order to have a stimulator on her back but says she had no relief.    The history is provided by  the patient.     Review of patient's allergies indicates:   Allergen Reactions    Penicillins      Other reaction(s): Hives    Sulfa (sulfonamide antibiotics) Other (See Comments)     Other reaction(s): Hives     Past Medical History:   Diagnosis Date    Anxiety and depression 2015    Arthritis     Cervical radiculopathy 2016    Cirrhosis of liver     Colon polyps 2015    Diabetes mellitus type 2 with neurological manifestations 2015    no current medications, only one episode of elevated sugars with acute illness, will monitor     Encounter for blood transfusion     Hepatitis C     s/p treatment per patient report; managed by Dr. Perez    Hip fracture     Macrocytosis 2015    Thyroid disease     Transfusion reaction     hep c     Past Surgical History:   Procedure Laterality Date    APPENDECTOMY      BACK SURGERY      CAUDAL EPIDURAL STEROID INJECTION N/A 2018    Procedure: Injection-steroid-epidural-caudal;  Surgeon: Roxana Gu Jr., MD;  Location: [facility] OR;  Service: Pain Management;  Laterality: N/A;  with cath target L4-5    CAUDAL EPIDURAL STEROID INJECTION N/A 2019    Procedure: Injection-steroid-epidural-caudal;  Surgeon: Roxana Gu Jr., MD;  Location: [facility] PAIN MGT;  Service: Pain Management;  Laterality: N/A;     SECTION      CHOLECYSTECTOMY      COLONOSCOPY  3/31/10    2 polyps, tubular adenoma    COLONOSCOPY  2015    Dr. Washington    COLONOSCOPY N/A 10/10/2018    Procedure: COLONOSCOPY;  Surgeon: Reuben Miller Jr., MD;  Location: [facility];  Service: Endoscopy;  Laterality: N/A;    ESOPHAGOGASTRODUODENOSCOPY N/A 10/10/2018    Procedure: EGD (ESOPHAGOGASTRODUODENOSCOPY);  Surgeon: Reuben Miller Jr., MD;  Location: [facility] ENDO;  Service: Endoscopy;  Laterality: N/A;    ESOPHAGOGASTRODUODENOSCOPY N/A 12/10/2018    Procedure: EGD (ESOPHAGOGASTRODUODENOSCOPY)/poss emr;  Surgeon: Belle Kuo MD;  Location: [facility]  "ENDO (2ND FLR);  Service: Endoscopy;  Laterality: N/A;    ESOPHAGOGASTRODUODENOSCOPY N/A 3/12/2019    Procedure: EGD (ESOPHAGOGASTRODUODENOSCOPY);  Surgeon: Polo Keyes MD;  Location: [facility];  Service: Endoscopy;  Laterality: N/A;    HERNIA REPAIR      HYSTERECTOMY  1989    Uterus and both ovaries    INCISIONAL HERNIA REPAIR      x 2    JOINT REPLACEMENT      LIVER BIOPSY  12/2003    autoimmune hepatitis    ROTATOR CUFF REPAIR      right    STOMACH SURGERY  1980's    "took lymph node off of liver"    TOTAL HIP ARTHROPLASTY      left hip    UPPER GASTROINTESTINAL ENDOSCOPY  3/24/10    normal    UPPER GASTROINTESTINAL ENDOSCOPY  04/27/2015    Dr. Washington     Family History   Problem Relation Age of Onset    Diabetes Mellitus Mother     Diabetes Mother     Liver cancer Sister     Breast cancer Sister     Cataracts Sister     Pancreatic cancer Brother     Diabetes Brother     Lung cancer Brother     Colon cancer Brother     Brain cancer Brother     Stomach cancer Other     Diabetes Other     Throat cancer Brother     Cataracts Sister     Diabetes Son     Liver disease Neg Hx      Social History     Tobacco Use    Smoking status: Current Every Day Smoker     Packs/day: 2.00     Years: 45.00     Pack years: 90.00     Types: Cigarettes    Smokeless tobacco: Never Used   Substance Use Topics    Alcohol use: No     Comment: used to drink heavily, stopped in 1990's    Drug use: No     Review of Systems   Constitutional: Negative for fever.   HENT: Negative for congestion.    Musculoskeletal: Positive for back pain and myalgias. Negative for neck pain.   Skin: Negative for rash.   Neurological: Negative for weakness and numbness.   All other systems reviewed and are negative.      Physical Exam     Initial Vitals [01/09/20 0821]   BP Pulse Resp Temp SpO2   (!) 127/59 70 20 97.8 °F (36.6 °C) 97 %      MAP       --         Physical Exam    Nursing note and vitals reviewed.  Constitutional: She " appears well-developed and well-nourished. No distress.   HENT:   Head: Normocephalic and atraumatic.   Mouth/Throat: Oropharynx is clear and moist.   Eyes: Conjunctivae and EOM are normal. Pupils are equal, round, and reactive to light.   Neck: Normal range of motion. Neck supple. No stridor present. No tracheal deviation present.   Cardiovascular: Normal rate and regular rhythm.   Pulmonary/Chest: Breath sounds normal. No respiratory distress.   Musculoskeletal: Normal range of motion. She exhibits no edema or tenderness.        Cervical back: Normal.        Lumbar back: Normal.        Back:    Normal patellar reflexes, Negative SLR bilaterally.  Normal strength and sensation to BLE    Neurological: She is alert and oriented to person, place, and time. No sensory deficit.   Skin: Skin is warm and dry. Capillary refill takes less than 2 seconds.   Psychiatric: She has a normal mood and affect. Her behavior is normal.         ED Course   Procedures  Labs Reviewed - No data to display       X-Rays:   Independently Interpreted Readings:   Other Readings:  Reviewed by myself, read by radiology.    Imaging Results          X-Ray Thoracolumbar Spine AP Lateral (In process)  Result time 01/09/20 09:15:21              Medical Decision Making:   Initial Assessment:   Acute exacerbation of chronic lower back pain   Differential Diagnosis:   Herniated disk, hardware failure, muscular strain  ED Management:  Patient presents to ED for evaluation of an acute exacerbation of chronic low back pain.  No neurologic findings.  X-ray shows no acute abnormality.  Toradol, decadron and Lidoderm patch provided.  Patient instructed [facility] policy of narcotic administration for chronic pain issues, encourged f/u with her pain management or PCP for further evaluation of this issue.  Strict return precautions given and patient verbalized understanding.                                     Clinical Impression:     1. Chronic bilateral  thoracic back pain    2. Back pain             Scribe attestation I, Sofie El PA-C, personally performed the services described in this documentation. All medical record entries made by the scribe were at my direction and in my presence.  I have reviewed the chart and agree that the record reflects my personal performance and is accurate and complete.                 Sofie El PA-C  01/09/20 1007

## 2020-01-09 NOTE — DISCHARGE INSTRUCTIONS
You have been prescribed Toradol for pain. This is an Non-Steroidal Anti-Inflammatory (NSAID) Medication. Please do not take any additional NSAIDs while you are taking this medication including (Advil, Aleve, Motrin, Ibuprofen, Mobic\meloxicam, Naprosyn, Toradol, ketoralac, etc.). Please stop taking this medication if you experience: weakness, itching, yellow skin or eyes, joint pains, vomiting blood, blood or black stools, unusual weight gain, or swelling in your arms, legs, hands, or feet.

## 2020-01-13 ENCOUNTER — INITIAL CONSULT (OUTPATIENT)
Dept: ORTHOPEDICS | Facility: CLINIC | Age: 65
End: 2020-01-13
Payer: MEDICARE

## 2020-01-13 VITALS — WEIGHT: 144.31 LBS | BODY MASS INDEX: 26.56 KG/M2 | HEIGHT: 62 IN

## 2020-01-13 DIAGNOSIS — Z98.1 STATUS POST LUMBAR SPINAL FUSION: Primary | ICD-10-CM

## 2020-01-13 PROCEDURE — 1125F AMNT PAIN NOTED PAIN PRSNT: CPT | Mod: HCNC,S$GLB,, | Performed by: ORTHOPAEDIC SURGERY

## 2020-01-13 PROCEDURE — 1159F MED LIST DOCD IN RCRD: CPT | Mod: HCNC,S$GLB,, | Performed by: ORTHOPAEDIC SURGERY

## 2020-01-13 PROCEDURE — 99999 PR PBB SHADOW E&M-EST. PATIENT-LVL III: CPT | Mod: PBBFAC,HCNC,, | Performed by: ORTHOPAEDIC SURGERY

## 2020-01-13 PROCEDURE — 1159F PR MEDICATION LIST DOCUMENTED IN MEDICAL RECORD: ICD-10-PCS | Mod: HCNC,S$GLB,, | Performed by: ORTHOPAEDIC SURGERY

## 2020-01-13 PROCEDURE — 99214 OFFICE O/P EST MOD 30 MIN: CPT | Mod: HCNC,S$GLB,, | Performed by: ORTHOPAEDIC SURGERY

## 2020-01-13 PROCEDURE — 1101F PT FALLS ASSESS-DOCD LE1/YR: CPT | Mod: HCNC,CPTII,S$GLB, | Performed by: ORTHOPAEDIC SURGERY

## 2020-01-13 PROCEDURE — 99999 PR PBB SHADOW E&M-EST. PATIENT-LVL III: ICD-10-PCS | Mod: PBBFAC,HCNC,, | Performed by: ORTHOPAEDIC SURGERY

## 2020-01-13 PROCEDURE — 1125F PR PAIN SEVERITY QUANTIFIED, PAIN PRESENT: ICD-10-PCS | Mod: HCNC,S$GLB,, | Performed by: ORTHOPAEDIC SURGERY

## 2020-01-13 PROCEDURE — 99214 PR OFFICE/OUTPT VISIT, EST, LEVL IV, 30-39 MIN: ICD-10-PCS | Mod: HCNC,S$GLB,, | Performed by: ORTHOPAEDIC SURGERY

## 2020-01-13 PROCEDURE — 1101F PR PT FALLS ASSESS DOC 0-1 FALLS W/OUT INJ PAST YR: ICD-10-PCS | Mod: HCNC,CPTII,S$GLB, | Performed by: ORTHOPAEDIC SURGERY

## 2020-01-13 NOTE — PROGRESS NOTES
DATE: 1/13/2020  PATIENT: Thom Krishna    Attending Physician: Chung Ding M.D.    CHIEF COMPLAINT: low back pain    HISTORY:  Thom Krishna is a 64 y.o. female who here for initial evaluation of low back and bilateral leg pain (Back - 8, Leg - 4). The pain has been present since 2005 after she fell and hurt her back. The patient describes the pain as sharp and progressing. The patient had an L4-5 TLIF, the dates of the surgery are not well remembered by the patient. The surgery is listed as 2017. Since then, she's continued to have pain .  The pain is worse with ambulation and improved by rest. There is  associated numbness and tingling. There is no subjective weakness. Prior treatments have included, injections prior to surgery.     The Patient reports myelopathic symptoms such as handwriting changes or difficulty with buttons/coins/keys. Denies perineal paresthesias, bowel/bladder dysfunction. She says the upper extremity changes have been going on for the last month or so. Patient reports smoking 1 and 1/2 ppd.     PAST MEDICAL/SURGICAL HISTORY:  Past Medical History:   Diagnosis Date    Anxiety and depression 7/21/2015    Arthritis     Cervical radiculopathy 4/8/2016    Cirrhosis of liver     Colon polyps 4/27/2015    Diabetes mellitus type 2 with neurological manifestations 11/6/2015    no current medications, only one episode of elevated sugars with acute illness, will monitor     Encounter for blood transfusion     Hepatitis C     s/p treatment per patient report; managed by Dr. Perez    Hip fracture     Macrocytosis 7/21/2015    Thyroid disease     Transfusion reaction     hep c     Past Surgical History:   Procedure Laterality Date    APPENDECTOMY      BACK SURGERY      CAUDAL EPIDURAL STEROID INJECTION N/A 8/7/2018    Procedure: Injection-steroid-epidural-caudal;  Surgeon: Roxana Gu Jr., MD;  Location: [facility] OR;  Service: Pain Management;  Laterality: N/A;  with cath target  "L4-5    CAUDAL EPIDURAL STEROID INJECTION N/A 2019    Procedure: Injection-steroid-epidural-caudal;  Surgeon: Roxana Gu Jr., MD;  Location: [facility] MGT;  Service: Pain Management;  Laterality: N/A;     SECTION      CHOLECYSTECTOMY      COLONOSCOPY  3/31/10    2 polyps, tubular adenoma    COLONOSCOPY  2015    Dr. Washington    COLONOSCOPY N/A 10/10/2018    Procedure: COLONOSCOPY;  Surgeon: Reuben Miller Jr., MD;  Location: [facility];  Service: Endoscopy;  Laterality: N/A;    ESOPHAGOGASTRODUODENOSCOPY N/A 10/10/2018    Procedure: EGD (ESOPHAGOGASTRODUODENOSCOPY);  Surgeon: Reuben Miller Jr., MD;  Location: [facility];  Service: Endoscopy;  Laterality: N/A;    ESOPHAGOGASTRODUODENOSCOPY N/A 12/10/2018    Procedure: EGD (ESOPHAGOGASTRODUODENOSCOPY)/poss emr;  Surgeon: Belle Kuo MD;  [facility]  Service: Endoscopy;  Laterality: N/A;    ESOPHAGOGASTRODUODENOSCOPY N/A 3/12/2019    Procedure: EGD (ESOPHAGOGASTRODUODENOSCOPY);  Surgeon: Polo Keyes MD;  Location: [facility];  Service: Endoscopy;  Laterality: N/A;    HERNIA REPAIR      HYSTERECTOMY      Uterus and both ovaries    INCISIONAL HERNIA REPAIR      x 2    JOINT REPLACEMENT      LIVER BIOPSY  2003    autoimmune hepatitis    ROTATOR CUFF REPAIR      right    STOMACH SURGERY      "took lymph node off of liver"    TOTAL HIP ARTHROPLASTY      left hip    UPPER GASTROINTESTINAL ENDOSCOPY  3/24/10    normal    UPPER GASTROINTESTINAL ENDOSCOPY  2015    Dr. Washington       Current Medications:   Current Outpatient Medications:     ALPRAZolam (XANAX) 1 MG tablet, Take 1 tablet (1 mg total) by mouth 2 (two) times daily as needed., Disp: 45 tablet, Rfl: 5    amitriptyline (ELAVIL) 25 MG tablet, Take 25 mg by mouth., Disp: , Rfl:     amitriptyline (ELAVIL) 50 MG tablet, Take 50 mg by mouth., Disp: , Rfl:     bisacodyl (DULCOLAX) 5 mg EC tablet, Take 5 mg by mouth daily as " needed for Constipation., Disp: , Rfl:     blood-glucose meter kit, Test tid please dispense test strips and lancets, Disp: , Rfl:     cholecalciferol, vitamin D3, (VITAMIN D3) 1,000 unit capsule, Take 5 capsules (5,000 Units total) by mouth once daily., Disp: 100 capsule, Rfl: 11    diazePAM (VALIUM) 5 MG tablet, Take 5 mg by mouth., Disp: , Rfl:     diclofenac sodium (VOLTAREN) 1 % Gel, BETTYE 2 GRAMS AA QID, Disp: , Rfl: 1    elbasvir-grazoprevir (ZEPATIER)  mg Tab, , Disp: , Rfl:     HYDROcodone-acetaminophen (NORCO)  mg per tablet, Take 1 tablet by mouth every 6 (six) hours as needed for Pain., Disp: 90 tablet, Rfl: 0    HYDROcodone-acetaminophen (NORCO)  mg per tablet, Take 1 tablet by mouth every 6 (six) hours as needed for pain, Disp: 90 tablet, Rfl: 0    levothyroxine (SYNTHROID) 75 MCG tablet, Take 1 tablet (75 mcg total) by mouth once daily., Disp: 90 tablet, Rfl: 3    lidocaine (LIDODERM) 5 %, Place 1 patch onto the skin once daily. Remove & Discard patch within 12 hours or as directed by MD for 7 days, Disp: 7 patch, Rfl: 0    methocarbamol (ROBAXIN) 750 MG Tab, Take 1,500 mg by mouth., Disp: , Rfl:     nabumetone (RELAFEN) 750 MG tablet, TK 1 T PO BID, Disp: , Rfl: 2    ondansetron (ZOFRAN) 4 MG tablet, Take 1 tablet (4 mg total) by mouth every 6 (six) hours., Disp: 12 tablet, Rfl: 0    ondansetron (ZOFRAN-ODT) 8 MG TbDL, TAKE ONE TABLET UNDER THE TONGUE THREE TIMES DAILY AS NEEDED FOR NAUSEA, Disp: 20 tablet, Rfl: 11    OPW TEST CLAIM - DO NOT FILL, Take 1 tablet by mouth 2 (two) times daily as needed. OPW test claim. Do not fill., Disp: 30 tablet, Rfl: 0    pantoprazole (PROTONIX) 20 MG tablet, Take 40 mg by mouth., Disp: , Rfl:     pantoprazole (PROTONIX) 40 MG tablet, Take 1 tablet (40 mg total) by mouth once daily., Disp: 90 tablet, Rfl: 4    promethazine (PHENERGAN) 25 MG tablet, Take 25 mg by mouth., Disp: , Rfl:     tiZANidine (ZANAFLEX) 4 MG tablet, TK 1/2 TO 1  T PO HS, Disp: , Rfl: 2    buPROPion (WELLBUTRIN XL) 150 MG TB24 tablet, Take 1 tablet (150 mg total) by mouth once daily., Disp: 90 tablet, Rfl: 4    ranitidine (ZANTAC) 300 MG tablet, Take 1 tablet (300 mg total) by mouth every evening. , about 30-60 minutes before bedtime., Disp: 90 tablet, Rfl: 3    Social History:   Social History     Socioeconomic History    Marital status: Significant Other     Spouse name: Not on file    Number of children: Not on file    Years of education: Not on file    Highest education level: Not on file   Occupational History    Not on file   Social Needs    Financial resource strain: Not on file    Food insecurity:     Worry: Not on file     Inability: Not on file    Transportation needs:     Medical: Not on file     Non-medical: Not on file   Tobacco Use    Smoking status: Current Every Day Smoker     Packs/day: 2.00     Years: 45.00     Pack years: 90.00     Types: Cigarettes    Smokeless tobacco: Never Used   Substance and Sexual Activity    Alcohol use: No     Comment: used to drink heavily, stopped in 1990's    Drug use: No    Sexual activity: Not on file   Lifestyle    Physical activity:     Days per week: Not on file     Minutes per session: Not on file    Stress: Not on file   Relationships    Social connections:     Talks on phone: Not on file     Gets together: Not on file     Attends Buddhist service: Not on file     Active member of club or organization: Not on file     Attends meetings of clubs or organizations: Not on file     Relationship status: Not on file   Other Topics Concern    Not on file   Social History Narrative    Youngest out of 16 children to her parents       REVIEW OF SYSTEMS:  Constitution: Negative. Negative for chills, fever and night sweats.   Cardiovascular: Negative for chest pain and syncope.   Respiratory: Negative for cough and shortness of breath.   Gastrointestinal: See HPI. Negative for nausea/vomiting. Negative for  "abdominal pain.  Genitourinary: See HPI. Negative for discoloration or dysuria.  Hematologic/Lymphatic: neg for bleeding/clotting disorders.   Musculoskeletal: Negative for falls and muscle weakness.   Neurological: See HPI. neg history of seizures. neg history of cranial surgery or shunts.  Neurological: See HPI. No seizures.   Endocrine: Negative for polydipsia, polyphagia and polyuria.   Allergic/Immunologic: Negative for hives and persistent infections.     EXAM:  Ht 5' 2" (1.575 m)   Wt 65.5 kg (144 lb 4.7 oz)   BMI 26.39 kg/m²     PHYSICAL EXAMINATION:    General: The patient is a WNWD 64 y.o. female in no apparent distress, the patient is orientatied to person, place and time.  Psych: Normal mood and affect  HEENT: Vision grossly intact, hearing intact to the spoken word.  Lungs: Respirations unlabored.  Gait: Normal station and gait, no difficulty with toe or heel walk.   Skin: Dorsal lumbar skin negative for rashes, lesions, hairy patches and surgical scars. There is moderate lumbar tenderness to palpation.  Range of motion: Lumbar range of motion is acceptable.  Spinal Balance: Global saggital and coronal spinal balance acceptable, no significant for scoliosis and kyphosis.  Musculoskeletal: No pain with the range of motion of the bilateral hips. No trochanteric tenderness to palpation.  Vascular: Bilateral lower extremities warm and well perfused, Dorsalis pedis pulses 2+ bilaterally.  Neurological: Normal strength and tone in all major motor groups in the bilateral lower extremities. Normal sensation to light touch in the L2-S1 dermatomes bilaterally.  Deep tendon reflexes symmetric  in the bilateral lower extremities.  Negative Babinski bilaterally. Straight leg raise negative bilaterally.    IMAGING:      Today I personally reviewed AP, Lat and Flex/Ex  upright L-spine that demonstrate hardware fixed in place, CT scan with no apparent loosening except possibly L5      Body mass index is 26.39 " kg/m².  Hemoglobin A1C   Date Value Ref Range Status   11/08/2019 5.2 4.0 - 5.6 % Final     Comment:     ADA Screening Guidelines:  5.7-6.4%  Consistent with prediabetes  >or=6.5%  Consistent with diabetes  High levels of fetal hemoglobin interfere with the HbA1C  assay. Heterozygous hemoglobin variants (HbS, HgC, etc)do  not significantly interfere with this assay.   However, presence of multiple variants may affect accuracy.     08/12/2019 5.3 4.0 - 5.6 % Final     Comment:     ADA Screening Guidelines:  5.7-6.4%  Consistent with prediabetes  >or=6.5%  Consistent with diabetes  High levels of fetal hemoglobin interfere with the HbA1C  assay. Heterozygous hemoglobin variants (HbS, HgC, etc)do  not significantly interfere with this assay.   However, presence of multiple variants may affect accuracy.     11/19/2018 5.1 4.0 - 5.6 % Final     Comment:     ADA Screening Guidelines:  5.7-6.4%  Consistent with prediabetes  >or=6.5%  Consistent with diabetes  High levels of fetal hemoglobin interfere with the HbA1C  assay. Heterozygous hemoglobin variants (HbS, HgC, etc)do  not significantly interfere with this assay.   However, presence of multiple variants may affect accuracy.         ASSESSMENT/PLAN:    Diagnoses and all orders for this visit:    Status post lumbar spinal fusion      No follow-ups on file.    With patient's history, will collect the MRI of the cervical and lumbar spine images from outside facility. Will review these images to attempt to identify the source of her symptoms. Will have patient fu after images are obtained.

## 2020-01-16 DIAGNOSIS — F41.9 ANXIETY: ICD-10-CM

## 2020-01-16 RX ORDER — ALPRAZOLAM 1 MG/1
1 TABLET ORAL 2 TIMES DAILY PRN
Qty: 45 TABLET | Refills: 5 | Status: SHIPPED | OUTPATIENT
Start: 2020-01-16 | End: 2020-01-16 | Stop reason: SDUPTHER

## 2020-01-16 RX ORDER — ALPRAZOLAM 1 MG/1
1 TABLET ORAL 2 TIMES DAILY PRN
Qty: 45 TABLET | Refills: 5 | Status: SHIPPED | OUTPATIENT
Start: 2020-01-16 | End: 2020-07-06 | Stop reason: SDUPTHER

## 2020-01-21 ENCOUNTER — PATIENT OUTREACH (OUTPATIENT)
Dept: ADMINISTRATIVE | Facility: OTHER | Age: 65
End: 2020-01-21

## 2020-01-21 NOTE — PROGRESS NOTES
Chart reviewed. Care Everywhere updates requested. Immunizations reconciled. HM updated.  Not found on links

## 2020-01-22 ENCOUNTER — OFFICE VISIT (OUTPATIENT)
Dept: ORTHOPEDICS | Facility: CLINIC | Age: 65
End: 2020-01-22
Payer: MEDICARE

## 2020-01-22 VITALS — HEIGHT: 62 IN | BODY MASS INDEX: 26.51 KG/M2 | WEIGHT: 144.06 LBS

## 2020-01-22 DIAGNOSIS — Z98.1 STATUS POST LUMBAR SPINAL FUSION: Primary | ICD-10-CM

## 2020-01-22 PROCEDURE — 99213 OFFICE O/P EST LOW 20 MIN: CPT | Mod: HCNC,S$GLB,, | Performed by: ORTHOPAEDIC SURGERY

## 2020-01-22 PROCEDURE — 1101F PR PT FALLS ASSESS DOC 0-1 FALLS W/OUT INJ PAST YR: ICD-10-PCS | Mod: HCNC,CPTII,S$GLB, | Performed by: ORTHOPAEDIC SURGERY

## 2020-01-22 PROCEDURE — 99213 PR OFFICE/OUTPT VISIT, EST, LEVL III, 20-29 MIN: ICD-10-PCS | Mod: HCNC,S$GLB,, | Performed by: ORTHOPAEDIC SURGERY

## 2020-01-22 PROCEDURE — 1101F PT FALLS ASSESS-DOCD LE1/YR: CPT | Mod: HCNC,CPTII,S$GLB, | Performed by: ORTHOPAEDIC SURGERY

## 2020-01-22 PROCEDURE — 1159F MED LIST DOCD IN RCRD: CPT | Mod: HCNC,S$GLB,, | Performed by: ORTHOPAEDIC SURGERY

## 2020-01-22 PROCEDURE — 1125F AMNT PAIN NOTED PAIN PRSNT: CPT | Mod: HCNC,S$GLB,, | Performed by: ORTHOPAEDIC SURGERY

## 2020-01-22 PROCEDURE — 1125F PR PAIN SEVERITY QUANTIFIED, PAIN PRESENT: ICD-10-PCS | Mod: HCNC,S$GLB,, | Performed by: ORTHOPAEDIC SURGERY

## 2020-01-22 PROCEDURE — 1159F PR MEDICATION LIST DOCUMENTED IN MEDICAL RECORD: ICD-10-PCS | Mod: HCNC,S$GLB,, | Performed by: ORTHOPAEDIC SURGERY

## 2020-01-22 PROCEDURE — 99999 PR PBB SHADOW E&M-EST. PATIENT-LVL III: CPT | Mod: PBBFAC,HCNC,, | Performed by: ORTHOPAEDIC SURGERY

## 2020-01-22 PROCEDURE — 99999 PR PBB SHADOW E&M-EST. PATIENT-LVL III: ICD-10-PCS | Mod: PBBFAC,HCNC,, | Performed by: ORTHOPAEDIC SURGERY

## 2020-01-22 RX ORDER — TIZANIDINE 4 MG/1
4 TABLET ORAL EVERY 6 HOURS PRN
COMMUNITY
End: 2020-06-26 | Stop reason: ALTCHOICE

## 2020-01-24 DIAGNOSIS — K52.9 GASTROENTERITIS: ICD-10-CM

## 2020-01-24 RX ORDER — ONDANSETRON 8 MG/1
TABLET, ORALLY DISINTEGRATING ORAL
Qty: 20 TABLET | Refills: 11 | Status: SHIPPED | OUTPATIENT
Start: 2020-01-24 | End: 2020-02-11 | Stop reason: ALTCHOICE

## 2020-02-02 NOTE — PROGRESS NOTES
The patient returns for MRI Follow up.    She has a history of an L4/5 fusion by an outside surgeon and complains of low back and RLE pain as well as equivocal myelopathic symptoms.    Today I reviewed her lumbar and cervical MRIs.    These demonstrate C5/6 foraminal stenosis but no severe central stenosis or myelomalacia.    She also has recurrent L4/5 stenosis.    Most notable is her T score of -3.2.    Today we discussed options, given her severe osteoporosis I am very concerned about a revision operation failing. I will discuss with Dr. Loza but I do not think she is a candidate for surgery. I have recommended evaluation by our osteoporosis clinic.    I spent 15 minutes with the patient of which greater than 1/2 the time was devoted to counciling the patient regarding treatment options.

## 2020-02-05 DIAGNOSIS — M54.50 LOW BACK PAIN RADIATING TO RIGHT LOWER EXTREMITY: ICD-10-CM

## 2020-02-05 DIAGNOSIS — M48.02 CERVICAL SPINAL STENOSIS: ICD-10-CM

## 2020-02-05 DIAGNOSIS — M79.604 LOW BACK PAIN RADIATING TO RIGHT LOWER EXTREMITY: ICD-10-CM

## 2020-02-05 DIAGNOSIS — M47.816 DEGENERATIVE JOINT DISEASE OF CERVICAL AND LUMBAR SPINE: ICD-10-CM

## 2020-02-05 DIAGNOSIS — M54.12 RIGHT CERVICAL RADICULOPATHY: ICD-10-CM

## 2020-02-05 DIAGNOSIS — M47.812 DEGENERATIVE JOINT DISEASE OF CERVICAL AND LUMBAR SPINE: ICD-10-CM

## 2020-02-05 RX ORDER — HYDROCODONE BITARTRATE AND ACETAMINOPHEN 10; 325 MG/1; MG/1
1 TABLET ORAL EVERY 6 HOURS PRN
Qty: 90 TABLET | Refills: 0 | Status: SHIPPED | OUTPATIENT
Start: 2020-02-05 | End: 2020-04-22 | Stop reason: SDUPTHER

## 2020-02-05 RX ORDER — HYDROCODONE BITARTRATE AND ACETAMINOPHEN 10; 325 MG/1; MG/1
1 TABLET ORAL EVERY 6 HOURS PRN
Qty: 90 TABLET | Refills: 0 | Status: SHIPPED | OUTPATIENT
Start: 2020-02-05 | End: 2020-03-04 | Stop reason: SDUPTHER

## 2020-02-05 NOTE — TELEPHONE ENCOUNTER
----- Message from Trish Galvan sent at 2/5/2020  8:45 AM CST -----  Contact: [phone]/self   Patient is requesting a refill for HYDROcodone-acetaminophen (NORCO)  mg per tablet. Patient can  the prescription or have it sent to [facility]. Please advise.

## 2020-02-11 ENCOUNTER — OFFICE VISIT (OUTPATIENT)
Dept: URGENT CARE | Facility: CLINIC | Age: 65
End: 2020-02-11
Payer: MEDICARE

## 2020-02-11 ENCOUNTER — TELEPHONE (OUTPATIENT)
Dept: FAMILY MEDICINE | Facility: CLINIC | Age: 65
End: 2020-02-11

## 2020-02-11 ENCOUNTER — HOSPITAL ENCOUNTER (EMERGENCY)
Facility: HOSPITAL | Age: 65
Discharge: HOME | End: 2020-02-11
Attending: EMERGENCY MEDICINE
Payer: MEDICARE

## 2020-02-11 VITALS
WEIGHT: 144 LBS | TEMPERATURE: 98 F | DIASTOLIC BLOOD PRESSURE: 76 MMHG | OXYGEN SATURATION: 97 % | RESPIRATION RATE: 18 BRPM | BODY MASS INDEX: 26.5 KG/M2 | SYSTOLIC BLOOD PRESSURE: 129 MMHG | HEART RATE: 75 BPM | HEIGHT: 62 IN

## 2020-02-11 VITALS
RESPIRATION RATE: 18 BRPM | TEMPERATURE: 98 F | DIASTOLIC BLOOD PRESSURE: 59 MMHG | WEIGHT: 145 LBS | SYSTOLIC BLOOD PRESSURE: 125 MMHG | OXYGEN SATURATION: 96 % | HEART RATE: 62 BPM | BODY MASS INDEX: 26.52 KG/M2

## 2020-02-11 DIAGNOSIS — R10.13 EPIGASTRIC ABDOMINAL PAIN: Primary | ICD-10-CM

## 2020-02-11 DIAGNOSIS — R11.0 NAUSEA: ICD-10-CM

## 2020-02-11 DIAGNOSIS — R10.9 ABDOMINAL PAIN, UNSPECIFIED ABDOMINAL LOCATION: ICD-10-CM

## 2020-02-11 DIAGNOSIS — J06.9 URI WITH COUGH AND CONGESTION: Primary | ICD-10-CM

## 2020-02-11 LAB
ALBUMIN SERPL BCP-MCNC: 3.6 G/DL (ref 3.5–5.2)
ALP SERPL-CCNC: 91 U/L (ref 55–135)
ALT SERPL W/O P-5'-P-CCNC: 13 U/L (ref 10–44)
ANION GAP SERPL CALC-SCNC: 7 MMOL/L (ref 8–16)
AST SERPL-CCNC: 19 U/L (ref 10–40)
BACTERIA #/AREA URNS HPF: NORMAL /HPF
BILIRUB SERPL-MCNC: 0.4 MG/DL (ref 0.1–1)
BILIRUB UR QL STRIP: NEGATIVE
BUN SERPL-MCNC: 13 MG/DL (ref 8–23)
CALCIUM SERPL-MCNC: 9 MG/DL (ref 8.7–10.5)
CHLORIDE SERPL-SCNC: 105 MMOL/L (ref 95–110)
CLARITY UR: CLEAR
CO2 SERPL-SCNC: 26 MMOL/L (ref 23–29)
COLOR UR: YELLOW
CREAT SERPL-MCNC: 1 MG/DL (ref 0.5–1.4)
ERYTHROCYTE [DISTWIDTH] IN BLOOD BY AUTOMATED COUNT: 15.1 % (ref 11.5–14.5)
EST. GFR  (AFRICAN AMERICAN): >60 ML/MIN/1.73 M^2
EST. GFR  (NON AFRICAN AMERICAN): 59 ML/MIN/1.73 M^2
GLUCOSE SERPL-MCNC: 77 MG/DL (ref 70–110)
GLUCOSE UR QL STRIP: NEGATIVE
HCT VFR BLD AUTO: 40.6 % (ref 37–48.5)
HGB BLD-MCNC: 13.1 G/DL (ref 12–16)
HGB UR QL STRIP: NEGATIVE
KETONES UR QL STRIP: NEGATIVE
LEUKOCYTE ESTERASE UR QL STRIP: ABNORMAL
LIPASE SERPL-CCNC: 9 U/L (ref 4–60)
MCH RBC QN AUTO: 31.5 PG (ref 27–31)
MCHC RBC AUTO-ENTMCNC: 32.3 G/DL (ref 32–36)
MCV RBC AUTO: 98 FL (ref 82–98)
MICROSCOPIC COMMENT: NORMAL
NITRITE UR QL STRIP: NEGATIVE
PH UR STRIP: 6 [PH] (ref 5–8)
PLATELET # BLD AUTO: 180 K/UL (ref 150–350)
PMV BLD AUTO: 10.8 FL (ref 9.2–12.9)
POTASSIUM SERPL-SCNC: 3.9 MMOL/L (ref 3.5–5.1)
PROT SERPL-MCNC: 7 G/DL (ref 6–8.4)
PROT UR QL STRIP: NEGATIVE
RBC # BLD AUTO: 4.16 M/UL (ref 4–5.4)
SODIUM SERPL-SCNC: 138 MMOL/L (ref 136–145)
SP GR UR STRIP: 1.01 (ref 1–1.03)
SQUAMOUS #/AREA URNS HPF: 3 /HPF
URN SPEC COLLECT METH UR: ABNORMAL
UROBILINOGEN UR STRIP-ACNC: NEGATIVE EU/DL
WBC # BLD AUTO: 4.74 K/UL (ref 3.9–12.7)
WBC #/AREA URNS HPF: 2 /HPF (ref 0–5)

## 2020-02-11 PROCEDURE — 80053 COMPREHEN METABOLIC PANEL: CPT | Mod: HCNC

## 2020-02-11 PROCEDURE — 74018 RADEX ABDOMEN 1 VIEW: CPT | Mod: FY,S$GLB,, | Performed by: RADIOLOGY

## 2020-02-11 PROCEDURE — 99214 PR OFFICE/OUTPT VISIT, EST, LEVL IV, 30-39 MIN: ICD-10-PCS | Mod: 25,S$GLB,, | Performed by: INTERNAL MEDICINE

## 2020-02-11 PROCEDURE — 74018 XR ABDOMEN AP 1 VIEW: ICD-10-PCS | Mod: FY,S$GLB,, | Performed by: RADIOLOGY

## 2020-02-11 PROCEDURE — 96365 THER/PROPH/DIAG IV INF INIT: CPT | Mod: HCNC

## 2020-02-11 PROCEDURE — 99285 EMERGENCY DEPT VISIT HI MDM: CPT | Mod: 25,HCNC

## 2020-02-11 PROCEDURE — 96372 PR INJECTION,THERAP/PROPH/DIAG2ST, IM OR SUBCUT: ICD-10-PCS | Mod: S$GLB,,, | Performed by: INTERNAL MEDICINE

## 2020-02-11 PROCEDURE — 96372 THER/PROPH/DIAG INJ SC/IM: CPT | Mod: S$GLB,,, | Performed by: INTERNAL MEDICINE

## 2020-02-11 PROCEDURE — 83690 ASSAY OF LIPASE: CPT | Mod: HCNC

## 2020-02-11 PROCEDURE — 25500020 PHARM REV CODE 255: Mod: HCNC | Performed by: EMERGENCY MEDICINE

## 2020-02-11 PROCEDURE — 99214 OFFICE O/P EST MOD 30 MIN: CPT | Mod: 25,S$GLB,, | Performed by: INTERNAL MEDICINE

## 2020-02-11 PROCEDURE — 63600175 PHARM REV CODE 636 W HCPCS: Mod: HCNC | Performed by: EMERGENCY MEDICINE

## 2020-02-11 PROCEDURE — 81000 URINALYSIS NONAUTO W/SCOPE: CPT | Mod: HCNC

## 2020-02-11 PROCEDURE — 96361 HYDRATE IV INFUSION ADD-ON: CPT | Mod: HCNC

## 2020-02-11 PROCEDURE — 85027 COMPLETE CBC AUTOMATED: CPT | Mod: HCNC

## 2020-02-11 PROCEDURE — 96375 TX/PRO/DX INJ NEW DRUG ADDON: CPT | Mod: HCNC

## 2020-02-11 RX ORDER — KETOROLAC TROMETHAMINE 30 MG/ML
30 INJECTION, SOLUTION INTRAMUSCULAR; INTRAVENOUS
Status: DISCONTINUED | OUTPATIENT
Start: 2020-02-11 | End: 2020-02-11

## 2020-02-11 RX ORDER — DOCUSATE SODIUM 100 MG/1
100 CAPSULE, LIQUID FILLED ORAL 2 TIMES DAILY
Qty: 30 CAPSULE | Refills: 0 | Status: SHIPPED | OUTPATIENT
Start: 2020-02-11 | End: 2020-02-21

## 2020-02-11 RX ORDER — DICYCLOMINE HYDROCHLORIDE 20 MG/1
20 TABLET ORAL 2 TIMES DAILY
Qty: 15 TABLET | Refills: 0 | Status: SHIPPED | OUTPATIENT
Start: 2020-02-11 | End: 2020-02-18

## 2020-02-11 RX ORDER — ONDANSETRON 8 MG/1
8 TABLET, ORALLY DISINTEGRATING ORAL
Status: COMPLETED | OUTPATIENT
Start: 2020-02-11 | End: 2020-02-11

## 2020-02-11 RX ORDER — PROMETHAZINE HYDROCHLORIDE 25 MG/1
25 TABLET ORAL EVERY 6 HOURS PRN
Qty: 12 TABLET | Refills: 0 | Status: SHIPPED | OUTPATIENT
Start: 2020-02-11 | End: 2020-02-14

## 2020-02-11 RX ORDER — MORPHINE SULFATE 4 MG/ML
4 INJECTION, SOLUTION INTRAMUSCULAR; INTRAVENOUS
Status: COMPLETED | OUTPATIENT
Start: 2020-02-11 | End: 2020-02-11

## 2020-02-11 RX ORDER — BENZONATATE 100 MG/1
100 CAPSULE ORAL 3 TIMES DAILY PRN
Qty: 15 CAPSULE | Refills: 0 | Status: SHIPPED | OUTPATIENT
Start: 2020-02-11 | End: 2020-02-16

## 2020-02-11 RX ORDER — PROMETHAZINE HYDROCHLORIDE AND DEXTROMETHORPHAN HYDROBROMIDE 6.25; 15 MG/5ML; MG/5ML
5 SYRUP ORAL NIGHTLY PRN
Qty: 25 ML | Refills: 0 | Status: SHIPPED | OUTPATIENT
Start: 2020-02-11 | End: 2020-02-16

## 2020-02-11 RX ORDER — KETOROLAC TROMETHAMINE 30 MG/ML
30 INJECTION, SOLUTION INTRAMUSCULAR; INTRAVENOUS
Status: COMPLETED | OUTPATIENT
Start: 2020-02-11 | End: 2020-02-11

## 2020-02-11 RX ORDER — SODIUM CHLORIDE 9 MG/ML
500 INJECTION, SOLUTION INTRAVENOUS
Status: COMPLETED | OUTPATIENT
Start: 2020-02-11 | End: 2020-02-11

## 2020-02-11 RX ADMIN — SODIUM CHLORIDE 500 ML: 0.9 INJECTION, SOLUTION INTRAVENOUS at 01:02

## 2020-02-11 RX ADMIN — KETOROLAC TROMETHAMINE 30 MG: 30 INJECTION, SOLUTION INTRAMUSCULAR; INTRAVENOUS at 11:02

## 2020-02-11 RX ADMIN — PROMETHAZINE HYDROCHLORIDE 12.5 MG: 25 INJECTION INTRAMUSCULAR; INTRAVENOUS at 01:02

## 2020-02-11 RX ADMIN — ONDANSETRON 8 MG: 8 TABLET, ORALLY DISINTEGRATING ORAL at 10:02

## 2020-02-11 RX ADMIN — MORPHINE SULFATE 4 MG: 4 INJECTION INTRAVENOUS at 01:02

## 2020-02-11 RX ADMIN — IOHEXOL 75 ML: 350 INJECTION, SOLUTION INTRAVENOUS at 01:02

## 2020-02-11 NOTE — TELEPHONE ENCOUNTER
----- Message from Kat Medley sent at 2/11/2020  7:52 AM CST -----  Contact: patient  Type:  Same Day Appointment Request    Caller is requesting a same day appointment.  Caller declined first available appointment listed below.    Name of Caller: Thom  When is the first available appointment? Wants today  Symptoms: face swelling and stomach issues  Best Call Back Number: [phone]  Additional Information:  Thinks she is getting the virus that is currently being spread

## 2020-02-11 NOTE — ED TRIAGE NOTES
"Pt came to the ED with complaints of nausea, vomiting and feeling bloated.  Pt complains of abdominal pain noted to the upper left and right quadrants.  Pt states that she has a history of Hep C but that she thought she had "gotten rid of it" after taking medications.  Pt states she was due for testing on her liver again on the 20th and that it has been a while since she has been seen for that issue.  Pt notes that her symptoms seem to be similar to when she was having issues with Hep C and cirrhosis in the past. Pt denies any other symptoms at this time  "

## 2020-02-11 NOTE — PROGRESS NOTES
"Subjective:       Patient ID: Thom Kirshna is a 64 y.o. female.    Vitals:  height is 5' 2" (1.575 m) and weight is 65.3 kg (144 lb). Her temperature is 98.3 °F (36.8 °C). Her blood pressure is 129/76 and her pulse is 75. Her respiration is 18 and oxygen saturation is 97%.     Chief Complaint: Cough    65 y/o female with pertienent PMHx of Hep C, Cirrhosis, HTN, and Diabetes Type 2 presents to urgent care c/o ear pain, dry cough and sore throat that started yesterday. Symptoms have been constant and moderate since onset. Pt also c/o diffuse abdominal pain with nausea and vomiting "bile" for 3 days. Pt states she has been belching a fowel odor and the zofran is not helping anything. Pt has been taking OTC cough suppressant with minimal relief. Pt denies recent illness/travel, fever, chills, difficulty swallowing, nasal congestion, sinus pressure, SOB, chest pain, leg pain/swelling, diarrhea, blood in vomit/stool, hematuria, dysuria,  back pain, neck pain, headache, vision changes, and rash.      Cough   This is a new problem. The current episode started yesterday. The problem has been unchanged. The problem occurs hourly. The cough is non-productive. Associated symptoms include ear pain and a sore throat. Pertinent negatives include no chest pain, chills, eye redness, fever, hemoptysis, myalgias, rash, shortness of breath or wheezing. Nothing aggravates the symptoms. She has tried OTC cough suppressant for the symptoms. The treatment provided mild relief.       Constitution: Negative for chills, sweating, fatigue and fever.   HENT: Positive for ear pain and sore throat. Negative for congestion, sinus pain, sinus pressure, trouble swallowing and voice change.    Neck: Negative for neck pain and painful lymph nodes.   Cardiovascular: Negative for chest pain, leg swelling, palpitations and sob on exertion.   Eyes: Negative for eye redness, double vision and blurred vision.   Respiratory: Positive for cough. Negative for " chest tightness, sputum production, bloody sputum, COPD, shortness of breath, stridor, wheezing and asthma.    Gastrointestinal: Negative for abdominal pain, nausea, vomiting and diarrhea.   Genitourinary: Negative for dysuria and urgency.   Musculoskeletal: Negative for back pain and muscle ache.   Skin: Negative for rash.   Allergic/Immunologic: Negative for seasonal allergies and asthma.   Hematologic/Lymphatic: Negative for swollen lymph nodes.       Objective:      Physical Exam   Constitutional: She is oriented to person, place, and time. She appears well-developed and well-nourished. She is cooperative.  Non-toxic appearance. She does not have a sickly appearance. She does not appear ill. No distress.   HENT:   Head: Normocephalic and atraumatic.   Right Ear: Hearing, tympanic membrane, external ear and ear canal normal.   Left Ear: Hearing, tympanic membrane, external ear and ear canal normal.   Nose: Nose normal. No mucosal edema, rhinorrhea or nasal deformity. No epistaxis. Right sinus exhibits no maxillary sinus tenderness and no frontal sinus tenderness. Left sinus exhibits no maxillary sinus tenderness and no frontal sinus tenderness.   Mouth/Throat: Uvula is midline and mucous membranes are normal. No trismus in the jaw. Normal dentition. No uvula swelling. Posterior oropharyngeal erythema present. No oropharyngeal exudate, posterior oropharyngeal edema or tonsillar abscesses.   Eyes: Conjunctivae, EOM and lids are normal. No scleral icterus.   Neck: Trachea normal, full passive range of motion without pain and phonation normal. Neck supple. No neck rigidity. No edema and no erythema present.   Cardiovascular: Normal rate, regular rhythm, normal heart sounds, intact distal pulses and normal pulses.   No murmur heard.  Pulmonary/Chest: Effort normal and breath sounds normal. No respiratory distress. She has no decreased breath sounds. She has no wheezes. She has no rhonchi.   Abdominal: Soft. Normal  appearance and bowel sounds are normal. She exhibits no distension. There is tenderness. There is positive Diaz's sign. There is no rebound, no guarding, no CVA tenderness and no tenderness at McBurney's point.   Diffuse tenderness in all 4 quadrants, no rebound or guarding. Probable + diaz sign. Negative Rovsing. No CVA tenderness.    Musculoskeletal: Normal range of motion. She exhibits no edema or deformity.   Lymphadenopathy:     She has no cervical adenopathy.   Neurological: She is alert and oriented to person, place, and time. She exhibits normal muscle tone. Coordination normal.   Skin: Skin is warm, dry, intact, not diaphoretic and not pale.   Psychiatric: She has a normal mood and affect. Her speech is normal and behavior is normal. Judgment and thought content normal. Cognition and memory are normal.   Nursing note and vitals reviewed.        Assessment:       1. URI with cough and congestion    2. Abdominal pain, unspecified abdominal location        Plan:         URI with cough and congestion  -     promethazine-dextromethorphan (PROMETHAZINE-DM) 6.25-15 mg/5 mL Syrp; Take 5 mLs by mouth nightly as needed.  Dispense: 25 mL; Refill: 0  -     benzonatate (TESSALON) 100 MG capsule; Take 1 capsule (100 mg total) by mouth 3 (three) times daily as needed.  Dispense: 15 capsule; Refill: 0  -     (Magic mouthwash) 1:1:1 Benadryl 12.5mg/5ml liq, aluminum & magnesium hydroxide-simehticone (Maalox), lidocaine viscous 2%; Swish and spit 5 mLs every 4 (four) hours as needed. for mouth sores  Dispense: 90 mL; Refill: 0    Abdominal pain, unspecified abdominal location  -     (pyxis) gi cocktail (mylanta 30 mL, lidocaine 2 % viscous 10 mL, dicyclomine 10 mL) 50 mL  -     Discontinue: ketorolac injection 30 mg  -     X-Ray Abdomen AP 1 View; Future; Expected date: 02/11/2020  -     Refer to Emergency Dept.  -     ondansetron disintegrating tablet 8 mg  -     ketorolac injection 30 mg  -     POCT Urinalysis,  Dipstick, Manual, W/O Scope    X-ray Abdomen Ap 1 View    Result Date: 2/11/2020  EXAMINATION: XR ABDOMEN AP 1 VIEW CLINICAL HISTORY: Unspecified abdominal pain TECHNIQUE: AP View(s) of the abdomen was performed. COMPARISON: December 10, 2018 FINDINGS: Nonobstructive bowel gas pattern.  No free air.  Reconfirmed clips project about the right upper quadrant.  Reconfirmed postsurgical hardware fixation lower lumbar spine.  Reconfirmed partially visualize left total hip arthroplasty procedure.  Thoracolumbar dextrocurvature that could be positional.  No acute fractures.     Nonobstructive bowel gas pattern, no free air, postsurgical findings as described. Electronically signed by: Marcelo Stanford Date:    02/11/2020 Time:    10:36      63 y/o female with multiple medical problems presents to urgent care c/o of URI symptoms and diffuse abdominal pain,nasuea, and vomiting. VSS. On exam patient is diffusely tender in all 4 quadrants. No rebound, guarding, or CVA tenderness. Advised patient that her pain could be due to the gas shown on AbXR, but I am unable to determine anything else going on in her abdomen without a CT scan. Pt verbalized understanding and requested to go to ED for CT scan. Pt reports minimal relief after toradol injection, GI cocktail and zofran.     Patient Instructions             Viral Upper Respiratory Illness (Adult)  You have a viral upper respiratory illness (URI), which is another term for the common cold. This illness is contagious during the first few days. It is spread through the air by coughing and sneezing. It may also be spread by direct contact (touching the sick person and then touching your own eyes, nose, or mouth). Frequent handwashing will decrease risk of spread. Most viral illnesses go away within 7 to 10 days with rest and simple home remedies. Sometimes the illness may last for several weeks. Antibiotics will not kill a virus, and they are generally not prescribed for this  condition.    Home care  · If symptoms are severe, rest at home for the first 2 to 3 days. When you resume activity, don't let yourself get too tired.  · Avoid being exposed to cigarette smoke (yours or others).  · You may use acetaminophen or ibuprofen to control pain and fever, unless another medicine was prescribed. (Note: If you have chronic liver or kidney disease, have ever had a stomach ulcer or gastrointestinal bleeding, or are taking blood-thinning medicines, talk with your healthcare provider before using these medicines.) Aspirin should never be given to anyone under 18 years of age who is ill with a viral infection or fever. It may cause severe liver or brain damage.  · Your appetite may be poor, so a light diet is fine. Avoid dehydration by drinking 6 to 8 glasses of fluids per day (water, soft drinks, juices, tea, or soup). Extra fluids will help loosen secretions in the nose and lungs.  · Over-the-counter cold medicines will not shorten the length of time youre sick, but they may be helpful for the following symptoms: cough, sore throat, and nasal and sinus congestion. (Note: Do not use decongestants if you have high blood pressure.)  Follow-up care  Follow up with your healthcare provider, or as advised.  When to seek medical advice  Call your healthcare provider right away if any of these occur:  · Cough with lots of colored sputum (mucus)  · Severe headache; face, neck, or ear pain  · Difficulty swallowing due to throat pain  · Fever of 100.4°F (38°C)  Call 911, or get immediate medical care  Call emergency services right away if any of these occur:  · Chest pain, shortness of breath, wheezing, or difficulty breathing  · Coughing up blood  · Inability to swallow due to throat pain  Date Last Reviewed: 9/13/2015  © 7776-5369 [employer]. [address] [place] [address], [place] [address]. All rights reserved. This information is not intended as a substitute for professional medical care.  Always follow your healthcare professional's instructions.    Below are suggestions for symptomatic relief:    - Tylenol every 4 hours OR ibuprofen every 6 hours as needed for pain/fever/chills/body aches  - Salt water gargles to soothe throat pain.  - Chloroseptic spray also helps to numb throat pain.  - Warm face compresses to help with facial sinus pain/pressure.  - Vicks vapor rub at night.  - Flonase OTC or Nasonex nasal spray for nasal congestion.  - Simple foods like chicken noodle soup, smoothies, hot tea with lemon and honey  - If you were not given a prescription cough medication, then Delsym OTC helps with coughing at night  - *please only take cough syrup at night time as it causes drowsiness. Please only take when absolutely needed, as this is a controlled substance that can cause addiction. Do not drive or operate any machinery while taking this medication.   - Zyrtec/Claritin during the day & Benadryl at night may help with any allergies     If you DO NOT have Hypertension or any history of palpitations, it is ok to take over the counter Sudafed or Mucinex D or Allegra-D/Claritin-D/Zyrtec-D.  If you do take one of the above, it is ok to combine that with plain over the counter Mucinex or Allegra or Claritin or Zyrtec. If, for example, you are taking Zyrtec -D, you can combine that with Mucinex, but not Mucinex-D.  If you are taking Mucinex-D, you can combine that with plain Allegra or Claritin or Zyrtec.     If you DO have Hypertension or palpitations, it is safe to take Coricidin HBP for relief of sinus symptoms.     Please follow up with your primary care provider within 2-5 days if your signs and symptoms have not resolved or worsen.    Unknown Causes of Abdominal Pain (Female)    The exact cause of your abdominal (stomach) pain is not clear. This does not mean that this is something to worry about. Everyone likes to know the exact cause of the problem, but sometimes with abdominal pain, there is  no clear-cut cause, and this could be a good thing. The good news is that your symptoms can be treated, and you will feel better.   Your condition does not seem serious now; however, sometimes the signs of a serious problem may take more time to appear. For this reason, it is important for you to watch for any new symptoms, problems, or worsening of your condition.  Over the next few days, the abdominal pain may come and go, or be continuous. Other common symptoms can include nausea and vomiting. Sometimes it can be difficult to tell if you feel nauseous, you may just feel bad and not associate that feeling with nausea. Constipation, diarrhea, and a fever may go along with the pain.  The pain may continue even if treated correctly over the following days. Depending on how things go, sometimes the cause can become clear and may require further or different treatment. Additional evaluations, medications, or tests may also be needed.  Home care  Your healthcare provider may prescribe medicine for pain, symptoms, or an infection.  Follow the healthcare provider's instructions for taking these medicines.  General care  · Rest as much as you can until your next exam. No strenuous activities.  · Try to find positions that ease discomfort. A small pillow placed on the abdomen may help relieve pain.  · Something warm on your abdomen (such as a heating pad) may help, but be careful not to burn yourself.  Diet  · Do not force yourself to eat, especially if having cramps, vomiting, or diarrhea.  · Water is important so you do not get dehydrated. Soup may also be good. Sports drinks may also help, especially if they are not too acidic. Make sure you don't drink sugary drinks as this can make things worse. Take liquids in small amounts. Do not guzzle them.  · Caffeine sometimes makes the pain and cramping worse.  · Avoid dairy products if you have vomiting or diarrhea.  · Don't eat large amounts at a time. Wait a few minutes  between bites.  · Eat a diet low in fiber (called a low-residue diet). Foods allowed include refined breads, white rice, fruit and vegetable juices without pulp, tender meats. These foods will pass more easily through the intestine.  · Avoid whole-grain foods, whole fruits and vegetables, meats, seeds and nuts, fried or fatty foods, dairy, alcohol and spicy foods until your symptoms go away.  Follow-up care  Follow up with your healthcare provider, or as advised, if your pain does not begin to improve in the next 24 hours.  Call 911  Call 911 if any of these occur:  · Trouble breathing  · Confusion  · Fainting or loss of consciousness  · Rapid heart rate  · Seizure  When to seek medical advice  Call your healthcare provider right away if any of these occur:  · Pain gets worse or moves to the right lower abdomen  · New or worsening vomiting or diarrhea  · Swelling of the abdomen  · Unable to pass stool for more than 3 days  · Fever of 100.4ºF (38ºC) or higher, or as directed by your healthcare provider.  · Blood in vomit or bowel movements (dark red or black color)  · Jaundice (yellow color of eyes and skin)  · Weakness, dizziness  · Chest, arm, back, neck or jaw pain  · Unexpected vaginal bleeding or missed period  · Can't keep down liquids or water and are getting dehydrated  Date Last Reviewed: 12/30/2015  © 7883-5729 [employer]. [address] [place] [address], [place] [address]. All rights reserved. This information is not intended as a substitute for professional medical care. Always follow your healthcare professional's instructions.      GO STRAIGHT TO THE ER AND DO NOT EAT OR DRINK ANYTHING UNLESS A HEALTHCARE PROVIDER GIVES IT TO YOU.

## 2020-02-11 NOTE — ED PROVIDER NOTES
"Encounter Date: 2/11/2020    SCRIBE #1 NOTE: I, Sonya King, am scribing for, and in the presence of,  Dr. Little. I have scribed the entire note.       History     Chief Complaint   Patient presents with    Abdominal Pain     pt complains of abd " bloating" and RUQ/LUQ abd pain, + nausea denies emesis. last bm was 2 days ago, last ate yesterday      Thom Krishna is a 64 y.o. 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    The patient presents to the ED due to generalized abdominal pain, associated with "bloating" pain and cramping starting 3 days ago.   She reports associated intermittent nausea and vomiting as well as diarrhea, but denies any constipation, fever, hematemesis, urinary symptoms, or blood in stool.  She was evaluated at an urgent care for the symptoms but referred to the ED for further evaluation.   She reports similar episodes in the past which resolved without intervention.  She has history of hysterectomy, appendectomy and cholecystectomy.    The history is provided by the patient.     Review of patient's allergies indicates:   Allergen Reactions    Penicillins      Other reaction(s): Hives    Sulfa (sulfonamide antibiotics) Other (See Comments)     Other reaction(s): Hives     Past Medical History:   Diagnosis Date    Anxiety and depression 7/21/2015    Arthritis     Cervical radiculopathy 4/8/2016    Cirrhosis of liver     Colon polyps 4/27/2015    Diabetes mellitus type 2 with neurological manifestations 11/6/2015    no current medications, only one episode of elevated sugars with acute illness, will monitor     Encounter for blood transfusion     Hepatitis C  " "   s/p treatment per patient report; managed by Dr. Perez    Hip fracture     Macrocytosis 2015    Thyroid disease     Transfusion reaction     hep c     Past Surgical History:   Procedure Laterality Date    APPENDECTOMY      BACK SURGERY      CAUDAL EPIDURAL STEROID INJECTION N/A 2018    Procedure: Injection-steroid-epidural-caudal;  Surgeon: Roxana Gu Jr., MD;  Location: [facility] OR;  Service: Pain Management;  Laterality: N/A;  with cath target L4-5    CAUDAL EPIDURAL STEROID INJECTION N/A 2019    Procedure: Injection-steroid-epidural-caudal;  Surgeon: Roxana Gu Jr., MD;  Location: [facility];  Service: Pain Management;  Laterality: N/A;     SECTION      CHOLECYSTECTOMY      COLONOSCOPY  3/31/10    2 polyps, tubular adenoma    COLONOSCOPY  2015    Dr. Washington    COLONOSCOPY N/A 10/10/2018    Procedure: COLONOSCOPY;  Surgeon: Reuben Miller Jr., MD;  Location: [facility];  Service: Endoscopy;  Laterality: N/A;    ESOPHAGOGASTRODUODENOSCOPY N/A 10/10/2018    Procedure: EGD (ESOPHAGOGASTRODUODENOSCOPY);  Surgeon: Reuben Miller Jr., MD;  Location: [facility];  Service: Endoscopy;  Laterality: N/A;    ESOPHAGOGASTRODUODENOSCOPY N/A 12/10/2018    Procedure: EGD (ESOPHAGOGASTRODUODENOSCOPY)/poss emr;  Surgeon: Belle Kuo MD;  Location: [address]);  Service: Endoscopy;  Laterality: N/A;    ESOPHAGOGASTRODUODENOSCOPY N/A 3/12/2019    Procedure: EGD (ESOPHAGOGASTRODUODENOSCOPY);  Surgeon: Polo Keyes MD;  Location: [facility];  Service: Endoscopy;  Laterality: N/A;    HERNIA REPAIR      HYSTERECTOMY      Uterus and both ovaries    INCISIONAL HERNIA REPAIR      x 2    JOINT REPLACEMENT      LIVER BIOPSY  2003    autoimmune hepatitis    ROTATOR CUFF REPAIR      right    STOMACH SURGERY      "took lymph node off of liver"    TOTAL HIP ARTHROPLASTY      left hip    UPPER GASTROINTESTINAL ENDOSCOPY  3/24/10    normal    " UPPER GASTROINTESTINAL ENDOSCOPY  04/27/2015    Dr. Washington     Family History   Problem Relation Age of Onset    Diabetes Mellitus Mother     Diabetes Mother     Liver cancer Sister     Breast cancer Sister     Cataracts Sister     Pancreatic cancer Brother     Diabetes Brother     Lung cancer Brother     Colon cancer Brother     Brain cancer Brother     Stomach cancer Other     Diabetes Other     Throat cancer Brother     Cataracts Sister     Diabetes Son     Liver disease Neg Hx      Social History     Tobacco Use    Smoking status: Current Every Day Smoker     Packs/day: 2.00     Years: 45.00     Pack years: 90.00     Types: Cigarettes    Smokeless tobacco: Never Used   Substance Use Topics    Alcohol use: No     Comment: used to drink heavily, stopped in 1990's    Drug use: No     Review of Systems   Constitutional: Negative for chills and fever.   HENT: Negative for sore throat.    Respiratory: Negative for cough and shortness of breath.    Cardiovascular: Negative for chest pain.   Gastrointestinal: Positive for abdominal distention, abdominal pain, diarrhea, nausea and vomiting. Negative for anal bleeding, blood in stool and constipation.   Genitourinary: Negative for dysuria, frequency and urgency.   Musculoskeletal: Negative for back pain.   Skin: Negative for rash and wound.   Neurological: Negative for syncope and weakness.   Hematological: Does not bruise/bleed easily.   Psychiatric/Behavioral: Negative for agitation, behavioral problems and confusion.       Physical Exam     Initial Vitals [02/11/20 1145]   BP Pulse Resp Temp SpO2   (!) 145/73 65 17 97.7 °F (36.5 °C) 99 %      MAP       --         Physical Exam    Nursing note and vitals reviewed.  Constitutional: She appears well-developed and well-nourished. She is not diaphoretic. No distress.   HENT:   Head: Normocephalic and atraumatic.   Mouth/Throat: Oropharynx is clear and moist.   Eyes: EOM are normal. Pupils are equal,  round, and reactive to light.   Neck: No tracheal deviation present.   Cardiovascular: Normal rate, regular rhythm, normal heart sounds and intact distal pulses.   Pulmonary/Chest: Breath sounds normal. No stridor. No respiratory distress. She has no wheezes.   Abdominal: Soft. Bowel sounds are normal. She exhibits no distension and no mass. There is tenderness (generalized). There is rebound. There is no guarding.   Well healed post op scar. No rigidity   Musculoskeletal: Normal range of motion. She exhibits no edema.   Neurological: She is alert and oriented to person, place, and time. She has normal strength. No cranial nerve deficit or sensory deficit.   Skin: Skin is warm and dry. Capillary refill takes less than 2 seconds. No pallor.   Psychiatric: She has a normal mood and affect. Her behavior is normal. Thought content normal.         ED Course   Procedures  Labs Reviewed   URINALYSIS, REFLEX TO URINE CULTURE - Abnormal; Notable for the following components:       Result Value    Leukocytes, UA 1+ (*)     All other components within normal limits    Narrative:     Preferred Collection Type->Urine, Clean Catch   CBC WITHOUT DIFFERENTIAL - Abnormal; Notable for the following components:    Mean Corpuscular Hemoglobin 31.5 (*)     RDW 15.1 (*)     All other components within normal limits   COMPREHENSIVE METABOLIC PANEL - Abnormal; Notable for the following components:    Anion Gap 7 (*)     eGFR if non  59 (*)     All other components within normal limits   LIPASE   URINALYSIS MICROSCOPIC    Narrative:     Preferred Collection Type->Urine, Clean Catch          Imaging Results          CT Abdomen Pelvis With Contrast (Final result)  Result time 02/11/20 14:05:33    Final result by Gabriele Epps MD (02/11/20 14:05:33)                 Impression:      No acute abnormality.    No evidence for bowel obstruction.    In this patient status post cholecystectomy there is stable moderate intrahepatic  and extrahepatic biliary ductal dilatation.    Stable 6 mm pulmonary nodule in comparison the prior exam.    Patulous esophagus, which may increase the risk for aspiration.    Moderate aortoiliac atherosclerosis without high-grade stenosis of the celiac or SMA.    Electronically signed by resident: Sanford Matta  Date:    02/11/2020  Time:    13:42    Electronically signed by: Gabriele Epps MD  Date:    02/11/2020  Time:    14:05             Narrative:    EXAMINATION:  CT ABDOMEN PELVIS WITH CONTRAST    CLINICAL HISTORY:  Bowel obstruction, high-grade;    TECHNIQUE:  Low dose axial images, sagittal and coronal reformations were obtained from the lung bases to the pubic symphysis, following the administration of 75 mL of Omnipaque 350 IV contrast.    COMPARISON:  Abdominal radiograph 02/11/2019    FINDINGS:  Heart: Normal in size. No pericardial effusion.    Esophagus: Esophagus is again fluid-filled.  This may increase the risk for aspiration.    Lung Bases: There is a 6 mm right pulmonary nodule.  This is stable from the prior exam of 06/24/2019.    Liver: Normal in size and attenuation, with no focal hepatic lesions.    Gallbladder: Surgically absent.    Bile Ducts: Moderate intrahepatic and extrahepatic biliary ductal dilatation.    Pancreas: Age-related pancreatic atrophy.  No pancreatic mass or pancreatic ductal dilatation is seen.    Spleen: Unremarkable.    Adrenals: Unremarkable.    Kidneys/ Ureters: Unremarkable.    Bladder: No evidence of wall thickening.    Reproductive organs: Unremarkable.    GI Tract/Mesentery: Small bowel is largely nondistended.  No evidence for small bowel obstruction.  The colon is essentially unremarkable.  No wall thickening or local inflammation.    Peritoneal Space: No ascites. No free air.    Retroperitoneum: No significant adenopathy.    Abdominal wall: Calcified focus in the anterior abdominal wall likely relates to prior trauma.  Multiple body wall calcifications noted in  the gluteal soft tissues.    Vasculature: Moderate aortoiliac atherosclerosis with prominent atherosclerosis about the celiac and SMA origins, but without definite high-grade stenosis of the celiac or SMA.    Bones: Left hip total hip arthroplasty.  Degenerative changes of the right hip.  Posterior instrumented fusion L4-5 and intervertebral disc spacer again noted.  Stable compression deformity of the superior margin of L4.  Otherwise the osseous structures appear intact.                                 Medical Decision Making:   History:   Old Medical Records: I decided to obtain old medical records.  Old Records Summarized: records from clinic visits and other records.       <> Summary of Records: Patient was seen in urgent care this morning. Found to have generalized abdominal tenderness with positive Crowe's sign. Was given Toradol shot, Zofran and GI cocktail without significant improvement.   Abdominal xray was obtained that was unremarkable. She was sent to ED for further evaluation of abdominal pain.   Initial Assessment:   63 yo F with extensive surgical history presents to ED with N/V/D. Referred to ED by Urgent Care.  Vitals reassuring, exam with generalized mild abdominal pain.  Will obtain labs, CT A/P. Treat symptomatically, and reassess.  Differential Diagnosis:   Differential Diagnosis includes, but is not limited to:  AAA, aortic dissection, mesenteric ischemia, perforated viscous, MI/ACS, SBO/volvulus, incarcerated/strangulated hernia, intussusception, ileus, appendicitis, cholecystitis, cholangitis, diverticulitis, esophagitis, hepatitis, nephrolithiasis, pancreatitis, gastroenteritis, colitis, IBD/IBS, biliary colic, GERD, PUD, constipation, UTI/pyelonephritis,  disorder.    Clinical Tests:   Lab Tests: Ordered and Reviewed  Radiological Study: Ordered and Reviewed  ED Management:  Labs unremarkable.  UA without infection.  CT without acute process.  Unsure of current etiology, but no  emergent process evident on today/s visit. Will continue symptomatic treatment and refer to PCP/GI for further evaluation and management if symptoms persist.    After complete evaluation, including thorough history and physical exam, the patient's symptoms are most consistent with benign cause of abdominal pain. There is no rebound/guarding or other peritoneal signs to suggest perforation or other emergent surgical process. There is no fever or leukocytosis to suggest acute bacterial infection. There is no significant focal abdominal tenderness to suggest cholecystitis, appendicitis, diverticulitis, or  source, and the patient's current symptoms and clinical presentation do not warrant other targeted diagnostics at this time. Further imaging is unlikely to be of benefit. The patient was treated with supportive care.     On re-evaluation, the patient's status has improved.  After complete ED evaluation, clinical impression is most consistent with generalized abdominal pain.  PCP/GI follow-up within 2-3 days was recommended.    After taking into careful account the patient's history, physical exam findings, as well as empirical and objective data obtained throughout ED workup, I feel no emergent medical condition has been identified. No further evaluation or admission was felt to be required, and the patient is stable for discharge from the ED. The patient and any additional family present were updated with test results, overall clinical impression, and recommended further plan of care, including discharge instructions as provided and outpatient follow-up for continued evaluation and management as needed. All questions were answered. The patient expressed understanding and agreed with current plan for discharge and follow-up plan of care. Strict ED return precautions were provided, including return/worsening of current symptoms, new symptoms, or any other concerns.                     ED Course as of Feb 15 0904    Tue Feb 11, 2020   1445 Patient notes she is still having some pain but it is improved.     [SP]      ED Course User Index  [SP] Sonya King                Clinical Impression:     1. Epigastric abdominal pain    2. Nausea        Disposition:   Disposition: Discharged  Condition: Stable        I, Dr. Ashvin Little, personally performed the services described in this documentation. All medical record entries made by the scribe were at my direction and in my presence.  I have reviewed the chart and agree that the record reflects my personal performance and is accurate and complete.     Ashvin Little MD.           Ashvin Little MD  02/15/20 0908

## 2020-02-11 NOTE — PATIENT INSTRUCTIONS
Viral Upper Respiratory Illness (Adult)  You have a viral upper respiratory illness (URI), which is another term for the common cold. This illness is contagious during the first few days. It is spread through the air by coughing and sneezing. It may also be spread by direct contact (touching the sick person and then touching your own eyes, nose, or mouth). Frequent handwashing will decrease risk of spread. Most viral illnesses go away within 7 to 10 days with rest and simple home remedies. Sometimes the illness may last for several weeks. Antibiotics will not kill a virus, and they are generally not prescribed for this condition.    Home care  · If symptoms are severe, rest at home for the first 2 to 3 days. When you resume activity, don't let yourself get too tired.  · Avoid being exposed to cigarette smoke (yours or others).  · You may use acetaminophen or ibuprofen to control pain and fever, unless another medicine was prescribed. (Note: If you have chronic liver or kidney disease, have ever had a stomach ulcer or gastrointestinal bleeding, or are taking blood-thinning medicines, talk with your healthcare provider before using these medicines.) Aspirin should never be given to anyone under 18 years of age who is ill with a viral infection or fever. It may cause severe liver or brain damage.  · Your appetite may be poor, so a light diet is fine. Avoid dehydration by drinking 6 to 8 glasses of fluids per day (water, soft drinks, juices, tea, or soup). Extra fluids will help loosen secretions in the nose and lungs.  · Over-the-counter cold medicines will not shorten the length of time youre sick, but they may be helpful for the following symptoms: cough, sore throat, and nasal and sinus congestion. (Note: Do not use decongestants if you have high blood pressure.)  Follow-up care  Follow up with your healthcare provider, or as advised.  When to seek medical advice  Call your healthcare provider right away  if any of these occur:  · Cough with lots of colored sputum (mucus)  · Severe headache; face, neck, or ear pain  · Difficulty swallowing due to throat pain  · Fever of 100.4°F (38°C)  Call 911, or get immediate medical care  Call emergency services right away if any of these occur:  · Chest pain, shortness of breath, wheezing, or difficulty breathing  · Coughing up blood  · Inability to swallow due to throat pain  Date Last Reviewed: 9/13/2015 © 2000-2017 [employer]. [address] [place] [id number]. All rights reserved. This information is not intended as a substitute for professional medical care. Always follow your healthcare professional's instructions.    Below are suggestions for symptomatic relief:    - Tylenol every 4 hours OR ibuprofen every 6 hours as needed for pain/fever/chills/body aches  - Salt water gargles to soothe throat pain.  - Chloroseptic spray also helps to numb throat pain.  - Warm face compresses to help with facial sinus pain/pressure.  - Vicks vapor rub at night.  - Flonase OTC or Nasonex nasal spray for nasal congestion.  - Simple foods like chicken noodle soup, smoothies, hot tea with lemon and honey  - If you were not given a prescription cough medication, then Delsym OTC helps with coughing at night  - *please only take cough syrup at night time as it causes drowsiness. Please only take when absolutely needed, as this is a controlled substance that can cause addiction. Do not drive or operate any machinery while taking this medication.   - Zyrtec/Claritin during the day & Benadryl at night may help with any allergies     If you DO NOT have Hypertension or any history of palpitations, it is ok to take over the counter Sudafed or Mucinex D or Allegra-D/Claritin-D/Zyrtec-D.  If you do take one of the above, it is ok to combine that with plain over the counter Mucinex or Allegra or Claritin or Zyrtec. If, for example, you are taking Zyrtec -D, you can combine that  with Mucinex, but not Mucinex-D.  If you are taking Mucinex-D, you can combine that with plain Allegra or Claritin or Zyrtec.     If you DO have Hypertension or palpitations, it is safe to take Coricidin HBP for relief of sinus symptoms.     Please follow up with your primary care provider within 2-5 days if your signs and symptoms have not resolved or worsen.    Unknown Causes of Abdominal Pain (Female)    The exact cause of your abdominal (stomach) pain is not clear. This does not mean that this is something to worry about. Everyone likes to know the exact cause of the problem, but sometimes with abdominal pain, there is no clear-cut cause, and this could be a good thing. The good news is that your symptoms can be treated, and you will feel better.   Your condition does not seem serious now; however, sometimes the signs of a serious problem may take more time to appear. For this reason, it is important for you to watch for any new symptoms, problems, or worsening of your condition.  Over the next few days, the abdominal pain may come and go, or be continuous. Other common symptoms can include nausea and vomiting. Sometimes it can be difficult to tell if you feel nauseous, you may just feel bad and not associate that feeling with nausea. Constipation, diarrhea, and a fever may go along with the pain.  The pain may continue even if treated correctly over the following days. Depending on how things go, sometimes the cause can become clear and may require further or different treatment. Additional evaluations, medications, or tests may also be needed.  Home care  Your healthcare provider may prescribe medicine for pain, symptoms, or an infection.  Follow the healthcare provider's instructions for taking these medicines.  General care  · Rest as much as you can until your next exam. No strenuous activities.  · Try to find positions that ease discomfort. A small pillow placed on the abdomen may help relieve  pain.  · Something warm on your abdomen (such as a heating pad) may help, but be careful not to burn yourself.  Diet  · Do not force yourself to eat, especially if having cramps, vomiting, or diarrhea.  · Water is important so you do not get dehydrated. Soup may also be good. Sports drinks may also help, especially if they are not too acidic. Make sure you don't drink sugary drinks as this can make things worse. Take liquids in small amounts. Do not guzzle them.  · Caffeine sometimes makes the pain and cramping worse.  · Avoid dairy products if you have vomiting or diarrhea.  · Don't eat large amounts at a time. Wait a few minutes between bites.  · Eat a diet low in fiber (called a low-residue diet). Foods allowed include refined breads, white rice, fruit and vegetable juices without pulp, tender meats. These foods will pass more easily through the intestine.  · Avoid whole-grain foods, whole fruits and vegetables, meats, seeds and nuts, fried or fatty foods, dairy, alcohol and spicy foods until your symptoms go away.  Follow-up care  Follow up with your healthcare provider, or as advised, if your pain does not begin to improve in the next 24 hours.  Call 911  Call 911 if any of these occur:  · Trouble breathing  · Confusion  · Fainting or loss of consciousness  · Rapid heart rate  · Seizure  When to seek medical advice  Call your healthcare provider right away if any of these occur:  · Pain gets worse or moves to the right lower abdomen  · New or worsening vomiting or diarrhea  · Swelling of the abdomen  · Unable to pass stool for more than 3 days  · Fever of 100.4ºF (38ºC) or higher, or as directed by your healthcare provider.  · Blood in vomit or bowel movements (dark red or black color)  · Jaundice (yellow color of eyes and skin)  · Weakness, dizziness  · Chest, arm, back, neck or jaw pain  · Unexpected vaginal bleeding or missed period  · Can't keep down liquids or water and are getting dehydrated  Date Last  Reviewed: 12/30/2015  © 5428-7229 [employer]. [address] [place] [address], [place] [address]. All rights reserved. This information is not intended as a substitute for professional medical care. Always follow your healthcare professional's instructions.      GO STRAIGHT TO THE ER AND DO NOT EAT OR DRINK ANYTHING UNLESS A HEALTHCARE PROVIDER GIVES IT TO YOU.

## 2020-02-11 NOTE — TELEPHONE ENCOUNTER
Called pt let her know that I didn't have any available appts today with any physician, she stated she will go to the Urgent care

## 2020-02-12 ENCOUNTER — TELEPHONE (OUTPATIENT)
Dept: FAMILY MEDICINE | Facility: CLINIC | Age: 65
End: 2020-02-12

## 2020-02-12 NOTE — ED NOTES
Pt lying in bed resting comfortably, in no acute distress.  Pt aware of plan of care to await results from blood work and urine.  Bed locked and in lowest position, side rails up x 2, call light within reach, VSS, will continue to monitor

## 2020-02-12 NOTE — TELEPHONE ENCOUNTER
----- Message from Alice Arita sent at 2/12/2020  1:17 PM CST -----  Contact: [phone]/self  Patient needs to be seen sooner than the next available appointment for a hospital f/u - within 1-2 weeks from today. Please call patient and advise.

## 2020-02-12 NOTE — ED NOTES
APPEARANCE: Alert, oriented and in no acute distress.  CARDIAC: Normal rate and rhythm, no murmur heard.   PERIPHERAL VASCULAR: peripheral pulses present. Normal cap refill. No edema. Warm to touch.    RESPIRATORY:Normal rate and effort, breath sounds clear bilaterally throughout chest. Respirations are equal and unlabored no obvious signs of distress.  MUSC:  No bony tenderness or soft tissue tenderness. No obvious deformity.  SKIN: Skin is warm and dry, normal skin turgor, mucous membranes moist.  NEURO: 5/5 strength major flexors/extensors bilaterally. Sensory intact to light touch bilaterally. [place] coma scale: eyes open spontaneously-4, oriented & converses-5, obeys commands-6. No neurological abnormalities.   MENTAL STATUS: awake, alert and aware of environment.

## 2020-02-12 NOTE — ED NOTES
Pt lying in bed resting comfortably, in no acute distress. Pt states her abdominal pain has improved.  Pt aware of plan of care to await results from ct scan.  Bed locked and in lowest position, side rails up x 2, call light within reach, VSS, will continue to monitor

## 2020-02-14 ENCOUNTER — TELEPHONE (OUTPATIENT)
Dept: FAMILY MEDICINE | Facility: CLINIC | Age: 65
End: 2020-02-14

## 2020-02-14 NOTE — TELEPHONE ENCOUNTER
Scheduled patient for first available appointment and added her to the wait-list in case of a cancellation.  Advised patient that Dr. Dejesus placed a Gastro referral a while back and she should definitely call to schedule this appointment since she is still having ongoing symptoms for further evaluation.  Patient verbalized understanding and will call the centralized number to schedule referral.

## 2020-02-14 NOTE — TELEPHONE ENCOUNTER
----- Message from Aurora Seveirno sent at 2/14/2020  9:31 AM CST -----  Contact: Patient  Type:  Patient Returning Call    Who Called: Patient  Who Left Message for Patient: unknown  Does the patient know what this is regarding?:  Referral to gastro  Would the patient rather a call back or a response via [facility]?  Call back  Best Call Back Number: [phone]  Additional Information:  Patient would like a call back regarding questions she has about the gastro

## 2020-02-18 ENCOUNTER — TELEPHONE (OUTPATIENT)
Dept: NEUROLOGY | Facility: HOSPITAL | Age: 65
End: 2020-02-18

## 2020-02-18 NOTE — TELEPHONE ENCOUNTER
----- Message from Selena Callejas sent at 2/14/2020 11:05 AM CST -----  Contact: self / [phone]  GI: Needs an appointment. Please advise

## 2020-02-18 NOTE — TELEPHONE ENCOUNTER
Clinic appt scheduled with pt on tomorrow, Wednesday, February 19, 2020 at 215pm.  Pt given clinic address and repeated all information given correctly.

## 2020-02-19 ENCOUNTER — LAB VISIT (OUTPATIENT)
Dept: LAB | Facility: HOSPITAL | Age: 65
End: 2020-02-19
Attending: INTERNAL MEDICINE
Payer: MEDICARE

## 2020-02-19 ENCOUNTER — OFFICE VISIT (OUTPATIENT)
Dept: NEUROLOGY | Facility: HOSPITAL | Age: 65
End: 2020-02-19
Attending: INTERNAL MEDICINE
Payer: MEDICARE

## 2020-02-19 VITALS
WEIGHT: 145 LBS | DIASTOLIC BLOOD PRESSURE: 85 MMHG | SYSTOLIC BLOOD PRESSURE: 133 MMHG | HEART RATE: 82 BPM | HEIGHT: 62 IN | BODY MASS INDEX: 26.68 KG/M2

## 2020-02-19 DIAGNOSIS — E03.2 HYPOTHYROIDISM DUE TO MEDICAMENTS AND OTHER EXOGENOUS SUBSTANCES: ICD-10-CM

## 2020-02-19 DIAGNOSIS — B18.2 HEP C W/ COMA, CHRONIC: ICD-10-CM

## 2020-02-19 DIAGNOSIS — B18.2 HEP C W/ COMA, CHRONIC: Primary | ICD-10-CM

## 2020-02-19 LAB — TSH SERPL DL<=0.005 MIU/L-ACNC: 1.25 UIU/ML (ref 0.4–4)

## 2020-02-19 PROCEDURE — 36415 COLL VENOUS BLD VENIPUNCTURE: CPT | Mod: HCNC

## 2020-02-19 PROCEDURE — 84443 ASSAY THYROID STIM HORMONE: CPT | Mod: HCNC

## 2020-02-19 PROCEDURE — 87522 HEPATITIS C REVRS TRNSCRPJ: CPT | Mod: HCNC

## 2020-02-19 PROCEDURE — 99215 OFFICE O/P EST HI 40 MIN: CPT | Mod: HCNC | Performed by: INTERNAL MEDICINE

## 2020-02-19 RX ORDER — SUCRALFATE 1 G/10ML
1 SUSPENSION ORAL 3 TIMES DAILY
Qty: 414 ML | Refills: 0 | Status: SHIPPED | OUTPATIENT
Start: 2020-02-19 | End: 2020-02-19

## 2020-02-19 RX ORDER — DICYCLOMINE HYDROCHLORIDE 10 MG/1
10 CAPSULE ORAL EVERY 8 HOURS PRN
Qty: 60 CAPSULE | Refills: 2 | Status: SHIPPED | OUTPATIENT
Start: 2020-02-19 | End: 2020-06-26

## 2020-02-19 RX ORDER — SUCRALFATE 1 G/10ML
SUSPENSION ORAL
Qty: 414 ML | Refills: 9 | Status: SHIPPED | OUTPATIENT
Start: 2020-02-19 | End: 2021-07-16 | Stop reason: ALTCHOICE

## 2020-02-19 NOTE — PROGRESS NOTES
"[facility]    CC: abdominal pain    HPI 64 y.o. female AIH, HCV s/p tx, duodenal and colon TAs presents for chronic, intermittent, epigastric abdominal pain associated with bloating and worsening reflux. Has had these symptoms for several years and are cyclical. Present throughout the day but mainly post-prandial. Also notes new hair loss. Has constipation with 1-3 BMs weekly, no blood. No dysphagia, odynophagia, or weight loss. Takes protonix BID. Tried prilosec without improvement. Unsure if she's tried bentyl or levsin in the past.    Recently presented to urgent care for abdominal pain. CT A/P unremarkable.    Last EGD 3/2019 normal. Last colonoscopy 10/2018 with one TA.    Past Medical History tobacco use, arthritis, thyroid disorder, DM2, AIH, HCV s/p tx, duodenal and colon TAs    Past Surgical History back surgery, appendectomy, cholecystectomy, c section, hernia repair      Review of Systems  General ROS: negative for chills, fever or weight loss  Cardiovascular ROS: no chest pain or dyspnea on exertion  Gastrointestinal ROS: +abdominal pain, +constipation, no black/bloody stools  Neurological ROS: no syncope or seizures; no ataxia  Dermatological ROS: +hair loss, negative for pruritis, rash and jaundice    Physical Examination  /85 (BP Location: Left arm, Patient Position: Sitting, BP Method: Medium (Automatic))   Pulse 82   Ht 5' 2" (1.575 m)   Wt 65.8 kg (145 lb)   BMI 26.52 kg/m²   General appearance: alert, cooperative, no distress  HENT: Normocephalic, atraumatic, neck symmetrical, no nasal discharge   Eyes: conjunctivae/corneas clear, PERRL, EOM's intact  Lungs: clear to auscultation bilaterally, no dullness to percussion bilaterally  Heart: regular rate and rhythm without rub; no displacement of the PMI   Abdomen: soft, non-tender; bowel sounds normoactive; no organomegaly  Extremities: extremities symmetric; no clubbing, cyanosis, or edema  Integument:     Assessment:   63yo F " AIH, HCV s/p tx, CCY,  duodenal and colon TAs presents for chronic, intermittent, epigastric abdominal pain associated with bloating and worsening reflux which are recurrent symptoms over many years consistent with dyspepsia vs bile acid reflux vs dysmotility. EGD 3/2019 normal. She is requesting to continue to [facility] care here which appears to be quiescent given recent normal CMP. CT A/P without evidence of HCC. Labs and imaging not consistent with cirrhosis.    Plan:   - trial carafate TID and bentyl q8hr PRN for 2 weeks  - check HCV RNA and TSH  - will call with results and reinterview symptoms; if dyspepsia persists can trial cholestyramine, then Creon, then Mcgarry or Librax  - HCC screening q6mo, next due Aug 2020 for RUQ ultrasound (recent CT scan normal)    Kd Washington MD   [address] [place] [address], [address]   [place] [address] [phone]

## 2020-02-19 NOTE — PATIENT INSTRUCTIONS
Labs today, 1st floor [facility].    Medication sent to your pharmacy, take as directed.

## 2020-02-21 LAB
HCV RNA SERPL NAA+PROBE-LOG IU: <1.08 LOG (10) IU/ML
HCV RNA SERPL QL NAA+PROBE: NOT DETECTED IU/ML
HCV RNA SPEC NAA+PROBE-ACNC: <12 IU/ML

## 2020-02-24 ENCOUNTER — TELEPHONE (OUTPATIENT)
Dept: GASTROENTEROLOGY | Facility: CLINIC | Age: 65
End: 2020-02-24

## 2020-02-27 ENCOUNTER — TELEPHONE (OUTPATIENT)
Dept: NEUROLOGY | Facility: HOSPITAL | Age: 65
End: 2020-02-27

## 2020-02-28 RX ORDER — LEVOTHYROXINE SODIUM 75 UG/1
75 TABLET ORAL DAILY
Qty: 90 TABLET | Refills: 3 | Status: SHIPPED | OUTPATIENT
Start: 2020-02-28 | End: 2021-05-05 | Stop reason: SDUPTHER

## 2020-02-28 NOTE — TELEPHONE ENCOUNTER
Called [facility] but there was no answer so I left a message. Her TSH is normal and HCV RNA is negative. If her dyspepsia symptoms persist despite a 2 week trial of carafate TID and bentyl q8hr PRN, then can trial cholestyramine, Creon, Levsin, Librax.

## 2020-03-03 DIAGNOSIS — M54.50 LOW BACK PAIN RADIATING TO RIGHT LOWER EXTREMITY: ICD-10-CM

## 2020-03-03 DIAGNOSIS — M47.816 DEGENERATIVE JOINT DISEASE OF CERVICAL AND LUMBAR SPINE: ICD-10-CM

## 2020-03-03 DIAGNOSIS — M48.02 CERVICAL SPINAL STENOSIS: ICD-10-CM

## 2020-03-03 DIAGNOSIS — M54.12 RIGHT CERVICAL RADICULOPATHY: ICD-10-CM

## 2020-03-03 DIAGNOSIS — M47.812 DEGENERATIVE JOINT DISEASE OF CERVICAL AND LUMBAR SPINE: ICD-10-CM

## 2020-03-03 DIAGNOSIS — M79.604 LOW BACK PAIN RADIATING TO RIGHT LOWER EXTREMITY: ICD-10-CM

## 2020-03-03 RX ORDER — HYDROCODONE BITARTRATE AND ACETAMINOPHEN 10; 325 MG/1; MG/1
1 TABLET ORAL EVERY 6 HOURS PRN
Qty: 90 TABLET | Refills: 0 | Status: CANCELLED | OUTPATIENT
Start: 2020-03-03

## 2020-03-04 DIAGNOSIS — M47.816 DEGENERATIVE JOINT DISEASE OF CERVICAL AND LUMBAR SPINE: ICD-10-CM

## 2020-03-04 DIAGNOSIS — M54.12 RIGHT CERVICAL RADICULOPATHY: ICD-10-CM

## 2020-03-04 DIAGNOSIS — M79.604 LOW BACK PAIN RADIATING TO RIGHT LOWER EXTREMITY: ICD-10-CM

## 2020-03-04 DIAGNOSIS — M54.50 LOW BACK PAIN RADIATING TO RIGHT LOWER EXTREMITY: ICD-10-CM

## 2020-03-04 DIAGNOSIS — M47.812 DEGENERATIVE JOINT DISEASE OF CERVICAL AND LUMBAR SPINE: ICD-10-CM

## 2020-03-04 DIAGNOSIS — M48.02 CERVICAL SPINAL STENOSIS: ICD-10-CM

## 2020-03-04 RX ORDER — HYDROCODONE BITARTRATE AND ACETAMINOPHEN 10; 325 MG/1; MG/1
1 TABLET ORAL EVERY 6 HOURS PRN
Qty: 90 TABLET | Refills: 0 | Status: SHIPPED | OUTPATIENT
Start: 2020-03-04 | End: 2020-03-05 | Stop reason: SDUPTHER

## 2020-03-05 ENCOUNTER — TELEPHONE (OUTPATIENT)
Dept: FAMILY MEDICINE | Facility: CLINIC | Age: 65
End: 2020-03-05

## 2020-03-05 DIAGNOSIS — M79.604 LOW BACK PAIN RADIATING TO RIGHT LOWER EXTREMITY: ICD-10-CM

## 2020-03-05 DIAGNOSIS — M54.50 LOW BACK PAIN RADIATING TO RIGHT LOWER EXTREMITY: ICD-10-CM

## 2020-03-05 DIAGNOSIS — M47.816 DEGENERATIVE JOINT DISEASE OF CERVICAL AND LUMBAR SPINE: ICD-10-CM

## 2020-03-05 DIAGNOSIS — M47.812 DEGENERATIVE JOINT DISEASE OF CERVICAL AND LUMBAR SPINE: ICD-10-CM

## 2020-03-05 DIAGNOSIS — M48.02 CERVICAL SPINAL STENOSIS: ICD-10-CM

## 2020-03-05 DIAGNOSIS — M54.12 RIGHT CERVICAL RADICULOPATHY: ICD-10-CM

## 2020-03-05 RX ORDER — HYDROCODONE BITARTRATE AND ACETAMINOPHEN 10; 325 MG/1; MG/1
1 TABLET ORAL EVERY 6 HOURS PRN
Qty: 90 TABLET | Refills: 0 | Status: SHIPPED | OUTPATIENT
Start: 2020-03-05 | End: 2020-04-01 | Stop reason: SDUPTHER

## 2020-03-05 NOTE — TELEPHONE ENCOUNTER
----- Message from Kat Medley sent at 3/5/2020  9:23 AM CST -----  Contact: patient  Type:  Needs Medical Advice    Who Called: Thom  Would the patient rather a call back or a response via [facility]?  Call back  Best Call Back Number:  [phone]  Additional Information:  Please call as soon as possible about her pain medication

## 2020-03-05 NOTE — TELEPHONE ENCOUNTER
----- Message from Gunner Cox sent at 3/5/2020 10:54 AM CST -----  Contact: [phone] / self   Patient is returning a call from your office. Please Advise.

## 2020-03-05 NOTE — TELEPHONE ENCOUNTER
----- Message from Alice Arita sent at 3/5/2020 10:25 AM CST -----  Contact: patient  Patient called about medication HYDROcodone-acetaminophen (NORCO)  mg per tablet. We both spoke with the pharmacy but, they do not have it there. Can you resend to [facility] please? Thanks.

## 2020-03-05 NOTE — TELEPHONE ENCOUNTER
Called pt and let her know that I sent a request to dr estrada. For her to send the RX to [facility] down [facility].

## 2020-03-05 NOTE — TELEPHONE ENCOUNTER
Called pharmacy and they stated that they did not get the RX. Pharmacist had a really bad attitude, can you please resend pt's RX to [facility] Please

## 2020-03-05 NOTE — TELEPHONE ENCOUNTER
----- Message from Milana Castaneda sent at 3/5/2020  3:08 PM CST -----  Contact: self  Type:  RX Refill Request    Who Called:    Patient need to speak with nurse.   Patient states need refill on medication [place]  Patient want to know if medication was called into [facility]    Refill or New Rx:  refill  RX Name and Strength:   Norco  How is the patient currently taking it? (ex. 1XDay):  Is this a 30 day or 90 day RX:  Preferred Pharmacy with phone number: [facility]  Local or Mail Order:  Ordering Provider: Dr Dejesus  Would the patient rather a call back or a response via [facility]? call  Best Call Back Number:[phone]  Additional Information: patient states experiencing a lot of pain

## 2020-04-01 DIAGNOSIS — M79.604 LOW BACK PAIN RADIATING TO RIGHT LOWER EXTREMITY: ICD-10-CM

## 2020-04-01 DIAGNOSIS — M54.12 RIGHT CERVICAL RADICULOPATHY: ICD-10-CM

## 2020-04-01 DIAGNOSIS — M48.02 CERVICAL SPINAL STENOSIS: ICD-10-CM

## 2020-04-01 DIAGNOSIS — M47.816 DEGENERATIVE JOINT DISEASE OF CERVICAL AND LUMBAR SPINE: ICD-10-CM

## 2020-04-01 DIAGNOSIS — M47.812 DEGENERATIVE JOINT DISEASE OF CERVICAL AND LUMBAR SPINE: ICD-10-CM

## 2020-04-01 DIAGNOSIS — M54.50 LOW BACK PAIN RADIATING TO RIGHT LOWER EXTREMITY: ICD-10-CM

## 2020-04-01 RX ORDER — HYDROCODONE BITARTRATE AND ACETAMINOPHEN 10; 325 MG/1; MG/1
1 TABLET ORAL EVERY 6 HOURS PRN
Qty: 90 TABLET | Refills: 0 | Status: SHIPPED | OUTPATIENT
Start: 2020-04-01 | End: 2020-04-27 | Stop reason: SDUPTHER

## 2020-04-01 NOTE — TELEPHONE ENCOUNTER
----- Message from Tatyana Rankin sent at 4/1/2020  8:29 AM CDT -----  Contact: [phone]/self  Type:  RX Refill Request    Who Called:  self  Refill or New Rx: refill  RX Name and Strength: HYDROcodone-acetaminophen (NORCO)  mg per tablet  How is the patient currently taking it? (ex. 1XDay): Take 1 tablet by mouth every 6 (six) hours as needed for pain - Oral  Is this a 30 day or 90 day RX: 30 day  Preferred Pharmacy with phone number: [facility]  Local or Mail Order: local  Ordering Provider:   Would the patient rather a call back or a response via [facility]?  call  Best Call Back Number: [phone]/self  Additional Information:

## 2020-04-06 ENCOUNTER — HOSPITAL ENCOUNTER (EMERGENCY)
Facility: HOSPITAL | Age: 65
Discharge: HOME | End: 2020-04-06
Attending: EMERGENCY MEDICINE
Payer: MEDICARE

## 2020-04-06 ENCOUNTER — TELEPHONE (OUTPATIENT)
Dept: FAMILY MEDICINE | Facility: CLINIC | Age: 65
End: 2020-04-06

## 2020-04-06 VITALS
OXYGEN SATURATION: 97 % | RESPIRATION RATE: 18 BRPM | SYSTOLIC BLOOD PRESSURE: 130 MMHG | WEIGHT: 145 LBS | TEMPERATURE: 98 F | HEART RATE: 63 BPM | DIASTOLIC BLOOD PRESSURE: 71 MMHG | BODY MASS INDEX: 26.52 KG/M2

## 2020-04-06 DIAGNOSIS — R52 PAIN: ICD-10-CM

## 2020-04-06 DIAGNOSIS — S22.080A COMPRESSION FRACTURE OF T11 VERTEBRA, INITIAL ENCOUNTER: Primary | ICD-10-CM

## 2020-04-06 DIAGNOSIS — E55.9 VITAMIN D DEFICIENCY: Primary | ICD-10-CM

## 2020-04-06 DIAGNOSIS — M54.9 BACK PAIN: ICD-10-CM

## 2020-04-06 LAB — POCT GLUCOSE: 96 MG/DL (ref 70–110)

## 2020-04-06 PROCEDURE — 99284 EMERGENCY DEPT VISIT MOD MDM: CPT | Mod: 25,HCNC

## 2020-04-06 PROCEDURE — 25000003 PHARM REV CODE 250: Performed by: NURSE PRACTITIONER

## 2020-04-06 PROCEDURE — 96372 THER/PROPH/DIAG INJ SC/IM: CPT | Mod: HCNC

## 2020-04-06 PROCEDURE — 82962 GLUCOSE BLOOD TEST: CPT | Mod: HCNC

## 2020-04-06 PROCEDURE — 63600175 PHARM REV CODE 636 W HCPCS: Performed by: NURSE PRACTITIONER

## 2020-04-06 RX ORDER — LIDOCAINE 50 MG/G
1 PATCH TOPICAL ONCE
Status: DISCONTINUED | OUTPATIENT
Start: 2020-04-06 | End: 2020-04-06 | Stop reason: HOSPADM

## 2020-04-06 RX ORDER — LIDOCAINE 50 MG/G
1 PATCH TOPICAL DAILY
Qty: 12 PATCH | Refills: 0 | Status: SHIPPED | OUTPATIENT
Start: 2020-04-06 | End: 2021-07-16 | Stop reason: ALTCHOICE

## 2020-04-06 RX ORDER — KETOROLAC TROMETHAMINE 30 MG/ML
30 INJECTION, SOLUTION INTRAMUSCULAR; INTRAVENOUS
Status: COMPLETED | OUTPATIENT
Start: 2020-04-06 | End: 2020-04-06

## 2020-04-06 RX ADMIN — KETOROLAC TROMETHAMINE 30 MG: 30 INJECTION, SOLUTION INTRAMUSCULAR at 11:04

## 2020-04-06 RX ADMIN — LIDOCAINE 1 PATCH: 50 PATCH TOPICAL at 11:04

## 2020-04-06 NOTE — ED PROVIDER NOTES
"Encounter Date: 4/6/2020    SCRIBE #1 NOTE: I, Michelle Ahumada, am scribing for, and in the presence of,  MAGGY Kumari. I have scribed the entire note.       History     Chief Complaint   Patient presents with    Back Pain     on thurs pt states while bringing grocies inside, she "felt back pop" hx of arthris, pt has been taking norco with no relief pt currently wearing back brace      Time seen by provider: 11:06 AM    This is a 64 y.o. female with a history of chronic back pain, DM, cirrhosis, and osteoporosis who presents to the ED with complaint of worsening back pain since Thursday. Patient says she was carrying groceries into her home when she felt her back pop. Pain worsens with certain movements. Patient denies urinary or bowel incontinence, numbness, tingling, weakness, or any other complaints at this time. Patient says she has used ice packs, heating pads, and Norcos with no relief. Patient last took Norco at 9 AM this morning.    The history is provided by the patient.     Review of patient's allergies indicates:   Allergen Reactions    Penicillins      Other reaction(s): Hives    Sulfa (sulfonamide antibiotics) Other (See Comments)     Other reaction(s): Hives     Past Medical History:   Diagnosis Date    Anxiety and depression 7/21/2015    Arthritis     Cervical radiculopathy 4/8/2016    Cirrhosis of liver     Colon polyps 4/27/2015    Diabetes mellitus type 2 with neurological manifestations 11/6/2015    no current medications, only one episode of elevated sugars with acute illness, will monitor     Encounter for blood transfusion     Hepatitis C     s/p treatment per patient report; managed by Dr. Perez    Hip fracture     Macrocytosis 7/21/2015    Thyroid disease     Transfusion reaction     hep c     Past Surgical History:   Procedure Laterality Date    APPENDECTOMY      BACK SURGERY      CAUDAL EPIDURAL STEROID INJECTION N/A 8/7/2018    Procedure: " "Injection-steroid-epidural-caudal;  Surgeon: Roxana Gu Jr., MD;  Location: [facility];  Service: Pain Management;  Laterality: N/A;  with cath target L4-5    CAUDAL EPIDURAL STEROID INJECTION N/A 2019    Procedure: Injection-steroid-epidural-caudal;  Surgeon: Roxana Gu Jr., MD;  Location: [facility];  Service: Pain Management;  Laterality: N/A;     SECTION      CHOLECYSTECTOMY      COLONOSCOPY  3/31/10    2 polyps, tubular adenoma    COLONOSCOPY  2015    Dr. Washington    COLONOSCOPY N/A 10/10/2018    Procedure: COLONOSCOPY;  Surgeon: Reuben Miller Jr., MD;  Location: [facility];  Service: Endoscopy;  Laterality: N/A;    ESOPHAGOGASTRODUODENOSCOPY N/A 10/10/2018    Procedure: EGD (ESOPHAGOGASTRODUODENOSCOPY);  Surgeon: Reuben Miller Jr., MD;  Location: [facility];  Service: Endoscopy;  Laterality: N/A;    ESOPHAGOGASTRODUODENOSCOPY N/A 12/10/2018    Procedure: EGD (ESOPHAGOGASTRODUODENOSCOPY)/poss emr;  Surgeon: Belle Kuo MD;  Location: [facility] ([address] [place] [address]);  Service: Endoscopy;  Laterality: N/A;    ESOPHAGOGASTRODUODENOSCOPY N/A 3/12/2019    Procedure: EGD (ESOPHAGOGASTRODUODENOSCOPY);  Surgeon: Polo Keyes MD;  Location: [facility];  Service: Endoscopy;  Laterality: N/A;    HERNIA REPAIR      HYSTERECTOMY      Uterus and both ovaries    INCISIONAL HERNIA REPAIR      x 2    JOINT REPLACEMENT      LIVER BIOPSY  2003    autoimmune hepatitis    ROTATOR CUFF REPAIR      right    STOMACH SURGERY      "took lymph node off of liver"    TOTAL HIP ARTHROPLASTY      left hip    UPPER GASTROINTESTINAL ENDOSCOPY  3/24/10    normal    UPPER GASTROINTESTINAL ENDOSCOPY  2015    Dr. Washington     Family History   Problem Relation Age of Onset    Diabetes Mellitus Mother     Diabetes Mother     Liver cancer Sister     Breast cancer Sister     Cataracts Sister     Pancreatic cancer Brother     Diabetes Brother     Lung cancer Brother     Colon " cancer Brother     Brain cancer Brother     Stomach cancer Other     Diabetes Other     Throat cancer Brother     Cataracts Sister     Diabetes Son     Liver disease Neg Hx      Social History     Tobacco Use    Smoking status: Current Every Day Smoker     Packs/day: 2.00     Years: 45.00     Pack years: 90.00     Types: Cigarettes    Smokeless tobacco: Never Used   Substance Use Topics    Alcohol use: No     Comment: used to drink heavily, stopped in 1990's    Drug use: No     Review of Systems   Constitutional: Negative for chills and fever.   HENT: Negative for congestion, rhinorrhea and sore throat.    Eyes: Negative for redness and visual disturbance.   Respiratory: Negative for cough, shortness of breath and wheezing.    Cardiovascular: Negative for chest pain and palpitations.   Gastrointestinal: Negative for abdominal pain, diarrhea, nausea and vomiting.   Genitourinary: Negative for dysuria and hematuria.   Musculoskeletal: Positive for back pain. Negative for myalgias and neck pain.   Skin: Negative for rash.   Neurological: Negative for dizziness, weakness and light-headedness.   Psychiatric/Behavioral: Negative for confusion.   All other systems reviewed and are negative.      Physical Exam     Initial Vitals [04/06/20 1055]   BP Pulse Resp Temp SpO2   125/72 91 17 97.5 °F (36.4 °C) 99 %      MAP       --         Physical Exam    Nursing note and vitals reviewed.  Constitutional: She appears well-developed and well-nourished. No distress.   Histrionic   HENT:   Head: Normocephalic and atraumatic.   Mouth/Throat: Oropharynx is clear and moist.   Eyes: Conjunctivae and EOM are normal. Pupils are equal, round, and reactive to light.   Neck: Normal range of motion. Neck supple. No stridor present. No tracheal deviation present.   Cardiovascular: Normal rate, regular rhythm and normal heart sounds.   No murmur heard.  Pulses:       Dorsalis pedis pulses are 2+ on the right side, and 2+ on the left  side.   Abdominal: Soft. Bowel sounds are normal. There is no tenderness. There is no rebound and no guarding.   Musculoskeletal: Normal range of motion. She exhibits tenderness. She exhibits no edema.   TTP to light tough to her entire back. SLR positive on the right. No signs of trauma. No deformities, crepitus, or step-off.   Neurological: She is alert and oriented to person, place, and time. She has normal strength. No sensory deficit.   Reflex Scores:       Patellar reflexes are 2+ on the right side and 2+ on the left side.  Sensation and strength intact to lower extremities bilaterally   Skin: Skin is warm and dry. Capillary refill takes less than 2 seconds.   Psychiatric: She has a normal mood and affect. Her behavior is normal.         ED Course   Procedures  Labs Reviewed   POCT GLUCOSE          X-Rays:   Independently Interpreted Readings:   Other Readings:  Reviewed by myself, read by radiology.    Imaging Results          X-Ray Lumbar Spine Ap And Lateral (Final result)  Result time 04/06/20 11:32:45    Final result by Theodore Schulte MD (04/06/20 11:32:45)                 Impression:      As above.      Electronically signed by: Theodore Schulte MD  Date:    04/06/2020  Time:    11:32             Narrative:    EXAMINATION:  XR LUMBAR SPINE AP AND LATERAL    CLINICAL HISTORY:  Low back pain, minor trauma;    TECHNIQUE:  AP, lateral and spot images were performed of the lumbar spine.    COMPARISON:  01/10/2017    FINDINGS:  Status post L4-L5 instrumented fusion.  Hardware is intact.  Note made of grade 1 anterolisthesis at L4-L5.    Vertebral body heights are maintained.  Moderate disc height loss noted throughout the lower lumbar spine with facet arthropathy.  No lytic or blastic lesions.  Note made of vascular calcification and surgical clips.                               X-Ray Thoracic Spine AP Lateral (Final result)  Result time 04/06/20 11:29:46    Final result by Kd Olivo Jr., MD (04/06/20  11:29:46)                 Impression:      Interval compression superior endplate likely of T11 when compared to January 2020.  Bones are significantly osteopenic.      Electronically signed by: Kd Olivo MD  Date:    04/06/2020  Time:    11:29             Narrative:    EXAMINATION:  XR THORACIC SPINE AP LATERAL    CLINICAL HISTORY:  Pain, unspecified    TECHNIQUE:  AP and lateral views of the thoracic spine were performed.    COMPARISON:  Thoracolumbar spine January 9, 2020.    FINDINGS:  Bones are significantly demineralized.  Degenerative change lower cervical spine.  Interval compression superior endplate probably T11.  Degenerative changes with disc space narrowing and small osteophytes in the upper thoracic spine.                              Medical Decision Making:   Initial Assessment:   Past medical records queried and reviewed, including a history of chronic back pain, DM, cirrhosis, and osteoporosis.     This is a 64 y.o. female who presents to the ED with complaint of worsening back pain since Thursday. The patient was seen and examined. The history and physical exam was obtained. The nursing notes and vital signs were reviewed.     Secondary to symptoms and exam findings we ordered:    Labs: POCT glucose    Imaging: XR Lumbar, XR Thoracic    Patient will be administered lidocaine patch and ketorolac injection.  Patient will be monitored and reassessed.  Clinical Tests:   Lab Tests: Reviewed and Ordered  Radiological Study: Ordered and Reviewed  ED Management:  Xray, Lidoderm patch, IM toradol    Xray shows possibly new compression fracture of T11.  She has no sign of neurovascular compromise.  Pt is in pain management and has previously seen  for back problems in the past.  She also has a TLSO back brace that was previously given to her.      Advised the patient to continue wearing her back brace.  She is to f/u with pain management and  within one week. Pt to follow up with  PCP within 2 days.  I reviewed strict return precautions. In addition, pt is to return to the ED if condition changes, progresses, or if there are any concerns.  Pt verbalized understanding, compliance, and agreement with the treatment plan.                                     Clinical Impression:     1. Compression fracture of T11 vertebra, initial encounter    2. Pain    3. Back pain                I, Heather WINTER, personally performed the services described in this documentation. All medical record entries made by the scribe were at my direction and in my presence.  I have reviewed the chart and agree that the record reflects my personal performance and is accurate and complete.     YANN Kumari  12:01 PM 04/06/2020               MAGGY Mabry  04/06/20 1211

## 2020-04-06 NOTE — ED TRIAGE NOTES
"on thurs pt states while bringing grocies inside, she "felt back pop" hx of arthris, pt has been taking norco with no relief pt currently wearing back brace   "

## 2020-04-06 NOTE — TELEPHONE ENCOUNTER
----- Message from Keyanna Ashraf sent at 4/6/2020  3:40 PM CDT -----  Contact: patient  Patient called states that she was discharged from the hospital with a broken bone in her back and ER suggested she contact  To get a stronger medication to help with pain until she's able to be seen by a Surgeon  And would also like to be referred to a Surgeon     Please call back to discuss [phone]

## 2020-04-06 NOTE — DISCHARGE INSTRUCTIONS
It is very important that you wear your back brace!     Use your Norco as prescribed.    You need to contact your pain management provider to notify them of your fracture.    Return to the ED if your condition changes, progresses, or if you have any concerns.

## 2020-04-06 NOTE — TELEPHONE ENCOUNTER
I returned patient's call in regards to her being discharged from the hospital with a broken bone in her back and ER suggested she contact  To get a stronger medication to help with pain until she's able to be seen by a Surgeon and would also like to be referred to a Surgeon. There was no answer and I left a message for a return call

## 2020-04-06 NOTE — ED NOTES
Pt getting dressed by self , and placing back on home brace, pt states brace was given to her by pain management

## 2020-04-07 ENCOUNTER — TELEPHONE (OUTPATIENT)
Dept: NEUROSURGERY | Facility: CLINIC | Age: 65
End: 2020-04-07

## 2020-04-07 RX ORDER — CHOLECALCIFEROL (VITAMIN D3) 125 MCG
CAPSULE ORAL
Qty: 100 CAPSULE | Refills: 4 | Status: SHIPPED | OUTPATIENT
Start: 2020-04-07 | End: 2021-12-20 | Stop reason: SDUPTHER

## 2020-04-07 NOTE — TELEPHONE ENCOUNTER
Please let her know that I have reached out to a spine surgeon regarding their recommendations.  Right now there are a lot of changes to usual operations due to COVID -19 concerns.  As soon as I hear back then I will let her know what they advise.      In the meantime, she should take her usual pain medication and avoid bending/lifting/twisting.  We do not want to increase her pain medication as this could lead to constipation and that would exacerbate stress on the compression fracture.  I know that she is in pain, however, she needs to use the Norco prescription she already has pending further recommendations from spine.    Also, we have tried to treat her osteoporosis, but she has has issues with taking the-counter 5000 IU of vitamin D3 that we have previously discussed.  I believe she has had financial concerns about this and has preferred a prescription, however, the weekly prescription vitamin-D 2 we tried did not appropriately raise her levels.      Her vitamin-D level is still very low and this will interfere with healing.  She needs to discuss a 5000 IU vitamin D3 supplement with her pharmacist (she can try the [facility] here) to see if they can recommend 1 that is affordable for her to take daily with breakfast as this will help with stability and healing of her spine.

## 2020-04-07 NOTE — TELEPHONE ENCOUNTER
----- Message from Kd Gandhi MD sent at 4/7/2020  1:41 PM CDT -----  Regarding: RE: acute compression fracture  We will order her a TLSO brace and a clinic FU in 2 weeks with repeated thoracolumbar XR standing in brace.    Thanks    DD   ----- Message -----  From: Brandy Dejesus MD  Sent: 4/7/2020  11:44 AM CDT  To: Aleksander Teixeira PA-C, Kd Gandhi MD  Subject: acute compression fracture                       Hi Kd and Aleksander:    This patient of mine was previously seen by you all for spine issues and now has an acute T11 compression fracture, for which she was seen in the ER yesterday 4/6/2020.     She has a history of longstanding chronic pain, multiple psychosocial issues and poor compliance.  I am trying to treat her osteoporosis with Prolia and she has had a couple of injections(believe she is currently overdue and will check), but she has not been taking her recommended vitamin-D consistently and this level is still very low.    She is reporting severe increase in her pain with this issue. She is on chronic narcotics already, and I wanted to see what you would recommend for her in terms of treatment.    Are you seeing patients like this in the office right now? Would you  recommend attempt at pain control/bracing with follow-up with you in a few weeks?    I appreciate your time/thoughts on this, Brandy Dejesus

## 2020-04-07 NOTE — TELEPHONE ENCOUNTER
Called patient and notified of Dr. Dejesus's recommendations.  Patient verbalized understanding.  Patient was advised that once Dr. Dejesus has further recommendations, she will receive a call back from staff or Dr. Dejesus herself.

## 2020-04-07 NOTE — TELEPHONE ENCOUNTER
Attempted to schedule this patient and she has been discharged form the practice patient relations contacted and awaiting returned call.

## 2020-04-07 NOTE — TELEPHONE ENCOUNTER
----- Message from Cecilia Diez sent at 4/7/2020  2:00 PM CDT -----  Contact: [phone]-self  Patient is returning your call.

## 2020-04-08 ENCOUNTER — TELEPHONE (OUTPATIENT)
Dept: FAMILY MEDICINE | Facility: CLINIC | Age: 65
End: 2020-04-08

## 2020-04-08 NOTE — TELEPHONE ENCOUNTER
----- Message from Gunner Cox sent at 4/8/2020  1:40 PM CDT -----  Contact: [phone] / self   Patient would like to speak with your office regarding pain medication she is being prescribed. Please Advise.

## 2020-04-08 NOTE — TELEPHONE ENCOUNTER
Please call Thom to let her know that I tried to call back [facility] regarding the Percocet prescription but had to leave a message as no one answered. It sounds like they did give her 7 days of Percocet, and she can take this on top of her usual Norco prescription for the short term given that this is an acute issue with her spine compression fracture.    Also, I believe that neurosurgeon Dr. Gandhi reached out to her about a back brace and getting her scheduled in his clinic.  Is she going to be seeing him or following up with [facility] for the spinal compression fracture?

## 2020-04-08 NOTE — TELEPHONE ENCOUNTER
[phone]  Alysia with [facility] called requesting approval for a 7 day script of Percocet for the patient.  Please Advise.

## 2020-04-08 NOTE — TELEPHONE ENCOUNTER
DANIEL. Called patient and notified of doctors recommendations.    Patient stated that she will follow up [facility] for spinal compression fracture.

## 2020-04-12 ENCOUNTER — TELEPHONE (OUTPATIENT)
Dept: FAMILY MEDICINE | Facility: CLINIC | Age: 65
End: 2020-04-12

## 2020-04-16 ENCOUNTER — PATIENT OUTREACH (OUTPATIENT)
Dept: ADMINISTRATIVE | Facility: OTHER | Age: 65
End: 2020-04-16

## 2020-04-17 ENCOUNTER — TELEPHONE (OUTPATIENT)
Dept: FAMILY MEDICINE | Facility: CLINIC | Age: 65
End: 2020-04-17

## 2020-04-17 NOTE — TELEPHONE ENCOUNTER
----- Message from Carlos Eduardo Croft sent at 4/17/2020  9:34 AM CDT -----  Contact: [phone]/ self   Patient requesting to speak with you regarding personal matter. Please advise.

## 2020-04-17 NOTE — TELEPHONE ENCOUNTER
Returned patient's call.  Patient stated that she was returning my call.  Advised patient that I did not call since our last conversation and I am still awaiting response.  Patient verbalized understanding.

## 2020-04-17 NOTE — TELEPHONE ENCOUNTER
----- Message from Carlos Eduardo Croft sent at 4/17/2020  1:31 PM CDT -----  Contact: [phone]/ self   Patient called in returning your call. Please advise.

## 2020-04-17 NOTE — TELEPHONE ENCOUNTER
"Returned patient's call.  Patient called c/o severe back pain.  Patient has been taking Norco and was given a temporary supply of Percocet.  Patient has ran out of percocet and is now requesting additional medication.  Advised patient that it is highly unlikely that refill will be granted due to pain contract.  But I will send message per margarita.  She is due to have procedure done on back this upcoming Thursday, but is unsure if she should go through with it because it will only be an "temporary fix."  Patient is requesting further recommendation on procedure and medication.  Please advise.    "

## 2020-04-18 ENCOUNTER — NURSE TRIAGE (OUTPATIENT)
Dept: ADMINISTRATIVE | Facility: CLINIC | Age: 65
End: 2020-04-18

## 2020-04-18 NOTE — TELEPHONE ENCOUNTER
Reason for Disposition   Caller has NON-URGENT medication question about med that PCP prescribed and triager unable to answer question   Caller requesting a NON-URGENT new prescription or refill and triager unable to refill per unit policy    Protocols used: MEDICATION QUESTION CALL-A-MORGAN Tomas takes [place] for back pain.  She still has plenty of Norco left from a prescription from Dr Dejesus sent on 04/01/2020, per Thom.  She states she likes Percocet better, it provides better pain relief and she has been taking both of them for best pain control. She is out of Percocet.  Explained that narcotic pain medications are not ordered after clinic hours, nor on weekends, but assured her that I will message Dr Dejesus and let her know that she would like a new order for Percocet, which MD will see on Monday.  Explained that severe and debilitating pain needs to be evaluated in ED, and encouraged her to go to ED if pain becomes so severe.  She states she will.  Message to PCP.  Please contact caller directly with any additional care advice. .

## 2020-04-19 NOTE — TELEPHONE ENCOUNTER
Patient notified us that she is following up with [facility] for the compression fracture in her back-- they are the ones that prescribed the Percocet recently for this acute issue.    Are they ok with prescribing the Percocet if they feel it's appropriate?   Percocet is not generally thought of as a chronic medication and is really stronger than I'm comfortable with for a daily use medication.     If the pain specialists feel that it's appropriate then it would be best if they can prescribe it and I'll stop the Norco, or if they want to send me a letter that daily percocet is their recommendation, then I would consider writing it for her instead of the Chronic Norco.     I  feel that [facility] should be involved in this because she is under their care for her back issue-- the source of her pain. Please follow up after discussion with patient thanks. KIMI

## 2020-04-20 ENCOUNTER — TELEPHONE (OUTPATIENT)
Dept: NEUROSURGERY | Facility: CLINIC | Age: 65
End: 2020-04-20

## 2020-04-20 DIAGNOSIS — R52 PAIN: Primary | ICD-10-CM

## 2020-04-20 NOTE — TELEPHONE ENCOUNTER
----- Message from Kd Gandhi MD sent at 4/20/2020  1:08 PM CDT -----  Please schedule real visit with thoracolumbar XR ap lateral

## 2020-04-20 NOTE — TELEPHONE ENCOUNTER
Pain specialist has already sent in prescription for Percocet to [facility].  Patient stated that pharmacy will not fill it unless PCP ok's it.  Advised patient that Dr. Dejesus is willing to write letter stating that daily percocet is their recommendation.  Also advised that she will no longer receive Norco.  Patient verbalized understanding.  Patient would like letter to receive Percocet.  Please advise.

## 2020-04-22 ENCOUNTER — OFFICE VISIT (OUTPATIENT)
Dept: FAMILY MEDICINE | Facility: CLINIC | Age: 65
End: 2020-04-22
Payer: MEDICARE

## 2020-04-22 DIAGNOSIS — M47.816 DEGENERATIVE JOINT DISEASE OF CERVICAL AND LUMBAR SPINE: ICD-10-CM

## 2020-04-22 DIAGNOSIS — M79.604 LOW BACK PAIN RADIATING TO RIGHT LOWER EXTREMITY: ICD-10-CM

## 2020-04-22 DIAGNOSIS — K75.4 AUTOIMMUNE HEPATITIS: ICD-10-CM

## 2020-04-22 DIAGNOSIS — M81.0 POSTMENOPAUSAL OSTEOPOROSIS: ICD-10-CM

## 2020-04-22 DIAGNOSIS — E11.49 DIABETES MELLITUS TYPE 2 WITH NEUROLOGICAL MANIFESTATIONS: ICD-10-CM

## 2020-04-22 DIAGNOSIS — M47.812 DEGENERATIVE JOINT DISEASE OF CERVICAL AND LUMBAR SPINE: ICD-10-CM

## 2020-04-22 DIAGNOSIS — M54.6 ACUTE THORACIC BACK PAIN, UNSPECIFIED BACK PAIN LATERALITY: Primary | ICD-10-CM

## 2020-04-22 DIAGNOSIS — K21.9 GASTROESOPHAGEAL REFLUX DISEASE, ESOPHAGITIS PRESENCE NOT SPECIFIED: ICD-10-CM

## 2020-04-22 DIAGNOSIS — M54.50 LOW BACK PAIN RADIATING TO RIGHT LOWER EXTREMITY: ICD-10-CM

## 2020-04-22 DIAGNOSIS — M48.02 CERVICAL SPINAL STENOSIS: ICD-10-CM

## 2020-04-22 DIAGNOSIS — M50.30 DDD (DEGENERATIVE DISC DISEASE), CERVICAL: ICD-10-CM

## 2020-04-22 DIAGNOSIS — M51.36 DDD (DEGENERATIVE DISC DISEASE), LUMBAR: ICD-10-CM

## 2020-04-22 DIAGNOSIS — S22.080S COMPRESSION FRACTURE OF T11 VERTEBRA, SEQUELA: ICD-10-CM

## 2020-04-22 DIAGNOSIS — M54.12 RIGHT CERVICAL RADICULOPATHY: ICD-10-CM

## 2020-04-22 RX ORDER — HYDROCODONE BITARTRATE AND ACETAMINOPHEN 10; 325 MG/1; MG/1
1 TABLET ORAL EVERY 6 HOURS PRN
Qty: 90 TABLET | Refills: 0 | Status: CANCELLED | OUTPATIENT
Start: 2020-04-22

## 2020-04-22 NOTE — TELEPHONE ENCOUNTER
I received a call from the patient in regards to her pain medication. Per Dr. Dejesus: I had an extensive conversation with the [facility] nurse.  The 1st issue on hand is that the pharmacy cannot fill a pain med prescription for Norco, nor can the fill a Percocet prescription with the same dosage any time before April 24, 2020.  This is a hard stop due to some new pharmacy regulations.  The nurse at [facility] stated that she will try to have the physician change the dosage of the Percocets (to 5/325) so that [facility] will fill her Percocet prescription NOW especially since she has a surgery for her back scheduled tomorrow.  If she has not been able to  Percocet today at [facility], if she can address this with the pain management group tomorrow at the time of her surgery and they will sort it out.      Secondly, regarding her long-term pain medication, it is possible that she may be able to receive long-term pain management through [facility] and possibly they would prescribe her Percocet on a long-term basis, however, I am waiting for a call back from a  to clarify who is going to be doing her long-term pain medication.  Patient expressed understanding.

## 2020-04-22 NOTE — PROGRESS NOTES
" Office Visit    Patient Name: Thom Krishna    : 1955  MRN: [id number]    Subjective:  Thom is a 64 y.o. female who presents today for:    No chief complaint on file.    The patient location is: HER HOME  The chief complaint leading to consultation is: CHRONIC PAIN, INCREASED RECENTLY SECONDARY TO ACUTE COMPRESSION FRACTURE  Visit type: audio only  Total time spent with patient: START 1:10PM- 1:31 21 minutes  Each patient to whom he or she provides medical services by telemedicine is:  (1) informed of the relationship between the physician and patient and the respective role of any other health care provider with respect to management of the patient; and (2) notified that he or she may decline to receive medical services by telemedicine and may withdraw from such care at any time.    64-year-old patient of mine, last seen by me 2019 and  with history of hepatitis-C treated by [facility] hepatology, hypothyroidism, history of type 2 diabetes (now controlled off of medications A1c 5.2 19), tobacco abuse, GERD, chronic pain disorder,   WHO PRESENTS FOR AUDIO VISIT TO DISCUSS RECENT SEVERE INCREASE IN BACK SECONDARY TO COMPRESSION FRACTURE OF SPINE.     She is prescribed chronic Norco by me for help with management of chronic pain-- current dose is Norco 10/325 TID PRN #90 per month.     She was evaluated in the ER on 20 for acute increase in her back pain-- per ER notes "Patient says she was carrying groceries into her home when she felt her back pop. Pain worsens with certain movements" XRAY T-SPINE from ER showed Interval compression superior endplate likely of T11 when compared to 2020.  Bones are significantly osteopenic.     She has since followed up with [facility] who she has previously seen for chronic pain issues, and she is scheduled for kyphoplasty tomorrow with Dr Younger.     Due to this acute episode causing very severe pain, they provided her with a small amount of " Percocet to take on top of her baseline [place]. Her pharmacy, however, has not and will not fill the Percocet prescription until 2020 at the earliest due concerns with concomitant opoid prescriptions.     She has not started PROLIA for treatment of her  osteoporosis or taken her vit D consistently,         Past Medical History  Past Medical History:   Diagnosis Date    Anxiety and depression 2015    Arthritis     Cervical radiculopathy 2016    Cirrhosis of liver     Colon polyps 2015    Diabetes mellitus type 2 with neurological manifestations 2015    no current medications, only one episode of elevated sugars with acute illness, will monitor     Encounter for blood transfusion     Hepatitis C     s/p treatment per patient report; managed by Dr. Perez    Hip fracture     Macrocytosis 2015    Thyroid disease     Transfusion reaction     hep c       Past Surgical History  Past Surgical History:   Procedure Laterality Date    APPENDECTOMY      BACK SURGERY      CAUDAL EPIDURAL STEROID INJECTION N/A 2018    Procedure: Injection-steroid-epidural-caudal;  Surgeon: Roxana Gu Jr., MD;  Location: [facility] OR;  Service: Pain Management;  Laterality: N/A;  with cath target L4-5    CAUDAL EPIDURAL STEROID INJECTION N/A 2019    Procedure: Injection-steroid-epidural-caudal;  Surgeon: Roxana Gu Jr., MD;  Location: [facility] PAIN [facility]GT;  Service: Pain Management;  Laterality: N/A;     SECTION      CHOLECYSTECTOMY      COLONOSCOPY  3/31/10    2 polyps, tubular adenoma    COLONOSCOPY  2015    Dr. Washington    COLONOSCOPY N/A 10/10/2018    Procedure: COLONOSCOPY;  Surgeon: Reuben Miller Jr., MD;  Location: [facility] ENDO;  Service: Endoscopy;  Laterality: N/A;    ESOPHAGOGASTRODUODENOSCOPY N/A 10/10/2018    Procedure: EGD (ESOPHAGOGASTRODUODENOSCOPY);  Surgeon: Reuben Miller Jr., MD;  Location: [facility] ENDO;  Service: Endoscopy;  Laterality: N/A;     "ESOPHAGOGASTRODUODENOSCOPY N/A 12/10/2018    Procedure: EGD (ESOPHAGOGASTRODUODENOSCOPY)/poss emr;  Surgeon: Belle Kuo MD;  Location: [facility] ([address] [place] [address]);  Service: Endoscopy;  Laterality: N/A;    ESOPHAGOGASTRODUODENOSCOPY N/A 3/12/2019    Procedure: EGD (ESOPHAGOGASTRODUODENOSCOPY);  Surgeon: Polo Keyes MD;  Location: [facility];  Service: Endoscopy;  Laterality: N/A;    HERNIA REPAIR      HYSTERECTOMY  1989    Uterus and both ovaries    INCISIONAL HERNIA REPAIR      x 2    JOINT REPLACEMENT      LIVER BIOPSY  12/2003    autoimmune hepatitis    ROTATOR CUFF REPAIR      right    STOMACH SURGERY  1980's    "took lymph node off of liver"    TOTAL HIP ARTHROPLASTY      left hip    UPPER GASTROINTESTINAL ENDOSCOPY  3/24/10    normal    UPPER GASTROINTESTINAL ENDOSCOPY  04/27/2015    Dr. Washington       Family History  Family History   Problem Relation Age of Onset    Diabetes Mellitus Mother     Diabetes Mother     Liver cancer Sister     Breast cancer Sister     Cataracts Sister     Pancreatic cancer Brother     Diabetes Brother     Lung cancer Brother     Colon cancer Brother     Brain cancer Brother     Stomach cancer Other     Diabetes Other     Throat cancer Brother     Cataracts Sister     Diabetes Son     Liver disease Neg Hx        Social History  Social History     Socioeconomic History    Marital status: Significant Other     Spouse name: Not on file    Number of children: Not on file    Years of education: Not on file    Highest education level: Not on file   Occupational History    Not on file   Social Needs    Financial resource strain: Not on file    Food insecurity:     Worry: Not on file     Inability: Not on file    Transportation needs:     Medical: Not on file     Non-medical: Not on file   Tobacco Use    Smoking status: Current Every Day Smoker     Packs/day: 2.00     Years: 45.00     Pack years: 90.00     Types: Cigarettes    Smokeless tobacco: Never " Used   Substance and Sexual Activity    Alcohol use: No     Comment: used to drink heavily, stopped in 1990's    Drug use: No    Sexual activity: Not on file   Lifestyle    Physical activity:     Days per week: Not on file     Minutes per session: Not on file    Stress: Not on file   Relationships    Social connections:     Talks on phone: Not on file     Gets together: Not on file     Attends Anabaptism service: Not on file     Active member of club or organization: Not on file     Attends meetings of clubs or organizations: Not on file     Relationship status: Not on file   Other Topics Concern    Not on file   Social History Narrative    Youngest out of 16 children to her parents       Current Medications  Medications reviewed and updated.     Allergies   Review of patient's allergies indicates:   Allergen Reactions    Penicillins      Other reaction(s): Hives    Sulfa (sulfonamide antibiotics) Other (See Comments)     Other reaction(s): Hives       Review of Systems (Pertinent positives)  Review of Systems   Musculoskeletal: Positive for back pain.   Psychiatric/Behavioral: The patient is nervous/anxious.        There were no vitals taken for this visit.    Physical Exam      Assessment/Plan:  Thom Krishna is a 64 y.o. female who presents today for :    Diagnoses and all orders for this visit:    Acute thoracic back pain, unspecified back pain laterality  Comments:  IN THE PROCESS OF CALLING [facility] TO COORDINATE PAIN MEDICATION PRESCRIPTIONS WITH THEM.  KYPHOPLASTY SCHEDULED 4/24/20    Compression fracture of T11 vertebra, sequela  Comments:  KYPHOPLASTY SCHEDULED FOR 2420, NEEDS IMPROVED TREATMENT OF HER OSTEOPOROSIS-HAS NOT BEEN COMPLIANT THUS FAR    DDD (degenerative disc disease), cervical    DDD (degenerative disc disease), lumbar    Autoimmune hepatitis  Comments:  PER [facility]    Diabetes mellitus type 2 with neurological manifestations  Comments:  CONTROLLED OFF OF  MEDICATION    Gastroesophageal reflux disease, esophagitis presence not specified    Postmenopausal osteoporosis  Comments:  OPEN TO STARTING PROLIA-PREVIOUSLY NOT COMPLIANT & DID NOT FOLLOW-UP FOR INFUSION.  ALSO NEEDS TO TAKE VITAMIN-D MORE CONSISTENTLY.  EXTENSIVELY COUNSELED            ICD-10-CM ICD-9-CM    1. Acute thoracic back pain, unspecified back pain laterality M54.6 724.1     IN THE PROCESS OF [facility]ING [facility] TO COORDINATE PAIN MEDICATION PRESCRIPTIONS WITH THEM.  KYPHOPLASTY SCHEDULED 4/24/20   2. Compression fracture of T11 vertebra, sequela S22.080S 905.1     KYPHOPLASTY SCHEDULED FOR 2420, NEEDS IMPROVED TREATMENT OF HER OSTEOPOROSIS-HAS NOT BEEN COMPLIANT THUS FAR   3. DDD (degenerative disc disease), cervical M50.30 722.4    4. DDD (degenerative disc disease), lumbar M51.36 722.52    5. Autoimmune hepatitis K75.4 571.42     PER [facility] HEPATOLOGY   6. Diabetes mellitus type 2 with neurological manifestations E11.49 250.60     CONTROLLED OFF OF MEDICATION   7. Gastroesophageal reflux disease, esophagitis presence not specified K21.9 530.81    8. Postmenopausal osteoporosis M81.0 733.01     OPEN TO STARTING PROLIA-PREVIOUSLY NOT COMPLIANT & DID NOT FOLLOW-UP FOR INFUSION.  ALSO NEEDS TO TAKE VITAMIN-D MORE CONSISTENTLY.  EXTENSIVELY COUNSELED       There are no Patient Instructions on file for this visit.      Follow up for return as needed for new concerns.

## 2020-04-23 ENCOUNTER — TELEPHONE (OUTPATIENT)
Dept: FAMILY MEDICINE | Facility: CLINIC | Age: 65
End: 2020-04-23

## 2020-04-23 NOTE — TELEPHONE ENCOUNTER
----- Message from Malathi Atwood sent at 4/22/2020  3:28 PM CDT -----  Contact: Self [phone]  Patient would like to speak with you about if her medication was sent or not. Please advise

## 2020-04-23 NOTE — TELEPHONE ENCOUNTER
Returned call.  No answer.   Breathing spontaneous and unlabored. Breath sounds clear and equal bilaterally with regular rhythm.

## 2020-04-24 ENCOUNTER — TELEPHONE (OUTPATIENT)
Dept: FAMILY MEDICINE | Facility: CLINIC | Age: 65
End: 2020-04-24

## 2020-04-24 DIAGNOSIS — Z79.899 MEDICATION MANAGEMENT: ICD-10-CM

## 2020-04-24 DIAGNOSIS — M81.0 POSTMENOPAUSAL OSTEOPOROSIS: Primary | ICD-10-CM

## 2020-04-24 DIAGNOSIS — S22.080S COMPRESSION FRACTURE OF T11 VERTEBRA, SEQUELA: ICD-10-CM

## 2020-04-24 PROBLEM — S22.080A COMPRESSION FRACTURE OF T11 VERTEBRA: Status: ACTIVE | Noted: 2020-04-24

## 2020-04-24 NOTE — TELEPHONE ENCOUNTER
This patient has severe thus far untreated postmenopausal osteoporosis as she has been poorly compliant with follow-up.      She was extensively counseled about worsening and recurring compression fractures should she continue to delay this therapy-had a kyphoplasty today for thoracic spine compression fracture.      She has a treatment plan on file for Prolia, Charo can you please contact patient to set her up for a Prolia injection in 6 weeks (should be sufficiently recovered to drive herself to the appointment by then) and Dawn can you please ensure that she has labs done 1 week prior to test things like vitamin-D and kidney function.  Let me know if there are any issues with setting this up, thank you. KIMI

## 2020-04-27 ENCOUNTER — TELEPHONE (OUTPATIENT)
Dept: FAMILY MEDICINE | Facility: CLINIC | Age: 65
End: 2020-04-27

## 2020-04-27 DIAGNOSIS — M79.604 LOW BACK PAIN RADIATING TO RIGHT LOWER EXTREMITY: ICD-10-CM

## 2020-04-27 DIAGNOSIS — M54.12 RIGHT CERVICAL RADICULOPATHY: ICD-10-CM

## 2020-04-27 DIAGNOSIS — M48.02 CERVICAL SPINAL STENOSIS: ICD-10-CM

## 2020-04-27 DIAGNOSIS — M47.812 DEGENERATIVE JOINT DISEASE OF CERVICAL AND LUMBAR SPINE: ICD-10-CM

## 2020-04-27 DIAGNOSIS — M54.50 LOW BACK PAIN RADIATING TO RIGHT LOWER EXTREMITY: ICD-10-CM

## 2020-04-27 DIAGNOSIS — M47.816 DEGENERATIVE JOINT DISEASE OF CERVICAL AND LUMBAR SPINE: ICD-10-CM

## 2020-04-27 RX ORDER — HYDROCODONE BITARTRATE AND ACETAMINOPHEN 10; 325 MG/1; MG/1
1 TABLET ORAL EVERY 6 HOURS PRN
Qty: 90 TABLET | Refills: 0 | Status: SHIPPED | OUTPATIENT
Start: 2020-04-27 | End: 2020-05-26 | Stop reason: SDUPTHER

## 2020-04-27 NOTE — TELEPHONE ENCOUNTER
----- Message from Dorene Romero sent at 4/27/2020  7:54 AM CDT -----  Contact: Self [phone]  Patient is calling to talk to nurse in regards to see if pain medication can be called in since she is still having back pain. [facility] [place] [phone] (Phone)  [phone] (Fax)

## 2020-04-27 NOTE — TELEPHONE ENCOUNTER
Informed pt because she was just prescribed 12 Percocet for the period after her surgery, it is okay that she now resume her usual baseline Norco prescription. Dr. Dejesus have sent her Falcon to the [facility] in [place]-the prescription needs to last her at least 1 month, so she should keep this in mind.  Also, she should not get any additional prescriptions for pain medications from [facility]. Pt verbalized understanding.

## 2020-04-27 NOTE — TELEPHONE ENCOUNTER
Because she was just prescribed 12 Percocet for the period after her surgery, it is okay that she now resume her usual baseline Norco prescription.      I have sent her Sidney to the [facility] pharmacy in [place]-the prescription needs to last her at least 1 month, so she should keep this in mind.  Also, she should not get any additional prescriptions for pain medications from [facility], thank you.

## 2020-04-27 NOTE — TELEPHONE ENCOUNTER
Spoke to pt and states that she had her surgery on Thursday by Dr. Younger and he prescribed Percocet 10/325mg #12. Pt states that the Percocet is making her vomit and cause her stomach to burn. Pt stated that Dr. Younger refused to prescribed continual pain medication due to her taking the Xanax. Pt stated that she is a opoid risk. Informed pt that she would have to call Dr. Younger's office for a change in her pain medication since she is under his care from the surgery. Pt stated that she will call Dr. Younger's office.

## 2020-04-30 ENCOUNTER — TELEPHONE (OUTPATIENT)
Dept: FAMILY MEDICINE | Facility: CLINIC | Age: 65
End: 2020-04-30

## 2020-04-30 NOTE — TELEPHONE ENCOUNTER
I called patient in regards to her appointment with Dr. Dejesus. Patient rescheduled for 06/26/2020 at 11:20

## 2020-05-15 ENCOUNTER — TELEPHONE (OUTPATIENT)
Dept: FAMILY MEDICINE | Facility: CLINIC | Age: 65
End: 2020-05-15

## 2020-05-15 NOTE — TELEPHONE ENCOUNTER
Patient stated that she received a text about a procedure. It was from Gastroenterologist. She just need to respond to the text so they can give her a call to schedule. Patient expressed understanding.

## 2020-05-26 DIAGNOSIS — M54.50 LOW BACK PAIN RADIATING TO RIGHT LOWER EXTREMITY: ICD-10-CM

## 2020-05-26 DIAGNOSIS — M54.12 RIGHT CERVICAL RADICULOPATHY: ICD-10-CM

## 2020-05-26 DIAGNOSIS — M47.816 DEGENERATIVE JOINT DISEASE OF CERVICAL AND LUMBAR SPINE: ICD-10-CM

## 2020-05-26 DIAGNOSIS — M47.812 DEGENERATIVE JOINT DISEASE OF CERVICAL AND LUMBAR SPINE: ICD-10-CM

## 2020-05-26 DIAGNOSIS — M48.02 CERVICAL SPINAL STENOSIS: ICD-10-CM

## 2020-05-26 DIAGNOSIS — M79.604 LOW BACK PAIN RADIATING TO RIGHT LOWER EXTREMITY: ICD-10-CM

## 2020-05-26 RX ORDER — HYDROCODONE BITARTRATE AND ACETAMINOPHEN 10; 325 MG/1; MG/1
1 TABLET ORAL EVERY 6 HOURS PRN
Qty: 90 TABLET | Refills: 0 | Status: SHIPPED | OUTPATIENT
Start: 2020-05-26 | End: 2020-06-23 | Stop reason: SDUPTHER

## 2020-05-26 NOTE — TELEPHONE ENCOUNTER
----- Message from Ann-Marie Caldwell sent at 5/26/2020  8:36 AM CDT -----  Type:  Needs Medical Advice    Type:  RX Refill Request    Who Called: patient    RX Name and Strength:  HYDROcodone-acetaminophen (NORCO)  mg per tablet  How is the patient currently taking it? (ex. 1XDay):    Is this a 30 day or 90 day RX: 30    Preferred Pharmacy with phone number:  [facility]    Local or Mail Order: local    Ordering Provider: Oleg    Would the patient rather a call back or a response via [facility]? call    Best Call Back Number: [phone]    Additional Information: none

## 2020-05-26 NOTE — TELEPHONE ENCOUNTER
Patient called requesting refill on pending medication to be sent to local pharmacy on file.  Please advise.

## 2020-06-05 ENCOUNTER — TELEPHONE (OUTPATIENT)
Dept: NEUROLOGY | Facility: HOSPITAL | Age: 65
End: 2020-06-05

## 2020-06-05 NOTE — TELEPHONE ENCOUNTER
Pt inquiring about when she would be able to schedule scope. Message sent to Dr. Keyes staff for scheduling

## 2020-06-05 NOTE — TELEPHONE ENCOUNTER
----- Message from Jazmyn Puckett sent at 6/5/2020  3:02 PM CDT -----  Contact: Pt/ [phone]  Pt is requesting a callback.    Please call

## 2020-06-09 ENCOUNTER — TELEPHONE (OUTPATIENT)
Dept: FAMILY MEDICINE | Facility: CLINIC | Age: 65
End: 2020-06-09

## 2020-06-09 NOTE — TELEPHONE ENCOUNTER
----- Message from Alley Diehl sent at 6/9/2020  9:18 AM CDT -----  Pt called regarding her pain medication that she took to [facility] and they have to order it and pt asked for a call back because she said she need this medication.      Pt contact # [phone].      Thanks

## 2020-06-09 NOTE — TELEPHONE ENCOUNTER
Returned patient's call.    Patient stated that she no longer needed help.  Situation was handled.

## 2020-06-12 ENCOUNTER — PATIENT OUTREACH (OUTPATIENT)
Dept: ADMINISTRATIVE | Facility: HOSPITAL | Age: 65
End: 2020-06-12

## 2020-06-12 DIAGNOSIS — E11.49 DIABETES MELLITUS TYPE 2 WITH NEUROLOGICAL MANIFESTATIONS: Primary | ICD-10-CM

## 2020-06-15 ENCOUNTER — TELEPHONE (OUTPATIENT)
Dept: GASTROENTEROLOGY | Facility: CLINIC | Age: 65
End: 2020-06-15

## 2020-06-22 ENCOUNTER — TELEPHONE (OUTPATIENT)
Dept: FAMILY MEDICINE | Facility: CLINIC | Age: 65
End: 2020-06-22

## 2020-06-22 DIAGNOSIS — M79.604 LOW BACK PAIN RADIATING TO RIGHT LOWER EXTREMITY: ICD-10-CM

## 2020-06-22 DIAGNOSIS — M54.12 RIGHT CERVICAL RADICULOPATHY: ICD-10-CM

## 2020-06-22 DIAGNOSIS — M47.816 DEGENERATIVE JOINT DISEASE OF CERVICAL AND LUMBAR SPINE: ICD-10-CM

## 2020-06-22 DIAGNOSIS — M54.50 LOW BACK PAIN RADIATING TO RIGHT LOWER EXTREMITY: ICD-10-CM

## 2020-06-22 DIAGNOSIS — M47.812 DEGENERATIVE JOINT DISEASE OF CERVICAL AND LUMBAR SPINE: ICD-10-CM

## 2020-06-22 DIAGNOSIS — M48.02 CERVICAL SPINAL STENOSIS: ICD-10-CM

## 2020-06-22 NOTE — TELEPHONE ENCOUNTER
Spoke to pt and states that she has a stimulator placed on last week by Dr. Younger. Pt states that she went to wound care today and was told that the area is infected. Pt was advised to call Dr. Younger due to the incision site being infected. Pt stated that Dr. Younger gave her Percocet 10/325mg 1 tablet every 4-6 hours #20 on 6/9/20. Pt states that she has jonathan taking the Percocet every 4 hours. Pt stated that she still would like to get her pain medication from Dr. Dejesus. Pls advise.

## 2020-06-22 NOTE — TELEPHONE ENCOUNTER
----- Message from Sofie Tello sent at 6/22/2020 10:38 AM CDT -----  Regarding: Pain  Type:  Needs Medical Advice    Who Called: Patient  Symptoms (please be specific): pain in back from procedure, she believes its infected  Would the patient rather a call back or a response via [facility]? callback  Best Call Back Number: [phone]  Additional Information: had a stimulator placed in back

## 2020-06-23 DIAGNOSIS — M79.604 LOW BACK PAIN RADIATING TO RIGHT LOWER EXTREMITY: ICD-10-CM

## 2020-06-23 DIAGNOSIS — M47.816 DEGENERATIVE JOINT DISEASE OF CERVICAL AND LUMBAR SPINE: ICD-10-CM

## 2020-06-23 DIAGNOSIS — M47.812 DEGENERATIVE JOINT DISEASE OF CERVICAL AND LUMBAR SPINE: ICD-10-CM

## 2020-06-23 DIAGNOSIS — M54.12 RIGHT CERVICAL RADICULOPATHY: ICD-10-CM

## 2020-06-23 DIAGNOSIS — M48.02 CERVICAL SPINAL STENOSIS: ICD-10-CM

## 2020-06-23 DIAGNOSIS — M54.50 LOW BACK PAIN RADIATING TO RIGHT LOWER EXTREMITY: ICD-10-CM

## 2020-06-23 RX ORDER — HYDROCODONE BITARTRATE AND ACETAMINOPHEN 10; 325 MG/1; MG/1
1 TABLET ORAL EVERY 6 HOURS PRN
Qty: 90 TABLET | Refills: 0 | Status: SHIPPED | OUTPATIENT
Start: 2020-06-23 | End: 2020-07-20 | Stop reason: SDUPTHER

## 2020-06-23 RX ORDER — HYDROCODONE BITARTRATE AND ACETAMINOPHEN 10; 325 MG/1; MG/1
1 TABLET ORAL EVERY 6 HOURS PRN
Qty: 90 TABLET | Refills: 0 | Status: SHIPPED | OUTPATIENT
Start: 2020-06-23 | End: 2020-06-23 | Stop reason: SDUPTHER

## 2020-06-23 NOTE — TELEPHONE ENCOUNTER
----- Message from Trish Galvan sent at 6/23/2020  9:43 AM CDT -----  Contact: [phone]/Self  Patient is requesting to speak with nurse to ask why Dr. Dejesus sent her [place] medication to [facility]. Pt prefers [facility]. Please advise.

## 2020-06-26 ENCOUNTER — LAB VISIT (OUTPATIENT)
Dept: LAB | Facility: HOSPITAL | Age: 65
End: 2020-06-26
Attending: FAMILY MEDICINE
Payer: MEDICARE

## 2020-06-26 ENCOUNTER — OFFICE VISIT (OUTPATIENT)
Dept: FAMILY MEDICINE | Facility: CLINIC | Age: 65
End: 2020-06-26
Payer: MEDICARE

## 2020-06-26 VITALS
HEART RATE: 76 BPM | BODY MASS INDEX: 26.9 KG/M2 | SYSTOLIC BLOOD PRESSURE: 102 MMHG | HEIGHT: 62 IN | OXYGEN SATURATION: 97 % | TEMPERATURE: 98 F | WEIGHT: 146.19 LBS | DIASTOLIC BLOOD PRESSURE: 68 MMHG

## 2020-06-26 DIAGNOSIS — S22.080S COMPRESSION FRACTURE OF T11 VERTEBRA, SEQUELA: ICD-10-CM

## 2020-06-26 DIAGNOSIS — M51.36 DDD (DEGENERATIVE DISC DISEASE), LUMBAR: ICD-10-CM

## 2020-06-26 DIAGNOSIS — M54.6 CHRONIC BILATERAL THORACIC BACK PAIN: ICD-10-CM

## 2020-06-26 DIAGNOSIS — Z79.899 MEDICATION MANAGEMENT: ICD-10-CM

## 2020-06-26 DIAGNOSIS — E55.9 VITAMIN D DEFICIENCY: ICD-10-CM

## 2020-06-26 DIAGNOSIS — E11.49 DIABETES MELLITUS TYPE 2 WITH NEUROLOGICAL MANIFESTATIONS: ICD-10-CM

## 2020-06-26 DIAGNOSIS — M81.0 POSTMENOPAUSAL OSTEOPOROSIS: ICD-10-CM

## 2020-06-26 DIAGNOSIS — Z72.0 TOBACCO ABUSE: ICD-10-CM

## 2020-06-26 DIAGNOSIS — E53.8 VITAMIN B12 DEFICIENCY: ICD-10-CM

## 2020-06-26 DIAGNOSIS — Z96.89 SPINAL CORD STIMULATOR STATUS: ICD-10-CM

## 2020-06-26 DIAGNOSIS — M96.1 FAILED BACK SURGICAL SYNDROME: Primary | ICD-10-CM

## 2020-06-26 DIAGNOSIS — M54.9 CHRONIC NECK AND BACK PAIN: ICD-10-CM

## 2020-06-26 DIAGNOSIS — G89.29 CHRONIC BILATERAL THORACIC BACK PAIN: ICD-10-CM

## 2020-06-26 DIAGNOSIS — K75.4 AUTOIMMUNE HEPATITIS: ICD-10-CM

## 2020-06-26 DIAGNOSIS — G89.29 CHRONIC NECK AND BACK PAIN: ICD-10-CM

## 2020-06-26 DIAGNOSIS — F32.A ANXIETY AND DEPRESSION: ICD-10-CM

## 2020-06-26 DIAGNOSIS — E03.9 ACQUIRED HYPOTHYROIDISM: ICD-10-CM

## 2020-06-26 DIAGNOSIS — M50.30 DDD (DEGENERATIVE DISC DISEASE), CERVICAL: ICD-10-CM

## 2020-06-26 DIAGNOSIS — M54.2 CHRONIC NECK AND BACK PAIN: ICD-10-CM

## 2020-06-26 DIAGNOSIS — F41.9 ANXIETY AND DEPRESSION: ICD-10-CM

## 2020-06-26 LAB
25(OH)D3+25(OH)D2 SERPL-MCNC: 10 NG/ML (ref 30–96)
ALBUMIN SERPL BCP-MCNC: 3.7 G/DL (ref 3.5–5.2)
ALP SERPL-CCNC: 93 U/L (ref 55–135)
ALT SERPL W/O P-5'-P-CCNC: 10 U/L (ref 10–44)
ANION GAP SERPL CALC-SCNC: 8 MMOL/L (ref 8–16)
AST SERPL-CCNC: 16 U/L (ref 10–40)
BILIRUB SERPL-MCNC: 0.3 MG/DL (ref 0.1–1)
BUN SERPL-MCNC: 12 MG/DL (ref 8–23)
CALCIUM SERPL-MCNC: 9.2 MG/DL (ref 8.7–10.5)
CHLORIDE SERPL-SCNC: 109 MMOL/L (ref 95–110)
CO2 SERPL-SCNC: 26 MMOL/L (ref 23–29)
CREAT SERPL-MCNC: 0.9 MG/DL (ref 0.5–1.4)
EST. GFR  (AFRICAN AMERICAN): >60 ML/MIN/1.73 M^2
EST. GFR  (NON AFRICAN AMERICAN): >60 ML/MIN/1.73 M^2
ESTIMATED AVG GLUCOSE: 108 MG/DL (ref 68–131)
GLUCOSE SERPL-MCNC: 76 MG/DL (ref 70–110)
HBA1C MFR BLD HPLC: 5.4 % (ref 4–5.6)
MAGNESIUM SERPL-MCNC: 1.9 MG/DL (ref 1.6–2.6)
POTASSIUM SERPL-SCNC: 3.7 MMOL/L (ref 3.5–5.1)
PROT SERPL-MCNC: 7.2 G/DL (ref 6–8.4)
SODIUM SERPL-SCNC: 143 MMOL/L (ref 136–145)

## 2020-06-26 PROCEDURE — 3044F HG A1C LEVEL LT 7.0%: CPT | Mod: HCNC,CPTII,S$GLB, | Performed by: FAMILY MEDICINE

## 2020-06-26 PROCEDURE — 1101F PR PT FALLS ASSESS DOC 0-1 FALLS W/OUT INJ PAST YR: ICD-10-PCS | Mod: HCNC,CPTII,S$GLB, | Performed by: FAMILY MEDICINE

## 2020-06-26 PROCEDURE — 1101F PT FALLS ASSESS-DOCD LE1/YR: CPT | Mod: HCNC,CPTII,S$GLB, | Performed by: FAMILY MEDICINE

## 2020-06-26 PROCEDURE — 1125F AMNT PAIN NOTED PAIN PRSNT: CPT | Mod: HCNC,S$GLB,, | Performed by: FAMILY MEDICINE

## 2020-06-26 PROCEDURE — 99214 PR OFFICE/OUTPT VISIT, EST, LEVL IV, 30-39 MIN: ICD-10-PCS | Mod: HCNC,S$GLB,, | Performed by: FAMILY MEDICINE

## 2020-06-26 PROCEDURE — 82306 VITAMIN D 25 HYDROXY: CPT | Mod: HCNC

## 2020-06-26 PROCEDURE — 80053 COMPREHEN METABOLIC PANEL: CPT | Mod: HCNC

## 2020-06-26 PROCEDURE — 99999 PR PBB SHADOW E&M-EST. PATIENT-LVL V: ICD-10-PCS | Mod: PBBFAC,HCNC,, | Performed by: FAMILY MEDICINE

## 2020-06-26 PROCEDURE — 3044F PR MOST RECENT HEMOGLOBIN A1C LEVEL <7.0%: ICD-10-PCS | Mod: HCNC,CPTII,S$GLB, | Performed by: FAMILY MEDICINE

## 2020-06-26 PROCEDURE — 3074F PR MOST RECENT SYSTOLIC BLOOD PRESSURE < 130 MM HG: ICD-10-PCS | Mod: HCNC,CPTII,S$GLB, | Performed by: FAMILY MEDICINE

## 2020-06-26 PROCEDURE — 3008F PR BODY MASS INDEX (BMI) DOCUMENTED: ICD-10-PCS | Mod: HCNC,CPTII,S$GLB, | Performed by: FAMILY MEDICINE

## 2020-06-26 PROCEDURE — 3008F BODY MASS INDEX DOCD: CPT | Mod: HCNC,CPTII,S$GLB, | Performed by: FAMILY MEDICINE

## 2020-06-26 PROCEDURE — 3078F PR MOST RECENT DIASTOLIC BLOOD PRESSURE < 80 MM HG: ICD-10-PCS | Mod: HCNC,CPTII,S$GLB, | Performed by: FAMILY MEDICINE

## 2020-06-26 PROCEDURE — 36415 COLL VENOUS BLD VENIPUNCTURE: CPT | Mod: HCNC

## 2020-06-26 PROCEDURE — 1159F PR MEDICATION LIST DOCUMENTED IN MEDICAL RECORD: ICD-10-PCS | Mod: HCNC,S$GLB,, | Performed by: FAMILY MEDICINE

## 2020-06-26 PROCEDURE — 83036 HEMOGLOBIN GLYCOSYLATED A1C: CPT | Mod: HCNC

## 2020-06-26 PROCEDURE — 3078F DIAST BP <80 MM HG: CPT | Mod: HCNC,CPTII,S$GLB, | Performed by: FAMILY MEDICINE

## 2020-06-26 PROCEDURE — 1159F MED LIST DOCD IN RCRD: CPT | Mod: HCNC,S$GLB,, | Performed by: FAMILY MEDICINE

## 2020-06-26 PROCEDURE — 3074F SYST BP LT 130 MM HG: CPT | Mod: HCNC,CPTII,S$GLB, | Performed by: FAMILY MEDICINE

## 2020-06-26 PROCEDURE — 99214 OFFICE O/P EST MOD 30 MIN: CPT | Mod: HCNC,S$GLB,, | Performed by: FAMILY MEDICINE

## 2020-06-26 PROCEDURE — 83735 ASSAY OF MAGNESIUM: CPT | Mod: HCNC

## 2020-06-26 PROCEDURE — 99999 PR PBB SHADOW E&M-EST. PATIENT-LVL V: CPT | Mod: PBBFAC,HCNC,, | Performed by: FAMILY MEDICINE

## 2020-06-26 PROCEDURE — 1125F PR PAIN SEVERITY QUANTIFIED, PAIN PRESENT: ICD-10-PCS | Mod: HCNC,S$GLB,, | Performed by: FAMILY MEDICINE

## 2020-06-26 NOTE — PROGRESS NOTES
Office Visit    Patient Name: Thom Krishna    : 1955  MRN: [id number]    Subjective:  Thom is a 65 y.o. female who presents today for:    Follow-up (6 mths follow-up)      65-year-old patient of mine, last seen by me for virtual audio visit 20  and with history of hepatitis-C treated by [facility] hepatology, hypothyroidism, history of type 2 diabetes (now controlled off of medications), tobacco abuse, GERD, anxiety./deperssion, chronic pain disorder,  osteoporosis with thoracic compression fracture in vitamin-D deficiency, multilevel degenerative disc disease of the spine, history of lumbar fusion, recent spinal cord stimulator placement 2020 per pain management doctor Carisa, who presents for follow-up.  She is recovering from an infection of her spinal cord stimulator site.    Spinal Cord Stimulator was Placed by Dr Younger on 2020. Had an infection complicating the procedure and she had to take antibiotics-- now off antibiotics.     She is noting some mild improvement in the last 24 hours with being able to turn the stimulator on and feels that this may be helpful for her. She follows up with Dr Younger on Wednesday. Has wound care appointment at  on this Monday.     She is overwhelmed by her ongoing chronic pain and feels that her life is out of control.  Continues to endorse significant depression but no suicidal ideation.  Stopped Wellbutrin due to side effects in feeling like it was not helping enough to warrant them.    She tries to help around the house but often has to lay down.     She is trying to eat a high protein, healthy diet to help with her healing.     She smokes about 2 packs per day. She is under stress and feels this this contributes to her smoking.      Past Medical History  Past Medical History:   Diagnosis Date    Anxiety and depression 2015    Arthritis     Cervical radiculopathy 2016    Cirrhosis of liver     Colon polyps 2015    Diabetes mellitus type  2 with neurological manifestations 2015    no current medications, only one episode of elevated sugars with acute illness, will monitor     Encounter for blood transfusion     Hepatitis C     s/p treatment per patient report; managed by Dr. Perez    Hip fracture     Macrocytosis 2015    Thyroid disease     Transfusion reaction     hep c       Past Surgical History  Past Surgical History:   Procedure Laterality Date    APPENDECTOMY      BACK SURGERY      CAUDAL EPIDURAL STEROID INJECTION N/A 2018    Procedure: Injection-steroid-epidural-caudal;  Surgeon: Roxana Gu Jr., MD;  Location: [facility] OR;  Service: Pain Management;  Laterality: N/A;  with cath target L4-5    CAUDAL EPIDURAL STEROID INJECTION N/A 2019    Procedure: Injection-steroid-epidural-caudal;  Surgeon: Roxana Gu Jr., MD;  Location: [facility]IN [facility]GT;  Service: Pain Management;  Laterality: N/A;     SECTION      CHOLECYSTECTOMY      COLONOSCOPY  3/31/10    2 polyps, tubular adenoma    COLONOSCOPY  2015    Dr. Washington    COLONOSCOPY N/A 10/10/2018    Procedure: COLONOSCOPY;  Surgeon: Reuben Miller Jr., MD;  Location: [facility];  Service: Endoscopy;  Laterality: N/A;    ESOPHAGOGASTRODUODENOSCOPY N/A 10/10/2018    Procedure: EGD (ESOPHAGOGASTRODUODENOSCOPY);  Surgeon: Reuben Miller Jr., MD;  Location: [facility];  Service: Endoscopy;  Laterality: N/A;    ESOPHAGOGASTRODUODENOSCOPY N/A 12/10/2018    Procedure: EGD (ESOPHAGOGASTRODUODENOSCOPY)/poss emr;  Surgeon: Belle Kuo MD;  Location: [facility] ([address] [place] [address]);  Service: Endoscopy;  Laterality: N/A;    ESOPHAGOGASTRODUODENOSCOPY N/A 3/12/2019    Procedure: EGD (ESOPHAGOGASTRODUODENOSCOPY);  Surgeon: Polo Keyes MD;  Location: [facility];  Service: Endoscopy;  Laterality: N/A;    HERNIA REPAIR      HYSTERECTOMY      Uterus and both ovaries    INCISIONAL HERNIA REPAIR      x 2    JOINT REPLACEMENT      LIVER BIOPSY  2003  "   autoimmune hepatitis    ROTATOR CUFF REPAIR      right    STOMACH SURGERY  1980's    "took lymph node off of liver"    TOTAL HIP ARTHROPLASTY      left hip    UPPER GASTROINTESTINAL ENDOSCOPY  3/24/10    normal    UPPER GASTROINTESTINAL ENDOSCOPY  04/27/2015    Dr. Washington       Family History  Family History   Problem Relation Age of Onset    Diabetes Mellitus Mother     Diabetes Mother     Liver cancer Sister     Breast cancer Sister     Cataracts Sister     Pancreatic cancer Brother     Diabetes Brother     Lung cancer Brother     Colon cancer Brother     Brain cancer Brother     Stomach cancer Other     Diabetes Other     Throat cancer Brother     Cataracts Sister     Diabetes Son     Liver disease Neg Hx        Social History  Social History     Socioeconomic History    Marital status: Significant Other     Spouse name: Not on file    Number of children: Not on file    Years of education: Not on file    Highest education level: Not on file   Occupational History    Not on file   Social Needs    Financial resource strain: Not on file    Food insecurity     Worry: Not on file     Inability: Not on file    Transportation needs     Medical: Not on file     Non-medical: Not on file   Tobacco Use    Smoking status: Current Every Day Smoker     Packs/day: 2.00     Years: 45.00     Pack years: 90.00     Types: Cigarettes    Smokeless tobacco: Never Used   Substance and Sexual Activity    Alcohol use: No     Comment: used to drink heavily, stopped in 1990's    Drug use: No    Sexual activity: Not on file   Lifestyle    Physical activity     Days per week: Not on file     Minutes per session: Not on file    Stress: Not on file   Relationships    Social connections     Talks on phone: Not on file     Gets together: Not on file     Attends [employer] service: Not on file     Active member of club or organization: Not on file     Attends meetings of clubs or organizations: Not on file " "    Relationship status: Not on file   Other Topics Concern    Not on file   Social History Narrative    Youngest out of 16 children to her parents       Current Medications  Medications reviewed and updated.     Allergies   Review of patient's allergies indicates:   Allergen Reactions    Penicillins      Other reaction(s): Hives    Sulfa (sulfonamide antibiotics) Other (See Comments)     Other reaction(s): Hives       Review of Systems (Pertinent positives)  Review of Systems   Constitutional: Negative for fever.   Respiratory: Negative for shortness of breath.    Cardiovascular: Negative for chest pain and leg swelling.   Gastrointestinal: Positive for constipation.   Musculoskeletal: Positive for back pain.   Skin: Positive for color change and wound.   Psychiatric/Behavioral: Positive for dysphoric mood.       /68   Pulse 76   Temp 98.4 °F (36.9 °C)   Ht 5' 2" (1.575 m)   Wt 66.3 kg (146 lb 2.6 oz)   SpO2 97%   BMI 26.73 kg/m²     Physical Exam  Vitals signs reviewed.   Constitutional:       General: She is not in acute distress.     Appearance: She is well-developed.   HENT:      Head: Normocephalic and atraumatic.   Eyes:      Conjunctiva/sclera: Conjunctivae normal.   Cardiovascular:      Rate and Rhythm: Normal rate and regular rhythm.   Pulmonary:      Effort: Pulmonary effort is normal.      Breath sounds: Normal breath sounds.   Musculoskeletal:      Thoracic back: She exhibits pain and spasm.        Back:    Skin:     General: Skin is warm and dry.   Neurological:      General: No focal deficit present.      Mental Status: She is alert and oriented to person, place, and time.   Psychiatric:         Mood and Affect: Mood is depressed.           Assessment/Plan:  Thom Krishna is a 65 y.o. female who presents today for :    Thom was seen today for follow-up.    Diagnoses and all orders for this visit:    Failed back surgical syndrome  Comments:  Following w/ pain mgmt Dr Younger-had spinal " cord stimulator that unfortunately got infected. Had stimulator turned on yesterday & starting to notice some relief    Spinal cord stimulator status    Tobacco abuse  Comments:  Counseled that this underlie eyes many of her back issues-predisposition for poor healing, osteoporosis.  Pre contemplative in terms of cessation    Postmenopausal osteoporosis  Comments:  Now taking vitamin-D gummies more regularly-advised on 5000 IU QD.  was supposed to have Prolia but then went for SCS- will re-eval for PROLIA in 6 weeks    Vitamin D deficiency    Chronic bilateral thoracic back pain  Comments:  Her thoracic back pain is more myofascial, secondary due to stressors in lower back and chronic poor posture.  Advised on physical therapy once healed from SCS    Compression fracture of T11 vertebra, sequela  Comments:  Pain is recently more tolerable, following with Pain Management, advised on need to treat osteoporosis    DDD (degenerative disc disease), lumbar    DDD (degenerative disc disease), cervical    Chronic neck and back pain  Comments:  Following with Dr. Younger (pain mgmt) on chronic Norco, states that many alternative therapies like muscle relaxers make her sick.    Diabetes mellitus type 2 with neurological manifestations  Comments:  Has been diet controlled, A1c pending with labs.    Anxiety and depression  Comments:  Continues Xanax as the primary management for her anxiety, have tried multiple prior SSRIs/SNRI-patient discontinues due to side effects    Autoimmune hepatitis  Comments:  Has followed with [facility], Dr. Sahu, LFTs pending with labs today    Acquired hypothyroidism  Comments:  TSH checked 2/19/20 and was at goal, has been stable on levothyroxine 75 mg daily, will continue    Vitamin B12 deficiency  Comments:  Not on B12 supplement but taking fortified ensures.  On chronic Protonix, continue to monitor PPI labs.            ICD-10-CM ICD-9-CM    1. Failed back surgical syndrome  M96.1  722.80     Following w/ pain mgmt Dr Younger-had spinal cord stimulator that unfortunately got infected. Had stimulator turned on yesterday & starting to notice some relief   2. Spinal cord stimulator status  Z96.89 V45.89    3. Tobacco abuse  Z72.0 305.1     Counseled that this underlie eyes many of her back issues-predisposition for poor healing, osteoporosis.  Pre contemplative in terms of cessation   4. Postmenopausal osteoporosis  M81.0 733.01     Now taking vitamin-D gummies more regularly-advised on 5000 IU QD.  was supposed to have Prolia but then went for SCS- will re-eval for PROLIA in 6 weeks   5. Vitamin D deficiency  E55.9 268.9    6. Chronic bilateral thoracic back pain  M54.6 724.1     G89.29 338.29     Her thoracic back pain is more myofascial, secondary due to stressors in lower back and chronic poor posture.  Advised on physical therapy once healed from SCS   7. Compression fracture of T11 vertebra, sequela  S22.080S 905.1     Pain is recently more tolerable, following with Pain Management, advised on need to treat osteoporosis   8. DDD (degenerative disc disease), lumbar  M51.36 722.52    9. DDD (degenerative disc disease), cervical  M50.30 722.4    10. Chronic neck and back pain  M54.2 723.1     M54.9 724.5     G89.29 338.29     Following with Dr. Younger (pain mgmt) on chronic Norco, states that many alternative therapies like muscle relaxers make her sick.   11. Diabetes mellitus type 2 with neurological manifestations  E11.49 250.60     Has been diet controlled, A1c pending with labs.   12. Anxiety and depression  F41.9 300.00     F32.9 311     Continues Xanax as the primary management for her anxiety, have tried multiple prior SSRIs/SNRI-patient discontinues due to side effects   13. Autoimmune hepatitis  K75.4 571.42     Has followed with [facility], Dr. Sahu, LFTs pending with labs today   14. Acquired hypothyroidism  E03.9 244.9     TSH checked 2/19/20 and was at goal, has been  stable on levothyroxine 75 mg daily, will continue   15. Vitamin B12 deficiency  E53.8 266.2     Not on B12 supplement but taking fortified ensures.  On chronic Protonix, continue to monitor PPI labs.       Patient Instructions   Continue to fill pain med prescriptions monthly then in august will POSTDATE 23 months worth for while I am on maternity leave.       Re-evaluate for PROLIA to treat osteoporosis and physical therapy in 6 week once infection and acute back issues have hopefully calmed.         Follow up in about 8 weeks (around 8/21/2020).

## 2020-06-26 NOTE — PATIENT INSTRUCTIONS
Continue to fill pain med prescriptions monthly then in august will POSTDATE 23 months worth for while I am on maternity leave.       Re-evaluate for PROLIA to treat osteoporosis and physical therapy in 6 week once infection and acute back issues have hopefully calmed.

## 2020-06-27 ENCOUNTER — TELEPHONE (OUTPATIENT)
Dept: FAMILY MEDICINE | Facility: CLINIC | Age: 65
End: 2020-06-27

## 2020-06-27 DIAGNOSIS — M81.0 POSTMENOPAUSAL OSTEOPOROSIS: ICD-10-CM

## 2020-06-27 DIAGNOSIS — E55.9 VITAMIN D DEFICIENCY: Primary | ICD-10-CM

## 2020-06-29 ENCOUNTER — TELEPHONE (OUTPATIENT)
Dept: FAMILY MEDICINE | Facility: CLINIC | Age: 65
End: 2020-06-29

## 2020-06-29 NOTE — TELEPHONE ENCOUNTER
Notified pt that her labs all look fine except for persistently very low vitamin D. This will continue to contribute to osteoporotic bone loss if we cannot correct this level. She told me she has vitamin D gummies -  she needs 5,000 IU of vitamin D 3 EVERY DAY. Dr. Dejesus will also send a new WEEKLY D3 prescription. Her insurance did not cover weekly D2, but maybe the D3 will be different. If her insurance coverers the weekly D3 then she can cut her daily D3 down to 1,000 IU. Otherwise no concerns. Pt verbalized understanding.

## 2020-06-29 NOTE — TELEPHONE ENCOUNTER
----- Message from Brandy Dejesus MD sent at 6/27/2020  9:13 AM CDT -----  Please notify that labs all look fine except for persistently very low vitamin D. This will continue to contribute to osteoporotic bone loss if we cannot correct this level. She told me she has vitamin D gummies-- she needs 5,000 IU of vitamin D 3 EVERY DAY. I am also going to send a new WEEKLY D3 prescription. Her insurance did not cover weekly D2, but maybe the D3 will be different. If her insurance coverers the weekly D3 then she can cut her daily D3 down to 1,000 IU. Otherwise no concerns, LFM

## 2020-06-30 ENCOUNTER — TELEPHONE (OUTPATIENT)
Dept: FAMILY MEDICINE | Facility: CLINIC | Age: 65
End: 2020-06-30

## 2020-06-30 NOTE — TELEPHONE ENCOUNTER
Spoke to pt and stated that the Vitamin D is too expensive at $45. Pt is requesting another medication. Pls advise.

## 2020-06-30 NOTE — TELEPHONE ENCOUNTER
----- Message from Carlos Eduardo Croft sent at 6/30/2020 12:30 PM CDT -----  Regarding: Medicaton  Contact: Self/ [phone]  Patient requesting to speak with you regarding her vitamin D medication being too expensive. Please advise.

## 2020-06-30 NOTE — TELEPHONE ENCOUNTER
----- Message from Carlos Eduardo Croft sent at 6/30/2020 12:30 PM CDT -----  Regarding: Medicaton  Contact: Self/ [phone]  Patient requesting to speak with you regarding her vitamin D medication being too expensive. Please advise.

## 2020-06-30 NOTE — TELEPHONE ENCOUNTER
Informed pt that she can take the Vitamin D3 at [facility] 5000 units, 100 pills for $4.88. Pt verbalized understanding.

## 2020-06-30 NOTE — TELEPHONE ENCOUNTER
Covering for Dr. Dejesus     She can get vitamin D3 at [facility] [facility] 5000 units, 100 pills for $4.88        Product Title  [place] Vitamin D3 Softgels, 5000 IU, 100 Count  Current Price$4.88   2-day delivery  Free pickup today

## 2020-07-06 DIAGNOSIS — F41.9 ANXIETY: ICD-10-CM

## 2020-07-06 RX ORDER — ALPRAZOLAM 1 MG/1
1 TABLET ORAL 2 TIMES DAILY PRN
Qty: 45 TABLET | Refills: 5 | Status: SHIPPED | OUTPATIENT
Start: 2020-07-06 | End: 2020-12-02 | Stop reason: SDUPTHER

## 2020-07-06 NOTE — TELEPHONE ENCOUNTER
----- Message from Carlos Eduardo Croft sent at 7/6/2020 12:11 PM CDT -----  Regarding: Refill request  Contact: Self/ [phone]  Type:  RX Refill Request     Who Called: Self      RX Name and Strength: ALPRAZolam (XANAX) 1 MG tablet     How is the patient currently taking it? (ex. 1XDay): Route: Take 1 tablet (1 mg total) by mouth 2 (two) times daily as needed. - Oral     Is this a 30 day or 90 day RX:     Preferred Pharmacy with phone number: [id number] - [place] - [address] [facility] AT [facility] [phone]       Local or Mail Order: Local     Ordering Provider: Dr. Dejesus      Would the patient rather a call back or a response via [facility]? Local     Best Call Back Number: [phone]     Additional Information:

## 2020-07-17 ENCOUNTER — TELEPHONE (OUTPATIENT)
Dept: FAMILY MEDICINE | Facility: CLINIC | Age: 65
End: 2020-07-17

## 2020-07-17 ENCOUNTER — OFFICE VISIT (OUTPATIENT)
Dept: URGENT CARE | Facility: CLINIC | Age: 65
End: 2020-07-17
Payer: MEDICARE

## 2020-07-17 VITALS
SYSTOLIC BLOOD PRESSURE: 139 MMHG | BODY MASS INDEX: 26.87 KG/M2 | OXYGEN SATURATION: 98 % | RESPIRATION RATE: 20 BRPM | HEIGHT: 62 IN | DIASTOLIC BLOOD PRESSURE: 72 MMHG | TEMPERATURE: 99 F | HEART RATE: 95 BPM | WEIGHT: 146 LBS

## 2020-07-17 DIAGNOSIS — T81.49XA SURGICAL WOUND INFECTION: Primary | ICD-10-CM

## 2020-07-17 PROCEDURE — 99213 PR OFFICE/OUTPT VISIT, EST, LEVL III, 20-29 MIN: ICD-10-PCS | Mod: S$GLB,,, | Performed by: PHYSICIAN ASSISTANT

## 2020-07-17 PROCEDURE — 87077 CULTURE AEROBIC IDENTIFY: CPT | Mod: HCNC

## 2020-07-17 PROCEDURE — 99213 OFFICE O/P EST LOW 20 MIN: CPT | Mod: S$GLB,,, | Performed by: PHYSICIAN ASSISTANT

## 2020-07-17 PROCEDURE — 87070 CULTURE OTHR SPECIMN AEROBIC: CPT | Mod: HCNC

## 2020-07-17 PROCEDURE — 87186 SC STD MICRODIL/AGAR DIL: CPT | Mod: HCNC

## 2020-07-17 RX ORDER — CEPHALEXIN 500 MG/1
500 CAPSULE ORAL EVERY 12 HOURS
Qty: 14 CAPSULE | Refills: 0 | Status: SHIPPED | OUTPATIENT
Start: 2020-07-17 | End: 2020-07-24

## 2020-07-17 RX ORDER — VANCOMYCIN HYDROCHLORIDE 1 G/20ML
INJECTION, POWDER, LYOPHILIZED, FOR SOLUTION INTRAVENOUS
COMMUNITY
Start: 2020-07-06 | End: 2022-05-10 | Stop reason: ALTCHOICE

## 2020-07-17 NOTE — TELEPHONE ENCOUNTER
----- Message from Dorene Rmoero sent at 7/17/2020  1:52 PM CDT -----  Regarding: call back  Contact: [phone]  Patient called in requesting to speak with you. Please advise.

## 2020-07-17 NOTE — PATIENT INSTRUCTIONS
Wound Check, Infection  You have a wound that has become infected. The wound will not heal properly unless the infection is cleared. Infection in a wound may also spread if it is not treated. In most cases, antibiotic medicines are prescribed to treat a wound infection.   Symptoms of a wound infection include:  · Redness or swelling around the wound  · Warmth coming from the wound  · New or worsening pain  · Red streaks around the wound  · Draining pus  · Fever  Home care  Follow all directions you are given to treat the infection.  Medicines  Take all medicines as prescribed.   · If you were given antibiotics, take them until they are gone or your healthcare provider tells you to stop. It is vital to finish the antibiotics even if you feel better. If you do not finish them, the infection may come back and be harder to treat.  · If your infection is not responding to the medicines you are taking, you may be prescribed new medicines.  · Take medicine for pain as directed by your healthcare provider.  Wound care  Care for your wound as directed by your healthcare provider.  · Apply a warm compress (clean cloth soaked in hot water) to the infected area for about 5 to 10 minutes at a time. Be very careful not to burn yourself. Test the cloth on a non-infected area to make sure it is not too hot.  · Continue to change the dressing daily. If it becomes wet, stained with wound fluid, or dirty, change it sooner. To change it:  ¨ Wash your hands with soap and water before changing the dressing.  ¨ Carefully remove the dressing and tape. If it sticks to the wound, you may need to wet it a little to remove it. (Do not do this if your healthcare provider has told you not to.)  ¨ Gently clean the wound with clean water (or saline) using gauze, a clean washcloth, or cotton swab.  ¨ Do not use soap, alcohol, peroxide or other cleansers.  ¨ If you were told to dry the wound before putting on a new dressing, gently pat. Do not  rub.  ¨ Throw out the old dressing.  ¨ Wash your hands again before opening the new, clean dressing.  ¨ Wash your hands again when you are done.  Follow-up care  Follow up with your healthcare provider as advised. If a culture was done, you will be notified if your treatment needs to change. Call as directed for the results.  When to seek medical advice  Call your health care provider right away if any of these occur:  · Symptoms of infection don't start to improve within 2 days of starting antibiotics  · Symptoms of infection get worse  · New symptoms, such as red streaks around the wound  · Fever of 100.4°F (38.0°C) or higher for more than 2 days after starting the antibiotics  Date Last Reviewed: 8/10/2015  © 8077-7039 [employer]. [address] [place] [address], [place] [address]. All rights reserved. This information is not intended as a substitute for professional medical care. Always follow your healthcare professional's instructions.      Please follow up with your Primary care provider within 2-5 days if your signs and symptoms have not resolved or worsen.     If your condition worsens or fails to improve we recommend that you receive another evaluation at the emergency room immediately or contact your primary medical clinic to discuss your concerns.   You must understand that you have received an Urgent Care treatment only and that you may be released before all of your medical problems are known or treated. You, the patient, will arrange for follow up care as instructed.     RED FLAGS/WARNING SYMPTOMS DISCUSSED WITH PATIENT THAT WOULD WARRANT EMERGENT MEDICAL ATTENTION. PATIENT VERBALIZED UNDERSTANDING.

## 2020-07-17 NOTE — PROGRESS NOTES
"Subjective:       Patient ID: Thom Krishna is a 65 y.o. female.    Vitals:  height is 5' 2" (1.575 m) and weight is 66.2 kg (146 lb). Her tympanic temperature is 99.2 °F (37.3 °C). Her blood pressure is 139/72 and her pulse is 95. Her respiration is 20 and oxygen saturation is 98%.     Chief Complaint: Wound Check (Left lower back)    Patient had a stimulator put in left lower back 06/09/20. Sine she has been having pain and had an infection that she has been following with wound care. She states the pain is worse. She Has only been using topical Vanc. She is taking hydrocodone for pain.     Wound Check  Treated in ED: 1 month. Prior ED Treatment: topical vacomycin. There has been colored and bloody discharge from the wound. The redness has worsened. The swelling has worsened. The pain has worsened.       Constitution: Negative for chills, sweating, fatigue and fever.   Neck: Negative for neck pain, neck stiffness, painful lymph nodes and neck swelling.   Cardiovascular: Negative for chest pain, leg swelling and palpitations.   Respiratory: Negative for cough and shortness of breath.    Musculoskeletal: Positive for pain and back pain. Negative for trauma, joint pain and joint swelling.   Skin: Positive for wound and erythema.   Hematologic/Lymphatic: Negative for swollen lymph nodes.       Objective:      Physical Exam   Constitutional: She is oriented to person, place, and time. She appears well-developed.   HENT:   Head: Normocephalic and atraumatic. Head is without abrasion, without contusion and without laceration.   Ears:   Right Ear: External ear normal.   Left Ear: External ear normal.   Nose: Nose normal.   Mouth/Throat: Oropharynx is clear and moist and mucous membranes are normal.   Eyes: Pupils are equal, round, and reactive to light. Conjunctivae, EOM and lids are normal.   Neck: Trachea normal, full passive range of motion without pain and phonation normal. Neck supple.   Cardiovascular: Normal rate, " regular rhythm and normal heart sounds.   Pulmonary/Chest: Effort normal and breath sounds normal. No stridor. No respiratory distress.   Musculoskeletal: Normal range of motion.   Neurological: She is alert and oriented to person, place, and time.   Skin: Skin is warm, dry, intact and no rash. Capillary refill takes less than 2 seconds. Lesions:  erythemaabrasion, burn, bruising and ecchymosis     Psychiatric: Her speech is normal and behavior is normal. Judgment and thought content normal.   Nursing note and vitals reviewed.            Assessment:       1. Surgical wound infection        Plan:         Surgical wound infection  -     Culture, Aerobic    Other orders  -     cephALEXin (KEFLEX) 500 MG capsule; Take 1 capsule (500 mg total) by mouth every 12 (twelve) hours. for 7 days  Dispense: 14 capsule; Refill: 0     Discussed with patient diagnosis.  Discussed to keep follow-up with wound care on Monday morning.  Discussed strict emergency room precautions.  Discussed that I am sending off a wound culture.  I discussed with patient if she has any systemic symptoms to make sure she goes straight to the emergency room.  Discussed risks of taking. Discussed diagnosis with patient as well as treatment and home care. Discussed return to clinic precautions vs ER precautions. All patients questions answered. Patient verbalized understanding. Patient agreed with plan of care.         Wound Check, Infection  You have a wound that has become infected. The wound will not heal properly unless the infection is cleared. Infection in a wound may also spread if it is not treated. In most cases, antibiotic medicines are prescribed to treat a wound infection.   Symptoms of a wound infection include:  · Redness or swelling around the wound  · Warmth coming from the wound  · New or worsening pain  · Red streaks around the wound  · Draining pus  · Fever  Home care  Follow all directions you are given to treat the  infection.  Medicines  Take all medicines as prescribed.   · If you were given antibiotics, take them until they are gone or your healthcare provider tells you to stop. It is vital to finish the antibiotics even if you feel better. If you do not finish them, the infection may come back and be harder to treat.  · If your infection is not responding to the medicines you are taking, you may be prescribed new medicines.  · Take medicine for pain as directed by your healthcare provider.  Wound care  Care for your wound as directed by your healthcare provider.  · Apply a warm compress (clean cloth soaked in hot water) to the infected area for about 5 to 10 minutes at a time. Be very careful not to burn yourself. Test the cloth on a non-infected area to make sure it is not too hot.  · Continue to change the dressing daily. If it becomes wet, stained with wound fluid, or dirty, change it sooner. To change it:  ¨ Wash your hands with soap and water before changing the dressing.  ¨ Carefully remove the dressing and tape. If it sticks to the wound, you may need to wet it a little to remove it. (Do not do this if your healthcare provider has told you not to.)  ¨ Gently clean the wound with clean water (or saline) using gauze, a clean washcloth, or cotton swab.  ¨ Do not use soap, alcohol, peroxide or other cleansers.  ¨ If you were told to dry the wound before putting on a new dressing, gently pat. Do not rub.  ¨ Throw out the old dressing.  ¨ Wash your hands again before opening the new, clean dressing.  ¨ Wash your hands again when you are done.  Follow-up care  Follow up with your healthcare provider as advised. If a culture was done, you will be notified if your treatment needs to change. Call as directed for the results.  When to seek medical advice  Call your health care provider right away if any of these occur:  · Symptoms of infection don't start to improve within 2 days of starting antibiotics  · Symptoms of infection  get worse  · New symptoms, such as red streaks around the wound  · Fever of 100.4°F (38.0°C) or higher for more than 2 days after starting the antibiotics  Date Last Reviewed: 8/10/2015  © 1630-3381 [employer]. [address] [place] [address]. All rights reserved. This information is not intended as a substitute for professional medical care. Always follow your healthcare professional's instructions.      Please follow up with your Primary care provider within 2-5 days if your signs and symptoms have not resolved or worsen.     If your condition worsens or fails to improve we recommend that you receive another evaluation at the emergency room immediately or contact your primary medical clinic to discuss your concerns.   You must understand that you have received an Urgent Care treatment only and that you may be released before all of your medical problems are known or treated. You, the patient, will arrange for follow up care as instructed.     RED FLAGS/WARNING SYMPTOMS DISCUSSED WITH PATIENT THAT WOULD WARRANT EMERGENT MEDICAL ATTENTION. PATIENT VERBALIZED UNDERSTANDING.

## 2020-07-17 NOTE — TELEPHONE ENCOUNTER
Spoke to pt and stated that she has an infection in the site that the stimulator is in. Informed pt that she would have to call the [facility] and informed them that the stimulator site is causing her a lot of pain. Pt stated that she will call the office.

## 2020-07-20 ENCOUNTER — TELEPHONE (OUTPATIENT)
Dept: URGENT CARE | Facility: CLINIC | Age: 65
End: 2020-07-20

## 2020-07-20 ENCOUNTER — TELEPHONE (OUTPATIENT)
Dept: GASTROENTEROLOGY | Facility: CLINIC | Age: 65
End: 2020-07-20

## 2020-07-20 ENCOUNTER — TELEPHONE (OUTPATIENT)
Dept: FAMILY MEDICINE | Facility: CLINIC | Age: 65
End: 2020-07-20

## 2020-07-20 DIAGNOSIS — M48.02 CERVICAL SPINAL STENOSIS: ICD-10-CM

## 2020-07-20 DIAGNOSIS — M54.50 LOW BACK PAIN RADIATING TO RIGHT LOWER EXTREMITY: ICD-10-CM

## 2020-07-20 DIAGNOSIS — M47.812 DEGENERATIVE JOINT DISEASE OF CERVICAL AND LUMBAR SPINE: ICD-10-CM

## 2020-07-20 DIAGNOSIS — M47.816 DEGENERATIVE JOINT DISEASE OF CERVICAL AND LUMBAR SPINE: ICD-10-CM

## 2020-07-20 DIAGNOSIS — M54.12 RIGHT CERVICAL RADICULOPATHY: ICD-10-CM

## 2020-07-20 DIAGNOSIS — M79.604 LOW BACK PAIN RADIATING TO RIGHT LOWER EXTREMITY: ICD-10-CM

## 2020-07-20 LAB — BACTERIA SPEC AEROBE CULT: ABNORMAL

## 2020-07-20 RX ORDER — HYDROCODONE BITARTRATE AND ACETAMINOPHEN 10; 325 MG/1; MG/1
1 TABLET ORAL EVERY 6 HOURS PRN
Qty: 90 TABLET | Refills: 0 | Status: SHIPPED | OUTPATIENT
Start: 2020-07-20 | End: 2020-08-17 | Stop reason: SDUPTHER

## 2020-07-20 NOTE — TELEPHONE ENCOUNTER
Called patient to schedule procedure patient stated that she is currently sick with a back infection and she will call back to schedule when she feels better.  Patient was due 3/12/2020.  Mailed letter to her home.

## 2020-08-17 DIAGNOSIS — M54.50 LOW BACK PAIN RADIATING TO RIGHT LOWER EXTREMITY: ICD-10-CM

## 2020-08-17 DIAGNOSIS — M48.02 CERVICAL SPINAL STENOSIS: ICD-10-CM

## 2020-08-17 DIAGNOSIS — M79.604 LOW BACK PAIN RADIATING TO RIGHT LOWER EXTREMITY: ICD-10-CM

## 2020-08-17 DIAGNOSIS — M54.12 RIGHT CERVICAL RADICULOPATHY: ICD-10-CM

## 2020-08-17 DIAGNOSIS — M47.812 DEGENERATIVE JOINT DISEASE OF CERVICAL AND LUMBAR SPINE: ICD-10-CM

## 2020-08-17 DIAGNOSIS — M47.816 DEGENERATIVE JOINT DISEASE OF CERVICAL AND LUMBAR SPINE: ICD-10-CM

## 2020-08-17 RX ORDER — HYDROCODONE BITARTRATE AND ACETAMINOPHEN 10; 325 MG/1; MG/1
1 TABLET ORAL EVERY 6 HOURS PRN
Qty: 90 TABLET | Refills: 0 | Status: SHIPPED | OUTPATIENT
Start: 2020-08-17 | End: 2020-09-15 | Stop reason: SDUPTHER

## 2020-08-17 NOTE — TELEPHONE ENCOUNTER
----- Message from Cecilia Diez sent at 8/17/2020  7:56 AM CDT -----  Contact: [phone]  Type:  Needs Medical Advice    Who Called: PT  Reason for call: regarding a refill on her medication for HYDROcodone-acetaminophen (NORCO)  mg per tablet  Pharmacy name and phone #:  [facility]  Would the patient rather a call back or a response via [facility]?  Call back  Best Call Back Number:[phone]

## 2020-09-09 ENCOUNTER — HOSPITAL ENCOUNTER (EMERGENCY)
Facility: HOSPITAL | Age: 65
Discharge: HOME | End: 2020-09-09
Attending: EMERGENCY MEDICINE
Payer: MEDICARE

## 2020-09-09 VITALS
DIASTOLIC BLOOD PRESSURE: 71 MMHG | WEIGHT: 145 LBS | BODY MASS INDEX: 26.68 KG/M2 | TEMPERATURE: 99 F | HEIGHT: 62 IN | OXYGEN SATURATION: 96 % | SYSTOLIC BLOOD PRESSURE: 164 MMHG | RESPIRATION RATE: 20 BRPM | HEART RATE: 83 BPM

## 2020-09-09 DIAGNOSIS — G89.29 EXACERBATION OF CHRONIC BACK PAIN: Primary | ICD-10-CM

## 2020-09-09 DIAGNOSIS — M54.9 BACK PAIN: ICD-10-CM

## 2020-09-09 DIAGNOSIS — M54.9 EXACERBATION OF CHRONIC BACK PAIN: Primary | ICD-10-CM

## 2020-09-09 DIAGNOSIS — R03.0 ELEVATED BLOOD PRESSURE READING: ICD-10-CM

## 2020-09-09 LAB — POCT GLUCOSE: 78 MG/DL (ref 70–110)

## 2020-09-09 PROCEDURE — 63600175 PHARM REV CODE 636 W HCPCS: Mod: HCNC | Performed by: NURSE PRACTITIONER

## 2020-09-09 PROCEDURE — 96372 THER/PROPH/DIAG INJ SC/IM: CPT | Mod: HCNC

## 2020-09-09 PROCEDURE — 82962 GLUCOSE BLOOD TEST: CPT | Mod: HCNC

## 2020-09-09 PROCEDURE — 99284 EMERGENCY DEPT VISIT MOD MDM: CPT | Mod: 25,HCNC

## 2020-09-09 RX ORDER — KETOROLAC TROMETHAMINE 30 MG/ML
30 INJECTION, SOLUTION INTRAMUSCULAR; INTRAVENOUS
Status: DISCONTINUED | OUTPATIENT
Start: 2020-09-09 | End: 2020-09-09

## 2020-09-09 RX ORDER — HALOPERIDOL 5 MG/ML
5 INJECTION INTRAMUSCULAR
Status: COMPLETED | OUTPATIENT
Start: 2020-09-09 | End: 2020-09-09

## 2020-09-09 RX ADMIN — HALOPERIDOL LACTATE 5 MG: 5 INJECTION, SOLUTION INTRAMUSCULAR at 03:09

## 2020-09-09 NOTE — FIRST PROVIDER EVALUATION
Emergency Department TeleTriage Encounter Note      CHIEF COMPLAINT    Chief Complaint   Patient presents with    Back Pain     c/o pain to her upper back that radiates down her entire right leg for awhile, worse recently. also c/o tingling to left arm x2 days. called her pain management doctor, Dr. Younger, and was told to come to the er. reports she had a stimulator placed in June       VITAL SIGNS   Initial Vitals [09/09/20 1411]   BP Pulse Resp Temp SpO2   (!) 164/71 83 20 98.5 °F (36.9 °C) 96 %      MAP       --            ALLERGIES    Review of patient's allergies indicates:   Allergen Reactions    Cefaclor Hives    Penicillins      Other reaction(s): Hives    Sulfa (sulfonamide antibiotics) Other (See Comments)     Other reaction(s): Hives       PROVIDER TRIAGE NOTE  65-year-old female presents to the ER for evaluation of back pain.  Patient reports having pain from the upper back that radiates to her right lower extremity.  Patient reports having staph infection to the nerve stimulator in the past and is currently seeing wound care.  Patient is currently not on antibiotics    Initial orders will be placed and care will be transferred to an alternate provider when patient is roomed for a full evaluation. Any additional orders and the final disposition will be determined by that provider.      ORDERS  Labs Reviewed   CBC W/ AUTO DIFFERENTIAL   BASIC METABOLIC PANEL       ED Orders (720h ago, onward)    Start Ordered     Status Ordering Provider    09/09/20 1422 09/09/20 1421  CBC auto differential  STAT  Collect    Ordered VIRGIL MELÉNDEZ    09/09/20 1422 09/09/20 1421  Basic metabolic panel  STAT  Collect    Ordered SERINAYKUTVIRGIL GARNER    09/09/20 1422 09/09/20 1421  Insert Saline lock IV  Once      Ordered VIRGIL MELÉDNEZ            Virtual Visit Note: The provider triage portion of this emergency department evaluation and documentation was performed via [employer], a HIPAA-compliant telemedicine  application, in concert with a tele-presenter in the room. A face to face patient evaluation with one of my colleagues will occur once the patient is placed in an emergency department room.      DISCLAIMER: This note was prepared with [employer] voice recognition transcription software. Garbled syntax, mangled pronouns, and other bizarre constructions may be attributed to that software system.

## 2020-09-09 NOTE — ED NOTES
Pt presents to the ED c/o chronic Rt lower back pain exacerbation. Denies trauma, ambulates w/o assst  
Statement Selected

## 2020-09-09 NOTE — DISCHARGE INSTRUCTIONS
Your blood pressure was a little high today.  You need to have it rechecked by your doctor within one week.     Return to the ED if your condition changes, progresses, or if you have any concerns.

## 2020-09-09 NOTE — ED PROVIDER NOTES
Encounter Date: 9/9/2020       History     Chief Complaint   Patient presents with    Back Pain     c/o pain to her upper back that radiates down her entire right leg for awhile, worse recently. also c/o tingling to left arm x2 days. called her pain management doctor, Dr. Younger, and was told to come to the er. reports she had a stimulator placed in June     65-year-old female presents to the ED for back pain.  The patient has history of chronic back pain and is established with pain management.  She reports that her back pain has significantly worsened over the past 2-3 days.  She denies trauma, fever/chills, weakness, loss of bowel or bladder control, saddle anesthesia, and urinary complaint.  Pain is worse with movements and certain positions.  The patient had a spinal stimulator placed in June which did get infected with staph.  She reports that the infection has significantly improved.  Today the pain is located in her upper back with radiation to her lower back and right lower extremity.  This is the same location of her chronic pain.  She has taken Norco without improvement.  The patient reports that her pain is so bad that she is using a walker today.  The patient was ambulatory into the ED and did drive herself.  No other complaints at this time.    The history is provided by the patient and medical records.     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 Perez    Hip fracture     Macrocytosis 2015    Thyroid disease     Transfusion reaction     hep c     Past Surgical History:   Procedure Laterality Date    APPENDECTOMY      BACK SURGERY      CAUDAL EPIDURAL STEROID INJECTION N/A 2018    Procedure: Injection-steroid-epidural-caudal;  Surgeon: Roxana Gu Jr., MD;  Location: [facility] OR;  Service: Pain Management;  Laterality: N/A;  with cath target L4-5    CAUDAL EPIDURAL STEROID INJECTION N/A 2019    Procedure: Injection-steroid-epidural-caudal;  Surgeon: Roxana Gu Jr., MD;  Location: [facility];  Service: Pain Management;  Laterality: N/A;     SECTION      CHOLECYSTECTOMY      COLONOSCOPY  3/31/10    2 polyps, tubular adenoma    COLONOSCOPY  2015    Dr. Washington    COLONOSCOPY N/A 10/10/2018    Procedure: COLONOSCOPY;  Surgeon: Reuben Miller Jr., MD;  Location: [facility];  Service: Endoscopy;  Laterality: N/A;    ESOPHAGOGASTRODUODENOSCOPY N/A 10/10/2018    Procedure: EGD (ESOPHAGOGASTRODUODENOSCOPY);  Surgeon: Reuben Miller Jr., MD;  Location: [facility];  Service: Endoscopy;  Laterality: N/A;    ESOPHAGOGASTRODUODENOSCOPY N/A 12/10/2018    Procedure: EGD (ESOPHAGOGASTRODUODENOSCOPY)/poss emr;  Surgeon: Belle Kuo MD;  Location: [address]);  Service: Endoscopy;  Laterality: N/A;    ESOPHAGOGASTRODUODENOSCOPY N/A 3/12/2019    Procedure: EGD (ESOPHAGOGASTRODUODENOSCOPY);  Surgeon: Polo Keyes MD;  Location: [facility];  Service: Endoscopy;  Laterality: N/A;    HERNIA REPAIR      HYSTERECTOMY      Uterus and both ovaries    INCISIONAL HERNIA REPAIR      x 2    JOINT REPLACEMENT      LIVER BIOPSY  2003    autoimmune hepatitis    ROTATOR CUFF REPAIR      right    STOMACH SURGERY      "took lymph node off of liver"    TOTAL HIP ARTHROPLASTY      left hip    UPPER GASTROINTESTINAL ENDOSCOPY  3/24/10 "    normal    UPPER GASTROINTESTINAL ENDOSCOPY  04/27/2015    Dr. Washington     Family History   Problem Relation Age of Onset    Diabetes Mellitus Mother     Diabetes Mother     Liver cancer Sister     Breast cancer Sister     Cataracts Sister     Pancreatic cancer Brother     Diabetes Brother     Lung cancer Brother     Colon cancer Brother     Brain cancer Brother     Stomach cancer Other     Diabetes Other     Throat cancer Brother     Cataracts Sister     Diabetes Son     Liver disease Neg Hx      Social History     Tobacco Use    Smoking status: Current Every Day Smoker     Packs/day: 2.00     Years: 45.00     Pack years: 90.00     Types: Cigarettes    Smokeless tobacco: Never Used   Substance Use Topics    Alcohol use: No     Comment: used to drink heavily, stopped in 1990's    Drug use: No     Review of Systems   Constitutional: Negative for chills, fatigue and fever.   Respiratory: Negative for cough and shortness of breath.    Cardiovascular: Negative for chest pain.   Gastrointestinal: Negative for abdominal pain, nausea and vomiting.   Genitourinary: Negative for difficulty urinating, dysuria and hematuria.   Musculoskeletal: Positive for back pain. Negative for gait problem, neck pain and neck stiffness.   Skin: Negative for rash.   Neurological: Negative for weakness, numbness and headaches.   Psychiatric/Behavioral: Negative for confusion.   All other systems reviewed and are negative.      Physical Exam     Initial Vitals [09/09/20 1411]   BP Pulse Resp Temp SpO2   (!) 164/71 83 20 98.5 °F (36.9 °C) 96 %      MAP       --         Physical Exam    Nursing note and vitals reviewed.  Constitutional: Vital signs are normal. She appears well-developed and well-nourished. She is active and cooperative.  Non-toxic appearance. She does not have a sickly appearance. She does not appear ill.   HENT:   Head: Normocephalic and atraumatic.   Eyes: Conjunctivae and EOM are normal.   Neck: Normal  range of motion. Neck supple.   Cardiovascular: Normal rate and regular rhythm.   Pulmonary/Chest: Effort normal and breath sounds normal.   Abdominal: Soft. Normal appearance and bowel sounds are normal.   Neurological: She is alert and oriented to person, place, and time. She has normal strength. GCS eye subscore is 4. GCS verbal subscore is 5. GCS motor subscore is 6.   Skin: Skin is warm, dry and intact. Capillary refill takes less than 2 seconds. No rash noted. No erythema.   The patient has a well-approximated post surgical wound noted to her left lower back.  There is no purulence, induration, fluctuance, or tenderness to palpation.  No surrounding or overlying erythema.   Psychiatric: She has a normal mood and affect. Her speech is normal and behavior is normal. Judgment and thought content normal. Cognition and memory are normal.         ED Course   Procedures  Labs Reviewed - No data to display       Imaging Results    None          Medical Decision Making:   History:   Old Medical Records: I decided to obtain old medical records.  Differential Diagnosis:   Chronic pain, strain, sprain, spasm, infection, considered but do not suspect cauda equina syndrome  Clinical Tests:   Radiological Study: Ordered  ED Management:  IM Haldol    X-rays were ordered but she declined.  The patient has no signs or symptoms of cauda equina syndrome.  She reports that the known infection over her nerve stimulator has significantly improved.  She is being followed for management of her wound.  The patient reports this is her chronic pain.  I feels today's complaints are an exacerbation of her chronic pain.  She is ambulatory while in the ED with the assistance of a walker.  She has no foot drop.  No red flags on exam today.  I advised her to follow-up with her pain management and primary care physician.  She reports that she feels comfortable with discharge at this time.  I reviewed strict return precautions including red flag  signs or symptoms.  The patient verbalized understanding, compliance, and agreement with the treatment plan.    The patient's blood pressure was noted to be elevated while in the ED today.  The patient has no associated signs or symptoms of hypertension.  Patient's blood pressure is likely elevated due to situation.  Advised blood pressure recheck by PCP within 1 week.                Attending Attestation:     Physician Attestation Statement for NP/PA:   I discussed this assessment and plan of this patient with the NP/PA, but I did not personally examine the patient. The face to face encounter was performed by the NP/PA.                            Clinical Impression:       ICD-10-CM ICD-9-CM   1. Exacerbation of chronic back pain  M54.9 724.5    G89.29 338.29     338.19   2. Back pain  M54.9 724.5   3. Elevated blood pressure reading  R03.0 796.2                                  MAGGY Mabry  09/09/20 2320       Alonzo Carlos MD  09/10/20 [id number]

## 2020-09-15 DIAGNOSIS — M54.12 RIGHT CERVICAL RADICULOPATHY: ICD-10-CM

## 2020-09-15 DIAGNOSIS — M47.816 DEGENERATIVE JOINT DISEASE OF CERVICAL AND LUMBAR SPINE: ICD-10-CM

## 2020-09-15 DIAGNOSIS — M79.604 LOW BACK PAIN RADIATING TO RIGHT LOWER EXTREMITY: ICD-10-CM

## 2020-09-15 DIAGNOSIS — M47.812 DEGENERATIVE JOINT DISEASE OF CERVICAL AND LUMBAR SPINE: ICD-10-CM

## 2020-09-15 DIAGNOSIS — M54.50 LOW BACK PAIN RADIATING TO RIGHT LOWER EXTREMITY: ICD-10-CM

## 2020-09-15 DIAGNOSIS — M48.02 CERVICAL SPINAL STENOSIS: ICD-10-CM

## 2020-09-15 RX ORDER — HYDROCODONE BITARTRATE AND ACETAMINOPHEN 10; 325 MG/1; MG/1
1 TABLET ORAL EVERY 6 HOURS PRN
Qty: 90 TABLET | Refills: 0 | Status: SHIPPED | OUTPATIENT
Start: 2020-09-15 | End: 2020-10-12 | Stop reason: SDUPTHER

## 2020-09-15 NOTE — TELEPHONE ENCOUNTER
----- Message from Briceño Andriy sent at 9/15/2020  9:42 AM CDT -----  Type:  RX Refill Request    Who Called: Patient  Refill or New Rx:refill  RX Name and Strength:HYDROcodone-acetaminophen (NORCO)    How is the patient currently taking it? (ex. 1XDay):3 a day   Is this a 30 day or 90 day RX:  Preferred Pharmacy with phone number:[facility] [phone] (Phone)  [phone] (Fax)      Local or Mail Order:Local  Ordering Provider:Nicanor  Would the patient rather a call back or a response via [facility]? call  Best Call Back Number: [phone]  Additional Information:

## 2020-09-30 ENCOUNTER — TELEPHONE (OUTPATIENT)
Dept: FAMILY MEDICINE | Facility: CLINIC | Age: 65
End: 2020-09-30

## 2020-09-30 NOTE — TELEPHONE ENCOUNTER
----- Message from Suresh Stephenson sent at 9/30/2020  2:21 PM CDT -----  Regarding: call back  Type:  Needs Medical Advice    Who Called: patient   Reason: patient missed call and would like a call back   Would the patient rather a call back or a response via [facility]? Call back   Best Call Back Number: [phone]  Additional Information: n/a

## 2020-10-12 ENCOUNTER — TELEPHONE (OUTPATIENT)
Dept: FAMILY MEDICINE | Facility: CLINIC | Age: 65
End: 2020-10-12

## 2020-10-12 DIAGNOSIS — M54.50 LOW BACK PAIN RADIATING TO RIGHT LOWER EXTREMITY: ICD-10-CM

## 2020-10-12 DIAGNOSIS — M47.816 DEGENERATIVE JOINT DISEASE OF CERVICAL AND LUMBAR SPINE: ICD-10-CM

## 2020-10-12 DIAGNOSIS — M54.12 RIGHT CERVICAL RADICULOPATHY: ICD-10-CM

## 2020-10-12 DIAGNOSIS — M47.812 DEGENERATIVE JOINT DISEASE OF CERVICAL AND LUMBAR SPINE: ICD-10-CM

## 2020-10-12 DIAGNOSIS — M79.604 LOW BACK PAIN RADIATING TO RIGHT LOWER EXTREMITY: ICD-10-CM

## 2020-10-12 DIAGNOSIS — M48.02 CERVICAL SPINAL STENOSIS: ICD-10-CM

## 2020-10-12 RX ORDER — HYDROCODONE BITARTRATE AND ACETAMINOPHEN 10; 325 MG/1; MG/1
1 TABLET ORAL EVERY 6 HOURS PRN
Qty: 90 TABLET | Refills: 0 | Status: SHIPPED | OUTPATIENT
Start: 2020-10-12 | End: 2020-11-06 | Stop reason: SDUPTHER
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
## 2020-10-13 NOTE — TELEPHONE ENCOUNTER
Covering for kedar Guaman for early fill as prescription is technically due in 4 days anyway.  Please ensure since I do not follow this patient that she is not frequently asking for early refills.

## 2020-10-14 DIAGNOSIS — F41.9 ANXIETY: ICD-10-CM

## 2020-10-15 RX ORDER — ALPRAZOLAM 1 MG/1
1 TABLET ORAL 2 TIMES DAILY PRN
Qty: 45 TABLET | Refills: 5 | OUTPATIENT
Start: 2020-10-15

## 2020-11-02 ENCOUNTER — NURSE TRIAGE (OUTPATIENT)
Dept: ADMINISTRATIVE | Facility: CLINIC | Age: 65
End: 2020-11-02

## 2020-11-02 ENCOUNTER — HOSPITAL ENCOUNTER (EMERGENCY)
Facility: HOSPITAL | Age: 65
Discharge: HOME | End: 2020-11-02
Attending: EMERGENCY MEDICINE
Payer: MEDICARE

## 2020-11-02 VITALS
TEMPERATURE: 99 F | OXYGEN SATURATION: 98 % | RESPIRATION RATE: 20 BRPM | BODY MASS INDEX: 27.6 KG/M2 | HEART RATE: 98 BPM | DIASTOLIC BLOOD PRESSURE: 65 MMHG | SYSTOLIC BLOOD PRESSURE: 130 MMHG | HEIGHT: 62 IN | WEIGHT: 150 LBS

## 2020-11-02 DIAGNOSIS — G89.29 EXACERBATION OF CHRONIC BACK PAIN: Primary | ICD-10-CM

## 2020-11-02 DIAGNOSIS — M54.9 EXACERBATION OF CHRONIC BACK PAIN: Primary | ICD-10-CM

## 2020-11-02 PROCEDURE — 63600175 PHARM REV CODE 636 W HCPCS: Mod: HCNC | Performed by: EMERGENCY MEDICINE

## 2020-11-02 PROCEDURE — 99284 EMERGENCY DEPT VISIT MOD MDM: CPT | Mod: 25,HCNC

## 2020-11-02 PROCEDURE — 96372 THER/PROPH/DIAG INJ SC/IM: CPT | Mod: HCNC

## 2020-11-02 RX ORDER — HYDROMORPHONE HYDROCHLORIDE 2 MG/ML
2 INJECTION, SOLUTION INTRAMUSCULAR; INTRAVENOUS; SUBCUTANEOUS
Status: COMPLETED | OUTPATIENT
Start: 2020-11-02 | End: 2020-11-02

## 2020-11-02 RX ADMIN — HYDROMORPHONE HYDROCHLORIDE 2 MG: 2 INJECTION, SOLUTION INTRAMUSCULAR; INTRAVENOUS; SUBCUTANEOUS at 05:11

## 2020-11-02 NOTE — TELEPHONE ENCOUNTER
C/o R back pain radiating into R leg, reports pain is chronic, stimulator placed & has not improved & now pain radiating into hip & R upper back behind R breast. Denies recent injury/trauma. Denies chest pain/sob.      Reason for Disposition   Pain radiates into groin, scrotum    Additional Information   Negative: Passed out (i.e., fainted, collapsed and was not responding)   Negative: Shock suspected (e.g., cold/pale/clammy skin, too weak to stand, low BP, rapid pulse)   Negative: Sounds like a life-threatening emergency to the triager   Negative: SEVERE back pain of sudden onset and age > 60   Negative: SEVERE abdominal pain (e.g., excruciating)   Negative: Abdominal pain and age > 60   Negative: Unable to urinate (or only a few drops) and bladder feels very full   Negative: Loss of bladder or bowel control (urine or bowel incontinence; wetting self, leaking stool) of new onset   Negative: Numbness (loss of sensation) in groin or rectal area    Protocols used: BACK PAIN-A-OH

## 2020-11-02 NOTE — ED PROVIDER NOTES
"Encounter Date: 11/2/2020    SCRIBE #1 NOTE: I, Carmine Lluvia, am scribing for, and in the presence of, Pa Norwood MD.       History     Chief Complaint   Patient presents with    Back Pain     c/o pain across her upper back that radiates to right groin and down right leg. reports pain is chronic, but worsened today. states she turned off her pain stimulator pta.      Time seen by provider: 4:44 PM    This is a 65 y.o. female who presents with complaint of chronic back pain that worsened today. Patient reports pain across her mid to upper back that radiates "into my spine" and right groin area that travels down the right LE. She denies fever, vomiting, chest pain or SOB. She has a pain stimulator that was placed 06/09/2020. Patient also takes Norco daily without relief in her pain. Patient reports a history of appendectomy, cholecystectomy.          The history is provided by the patient.     Review of patient's allergies indicates:   Allergen Reactions    Cefaclor Hives    Penicillins      Other reaction(s): Hives    Sulfa (sulfonamide antibiotics) Other (See Comments)     Other reaction(s): Hives     Past Medical History:   Diagnosis Date    Anxiety and depression 7/21/2015    Arthritis     Cervical radiculopathy 4/8/2016    Cirrhosis of liver     Colon polyps 4/27/2015    Diabetes mellitus type 2 with neurological manifestations 11/6/2015    no current medications, only one episode of elevated sugars with acute illness, will monitor     Encounter for blood transfusion     Hepatitis C     s/p treatment per patient report; managed by Dr. Perez    Hip fracture     Macrocytosis 7/21/2015    Thyroid disease     Transfusion reaction     hep c     Past Surgical History:   Procedure Laterality Date    APPENDECTOMY      BACK SURGERY      CAUDAL EPIDURAL STEROID INJECTION N/A 8/7/2018    Procedure: Injection-steroid-epidural-caudal;  Surgeon: Roxana Gu Jr., MD;  Location: [facility] OR;  " "Service: Pain Management;  Laterality: N/A;  with cath target L4-5    CAUDAL EPIDURAL STEROID INJECTION N/A 2019    Procedure: Injection-steroid-epidural-caudal;  Surgeon: Roxana Gu Jr., MD;  Location: [facility] PAIN [facility]GT;  Service: Pain Management;  Laterality: N/A;     SECTION      CHOLECYSTECTOMY      COLONOSCOPY  3/31/10    2 polyps, tubular adenoma    COLONOSCOPY  2015    Dr. Washington    COLONOSCOPY N/A 10/10/2018    Procedure: COLONOSCOPY;  Surgeon: Reuben Miller Jr., MD;  Location: [facility];  Service: Endoscopy;  Laterality: N/A;    ESOPHAGOGASTRODUODENOSCOPY N/A 10/10/2018    Procedure: EGD (ESOPHAGOGASTRODUODENOSCOPY);  Surgeon: Reuben Miller Jr., MD;  Location: [facility];  Service: Endoscopy;  Laterality: N/A;    ESOPHAGOGASTRODUODENOSCOPY N/A 12/10/2018    Procedure: EGD (ESOPHAGOGASTRODUODENOSCOPY)/poss emr;  Surgeon: Belle Kuo MD;  Location: [address]);  Service: Endoscopy;  Laterality: N/A;    ESOPHAGOGASTRODUODENOSCOPY N/A 3/12/2019    Procedure: EGD (ESOPHAGOGASTRODUODENOSCOPY);  Surgeon: Polo Keyes MD;  Location: [facility];  Service: Endoscopy;  Laterality: N/A;    HERNIA REPAIR      HYSTERECTOMY      Uterus and both ovaries    INCISIONAL HERNIA REPAIR      x 2    JOINT REPLACEMENT      LIVER BIOPSY  2003    autoimmune hepatitis    ROTATOR CUFF REPAIR      right    STOMACH SURGERY      "took lymph node off of liver"    TOTAL HIP ARTHROPLASTY      left hip    UPPER GASTROINTESTINAL ENDOSCOPY  3/24/10    normal    UPPER GASTROINTESTINAL ENDOSCOPY  2015    Dr. Washington     Family History   Problem Relation Age of Onset    Diabetes Mellitus Mother     Diabetes Mother     Liver cancer Sister     Breast cancer Sister     Cataracts Sister     Pancreatic cancer Brother     Diabetes Brother     Lung cancer Brother     Colon cancer Brother     Brain cancer Brother     Stomach cancer Other     Diabetes Other     " Throat cancer Brother     Cataracts Sister     Diabetes Son     Liver disease Neg Hx      Social History     Tobacco Use    Smoking status: Current Every Day Smoker     Packs/day: 2.00     Years: 45.00     Pack years: 90.00     Types: Cigarettes    Smokeless tobacco: Never Used   Substance Use Topics    Alcohol use: No     Comment: used to drink heavily, stopped in 1990's    Drug use: No     Review of Systems   Constitutional: Negative for fever.   HENT: Negative for sore throat.    Respiratory: Negative for shortness of breath.    Cardiovascular: Negative for chest pain.   Gastrointestinal: Negative for nausea and vomiting.        + right groin pain   Genitourinary: Negative for dysuria.   Musculoskeletal: Positive for back pain and myalgias (RLE pain).   Skin: Negative for rash.   Neurological: Negative for weakness.   Hematological: Does not bruise/bleed easily.   All other systems reviewed and are negative.      Physical Exam     Initial Vitals [11/02/20 1553]   BP Pulse Resp Temp SpO2   130/65 98 20 98.5 °F (36.9 °C) 98 %      MAP       --         Physical Exam    Nursing note and vitals reviewed.  Constitutional: She appears well-developed. She is not diaphoretic.   HENT:   Head: Normocephalic and atraumatic.   Eyes: EOM are normal. Pupils are equal, round, and reactive to light.   Neck: Normal range of motion. Neck supple.   Cardiovascular: Normal rate, regular rhythm, normal heart sounds and intact distal pulses.   Pulmonary/Chest: Breath sounds normal. No respiratory distress. She has no wheezes.   Abdominal: Soft. She exhibits no distension. There is abdominal tenderness in the right lower quadrant. There is guarding (voluntary).   Musculoskeletal: Normal range of motion. Tenderness present.      Comments: Tenderness of the lower thoracic into the lumbar paraspinous muscles bilaterally   Neurological: She is alert and oriented to person, place, and time.   Skin: Skin is warm and dry.         ED  Course   Procedures  Labs Reviewed - No data to display       Imaging Results    None          Medical Decision Making:   ED Management:  65-year-old female with acute exacerbation of chronic pain.  Patient was given an intramuscular shot of Dilaudid which reduced her discomfort.  I have explained to her that further pain management should be handled by her pain management physician.  She is requesting her pain stimulated to be removed which I have also advised her follow-up for.  She may of course, return to the ED for any worsening or any other urgent concerns.                             Clinical Impression:     ICD-10-CM ICD-9-CM   1. Exacerbation of chronic back pain  M54.9 724.5    G89.29 338.29     338.19                          ED Disposition Condition    Discharge Stable        ED Prescriptions     None        Follow-up Information     Follow up With Specialties Details Why Contact Info    Brandy Dejesus MD Family Medicine Schedule an appointment as soon as possible for a visit   [address]  [address]  [facility] [id number] [phone]      Kd Gandhi MD Neurosurgery Schedule an appointment as soon as possible for a visit   [address]  [address]  [facility] [id number] [phone]                          I, Dr. Pa Norwood, personally performed the services described in this documentation. All medical record entries made by the scribe were at my direction and in my presence. I have reviewed the chart and agree that the record reflects my personal performance and is accurate and complete. Pa Norwood MD.  8:42 PM 11/02/2020                 Pa Norwood MD  11/02/20 2044

## 2020-11-03 ENCOUNTER — PATIENT OUTREACH (OUTPATIENT)
Dept: ADMINISTRATIVE | Facility: OTHER | Age: 65
End: 2020-11-03

## 2020-11-03 ENCOUNTER — TELEPHONE (OUTPATIENT)
Dept: FAMILY MEDICINE | Facility: CLINIC | Age: 65
End: 2020-11-03

## 2020-11-03 ENCOUNTER — TELEPHONE (OUTPATIENT)
Dept: NEUROSURGERY | Facility: CLINIC | Age: 65
End: 2020-11-03

## 2020-11-03 DIAGNOSIS — E11.49 DIABETES MELLITUS TYPE 2 WITH NEUROLOGICAL MANIFESTATIONS: Primary | ICD-10-CM

## 2020-11-03 DIAGNOSIS — Z12.31 ENCOUNTER FOR SCREENING MAMMOGRAM FOR MALIGNANT NEOPLASM OF BREAST: ICD-10-CM

## 2020-11-03 NOTE — PROGRESS NOTES
Updates were requested from care everywhere.  Chart was reviewed for overdue Proactive [facility] Encounters (PHAN) topics (CRS, Breast Cancer Screening, Eye exam)  Health Maintenance has been updated.  LINKS not responding.  Order placed for diabetic eye screening photo and mammogram.

## 2020-11-03 NOTE — TELEPHONE ENCOUNTER
----- Message from Keyanna Ashraf sent at 11/3/2020 11:36 AM CST -----  Contact: Patient  Type:  Same Day Appointment Request    Caller is requesting a same day appointment.  Caller declined first available appointment listed below.    Name of Caller: Patient   When is the first available appointment?   Symptoms:severe back pain   Best Call Back Number [phone]  Additional Information:

## 2020-11-03 NOTE — TELEPHONE ENCOUNTER
----- Message from Lena Ray sent at 11/3/2020 11:11 AM CST -----  Type:  Sooner Apoointment Request    Caller is requesting a sooner appointment.  Caller declined first available appointment listed below.  Caller will not accept being placed on the waitlist and is requesting a message be sent to doctor.  Name of Caller: Thom  When is the first available appointment? 12/2  Symptoms: pt is having severe pain in her back and its traveling down her spine into her right leg  Would the patient rather a call back or a response via [facility]? Call back  Best Call Back Number: [phone]  Additional Information: pt went to er and was told she should see Dr. Gandhi for possible surgery

## 2020-11-03 NOTE — TELEPHONE ENCOUNTER
Spoke with pt. Who informed me she is wanting to have pain medications . I informed her she would need to keep upcoming appointment patient voiced understanding

## 2020-11-04 ENCOUNTER — OFFICE VISIT (OUTPATIENT)
Dept: NEUROSURGERY | Facility: CLINIC | Age: 65
End: 2020-11-04
Payer: MEDICARE

## 2020-11-04 VITALS — HEART RATE: 94 BPM | DIASTOLIC BLOOD PRESSURE: 77 MMHG | SYSTOLIC BLOOD PRESSURE: 135 MMHG

## 2020-11-04 DIAGNOSIS — M51.36 DDD (DEGENERATIVE DISC DISEASE), LUMBAR: ICD-10-CM

## 2020-11-04 DIAGNOSIS — M54.16 LUMBAR RADICULOPATHY: ICD-10-CM

## 2020-11-04 DIAGNOSIS — M54.6 CHRONIC BILATERAL THORACIC BACK PAIN: ICD-10-CM

## 2020-11-04 DIAGNOSIS — G89.29 CHRONIC BILATERAL LOW BACK PAIN WITH RIGHT-SIDED SCIATICA: Primary | ICD-10-CM

## 2020-11-04 DIAGNOSIS — S22.000D COMPRESSION FRACTURE OF THORACIC VERTEBRA WITH ROUTINE HEALING, UNSPECIFIED THORACIC VERTEBRAL LEVEL, SUBSEQUENT ENCOUNTER: ICD-10-CM

## 2020-11-04 DIAGNOSIS — M47.26 OTHER SPONDYLOSIS WITH RADICULOPATHY, LUMBAR REGION: ICD-10-CM

## 2020-11-04 DIAGNOSIS — G89.29 CHRONIC BILATERAL THORACIC BACK PAIN: ICD-10-CM

## 2020-11-04 DIAGNOSIS — M54.41 CHRONIC BILATERAL LOW BACK PAIN WITH RIGHT-SIDED SCIATICA: Primary | ICD-10-CM

## 2020-11-04 PROCEDURE — 1159F PR MEDICATION LIST DOCUMENTED IN MEDICAL RECORD: ICD-10-PCS | Mod: HCNC,S$GLB,, | Performed by: PHYSICIAN ASSISTANT

## 2020-11-04 PROCEDURE — 99214 OFFICE O/P EST MOD 30 MIN: CPT | Mod: HCNC,S$GLB,, | Performed by: PHYSICIAN ASSISTANT

## 2020-11-04 PROCEDURE — 99214 PR OFFICE/OUTPT VISIT, EST, LEVL IV, 30-39 MIN: ICD-10-PCS | Mod: HCNC,S$GLB,, | Performed by: PHYSICIAN ASSISTANT

## 2020-11-04 PROCEDURE — 3075F PR MOST RECENT SYSTOLIC BLOOD PRESS GE 130-139MM HG: ICD-10-PCS | Mod: HCNC,CPTII,S$GLB, | Performed by: PHYSICIAN ASSISTANT

## 2020-11-04 PROCEDURE — 99999 PR PBB SHADOW E&M-EST. PATIENT-LVL III: CPT | Mod: PBBFAC,HCNC,, | Performed by: PHYSICIAN ASSISTANT

## 2020-11-04 PROCEDURE — 1101F PR PT FALLS ASSESS DOC 0-1 FALLS W/OUT INJ PAST YR: ICD-10-PCS | Mod: HCNC,CPTII,S$GLB, | Performed by: PHYSICIAN ASSISTANT

## 2020-11-04 PROCEDURE — 3078F DIAST BP <80 MM HG: CPT | Mod: HCNC,CPTII,S$GLB, | Performed by: PHYSICIAN ASSISTANT

## 2020-11-04 PROCEDURE — 3075F SYST BP GE 130 - 139MM HG: CPT | Mod: HCNC,CPTII,S$GLB, | Performed by: PHYSICIAN ASSISTANT

## 2020-11-04 PROCEDURE — 3078F PR MOST RECENT DIASTOLIC BLOOD PRESSURE < 80 MM HG: ICD-10-PCS | Mod: HCNC,CPTII,S$GLB, | Performed by: PHYSICIAN ASSISTANT

## 2020-11-04 PROCEDURE — 1125F AMNT PAIN NOTED PAIN PRSNT: CPT | Mod: HCNC,S$GLB,, | Performed by: PHYSICIAN ASSISTANT

## 2020-11-04 PROCEDURE — 1125F PR PAIN SEVERITY QUANTIFIED, PAIN PRESENT: ICD-10-PCS | Mod: HCNC,S$GLB,, | Performed by: PHYSICIAN ASSISTANT

## 2020-11-04 PROCEDURE — 1101F PT FALLS ASSESS-DOCD LE1/YR: CPT | Mod: HCNC,CPTII,S$GLB, | Performed by: PHYSICIAN ASSISTANT

## 2020-11-04 PROCEDURE — 99999 PR PBB SHADOW E&M-EST. PATIENT-LVL III: ICD-10-PCS | Mod: PBBFAC,HCNC,, | Performed by: PHYSICIAN ASSISTANT

## 2020-11-04 PROCEDURE — 1159F MED LIST DOCD IN RCRD: CPT | Mod: HCNC,S$GLB,, | Performed by: PHYSICIAN ASSISTANT

## 2020-11-04 RX ORDER — METHYLPREDNISOLONE 4 MG/1
TABLET ORAL
Qty: 21 TABLET | Refills: 0 | Status: SHIPPED | OUTPATIENT
Start: 2020-11-04 | End: 2020-12-04

## 2020-11-04 NOTE — PROGRESS NOTES
Subjective:     Patient ID:  Thom Krishna is a 65 y.o. female.    Hiram    Chief Complaint:  Midback pain.  Low back pain and right leg pain    HPI    Thom Krishna is a 65 y.o. female who presents for follow up.  She has previous spine surgery with Dr. Forrester in 2017 that included a L4-5 instrumented fusion.  She has seen Dr. Gandhi and Dr. Ding in the past and no further spine surgery was recommended at that time.    She had a SCS placed with Dr. Younger on June 9th, 2020 that ended up getting infected with staph.  SCS was never taken out and she was placed on oral antibiotics and went to wound care.  She never got relief of her back and leg pain after the SCS was placed.  She had the SCS reprogrammed multiple times without relief.  She states she told Dr. Younger recently she wanted it taken out but that he did not recommend taking it out.    She has the same symptoms she has had for year that have been getting progressively worse and has been much worse in the last two weeks.  Back pain is constant in the midback and low back that is constant.  Worse with standing and walking and better with sitting and laying down.  Pain radiates down the anterior right leg to the top of the right foot and some pain in the right groin.  No pain in the left leg.  Some weakness in the left leg.    She has some burning in the left hand.    Patient has not had PT recently.  ESIs in the past without relief.  One lumbar spine surgery and placement of SCS.  Patient is currently taking norco, robaxin, relafen, and lidoderm patch.  Medications prescribed by her PCP Dr. Dejesus.    Patient denies any recent accidents or trauma, no saddle anesthesias, and no bowel or bladder incontinence.    Review of Systems:  Constitution: Negative for chills, fever, night sweats and weight loss.   Musculoskeletal: Negative for falls.   Gastrointestinal: Negative for bowel incontinence, nausea and vomiting.   Genitourinary: Negative for bladder  incontinence.   Neurological: Negative for disturbances in coordination and loss of balance.      Objective:      Vitals:    11/04/20 0937   BP: 135/77   Pulse: 94   PainSc:   8   PainLoc: Back       Physical Exam:      General:  Thom Krishna is well-developed, well-nourished, appears stated age, in no acute distress, alert and oriented to person, place, and time.    Pulmonary/Chest:  Respiratory effort normal  Abdominal: Exhibits no distension  Psychiatric:  Normal mood and affect.  Behavior is normal.  Judgement and thought content normal      Musculoskeletal:    Lumbar ROM:   Pain in lumbar flexion, extension, left lateral bending, and right lateral bending.    Lumbar Spine Inspection:  Healed surgical scars with no visible rashes.    Lumbar Spine Palpation:  No tenderness to low back palpation.  Severe point tenderness to the lower thoracic spine.    SI Joint Palpation:  No tenderness to left SI Joint palpation.  Tenderness to right SI joint palpation.    Straight Leg Raise:  Cannot assess due to pain level.      Neurological:     Muscle strength against resistance:     Right Left   Deltoid  5 / 5 5 / 5   Biceps  5 / 5 5 / 5   Triceps 5 / 5 5 / 5   Wrist flexion  5 / 5 5 / 5   Wrist extension 5 / 5 5 / 5   Finger abduction 5 / 5 5 / 5   Thumb opposition 5 / 5 5 / 5   Handgrip 5 / 5 5 / 5   Hip flexion  5 / 5 5 / 5   Hip extension 5 / 5 5 / 5   Hip abduction 5 / 5 5 / 5   Hip adduction 5/ 5 5 / 5   Knee extension  5 / 5 5 / 5   Knee flexion  5 / 5 5 / 5   Dorsiflexion  5 / 5 5 / 5   EHL  5 / 5 5 / 5   Plantar flexion  5 / 5 5 / 5   Inversion of the feet 5 / 5 5 / 5   Eversion of the feet 5 / 5 5 / 5       Reflexes:     Right Left   Triceps 2+ 2+   Biceps 2+ 2+   Brachioradialis 2+ 2+   Patellar 3+ 3+   Achilles 2+ 2+     Babinski: Negative bilaterally  Clonus:  Negative bilaterally  Burgos: Negative bilaterally    On gross examination of the bilateral upper and lower extremities, patient has no signs of  clubbing, cyanosis, or edema.       XRAY Interpretation:     Lumbar spine xrays were personally reviewed today. No fractures.  L4-5 instrumentation intact.  Retrolisthesis at L3-4.  DDD at L3-4 and L5-S1.    Xrays thoracic spine results  Narrative & Impression     EXAMINATION:  XR THORACIC SPINE AP LATERAL     CLINICAL HISTORY:  Pain, unspecified     TECHNIQUE:  AP and lateral views of the thoracic spine were performed.     COMPARISON:  Thoracolumbar spine January 9, 2020.     FINDINGS:  Bones are significantly demineralized.  Degenerative change lower cervical spine.  Interval compression superior endplate probably T11.  Degenerative changes with disc space narrowing and small osteophytes in the upper thoracic spine.     Impression:     Interval compression superior endplate likely of T11 when compared to January 2020.  Bones are significantly osteopenic.        Electronically signed by: Kd Olivo MD  Date:                                            04/06/2020  Time:                                           11:29            Assessment:          1. Chronic bilateral low back pain with right-sided sciatica    2. Other spondylosis with radiculopathy, lumbar region    3. DDD (degenerative disc disease), lumbar    4. Lumbar radiculopathy    5. Compression fracture of thoracic vertebra with routine healing, unspecified thoracic vertebral level, subsequent encounter    6. Chronic bilateral thoracic back pain            Plan:          Orders Placed This Encounter    methylPREDNISolone (MEDROL DOSEPACK) 4 mg tablet       Patient with previous L4-5 fusion in 2017 with Dr. Forrester and now SCS placement in June 2020 still with severe pain that is worsening in the midback, low back, and right leg    Possible acute Tspine fracture.  Patient not clear if she had a resent Bone Density test or if she is being treated for her ostroporosis    -Medrol pack with food  -I will not prescribe narcotic pain medication to her.  She is  requesting a higher dose than what she is taking.  She will need to contact her PCP that has been prescribing it  -She recently had CT and Xrays done at [facility] ordered by Dr. Younger of her spine.  She will get the CD and we will get the reports for review.  -I will review with Dr. Gandhi once the images have been loaded into the system for further recommendations from a spine surgery standpoint  -She should continue to follow with Dr. Younger for pain management    Follow-Up:  Follow up if symptoms worsen or fail to improve. If there are any questions prior to this, the patient was instructed to contact the office.       WALE Rodgers, CORIE  Neurosurgery  [facility]    Addendum:    11/09/2020     I reviewed everything with Dr. Gandhi.  He recommends she get a second opinion from Dr. Szymanski or Dr. Hi.    She wants her SCS taken out that was placed by Dr. Younger and another opinion on her back and leg pain in general.      WALE Rodgers, PAADELAIDA  Neurosurgery  [facility]

## 2020-11-06 ENCOUNTER — TELEPHONE (OUTPATIENT)
Dept: NEUROSURGERY | Facility: CLINIC | Age: 65
End: 2020-11-06

## 2020-11-06 ENCOUNTER — NURSE TRIAGE (OUTPATIENT)
Dept: ADMINISTRATIVE | Facility: CLINIC | Age: 65
End: 2020-11-06

## 2020-11-06 DIAGNOSIS — M79.604 LOW BACK PAIN RADIATING TO RIGHT LOWER EXTREMITY: ICD-10-CM

## 2020-11-06 DIAGNOSIS — M47.812 DEGENERATIVE JOINT DISEASE OF CERVICAL AND LUMBAR SPINE: ICD-10-CM

## 2020-11-06 DIAGNOSIS — M47.816 DEGENERATIVE JOINT DISEASE OF CERVICAL AND LUMBAR SPINE: ICD-10-CM

## 2020-11-06 DIAGNOSIS — M54.12 RIGHT CERVICAL RADICULOPATHY: ICD-10-CM

## 2020-11-06 DIAGNOSIS — M48.02 CERVICAL SPINAL STENOSIS: ICD-10-CM

## 2020-11-06 DIAGNOSIS — M54.50 LOW BACK PAIN RADIATING TO RIGHT LOWER EXTREMITY: ICD-10-CM

## 2020-11-06 RX ORDER — HYDROCODONE BITARTRATE AND ACETAMINOPHEN 10; 325 MG/1; MG/1
1 TABLET ORAL EVERY 6 HOURS PRN
Qty: 90 TABLET | Refills: 0 | Status: SHIPPED | OUTPATIENT
Start: 2020-11-06 | End: 2020-12-02 | Stop reason: SDUPTHER

## 2020-11-06 NOTE — TELEPHONE ENCOUNTER
----- Message from Trish Galvan sent at 11/6/2020  8:56 AM CST -----  Contact: [phone]/Self  Type:  Patient Returning Call    Who Called:Pt  Who Left Message for Patient:Palmira   Does the patient know what this is regarding?:Paperwork   Would the patient rather a call back or a response via [facility]? Call back   Best Call Back Number:[phone]  Additional Information:

## 2020-11-06 NOTE — TELEPHONE ENCOUNTER
Dr. Gandhi,    This patient you and Aleksander saw last year and no surgery was recommended at that time.  She also so Dr. Ding who did not recommend surgery.  Since then she has a T11 vertebropasty and a SCS with Dr. Younger that ended up getting infected.      She continues to have persistent midback and low back pain and right leg pain.    She wants her SCS taken out and she said Dr. Younger will not take it out.    Is this someone you would see in clinic to discuss that with or should she follow up with Dr. Younger going forward?    Thanks,  Jennyfer Summers, [facility], PA-C  Neurosurgery  [facility]

## 2020-11-06 NOTE — TELEPHONE ENCOUNTER
Spoke with patient she states that she is having 9/10 back pain that is radiating to her legs.   She states that she needs a new prescription for Adams 10-325mg sent to [facility].  Advised patient to go to ER per protocol patient refused.  She states she went to ER yesterday and they only gave her a shot  She would like medication sent to pharmacy.  She states she is willing to go to a later appointment in office.  Will send message to PCP to follow up with patient.     Reason for Disposition   SEVERE back pain of sudden onset and age > 60    Additional Information   Negative: Passed out (i.e., fainted, collapsed and was not responding)   Negative: Shock suspected (e.g., cold/pale/clammy skin, too weak to stand, low BP, rapid pulse)   Negative: Sounds like a life-threatening emergency to the triager    Protocols used: BACK PAIN-A-OH

## 2020-11-06 NOTE — TELEPHONE ENCOUNTER
----- Message from Lena Ray sent at 11/6/2020 12:08 PM CST -----  Type:  RX Refill Request    Who Called: Thom  Refill or New Rx: refill  RX Name and Strength: HYDROcodone-acetaminophen (NORCO)  mg per tablet  How is the patient currently taking it? (ex. 1XDay): as needed  Is this a 30 day or 90 day RX: 90  Preferred Pharmacy with phone number: [facility] [phone] (Phone)  [phone] (Fax)  Local or Mail Order: local  Ordering Provider: Dr. Dejesus  Would the patient rather a call back or a response via [facility]? Call back  Best Call Back Number: [phone]  Additional Information: pt is out of medication and is in severe pain, would like to have her medication for the weekend; Pharmacy downstairs says they will fill it, just waiting on approval from provider

## 2020-11-06 NOTE — TELEPHONE ENCOUNTER
----- Message from Mathew Middleton sent at 11/6/2020 10:17 AM CST -----  Contact: [phone]/ patient  COLBY SEGURA calling returning Dr Gandhi nurse call   Please advise

## 2020-11-06 NOTE — TELEPHONE ENCOUNTER
----- Message from Malathi Atwood sent at 11/6/2020  8:42 AM CST -----  Contact: SELF [phone]  Patient would like to speak with you about a personal matter. Please advise

## 2020-11-09 ENCOUNTER — TELEPHONE (OUTPATIENT)
Dept: NEUROSURGERY | Facility: CLINIC | Age: 65
End: 2020-11-09

## 2020-11-09 ENCOUNTER — TELEPHONE (OUTPATIENT)
Dept: NEUROLOGY | Facility: HOSPITAL | Age: 65
End: 2020-11-09

## 2020-11-09 NOTE — TELEPHONE ENCOUNTER
----- Message from Lena Ray sent at 11/9/2020 10:01 AM CST -----  Type:  Needs Medical Advice    Who Called: Thom  Symptoms (please be specific): pt is calling to reschedule her procedure   How long has patient had these symptoms:  unknown  Pharmacy name and phone #:  n/a  Would the patient rather a call back or a response via [facility]? Call back  Best Call Back Number: [phone]  Additional Information: none

## 2020-11-09 NOTE — TELEPHONE ENCOUNTER
----- Message from Aziza Alas sent at 11/9/2020  9:22 AM CST -----  Contact: self [phone]  Patient is calling to speak with you to find out what doctor she is being referred to. Please call

## 2020-11-09 NOTE — TELEPHONE ENCOUNTER
Health Maintenance Due   Topic Date Due   • Pneumococcal Vaccine 19-64 Highest Risk (1 of 3 - PCV13) 06/06/2008   • Cervical Cancer Screening  04/28/2017       Patient is due for the topics as listed above and wishes to discuss with provider.             Please schedule her a second opinion consult with Dr. Szymanski or Dr. Hi per Dr. Gandhi and let the patient know.    She wants her SCS taken out that was placed by Dr. Younger and another opinion on her back and leg pain in general.    Thanks,  Jennyfer Summers, [facility], PA-C  Neurosurgery  [facility]

## 2020-11-09 NOTE — TELEPHONE ENCOUNTER
"Pt requesting to reschedule procedure, stating "'s office called me a months ago, but I had an infection, couldn't have procedure done".  Pt informed Dr. Keyes's staff attempted to schedule upper gi in July.  Pt informed message forward to Dr. Keyes to contact pt to schedule.  Pt grateful for information.  "

## 2020-11-10 ENCOUNTER — TELEPHONE (OUTPATIENT)
Dept: FAMILY MEDICINE | Facility: CLINIC | Age: 65
End: 2020-11-10

## 2020-11-10 ENCOUNTER — TELEPHONE (OUTPATIENT)
Dept: GASTROENTEROLOGY | Facility: CLINIC | Age: 65
End: 2020-11-10

## 2020-11-10 DIAGNOSIS — Z01.812 PRE-PROCEDURE LAB EXAM: ICD-10-CM

## 2020-11-10 DIAGNOSIS — D13.2 ADENOMATOUS DUODENAL POLYP: Primary | ICD-10-CM

## 2020-11-10 NOTE — TELEPHONE ENCOUNTER
Spoke to pt and stated that she needed an appt for a referral for Pulmonology because she was told she has lung nodules. Pt was scheduled for an appt with Dr. Swanson.

## 2020-11-10 NOTE — TELEPHONE ENCOUNTER
EGD Instructions    [facility] [address]  Clinic Office [phone]  Endoscopy Lab [phone]    You are scheduled for an EGD with Dr. Martinez Kuo on 11/20/2020 at [facility].  You will check in at the Information Desk on the first floor of the hospital. Please contact the office two days before procedure date to reschedule if needed.    Please follow instructions of  if you are taking anticoagulant/blood thinning medications such as Aggrenox, Brilinta, Effient, Eliquis, Lovenox, Plavix, Pletal, Pradaxa, Ticilid, Xarelto or Coumadin.     Please skip your morning dose of insulin or other oral medications for diabetes the morning of the procedure unless instructed otherwise by your doctor. You should take your blood pressure, heart, anti-rejection and or seizure medication the morning of your procedure     The Day Before The Procedure:     Eat a light evening meal before 7 pm.   Eat no solid food after 7 pm.     From 7 pm until 12 midnight, you may drink clear liquids including:    Water, Coffee or decaffeinated coffee (no milk or cream)  Tea, Herbal tea  Carbonated beverages (soft drinks), regular and sugar free  Gelatin  Apple Juice, grape juice, white cranberry juice  Gatorade, Power Aid, Crystal Light, Moises Aid  Lemonade and Limeade  Bouillon, clear consomme'  Snowball, popsicles    DO NOT DRINK ANY LIQUIDS CONTAINING RED DYE  DO NOT DRINK ANY LIQUIDS NOT SPECIFICALLY LISTED  DO NOT DRINK ALCOHOL     AFTER MIDNIGHT, YOU MAY HAVE NOTHING ELSE BY MOUTH    The Day of the Procedure     At 600 am, you may take the last dose of any medications you are allowed to take with a small sip of water (blood pressure, heart, anti-rejection and or seizure medication).  If you use inhalers bring them with you.   Please leave all valuables and jewelry at home.   You may call the Endoscopy department at [phone] with any questions regarding your procedure.

## 2020-11-10 NOTE — TELEPHONE ENCOUNTER
----- Message from Gris Mtz MA sent at 11/10/2020 10:19 AM CST -----  Regarding: Requesting a call  Type: Requesting a call     Who Called: pt   Would the patient rather a call back or a response via [facility]? Call back   Best Call Back Number: [phone]  Additional Information: pt is requesting an appt with provider also stated Dr Keyes advice her to get her lungs check prior to procedure with him.

## 2020-11-10 NOTE — LETTER
[facility]  [address], [address]  [place] [address]  Phone: [phone]                 Thom Krishna  [address]   [place] [id number]        EGD Instructions    [facility]  [address] [facility]  Clinic Office [phone]  Endoscopy Lab [phone]    You are scheduled for an EGD with Dr. Martinez Kuo on 11/20/2020 at [facility].  You will check in at the Information Desk on the first floor of the hospital. Please contact the office two days before procedure date to reschedule if needed.    Please follow instructions of  if you are taking anticoagulant/blood thinning medications such as Aggrenox, Brilinta, Effient, Eliquis, Lovenox, Plavix, Pletal, Pradaxa, Ticilid, Xarelto or Coumadin.     Please skip your morning dose of insulin or other oral medications for diabetes the morning of the procedure unless instructed otherwise by your doctor. You should take your blood pressure, heart, anti-rejection and or seizure medication the morning of your procedure     The Day Before The Procedure:     Eat a light evening meal before 7 pm.   Eat no solid food after 7 pm.     From 7 pm until 12 midnight, you may drink clear liquids including:    Water, Coffee or decaffeinated coffee (no milk or cream)  Tea, Herbal tea  Carbonated beverages (soft drinks), regular and sugar free  Gelatin  Apple Juice, grape juice, white cranberry juice  Gatorade, Power Aid, Crystal Light, Moises Aid  Lemonade and Limeade  Bouillon, clear consomme'  Snowball, popsicles    DO NOT DRINK ANY LIQUIDS CONTAINING RED DYE  DO NOT DRINK ANY LIQUIDS NOT SPECIFICALLY LISTED  DO NOT DRINK ALCOHOL     AFTER MIDNIGHT, YOU MAY HAVE NOTHING ELSE BY MOUTH    The Day of the Procedure     At 600 am, you may take the last dose of any medications you are allowed to take with a small sip of water (blood pressure, heart, anti-rejection and or seizure medication).  If you use inhalers bring  them with you.   Please leave all valuables and jewelry at home.   You may call the Endoscopy department at [phone] with any questions regarding your procedure.

## 2020-11-10 NOTE — TELEPHONE ENCOUNTER
----- Message from Elza Adamson LPN sent at 11/9/2020 10:18 AM CST -----  Pt called Dr. Washington staff to schedule upper gi by mistake.  Please contact pt to reschedule procedure from July 2020.  Thanks.

## 2020-11-11 ENCOUNTER — OFFICE VISIT (OUTPATIENT)
Dept: FAMILY MEDICINE | Facility: CLINIC | Age: 65
End: 2020-11-11
Payer: MEDICARE

## 2020-11-11 VITALS
BODY MASS INDEX: 26.49 KG/M2 | OXYGEN SATURATION: 99 % | WEIGHT: 143.94 LBS | HEIGHT: 62 IN | SYSTOLIC BLOOD PRESSURE: 142 MMHG | HEART RATE: 86 BPM | DIASTOLIC BLOOD PRESSURE: 82 MMHG

## 2020-11-11 DIAGNOSIS — R91.1 PULMONARY NODULE: Primary | ICD-10-CM

## 2020-11-11 DIAGNOSIS — M96.1 FAILED BACK SURGICAL SYNDROME: ICD-10-CM

## 2020-11-11 DIAGNOSIS — E03.9 ACQUIRED HYPOTHYROIDISM: ICD-10-CM

## 2020-11-11 DIAGNOSIS — S22.080S COMPRESSION FRACTURE OF T11 VERTEBRA, SEQUELA: ICD-10-CM

## 2020-11-11 DIAGNOSIS — E11.49 DIABETES MELLITUS TYPE 2 WITH NEUROLOGICAL MANIFESTATIONS: ICD-10-CM

## 2020-11-11 PROCEDURE — 1101F PR PT FALLS ASSESS DOC 0-1 FALLS W/OUT INJ PAST YR: ICD-10-PCS | Mod: HCNC,CPTII,S$GLB, | Performed by: FAMILY MEDICINE

## 2020-11-11 PROCEDURE — 3044F HG A1C LEVEL LT 7.0%: CPT | Mod: HCNC,CPTII,S$GLB, | Performed by: FAMILY MEDICINE

## 2020-11-11 PROCEDURE — 3008F BODY MASS INDEX DOCD: CPT | Mod: HCNC,CPTII,S$GLB, | Performed by: FAMILY MEDICINE

## 2020-11-11 PROCEDURE — 3077F SYST BP >= 140 MM HG: CPT | Mod: HCNC,CPTII,S$GLB, | Performed by: FAMILY MEDICINE

## 2020-11-11 PROCEDURE — 99999 PR PBB SHADOW E&M-EST. PATIENT-LVL IV: ICD-10-PCS | Mod: PBBFAC,HCNC,, | Performed by: FAMILY MEDICINE

## 2020-11-11 PROCEDURE — 99999 PR PBB SHADOW E&M-EST. PATIENT-LVL IV: CPT | Mod: PBBFAC,HCNC,, | Performed by: FAMILY MEDICINE

## 2020-11-11 PROCEDURE — 1101F PT FALLS ASSESS-DOCD LE1/YR: CPT | Mod: HCNC,CPTII,S$GLB, | Performed by: FAMILY MEDICINE

## 2020-11-11 PROCEDURE — 3008F PR BODY MASS INDEX (BMI) DOCUMENTED: ICD-10-PCS | Mod: HCNC,CPTII,S$GLB, | Performed by: FAMILY MEDICINE

## 2020-11-11 PROCEDURE — 1159F PR MEDICATION LIST DOCUMENTED IN MEDICAL RECORD: ICD-10-PCS | Mod: HCNC,S$GLB,, | Performed by: FAMILY MEDICINE

## 2020-11-11 PROCEDURE — 3079F PR MOST RECENT DIASTOLIC BLOOD PRESSURE 80-89 MM HG: ICD-10-PCS | Mod: HCNC,CPTII,S$GLB, | Performed by: FAMILY MEDICINE

## 2020-11-11 PROCEDURE — 99214 PR OFFICE/OUTPT VISIT, EST, LEVL IV, 30-39 MIN: ICD-10-PCS | Mod: HCNC,S$GLB,, | Performed by: FAMILY MEDICINE

## 2020-11-11 PROCEDURE — 3077F PR MOST RECENT SYSTOLIC BLOOD PRESSURE >= 140 MM HG: ICD-10-PCS | Mod: HCNC,CPTII,S$GLB, | Performed by: FAMILY MEDICINE

## 2020-11-11 PROCEDURE — 99499 RISK ADDL DX/OHS AUDIT: ICD-10-PCS | Mod: S$GLB,,, | Performed by: FAMILY MEDICINE

## 2020-11-11 PROCEDURE — 3044F PR MOST RECENT HEMOGLOBIN A1C LEVEL <7.0%: ICD-10-PCS | Mod: HCNC,CPTII,S$GLB, | Performed by: FAMILY MEDICINE

## 2020-11-11 PROCEDURE — 99214 OFFICE O/P EST MOD 30 MIN: CPT | Mod: HCNC,S$GLB,, | Performed by: FAMILY MEDICINE

## 2020-11-11 PROCEDURE — 1159F MED LIST DOCD IN RCRD: CPT | Mod: HCNC,S$GLB,, | Performed by: FAMILY MEDICINE

## 2020-11-11 PROCEDURE — 99499 UNLISTED E&M SERVICE: CPT | Mod: S$GLB,,, | Performed by: FAMILY MEDICINE

## 2020-11-11 PROCEDURE — 3079F DIAST BP 80-89 MM HG: CPT | Mod: HCNC,CPTII,S$GLB, | Performed by: FAMILY MEDICINE

## 2020-11-11 NOTE — PROGRESS NOTES
Subjective:       Patient ID: Thom Krishna is a 65 y.o. female.    Chief Complaint: Follow-up (referral for pulmonology)    This is a 65-year-old female patient, new to me (regularly followed by Dr. Dejesus--last visit 6/26/2020), with past medical history significant for ongoing chronic neck and back pain secondary to DDD L-spine/C-spine, history of lumbar fusion,  caudal NORMAN 08/2018 and 01/2019; spinal cord stimulator placement June 2020 by pain management Dr. Younger;  hepatitis-C treated by [facility] hepatology;  Hypothyroidism; history of type 2 diabetes (now controlled off of medications); tobacco abuse; GERD; anxiety/deperssion;  osteoporosis with thoracic compression fracture; and vitamin-D;  Deficiency. She presents today with request for referral to pulmonology to f/u on pulmonary nodule. She had an x-ray of the spine 10/21/2020 which showed calcified lymph nodes in left side of mediastinum and left pulmonary hilar region.  Of note she had a CT scan of the abdomen and pelvis 2/11/2020 which noted stable 6 mm pulmonary nodule in comparison to the prior exam.  She has never had a dedicated CT scan of the chest.  Denies cough, wheezing, or shortness of breath.  No excessive sweating.  No weight changes.    Review of Systems   Respiratory: Negative for cough, shortness of breath and wheezing.    Cardiovascular: Negative for chest pain, palpitations and leg swelling.   Musculoskeletal: Positive for back pain (chronic--worsened since spinal cord stimulator placed June 2020).         Past Medical History:   Diagnosis Date    Anxiety and depression 7/21/2015    Arthritis     Cervical radiculopathy 4/8/2016    Cirrhosis of liver     Colon polyps 4/27/2015    Diabetes mellitus type 2 with neurological manifestations 11/6/2015    no current medications, only one episode of elevated sugars with acute illness, will monitor     Encounter for blood transfusion     Hepatitis C     s/p treatment per patient report;  managed by Dr. Perez    Hip fracture     Macrocytosis 2015    Thyroid disease     Transfusion reaction     hep c       Patient Active Problem List   Diagnosis    Hypothyroidism    Vitamin B12 deficiency    GERD (gastroesophageal reflux disease)    Autoimmune hepatitis    History of hepatitis C    Chronic pain    Spondylolisthesis    Spondylosis    DDD (degenerative disc disease), lumbar    DDD (degenerative disc disease), cervical    Coccydynia    Colon polyps    Anxiety and depression    Pulmonary nodule    Orthostatic hypotension    Macrocytosis    Diabetes mellitus type 2 with neurological manifestations    Tobacco abuse    Hypertriglyceridemia    Cervical radiculopathy    Foraminal stenosis of cervical region    S/P ANASTASIA-BSO (total abdominal hysterectomy and bilateral salpingo-oophorectomy)    Elevated liver enzymes    Chronic neck and back pain    Difficulty walking    Weakness    Vitamin D deficiency    Epigastric pain    Other constipation    Failed back surgical syndrome    Lumbar radiculopathy    Postmenopausal osteoporosis    Adenomatous duodenal polyp    Opioid dependence, daily use    Compression fracture of T11 vertebra       Past Surgical History:   Procedure Laterality Date    APPENDECTOMY      BACK SURGERY      CAUDAL EPIDURAL STEROID INJECTION N/A 2018    Procedure: Injection-steroid-epidural-caudal;  Surgeon: Roxana Gu Jr., MD;  Location: [facility] OR;  Service: Pain Management;  Laterality: N/A;  with cath target L4-5    CAUDAL EPIDURAL STEROID INJECTION N/A 2019    Procedure: Injection-steroid-epidural-caudal;  Surgeon: Roxana Gu Jr., MD;  [facility];  Service: Pain Management;  Laterality: N/A;     SECTION      CHOLECYSTECTOMY      COLONOSCOPY  3/31/10    2 polyps, tubular adenoma    COLONOSCOPY  2015    Dr. Washington    COLONOSCOPY N/A 10/10/2018    Procedure: COLONOSCOPY;  Surgeon: Reuben HINOJOSA  "Paul Ac MD;  Location: [facility];  Service: Endoscopy;  Laterality: N/A;    ESOPHAGOGASTRODUODENOSCOPY N/A 10/10/2018    Procedure: EGD (ESOPHAGOGASTRODUODENOSCOPY);  Surgeon: Reuben Miller Jr., MD;  Location: [facility];  Service: Endoscopy;  Laterality: N/A;    ESOPHAGOGASTRODUODENOSCOPY N/A 12/10/2018    Procedure: EGD (ESOPHAGOGASTRODUODENOSCOPY)/poss emr;  Surgeon: Belle Kuo MD;  Location: [facility] (2ND FLR);  Service: Endoscopy;  Laterality: N/A;    ESOPHAGOGASTRODUODENOSCOPY N/A 3/12/2019    Procedure: EGD (ESOPHAGOGASTRODUODENOSCOPY);  Surgeon: Polo Keyes MD;  Location: [facility];  Service: Endoscopy;  Laterality: N/A;    HERNIA REPAIR      HYSTERECTOMY  1989    Uterus and both ovaries    INCISIONAL HERNIA REPAIR      x 2    JOINT REPLACEMENT      LIVER BIOPSY  12/2003    autoimmune hepatitis    ROTATOR CUFF REPAIR      right    STOMACH SURGERY  1980's    "took lymph node off of liver"    TOTAL HIP ARTHROPLASTY      left hip    UPPER GASTROINTESTINAL ENDOSCOPY  3/24/10    normal    UPPER GASTROINTESTINAL ENDOSCOPY  04/27/2015    Dr. Washington       Family History   Problem Relation Age of Onset    Diabetes Mellitus Mother     Diabetes Mother     Liver cancer Sister     Breast cancer Sister     Cataracts Sister     Pancreatic cancer Brother     Diabetes Brother     Lung cancer Brother     Colon cancer Brother     Brain cancer Brother     Stomach cancer Other     Diabetes Other     Throat cancer Brother     Cataracts Sister     Diabetes Son     Liver disease Neg Hx        Social History     Tobacco Use   Smoking Status Current Every Day Smoker    Packs/day: 2.00    Years: 45.00    Pack years: 90.00    Types: Cigarettes   Smokeless Tobacco Never Used       Social History     Social History Narrative    Youngest out of 16 children to her parents       Medications have been reviewed and reconciled.     Review of patient's allergies indicates:   Allergen Reactions " "   Cefaclor Hives    Penicillins      Other reaction(s): Hives    Sulfa (sulfonamide antibiotics) Other (See Comments)     Other reaction(s): Hives        Objective:        Vitals:    11/11/20 1324   BP: (!) 142/82   Pulse: 86   SpO2: 99%   Weight: 65.3 kg (143 lb 15.4 oz)   Height: 5' 2" (1.575 m)       Physical Exam  Constitutional:       General: She is not in acute distress.     Appearance: She is well-developed.   HENT:      Head: Normocephalic and atraumatic.   Eyes:      General: No scleral icterus.     Extraocular Movements: Extraocular movements intact.   Neck:      Musculoskeletal: Normal range of motion and neck supple.   Cardiovascular:      Rate and Rhythm: Normal rate and regular rhythm.      Heart sounds: Normal heart sounds. No murmur. No friction rub. No gallop.    Pulmonary:      Effort: Pulmonary effort is normal.      Breath sounds: Normal breath sounds. No wheezing or rales.   Musculoskeletal: Normal range of motion.      Right lower leg: No edema.      Left lower leg: No edema.   Lymphadenopathy:      Cervical: No cervical adenopathy.   Skin:     General: Skin is warm and dry.      Findings: No erythema or rash.   Neurological:      Mental Status: She is alert and oriented to person, place, and time.      Cranial Nerves: No cranial nerve deficit.   Psychiatric:         Mood and Affect: Mood is depressed.         Behavior: Behavior normal.         Assessment:       1. Pulmonary nodule    2. Failed back surgical syndrome    3. Compression fracture of T11 vertebra, sequela    4. Diabetes mellitus type 2 with neurological manifestations    5. Acquired hypothyroidism        Plan:       Thom was seen today for follow-up.    Diagnoses and all orders for this visit:    Pulmonary nodule  -     Ambulatory referral/consult to Pulmonology; Future    Failed back surgical syndrome    Compression fracture of T11 vertebra, sequela    Diabetes mellitus type 2 with neurological manifestations    Acquired " hypothyroidism    Pulmonary Nodule   She had an x-ray of the spine 10/21/2020 which showed calcified lymph nodes in left side of mediastinum and left pulmonary hilar region. Noted that CT scan of the abdomen and pelvis 2/11/2020 showed stable 6 mm pulmonary nodule in comparison to the prior exam. She is asymptomatic. Will refer to pulmonology for further evaluation.  Patient may meet criteria for dedicated CT chest without contrast.    Failed Back Surgical Syndrome   Pt with severe chronic back pain; worsened with SCS placed by Dr. Younger ([facility]) 6/2020    Managed with Norco 10/325 q.6 p.r.n. at present which is barely covering pain symptoms.    Evaluated by NSGY PA 11/4/2020. Pt requested higher dose of pain medication which was declined. Given Medrol dose pack. Imaging reviewed with Dr. Gandhi. Recommended second opinion from Dr. Szymanski or Dr. Hi. Pt desires her SCS to be taken out and another opinion on her back and leg pain in general. Scheduled with Dr. Hi 1/2021  I advised that she work with warm compress/OTC topicals--lidocaine/ activity as tolerated     Compression Fracture T11  Last DEXA 11/2018 showed Osteoporosis with pretty low T scores ( -3.5 for total hip; -3.2 for L1-L3)  24% risk of a major osteoporotic fracture and a 8.7% risk of hip fracture in the next 10 years (FRAX). Patient would probably do best with teriparatide injections x 2 years if covered by insurance followed by [facility].   Continue Vitamin D supplementation with cholecalciferol 50K U qweek.     DM-2  Diet-controlled  A1c 5.4 6/2020    Hypothyroidism   TSH 1.250 2/19/2020    Follow up if symptoms worsen or fail to improve.

## 2020-11-16 ENCOUNTER — TELEPHONE (OUTPATIENT)
Dept: FAMILY MEDICINE | Facility: CLINIC | Age: 65
End: 2020-11-16

## 2020-11-16 ENCOUNTER — TELEPHONE (OUTPATIENT)
Dept: NEUROSURGERY | Facility: CLINIC | Age: 65
End: 2020-11-16

## 2020-11-16 NOTE — TELEPHONE ENCOUNTER
----- Message from Lena Ray sent at 11/16/2020  1:19 PM CST -----  Type:  Needs Medical Advice    Who Called: Thom  Symptoms (please be specific): pt is requesting a call back regarding her back and legs hurting her, causing her to have dizzy spells   How long has patient had these symptoms:  unknown  Pharmacy name and phone #:  n/a  Would the patient rather a call back or a response via [facility]? Call back  Best Call Back Number:  [phone]  Additional Information: none

## 2020-11-16 NOTE — TELEPHONE ENCOUNTER
Spoke to pt and states that she has a lot of pain in her back and legs. Pt stated that Dr. Younger is the treating physician. Pt stated that the pain medication she's taking is not helping. Pt was informed to call Dr. Younger's office regarding her pain. Pt stated that she's called the office, but has not gotten a return call back. Pt was again advised to call Dr. Younger's office for her pain. Pt verbalized understanding.

## 2020-11-17 ENCOUNTER — LAB VISIT (OUTPATIENT)
Dept: FAMILY MEDICINE | Facility: CLINIC | Age: 65
End: 2020-11-17
Payer: MEDICARE

## 2020-11-17 DIAGNOSIS — Z01.812 PRE-PROCEDURE LAB EXAM: ICD-10-CM

## 2020-11-17 PROCEDURE — U0003 INFECTIOUS AGENT DETECTION BY NUCLEIC ACID (DNA OR RNA); SEVERE ACUTE RESPIRATORY SYNDROME CORONAVIRUS 2 (SARS-COV-2) (CORONAVIRUS DISEASE [COVID-19]), AMPLIFIED PROBE TECHNIQUE, MAKING USE OF HIGH THROUGHPUT TECHNOLOGIES AS DESCRIBED BY CMS-2020-01-R: HCPCS | Mod: HCNC

## 2020-11-18 ENCOUNTER — TELEPHONE (OUTPATIENT)
Dept: ENDOSCOPY | Facility: HOSPITAL | Age: 65
End: 2020-11-18

## 2020-11-18 NOTE — TELEPHONE ENCOUNTER
Spoke with patient about arrival time @ 0900.   Covid test = pnd    NPO status reviewed: Patient may eat until 7:00pm.  After 7pm, pt may have CLEAR liquids ONLY until completely NPO at Midnight.    Medications: Do not take Insulin or oral diabetic medications the day of the procedure.    Take as prescribed: heart, seizure and blood pressure medication in the morning with a sip of water (less than an ounce).  Take any breathing medications and bring inhalers to hospital with you.     Leave all valuables and jewelry at home. Wear comfortable clothes to procedure to change into hospital gown.   You cannot drive for 24 hours after your procedure because you will receive sedation for your procedure to make you comfortable.    A ride must be provided at discharge.

## 2020-11-19 LAB — SARS-COV-2 RNA RESP QL NAA+PROBE: NOT DETECTED

## 2020-11-20 ENCOUNTER — ANESTHESIA (OUTPATIENT)
Dept: ENDOSCOPY | Facility: HOSPITAL | Age: 65
End: 2020-11-20
Payer: MEDICARE

## 2020-11-20 ENCOUNTER — ANESTHESIA EVENT (OUTPATIENT)
Dept: ENDOSCOPY | Facility: HOSPITAL | Age: 65
End: 2020-11-20
Payer: MEDICARE

## 2020-11-20 ENCOUNTER — HOSPITAL ENCOUNTER (OUTPATIENT)
Facility: HOSPITAL | Age: 65
Discharge: HOME | End: 2020-11-20
Attending: INTERNAL MEDICINE | Admitting: INTERNAL MEDICINE
Payer: MEDICARE

## 2020-11-20 VITALS
SYSTOLIC BLOOD PRESSURE: 100 MMHG | BODY MASS INDEX: 26.31 KG/M2 | HEIGHT: 62 IN | OXYGEN SATURATION: 98 % | WEIGHT: 143 LBS | DIASTOLIC BLOOD PRESSURE: 46 MMHG | RESPIRATION RATE: 18 BRPM | TEMPERATURE: 99 F | HEART RATE: 71 BPM

## 2020-11-20 DIAGNOSIS — D13.2 ADENOMATOUS DUODENAL POLYP: ICD-10-CM

## 2020-11-20 PROCEDURE — 88305 TISSUE EXAM BY PATHOLOGIST: CPT | Mod: 26,HCNC,, | Performed by: PATHOLOGY

## 2020-11-20 PROCEDURE — 27201012 HC FORCEPS, HOT/COLD, DISP: Mod: HCNC | Performed by: INTERNAL MEDICINE

## 2020-11-20 PROCEDURE — 88305 TISSUE EXAM BY PATHOLOGIST: CPT | Mod: HCNC | Performed by: PATHOLOGY

## 2020-11-20 PROCEDURE — 37000008 HC ANESTHESIA 1ST 15 MINUTES: Mod: HCNC | Performed by: INTERNAL MEDICINE

## 2020-11-20 PROCEDURE — 88305 TISSUE EXAM BY PATHOLOGIST: ICD-10-PCS | Mod: 26,HCNC,, | Performed by: PATHOLOGY

## 2020-11-20 PROCEDURE — 25000003 PHARM REV CODE 250: Mod: HCNC | Performed by: NURSE ANESTHETIST, CERTIFIED REGISTERED

## 2020-11-20 PROCEDURE — 43239 PR EGD, FLEX, W/BIOPSY, SGL/MULTI: ICD-10-PCS | Mod: HCNC,,, | Performed by: INTERNAL MEDICINE

## 2020-11-20 PROCEDURE — 43239 EGD BIOPSY SINGLE/MULTIPLE: CPT | Mod: HCNC,,, | Performed by: INTERNAL MEDICINE

## 2020-11-20 PROCEDURE — 43239 EGD BIOPSY SINGLE/MULTIPLE: CPT | Mod: HCNC | Performed by: INTERNAL MEDICINE

## 2020-11-20 PROCEDURE — 63600175 PHARM REV CODE 636 W HCPCS: Mod: HCNC | Performed by: NURSE ANESTHETIST, CERTIFIED REGISTERED

## 2020-11-20 PROCEDURE — 37000009 HC ANESTHESIA EA ADD 15 MINS: Mod: HCNC | Performed by: INTERNAL MEDICINE

## 2020-11-20 RX ORDER — PROPOFOL 10 MG/ML
VIAL (ML) INTRAVENOUS CONTINUOUS PRN
Status: DISCONTINUED | OUTPATIENT
Start: 2020-11-20 | End: 2020-11-20

## 2020-11-20 RX ORDER — DEXTROMETHORPHAN/PSEUDOEPHED 2.5-7.5/.8
DROPS ORAL
Status: COMPLETED | OUTPATIENT
Start: 2020-11-20 | End: 2020-11-20

## 2020-11-20 RX ORDER — SODIUM CHLORIDE 0.9 % (FLUSH) 0.9 %
10 SYRINGE (ML) INJECTION
Status: DISCONTINUED | OUTPATIENT
Start: 2020-11-20 | End: 2020-11-20 | Stop reason: HOSPADM

## 2020-11-20 RX ORDER — SODIUM CHLORIDE 9 MG/ML
INJECTION, SOLUTION INTRAVENOUS CONTINUOUS
Status: DISCONTINUED | OUTPATIENT
Start: 2020-11-20 | End: 2020-11-20 | Stop reason: HOSPADM

## 2020-11-20 RX ORDER — SODIUM CHLORIDE 9 MG/ML
INJECTION, SOLUTION INTRAVENOUS CONTINUOUS PRN
Status: DISCONTINUED | OUTPATIENT
Start: 2020-11-20 | End: 2020-11-20

## 2020-11-20 RX ORDER — LIDOCAINE HYDROCHLORIDE 20 MG/ML
INJECTION INTRAVENOUS
Status: DISCONTINUED | OUTPATIENT
Start: 2020-11-20 | End: 2020-11-20

## 2020-11-20 RX ORDER — PROPOFOL 10 MG/ML
VIAL (ML) INTRAVENOUS
Status: DISCONTINUED | OUTPATIENT
Start: 2020-11-20 | End: 2020-11-20

## 2020-11-20 RX ADMIN — PROPOFOL 150 MCG/KG/MIN: 10 INJECTION, EMULSION INTRAVENOUS at 10:11

## 2020-11-20 RX ADMIN — SIMETHICONE 66.6 MG: 20 SUSPENSION/ DROPS ORAL at 10:11

## 2020-11-20 RX ADMIN — GLYCOPYRROLATE 0.2 MG: 0.2 INJECTION, SOLUTION INTRAMUSCULAR; INTRAVITREAL at 10:11

## 2020-11-20 RX ADMIN — LIDOCAINE HYDROCHLORIDE 60 MG: 20 INJECTION, SOLUTION INTRAVENOUS at 10:11

## 2020-11-20 RX ADMIN — PROPOFOL 50 MG: 10 INJECTION, EMULSION INTRAVENOUS at 10:11

## 2020-11-20 RX ADMIN — SODIUM CHLORIDE: 9 INJECTION, SOLUTION INTRAVENOUS at 10:11

## 2020-11-20 NOTE — TRANSFER OF CARE
"Anesthesia Transfer of Care Note    Patient: Thom Krishna    Procedure(s) Performed: Procedure(s) (LRB):  ESOPHAGOGASTRODUODENOSCOPY (EGD) (N/A)    Patient location: GI    Anesthesia Type: MAC    Transport from OR: Transported from OR on room air with adequate spontaneous ventilation    Post pain: adequate analgesia    Post assessment: no apparent anesthetic complications and tolerated procedure well    Post vital signs: stable    Level of consciousness: awake, alert and oriented    Nausea/Vomiting: no nausea/vomiting    Complications: none    Transfer of care protocol was followed      Last vitals:   Visit Vitals  /62 (BP Location: Left arm, Patient Position: Lying)   Pulse 64   Temp 36.4 °C (97.5 °F) (Skin)   Resp 18   Ht 5' 2" (1.575 m)   Wt 64.9 kg (143 lb)   SpO2 100%   Breastfeeding No   BMI 26.16 kg/m²     "

## 2020-11-20 NOTE — [employer]ATION PATIENT INSTRUCTIONS
Discharge Summary/Instructions after an Endoscopic Procedure  Patient Name: Thom Krishna  Patient MRN: [id number]  Patient YOB: 1955 Friday, November 20, 2020  Belle Kuo MD  Your health is very important to us during the Covid Crisis. Following your   procedure today, you will receive a daily text for 2 weeks asking about   signs or symptoms of Covid 19.  Please respond to this text when you   receive it so we can follow up and keep you as safe as possible.   RESTRICTIONS:  During your procedure today, you received medications for sedation.  These   medications may affect your judgment, balance and coordination.  Therefore,   for 24 hours, you have the following restrictions:   - DO NOT drive a car, operate machinery, make legal/financial decisions,   sign important papers or drink alcohol.    ACTIVITY:  Today: no heavy lifting, straining or running due to procedural   sedation/anesthesia.  The following day: return to full activity including work.  DIET:  Eat and drink normally unless instructed otherwise.     TREATMENT FOR COMMON SIDE EFFECTS:  - Mild abdominal pain, nausea, belching, bloating or excessive gas:  rest,   eat lightly and use a heating pad.  - Sore Throat: treat with throat lozenges and/or gargle with warm salt   water.  - Because air was used during the procedure, expelling large amounts of air   from your rectum or belching is normal.  - If a bowel prep was taken, you may not have a bowel movement for 1-3 days.    This is normal.  SYMPTOMS TO WATCH FOR AND REPORT TO YOUR PHYSICIAN:  1. Abdominal pain or bloating, other than gas cramps.  2. Chest pain.  3. Back pain.  4. Signs of infection such as: chills or fever occurring within 24 hours   after the procedure.  5. Rectal bleeding, which would show as bright red, maroon, or black stools.   (A tablespoon of blood from the rectum is not serious, especially if   hemorrhoids are present.)  6. Vomiting.  7. Weakness or  dizziness.  GO DIRECTLY TO THE NEAREST EMERGENCY ROOM IF YOU HAVE ANY OF THE FOLLOWING:      Difficulty breathing              Chills and/or fever over 101 F   Persistent vomiting and/or vomiting blood   Severe abdominal pain   Severe chest pain   Black, tarry stools   Bleeding- more than one tablespoon   Any other symptom or condition that you feel may need urgent attention  Your doctor recommends these additional instructions:  If any biopsies were taken, your doctors clinic will contact you in 1 to 2   weeks with any results.  - Discharge patient to home.   - Resume previous diet.   - Continue present medications.   - Repeat upper endoscopy in 1 year for surveillance based on pathology   results.   - Patient has a contact number available for emergencies.  The signs and   symptoms of potential delayed complications were discussed with the   patient.  Return to normal activities tomorrow.  Written discharge   instructions were provided to the patient.  For questions, problems or results please call your physician - Belle Kuo MD.  EMERGENCY PHONE NUMBER: [phone],  LAB RESULTS: [phone]  IF A COMPLICATION OR EMERGENCY SITUATION ARISES AND YOU ARE UNABLE TO REACH   YOUR PHYSICIAN - GO DIRECTLY TO THE EMERGENCY ROOM.  Belle Kuo MD  11/20/2020 11:18:58 AM  This report has been verified and signed electronically.  [employer]

## 2020-11-20 NOTE — H&P
History & Physical - Short Stay  Gastroenterology      SUBJECTIVE:     Procedure: EGD    Chief Complaint/Indication for Procedure: Duodenal polyp    History of Present Illness:  Patient is a 65 y.o. female with TA of duodenum s/p EMR coming for EGD surveillance.     PTA Medications   Medication Sig    ALPRAZolam (XANAX) 1 MG tablet Take 1 tablet (1 mg total) by mouth 2 (two) times daily as needed.    cholecalciferol, vitamin D3, (REPLESTA) 1,250 mcg (50,000 unit) Wafr Take 1 Wafer by mouth once a week.    elbasvir-grazoprevir (ZEPATIER)  mg Tab     HYDROcodone-acetaminophen (NORCO)  mg per tablet Take 1 tablet by mouth every 6 (six) hours as needed for pain    levothyroxine (SYNTHROID) 75 MCG tablet Take 1 tablet (75 mcg total) by mouth once daily.    methocarbamol (ROBAXIN) 750 MG Tab Take 1,500 mg by mouth.    nabumetone (RELAFEN) 750 MG tablet TK 1 T PO BID    ondansetron (ZOFRAN) 4 MG tablet Take 1 tablet (4 mg total) by mouth every 6 (six) hours.    pantoprazole (PROTONIX) 40 MG tablet Take 1 tablet (40 mg total) by mouth once daily.    blood-glucose meter kit Test tid please dispense test strips and lancets    cholecalciferol, vitamin D3, 125 mcg (5,000 unit) capsule Take 1 tablet  by mouth daily with breakfast.    diclofenac sodium (VOLTAREN) 1 % Gel BETTYE 2 GRAMS AA QID    lidocaine (LIDODERM) 5 % Place 1 patch onto the skin once daily. Remove & Discard patch within 12 hours or as directed by MD    methylPREDNISolone (MEDROL DOSEPACK) 4 mg tablet Take as directed    sucralfate (CARAFATE) 100 mg/mL suspension TAKE 10 ML BY MOUTH THREE TIMES DAILY FOR 14 DAYS    vancomycin (VANCOCIN) 1,000 mg injection EMPTY CONTENTS OF 1 VIAL INTO SHAKER. SPRINKLE DIRECTLY ONTO INFECTION SITE. PERFORM 1-2 TIMES DAILY.       Review of patient's allergies indicates:   Allergen Reactions    Cefaclor Hives    Penicillins      Other reaction(s): Hives    Sulfa (sulfonamide antibiotics) Other (See  Comments)     Other reaction(s): Hives        Past Medical History:   Diagnosis Date    Anxiety and depression 2015    Arthritis     Cervical radiculopathy 2016    Cirrhosis of liver     Colon polyps 2015    Diabetes mellitus type 2 with neurological manifestations 2015    no current medications, only one episode of elevated sugars with acute illness, will monitor     Encounter for blood transfusion     Hepatitis C     s/p treatment per patient report; managed by Dr. Perez    Hip fracture     Macrocytosis 2015    Thyroid disease     Transfusion reaction     hep c     Past Surgical History:   Procedure Laterality Date    APPENDECTOMY      BACK SURGERY      CAUDAL EPIDURAL STEROID INJECTION N/A 2018    Procedure: Injection-steroid-epidural-caudal;  Surgeon: Roxana Gu Jr., MD;  Location: [facility] OR;  Service: Pain Management;  Laterality: N/A;  with cath target L4-5    CAUDAL EPIDURAL STEROID INJECTION N/A 2019    Procedure: Injection-steroid-epidural-caudal;  Surgeon: Roxana Gu Jr., MD;  Location: [facility];  Service: Pain Management;  Laterality: N/A;     SECTION      CHOLECYSTECTOMY      COLONOSCOPY  3/31/10    2 polyps, tubular adenoma    COLONOSCOPY  2015    Dr. Washington    COLONOSCOPY N/A 10/10/2018    Procedure: COLONOSCOPY;  Surgeon: Reuben Miller Jr., MD;  Location: [facility];  Service: Endoscopy;  Laterality: N/A;    ESOPHAGOGASTRODUODENOSCOPY N/A 10/10/2018    Procedure: EGD (ESOPHAGOGASTRODUODENOSCOPY);  Surgeon: Reuben Miller Jr., MD;  Location: [facility];  Service: Endoscopy;  Laterality: N/A;    ESOPHAGOGASTRODUODENOSCOPY N/A 12/10/2018    Procedure: EGD (ESOPHAGOGASTRODUODENOSCOPY)/poss emr;  Surgeon: Belle Kuo MD;  Location: [facility] ([address] [place] [address]);  Service: Endoscopy;  Laterality: N/A;    ESOPHAGOGASTRODUODENOSCOPY N/A 3/12/2019    Procedure: EGD (ESOPHAGOGASTRODUODENOSCOPY);  Surgeon: Polo SYED  "MD Wali;  Location: [facility];  Service: Endoscopy;  Laterality: N/A;    HERNIA REPAIR      HYSTERECTOMY  1989    Uterus and both ovaries    INCISIONAL HERNIA REPAIR      x 2    JOINT REPLACEMENT      LIVER BIOPSY  12/2003    autoimmune hepatitis    ROTATOR CUFF REPAIR      right    STOMACH SURGERY  1980's    "took lymph node off of liver"    TOTAL HIP ARTHROPLASTY      left hip    UPPER GASTROINTESTINAL ENDOSCOPY  3/24/10    normal    UPPER GASTROINTESTINAL ENDOSCOPY  04/27/2015    Dr. Washington     Family History   Problem Relation Age of Onset    Diabetes Mellitus Mother     Diabetes Mother     Liver cancer Sister     Breast cancer Sister     Cataracts Sister     Pancreatic cancer Brother     Diabetes Brother     Lung cancer Brother     Colon cancer Brother     Brain cancer Brother     Stomach cancer Other     Diabetes Other     Throat cancer Brother     Cataracts Sister     Diabetes Son     Liver disease Neg Hx      Social History     Tobacco Use    Smoking status: Current Every Day Smoker     Packs/day: 2.00     Years: 45.00     Pack years: 90.00     Types: Cigarettes    Smokeless tobacco: Never Used   Substance Use Topics    Alcohol use: No     Comment: used to drink heavily, stopped in 1990's    Drug use: No          OBJECTIVE:     Vital Signs (Most Recent)  Temp: 97.5 °F (36.4 °C) (11/20/20 0928)  Pulse: 64 (11/20/20 0928)  Resp: 18 (11/20/20 0928)  BP: 132/62 (11/20/20 0928)  SpO2: 100 % (11/20/20 0928)         ASSESSMENT/PLAN:     Patient is a 65 y.o. female with TA of duodenum s/p EMR coming for EGD surveillance.     Plan: EGD    Anesthesia Plan: Moderate Sedation    ASA Grade: ASA 2 - Patient with mild systemic disease with no functional limitations  "

## 2020-11-20 NOTE — ANESTHESIA PREPROCEDURE EVALUATION
2020  Thom Krishna is a 65 y.o., female for EGD under MAC    Past Medical History:   Diagnosis Date    Anxiety and depression 2015    Arthritis     Cervical radiculopathy 2016    Cirrhosis of liver     Colon polyps 2015    Diabetes mellitus type 2 with neurological manifestations 2015    no current medications, only one episode of elevated sugars with acute illness, will monitor     Encounter for blood transfusion     Hepatitis C     s/p treatment per patient report; managed by Dr. Perez    Hip fracture     Macrocytosis 2015    Thyroid disease     Transfusion reaction     hep c     Past Surgical History:   Procedure Laterality Date    APPENDECTOMY      BACK SURGERY      CAUDAL EPIDURAL STEROID INJECTION N/A 2018    Procedure: Injection-steroid-epidural-caudal;  Surgeon: Roxana Gu Jr., MD;  Location: [facility] OR;  Service: Pain Management;  Laterality: N/A;  with cath target L4-5    CAUDAL EPIDURAL STEROID INJECTION N/A 2019    Procedure: Injection-steroid-epidural-caudal;  Surgeon: Roxana Gu Jr., MD;  Location: [facility];  Service: Pain Management;  Laterality: N/A;     SECTION      CHOLECYSTECTOMY      COLONOSCOPY  3/31/10    2 polyps, tubular adenoma    COLONOSCOPY  2015    Dr. Washington    COLONOSCOPY N/A 10/10/2018    Procedure: COLONOSCOPY;  Surgeon: Reuben Miller Jr., MD;  Location: [facility];  Service: Endoscopy;  Laterality: N/A;    ESOPHAGOGASTRODUODENOSCOPY N/A 10/10/2018    Procedure: EGD (ESOPHAGOGASTRODUODENOSCOPY);  Surgeon: Reuben Miller Jr., MD;  Location: [facility];  Service: Endoscopy;  Laterality: N/A;    ESOPHAGOGASTRODUODENOSCOPY N/A 12/10/2018    Procedure: EGD (ESOPHAGOGASTRODUODENOSCOPY)/poss emr;  Surgeon: Belle Kuo MD;  Location: [address]);  Service: Endoscopy;   "Laterality: N/A;    ESOPHAGOGASTRODUODENOSCOPY N/A 3/12/2019    Procedure: EGD (ESOPHAGOGASTRODUODENOSCOPY);  Surgeon: Polo Keyes MD;  [facility];  Service: Endoscopy;  Laterality: N/A;    HERNIA REPAIR      HYSTERECTOMY  1989    Uterus and both ovaries    INCISIONAL HERNIA REPAIR      x 2    JOINT REPLACEMENT      LIVER BIOPSY  12/2003    autoimmune hepatitis    ROTATOR CUFF REPAIR      right    STOMACH SURGERY  1980's    "took lymph node off of liver"    TOTAL HIP ARTHROPLASTY      left hip    UPPER GASTROINTESTINAL ENDOSCOPY  3/24/10    normal    UPPER GASTROINTESTINAL ENDOSCOPY  04/27/2015    Dr. Washington         Anesthesia Evaluation    I have reviewed the Patient Summary Reports.   I have reviewed the NPO Status.   I have reviewed the Medications.     Review of Systems  Social:  Smoker        Physical Exam  General:  Well nourished    Airway/Jaw/Neck:  Airway Findings: Mallampati: II      Chest/Lungs:  Chest/Lungs Clear    Heart/Vascular:  Heart Findings: Normal            Anesthesia Plan  Type of Anesthesia, risks & benefits discussed:  Anesthesia Type:  MAC  Patient's Preference:   Intra-op Monitoring Plan: standard ASA monitors  Intra-op Monitoring Plan Comments:   Post Op Pain Control Plan: multimodal analgesia  Post Op Pain Control Plan Comments:   Induction:    Beta Blocker:  Patient is not currently on a Beta-Blocker (No further documentation required).       Informed Consent: Patient understands risks and agrees with Anesthesia plan.  Questions answered. Anesthesia consent signed with patient.  ASA Score: 2     Day of Surgery Review of History & Physical:            Ready For Surgery From Anesthesia Perspective.       "

## 2020-11-20 NOTE — ANESTHESIA POSTPROCEDURE EVALUATION
Anesthesia Post Evaluation    Patient: Thom Krishna    Procedure(s) Performed: Procedure(s) (LRB):  ESOPHAGOGASTRODUODENOSCOPY (EGD) (N/A)    Final Anesthesia Type: MAC    Patient location during evaluation: GI PACU  Patient participation: Yes- Able to Participate  Level of consciousness: awake and alert and oriented  Post-procedure vital signs: reviewed and stable  Pain management: adequate  Airway patency: patent    PONV status at discharge: No PONV  Anesthetic complications: no      Cardiovascular status: blood pressure returned to baseline, hemodynamically stable and stable  Respiratory status: unassisted, spontaneous ventilation and room air  Hydration status: euvolemic  Follow-up not needed.          Vitals Value Taken Time   BP 92/50 11/20/20 1058   Temp 36.7 11/20/20 1058   Pulse 71 11/20/20 1058   Resp 18 11/20/20 1058   SpO2 97 11/20/20 1058         No case tracking events are documented in the log.      Pain/More Score: No data recorded

## 2020-11-20 NOTE — PLAN OF CARE
Admission process complete. Patient ready for procedure. Plan of care reviewed with patient. Preoperative fasting appropriate for procedure and sedation. Call light in reach and bed in lowest position. \

## 2020-11-24 LAB
FINAL PATHOLOGIC DIAGNOSIS: NORMAL
GROSS: NORMAL
Lab: NORMAL

## 2020-12-02 DIAGNOSIS — M48.02 CERVICAL SPINAL STENOSIS: ICD-10-CM

## 2020-12-02 DIAGNOSIS — M54.50 LOW BACK PAIN RADIATING TO RIGHT LOWER EXTREMITY: ICD-10-CM

## 2020-12-02 DIAGNOSIS — M79.604 LOW BACK PAIN RADIATING TO RIGHT LOWER EXTREMITY: ICD-10-CM

## 2020-12-02 DIAGNOSIS — M54.12 RIGHT CERVICAL RADICULOPATHY: ICD-10-CM

## 2020-12-02 DIAGNOSIS — M47.812 DEGENERATIVE JOINT DISEASE OF CERVICAL AND LUMBAR SPINE: ICD-10-CM

## 2020-12-02 DIAGNOSIS — M47.816 DEGENERATIVE JOINT DISEASE OF CERVICAL AND LUMBAR SPINE: ICD-10-CM

## 2020-12-02 DIAGNOSIS — F41.9 ANXIETY: ICD-10-CM

## 2020-12-02 RX ORDER — ALPRAZOLAM 1 MG/1
1 TABLET ORAL 2 TIMES DAILY PRN
Qty: 45 TABLET | Refills: 5 | Status: SHIPPED | OUTPATIENT
Start: 2020-12-02 | End: 2021-06-09 | Stop reason: SDUPTHER

## 2020-12-02 RX ORDER — HYDROCODONE BITARTRATE AND ACETAMINOPHEN 10; 325 MG/1; MG/1
1 TABLET ORAL EVERY 6 HOURS PRN
Qty: 90 TABLET | Refills: 0 | Status: CANCELLED | OUTPATIENT
Start: 2020-12-02

## 2020-12-02 NOTE — TELEPHONE ENCOUNTER
----- Message from Trish Galvan sent at 12/2/2020  3:52 PM CST -----  Contact: [phone]/Self  Type:  RX Refill Request    Who Called: Pt  Refill or New Rx:Refill   RX Name and Strength:HYDROcodone-acetaminophen (NORCO)  mg per tablet  How is the patient currently taking it? (ex. 1XDay):Take 1 tablet by mouth every 6 (six) hours as needed for pain   Is this a 30 day or 90 day RX:30  Preferred Pharmacy with phone number:[facility]   Local or Mail Order:Local   Ordering Provider:Dr. Dejesus   Would the patient rather a call back or a response via [facility]? Call back   Best Call Back Number:[phone]  Additional Information:

## 2020-12-02 NOTE — TELEPHONE ENCOUNTER
----- Message from Trish Galvan sent at 12/2/2020  3:52 PM CST -----  Contact: [phone]/Self  Type:  RX Refill Request    Who Called: Pt  Refill or New Rx:Refill   RX Name and Strength:HYDROcodone-acetaminophen (NORCO)  mg per tablet  How is the patient currently taking it? (ex. 1XDay):Take 1 tablet by mouth every 6 (six) hours as needed for pain   Is this a 30 day or 90 day RX:30  Preferred Pharmacy with phone number:[facility]   Local or Mail Order:Local   Ordering Provider:Dr. Dejesus   Would the patient rather a call back or a response via [facility]? Call back   Best Call Back Number:[phone]  Additional Information:

## 2020-12-03 DIAGNOSIS — M47.812 DEGENERATIVE JOINT DISEASE OF CERVICAL AND LUMBAR SPINE: ICD-10-CM

## 2020-12-03 DIAGNOSIS — M54.50 LOW BACK PAIN RADIATING TO RIGHT LOWER EXTREMITY: ICD-10-CM

## 2020-12-03 DIAGNOSIS — M79.604 LOW BACK PAIN RADIATING TO RIGHT LOWER EXTREMITY: ICD-10-CM

## 2020-12-03 DIAGNOSIS — M54.12 RIGHT CERVICAL RADICULOPATHY: ICD-10-CM

## 2020-12-03 DIAGNOSIS — M47.816 DEGENERATIVE JOINT DISEASE OF CERVICAL AND LUMBAR SPINE: ICD-10-CM

## 2020-12-03 DIAGNOSIS — M48.02 CERVICAL SPINAL STENOSIS: ICD-10-CM

## 2020-12-03 RX ORDER — HYDROCODONE BITARTRATE AND ACETAMINOPHEN 10; 325 MG/1; MG/1
1 TABLET ORAL EVERY 6 HOURS PRN
Qty: 90 TABLET | Refills: 0 | Status: SHIPPED | OUTPATIENT
Start: 2020-12-03 | End: 2020-12-28 | Stop reason: SDUPTHER

## 2020-12-03 RX ORDER — HYDROCODONE BITARTRATE AND ACETAMINOPHEN 10; 325 MG/1; MG/1
1 TABLET ORAL EVERY 6 HOURS PRN
Qty: 90 TABLET | Refills: 0 | Status: CANCELLED | OUTPATIENT
Start: 2020-12-03

## 2020-12-04 ENCOUNTER — OFFICE VISIT (OUTPATIENT)
Dept: PULMONOLOGY | Facility: CLINIC | Age: 65
End: 2020-12-04
Payer: MEDICARE

## 2020-12-04 VITALS
DIASTOLIC BLOOD PRESSURE: 60 MMHG | OXYGEN SATURATION: 96 % | WEIGHT: 144.38 LBS | SYSTOLIC BLOOD PRESSURE: 110 MMHG | HEART RATE: 77 BPM | HEIGHT: 62 IN | BODY MASS INDEX: 26.57 KG/M2

## 2020-12-04 DIAGNOSIS — Z87.891 PERSONAL HISTORY OF NICOTINE DEPENDENCE: ICD-10-CM

## 2020-12-04 DIAGNOSIS — R91.1 PULMONARY NODULE: ICD-10-CM

## 2020-12-04 DIAGNOSIS — Z12.2 ENCOUNTER FOR SCREENING FOR MALIGNANT NEOPLASM OF RESPIRATORY ORGANS: ICD-10-CM

## 2020-12-04 PROCEDURE — 99204 OFFICE O/P NEW MOD 45 MIN: CPT | Mod: HCNC,S$GLB,, | Performed by: NURSE PRACTITIONER

## 2020-12-04 PROCEDURE — 1101F PR PT FALLS ASSESS DOC 0-1 FALLS W/OUT INJ PAST YR: ICD-10-PCS | Mod: HCNC,CPTII,S$GLB, | Performed by: NURSE PRACTITIONER

## 2020-12-04 PROCEDURE — 1126F PR PAIN SEVERITY QUANTIFIED, NO PAIN PRESENT: ICD-10-PCS | Mod: HCNC,S$GLB,, | Performed by: NURSE PRACTITIONER

## 2020-12-04 PROCEDURE — 3008F BODY MASS INDEX DOCD: CPT | Mod: HCNC,CPTII,S$GLB, | Performed by: NURSE PRACTITIONER

## 2020-12-04 PROCEDURE — 99999 PR PBB SHADOW E&M-EST. PATIENT-LVL IV: CPT | Mod: PBBFAC,HCNC,, | Performed by: NURSE PRACTITIONER

## 2020-12-04 PROCEDURE — 1126F AMNT PAIN NOTED NONE PRSNT: CPT | Mod: HCNC,S$GLB,, | Performed by: NURSE PRACTITIONER

## 2020-12-04 PROCEDURE — 3288F PR FALLS RISK ASSESSMENT DOCUMENTED: ICD-10-PCS | Mod: HCNC,CPTII,S$GLB, | Performed by: NURSE PRACTITIONER

## 2020-12-04 PROCEDURE — 3008F PR BODY MASS INDEX (BMI) DOCUMENTED: ICD-10-PCS | Mod: HCNC,CPTII,S$GLB, | Performed by: NURSE PRACTITIONER

## 2020-12-04 PROCEDURE — 3288F FALL RISK ASSESSMENT DOCD: CPT | Mod: HCNC,CPTII,S$GLB, | Performed by: NURSE PRACTITIONER

## 2020-12-04 PROCEDURE — 1101F PT FALLS ASSESS-DOCD LE1/YR: CPT | Mod: HCNC,CPTII,S$GLB, | Performed by: NURSE PRACTITIONER

## 2020-12-04 PROCEDURE — 99999 PR PBB SHADOW E&M-EST. PATIENT-LVL IV: ICD-10-PCS | Mod: PBBFAC,HCNC,, | Performed by: NURSE PRACTITIONER

## 2020-12-04 PROCEDURE — 99204 PR OFFICE/OUTPT VISIT, NEW, LEVL IV, 45-59 MIN: ICD-10-PCS | Mod: HCNC,S$GLB,, | Performed by: NURSE PRACTITIONER

## 2020-12-04 RX ORDER — ONDANSETRON 8 MG/1
TABLET, ORALLY DISINTEGRATING ORAL
COMMUNITY
Start: 2020-12-01 | End: 2021-01-28 | Stop reason: SDUPTHER

## 2020-12-04 RX ORDER — TIZANIDINE 4 MG/1
TABLET ORAL
COMMUNITY
Start: 2020-09-16 | End: 2021-07-16

## 2020-12-04 RX ORDER — PANTOPRAZOLE SODIUM 40 MG/1
40 TABLET, DELAYED RELEASE ORAL
COMMUNITY
Start: 2020-09-11 | End: 2021-01-05 | Stop reason: SDUPTHER

## 2020-12-04 NOTE — LETTER
December 7, 2020      Prateek Swanson MD  [address]  [address]  [place] [address]           [facility]  [address]  [facility] [address]  Phone: [phone]          Patient: Thom Krishna   MR Number: [id number]   YOB: 1955   Date of Visit: 12/4/2020       Dear Dr. Prateek Swanson:    Thank you for referring Thom Krishna to me for evaluation. Attached you will find relevant portions of my assessment and plan of care.    If you have questions, please do not hesitate to call me. I look forward to following Thom Krishna along with you.    Sincerely,    Sherrill Marquez, NP    Enclosure  CC:  No Recipients    If you would like to receive this communication electronically, please contact [email or web address] or [phone] to request more information on [facility] access.    For providers and/or their staff who would like to refer a patient to [facility], please contact us through our one-stop-shop provider referral line, [facility], at [phone].    If you feel you have received this communication in error or would no longer like to receive these types of communications, please e-mail [email or web address]

## 2020-12-04 NOTE — PROGRESS NOTES
Subjective:       Patient ID: Thom Krishna is a 65 y.o. female.    Chief Complaint: Pulmonary Nodules    HPI   Ms. Krishna is a 66 y/o F with past medical history significant for ongoing chronic neck and back pain secondary to DDD L-spine/C-spine, history of lumbar fusion,  caudal NORMAN 08/2018 and 01/2019; spinal cord stimulator placement June 2020 by pain management Dr. Younger;  hepatitis-C treated by [facility] hepatology;  Hypothyroidism; history of type 2 diabetes (now controlled off of medications); tobacco abuse; GERD; anxiety/deperssion;  osteoporosis with thoracic compression fracture; and vitamin-D;  Deficiency presenting for pulmonary nodule evaluation.     As per PCP note 11/11/2020:  On 10/21/2020, patient explains she underwent x-ray of spine which showed calcified lymph nodes of the left mediastinum and left pulmonary hilar region.   Of note, she had a CT scan of the abdomen and pelvis 2/11/2020 which noted stable 6 mm pulmonary nodule in comparison to the prior exam.  She has never had a dedicated CT scan of the chest.  Denies cough, wheezing, or shortness of breath.  No excessive sweating.  No weight changes.    Patient  experiences chronic mMRC 1 symptoms of dyspnea. Notes to have no chronic cough. Explains had 0 COPD exacerbations in the last one year; 0 hospitalizations for respiratory problems. Currently, reports not taking any inhaler therapy. Patient reports 45 year smoking history.  States smoke approximately 2 packs a day.  Currently she reports smoking 1 pack a day.  At her most, she discloses smoking 4 packs a day.  Explains she is enrolled in the smoking cessation program.    Reports strong family history of cancer including brother and sister having lung cancer.  Reports most her family members are smokers.     Additional Pulmonary History:   Travel History: Denies  History of exposures to TB:  Denies  Family History of Lung Cancer: Reports sister and bother with lung CA (smokers)   Childhood  "history of Lung Disease: Denies  Social History     Tobacco Use   Smoking Status Current Every Day Smoker    Packs/day: 2.00    Years: 45.00    Pack years: 90.00    Types: Cigarettes   Smokeless Tobacco Never Used     Reviewed allergies and medications.  Reviewed medical and surgical history.  Reviewed social and family history.    Review of Systems   Constitutional: Negative for fever, chills, weight loss and night sweats.   HENT: Negative for postnasal drip, rhinorrhea, trouble swallowing and congestion.    Respiratory: Negative for apnea, cough, sputum production, choking, chest tightness, wheezing, dyspnea on extertion and use of rescue inhaler.    Cardiovascular: Negative for chest pain and leg swelling.   Musculoskeletal: Positive for arthralgias (Reports chronic) and back pain (Reports chronic).   Skin: Negative for rash.   Gastrointestinal: Negative for acid reflux.   Neurological: Negative for dizziness and light-headedness.   Hematological: Negative for adenopathy.   Psychiatric/Behavioral: Negative for sleep disturbance.         Objective:      Vitals:    12/04/20 0905   BP: 110/60   BP Location: Right arm   Patient Position: Sitting   Pulse: 77   SpO2: 96%   Weight: 65.5 kg (144 lb 6.4 oz)   Height: 5' 2" (1.575 m)      Physical Exam   Constitutional: She is oriented to person, place, and time. She appears well-developed and well-nourished. No distress.   HENT:   Head: Normocephalic.   Neck: Normal range of motion. Neck supple.   Cardiovascular: Normal rate, regular rhythm and normal heart sounds.   Pulmonary/Chest: Normal expansion and effort normal. No respiratory distress. She has decreased breath sounds. She has no wheezes. She has no rhonchi.   Abdominal: Soft. Bowel sounds are normal. There is no abdominal tenderness.   Musculoskeletal: Normal range of motion.         General: No edema.   Lymphadenopathy: No supraclavicular adenopathy is present.     She has no cervical adenopathy. "   Neurological: She is alert and oriented to person, place, and time. Gait normal.   Skin: Skin is warm and dry. Nails show no clubbing.   Psychiatric: She has a normal mood and affect. Her behavior is normal.   Vitals reviewed.    Personal Diagnostic Review    CT abdomen pelvis 02/11/2020-     Stable 6 mm pulmonary nodule in comparison the prior exam.         Assessment:       1. Pulmonary nodule    2. Encounter for screening for malignant neoplasm of respiratory organs    3. Personal history of nicotine dependence         Outpatient Encounter Medications as of 12/4/2020   Medication Sig Dispense Refill    ALPRAZolam (XANAX) 1 MG tablet Take 1 tablet (1 mg total) by mouth 2 (two) times daily as needed. 45 tablet 5    blood-glucose meter kit Test tid please dispense test strips and lancets      cholecalciferol, vitamin D3, 125 mcg (5,000 unit) capsule Take 1 tablet  by mouth daily with breakfast. 100 capsule 4    diclofenac sodium (VOLTAREN) 1 % Gel BETTYE 2 GRAMS AA QID  1    elbasvir-grazoprevir (ZEPATIER)  mg Tab       HYDROcodone-acetaminophen (NORCO)  mg per tablet Take 1 tablet by mouth every 6 (six) hours as needed for pain 90 tablet 0    levothyroxine (SYNTHROID) 75 MCG tablet Take 1 tablet (75 mcg total) by mouth once daily. 90 tablet 3    lidocaine (LIDODERM) 5 % Place 1 patch onto the skin once daily. Remove & Discard patch within 12 hours or as directed by MD 12 patch 0    methocarbamol (ROBAXIN) 750 MG Tab Take 1,500 mg by mouth.      nabumetone (RELAFEN) 750 MG tablet TK 1 T PO BID  2    ondansetron (ZOFRAN) 4 MG tablet Take 1 tablet (4 mg total) by mouth every 6 (six) hours. 12 tablet 0    ondansetron (ZOFRAN-ODT) 8 MG TbDL       pantoprazole (PROTONIX) 40 MG tablet Take 40 mg by mouth.      sucralfate (CARAFATE) 100 mg/mL suspension TAKE 10 ML BY MOUTH THREE TIMES DAILY FOR 14 DAYS 414 mL 9    tiZANidine (ZANAFLEX) 4 MG tablet TK 1/2 TO 1 T PO HS      vancomycin (VANCOCIN)  1,000 mg injection EMPTY CONTENTS OF 1 VIAL INTO SHAKER. SPRINKLE DIRECTLY ONTO INFECTION SITE. PERFORM 1-2 TIMES DAILY.      [DISCONTINUED] cholecalciferol, vitamin D3, (REPLESTA) 1,250 mcg (50,000 unit) Wafr Take 1 Wafer by mouth once a week. 15 Wafer 4    [DISCONTINUED] methylPREDNISolone (MEDROL DOSEPACK) 4 mg tablet Take as directed 21 tablet 0    [DISCONTINUED] pantoprazole (PROTONIX) 40 MG tablet Take 1 tablet (40 mg total) by mouth once daily. 90 tablet 4     No facility-administered encounter medications on file as of 12/4/2020.      Orders Placed This Encounter   Procedures    CT Chest Lung Screening Low Dose     Standing Status:   Future     Standing Expiration Date:   12/4/2021     Order Specific Question:   Is there documentation of shared decision making for this lung screening exam?     Answer:   Yes     Order Specific Question:   Are you a current or former smoker who quit within the past 15 years?     Answer:   Yes     Order Specific Question:   Are you between age 55-77 years old?     Answer:   Yes     Order Specific Question:   Has the patient smoked 30 or more packs of cigarettes/year?     Answer:   Yes     Order Specific Question:   Does the patient show any signs or symptoms of lung cancer?     Answer:   No     Order Specific Question:   Is this the first (baseline) CT or an annual exam?     Answer:   Baseline [1]     Order Specific Question:   May the Radiologist modify the order per protocol to meet the clinical needs of the patient?     Answer:   Yes       Plan:         Problem List Items Addressed This Visit        Pulmonary    Pulmonary nodule    Overview     Last noted on CT abdomen from 2011 - 0.6 CM RIGHT LOWER LOBE PULMONARY NODULE UNCHANGED SINCE EXAMINATION   OF 5/21/2010          Current Assessment & Plan     Recommend patient undergo low-dose CT screening of chest secondary to smoking history.  She has a greater than a 45 pack year history, 65 years old and is a current  smoker.  Discussed with patient it is recommended to continue annual low-dose CT screenings until the age of 80 or have quit smoking within a 15 year..    Would recommend patient continues with smoking cessation program.    Offered patient pulmonary function test to assess lung obstruction-however patient declines at this time she is only interested in workup for a pulmonary nodule.         Encounter for screening for malignant neoplasm of respiratory organs    Current Assessment & Plan     Recommend patient undergo low-dose CT screening of chest secondary to smoking history.  She has a greater than a 45 pack year history, 65 years old and is a current smoker.  Discussed with patient it is recommended to continue annual low-dose CT screenings until the age of 78 or have quit smoking within a 15 year..    Would recommend patient continues with smoking cessation program.    Offered patient pulmonary function test to assess lung obstruction-however patient declines at this time she is only interested in workup for a pulmonary nodule.         Relevant Orders    CT Chest Lung Screening Low Dose       Other    Personal history of nicotine dependence     Current Assessment & Plan     Recommend patient undergo low-dose CT screening of chest secondary to smoking history.  She has a greater than a 45 pack year history, 65 years old and is a current smoker.  Discussed with patient it is recommended to continue annual low-dose CT screenings until the age of 78 or have quit smoking within a 15 year..    Would recommend patient continues with smoking cessation program.    Offered patient pulmonary function test to assess lung obstruction-however patient declines at this time she is only interested in workup for a pulmonary nodule.         Relevant Orders    CT Chest Lung Screening Low Dose      Will notify of low-dose CT once received.  Follow-up in 3 months or sooner if needed    This note is dictated on M*Modal word recognition  program.  There are word recognition mistakes that are occasionally missed on review.

## 2020-12-07 PROBLEM — Z12.2 ENCOUNTER FOR SCREENING FOR MALIGNANT NEOPLASM OF RESPIRATORY ORGANS: Status: ACTIVE | Noted: 2020-12-07

## 2020-12-07 PROBLEM — Z87.891 PERSONAL HISTORY OF NICOTINE DEPENDENCE: Status: ACTIVE | Noted: 2020-12-07

## 2020-12-08 NOTE — ASSESSMENT & PLAN NOTE
Recommend patient undergo low-dose CT screening of chest secondary to smoking history.  She has a greater than a 45 pack year history, 65 years old and is a current smoker.  Discussed with patient it is recommended to continue annual low-dose CT screenings until the age of 78 or have quit smoking within a 15 year..    Would recommend patient continues with smoking cessation program.    Offered patient pulmonary function test to assess lung obstruction-however patient declines at this time she is only interested in workup for a pulmonary nodule.

## 2020-12-09 DIAGNOSIS — E11.9 TYPE 2 DIABETES MELLITUS WITHOUT COMPLICATION: ICD-10-CM

## 2020-12-17 ENCOUNTER — HOSPITAL ENCOUNTER (OUTPATIENT)
Dept: RADIOLOGY | Facility: HOSPITAL | Age: 65
Discharge: HOME | End: 2020-12-17
Attending: NURSE PRACTITIONER
Payer: MEDICARE

## 2020-12-17 DIAGNOSIS — Z87.891 PERSONAL HISTORY OF NICOTINE DEPENDENCE: ICD-10-CM

## 2020-12-17 DIAGNOSIS — Z12.2 ENCOUNTER FOR SCREENING FOR MALIGNANT NEOPLASM OF RESPIRATORY ORGANS: ICD-10-CM

## 2020-12-17 PROCEDURE — G0297 LDCT FOR LUNG CA SCREEN: HCPCS | Mod: TC,HCNC

## 2020-12-17 PROCEDURE — G0297 CT CHEST LUNG SCREENING LOW DOSE: ICD-10-PCS | Mod: 26,HCNC,, | Performed by: RADIOLOGY

## 2020-12-17 PROCEDURE — G0297 LDCT FOR LUNG CA SCREEN: HCPCS | Mod: 26,HCNC,, | Performed by: RADIOLOGY

## 2020-12-21 ENCOUNTER — TELEPHONE (OUTPATIENT)
Dept: FAMILY MEDICINE | Facility: CLINIC | Age: 65
End: 2020-12-21
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
## 2020-12-22 ENCOUNTER — TELEPHONE (OUTPATIENT)
Dept: FAMILY MEDICINE | Facility: CLINIC | Age: 65
End: 2020-12-22
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
## 2020-12-28 DIAGNOSIS — M47.816 DEGENERATIVE JOINT DISEASE OF CERVICAL AND LUMBAR SPINE: ICD-10-CM

## 2020-12-28 DIAGNOSIS — M54.12 RIGHT CERVICAL RADICULOPATHY: ICD-10-CM

## 2020-12-28 DIAGNOSIS — M48.02 CERVICAL SPINAL STENOSIS: ICD-10-CM

## 2020-12-28 DIAGNOSIS — M79.604 LOW BACK PAIN RADIATING TO RIGHT LOWER EXTREMITY: ICD-10-CM

## 2020-12-28 DIAGNOSIS — M47.812 DEGENERATIVE JOINT DISEASE OF CERVICAL AND LUMBAR SPINE: ICD-10-CM

## 2020-12-28 DIAGNOSIS — M54.50 LOW BACK PAIN RADIATING TO RIGHT LOWER EXTREMITY: ICD-10-CM

## 2020-12-28 NOTE — TELEPHONE ENCOUNTER
----- Message from Trish Galvan sent at 12/28/2020  4:34 PM CST -----  Contact: [phone]/Self  Type:  RX Refill Request    Who Called: Pt  Refill or New Rx:refill   RX Name and Strength:HYDROcodone-acetaminophen (NORCO)  mg per tablet  How is the patient currently taking it? (ex. 1XDay):Take 1 tablet by mouth every 6 (six) hours as needed for pain   Is this a 30 day or 90 day RX:30  [facility]    Local or Mail Order:Local   Ordering Provider:Dr. Dejesus   Would the patient rather a call back or a response via [facility]? Call back   Best Call Back Number:[phone]  Additional Information: Pt is out of medication

## 2020-12-29 ENCOUNTER — NURSE TRIAGE (OUTPATIENT)
Dept: ADMINISTRATIVE | Facility: CLINIC | Age: 65
End: 2020-12-29

## 2020-12-29 DIAGNOSIS — M79.604 LOW BACK PAIN RADIATING TO RIGHT LOWER EXTREMITY: ICD-10-CM

## 2020-12-29 DIAGNOSIS — M54.12 RIGHT CERVICAL RADICULOPATHY: ICD-10-CM

## 2020-12-29 DIAGNOSIS — M48.02 CERVICAL SPINAL STENOSIS: ICD-10-CM

## 2020-12-29 DIAGNOSIS — M47.812 DEGENERATIVE JOINT DISEASE OF CERVICAL AND LUMBAR SPINE: ICD-10-CM

## 2020-12-29 DIAGNOSIS — M54.50 LOW BACK PAIN RADIATING TO RIGHT LOWER EXTREMITY: ICD-10-CM

## 2020-12-29 DIAGNOSIS — M47.816 DEGENERATIVE JOINT DISEASE OF CERVICAL AND LUMBAR SPINE: ICD-10-CM

## 2020-12-29 RX ORDER — HYDROCODONE BITARTRATE AND ACETAMINOPHEN 10; 325 MG/1; MG/1
1 TABLET ORAL EVERY 6 HOURS PRN
Qty: 90 TABLET | Refills: 0 | OUTPATIENT
Start: 2020-12-29

## 2020-12-29 RX ORDER — HYDROCODONE BITARTRATE AND ACETAMINOPHEN 10; 325 MG/1; MG/1
1 TABLET ORAL EVERY 6 HOURS PRN
Qty: 90 TABLET | Refills: 0 | Status: SHIPPED | OUTPATIENT
Start: 2020-12-29 | End: 2021-01-22 | Stop reason: SDUPTHER

## 2020-12-29 NOTE — TELEPHONE ENCOUNTER
Attempted to call x 3, originally the line was answered then disconnected, tried twice more, no answer, left vm x 2  Reason for Disposition   Message left on unidentified voice mail. Phone number verified.    Additional Information   Negative: Caller has already spoken with the PCP (or office), and has no further questions   Negative: Caller has already spoken with another triager and has no further questions   Negative: Caller has already spoken with another triager or PCP (or office), and has further questions and triager able to answer questions.   Negative: Busy signal.  First attempt to contact caller.  Follow-up call scheduled within 15 minutes.   Negative: No answer.  First attempt to contact caller.  Follow-up call scheduled within 15 minutes.   Negative: Message left on identified voicemail    Protocols used: NO CONTACT OR DUPLICATE CONTACT CALL-A-OH

## 2020-12-29 NOTE — TELEPHONE ENCOUNTER
Pt calling requesting refill on norco RX, reports unable t reach office.     Reason for Disposition   Caller requesting a CONTROLLED substance prescription refill (e.g., narcotics, ADHD medicines)    Protocols used: MEDICATION QUESTION CALL-A-OH
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
## 2020-12-29 NOTE — TELEPHONE ENCOUNTER
Attempted to reach x 1, left vm.  Patient called back to OOC, alternate Triage RN took her call.  Reason for Disposition   Message left on identified voicemail    Additional Information   Negative: Caller has already spoken with the PCP (or office), and has no further questions   Negative: Caller has already spoken with another triager and has no further questions   Negative: Caller has already spoken with another triager or PCP (or office), and has further questions and triager able to answer questions.   Negative: Busy signal.  First attempt to contact caller.  Follow-up call scheduled within 15 minutes.   Negative: No answer.  First attempt to contact caller.  Follow-up call scheduled within 15 minutes.    Protocols used: NO CONTACT OR DUPLICATE CONTACT CALL-A-OH

## 2020-12-30 ENCOUNTER — PATIENT OUTREACH (OUTPATIENT)
Dept: ADMINISTRATIVE | Facility: OTHER | Age: 65
End: 2020-12-30

## 2020-12-30 DIAGNOSIS — E11.49 DIABETES MELLITUS TYPE 2 WITH NEUROLOGICAL MANIFESTATIONS: Primary | ICD-10-CM

## 2020-12-30 NOTE — PROGRESS NOTES
Care Everywhere: updated  Immunization: no profile in links   Health Maintenance: updated  Media Review: review for outside mammogram and eye exam report  Legacy Review:   Order placed: hemoglobin   Upcoming appts:

## 2021-01-04 ENCOUNTER — TELEPHONE (OUTPATIENT)
Dept: PULMONOLOGY | Facility: CLINIC | Age: 66
End: 2021-01-04

## 2021-01-05 RX ORDER — PANTOPRAZOLE SODIUM 40 MG/1
40 TABLET, DELAYED RELEASE ORAL DAILY
Qty: 30 TABLET | Refills: 0 | Status: SHIPPED | OUTPATIENT
Start: 2021-01-05 | End: 2021-02-01

## 2021-01-20 DIAGNOSIS — E11.9 TYPE 2 DIABETES MELLITUS WITHOUT COMPLICATION: ICD-10-CM

## 2021-01-21 DIAGNOSIS — Z12.2 ENCOUNTER FOR SCREENING FOR MALIGNANT NEOPLASM OF RESPIRATORY ORGANS: ICD-10-CM

## 2021-01-22 DIAGNOSIS — M54.12 RIGHT CERVICAL RADICULOPATHY: ICD-10-CM

## 2021-01-22 DIAGNOSIS — M54.50 LOW BACK PAIN RADIATING TO RIGHT LOWER EXTREMITY: ICD-10-CM

## 2021-01-22 DIAGNOSIS — M47.812 DEGENERATIVE JOINT DISEASE OF CERVICAL AND LUMBAR SPINE: ICD-10-CM

## 2021-01-22 DIAGNOSIS — M47.816 DEGENERATIVE JOINT DISEASE OF CERVICAL AND LUMBAR SPINE: ICD-10-CM

## 2021-01-22 DIAGNOSIS — M48.02 CERVICAL SPINAL STENOSIS: ICD-10-CM

## 2021-01-22 DIAGNOSIS — M79.604 LOW BACK PAIN RADIATING TO RIGHT LOWER EXTREMITY: ICD-10-CM

## 2021-01-23 ENCOUNTER — HOSPITAL ENCOUNTER (EMERGENCY)
Facility: HOSPITAL | Age: 66
Discharge: HOME | End: 2021-01-23
Attending: EMERGENCY MEDICINE
Payer: MEDICARE

## 2021-01-23 VITALS
OXYGEN SATURATION: 96 % | DIASTOLIC BLOOD PRESSURE: 63 MMHG | RESPIRATION RATE: 18 BRPM | HEART RATE: 58 BPM | BODY MASS INDEX: 26.68 KG/M2 | SYSTOLIC BLOOD PRESSURE: 136 MMHG | TEMPERATURE: 98 F | HEIGHT: 62 IN | WEIGHT: 145 LBS

## 2021-01-23 DIAGNOSIS — M54.9 BACK PAIN, UNSPECIFIED BACK LOCATION, UNSPECIFIED BACK PAIN LATERALITY, UNSPECIFIED CHRONICITY: ICD-10-CM

## 2021-01-23 DIAGNOSIS — G89.4 CHRONIC PAIN SYNDROME: Primary | ICD-10-CM

## 2021-01-23 PROCEDURE — 96372 THER/PROPH/DIAG INJ SC/IM: CPT

## 2021-01-23 PROCEDURE — 63600175 PHARM REV CODE 636 W HCPCS: Performed by: EMERGENCY MEDICINE

## 2021-01-23 PROCEDURE — 99284 EMERGENCY DEPT VISIT MOD MDM: CPT | Mod: 25

## 2021-01-23 RX ORDER — METHOCARBAMOL 500 MG/1
1000 TABLET, FILM COATED ORAL 3 TIMES DAILY
Qty: 30 TABLET | Refills: 0 | Status: SHIPPED | OUTPATIENT
Start: 2021-01-23 | End: 2021-01-30

## 2021-01-23 RX ORDER — MORPHINE SULFATE 4 MG/ML
4 INJECTION, SOLUTION INTRAMUSCULAR; INTRAVENOUS
Status: COMPLETED | OUTPATIENT
Start: 2021-01-23 | End: 2021-01-23

## 2021-01-23 RX ORDER — METHYLPREDNISOLONE SOD SUCC 125 MG
125 VIAL (EA) INJECTION
Status: COMPLETED | OUTPATIENT
Start: 2021-01-23 | End: 2021-01-23

## 2021-01-23 RX ORDER — DICLOFENAC SODIUM 10 MG/G
2 GEL TOPICAL 4 TIMES DAILY
Qty: 100 G | Refills: 0 | Status: SHIPPED | OUTPATIENT
Start: 2021-01-23 | End: 2021-12-10 | Stop reason: SDUPTHER

## 2021-01-23 RX ORDER — KETOROLAC TROMETHAMINE 30 MG/ML
15 INJECTION, SOLUTION INTRAMUSCULAR; INTRAVENOUS
Status: COMPLETED | OUTPATIENT
Start: 2021-01-23 | End: 2021-01-23

## 2021-01-23 RX ORDER — IBUPROFEN 600 MG/1
600 TABLET ORAL EVERY 6 HOURS PRN
Qty: 20 TABLET | Refills: 0 | Status: SHIPPED | OUTPATIENT
Start: 2021-01-23 | End: 2021-07-16 | Stop reason: ALTCHOICE

## 2021-01-23 RX ORDER — HYDROCODONE BITARTRATE AND ACETAMINOPHEN 10; 325 MG/1; MG/1
1 TABLET ORAL EVERY 6 HOURS PRN
Qty: 90 TABLET | Refills: 0 | Status: SHIPPED | OUTPATIENT
Start: 2021-01-23 | End: 2021-02-19 | Stop reason: SDUPTHER

## 2021-01-23 RX ADMIN — KETOROLAC TROMETHAMINE 15 MG: 30 INJECTION, SOLUTION INTRAMUSCULAR at 11:01

## 2021-01-23 RX ADMIN — MORPHINE SULFATE 4 MG: 4 INJECTION INTRAVENOUS at 11:01

## 2021-01-23 RX ADMIN — METHYLPREDNISOLONE SODIUM SUCCINATE 125 MG: 125 INJECTION, POWDER, FOR SOLUTION INTRAMUSCULAR; INTRAVENOUS at 10:01

## 2021-01-28 DIAGNOSIS — R11.0 NAUSEA: Primary | ICD-10-CM

## 2021-01-28 RX ORDER — ONDANSETRON 8 MG/1
8 TABLET, ORALLY DISINTEGRATING ORAL 3 TIMES DAILY PRN
Qty: 30 TABLET | Refills: 2 | Status: SHIPPED | OUTPATIENT
Start: 2021-01-28 | End: 2021-06-28 | Stop reason: SDUPTHER

## 2021-02-19 ENCOUNTER — LAB VISIT (OUTPATIENT)
Dept: LAB | Facility: HOSPITAL | Age: 66
End: 2021-02-19
Attending: FAMILY MEDICINE
Payer: MEDICARE

## 2021-02-19 ENCOUNTER — OFFICE VISIT (OUTPATIENT)
Dept: FAMILY MEDICINE | Facility: CLINIC | Age: 66
End: 2021-02-19
Payer: MEDICARE

## 2021-02-19 VITALS
HEART RATE: 98 BPM | BODY MASS INDEX: 24.91 KG/M2 | SYSTOLIC BLOOD PRESSURE: 112 MMHG | OXYGEN SATURATION: 98 % | HEIGHT: 62 IN | WEIGHT: 135.38 LBS | DIASTOLIC BLOOD PRESSURE: 62 MMHG

## 2021-02-19 DIAGNOSIS — Z79.899 MEDICATION MANAGEMENT: ICD-10-CM

## 2021-02-19 DIAGNOSIS — E11.49 DIABETES MELLITUS TYPE 2 WITH NEUROLOGICAL MANIFESTATIONS: ICD-10-CM

## 2021-02-19 DIAGNOSIS — E03.9 ACQUIRED HYPOTHYROIDISM: ICD-10-CM

## 2021-02-19 DIAGNOSIS — E55.9 VITAMIN D DEFICIENCY: ICD-10-CM

## 2021-02-19 DIAGNOSIS — K21.9 GASTROESOPHAGEAL REFLUX DISEASE, UNSPECIFIED WHETHER ESOPHAGITIS PRESENT: ICD-10-CM

## 2021-02-19 DIAGNOSIS — E53.8 VITAMIN B12 DEFICIENCY: ICD-10-CM

## 2021-02-19 DIAGNOSIS — M81.0 POSTMENOPAUSAL OSTEOPOROSIS: ICD-10-CM

## 2021-02-19 DIAGNOSIS — K75.4 AUTOIMMUNE HEPATITIS: ICD-10-CM

## 2021-02-19 DIAGNOSIS — M54.9 CHRONIC NECK AND BACK PAIN: Primary | ICD-10-CM

## 2021-02-19 DIAGNOSIS — M54.2 CHRONIC NECK AND BACK PAIN: Primary | ICD-10-CM

## 2021-02-19 DIAGNOSIS — Z86.19 HISTORY OF HEPATITIS C: ICD-10-CM

## 2021-02-19 DIAGNOSIS — G89.29 CHRONIC NECK AND BACK PAIN: Primary | ICD-10-CM

## 2021-02-19 DIAGNOSIS — Z72.0 TOBACCO ABUSE: ICD-10-CM

## 2021-02-19 DIAGNOSIS — S12.9XXS CLOSED FRACTURE OF CERVICAL VERTEBRA, UNSPECIFIED CERVICAL VERTEBRAL LEVEL, SEQUELA: ICD-10-CM
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
## 2021-02-19 DIAGNOSIS — S22.080S COMPRESSION FRACTURE OF T11 VERTEBRA, SEQUELA: ICD-10-CM

## 2021-02-19 LAB
25(OH)D3+25(OH)D2 SERPL-MCNC: 18 NG/ML (ref 30–96)
ALBUMIN SERPL BCP-MCNC: 4.3 G/DL (ref 3.5–5.2)
ALP SERPL-CCNC: 130 U/L (ref 55–135)
ALT SERPL W/O P-5'-P-CCNC: 11 U/L (ref 10–44)
ANION GAP SERPL CALC-SCNC: 12 MMOL/L (ref 8–16)
AST SERPL-CCNC: 17 U/L (ref 10–40)
BASOPHILS # BLD AUTO: 0.03 K/UL (ref 0–0.2)
BASOPHILS NFR BLD: 0.4 % (ref 0–1.9)
BILIRUB SERPL-MCNC: 0.3 MG/DL (ref 0.1–1)
BUN SERPL-MCNC: 16 MG/DL (ref 8–23)
CALCIUM SERPL-MCNC: 9.4 MG/DL (ref 8.7–10.5)
CHLORIDE SERPL-SCNC: 106 MMOL/L (ref 95–110)
CO2 SERPL-SCNC: 21 MMOL/L (ref 23–29)
CREAT SERPL-MCNC: 1.1 MG/DL (ref 0.5–1.4)
DIFFERENTIAL METHOD: ABNORMAL
EOSINOPHIL # BLD AUTO: 0 K/UL (ref 0–0.5)
EOSINOPHIL NFR BLD: 0.4 % (ref 0–8)
ERYTHROCYTE [DISTWIDTH] IN BLOOD BY AUTOMATED COUNT: 15.6 % (ref 11.5–14.5)
EST. GFR  (AFRICAN AMERICAN): >60 ML/MIN/1.73 M^2
EST. GFR  (NON AFRICAN AMERICAN): 52 ML/MIN/1.73 M^2
ESTIMATED AVG GLUCOSE: 97 MG/DL (ref 68–131)
GLUCOSE SERPL-MCNC: 98 MG/DL (ref 70–110)
HBA1C MFR BLD: 5 % (ref 4–5.6)
HCT VFR BLD AUTO: 36.5 % (ref 37–48.5)
HGB BLD-MCNC: 12 G/DL (ref 12–16)
IMM GRANULOCYTES # BLD AUTO: 0.03 K/UL (ref 0–0.04)
IMM GRANULOCYTES NFR BLD AUTO: 0.4 % (ref 0–0.5)
LYMPHOCYTES # BLD AUTO: 1.5 K/UL (ref 1–4.8)
LYMPHOCYTES NFR BLD: 21.1 % (ref 18–48)
MAGNESIUM SERPL-MCNC: 2 MG/DL (ref 1.6–2.6)
MCH RBC QN AUTO: 30 PG (ref 27–31)
MCHC RBC AUTO-ENTMCNC: 32.9 G/DL (ref 32–36)
MCV RBC AUTO: 91 FL (ref 82–98)
MONOCYTES # BLD AUTO: 0.4 K/UL (ref 0.3–1)
MONOCYTES NFR BLD: 5.8 % (ref 4–15)
NEUTROPHILS # BLD AUTO: 5.1 K/UL (ref 1.8–7.7)
NEUTROPHILS NFR BLD: 71.9 % (ref 38–73)
NRBC BLD-RTO: 0 /100 WBC
PLATELET # BLD AUTO: 258 K/UL (ref 150–350)
PMV BLD AUTO: 11.4 FL (ref 9.2–12.9)
POTASSIUM SERPL-SCNC: 3.9 MMOL/L (ref 3.5–5.1)
PROT SERPL-MCNC: 7.8 G/DL (ref 6–8.4)
RBC # BLD AUTO: 4 M/UL (ref 4–5.4)
SODIUM SERPL-SCNC: 139 MMOL/L (ref 136–145)
TSH SERPL DL<=0.005 MIU/L-ACNC: 0.69 UIU/ML (ref 0.4–4)
VIT B12 SERPL-MCNC: 470 PG/ML (ref 210–950)
WBC # BLD AUTO: 7.12 K/UL (ref 3.9–12.7)

## 2021-02-19 PROCEDURE — 1125F AMNT PAIN NOTED PAIN PRSNT: CPT | Mod: S$GLB,,, | Performed by: FAMILY MEDICINE

## 2021-02-19 PROCEDURE — 82306 VITAMIN D 25 HYDROXY: CPT

## 2021-02-19 PROCEDURE — 99999 PR PBB SHADOW E&M-EST. PATIENT-LVL V: ICD-10-PCS | Mod: PBBFAC,,, | Performed by: FAMILY MEDICINE

## 2021-02-19 PROCEDURE — 3288F FALL RISK ASSESSMENT DOCD: CPT | Mod: CPTII,S$GLB,, | Performed by: FAMILY MEDICINE

## 2021-02-19 PROCEDURE — 3288F PR FALLS RISK ASSESSMENT DOCUMENTED: ICD-10-PCS | Mod: CPTII,S$GLB,, | Performed by: FAMILY MEDICINE

## 2021-02-19 PROCEDURE — 99214 OFFICE O/P EST MOD 30 MIN: CPT | Mod: S$GLB,,, | Performed by: FAMILY MEDICINE

## 2021-02-19 PROCEDURE — 99499 RISK ADDL DX/OHS AUDIT: ICD-10-PCS | Mod: S$GLB,,, | Performed by: FAMILY MEDICINE

## 2021-02-19 PROCEDURE — 3078F PR MOST RECENT DIASTOLIC BLOOD PRESSURE < 80 MM HG: ICD-10-PCS | Mod: CPTII,S$GLB,, | Performed by: FAMILY MEDICINE

## 2021-02-19 PROCEDURE — 80053 COMPREHEN METABOLIC PANEL: CPT

## 2021-02-19 PROCEDURE — 3074F SYST BP LT 130 MM HG: CPT | Mod: CPTII,S$GLB,, | Performed by: FAMILY MEDICINE

## 2021-02-19 PROCEDURE — 82607 VITAMIN B-12: CPT

## 2021-02-19 PROCEDURE — 1159F MED LIST DOCD IN RCRD: CPT | Mod: S$GLB,,, | Performed by: FAMILY MEDICINE

## 2021-02-19 PROCEDURE — 3008F BODY MASS INDEX DOCD: CPT | Mod: CPTII,S$GLB,, | Performed by: FAMILY MEDICINE

## 2021-02-19 PROCEDURE — 1125F PR PAIN SEVERITY QUANTIFIED, PAIN PRESENT: ICD-10-PCS | Mod: S$GLB,,, | Performed by: FAMILY MEDICINE

## 2021-02-19 PROCEDURE — 3044F HG A1C LEVEL LT 7.0%: CPT | Mod: CPTII,S$GLB,, | Performed by: FAMILY MEDICINE

## 2021-02-19 PROCEDURE — 3078F DIAST BP <80 MM HG: CPT | Mod: CPTII,S$GLB,, | Performed by: FAMILY MEDICINE

## 2021-02-19 PROCEDURE — 36415 COLL VENOUS BLD VENIPUNCTURE: CPT

## 2021-02-19 PROCEDURE — 3008F PR BODY MASS INDEX (BMI) DOCUMENTED: ICD-10-PCS | Mod: CPTII,S$GLB,, | Performed by: FAMILY MEDICINE

## 2021-02-19 PROCEDURE — 85025 COMPLETE CBC W/AUTO DIFF WBC: CPT

## 2021-02-19 PROCEDURE — 3074F PR MOST RECENT SYSTOLIC BLOOD PRESSURE < 130 MM HG: ICD-10-PCS | Mod: CPTII,S$GLB,, | Performed by: FAMILY MEDICINE

## 2021-02-19 PROCEDURE — 99214 PR OFFICE/OUTPT VISIT, EST, LEVL IV, 30-39 MIN: ICD-10-PCS | Mod: S$GLB,,, | Performed by: FAMILY MEDICINE

## 2021-02-19 PROCEDURE — 1159F PR MEDICATION LIST DOCUMENTED IN MEDICAL RECORD: ICD-10-PCS | Mod: S$GLB,,, | Performed by: FAMILY MEDICINE

## 2021-02-19 PROCEDURE — 84443 ASSAY THYROID STIM HORMONE: CPT

## 2021-02-19 PROCEDURE — 3044F PR MOST RECENT HEMOGLOBIN A1C LEVEL <7.0%: ICD-10-PCS | Mod: CPTII,S$GLB,, | Performed by: FAMILY MEDICINE

## 2021-02-19 PROCEDURE — 83036 HEMOGLOBIN GLYCOSYLATED A1C: CPT

## 2021-02-19 PROCEDURE — 99499 UNLISTED E&M SERVICE: CPT | Mod: S$GLB,,, | Performed by: FAMILY MEDICINE

## 2021-02-19 PROCEDURE — 1101F PT FALLS ASSESS-DOCD LE1/YR: CPT | Mod: CPTII,S$GLB,, | Performed by: FAMILY MEDICINE

## 2021-02-19 PROCEDURE — 83735 ASSAY OF MAGNESIUM: CPT

## 2021-02-19 PROCEDURE — 99999 PR PBB SHADOW E&M-EST. PATIENT-LVL V: CPT | Mod: PBBFAC,,, | Performed by: FAMILY MEDICINE

## 2021-02-19 PROCEDURE — 1101F PR PT FALLS ASSESS DOC 0-1 FALLS W/OUT INJ PAST YR: ICD-10-PCS | Mod: CPTII,S$GLB,, | Performed by: FAMILY MEDICINE

## 2021-02-19 RX ORDER — HYDROCODONE BITARTRATE AND ACETAMINOPHEN 10; 325 MG/1; MG/1
1 TABLET ORAL EVERY 6 HOURS PRN
Qty: 90 TABLET | Refills: 0 | Status: SHIPPED | OUTPATIENT
Start: 2021-02-19 | End: 2021-03-16 | Stop reason: SDUPTHER

## 2021-02-19 RX ORDER — HYDROCODONE BITARTRATE AND ACETAMINOPHEN 10; 325 MG/1; MG/1
1 TABLET ORAL EVERY 6 HOURS PRN
Qty: 90 TABLET | Refills: 0 | Status: SHIPPED | OUTPATIENT
Start: 2020-02-23 | End: 2021-02-19 | Stop reason: SDUPTHER

## 2021-02-22 ENCOUNTER — TELEPHONE (OUTPATIENT)
Dept: FAMILY MEDICINE | Facility: CLINIC | Age: 66
End: 2021-02-22

## 2021-03-16 ENCOUNTER — TELEPHONE (OUTPATIENT)
Dept: FAMILY MEDICINE | Facility: CLINIC | Age: 66
End: 2021-03-16

## 2021-03-16 DIAGNOSIS — S12.9XXS CLOSED FRACTURE OF CERVICAL VERTEBRA, UNSPECIFIED CERVICAL VERTEBRAL LEVEL, SEQUELA: ICD-10-CM

## 2021-03-16 DIAGNOSIS — M54.9 CHRONIC NECK AND BACK PAIN: ICD-10-CM
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
## 2021-03-16 DIAGNOSIS — M54.2 CHRONIC NECK AND BACK PAIN: ICD-10-CM

## 2021-03-16 DIAGNOSIS — G89.29 CHRONIC NECK AND BACK PAIN: ICD-10-CM

## 2021-03-16 RX ORDER — HYDROCODONE BITARTRATE AND ACETAMINOPHEN 10; 325 MG/1; MG/1
1 TABLET ORAL EVERY 6 HOURS PRN
Qty: 90 TABLET | Refills: 0 | Status: SHIPPED | OUTPATIENT
Start: 2021-03-16 | End: 2021-04-15 | Stop reason: SDUPTHER

## 2021-04-01 ENCOUNTER — TELEPHONE (OUTPATIENT)
Dept: FAMILY MEDICINE | Facility: CLINIC | Age: 66
End: 2021-04-01

## 2021-04-01 DIAGNOSIS — M51.36 DDD (DEGENERATIVE DISC DISEASE), LUMBAR: ICD-10-CM

## 2021-04-01 DIAGNOSIS — G89.4 CHRONIC PAIN SYNDROME: Primary | ICD-10-CM

## 2021-04-01 DIAGNOSIS — F33.2 SEVERE EPISODE OF RECURRENT MAJOR DEPRESSIVE DISORDER, WITHOUT PSYCHOTIC FEATURES: ICD-10-CM

## 2021-04-01 DIAGNOSIS — F11.20 OPIOID DEPENDENCE, DAILY USE: ICD-10-CM

## 2021-04-01 RX ORDER — DULOXETIN HYDROCHLORIDE 30 MG/1
30 CAPSULE, DELAYED RELEASE ORAL
Qty: 90 CAPSULE | Refills: 4 | Status: SHIPPED | OUTPATIENT
Start: 2021-04-01 | End: 2021-12-20 | Stop reason: ALTCHOICE

## 2021-04-05 ENCOUNTER — TELEPHONE (OUTPATIENT)
Dept: FAMILY MEDICINE | Facility: CLINIC | Age: 66
End: 2021-04-05

## 2021-04-06 ENCOUNTER — TELEPHONE (OUTPATIENT)
Dept: FAMILY MEDICINE | Facility: CLINIC | Age: 66
End: 2021-04-06

## 2021-04-09 ENCOUNTER — TELEPHONE (OUTPATIENT)
Dept: FAMILY MEDICINE | Facility: CLINIC | Age: 66
End: 2021-04-09

## 2021-04-09 DIAGNOSIS — M51.36 DDD (DEGENERATIVE DISC DISEASE), LUMBAR: Primary | ICD-10-CM

## 2021-04-09 DIAGNOSIS — G89.4 CHRONIC PAIN SYNDROME: ICD-10-CM

## 2021-04-09 DIAGNOSIS — F11.20 OPIOID DEPENDENCE, DAILY USE: ICD-10-CM

## 2021-04-10 ENCOUNTER — TELEPHONE (OUTPATIENT)
Dept: FAMILY MEDICINE | Facility: CLINIC | Age: 66
End: 2021-04-10

## 2021-04-12 ENCOUNTER — TELEPHONE (OUTPATIENT)
Dept: FAMILY MEDICINE | Facility: CLINIC | Age: 66
End: 2021-04-12

## 2021-04-13 ENCOUNTER — TELEPHONE (OUTPATIENT)
Dept: FAMILY MEDICINE | Facility: CLINIC | Age: 66
End: 2021-04-13

## 2021-04-15 DIAGNOSIS — M54.9 CHRONIC NECK AND BACK PAIN: ICD-10-CM
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
## 2021-04-15 DIAGNOSIS — M54.2 CHRONIC NECK AND BACK PAIN: ICD-10-CM

## 2021-04-15 DIAGNOSIS — S12.9XXS CLOSED FRACTURE OF CERVICAL VERTEBRA, UNSPECIFIED CERVICAL VERTEBRAL LEVEL, SEQUELA: ICD-10-CM

## 2021-04-15 DIAGNOSIS — G89.29 CHRONIC NECK AND BACK PAIN: ICD-10-CM

## 2021-04-16 RX ORDER — HYDROCODONE BITARTRATE AND ACETAMINOPHEN 10; 325 MG/1; MG/1
1 TABLET ORAL EVERY 6 HOURS PRN
Qty: 90 TABLET | Refills: 0 | Status: SHIPPED | OUTPATIENT
Start: 2021-04-16 | End: 2021-06-14 | Stop reason: SDUPTHER

## 2021-05-03 ENCOUNTER — TELEPHONE (OUTPATIENT)
Dept: FAMILY MEDICINE | Facility: CLINIC | Age: 66
End: 2021-05-03

## 2021-05-05 RX ORDER — LEVOTHYROXINE SODIUM 75 UG/1
75 TABLET ORAL DAILY
Qty: 90 TABLET | Refills: 3 | Status: SHIPPED | OUTPATIENT
Start: 2021-05-05 | End: 2021-12-20 | Stop reason: SDUPTHER

## 2021-05-14 ENCOUNTER — TELEPHONE (OUTPATIENT)
Dept: FAMILY MEDICINE | Facility: CLINIC | Age: 66
End: 2021-05-14

## 2021-05-21 ENCOUNTER — IMMUNIZATION (OUTPATIENT)
Dept: PRIMARY CARE CLINIC | Facility: CLINIC | Age: 66
End: 2021-05-21
Payer: MEDICARE

## 2021-05-21 DIAGNOSIS — Z23 NEED FOR VACCINATION: Primary | ICD-10-CM

## 2021-05-21 PROCEDURE — 0001A COVID-19, MRNA, LNP-S, PF, 30 MCG/0.3 ML DOSE VACCINE: ICD-10-PCS | Mod: CV19,S$GLB,, | Performed by: INTERNAL MEDICINE

## 2021-05-21 PROCEDURE — 91300 COVID-19, MRNA, LNP-S, PF, 30 MCG/0.3 ML DOSE VACCINE: CPT | Mod: S$GLB,,, | Performed by: INTERNAL MEDICINE

## 2021-05-21 PROCEDURE — 0001A COVID-19, MRNA, LNP-S, PF, 30 MCG/0.3 ML DOSE VACCINE: CPT | Mod: CV19,S$GLB,, | Performed by: INTERNAL MEDICINE

## 2021-05-21 PROCEDURE — 91300 COVID-19, MRNA, LNP-S, PF, 30 MCG/0.3 ML DOSE VACCINE: ICD-10-PCS | Mod: S$GLB,,, | Performed by: INTERNAL MEDICINE

## 2021-06-07 ENCOUNTER — PES CALL (OUTPATIENT)
Dept: ADMINISTRATIVE | Facility: CLINIC | Age: 66
End: 2021-06-07

## 2021-06-09 DIAGNOSIS — F41.9 ANXIETY: ICD-10-CM

## 2021-06-09 RX ORDER — ALPRAZOLAM 1 MG/1
1 TABLET ORAL 2 TIMES DAILY PRN
Qty: 45 TABLET | Refills: 5 | Status: SHIPPED | OUTPATIENT
Start: 2021-06-09 | End: 2021-09-07 | Stop reason: SDUPTHER

## 2021-06-11 ENCOUNTER — IMMUNIZATION (OUTPATIENT)
Dept: PRIMARY CARE CLINIC | Facility: CLINIC | Age: 66
End: 2021-06-11
Payer: MEDICARE

## 2021-06-11 DIAGNOSIS — Z23 NEED FOR VACCINATION: Primary | ICD-10-CM

## 2021-06-11 PROCEDURE — 91300 COVID-19, MRNA, LNP-S, PF, 30 MCG/0.3 ML DOSE VACCINE: CPT | Mod: PBBFAC | Performed by: INTERNAL MEDICINE

## 2021-06-11 PROCEDURE — 0002A COVID-19, MRNA, LNP-S, PF, 30 MCG/0.3 ML DOSE VACCINE: CPT | Mod: PBBFAC | Performed by: INTERNAL MEDICINE

## 2021-06-14 ENCOUNTER — TELEPHONE (OUTPATIENT)
Dept: FAMILY MEDICINE | Facility: CLINIC | Age: 66
End: 2021-06-14

## 2021-06-14 DIAGNOSIS — M54.2 CHRONIC NECK AND BACK PAIN: ICD-10-CM
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
## 2021-06-14 DIAGNOSIS — G89.29 CHRONIC NECK AND BACK PAIN: ICD-10-CM

## 2021-06-14 DIAGNOSIS — M54.9 CHRONIC NECK AND BACK PAIN: ICD-10-CM

## 2021-06-14 DIAGNOSIS — S12.9XXS CLOSED FRACTURE OF CERVICAL VERTEBRA, UNSPECIFIED CERVICAL VERTEBRAL LEVEL, SEQUELA: ICD-10-CM

## 2021-06-14 RX ORDER — HYDROCODONE BITARTRATE AND ACETAMINOPHEN 10; 325 MG/1; MG/1
1 TABLET ORAL EVERY 6 HOURS PRN
Qty: 90 TABLET | Refills: 0 | Status: SHIPPED | OUTPATIENT
Start: 2021-06-14 | End: 2021-07-14 | Stop reason: SDUPTHER

## 2021-06-28 DIAGNOSIS — R11.0 NAUSEA: ICD-10-CM

## 2021-06-28 RX ORDER — ONDANSETRON 8 MG/1
8 TABLET, ORALLY DISINTEGRATING ORAL 3 TIMES DAILY PRN
Qty: 30 TABLET | Refills: 2 | Status: SHIPPED | OUTPATIENT
Start: 2021-06-28 | End: 2021-12-20 | Stop reason: SDUPTHER

## 2021-07-01 ENCOUNTER — PATIENT MESSAGE (OUTPATIENT)
Dept: ADMINISTRATIVE | Facility: OTHER | Age: 66
End: 2021-07-01

## 2021-07-06 ENCOUNTER — PATIENT MESSAGE (OUTPATIENT)
Dept: ADMINISTRATIVE | Facility: HOSPITAL | Age: 66
End: 2021-07-06

## 2021-07-07 ENCOUNTER — TELEPHONE (OUTPATIENT)
Dept: FAMILY MEDICINE | Facility: CLINIC | Age: 66
End: 2021-07-07

## 2021-07-07 ENCOUNTER — PATIENT MESSAGE (OUTPATIENT)
Dept: ADMINISTRATIVE | Facility: HOSPITAL | Age: 66
End: 2021-07-07

## 2021-07-14 DIAGNOSIS — M54.2 CHRONIC NECK AND BACK PAIN: ICD-10-CM

## 2021-07-14 DIAGNOSIS — G89.29 CHRONIC NECK AND BACK PAIN: ICD-10-CM

## 2021-07-14 DIAGNOSIS — S12.9XXS CLOSED FRACTURE OF CERVICAL VERTEBRA, UNSPECIFIED CERVICAL VERTEBRAL LEVEL, SEQUELA: ICD-10-CM

## 2021-07-14 DIAGNOSIS — M54.9 CHRONIC NECK AND BACK PAIN: ICD-10-CM
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
## 2021-07-14 RX ORDER — HYDROCODONE BITARTRATE AND ACETAMINOPHEN 10; 325 MG/1; MG/1
1 TABLET ORAL EVERY 6 HOURS PRN
Qty: 90 TABLET | Refills: 0 | Status: SHIPPED | OUTPATIENT
Start: 2021-07-14 | End: 2021-08-12 | Stop reason: SDUPTHER

## 2021-07-16 ENCOUNTER — OFFICE VISIT (OUTPATIENT)
Dept: FAMILY MEDICINE | Facility: CLINIC | Age: 66
End: 2021-07-16
Payer: MEDICARE

## 2021-07-16 VITALS
HEART RATE: 81 BPM | OXYGEN SATURATION: 98 % | DIASTOLIC BLOOD PRESSURE: 74 MMHG | HEIGHT: 62 IN | BODY MASS INDEX: 25.6 KG/M2 | WEIGHT: 139.13 LBS | SYSTOLIC BLOOD PRESSURE: 138 MMHG

## 2021-07-16 DIAGNOSIS — Z86.19 HISTORY OF HEPATITIS C: ICD-10-CM

## 2021-07-16 DIAGNOSIS — S32.030S CLOSED COMPRESSION FRACTURE OF L3 LUMBAR VERTEBRA, SEQUELA: ICD-10-CM

## 2021-07-16 DIAGNOSIS — M51.36 DDD (DEGENERATIVE DISC DISEASE), LUMBAR: ICD-10-CM

## 2021-07-16 DIAGNOSIS — M54.2 CHRONIC NECK AND BACK PAIN: ICD-10-CM

## 2021-07-16 DIAGNOSIS — K74.60 HEPATIC CIRRHOSIS, UNSPECIFIED HEPATIC CIRRHOSIS TYPE, UNSPECIFIED WHETHER ASCITES PRESENT: ICD-10-CM

## 2021-07-16 DIAGNOSIS — K75.4 AUTOIMMUNE HEPATITIS: ICD-10-CM

## 2021-07-16 DIAGNOSIS — M54.9 CHRONIC NECK AND BACK PAIN: ICD-10-CM

## 2021-07-16 DIAGNOSIS — F10.11 HISTORY OF ALCOHOL ABUSE: ICD-10-CM

## 2021-07-16 DIAGNOSIS — M81.0 POSTMENOPAUSAL OSTEOPOROSIS: ICD-10-CM

## 2021-07-16 DIAGNOSIS — E55.9 VITAMIN D DEFICIENCY: ICD-10-CM

## 2021-07-16 DIAGNOSIS — K83.8 DILATED BILE DUCT: Primary | ICD-10-CM

## 2021-07-16 DIAGNOSIS — E03.9 ACQUIRED HYPOTHYROIDISM: ICD-10-CM

## 2021-07-16 DIAGNOSIS — G89.29 CHRONIC NECK AND BACK PAIN: ICD-10-CM

## 2021-07-16 PROCEDURE — 1125F PR PAIN SEVERITY QUANTIFIED, PAIN PRESENT: ICD-10-PCS | Mod: S$GLB,,, | Performed by: FAMILY MEDICINE

## 2021-07-16 PROCEDURE — 3008F BODY MASS INDEX DOCD: CPT | Mod: CPTII,S$GLB,, | Performed by: FAMILY MEDICINE

## 2021-07-16 PROCEDURE — 3008F PR BODY MASS INDEX (BMI) DOCUMENTED: ICD-10-PCS | Mod: CPTII,S$GLB,, | Performed by: FAMILY MEDICINE

## 2021-07-16 PROCEDURE — 99999 PR PBB SHADOW E&M-EST. PATIENT-LVL V: CPT | Mod: PBBFAC,,, | Performed by: FAMILY MEDICINE

## 2021-07-16 PROCEDURE — 99999 PR PBB SHADOW E&M-EST. PATIENT-LVL V: ICD-10-PCS | Mod: PBBFAC,,, | Performed by: FAMILY MEDICINE

## 2021-07-16 PROCEDURE — 1125F AMNT PAIN NOTED PAIN PRSNT: CPT | Mod: S$GLB,,, | Performed by: FAMILY MEDICINE

## 2021-07-16 PROCEDURE — 1101F PR PT FALLS ASSESS DOC 0-1 FALLS W/OUT INJ PAST YR: ICD-10-PCS | Mod: CPTII,S$GLB,, | Performed by: FAMILY MEDICINE

## 2021-07-16 PROCEDURE — 1159F PR MEDICATION LIST DOCUMENTED IN MEDICAL RECORD: ICD-10-PCS | Mod: S$GLB,,, | Performed by: FAMILY MEDICINE

## 2021-07-16 PROCEDURE — 99214 OFFICE O/P EST MOD 30 MIN: CPT | Mod: S$GLB,,, | Performed by: FAMILY MEDICINE

## 2021-07-16 PROCEDURE — 3288F FALL RISK ASSESSMENT DOCD: CPT | Mod: CPTII,S$GLB,, | Performed by: FAMILY MEDICINE

## 2021-07-16 PROCEDURE — 3288F PR FALLS RISK ASSESSMENT DOCUMENTED: ICD-10-PCS | Mod: CPTII,S$GLB,, | Performed by: FAMILY MEDICINE

## 2021-07-16 PROCEDURE — 1101F PT FALLS ASSESS-DOCD LE1/YR: CPT | Mod: CPTII,S$GLB,, | Performed by: FAMILY MEDICINE

## 2021-07-16 PROCEDURE — 99214 PR OFFICE/OUTPT VISIT, EST, LEVL IV, 30-39 MIN: ICD-10-PCS | Mod: S$GLB,,, | Performed by: FAMILY MEDICINE

## 2021-07-16 PROCEDURE — 1159F MED LIST DOCD IN RCRD: CPT | Mod: S$GLB,,, | Performed by: FAMILY MEDICINE

## 2021-07-21 ENCOUNTER — TELEPHONE (OUTPATIENT)
Dept: NEUROSURGERY | Facility: CLINIC | Age: 66
End: 2021-07-21

## 2021-07-23 ENCOUNTER — HOSPITAL ENCOUNTER (EMERGENCY)
Facility: HOSPITAL | Age: 66
Discharge: HOME | End: 2021-07-23
Attending: EMERGENCY MEDICINE
Payer: MEDICARE

## 2021-07-23 VITALS
BODY MASS INDEX: 24.84 KG/M2 | DIASTOLIC BLOOD PRESSURE: 65 MMHG | HEART RATE: 55 BPM | OXYGEN SATURATION: 100 % | HEIGHT: 62 IN | TEMPERATURE: 98 F | WEIGHT: 135 LBS | RESPIRATION RATE: 16 BRPM | SYSTOLIC BLOOD PRESSURE: 148 MMHG

## 2021-07-23 DIAGNOSIS — G89.29 CHRONIC RIGHT-SIDED LOW BACK PAIN WITH RIGHT-SIDED SCIATICA: Primary | ICD-10-CM

## 2021-07-23 DIAGNOSIS — R10.9 CHRONIC ABDOMINAL PAIN: ICD-10-CM

## 2021-07-23 DIAGNOSIS — G89.29 CHRONIC ABDOMINAL PAIN: ICD-10-CM

## 2021-07-23 DIAGNOSIS — R10.11 RUQ ABDOMINAL PAIN: ICD-10-CM

## 2021-07-23 DIAGNOSIS — M54.41 CHRONIC RIGHT-SIDED LOW BACK PAIN WITH RIGHT-SIDED SCIATICA: Primary | ICD-10-CM

## 2021-07-23 LAB
ALBUMIN SERPL BCP-MCNC: 4.2 G/DL (ref 3.5–5.2)
ALP SERPL-CCNC: 136 U/L (ref 55–135)
ALT SERPL W/O P-5'-P-CCNC: 14 U/L (ref 10–44)
ANION GAP SERPL CALC-SCNC: 13 MMOL/L (ref 8–16)
AST SERPL-CCNC: 17 U/L (ref 10–40)
BILIRUB SERPL-MCNC: 0.4 MG/DL (ref 0.1–1)
BUN SERPL-MCNC: 8 MG/DL (ref 8–23)
CALCIUM SERPL-MCNC: 9.8 MG/DL (ref 8.7–10.5)
CHLORIDE SERPL-SCNC: 107 MMOL/L (ref 95–110)
CO2 SERPL-SCNC: 21 MMOL/L (ref 23–29)
CREAT SERPL-MCNC: 1 MG/DL (ref 0.5–1.4)
ERYTHROCYTE [DISTWIDTH] IN BLOOD BY AUTOMATED COUNT: 16.3 % (ref 11.5–14.5)
EST. GFR  (AFRICAN AMERICAN): >60 ML/MIN/1.73 M^2
EST. GFR  (NON AFRICAN AMERICAN): 58 ML/MIN/1.73 M^2
GLUCOSE SERPL-MCNC: 64 MG/DL (ref 70–110)
HCT VFR BLD AUTO: 41.7 % (ref 37–48.5)
HGB BLD-MCNC: 13.4 G/DL (ref 12–16)
LIPASE SERPL-CCNC: 9 U/L (ref 4–60)
MCH RBC QN AUTO: 30 PG (ref 27–31)
MCHC RBC AUTO-ENTMCNC: 32.1 G/DL (ref 32–36)
MCV RBC AUTO: 94 FL (ref 82–98)
PLATELET # BLD AUTO: 223 K/UL (ref 150–450)
PMV BLD AUTO: 11.2 FL (ref 9.2–12.9)
POTASSIUM SERPL-SCNC: 3.7 MMOL/L (ref 3.5–5.1)
PROT SERPL-MCNC: 8 G/DL (ref 6–8.4)
RBC # BLD AUTO: 4.46 M/UL (ref 4–5.4)
SODIUM SERPL-SCNC: 141 MMOL/L (ref 136–145)
WBC # BLD AUTO: 5.79 K/UL (ref 3.9–12.7)

## 2021-07-23 PROCEDURE — 96375 TX/PRO/DX INJ NEW DRUG ADDON: CPT

## 2021-07-23 PROCEDURE — 83690 ASSAY OF LIPASE: CPT | Performed by: EMERGENCY MEDICINE

## 2021-07-23 PROCEDURE — 85027 COMPLETE CBC AUTOMATED: CPT | Performed by: EMERGENCY MEDICINE

## 2021-07-23 PROCEDURE — 99284 EMERGENCY DEPT VISIT MOD MDM: CPT | Mod: 25

## 2021-07-23 PROCEDURE — 80053 COMPREHEN METABOLIC PANEL: CPT | Performed by: EMERGENCY MEDICINE

## 2021-07-23 PROCEDURE — 63600175 PHARM REV CODE 636 W HCPCS: Performed by: EMERGENCY MEDICINE

## 2021-07-23 PROCEDURE — 96374 THER/PROPH/DIAG INJ IV PUSH: CPT

## 2021-07-23 RX ORDER — MORPHINE SULFATE 4 MG/ML
4 INJECTION, SOLUTION INTRAMUSCULAR; INTRAVENOUS
Status: COMPLETED | OUTPATIENT
Start: 2021-07-23 | End: 2021-07-23

## 2021-07-23 RX ORDER — ONDANSETRON 2 MG/ML
4 INJECTION INTRAMUSCULAR; INTRAVENOUS
Status: COMPLETED | OUTPATIENT
Start: 2021-07-23 | End: 2021-07-23

## 2021-07-23 RX ADMIN — MORPHINE SULFATE 4 MG: 4 INJECTION INTRAVENOUS at 07:07

## 2021-07-23 RX ADMIN — ONDANSETRON 4 MG: 2 INJECTION INTRAMUSCULAR; INTRAVENOUS at 07:07

## 2021-07-27 ENCOUNTER — TELEPHONE (OUTPATIENT)
Dept: NEUROSURGERY | Facility: CLINIC | Age: 66
End: 2021-07-27

## 2021-07-30 ENCOUNTER — HOSPITAL ENCOUNTER (OUTPATIENT)
Dept: RADIOLOGY | Facility: HOSPITAL | Age: 66
Discharge: HOME | End: 2021-07-30
Attending: FAMILY MEDICINE
Payer: MEDICARE

## 2021-07-30 DIAGNOSIS — K74.60 HEPATIC CIRRHOSIS, UNSPECIFIED HEPATIC CIRRHOSIS TYPE, UNSPECIFIED WHETHER ASCITES PRESENT: ICD-10-CM

## 2021-07-30 DIAGNOSIS — K83.8 DILATED BILE DUCT: ICD-10-CM

## 2021-07-30 DIAGNOSIS — K75.4 AUTOIMMUNE HEPATITIS: ICD-10-CM

## 2021-07-30 PROCEDURE — 93975 VASCULAR STUDY: CPT | Mod: TC

## 2021-07-30 PROCEDURE — 93975 US ABDOMEN COMP WITH DOPPLER (XPD): ICD-10-PCS | Mod: 26,,, | Performed by: RADIOLOGY

## 2021-07-30 PROCEDURE — 93975 VASCULAR STUDY: CPT | Mod: 26,,, | Performed by: RADIOLOGY

## 2021-08-12 DIAGNOSIS — M54.2 CHRONIC NECK AND BACK PAIN: ICD-10-CM
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
## 2021-08-12 DIAGNOSIS — M54.9 CHRONIC NECK AND BACK PAIN: ICD-10-CM

## 2021-08-12 DIAGNOSIS — S12.9XXS CLOSED FRACTURE OF CERVICAL VERTEBRA, UNSPECIFIED CERVICAL VERTEBRAL LEVEL, SEQUELA: ICD-10-CM

## 2021-08-12 DIAGNOSIS — G89.29 CHRONIC NECK AND BACK PAIN: ICD-10-CM

## 2021-08-12 RX ORDER — HYDROCODONE BITARTRATE AND ACETAMINOPHEN 10; 325 MG/1; MG/1
1 TABLET ORAL EVERY 6 HOURS PRN
Qty: 90 TABLET | Refills: 0 | Status: SHIPPED | OUTPATIENT
Start: 2021-08-12 | End: 2021-09-06 | Stop reason: SDUPTHER

## 2021-09-03 ENCOUNTER — TELEPHONE (OUTPATIENT)
Dept: NEUROLOGY | Facility: CLINIC | Age: 66
End: 2021-09-03

## 2021-09-06 DIAGNOSIS — S12.9XXS CLOSED FRACTURE OF CERVICAL VERTEBRA, UNSPECIFIED CERVICAL VERTEBRAL LEVEL, SEQUELA: ICD-10-CM

## 2021-09-06 DIAGNOSIS — M54.9 CHRONIC NECK AND BACK PAIN: ICD-10-CM

## 2021-09-06 DIAGNOSIS — G89.29 CHRONIC NECK AND BACK PAIN: ICD-10-CM
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
## 2021-09-06 DIAGNOSIS — M54.2 CHRONIC NECK AND BACK PAIN: ICD-10-CM

## 2021-09-06 RX ORDER — HYDROCODONE BITARTRATE AND ACETAMINOPHEN 10; 325 MG/1; MG/1
1 TABLET ORAL EVERY 6 HOURS PRN
Qty: 90 TABLET | Refills: 0 | Status: SHIPPED | OUTPATIENT
Start: 2021-09-06 | End: 2021-09-07 | Stop reason: SDUPTHER

## 2021-09-07 ENCOUNTER — TELEPHONE (OUTPATIENT)
Dept: INTERNAL MEDICINE | Facility: CLINIC | Age: 66
End: 2021-09-07

## 2021-09-07 DIAGNOSIS — M54.9 CHRONIC NECK AND BACK PAIN: ICD-10-CM

## 2021-09-07 DIAGNOSIS — F41.9 ANXIETY: ICD-10-CM
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
## 2021-09-07 DIAGNOSIS — M54.2 CHRONIC NECK AND BACK PAIN: ICD-10-CM

## 2021-09-07 DIAGNOSIS — G89.29 CHRONIC NECK AND BACK PAIN: ICD-10-CM

## 2021-09-07 DIAGNOSIS — S12.9XXS CLOSED FRACTURE OF CERVICAL VERTEBRA, UNSPECIFIED CERVICAL VERTEBRAL LEVEL, SEQUELA: ICD-10-CM

## 2021-09-07 RX ORDER — ALPRAZOLAM 1 MG/1
1 TABLET ORAL 2 TIMES DAILY PRN
Qty: 45 TABLET | Refills: 5 | Status: SHIPPED | OUTPATIENT
Start: 2021-09-07 | End: 2021-12-20 | Stop reason: SDUPTHER

## 2021-09-07 RX ORDER — HYDROCODONE BITARTRATE AND ACETAMINOPHEN 10; 325 MG/1; MG/1
1 TABLET ORAL EVERY 6 HOURS PRN
Qty: 90 TABLET | Refills: 0 | Status: SHIPPED | OUTPATIENT
Start: 2021-09-07 | End: 2021-10-07 | Stop reason: SDUPTHER

## 2021-09-08 ENCOUNTER — TELEPHONE (OUTPATIENT)
Dept: FAMILY MEDICINE | Facility: CLINIC | Age: 66
End: 2021-09-08

## 2021-09-23 ENCOUNTER — TELEPHONE (OUTPATIENT)
Dept: FAMILY MEDICINE | Facility: CLINIC | Age: 66
End: 2021-09-23

## 2021-10-04 ENCOUNTER — PATIENT MESSAGE (OUTPATIENT)
Dept: ADMINISTRATIVE | Facility: HOSPITAL | Age: 66
End: 2021-10-04

## 2021-10-07 DIAGNOSIS — M54.2 CHRONIC NECK AND BACK PAIN: ICD-10-CM

## 2021-10-07 DIAGNOSIS — S12.9XXS CLOSED FRACTURE OF CERVICAL VERTEBRA, UNSPECIFIED CERVICAL VERTEBRAL LEVEL, SEQUELA: ICD-10-CM

## 2021-10-07 DIAGNOSIS — G89.29 CHRONIC NECK AND BACK PAIN: ICD-10-CM

## 2021-10-07 DIAGNOSIS — M54.9 CHRONIC NECK AND BACK PAIN: ICD-10-CM
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
## 2021-10-07 RX ORDER — HYDROCODONE BITARTRATE AND ACETAMINOPHEN 10; 325 MG/1; MG/1
1 TABLET ORAL EVERY 6 HOURS PRN
Qty: 90 TABLET | Refills: 0 | Status: SHIPPED | OUTPATIENT
Start: 2021-10-07 | End: 2021-11-02 | Stop reason: SDUPTHER

## 2021-10-12 ENCOUNTER — TELEPHONE (OUTPATIENT)
Dept: FAMILY MEDICINE | Facility: CLINIC | Age: 66
End: 2021-10-12

## 2021-10-12 DIAGNOSIS — G89.29 CHRONIC NECK AND BACK PAIN: ICD-10-CM

## 2021-10-12 DIAGNOSIS — M51.36 DDD (DEGENERATIVE DISC DISEASE), LUMBAR: ICD-10-CM

## 2021-10-12 DIAGNOSIS — M50.30 DDD (DEGENERATIVE DISC DISEASE), CERVICAL: Primary | ICD-10-CM

## 2021-10-12 DIAGNOSIS — M54.2 CHRONIC NECK AND BACK PAIN: ICD-10-CM

## 2021-10-12 DIAGNOSIS — M54.9 CHRONIC NECK AND BACK PAIN: ICD-10-CM

## 2021-10-13 ENCOUNTER — TELEPHONE (OUTPATIENT)
Dept: ENDOSCOPY | Facility: HOSPITAL | Age: 66
End: 2021-10-13

## 2021-10-13 DIAGNOSIS — D13.2 ADENOMATOUS DUODENAL POLYP: Primary | ICD-10-CM

## 2021-10-29 ENCOUNTER — TELEPHONE (OUTPATIENT)
Dept: FAMILY MEDICINE | Facility: CLINIC | Age: 66
End: 2021-10-29
Payer: MEDICAID

## 2021-11-02 DIAGNOSIS — S12.9XXS CLOSED FRACTURE OF CERVICAL VERTEBRA, UNSPECIFIED CERVICAL VERTEBRAL LEVEL, SEQUELA: ICD-10-CM

## 2021-11-02 DIAGNOSIS — M54.9 CHRONIC NECK AND BACK PAIN: ICD-10-CM

## 2021-11-02 DIAGNOSIS — M54.2 CHRONIC NECK AND BACK PAIN: ICD-10-CM

## 2021-11-02 DIAGNOSIS — G89.29 CHRONIC NECK AND BACK PAIN: ICD-10-CM
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
## 2021-11-03 RX ORDER — HYDROCODONE BITARTRATE AND ACETAMINOPHEN 10; 325 MG/1; MG/1
1 TABLET ORAL EVERY 6 HOURS PRN
Qty: 90 TABLET | Refills: 0 | Status: SHIPPED | OUTPATIENT
Start: 2021-11-03 | End: 2021-11-29 | Stop reason: SDUPTHER

## 2021-11-10 ENCOUNTER — TELEPHONE (OUTPATIENT)
Dept: NEUROSURGERY | Facility: CLINIC | Age: 66
End: 2021-11-10
Payer: MEDICARE

## 2021-11-10 ENCOUNTER — PATIENT OUTREACH (OUTPATIENT)
Dept: ADMINISTRATIVE | Facility: OTHER | Age: 66
End: 2021-11-10
Payer: MEDICARE

## 2021-11-10 DIAGNOSIS — Z12.31 ENCOUNTER FOR SCREENING MAMMOGRAM FOR BREAST CANCER: Primary | ICD-10-CM

## 2021-11-10 DIAGNOSIS — E11.9 DIABETES MELLITUS WITHOUT COMPLICATION: ICD-10-CM

## 2021-11-11 ENCOUNTER — TELEPHONE (OUTPATIENT)
Dept: NEUROSURGERY | Facility: CLINIC | Age: 66
End: 2021-11-11
Payer: MEDICARE

## 2021-11-11 ENCOUNTER — OFFICE VISIT (OUTPATIENT)
Dept: NEUROSURGERY | Facility: CLINIC | Age: 66
End: 2021-11-11
Payer: MEDICARE

## 2021-11-11 VITALS
DIASTOLIC BLOOD PRESSURE: 73 MMHG | HEART RATE: 76 BPM | RESPIRATION RATE: 18 BRPM | SYSTOLIC BLOOD PRESSURE: 144 MMHG | WEIGHT: 134.5 LBS | HEIGHT: 62 IN | BODY MASS INDEX: 24.75 KG/M2

## 2021-11-11 DIAGNOSIS — M43.16 SPONDYLOLISTHESIS, LUMBAR REGION: ICD-10-CM

## 2021-11-11 DIAGNOSIS — G89.4 CHRONIC PAIN DISORDER: ICD-10-CM

## 2021-11-11 DIAGNOSIS — M51.36 DEGENERATIVE DISC DISEASE, LUMBAR: Primary | ICD-10-CM

## 2021-11-11 PROCEDURE — 3044F PR MOST RECENT HEMOGLOBIN A1C LEVEL <7.0%: ICD-10-PCS | Mod: CPTII,S$GLB,, | Performed by: NEUROLOGICAL SURGERY

## 2021-11-11 PROCEDURE — 99214 PR OFFICE/OUTPT VISIT, EST, LEVL IV, 30-39 MIN: ICD-10-PCS | Mod: S$GLB,,, | Performed by: NEUROLOGICAL SURGERY

## 2021-11-11 PROCEDURE — 3078F DIAST BP <80 MM HG: CPT | Mod: CPTII,S$GLB,, | Performed by: NEUROLOGICAL SURGERY

## 2021-11-11 PROCEDURE — 3044F HG A1C LEVEL LT 7.0%: CPT | Mod: CPTII,S$GLB,, | Performed by: NEUROLOGICAL SURGERY

## 2021-11-11 PROCEDURE — 3008F BODY MASS INDEX DOCD: CPT | Mod: CPTII,S$GLB,, | Performed by: NEUROLOGICAL SURGERY

## 2021-11-11 PROCEDURE — 99214 OFFICE O/P EST MOD 30 MIN: CPT | Mod: S$GLB,,, | Performed by: NEUROLOGICAL SURGERY

## 2021-11-11 PROCEDURE — 1159F MED LIST DOCD IN RCRD: CPT | Mod: CPTII,S$GLB,, | Performed by: NEUROLOGICAL SURGERY

## 2021-11-11 PROCEDURE — 3077F SYST BP >= 140 MM HG: CPT | Mod: CPTII,S$GLB,, | Performed by: NEUROLOGICAL SURGERY

## 2021-11-11 PROCEDURE — 1159F PR MEDICATION LIST DOCUMENTED IN MEDICAL RECORD: ICD-10-PCS | Mod: CPTII,S$GLB,, | Performed by: NEUROLOGICAL SURGERY

## 2021-11-11 PROCEDURE — 3008F PR BODY MASS INDEX (BMI) DOCUMENTED: ICD-10-PCS | Mod: CPTII,S$GLB,, | Performed by: NEUROLOGICAL SURGERY

## 2021-11-11 PROCEDURE — 3077F PR MOST RECENT SYSTOLIC BLOOD PRESSURE >= 140 MM HG: ICD-10-PCS | Mod: CPTII,S$GLB,, | Performed by: NEUROLOGICAL SURGERY

## 2021-11-11 PROCEDURE — 99999 PR PBB SHADOW E&M-EST. PATIENT-LVL III: ICD-10-PCS | Mod: PBBFAC,,, | Performed by: NEUROLOGICAL SURGERY

## 2021-11-11 PROCEDURE — 1125F PR PAIN SEVERITY QUANTIFIED, PAIN PRESENT: ICD-10-PCS | Mod: CPTII,S$GLB,, | Performed by: NEUROLOGICAL SURGERY

## 2021-11-11 PROCEDURE — 1125F AMNT PAIN NOTED PAIN PRSNT: CPT | Mod: CPTII,S$GLB,, | Performed by: NEUROLOGICAL SURGERY

## 2021-11-11 PROCEDURE — 1101F PR PT FALLS ASSESS DOC 0-1 FALLS W/OUT INJ PAST YR: ICD-10-PCS | Mod: CPTII,S$GLB,, | Performed by: NEUROLOGICAL SURGERY

## 2021-11-11 PROCEDURE — 99999 PR PBB SHADOW E&M-EST. PATIENT-LVL III: CPT | Mod: PBBFAC,,, | Performed by: NEUROLOGICAL SURGERY

## 2021-11-11 PROCEDURE — 3078F PR MOST RECENT DIASTOLIC BLOOD PRESSURE < 80 MM HG: ICD-10-PCS | Mod: CPTII,S$GLB,, | Performed by: NEUROLOGICAL SURGERY

## 2021-11-11 PROCEDURE — 3288F PR FALLS RISK ASSESSMENT DOCUMENTED: ICD-10-PCS | Mod: CPTII,S$GLB,, | Performed by: NEUROLOGICAL SURGERY

## 2021-11-11 PROCEDURE — 1101F PT FALLS ASSESS-DOCD LE1/YR: CPT | Mod: CPTII,S$GLB,, | Performed by: NEUROLOGICAL SURGERY

## 2021-11-11 PROCEDURE — 3288F FALL RISK ASSESSMENT DOCD: CPT | Mod: CPTII,S$GLB,, | Performed by: NEUROLOGICAL SURGERY

## 2021-11-15 ENCOUNTER — TELEPHONE (OUTPATIENT)
Dept: NEUROSURGERY | Facility: CLINIC | Age: 66
End: 2021-11-15
Payer: MEDICARE

## 2021-11-16 ENCOUNTER — TELEPHONE (OUTPATIENT)
Dept: NEUROSURGERY | Facility: CLINIC | Age: 66
End: 2021-11-16
Payer: MEDICARE

## 2021-11-17 ENCOUNTER — PATIENT MESSAGE (OUTPATIENT)
Dept: ADMINISTRATIVE | Facility: HOSPITAL | Age: 66
End: 2021-11-17
Payer: MEDICARE

## 2021-11-17 ENCOUNTER — TELEPHONE (OUTPATIENT)
Dept: NEUROSURGERY | Facility: CLINIC | Age: 66
End: 2021-11-17
Payer: MEDICARE

## 2021-11-19 ENCOUNTER — TELEPHONE (OUTPATIENT)
Dept: NEUROSURGERY | Facility: CLINIC | Age: 66
End: 2021-11-19
Payer: MEDICARE

## 2021-11-23 ENCOUNTER — TELEPHONE (OUTPATIENT)
Dept: FAMILY MEDICINE | Facility: CLINIC | Age: 66
End: 2021-11-23
Payer: MEDICARE

## 2021-11-29 DIAGNOSIS — S12.9XXS CLOSED FRACTURE OF CERVICAL VERTEBRA, UNSPECIFIED CERVICAL VERTEBRAL LEVEL, SEQUELA: ICD-10-CM

## 2021-11-29 DIAGNOSIS — M54.2 CHRONIC NECK AND BACK PAIN: ICD-10-CM
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
## 2021-11-29 DIAGNOSIS — G89.29 CHRONIC NECK AND BACK PAIN: ICD-10-CM

## 2021-11-29 DIAGNOSIS — M54.9 CHRONIC NECK AND BACK PAIN: ICD-10-CM

## 2021-11-30 ENCOUNTER — TELEPHONE (OUTPATIENT)
Dept: FAMILY MEDICINE | Facility: CLINIC | Age: 66
End: 2021-11-30
Payer: MEDICARE

## 2021-11-30 RX ORDER — HYDROCODONE BITARTRATE AND ACETAMINOPHEN 10; 325 MG/1; MG/1
1 TABLET ORAL EVERY 6 HOURS PRN
Qty: 90 TABLET | Refills: 0 | Status: SHIPPED | OUTPATIENT
Start: 2021-11-30 | End: 2021-12-20 | Stop reason: SDUPTHER

## 2021-12-01 DIAGNOSIS — E11.9 TYPE 2 DIABETES MELLITUS WITHOUT COMPLICATION, UNSPECIFIED WHETHER LONG TERM INSULIN USE: ICD-10-CM

## 2021-12-03 ENCOUNTER — TELEPHONE (OUTPATIENT)
Dept: ENDOSCOPY | Facility: HOSPITAL | Age: 66
End: 2021-12-03
Payer: MEDICARE

## 2021-12-06 ENCOUNTER — TELEPHONE (OUTPATIENT)
Dept: PAIN MEDICINE | Facility: CLINIC | Age: 66
End: 2021-12-06
Payer: MEDICARE

## 2021-12-10 RX ORDER — DICLOFENAC SODIUM 10 MG/G
2 GEL TOPICAL 4 TIMES DAILY
Qty: 100 G | Refills: 4 | Status: SHIPPED | OUTPATIENT
Start: 2021-12-10 | End: 2022-10-12 | Stop reason: SDUPTHER

## 2021-12-13 ENCOUNTER — TELEPHONE (OUTPATIENT)
Dept: FAMILY MEDICINE | Facility: CLINIC | Age: 66
End: 2021-12-13
Payer: MEDICARE

## 2021-12-14 ENCOUNTER — TELEPHONE (OUTPATIENT)
Dept: FAMILY MEDICINE | Facility: CLINIC | Age: 66
End: 2021-12-14
Payer: MEDICARE

## 2021-12-16 ENCOUNTER — PATIENT MESSAGE (OUTPATIENT)
Dept: ADMINISTRATIVE | Facility: HOSPITAL | Age: 66
End: 2021-12-16
Payer: MEDICARE

## 2021-12-17 ENCOUNTER — TELEPHONE (OUTPATIENT)
Dept: FAMILY MEDICINE | Facility: CLINIC | Age: 66
End: 2021-12-17
Payer: MEDICARE

## 2021-12-20 ENCOUNTER — OFFICE VISIT (OUTPATIENT)
Dept: FAMILY MEDICINE | Facility: CLINIC | Age: 66
End: 2021-12-20
Payer: MEDICARE

## 2021-12-20 ENCOUNTER — TELEPHONE (OUTPATIENT)
Dept: FAMILY MEDICINE | Facility: CLINIC | Age: 66
End: 2021-12-20
Payer: MEDICARE

## 2021-12-20 DIAGNOSIS — E55.9 VITAMIN D DEFICIENCY: ICD-10-CM

## 2021-12-20 DIAGNOSIS — R11.0 NAUSEA: ICD-10-CM

## 2021-12-20 DIAGNOSIS — Z51.81 ENCOUNTER FOR MONITORING LONG-TERM PROTON PUMP INHIBITOR THERAPY: ICD-10-CM

## 2021-12-20 DIAGNOSIS — M50.30 DDD (DEGENERATIVE DISC DISEASE), CERVICAL: ICD-10-CM

## 2021-12-20 DIAGNOSIS — G89.29 CHRONIC NECK AND BACK PAIN: ICD-10-CM

## 2021-12-20 DIAGNOSIS — Z78.0 POSTMENOPAUSAL: ICD-10-CM

## 2021-12-20 DIAGNOSIS — F41.9 ANXIETY: ICD-10-CM

## 2021-12-20 DIAGNOSIS — E03.9 ACQUIRED HYPOTHYROIDISM: ICD-10-CM

## 2021-12-20 DIAGNOSIS — M51.36 DDD (DEGENERATIVE DISC DISEASE), LUMBAR: ICD-10-CM

## 2021-12-20 DIAGNOSIS — Z79.899 ENCOUNTER FOR MONITORING LONG-TERM PROTON PUMP INHIBITOR THERAPY: ICD-10-CM

## 2021-12-20 DIAGNOSIS — Z79.899 MEDICATION MANAGEMENT: ICD-10-CM

## 2021-12-20 DIAGNOSIS — Z72.0 TOBACCO ABUSE: ICD-10-CM

## 2021-12-20 DIAGNOSIS — S12.9XXS CLOSED FRACTURE OF CERVICAL VERTEBRA, UNSPECIFIED CERVICAL VERTEBRAL LEVEL, SEQUELA: ICD-10-CM

## 2021-12-20 DIAGNOSIS — E11.49 DIABETES MELLITUS TYPE 2 WITH NEUROLOGICAL MANIFESTATIONS: Primary | ICD-10-CM

## 2021-12-20 DIAGNOSIS — M81.0 POSTMENOPAUSAL OSTEOPOROSIS: ICD-10-CM
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
## 2021-12-20 DIAGNOSIS — M54.2 CHRONIC NECK AND BACK PAIN: ICD-10-CM

## 2021-12-20 DIAGNOSIS — K59.09 OTHER CONSTIPATION: ICD-10-CM

## 2021-12-20 DIAGNOSIS — K21.9 GASTROESOPHAGEAL REFLUX DISEASE, UNSPECIFIED WHETHER ESOPHAGITIS PRESENT: ICD-10-CM

## 2021-12-20 DIAGNOSIS — M54.9 CHRONIC NECK AND BACK PAIN: ICD-10-CM

## 2021-12-20 DIAGNOSIS — Z86.19 HISTORY OF HEPATITIS C: ICD-10-CM

## 2021-12-20 DIAGNOSIS — K75.4 AUTOIMMUNE HEPATITIS: ICD-10-CM

## 2021-12-20 PROCEDURE — 99214 OFFICE O/P EST MOD 30 MIN: CPT | Mod: HCNC,95,, | Performed by: FAMILY MEDICINE

## 2021-12-20 PROCEDURE — 99214 PR OFFICE/OUTPT VISIT, EST, LEVL IV, 30-39 MIN: ICD-10-PCS | Mod: HCNC,95,, | Performed by: FAMILY MEDICINE

## 2021-12-20 RX ORDER — HYDROCODONE BITARTRATE AND ACETAMINOPHEN 10; 325 MG/1; MG/1
1 TABLET ORAL EVERY 6 HOURS PRN
Qty: 90 TABLET | Refills: 0 | Status: SHIPPED | OUTPATIENT
Start: 2021-12-30 | End: 2022-01-18 | Stop reason: SDUPTHER

## 2021-12-20 RX ORDER — CHOLECALCIFEROL (VITAMIN D3) 125 MCG
CAPSULE ORAL
Qty: 100 CAPSULE | Refills: 4 | Status: SHIPPED | OUTPATIENT
Start: 2021-12-20 | End: 2022-10-12 | Stop reason: SDUPTHER

## 2021-12-20 RX ORDER — PANTOPRAZOLE SODIUM 40 MG/1
40 TABLET, DELAYED RELEASE ORAL DAILY
Qty: 90 TABLET | Refills: 3 | Status: SHIPPED | OUTPATIENT
Start: 2021-12-20 | End: 2022-03-13

## 2021-12-20 RX ORDER — ONDANSETRON 8 MG/1
8 TABLET, ORALLY DISINTEGRATING ORAL 3 TIMES DAILY PRN
Qty: 30 TABLET | Refills: 11 | Status: SHIPPED | OUTPATIENT
Start: 2021-12-20 | End: 2022-10-12 | Stop reason: SDUPTHER

## 2021-12-20 RX ORDER — AMOXICILLIN 250 MG
3 CAPSULE ORAL DAILY
Qty: 90 TABLET | Refills: 11 | Status: SHIPPED | OUTPATIENT
Start: 2021-12-20

## 2021-12-20 RX ORDER — LEVOTHYROXINE SODIUM 75 UG/1
75 TABLET ORAL DAILY
Qty: 90 TABLET | Refills: 3 | Status: SHIPPED | OUTPATIENT
Start: 2021-12-20 | End: 2022-07-07

## 2021-12-20 RX ORDER — ALPRAZOLAM 1 MG/1
1 TABLET ORAL 2 TIMES DAILY PRN
Qty: 45 TABLET | Refills: 5 | Status: SHIPPED | OUTPATIENT
Start: 2021-12-20 | End: 2022-01-18 | Stop reason: SDUPTHER

## 2021-12-22 ENCOUNTER — PATIENT OUTREACH (OUTPATIENT)
Dept: ADMINISTRATIVE | Facility: OTHER | Age: 66
End: 2021-12-22
Payer: MEDICARE

## 2021-12-27 ENCOUNTER — PATIENT MESSAGE (OUTPATIENT)
Dept: ADMINISTRATIVE | Facility: HOSPITAL | Age: 66
End: 2021-12-27
Payer: MEDICARE

## 2021-12-28 ENCOUNTER — HOSPITAL ENCOUNTER (OUTPATIENT)
Dept: RADIOLOGY | Facility: HOSPITAL | Age: 66
Discharge: HOME | End: 2021-12-28
Attending: NEUROLOGICAL SURGERY
Payer: MEDICARE

## 2021-12-28 ENCOUNTER — OFFICE VISIT (OUTPATIENT)
Dept: NEUROSURGERY | Facility: CLINIC | Age: 66
End: 2021-12-28
Payer: MEDICARE

## 2021-12-28 VITALS — RESPIRATION RATE: 16 BRPM | WEIGHT: 134 LBS | HEIGHT: 62 IN | BODY MASS INDEX: 24.66 KG/M2

## 2021-12-28 DIAGNOSIS — T85.192D MALFUNCTION OF SPINAL CORD STIMULATOR, SUBSEQUENT ENCOUNTER: ICD-10-CM

## 2021-12-28 DIAGNOSIS — G89.4 CHRONIC PAIN SYNDROME: ICD-10-CM

## 2021-12-28 DIAGNOSIS — M47.26 OTHER SPONDYLOSIS WITH RADICULOPATHY, LUMBAR REGION: ICD-10-CM

## 2021-12-28 DIAGNOSIS — M47.26 OTHER SPONDYLOSIS WITH RADICULOPATHY, LUMBAR REGION: Primary | ICD-10-CM

## 2021-12-28 PROCEDURE — 3008F BODY MASS INDEX DOCD: CPT | Mod: CPTII,S$GLB,, | Performed by: NEUROLOGICAL SURGERY

## 2021-12-28 PROCEDURE — 99999 PR PBB SHADOW E&M-EST. PATIENT-LVL IV: ICD-10-PCS | Mod: PBBFAC,,, | Performed by: NEUROLOGICAL SURGERY

## 2021-12-28 PROCEDURE — 72070 XR THORACIC SPINE AP LATERAL: ICD-10-PCS | Mod: 26,HCNC,, | Performed by: RADIOLOGY

## 2021-12-28 PROCEDURE — 72100 X-RAY EXAM L-S SPINE 2/3 VWS: CPT | Mod: TC,HCNC

## 2021-12-28 PROCEDURE — 99214 OFFICE O/P EST MOD 30 MIN: CPT | Mod: S$GLB,,, | Performed by: NEUROLOGICAL SURGERY

## 2021-12-28 PROCEDURE — 1101F PT FALLS ASSESS-DOCD LE1/YR: CPT | Mod: CPTII,S$GLB,, | Performed by: NEUROLOGICAL SURGERY

## 2021-12-28 PROCEDURE — 1159F MED LIST DOCD IN RCRD: CPT | Mod: CPTII,S$GLB,, | Performed by: NEUROLOGICAL SURGERY

## 2021-12-28 PROCEDURE — 3288F PR FALLS RISK ASSESSMENT DOCUMENTED: ICD-10-PCS | Mod: CPTII,S$GLB,, | Performed by: NEUROLOGICAL SURGERY

## 2021-12-28 PROCEDURE — 72100 X-RAY EXAM L-S SPINE 2/3 VWS: CPT | Mod: 26,HCNC,, | Performed by: RADIOLOGY

## 2021-12-28 PROCEDURE — 3288F FALL RISK ASSESSMENT DOCD: CPT | Mod: CPTII,S$GLB,, | Performed by: NEUROLOGICAL SURGERY

## 2021-12-28 PROCEDURE — 72070 X-RAY EXAM THORAC SPINE 2VWS: CPT | Mod: TC,HCNC

## 2021-12-28 PROCEDURE — 1101F PR PT FALLS ASSESS DOC 0-1 FALLS W/OUT INJ PAST YR: ICD-10-PCS | Mod: CPTII,S$GLB,, | Performed by: NEUROLOGICAL SURGERY

## 2021-12-28 PROCEDURE — 99999 PR PBB SHADOW E&M-EST. PATIENT-LVL IV: CPT | Mod: PBBFAC,,, | Performed by: NEUROLOGICAL SURGERY

## 2021-12-28 PROCEDURE — 3008F PR BODY MASS INDEX (BMI) DOCUMENTED: ICD-10-PCS | Mod: CPTII,S$GLB,, | Performed by: NEUROLOGICAL SURGERY

## 2021-12-28 PROCEDURE — 72100 XR LUMBAR SPINE AP AND LATERAL: ICD-10-PCS | Mod: 26,HCNC,, | Performed by: RADIOLOGY

## 2021-12-28 PROCEDURE — 99214 PR OFFICE/OUTPT VISIT, EST, LEVL IV, 30-39 MIN: ICD-10-PCS | Mod: S$GLB,,, | Performed by: NEUROLOGICAL SURGERY

## 2021-12-28 PROCEDURE — 1125F PR PAIN SEVERITY QUANTIFIED, PAIN PRESENT: ICD-10-PCS | Mod: CPTII,S$GLB,, | Performed by: NEUROLOGICAL SURGERY

## 2021-12-28 PROCEDURE — 1125F AMNT PAIN NOTED PAIN PRSNT: CPT | Mod: CPTII,S$GLB,, | Performed by: NEUROLOGICAL SURGERY

## 2021-12-28 PROCEDURE — 1159F PR MEDICATION LIST DOCUMENTED IN MEDICAL RECORD: ICD-10-PCS | Mod: CPTII,S$GLB,, | Performed by: NEUROLOGICAL SURGERY

## 2021-12-28 PROCEDURE — 72070 X-RAY EXAM THORAC SPINE 2VWS: CPT | Mod: 26,HCNC,, | Performed by: RADIOLOGY

## 2021-12-28 PROCEDURE — 99214 OFFICE O/P EST MOD 30 MIN: CPT | Mod: PBBFAC,PO | Performed by: NEUROLOGICAL SURGERY

## 2021-12-30 ENCOUNTER — TELEPHONE (OUTPATIENT)
Dept: NEUROSURGERY | Facility: CLINIC | Age: 66
End: 2021-12-30
Payer: MEDICARE

## 2022-01-04 NOTE — PROGRESS NOTES
Subjective:      Patient ID: Thom Krishna is a 66 y.o. female.    Chief Complaint: Follow-up (Pt is here today for a follow up appointment pt last visit she told Dr White she lost her remote to her stimulator device, Dr White 's staff reached out to Jazmyne ([employer] Rep) she was able to assist the pt with a new remote for her device, pt states that she's still encountering back pain that travels down to her right leg rating pain 8/10. Pt denies PT and she currently takes Norco to help with her pain. )    Patient has since gotten her spinal cord stimulator  with [employer]  The reps are here today to attempt to program the device  The leads have good contacts and the system appears to be working properly  Patient continues complain of pain 10/10  No new symptoms since last visit            Previous notes  Patient here today for evaluation of LBP   Hx of lumbar fusion in the past/kyphoplasty/spinal cord stimulator ([employer])   I do not have records of these procedures   Has continued back pain 10/10   Bilateral LE symptoms   States that she lost her scs  and it has not worked since then   Reached out to the reps but unable to contact the office   She recently moved from another city   Ambulates unssisted    Follow-up  Associated symptoms include weakness. Pertinent negatives include no abdominal pain, chest pain, chills, headaches or sore throat.     Review of Systems   Constitutional: Negative for activity change, appetite change and chills.   HENT: Negative for hearing loss, sore throat and tinnitus.    Eyes: Negative for pain, discharge and itching.   Cardiovascular: Negative for chest pain.   Gastrointestinal: Negative for abdominal pain.   Endocrine: Negative for cold intolerance and heat intolerance.   Genitourinary: Negative for difficulty urinating and dysuria.   Musculoskeletal: Positive for back pain and gait problem.   Allergic/Immunologic: Negative for environmental allergies.   Neurological:  Positive for weakness. Negative for dizziness, tremors, light-headedness and headaches.   Hematological: Negative for adenopathy.   Psychiatric/Behavioral: Negative for agitation, behavioral problems and confusion.         Objective:       Neurosurgery Physical Exam  Ortho/SPM Exam    Nursing note and vitals reviewed  Gen:Oriented to person, place, and time.             Appears stated age   Skin: no rashes or lesions identified   Head:Normocephalic and atraumatic.  Nose: Nose normal.    Eyes: EOM are normal. Pupils are equal, round, and reactive to light.   Neck: Neck supple. No masses or lesions palpated  Cardiovascular: Intact distal pulses.    Abdominal: Soft.   Neurological: Alert and oriented to person, place, and time.  No cranial nerve deficit.  Coordination normal. Normal muscle tone  Psychiatric: Normal mood and affect. Behavior is normal.      Back:  None  Paraspinal muscle spasms   None  Pain with flexion and extention   WNL  Range of motion    Neg  Straight leg raise     Motor   Right Right Left Left  Level Group   5  5  L2 Hip flexor (Psoas)   5  5  L3 Leg extension (Quads)   5  5  L4 Dorsiflexion & foot inversion (Tibialis Anterior)   5  5  L5 Great toe extension ( EHL)   5  5  S1 Foot eversion (Gastroc, PL & PB)     Sensation  NL Decreased (R/L/BL) Level Sensation    X  L2 Anterio-medial thigh   X  L3 Medial thigh around knee   X  L4 Medial foot   X  L5 Dorsum foot   X  S1 Lateral foot     Reflex  2+  Patellar tendon (L4)   2+  Achilles tendon (S1)       Results for orders placed during the hospital encounter of 04/07/16    MRI Lumbar Spine Without Contrast    Narrative  HISTORY: Low back pain.    COMPARISON: Lumbar spine x-ray 06/02/15.    FINDINGS: There is a remote concave compression fracture involving the superior endplate of the L4 vertebral body that results in loss of approximate 60% of height.    The osseous structures are otherwise intact with no other evidence of fracture.  There is 5-6 mm  of anterolisthesis of L4 on L5.  The lumbar spine alignment is otherwise well maintained.    There is degenerative disc space narrowing most prominent at L3-L4, L4-L5, and L5-S1.  The visualized portions of the spinal cord are unremarkable.  The spinal cord terminates at the L1 level.    T12-L1: There is bilateral facet hypertrophy.  No evidence of disc bulge, neuroforaminal stenosis, or central canal stenosis.    L1-L2: There is bilateral facet hypertrophy.  No evidence of disc bulge, neuroforaminal stenosis, or central canal stenosis.    L2-L3: There is bilateral facet hypertrophy. No evidence of disc bulge, neuroforaminal stenosis, or central canal stenosis.    L3-L4: There is a diffuse posterior disc bulge and bilateral facet hypertrophy and ligamentous thickening.  There is mild bilateral neuroforaminal stenosis.  No central canal stenosis.    L4-L5: There is a diffuse posterior disc bulge and bilateral facet hypertrophy with ligamentous thickening.  There is moderate central canal stenosis.  There is moderate bilateral neuroforaminal stenosis.    L5-S1: There is a diffuse posterior disc bulge and bilateral facet hypertrophy.  No central canal or neuroforaminal stenosis.  IMPRESSION:      #1. Multilevel degenerative change of the lumbar spine most significant at L4-L5 as detailed above.    #2.  Remote compression fracture of the L4 superior endplate.  ______________________________________    Electronically signed by: JIGNA JARAMILLO MD  Date:     04/07/16  Time:    15:47     No results found for this or any previous visit.    Results for orders placed during the hospital encounter of 04/06/20    X-Ray Lumbar Spine Ap And Lateral    Narrative  EXAMINATION:  XR LUMBAR SPINE AP AND LATERAL    CLINICAL HISTORY:  Low back pain, minor trauma;    TECHNIQUE:  AP, lateral and spot images were performed of the lumbar spine.    COMPARISON:  01/10/2017    FINDINGS:  Status post L4-L5 instrumented fusion.  Hardware is  intact.  Note made of grade 1 anterolisthesis at L4-L5.    Vertebral body heights are maintained.  Moderate disc height loss noted throughout the lower lumbar spine with facet arthropathy.  No lytic or blastic lesions.  Note made of vascular calcification and surgical clips.    Impression  As above.      Electronically signed by: Theodore Schulte MD  Date:    04/06/2020  Time:    11:32         I  reviewed all pertinent imaging regarding this case.  Assessment:     1. Other spondylosis with radiculopathy, lumbar region    2. Malfunction of spinal cord stimulator, subsequent encounter    3. Chronic pain syndrome      Plan:     Other spondylosis with radiculopathy, lumbar region  -     X-Ray Thoracic Spine AP Lateral; Future; Expected date: 12/28/2021  -     X-Ray Lumbar Spine AP And Lateral; Future; Expected date: 12/28/2021    Malfunction of spinal cord stimulator, subsequent encounter    Chronic pain syndrome    The patient's spinal cord stimulator along with the leads and battery appear to be working properly based on the [facility] assessment today in the office.  I do not have any imaging of her leads  Will get an x-ray of the thoracic spine to make sure the leads have not migrated.  Since I did not place the device I do not have any imaging to find out where the leads were placed originally      Thank you for the referral   Please call with any questions    Mandeep White MD  Neurosurgery     Disclaimer: This note was prepared using a voice recognition system and is likely to have sound alike errors within the text.

## 2022-01-10 ENCOUNTER — PATIENT MESSAGE (OUTPATIENT)
Dept: ADMINISTRATIVE | Facility: HOSPITAL | Age: 67
End: 2022-01-10
Payer: MEDICARE

## 2022-01-18 ENCOUNTER — TELEPHONE (OUTPATIENT)
Dept: NEUROSURGERY | Facility: CLINIC | Age: 67
End: 2022-01-18
Payer: MEDICARE
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
## 2022-01-18 DIAGNOSIS — S12.9XXS CLOSED FRACTURE OF CERVICAL VERTEBRA, UNSPECIFIED CERVICAL VERTEBRAL LEVEL, SEQUELA: ICD-10-CM

## 2022-01-18 DIAGNOSIS — M54.9 CHRONIC NECK AND BACK PAIN: ICD-10-CM

## 2022-01-18 DIAGNOSIS — G89.29 CHRONIC NECK AND BACK PAIN: ICD-10-CM

## 2022-01-18 DIAGNOSIS — F41.9 ANXIETY: ICD-10-CM

## 2022-01-18 DIAGNOSIS — M54.2 CHRONIC NECK AND BACK PAIN: ICD-10-CM

## 2022-01-18 RX ORDER — HYDROCODONE BITARTRATE AND ACETAMINOPHEN 10; 325 MG/1; MG/1
1 TABLET ORAL EVERY 6 HOURS PRN
Qty: 90 TABLET | Refills: 0 | Status: SHIPPED | OUTPATIENT
Start: 2022-01-18 | End: 2022-02-14 | Stop reason: SDUPTHER

## 2022-01-18 RX ORDER — ALPRAZOLAM 1 MG/1
1 TABLET ORAL 2 TIMES DAILY PRN
Qty: 45 TABLET | Refills: 5 | Status: SHIPPED | OUTPATIENT
Start: 2022-01-18 | End: 2022-06-17

## 2022-01-18 NOTE — TELEPHONE ENCOUNTER
Care Due:                  Date            Visit Type   Department     Provider  --------------------------------------------------------------------------------                                KENY IBANEZ      [facility] FAMILY  Last Visit: 12-      VISIT        MEDICINE       Brandy Dejesus  Next Visit: None Scheduled  None         None Found                                                            Last  Test          Frequency    Reason                     Performed    Due Date  --------------------------------------------------------------------------------    TSH.........  12 months..  levothyroxine............  02- 02-    Powered by [employer] by [employer]. Reference number: [id number].   1/18/2022 11:42:47 AM CST

## 2022-01-18 NOTE — TELEPHONE ENCOUNTER
1/18/22    Informed patient that I couldn't call last week (out of the office a few days) but returned her call to discuss x-rays.  No acute changes.  Leads do not appear to have moved based on prior info.  Patient will reach out to physician who placed device (Dr. Younger) to discuss next step, possibly removal.    CORIE Gautam  [facility]          ----- Message from Fiona Medley MA sent at 1/12/2022 11:15 AM CST -----  Contact: Marta priscilla  [phone]  Cyndie Brownlee this pt wanted to know was anything showing in her xrays, Dr White said he was going to give her a call and let her know if he seen anything.   ----- Message -----  From: Chuck Monreal  Sent: 1/12/2022  11:12 AM CST  To: Christopher Kaufman Staff    Calling to get test results.  Name of test (lab, x-ray): xray  Date of test: 12/28/21  Where was the test performed: [facility]  Would you like a call back, or a response through your [facility] portal?: call back   Comments:

## 2022-01-18 NOTE — TELEPHONE ENCOUNTER
----- Message from Shayy Patel sent at 1/18/2022 10:46 AM CST -----  Contact: [phone]  Type:  RX Refill Request    Who Called: pt  Refill or New Rx:refill  RX Name and Strength:HYDROcodone-acetaminophen (NORCO)  mg per tablet  How is the patient currently taking it? (ex. 1XDay):as needed some times 4x  a day   Is this a 30 day or 90 day RX:90  Preferred Pharmacy with phone number:[facility]   Local or Mail Order:local   Ordering Provider:  Would the patient rather a call back or a response via [facility]? Call back  Best Call Back Number:[phone]   Additional Information:     Type:  RX Refill Request    Who Called: pt   Refill or New Rx:refill  RX Name and Strength:ALPRAZolam (XANAX) 1 MG tablet  How is the patient currently taking it? (ex. 1XDay):1 a day   Is this a 30 day or 90 day RX:45  Preferred Pharmacy with phone number:[facility]   Local or Mail Order:local   Ordering Provider:  Would the patient rather a call back or a response via [facility]? Call back  Best Call Back Number:[phone]   Additional Information:

## 2022-01-24 ENCOUNTER — TELEPHONE (OUTPATIENT)
Dept: FAMILY MEDICINE | Facility: CLINIC | Age: 67
End: 2022-01-24
Payer: MEDICARE

## 2022-01-27 ENCOUNTER — TELEPHONE (OUTPATIENT)
Dept: ENDOSCOPY | Facility: HOSPITAL | Age: 67
End: 2022-01-27
Payer: MEDICARE

## 2022-02-10 ENCOUNTER — PATIENT OUTREACH (OUTPATIENT)
Dept: ADMINISTRATIVE | Facility: HOSPITAL | Age: 67
End: 2022-02-10
Payer: MEDICARE
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
## 2022-02-14 DIAGNOSIS — M54.9 CHRONIC NECK AND BACK PAIN: ICD-10-CM

## 2022-02-14 DIAGNOSIS — S12.9XXS CLOSED FRACTURE OF CERVICAL VERTEBRA, UNSPECIFIED CERVICAL VERTEBRAL LEVEL, SEQUELA: ICD-10-CM

## 2022-02-14 DIAGNOSIS — M54.2 CHRONIC NECK AND BACK PAIN: ICD-10-CM

## 2022-02-14 DIAGNOSIS — G89.29 CHRONIC NECK AND BACK PAIN: ICD-10-CM

## 2022-02-14 RX ORDER — HYDROCODONE BITARTRATE AND ACETAMINOPHEN 10; 325 MG/1; MG/1
1 TABLET ORAL EVERY 6 HOURS PRN
Qty: 90 TABLET | Refills: 0 | Status: SHIPPED | OUTPATIENT
Start: 2022-02-14 | End: 2022-03-09 | Stop reason: SDUPTHER

## 2022-02-14 NOTE — TELEPHONE ENCOUNTER
----- Message from Tatyana Rankin sent at 2/14/2022  7:54 AM CST -----  Regarding: refill  Contact: [phone]  Type:  RX Refill Request    Who Called:  self   Refill or New Rx: refill  RX Name and Strength: HYDROcodone-acetaminophen (NORCO)  mg per tablet  How is the patient currently taking it? (ex. 1XDay): Take 1 tablet by mouth every 6 (six) hours as needed for pain - Oral  Is this a 30 day or 90 day RX: 90 tablets  Preferred Pharmacy with phone number: [facility] - [address] [address]  Local or Mail Order: local   Ordering Provider:   Would the patient rather a call back or a response via [facility]?  call  Best Call Back Number: [phone]  Additional Information:

## 2022-02-14 NOTE — TELEPHONE ENCOUNTER
No new care gaps identified.  Powered by [employer]. Reference number: [id number].   2/14/2022 9:58:34 AM CST

## 2022-02-14 NOTE — TELEPHONE ENCOUNTER
Returned patient call about her refill on her medication HYDROcodone-acetaminophen (NORCO)  mg per tablet. I explained to the patient I would send a message to Dr. Dejesus about it. Patient verbalized understanding.

## 2022-02-23 DIAGNOSIS — D84.9 IMMUNOSUPPRESSED STATUS: ICD-10-CM

## 2022-02-24 ENCOUNTER — TELEPHONE (OUTPATIENT)
Dept: PAIN MEDICINE | Facility: CLINIC | Age: 67
End: 2022-02-24
Payer: MEDICARE

## 2022-02-24 NOTE — TELEPHONE ENCOUNTER
I spoke with the patient's niece, Mrs. Lozano.  I was able to get the patient scheduled with Dr. Palomo.  The patient's niece verbalized understanding of date, time and location.  All questions answered.

## 2022-02-24 NOTE — TELEPHONE ENCOUNTER
----- Message from Giselle Salinas sent at 2/24/2022 10:01 AM CST -----  Contact: Pt Denia Lozano  .Type:  Needs Medical Advice    Who Called: Chris Lozano   Would the patient rather a call back or a response via [facility]? Call   Best Call Back Number: [phone]  Additional Information: Pt Denia Lozano P is req a call back in regards to getting her aunts injections rescheduled

## 2022-03-03 ENCOUNTER — PATIENT OUTREACH (OUTPATIENT)
Dept: ADMINISTRATIVE | Facility: HOSPITAL | Age: 67
End: 2022-03-03
Payer: MEDICARE

## 2022-03-03 ENCOUNTER — PATIENT MESSAGE (OUTPATIENT)
Dept: ADMINISTRATIVE | Facility: HOSPITAL | Age: 67
End: 2022-03-03
Payer: MEDICARE

## 2022-03-09 ENCOUNTER — TELEPHONE (OUTPATIENT)
Dept: FAMILY MEDICINE | Facility: CLINIC | Age: 67
End: 2022-03-09
Payer: MEDICARE

## 2022-03-09 DIAGNOSIS — S12.9XXS CLOSED FRACTURE OF CERVICAL VERTEBRA, UNSPECIFIED CERVICAL VERTEBRAL LEVEL, SEQUELA: ICD-10-CM

## 2022-03-09 DIAGNOSIS — G89.29 CHRONIC NECK AND BACK PAIN: ICD-10-CM
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
## 2022-03-09 DIAGNOSIS — M54.9 CHRONIC NECK AND BACK PAIN: ICD-10-CM

## 2022-03-09 DIAGNOSIS — M54.2 CHRONIC NECK AND BACK PAIN: ICD-10-CM

## 2022-03-09 RX ORDER — HYDROCODONE BITARTRATE AND ACETAMINOPHEN 10; 325 MG/1; MG/1
1 TABLET ORAL EVERY 6 HOURS PRN
Qty: 90 TABLET | Refills: 0 | Status: SHIPPED | OUTPATIENT
Start: 2022-03-14 | End: 2022-04-11 | Stop reason: SDUPTHER

## 2022-03-09 NOTE — TELEPHONE ENCOUNTER
Returned patient call about leg and back pain. Patient said that she is in so much pain and is requesting something for her pain.

## 2022-03-09 NOTE — TELEPHONE ENCOUNTER
----- Message from Myla Perera sent at 3/9/2022 11:00 AM CST -----  Regarding: Pt Advice/Medication Request  Type:  Needs Medical Advice    Who Called: pt    Symptoms (please be specific): pain in leg and back    Pharmacy name and phone #:  [facility] [place] - [address] [place] [address]  Phone: [phone] Fax: [phone]      Would the patient rather a call back or a response via [facility]? Call    Best Call Back Number:  [phone]    Additional Information: March 11 - Dr Cade// Upper GI // would like pain and nerve medication

## 2022-03-17 ENCOUNTER — TELEPHONE (OUTPATIENT)
Dept: FAMILY MEDICINE | Facility: CLINIC | Age: 67
End: 2022-03-17
Payer: MEDICARE

## 2022-03-17 NOTE — TELEPHONE ENCOUNTER
Called patient to see if she got her Diabetic Eye Screening done this year or in 2021. Patient didn't answer. Left message.

## 2022-03-21 ENCOUNTER — PATIENT OUTREACH (OUTPATIENT)
Dept: ADMINISTRATIVE | Facility: HOSPITAL | Age: 67
End: 2022-03-21
Payer: MEDICARE

## 2022-04-11 ENCOUNTER — TELEPHONE (OUTPATIENT)
Dept: FAMILY MEDICINE | Facility: CLINIC | Age: 67
End: 2022-04-11
Payer: MEDICARE

## 2022-04-11 DIAGNOSIS — M54.9 CHRONIC NECK AND BACK PAIN: ICD-10-CM

## 2022-04-11 DIAGNOSIS — G89.29 CHRONIC NECK AND BACK PAIN: ICD-10-CM

## 2022-04-11 DIAGNOSIS — M54.2 CHRONIC NECK AND BACK PAIN: ICD-10-CM

## 2022-04-11 DIAGNOSIS — S12.9XXS CLOSED FRACTURE OF CERVICAL VERTEBRA, UNSPECIFIED CERVICAL VERTEBRAL LEVEL, SEQUELA: ICD-10-CM
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
## 2022-04-11 RX ORDER — HYDROCODONE BITARTRATE AND ACETAMINOPHEN 10; 325 MG/1; MG/1
1 TABLET ORAL EVERY 6 HOURS PRN
Qty: 90 TABLET | Refills: 0 | Status: CANCELLED | OUTPATIENT
Start: 2022-04-11

## 2022-04-11 RX ORDER — HYDROCODONE BITARTRATE AND ACETAMINOPHEN 10; 325 MG/1; MG/1
1 TABLET ORAL EVERY 6 HOURS PRN
Qty: 90 TABLET | Refills: 0 | Status: SHIPPED | OUTPATIENT
Start: 2022-04-11 | End: 2022-05-02 | Stop reason: SDUPTHER

## 2022-04-11 NOTE — TELEPHONE ENCOUNTER
----- Message from Dawn Brian LPN sent at 4/11/2022  3:07 PM CDT -----  Contact: [email or web address]@ [facility] [email or web address]-[phone]    ----- Message -----  From: Cecilia Diez  Sent: 4/11/2022   2:46 PM CDT  To: Oleg HERNÁNDEZ Staff    Type:  Pharmacy Calling to Clarify an RX    Name of Caller:Sia  Pharmacy Name: [facility]  Prescription Name:HYDROcodone-acetaminophen (NORCO)  mg per tablet  What do they need to clarify?: refill request  Best Call Back Number: [phone]

## 2022-04-11 NOTE — TELEPHONE ENCOUNTER
----- Message from Myla Perera sent at 4/11/2022  3:56 PM CDT -----  Regarding: Refill Req  Type:  RX Refill Request    Who Called: pt    Refill or New Rx: refill    RX Name and Strength: HYDROcodone-acetaminophen (NORCO)  mg per tablet    Preferred Pharmacy with phone number: [id number] [address] [address]  Phone: [phone] Fax: [phone]    Would the patient rather a call back or a response via [facility]? Call    Best Call Back Number: [phone]

## 2022-04-11 NOTE — TELEPHONE ENCOUNTER
Care Due:                  Date            Visit Type   Department     Provider  --------------------------------------------------------------------------------                                ESTABLISHED                              PATIENT -    [facility] FAMILY  Last Visit: 12-      [place]      MEDICINE       Brandy Dejesus  Next Visit: None Scheduled  None         None Found                                                            Last  Test          Frequency    Reason                     Performed    Due Date  --------------------------------------------------------------------------------    TSH.........  12 months..  levothyroxine............  02- 02-    Powered by [employer] by [employer]. Reference number: [id number].   4/11/2022 2:27:36 PM CDT

## 2022-04-11 NOTE — TELEPHONE ENCOUNTER
----- Message from Myla Perera sent at 4/11/2022  3:56 PM CDT -----  Regarding: Refill Req  Type:  RX Refill Request    Who Called: pt    Refill or New Rx: refill    RX Name and Strength: HYDROcodone-acetaminophen (NORCO)  mg per tablet    Preferred Pharmacy with phone number: [id number] [address] [address]  Phone: [phone] Fax: [phone]    Would the patient rather a call back or a response via [facility]? Call    Best Call Back Number: [phone]

## 2022-04-11 NOTE — TELEPHONE ENCOUNTER
Called and spoke to patient regarding medication refill request.  I explained to patient that we have received multiple request today with different pharmacies.  We cannot send medication to multiple pharmacies with each refill due to pain contract. [facility] is only approved to displacement. Patient verbalized understanding.

## 2022-04-11 NOTE — TELEPHONE ENCOUNTER
----- Message from Elvie Dent sent at 4/11/2022  3:17 PM CDT -----  Type:  Needs Medical Advice    Who Called: pt  Symptoms (please be specific): pt is needing to get a return call about her medication HYDROcodone-acetaminophen (NORCO)  mg per tablet  How long has patient had these symptoms:  pt is in a lot of pain  Pharmacy name and phone #:  [facility] [place] [address] [address]   Phone: [phone]  Fax:  [phone]        Would the patient rather a call back or a response via [facility]? Please call  Best Call Back Number: [phone]  Additional Information:

## 2022-04-11 NOTE — TELEPHONE ENCOUNTER
----- Message from Elvie Dent sent at 4/11/2022  2:23 PM CDT -----  Type:  RX Refill Request    Who Called: pt  Refill or New Rx:refill  RX Name and Strength:HYDROcodone-acetaminophen (NORCO)  mg per tablet  How is the patient currently taking it? (ex. 1XDay):Take 1 tablet by mouth every 6 (six) hours as needed for pain   Is this a 30 day or 90 day RX:90  Preferred Pharmacy with phone number:[address] [address]   Phone: [phone]  Fax:  [phone]        Local or Mail Order:local  Ordering Provider:Dr Vick  Would the patient rather a call back or a response via [facility]? Please call  Best Call Back Number:[phone]  Additional Information: pt philippe Lugo has given approval to receive this medication and she is in severe pain

## 2022-04-12 ENCOUNTER — TELEPHONE (OUTPATIENT)
Dept: FAMILY MEDICINE | Facility: CLINIC | Age: 67
End: 2022-04-12
Payer: MEDICARE

## 2022-04-12 NOTE — TELEPHONE ENCOUNTER
----- Message from Mathew Middleton sent at 4/12/2022 10:11 AM CDT -----  Contact: patient [phone]  Patient states she's returning your call   Please advise

## 2022-04-12 NOTE — TELEPHONE ENCOUNTER
Returned patient call about her taking an ultrasound of her liver and she wanted to let us know. Patient said she got her medication.

## 2022-04-18 ENCOUNTER — TELEPHONE (OUTPATIENT)
Dept: PAIN MEDICINE | Facility: CLINIC | Age: 67
End: 2022-04-18
Payer: MEDICARE

## 2022-04-18 NOTE — TELEPHONE ENCOUNTER
----- Message from Rachel Gross sent at 4/18/2022  8:42 AM CDT -----  Contact: Pt  Type:  Sooner Apoointment Request    Caller is requesting a sooner appointment.  Caller declined first available appointment listed below.  Caller will not accept being placed on the waitlist and is requesting a message be sent to doctor.  Name of Caller: pt   When is the first available appointment? Pt has an appt 04/27  Symptoms: back pain   Would the patient rather a call back or a response via [facility]? Phone   Best Call Back Number: [phone]  Additional Information:

## 2022-04-18 NOTE — TELEPHONE ENCOUNTER
Spoke with patient's caregiver who states that she may have Hip Surgery. She is wanting to know if she can reschedule but once I told her Dr. Palomo' appt wasn't until 4.27.22 she states that she would like to keep it.

## 2022-04-19 ENCOUNTER — TELEPHONE (OUTPATIENT)
Dept: FAMILY MEDICINE | Facility: CLINIC | Age: 67
End: 2022-04-19
Payer: MEDICARE

## 2022-04-19 NOTE — TELEPHONE ENCOUNTER
Returned patient call about Dr. Angel with [facility] saying her right hip is possibly  cracked and she need to have surgery. Patient said she just wanted to let us know that the surgeon said that he think she have  a crack in her hip and he's going to do an MRI once her insurance approve it. Patient said he told her that she might need surgery.

## 2022-04-19 NOTE — TELEPHONE ENCOUNTER
----- Message from Kamlesh Sahu sent at 4/19/2022  1:21 PM CDT -----  Type:  Patient Returning Call    Who Called:Pt  Would the patient rather a call back or a response via [facility]? call  Best Call Back Number:[phone]  Additional Information:     Pt would like a call regarding her back  She stated Dr Stanley ball/ [facility] stated her right hip is cracked ans she needs to have surgery  Phone  or [phone]

## 2022-04-26 NOTE — PROGRESS NOTES
New Patient Chronic Pain Note (Initial Visit)    Referring Physician: Nia Jose    PCP: Brandy Dejesus MD    Chief Complaint:   Chief Complaint   Patient presents with    Back Pain        SUBJECTIVE:    Thom Krishna is a 66 y.o. 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 Younger, osteoporosis, nicotine dependence who presents to the clinic for the evaluation of lower back pain.  Patient reports pain began approximately 7 years ago.  Without inciting accident injury or trauma.  Today patient reports pain which is constant which is rated an 8/10.  Pain is described as stabbing and tingling in nature.  Patient reports pain in a bandlike distribution in the lower back which radiates down the anterior and lateral aspect of predominantly the right lower extremity in L3-4 distribution to the dorsum of the foot.  Pain is exacerbated with prolonged standing, ambulation, lying supine.  Patient reports she is unable to even ambulate half a block before requiring rest.  Patient does endorse associated weakness in the right lower extremity associated with her pain.  Pain is marginally improved with Norco medication which she receives from her primary care physician.  Patient reports trialing gabapentin in the past with stroke-like symptoms.    Patient also reports pain in the neck which radiates into bilateral trapezius distributions in C5-6 dermatomal distribution.  Pain is constant and exacerbated with cervical flexion, extension and lateral flexion.  Patient does endorse associated weakness in the upper extremities, compromise in hand  strength and dexterity.    Patient reports significant motor weakness and loss of sensations.  Patient denies night fever/night sweats, urinary incontinence,  bowel incontinence and significant weight loss.      Pain Disability Index Review:     Last 3 PDI Scores 2022   Pain Disability Index (PDI) 58 [phone] 55       Non-Pharmacologic Treatments:  Physical Therapy/Home Exercise: yes  Ice/Heat:yes  TENS: no  Acupuncture: no  Massage: no  Chiropractic: no    Other: yes; surgery      Pain Medications:  - Opioids: Vicodin ( Hydrocodone/Acetaminophen)  - Adjuvant Medications: Topical Ointment (Voltaren Gel, Steroid cream, Anti-Inflammatory Cream, Compound cream) and Xanax (Alprazolam)    Pain Procedures:   -Dr. Younger:  Cervical epidural steroid injection    Past Medical History:   Diagnosis Date    Anxiety and depression 2015    Arthritis     Cervical radiculopathy 2016    Cirrhosis of liver     Colon polyps 2015    Diabetes mellitus type 2 with neurological manifestations 2015    no current medications, only one episode of elevated sugars with acute illness, will monitor     Encounter for blood transfusion     Hepatitis C     s/p treatment per patient report; managed by Dr. Perez    Hip fracture     Macrocytosis 2015    Thyroid disease     Transfusion reaction     hep c     Past Surgical History:   Procedure Laterality Date    APPENDECTOMY      BACK SURGERY      CAUDAL EPIDURAL STEROID INJECTION N/A 2018    Procedure: Injection-steroid-epidural-caudal;  Surgeon: Roxana Gu Jr., MD;  Location: [facility] OR;  Service: Pain Management;  Laterality: N/A;  with cath target L4-5    CAUDAL EPIDURAL STEROID INJECTION N/A 2019    Procedure: Injection-steroid-epidural-caudal;  Surgeon: Roxana Gu Jr., MD;  [facility];  Service: Pain Management;  Laterality: N/A;     SECTION      CHOLECYSTECTOMY      COLONOSCOPY  3/31/10    2 polyps, tubular adenoma    COLONOSCOPY  2015    Dr. Washington    COLONOSCOPY N/A 10/10/2018    Procedure: COLONOSCOPY;  Surgeon: Reuben Miller Jr.,  "MD;  Location: [facility];  Service: Endoscopy;  Laterality: N/A;    ESOPHAGOGASTRODUODENOSCOPY N/A 10/10/2018    Procedure: EGD (ESOPHAGOGASTRODUODENOSCOPY);  Surgeon: Reuben Miller Jr., MD;  Location: [facility];  Service: Endoscopy;  Laterality: N/A;    ESOPHAGOGASTRODUODENOSCOPY N/A 12/10/2018    Procedure: EGD (ESOPHAGOGASTRODUODENOSCOPY)/poss emr;  Surgeon: Belle Kuo MD;  [facility]  Service: Endoscopy;  Laterality: N/A;    ESOPHAGOGASTRODUODENOSCOPY N/A 3/12/2019    Procedure: EGD (ESOPHAGOGASTRODUODENOSCOPY);  Surgeon: Polo Keyes MD;  Location: [facility];  Service: Endoscopy;  Laterality: N/A;    ESOPHAGOGASTRODUODENOSCOPY N/A 11/20/2020    Procedure: ESOPHAGOGASTRODUODENOSCOPY (EGD);  Surgeon: Belle Kuo MD;  Location: [facility];  Service: Endoscopy;  Laterality: N/A;    HERNIA REPAIR      HYSTERECTOMY  1989    Uterus and both ovaries    INCISIONAL HERNIA REPAIR      x 2    JOINT REPLACEMENT      LIVER BIOPSY  12/2003    autoimmune hepatitis    ROTATOR CUFF REPAIR      right    STOMACH SURGERY  1980's    "took lymph node off of liver"    TOTAL HIP ARTHROPLASTY      left hip    UPPER GASTROINTESTINAL ENDOSCOPY  3/24/10    normal    UPPER GASTROINTESTINAL ENDOSCOPY  04/27/2015    Dr. Washington     Review of patient's allergies indicates:   Allergen Reactions    Cefaclor Hives    Gabapentin Swelling    Penicillins      Other reaction(s): Hives    Sulfa (sulfonamide antibiotics) Other (See Comments)     Other reaction(s): Hives       Current Outpatient Medications   Medication Sig    ALPRAZolam (XANAX) 1 MG tablet Take 1 tablet (1 mg total) by mouth 2 (two) times daily as needed.    diclofenac sodium (VOLTAREN) 1 % Gel Apply 2 g topically 4 (four) times daily.    HYDROcodone-acetaminophen (NORCO)  mg per tablet Take 1 tablet by mouth every 6 (six) hours as needed for pain    levothyroxine (SYNTHROID) 75 MCG tablet Take 1 tablet (75 mcg total) " "by mouth once daily.    ondansetron (ZOFRAN-ODT) 8 MG TbDL Dissolve 1 tablet (8 mg total) by mouth under the tongue 3 (three) times daily as needed (nausea).    pantoprazole (PROTONIX) 40 MG tablet TAKE 1 TABLET EVERY DAY    amitriptyline (ELAVIL) 10 MG tablet Take 1 tablet (10 mg total) by mouth nightly as needed for Insomnia or Pain.    blood-glucose meter kit Test tid please dispense test strips and lancets    cholecalciferol, vitamin D3, 125 mcg (5,000 unit) capsule Take 1 tablet  by mouth daily with breakfast. (Patient not taking: Reported on 4/27/2022)    senna-docusate 8.6-50 mg (SENNA WITH DOCUSATE SODIUM) 8.6-50 mg per tablet Take 3 tablets by mouth once daily. Take 1-2 tabs daily-- take any time a pain pill( norco is taken) to help reduce opiod induced constipation (Patient not taking: Reported on 4/27/2022)    vancomycin (VANCOCIN) 1,000 mg injection EMPTY CONTENTS OF 1 VIAL INTO SHAKER. SPRINKLE DIRECTLY ONTO INFECTION SITE. PERFORM 1-2 TIMES DAILY.     No current facility-administered medications for this visit.       Review of Systems     GENERAL:  No weight loss, malaise or fevers.  HEENT:   No recent changes in vision or hearing  NECK:  Negative for lumps, no difficulty with swallowing.  RESPIRATORY:  Negative for cough, wheezing or shortness of breath, patient denies any recent URI.  CARDIOVASCULAR:  Negative for chest pain, leg swelling or palpitations.  GI:  Negative for abdominal discomfort, blood in stools or black stools or change in bowel habits.  MUSCULOSKELETAL:  See HPI.  SKIN:  Negative for lesions, rash, and itching.  PSYCH:  No mood disorder or recent psychosocial stressors.   HEMATOLOGY/LYMPHOLOGY:  Negative for prolonged bleeding, bruising easily or swollen nodes.    NEURO:   No history of headaches, syncope, paralysis, seizures or tremors.  All other reviewed and negative other than HPI.    OBJECTIVE:    /74   Pulse 72   Resp 17   Ht 5' 2" (1.575 m)   Wt 61.1 kg (134 " lb 11.2 oz)   BMI 24.64 kg/m²       Physical Exam    GENERAL: Well appearing, in no acute distress, alert and oriented x3.  PSYCH:  Mood and affect appropriate.  SKIN: Skin color, texture, turgor normal, no rashes or lesions.  HEAD/FACE:  Normocephalic, atraumatic. Cranial nerves grossly intact.    NECK:  pain to palpation over the cervical paraspinous muscles. Spurling Negative.  pain with neck flexion, extension, or lateral flexion.   Normaltesting biceps, triceps and brachioradialis bilaterally.    NegativeHoffmann's bilaterally.    4+/5 strength testing deltoid, biceps, triceps, wrist extensor, wrist flexor and ulnar intrinsics bilaterally.    Normal  strength bilaterally    CV: RRR with palpation of the radial artery.  PULM: No evidence of respiratory difficulty, symmetric chest rise.  GI:  Soft and non-tender.    BACK:  3 vertical lower lumbar incisions and IPG pocket left buttock.  Well-healed. Straight leg raising in the sitting and supine positions is negative to radicular pain on the left.  Unable to complete on the right secondary to severe pain.. pain to palpation over the facet joints of the lumbar spine or spinous processes. Reduced range of motion with pain reproduction.  EXTREMITIES: Peripheral joint ROM is full and pain free without obvious instability or laxity in all four extremities. No deformities, edema, or skin discoloration. Good capillary refill.  MUSCULOSKELETAL: Unable to stand on heels & toes.   hip, and knee provocative maneuvers are negative.  pain with palpation over the sacroiliac joints bilaterally.  FABERs test is negative.  Facet loading test is positive bilaterally.   Bilateral upper and lower extremity strength is normal and symmetric.  No atrophy or tone abnormalities are noted.  RIGHT Lower extremity: Hip flexion 5/5, Hip Abduction 5/5, Hip Adduction 5/5, Knee extension 5/5, Knee flexion 5/5, Ankle dorsiflexion5/5, Extensor hallucis longus 5/5, Ankle plantarflexion  5/5  LEFT Lower extremity:  Hip flexion 5/5, Hip Abduction 5/5,Hip Adduction 5/5, Knee extension 5/5, Knee flexion 5/5, Ankle dorsiflexion 5/5, Extensor hallucis longus 5/5, Ankle plantarflexion 5/5  -Normal testing knee (patellar) jerk and ankle (achilles) jerk    NEURO: Bilateral upper and lower extremity coordination and muscle stretch reflexes are physiologic and symmetric. No loss of sensation is noted.  GAIT: normal.    Imagin/07/16    MRI Lumbar Spine Without Contrast    FINDINGS: There is a remote concave compression fracture involving the superior endplate of the L4 vertebral body that results in loss of approximate 60% of height.    The osseous structures are otherwise intact with no other evidence of fracture.  There is 5-6 mm of anterolisthesis of L4 on L5.  The lumbar spine alignment is otherwise well maintained.    There is degenerative disc space narrowing most prominent at L3-L4, L4-L5, and L5-S1.  The visualized portions of the spinal cord are unremarkable.  The spinal cord terminates at the L1 level.    T12-L1: There is bilateral facet hypertrophy.  No evidence of disc bulge, neuroforaminal stenosis, or central canal stenosis.    L1-L2: There is bilateral facet hypertrophy.  No evidence of disc bulge, neuroforaminal stenosis, or central canal stenosis.    L2-L3: There is bilateral facet hypertrophy. No evidence of disc bulge, neuroforaminal stenosis, or central canal stenosis.    L3-L4: There is a diffuse posterior disc bulge and bilateral facet hypertrophy and ligamentous thickening.  There is mild bilateral neuroforaminal stenosis.  No central canal stenosis.    L4-L5: There is a diffuse posterior disc bulge and bilateral facet hypertrophy with ligamentous thickening.  There is moderate central canal stenosis.  There is moderate bilateral neuroforaminal stenosis.    L5-S1: There is a diffuse posterior disc bulge and bilateral facet hypertrophy.  No central canal or neuroforaminal  stenosis.  IMPRESSION:      #1. Multilevel degenerative change of the lumbar spine most significant at L4-L5 as detailed above.    #2.  Remote compression fracture of the L4 superior endplate.  ______________________________________     04/07/16    MRI Cervical Spine Without Contrast      FINDINGS: The cervical spine vertebral body heights and alignment are well-maintained.  There is multilevel degenerative disc space narrowing.    There is no evidence of osseous fracture.  The structures at the base of the skull are unremarkable.    The visualized portions of the spinal cord are unremarkable.    C2-C3: No evidence of disc bulge, facet arthropathy, neuroforaminal stenosis, or central canal stenosis.    C3-C4: There is a diffuse posterior disc osteophyte complex.  There is mild left-sided neuroforaminal stenosis. No significant facet hypertrophy, central canal stenosis, or right neuroforaminal stenosis.    C4-C5: No evidence of disc bulge, facet arthropathy, neuroforaminal stenosis, or central canal stenosis.    C5-C6: There is a diffuse posterior disc osteophyte complex.  There is severe bilateral neuroforaminal stenosis.  No significant facet hypertrophy.  There is minimal central canal stenosis.    C6-C7: There is a diffuse posterior disc osteophyte complex, asymmetric to the right.  This results in mild-to-moderate right-sided neuroforaminal stenosis.  The left neural foramen appears patent.  There is minimal central canal stenosis.    C7-T1: No evidence of disc bulge, facet arthropathy, neuroforaminal stenosis, or central canal stenosis.  IMPRESSION:      #1. Multilevel degenerative change of the cervical spine most significant at C5-C6 where there is severe bilateral neuroforaminal stenosis.     12/28/21    X-Ray Lumbar Spine AP And Lateral      FINDINGS:  Bones are osteopenic.  T11 compression deformity noted with kyphoplasty findings present.  No significant posterior retropulsion.  Less than 50% height loss.   Remaining thoracic vertebral body heights maintained without spondylolisthesis.  Mild degenerative changes noted.  Spinal stimulator present with leads terminating at T6.    Posterior surgical fusion changes at L4-5 with disc spacer device are stable.  Vertebral body alignment unchanged.  Mild superior endplate compression deformity noted of L3 with kyphoplasty findings noted.  Remaining lumbar vertebral body heights maintained.  Disc spaces unchanged.    Atherosclerotic vascular calcification noted.  Constipation findings suspected.          ASSESSMENT: 66 y.o. year old female with lower back and leg pain, consistent with     1. Lumbar facet arthropathy  EMG W/ ULTRASOUND AND NERVE CONDUCTION TEST 2 Extremities    Case Request-RAD/Other Procedure Area: Bilateral L4/5 TF NORMAN with RN IV sedation   2. Radiculopathy, cervical region  MRI Cervical Spine Without Contrast   3. Cervical radiculopathy     4. Lumbar spondylosis  IR Epidural Transfor 1st Vert Lumbar Devon   5. Lumbar radiculopathy  IR Epidural Transfor 1st Vert Lumbar Devon   6. Lumbar radiculopathy, chronic     7. Failed back surgical syndrome           PLAN:   - Interventions:  Schedule for bilateral L4/5 transforaminal epidural steroid injection to see if this helps with radicular symptoms.  Explained the risks and benefits of the procedure in detail with the patient today in clinic along with alternative treatment options, and the patient elected to pursue the intervention at this time.      - Anticoagulation use: no no anticoagulation     report:  Reviewed and consistent with medication use as prescribed.    - Medications:  -We will start Elavil 10 mg nightly.  We have discussed potential common side effects of duloxetine including constipation, dizziness, dry mouth, drowsiness, urinary retention or headache.  Patient expresses understanding.      - Therapy:   We discussed initiating physical therapy to help manage the patient/s painful condition. The  patient was counseled that muscle strengthening will improve the long term prognosis in regards to pain and may also help increase range of motion and mobility. They were told that one of the goals of physical therapy is that they learn how to do the exercises so that they can do them independently at home daily upon completion. The patient's questions were answered and they were agreeable to this course. A referral for physical therapy was provided to the patient.      - Imaging: Reviewed available imaging with patient and answered any questions they had regarding study.Order MRI of the Cervical Spine to help evaluate possible source of pain.    - Consults/Referrals:  Physical Medicine Rehabilitation for lower extremity electromyography to help guide diagnosis and treatment    - Records: Obtain old records from outside physicians and imaging: Dr. Younger    - Follow up visit: return to clinic in 4-6 weeks      The above plan and management options were discussed at length with patient. Patient is in agreement with the above and verbalized understanding.    - I discussed the goals of interventional chronic pain management with the patient on today's visit. We discussed a multimodal and systematic approach to pain.  This includes diagnostic and therapeutic injections, adjuvant pharmacologic treatment, physical therapy, and at times psychiatry.  I emphasized the importance of regular exercise, core strengthening and stretching, diet and weight loss as a cornerstone of long-term pain management.    - This condition does not require this patient to take time off of work, and the primary goal of our Pain Management services is to improve the patient's functional capacity.  - Patient Questions: Answered all of the patient's questions regarding diagnoses, therapy, treatment and next steps        Gretta Palomo MD  Interventional Pain Management  [facility]    Disclaimer:  This note was prepared using voice  recognition system and is likely to have sound alike errors that may have been overlooked even after proof reading.  Please call me with any questions

## 2022-04-27 ENCOUNTER — TELEPHONE (OUTPATIENT)
Dept: PAIN MEDICINE | Facility: CLINIC | Age: 67
End: 2022-04-27

## 2022-04-27 ENCOUNTER — OFFICE VISIT (OUTPATIENT)
Dept: PAIN MEDICINE | Facility: CLINIC | Age: 67
End: 2022-04-27
Payer: MEDICARE

## 2022-04-27 ENCOUNTER — TELEPHONE (OUTPATIENT)
Dept: PHYSICAL MEDICINE AND REHAB | Facility: CLINIC | Age: 67
End: 2022-04-27
Payer: MEDICARE

## 2022-04-27 VITALS
SYSTOLIC BLOOD PRESSURE: 124 MMHG | DIASTOLIC BLOOD PRESSURE: 74 MMHG | HEIGHT: 62 IN | BODY MASS INDEX: 24.78 KG/M2 | WEIGHT: 134.69 LBS | HEART RATE: 72 BPM | RESPIRATION RATE: 17 BRPM

## 2022-04-27 DIAGNOSIS — M47.816 LUMBAR SPONDYLOSIS: ICD-10-CM

## 2022-04-27 DIAGNOSIS — M96.1 FAILED BACK SURGICAL SYNDROME: ICD-10-CM

## 2022-04-27 DIAGNOSIS — M54.16 LUMBAR RADICULOPATHY: ICD-10-CM

## 2022-04-27 DIAGNOSIS — M47.816 LUMBAR FACET ARTHROPATHY: Primary | ICD-10-CM

## 2022-04-27 DIAGNOSIS — M54.16 LUMBAR RADICULOPATHY, CHRONIC: ICD-10-CM

## 2022-04-27 DIAGNOSIS — M54.12 RADICULOPATHY, CERVICAL REGION: ICD-10-CM

## 2022-04-27 DIAGNOSIS — M54.12 CERVICAL RADICULOPATHY: ICD-10-CM

## 2022-04-27 PROCEDURE — 99999 PR PBB SHADOW E&M-EST. PATIENT-LVL IV: ICD-10-PCS | Mod: PBBFAC,,, | Performed by: ANESTHESIOLOGY

## 2022-04-27 PROCEDURE — 3074F SYST BP LT 130 MM HG: CPT | Mod: CPTII,S$GLB,, | Performed by: ANESTHESIOLOGY

## 2022-04-27 PROCEDURE — 3008F PR BODY MASS INDEX (BMI) DOCUMENTED: ICD-10-PCS | Mod: CPTII,S$GLB,, | Performed by: ANESTHESIOLOGY

## 2022-04-27 PROCEDURE — 3288F PR FALLS RISK ASSESSMENT DOCUMENTED: ICD-10-PCS | Mod: CPTII,S$GLB,, | Performed by: ANESTHESIOLOGY

## 2022-04-27 PROCEDURE — 99204 OFFICE O/P NEW MOD 45 MIN: CPT | Mod: S$GLB,,, | Performed by: ANESTHESIOLOGY

## 2022-04-27 PROCEDURE — 1101F PR PT FALLS ASSESS DOC 0-1 FALLS W/OUT INJ PAST YR: ICD-10-PCS | Mod: CPTII,S$GLB,, | Performed by: ANESTHESIOLOGY

## 2022-04-27 PROCEDURE — 99999 PR PBB SHADOW E&M-EST. PATIENT-LVL IV: CPT | Mod: PBBFAC,,, | Performed by: ANESTHESIOLOGY

## 2022-04-27 PROCEDURE — 3288F FALL RISK ASSESSMENT DOCD: CPT | Mod: CPTII,S$GLB,, | Performed by: ANESTHESIOLOGY

## 2022-04-27 PROCEDURE — 1101F PT FALLS ASSESS-DOCD LE1/YR: CPT | Mod: CPTII,S$GLB,, | Performed by: ANESTHESIOLOGY

## 2022-04-27 PROCEDURE — 99204 PR OFFICE/OUTPT VISIT, NEW, LEVL IV, 45-59 MIN: ICD-10-PCS | Mod: S$GLB,,, | Performed by: ANESTHESIOLOGY

## 2022-04-27 PROCEDURE — 1160F PR REVIEW ALL MEDS BY PRESCRIBER/CLIN PHARMACIST DOCUMENTED: ICD-10-PCS | Mod: CPTII,S$GLB,, | Performed by: ANESTHESIOLOGY

## 2022-04-27 PROCEDURE — 1159F PR MEDICATION LIST DOCUMENTED IN MEDICAL RECORD: ICD-10-PCS | Mod: CPTII,S$GLB,, | Performed by: ANESTHESIOLOGY

## 2022-04-27 PROCEDURE — 1160F RVW MEDS BY RX/DR IN RCRD: CPT | Mod: CPTII,S$GLB,, | Performed by: ANESTHESIOLOGY

## 2022-04-27 PROCEDURE — 3078F PR MOST RECENT DIASTOLIC BLOOD PRESSURE < 80 MM HG: ICD-10-PCS | Mod: CPTII,S$GLB,, | Performed by: ANESTHESIOLOGY

## 2022-04-27 PROCEDURE — 3074F PR MOST RECENT SYSTOLIC BLOOD PRESSURE < 130 MM HG: ICD-10-PCS | Mod: CPTII,S$GLB,, | Performed by: ANESTHESIOLOGY

## 2022-04-27 PROCEDURE — 1125F AMNT PAIN NOTED PAIN PRSNT: CPT | Mod: CPTII,S$GLB,, | Performed by: ANESTHESIOLOGY

## 2022-04-27 PROCEDURE — 3078F DIAST BP <80 MM HG: CPT | Mod: CPTII,S$GLB,, | Performed by: ANESTHESIOLOGY

## 2022-04-27 PROCEDURE — 1125F PR PAIN SEVERITY QUANTIFIED, PAIN PRESENT: ICD-10-PCS | Mod: CPTII,S$GLB,, | Performed by: ANESTHESIOLOGY

## 2022-04-27 PROCEDURE — 3008F BODY MASS INDEX DOCD: CPT | Mod: CPTII,S$GLB,, | Performed by: ANESTHESIOLOGY

## 2022-04-27 PROCEDURE — 1159F MED LIST DOCD IN RCRD: CPT | Mod: CPTII,S$GLB,, | Performed by: ANESTHESIOLOGY

## 2022-04-27 RX ORDER — AMITRIPTYLINE HYDROCHLORIDE 10 MG/1
10 TABLET, FILM COATED ORAL NIGHTLY PRN
Qty: 30 TABLET | Refills: 1 | Status: SHIPPED | OUTPATIENT
Start: 2022-04-27 | End: 2022-10-12 | Stop reason: ALTCHOICE

## 2022-04-27 NOTE — PATIENT INSTRUCTIONS
General Neck and Back Pain    Both neck and back pain are usually caused by injury to the muscles or ligaments of the spine. Sometimes the disks that separate each bone of the spine may cause pain by pressing on a nearby nerve. Back and neck pain may appear after a sudden twisting or bending force (such as in a car accident), or sometimes after a simple awkward movement. In either case, muscle spasm is often present and adds to the pain.  Acute neck and back pain usually gets better in 1 to 2 weeks. Pain related to disk disease, arthritis in the spinal joints or spinal stenosis (narrowing of the spinal canal) can become chronic and last for months or years.  Back and neck pain are common problems. Most people feel better in 1 or 2 weeks, and most of the rest in 1 to 2 months. Most people can remain active.  People experience and describe pain differently.  Pain can be sharp, stabbing, shooting, aching, cramping, or burning  Movement, standing, bending, lifting, sitting, or walking may worsen the pain  Pain can be localized to one spot or area, or it can be more generalized  Pain can spread or radiate upwards, downwards, to the front, or go down your arms  Muscle spasm may occur.  Most of the time mechanical problems with the muscles or spine cause the pain. it is usually caused by an injury, whether known or not, to the muscles or ligaments. While illnesses can cause back pain, it is usually not caused by a serious illness. Pain is usually related to physical activity, whether sports, exercise, work, or normal activity. Sometimes it can occur without an identifiable cause. This can happen simply by stretching or moving wrong, without noting pain at the time. Other causes include:  Overexertion, lifting, pushing, pulling incorrectly or too aggressively.  Sudden twisting, bending or stretching from an accident (car or fall), or accidental movement.  Poor posture  Poor conditioning, lack of regular exercise  Spinal  disc disease or arthritis  Stress  Pregnancy, or illness like appendicitis, bladder or kidney infection, pelvic infections   Home care  For neck pain: Use a comfortable pillow that supports the head and keeps the spine in a neutral position. The position of the head should not be tilted forward or backward.  When in bed, try to find a position of comfort. A firm mattress is best. Try lying flat on your back with pillows under your knees. You can also try lying on your side with your knees bent up towards your chest and a pillow between your knees.  At first, do not try to stretch out the sore spots. If there is a strain, it is not like the good soreness you get after exercising without an injury. In this case, stretching may make it worse.  Avoid prolonged sitting, long car rides or travel. This puts more stress on the lower back than standing or walking.  During the first 24 to 72 hours after an injury, apply an ice pack to the painful area for 20 minutes and then remove it for 20 minutes over a period of 60 to 90 minutes or several times a day.   You can alternate ice and heat therapies. Talk with your healthcare provider about the best treatment for your back or neck pain. As a safety precaution, do not use a heating pad at bedtime. Sleeping with a heating pad can lead to skin burns or tissue damage.  Therapeutic massage can help relax the back and neck muscles without stretching them.  Be aware of safe lifting methods and do not lift anything over 15 pounds until all the pain is gone.  Medications  Talk to your healthcare provider before using medicine, especially if you have other medical problems or are taking other medicines.  You may use over-the-counter medicine to control pain, unless another pain medicine was prescribed. If you have chronic conditions like diabetes, liver or kidney disease, stomach ulcers,  gastrointestinal bleeding, or are taking blood thinner medicines.  Be careful if you are given pain  medicines, narcotics, or medicine for muscle spasm. They can cause drowsiness, and can affect your coordination, reflexes, and judgment. Do not drive or operate heavy machinery.  Follow-up care  Follow up with your healthcare provider, or as advised. Physical therapy or further tests may be needed.  If X-rays were taken, you will be notified of any new findings that may affect your care.  Call 911  Seek emergency medical care if any of the following occur:  Trouble breathing  Confusion  Very drowsy or trouble awakening  Fainting or loss of consciousness  Rapid or very slow heart rate  Loss of bowel or bladder control  When to seek medical advice  Call your healthcare provider right away if any of these occur:  Pain becomes worse or spreads into your arms or legs  Weakness, numbness or pain in one or both arms or legs  Numbness in the groin area  Difficulty walking  Fever of 100.4ºF (38ºC) or higher, or as directed by your healthcare provider  Date Last Reviewed: 7/1/2016  © 3205-1033 [employer]. [address] [place] [address]. All rights reserved. This information is not intended as a substitute for professional medical care. Always follow your healthcare professional's instructions.       Understanding Lumbar Radiculopathy    Lumbar radiculopathy is irritation or inflammation of a nerve root in the low back. It causes symptoms that spread out from the back down one or both legs. To understand this condition, it helps to understand the parts of the spine:  Vertebrae. These are bones that stack to form the spine. The lumbar spine contains the 5 bottom vertebrae.  Disks. These are soft pads of tissue between the vertebrae. They act as shock absorbers for the spine.  Spinal canal. This is a tunnel formed within the stacked vertebrae. In the lumbar spine, nerves run through this canal.  Nerves. These branch off and leave the spinal canal, traveling out to parts of the body. As they leave the  spinal canal, nerves pass through openings between the vertebrae. The nerve root is the part of the nerve that is closest to the spinal canal.  Sciatic nerve. This is a large nerve formed from several nerve roots in the low back. This nerve extends down the back of the leg to the foot.  With lumbar radiculopathy, nerve roots in the low back become irritated. This leads to pain and symptoms. The sciatic nerve is commonly involved, so the condition is often called sciatica.  What causes lumbar radiculopathy?  Aging, injury, poor posture, extra body weight, and other issues can lead to problems in the low back. These problems may then irritate nerve roots. They include:  Damage to a disk in the lumbar spine. The damaged disk may then press on nearby nerve roots.  Degeneration from wear and tear, and aging. This can lead to narrowing (stenosis) of the openings between the vertebrae. The narrowed openings press on nerve roots as they leave the spinal canal.  Unstable spine. This is when a vertebra slips forward. It can then press on a nerve root.  Other, less common things can put pressure on nerves in the low back. These include diabetes, infection, or a tumor.  Symptoms of lumbar radiculopathy  These include:  Pain in the low back  Pain, numbness, tingling, or weakness that travels into the buttocks, hip, groin, or leg  Muscle spasms  Treatment for lumbar radiculopathy  In most cases, your healthcare provider will first try treatments that help relieve symptoms. These may include:  Prescription and over-the-counter pain medicines. These help relieve pain, swelling, and irritation.  Limits on positions and activities that increase pain. But lying in bed or avoiding all movement is only recommended for a short period of time.  Physical therapy, including exercises and stretches. This helps decrease pain and increase movement and function.  Steroid shots into the lower back. This may help relieve symptoms for a  time.  Weight-loss program. If you are overweight, losing extra pounds may help relieve symptoms.  In some cases, you may need surgery to fix the underlying problem. This depends on the cause, the symptoms, and how long the pain has lasted.  Possible complications  Over time, an irritated and inflamed nerve may become damaged. This may lead to long-lasting (permanent) numbness or weakness in your legs and feet. If symptoms change suddenly or get worse, be sure to let your healthcare provider know.  When to call your healthcare provider  Call your healthcare provider right away if you have any of these:  New pain or pain that gets worse  New or increasing weakness, tingling, or numbness in your leg or foot  Problems controlling your bladder or bowel   Date Last Reviewed: 3/10/2016  © 8963-6031 [employer]. [address] [place] [address], [place] [address]. All rights reserved. This information is not intended as a substitute for professional medical care. Always follow your healthcare professional's instructions.       Exercises to Strengthen Your Lower Back  Strong lower back and abdominal muscles work together to support your spine. The exercises below will help strengthen the lower back. It is important that you begin exercising slowly and increase levels gradually.  Always begin any exercise program with stretching. If you feel pain while doing any of these exercises, stop and talk to your doctor about a more specific exercise program that better suits your condition.   Low back stretch  The point of stretching is to make you more flexible and increase your range of motion. Stretch only as much as you are able. Stretch slowly. Do not push your stretch to the limit. If at any point you feel pain while stretching, this is your (temporary) limit.  Lie on your back with your knees bent and both feet on the ground.  Slowly raise your left knee to your chest as you flatten your lower back against the floor. Hold  for 5 seconds.  Relax and repeat the exercise with your right knee.  Do 10 of these exercises for each leg.  Repeat hugging both knees to your chest at the same time.  Building lower back strength  Start your exercise routine with 10 to 30 minutes a day, 1 to 3 times a day.  Initial exercises  Lying on your back:  Ankle pumps: Move your foot up and down, towards your head, and then away. Repeat 10 times with each foot.  Heel slides: Slowly bend your knee, drawing the heel of your foot towards you. Then slide your heel/foot from you, straightening your knee. Do not lift your foot off the floor (this is not a leg lift).  Abdominal contraction: Bend your knees and put your hands on your stomach. Tighten your stomach muscles. Hold for 5 seconds, then relax. Repeat 10 times.  Straight leg raise: Bend one leg at the knee and keep the other leg straight. Tighten your stomach muscles. Slowly lift your straight leg 6 to 12 inches off the floor and hold for up to 5 seconds. Repeat 10 times on each side.  Standing:  Wall squats: Stand with your back against the wall. Move your feet about 12 inches away from the wall. Tighten your stomach muscles, and slowly bend your knees until they are at about a 45 degree angle. Do not go down too far. Hold about 5 seconds. Then slowly return to your starting position. Repeat 10 times.  Heel raises: Stand facing the wall. Slowly raise the heels of your feet up and down, while keeping your toes on the floor. If you have trouble balancing, you can touch the wall with your hands. Repeat 10 times.  More advanced exercises  When you feel comfortable enough, try these exercises.  Kneeling lumbar extension: Begin on your hands and knees. At the same time, raise and straighten your right arm and left leg until they are parallel to the ground. Hold for 2 seconds and come back slowly to a starting position. Repeat with left arm and right leg, alternating 10 times.  Prone lumbar extension: Lie face  down, arms extended overhead, palms on the floor. At the same time, raise your right arm and left leg as high as comfortably possible. Hold for 10 seconds and slowly return to start. Repeat with left arm and right leg, alternating 10 times. Gradually build up to 20 times. (Advanced: Repeat this exercise raising both arms and both legs a few inches off the floor at the same time. Hold for 5 seconds and release.)  Pelvic tilt: Lie on the floor on your back with your knees bent at 90 degrees. Your feet should be flat on the floor. Inhale, exhale, then slowly contract your abdominal muscles bringing your navel toward your spine. Let your pelvis rock back until your lower back is flat on the floor. Hold for 10 seconds while breathing smoothly.  Abdominal crunch: Perform a pelvic tilt (above) flattening your lower back against the floor. Holding the tension in your abdominal muscles, take another breath and raise your shoulder blades off the ground (this is not a full sit-up). Keep your head in line with your body (dont bend your neck forward). Hold for 2 seconds, then slowly lower.  Date Last Reviewed: 6/1/2016  © 5860-4163 [employer]. [address] [place] [address], [place] [address]. All rights reserved. This information is not intended as a substitute for professional medical care. Always follow your healthcare professional's instructions.

## 2022-04-27 NOTE — TELEPHONE ENCOUNTER
The patient is NOT on any ASA or blood thinners.  No clearance is needed.  I went over the pre-procedure instructions with the patient and her niece, Benoit.  I gave the patient a copy.  The patient verbalized understanding.  All questions answered.

## 2022-04-28 ENCOUNTER — TELEPHONE (OUTPATIENT)
Dept: PAIN MEDICINE | Facility: CLINIC | Age: 67
End: 2022-04-28
Payer: MEDICARE

## 2022-04-28 NOTE — TELEPHONE ENCOUNTER
Release of information faxed to Dr. Juanito Younger.  I have scanned a copy of the release into the media section of the patient's chart.  Fax:  [phone]  Phone:  [phone]    I received an email notifying me that the fax was successfully sent.

## 2022-04-29 ENCOUNTER — TELEPHONE (OUTPATIENT)
Dept: FAMILY MEDICINE | Facility: CLINIC | Age: 67
End: 2022-04-29
Payer: MEDICARE

## 2022-04-29 NOTE — TELEPHONE ENCOUNTER
----- Message from Bianca Montemayor sent at 4/29/2022 10:26 AM CDT -----  Needs advice from nurse:      Who Called:pt  Regarding:patient having hip replacement on 5/13 and needs clearance faxed to   Would the patient rather a call back or [facility]IA [facility]?  Best Call Back number:[phone]  Additional Info:

## 2022-04-29 NOTE — TELEPHONE ENCOUNTER
Returned patient call about her having a hip replacement on 5/13 and need clearance faxed to [phone]. Patient said she had blood test and everything else over there. All they need is to her from you that it's okay to do and if it's okay to give the medication with Dr. Angel?

## 2022-04-30 NOTE — TELEPHONE ENCOUNTER
I am ok with writing a note stating that her surgeon Dr Angel will be prescribing pain medication temporarily during her post operative recovery.     As far as a signing off on a clearance-- I really can do that without reviewing her labs/EKG she had done. If they did all of her pre-op work up, I feel that they should be able to sign off on her clearance-- I'm confused why they are needing my approval. If they have concerns about anything and that's why they want me to sign off then I would have to be able to review her labs and EKG and have at least a virtual visit with her.     Please relay this and schedule pre-op eval if needed. Also please confirm that what she is needing regarding her meds is a note about the pain meds, thanks

## 2022-05-02 ENCOUNTER — TELEPHONE (OUTPATIENT)
Dept: FAMILY MEDICINE | Facility: CLINIC | Age: 67
End: 2022-05-02
Payer: MEDICARE

## 2022-05-02 DIAGNOSIS — G89.29 CHRONIC NECK AND BACK PAIN: ICD-10-CM

## 2022-05-02 DIAGNOSIS — M54.9 CHRONIC NECK AND BACK PAIN: ICD-10-CM

## 2022-05-02 DIAGNOSIS — M54.2 CHRONIC NECK AND BACK PAIN: ICD-10-CM

## 2022-05-02 DIAGNOSIS — S12.9XXS CLOSED FRACTURE OF CERVICAL VERTEBRA, UNSPECIFIED CERVICAL VERTEBRAL LEVEL, SEQUELA: ICD-10-CM
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
## 2022-05-02 RX ORDER — HYDROCODONE BITARTRATE AND ACETAMINOPHEN 10; 325 MG/1; MG/1
1 TABLET ORAL EVERY 6 HOURS PRN
Qty: 90 TABLET | Refills: 0 | Status: SHIPPED | OUTPATIENT
Start: 2022-05-02 | End: 2022-06-03 | Stop reason: SDUPTHER

## 2022-05-02 NOTE — TELEPHONE ENCOUNTER
----- Message from Allison Newton sent at 5/2/2022 10:21 AM CDT -----  Type:  Patient Requesting call Back    Who Called:Thom Krishna  Would the patient rather a call back or a response via [email or web address]? Call back  Best Call Back Number:[phone]  Additional Information: Patient Requesting call Back regarding primary care.

## 2022-05-02 NOTE — TELEPHONE ENCOUNTER
----- Message from Kamlesh Sahu sent at 5/2/2022 10:08 AM CDT -----  Type:  RX Refill Request    Who Called: Pt   Refill or New Rx:refill   RX Name and Strength: HYDROcodone-acetaminophen (NORCO)  mg per tablet  How is the patient currently taking it? (ex. 1XDay):1  Is this a 30 day or 90 day RX:90  Preferred Pharmacy with phone number:[facility] - [address] [address]  Local or Mail Order:local   Ordering Provider:Nicanor  Would the patient rather a call back or a response via [facility]? Call   Best Call Back Number: [phone]  Additional Information:         Pt would like a call back regarding medical clearance for procedure May 6th   Please fax medical clearance to Dr Randy Angel's office  [phone] phone

## 2022-05-02 NOTE — TELEPHONE ENCOUNTER
Returned patient call bout the pre op clearance that she need. Would you like for her to come in to see what she need. Patient is having her surgery on May 13, 2022. Patient is crying because she is in pain which she sent in a message about her pain medication that she sent a request in about. Patient is now saying she can drive because her leg hurt. Please advise.

## 2022-05-02 NOTE — TELEPHONE ENCOUNTER
Called patient to inform her that Dr. Dejesus is in clinic and haven't had a chance to get to her message yet. Patient verbalized understanding.

## 2022-05-02 NOTE — TELEPHONE ENCOUNTER
[facility]    [address] [address]    Phone:  [phone]    Fax:  [phone]       Thank You for choosing us for your health care visit. We are glad to serve you and happy to provide you with this summary of your visit. Please help us to ensure we have accurate records. If you find anything that needs to be changed, please let our staff know as soon as possible.          Your Demographic Information     Patient Name Sex Brissa Castro Female 1963       Ethnic Group Patient Race    Not of  or  Origin White      Your Visit Details     Date & Time Provider Department    2017 8:10 AM Jarek Escobar MD [facility]      Your Upcoming Appointment*(Max 10)     2017  4:15 PM CDT   Complete Physical Exam with Isaiah Salazar MD   [facility] ([facility])    [facility] [address]  [place] [id number] [phone]              Conditions Discussed Today or Order-Related Diagnoses        Comments    Perennial allergic rhinitis, unspecified allergic rhinitis trigger    -  Primary     Chronic pansinusitis           Your Vitals Were     BP Pulse Temp Resp Height Weight    130/71 72 97.2 °F (36.2 °C) (Temporal Artery) 22 5' (1.524 m) 139 lb (63 kg)    SpO2 BMI Smoking Status             97% 27.15 kg/m2 Never Smoker         Medications Prescribed or Re-Ordered Today     fluticasone (FLONASE) 50 MCG/ACT nasal spray    Sig - Route: Spray 2 sprays in each nostril daily. - Nasal    Class: Eprescribe    [facility] [address] [place] [address] Ph #: [phone]    loratadine-pseudoephedrine (CLARITIN-D 24 HOUR)  MG per 24 hr tablet    Sig - Route: Take 1 tablet by mouth daily. - Oral    Class: Normal    Pharmacy: [facility] [address] [place] [address] Ph #:  Returned patient call no answer. Left message.    [phone]      Your Current Medications Are        Disp Refills Start End    albuterol (PROVENTIL HFA) 108 (90 BASE) MCG/ACT inhaler 2 Inhaler 11 10/17/2016     Sig - Route: Inhale 2 puffs into the lungs every 4 hours as needed for Shortness of Breath or Wheezing. - Inhalation    Class: Eprescribe    estradiol (ESTRACE) 0.1 MG/GM vaginal cream 42.5 g 12 9/21/2016     Sig - Route: Place 2 g vaginally daily. - Vaginal    Notes to Pharmacy: Call to [facility]    sumatriptan (IMITREX) 100 MG tablet 10 tablet 0 12/9/2014     Sig - Route: Take 1 tablet by mouth as needed for Migraine. - Oral    Class: Eprescribe    fluticasone (FLONASE) 50 MCG/ACT nasal spray 16 g 12 2/24/2017     Sig - Route: Spray 2 sprays in each nostril daily. - Nasal    Class: Eprescribe    loratadine-pseudoephedrine (CLARITIN-D 24 HOUR)  MG per 24 hr tablet 30 tablet 0 2/24/2017 8/23/2017    Sig - Route: Take 1 tablet by mouth daily. - Oral    citalopram (CELEXA) 20 MG tablet 90 tablet 3 9/21/2016     Sig - Route: Take 1 tablet by mouth daily. - Oral    Class: Eprescribe      Discontinued Medications        Reason for Discontinue    cefPROZIL (CEFZIL) 500 MG tablet Ineffective      Allergies     Amoxicillin RASH    Erythromycin SWELLING      Immunizations History as of 2/24/2017     Name Date    Imitrex 12/9/2014 10:14 AM    Influenza A novel H1N1 10/20/2009    Tdap 10/29/2007      Problem List as of 2/24/2017     Anxiety    Migraines    GERD (gastroesophageal reflux disease)    Allergic rhinitis    Multinodular thyroid              Patient Instructions      Chronic Sinusitis  Chronic sinusitis is a long-term swelling or inflammation of the sinuses. If sinusitis lasts more than 12 weeks, it is called chronic.  What is chronic sinusitis?  Sinuses are air-filled spaces in the skull behind the face. They are kept moist and clean by a lining of mucosa. Things such as pollen, smoke, and chemical fumes can irritate the mucosa. Constant exposure  to irritants can cause ongoing inflammation of this lining. It can also damage tiny hairlike cilia that cover the mucosa. Cilia help transport mucus toward the opening of the sinus. Damage to cilia keeps mucus from draining from the sinuses.  Causes of chronic sinusitis  Problems that irritate the mucosa or block drainage can lead to chronic sinusitis. These may include:  · Chronic allergies  · Nasal polyps, deviated septum, or other obstructions  · Constant exposure to irritants, such as cigarette smoke or fumes  Common symptoms of chronic sinusitis  Symptoms may include:  · Facial pain and pressure  · Headache and sinus pain  · Nasal congestion  · Thick, colored drainage from the nose  · Postnasal drainage (thick mucus draining down the back of the throat)  · Loss of smell  · Fever  · Cough  · Sore throat    Treating chronic sinusitis  Treatment involves reducing irritation and inflammation. Your plan may include:  · Taking medications. Medications may be prescribed reduce secretions and swelling. These help unblock the sinuses and allow them to drain.   · Sinus irrigation (flushing with saltwater or saline solution) may be suggested. This helps to clear out mucus.  · A plan to control allergies is helpful if they are present. This plan may include medications or allergy shots.  · Surgery, in some cases. Surgery on the nose, sinuses, or both can improve sinus drainage or remove nasal obstructions.  © 8160-6940 [employer]. [address] [place] [address], [place] [address]. All rights reserved. This information is not intended as a substitute for professional medical care. Always follow your healthcare professional's instructions.

## 2022-05-02 NOTE — TELEPHONE ENCOUNTER
----- Message from Adela Villatoro sent at 5/2/2022  1:52 PM CDT -----  Contact: [phone] /self  Type:  RX Refill Request    Who Called: pt   Refill or New Rx:refill   RX Name and Strength:HYDROcodone-acetaminophen (NORCO)  mg per tablet  How is the patient currently taking it? (ex. 1XDay): as needed   Is this a 30 day or 90 day RX: 24 days   Preferred Pharmacy with phone number:[facility] [phone]  Local or Mail Order: local   Ordering Provider: Oleg   Would the patient rather a call back or a response via [facility]? Call back   Best Call Back Number: [phone]   Additional Information:  lydia says  has to send script , pharmacy closes at 4 pm . Pt also requesting a call back

## 2022-05-02 NOTE — TELEPHONE ENCOUNTER
----- Message from Allison Newton sent at 4/29/2022  3:50 PM CDT -----  Type:  Patient Requesting call back    Who Called:Thom Krishna  Would the patient rather a call back or a response via [facility]? Call back  Best Call Back Number:[phone]  Additional Information: Patient Requesting call back regarding primary care.

## 2022-05-02 NOTE — TELEPHONE ENCOUNTER
----- Message from Adela Villatoro sent at 5/2/2022  2:13 PM CDT -----  Contact: [phone]/self  Who Called: PT  Regarding: pt says she is in severe pain  and wanted to male sure the script for her HYDROcodone-acetaminophen (NORCO)  mg per tablet.   Would the patient rather a call back or a response via [facility]? Call back  Best Call Back Number: [phone]  Additional Information: I informed her the dr has not yet got to her message but she says she is in much pain an needs it called in before 4 pm

## 2022-05-02 NOTE — TELEPHONE ENCOUNTER
Returned patient call about pain medication. I explained to the patient again that the refill was sent to Dr. Dejesus and she is in clinic and is not in her box right now. As soon as she get to it Dr. Dejesus will make a decision on the medication.

## 2022-05-02 NOTE — TELEPHONE ENCOUNTER
"Called patient to inform her of the message below from Dr. Dejesus. Patient was scheduled for her virtual visit on May 10, 2022 at 10:30 am and patient was told that she need to have results faxed over to Dr. Dejesus. Patient was given a fax number.       "I am ok with writing a note stating that her surgeon Dr Angel will be prescribing pain medication temporarily during her post operative recovery.      As far as a signing off on a clearance-- I really can do that without reviewing her labs/EKG she had done. If they did all of her pre-op work up, I feel that they should be able to sign off on her clearance-- I'm confused why they are needing my approval. If they have concerns about anything and that's why they want me to sign off then I would have to be able to review her labs and EKG and have at least a virtual visit with her.      Please relay this and schedule pre-op eval if needed. Also please confirm that what she is needing regarding her meds is a note about the pain meds, thanks"  "

## 2022-05-03 ENCOUNTER — TELEPHONE (OUTPATIENT)
Dept: PAIN MEDICINE | Facility: CLINIC | Age: 67
End: 2022-05-03
Payer: MEDICARE

## 2022-05-03 ENCOUNTER — TELEPHONE (OUTPATIENT)
Dept: PHYSICAL MEDICINE AND REHAB | Facility: CLINIC | Age: 67
End: 2022-05-03
Payer: MEDICARE

## 2022-05-03 NOTE — TELEPHONE ENCOUNTER
----- Message from Jocelyne Chiu sent at 5/3/2022 10:37 AM CDT -----  Contact: dc Harmon called in to reschedule her EMG appointment. Please give her a call back at [phone]

## 2022-05-03 NOTE — TELEPHONE ENCOUNTER
Pt called to cancel her procedure because she has surgery coming up.    Prateek Avelar   Medical Assistant

## 2022-05-03 NOTE — TELEPHONE ENCOUNTER
----- Message from Jocelyne Chiu sent at 5/3/2022 10:34 AM CDT -----  Contact: COLBY  Patient called in to cancel upcoming procedure. Please give her a call back at [phone] or [phone]

## 2022-05-10 ENCOUNTER — OFFICE VISIT (OUTPATIENT)
Dept: FAMILY MEDICINE | Facility: CLINIC | Age: 67
End: 2022-05-10
Payer: MEDICARE

## 2022-05-10 DIAGNOSIS — N18.31 CHRONIC KIDNEY DISEASE, STAGE 3A: ICD-10-CM

## 2022-05-10 DIAGNOSIS — J43.9 PULMONARY EMPHYSEMA, UNSPECIFIED EMPHYSEMA TYPE: ICD-10-CM

## 2022-05-10 DIAGNOSIS — K75.4 AUTOIMMUNE HEPATITIS: ICD-10-CM

## 2022-05-10 DIAGNOSIS — M54.9 CHRONIC NECK AND BACK PAIN: ICD-10-CM

## 2022-05-10 DIAGNOSIS — I70.0 ATHEROSCLEROSIS OF AORTA: ICD-10-CM

## 2022-05-10 DIAGNOSIS — M81.0 POSTMENOPAUSAL OSTEOPOROSIS: ICD-10-CM

## 2022-05-10 DIAGNOSIS — Z72.0 TOBACCO ABUSE: ICD-10-CM

## 2022-05-10 DIAGNOSIS — M50.30 DDD (DEGENERATIVE DISC DISEASE), CERVICAL: ICD-10-CM

## 2022-05-10 DIAGNOSIS — G89.29 CHRONIC NECK AND BACK PAIN: ICD-10-CM

## 2022-05-10 DIAGNOSIS — M54.2 CHRONIC NECK AND BACK PAIN: ICD-10-CM

## 2022-05-10 DIAGNOSIS — M96.1 FAILED BACK SURGICAL SYNDROME: ICD-10-CM

## 2022-05-10 DIAGNOSIS — M51.36 DDD (DEGENERATIVE DISC DISEASE), LUMBAR: ICD-10-CM

## 2022-05-10 DIAGNOSIS — E11.49 DIABETES MELLITUS TYPE 2 WITH NEUROLOGICAL MANIFESTATIONS: ICD-10-CM

## 2022-05-10 DIAGNOSIS — E03.9 ACQUIRED HYPOTHYROIDISM: ICD-10-CM

## 2022-05-10 DIAGNOSIS — M25.551 RIGHT HIP PAIN: Primary | ICD-10-CM

## 2022-05-10 DIAGNOSIS — E55.9 VITAMIN D DEFICIENCY: ICD-10-CM

## 2022-05-10 DIAGNOSIS — M16.11 PRIMARY OSTEOARTHRITIS OF RIGHT HIP: ICD-10-CM

## 2022-05-10 DIAGNOSIS — F11.20 OPIOID DEPENDENCE, DAILY USE: ICD-10-CM

## 2022-05-10 PROCEDURE — 1160F PR REVIEW ALL MEDS BY PRESCRIBER/CLIN PHARMACIST DOCUMENTED: ICD-10-PCS | Mod: CPTII,95,, | Performed by: FAMILY MEDICINE

## 2022-05-10 PROCEDURE — 1160F RVW MEDS BY RX/DR IN RCRD: CPT | Mod: CPTII,95,, | Performed by: FAMILY MEDICINE

## 2022-05-10 PROCEDURE — 99214 PR OFFICE/OUTPT VISIT, EST, LEVL IV, 30-39 MIN: ICD-10-PCS | Mod: 95,,, | Performed by: FAMILY MEDICINE

## 2022-05-10 PROCEDURE — 99214 OFFICE O/P EST MOD 30 MIN: CPT | Mod: 95,,, | Performed by: FAMILY MEDICINE

## 2022-05-10 PROCEDURE — 1159F PR MEDICATION LIST DOCUMENTED IN MEDICAL RECORD: ICD-10-PCS | Mod: CPTII,95,, | Performed by: FAMILY MEDICINE

## 2022-05-10 PROCEDURE — 1159F MED LIST DOCD IN RCRD: CPT | Mod: CPTII,95,, | Performed by: FAMILY MEDICINE

## 2022-05-10 NOTE — PROGRESS NOTES
Office Visit    Patient Name: Thom Krishna    : 1955  MRN: [id number]    Subjective:  Thom is a 66 y.o. female who presents today for:    No chief complaint on file.    The patient location is: HER APARTMENT IN [place], [place]  The chief complaint leading to consultation is: PRE-OP CLEARANCE FOR R HIP    Visit type: audiovisual-- converted to Audio Only Secondary to Technical Difficulties preventing patient from logging in to the audiovisual platform    Face to Face time with patient: START 1032 END 1155  30 minutes of total time spent on the encounter, which includes face to face time and non-face to face time preparing to see the patient (eg, review of tests), Obtaining and/or reviewing separately obtained history, Documenting clinical information in the electronic or other health record, Independently interpreting results (not separately reported) and communicating results to the patient/family/caregiver, or Care coordination (not separately reported).     Each patient to whom he or she provides medical services by telemedicine is:  (1) informed of the relationship between the physician and patient and the respective role of any other health care provider with respect to management of the patient; and (2) notified that he or she may decline to receive medical services by telemedicine and may withdraw from such care at any time.    Notes: Patient Requests Preoperative Clearance     Surgery: R HIP TOTAL REPLACEMENT  Indication: SEVERE PAIN  Surgeon and anticipated date of surgery: Dr Randy Angel Fax: [phone], Scheduled for This 22  Phone: [phone]  Feeling Healthy and Well Without Symptoms of Illness: Denies, f/c/n/v, sore throat/URI sx, dysuria, diarrhea, rash.     Exercise Tolerance:  POOR but it is limited 2/2 back and hip pain. Denies  chest pain, shortness of breath, palpitations, dizziness/lightheadedness, edema    Previous Trouble with Anesthesia: NONE    Easy  Bleeding/Bruising?  NONE      Chronic Conditions:   66 y.o. 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 Younger, [facility] osteoporosis, nicotine dependence.     Has been taking Vit D Gummy 2,000 TID, Received ONE PROLIA DOSE 12/31/18 but did not receive subsequent due to cost/ transportation/ non-compliance    Saw Pain med 4/27/22- [facility]-- nerve conduction studies and trial of injections advised-- patient plans to return after recovering from her hip replacement.       PAST MEDICAL HISTORY, SURGICAL/SOCIAL/FAMILY HISTORY REVIEWED AS PER CHART, WITH PERTINENT FINDINGS INCLUDED IN HISTORY SECTION OF NOTE.     Current Medications    Medication List with Changes/Refills   Current Medications    ALPRAZOLAM (XANAX) 1 MG TABLET    Take 1 tablet (1 mg total) by mouth 2 (two) times daily as needed.    AMITRIPTYLINE (ELAVIL) 10 MG TABLET    Take 1 tablet (10 mg total) by mouth nightly as needed for Insomnia or Pain.    BLOOD-GLUCOSE METER KIT    Test tid please dispense test strips and lancets    CHOLECALCIFEROL, VITAMIN D3, 125 MCG (5,000 UNIT) CAPSULE    Take 1 tablet  by mouth daily with breakfast.    DICLOFENAC SODIUM (VOLTAREN) 1 % GEL    Apply 2 g topically 4 (four) times daily.    HYDROCODONE-ACETAMINOPHEN (NORCO)  MG PER TABLET    Take 1 tablet by mouth every 6 (six) hours as needed for pain    LEVOTHYROXINE (SYNTHROID) 75 MCG TABLET    Take 1 tablet (75 mcg total) by mouth once daily.    ONDANSETRON (ZOFRAN-ODT) 8 MG TBDL    Dissolve 1 tablet (8 mg total) by mouth under the tongue 3 (three) times daily as needed (nausea).    PANTOPRAZOLE (PROTONIX) 40 MG TABLET    TAKE 1 TABLET EVERY DAY    SENNA-DOCUSATE 8.6-50 MG (SENNA WITH DOCUSATE SODIUM) 8.6-50 MG PER TABLET    Take 3 tablets by mouth once daily.  Take 1-2 tabs daily-- take any time a pain pill( norco is taken) to help reduce opiod induced constipation   Discontinued Medications    VANCOMYCIN (VANCOCIN) 1,000 MG INJECTION    EMPTY CONTENTS OF 1 VIAL INTO SHAKER. SPRINKLE DIRECTLY ONTO INFECTION SITE. PERFORM 1-2 TIMES DAILY.       Allergies   Review of patient's allergies indicates:   Allergen Reactions    Cefaclor Hives    Gabapentin Swelling    Penicillins      Other reaction(s): Hives    Sulfa (sulfonamide antibiotics) Other (See Comments)     Other reaction(s): Hives         Review of Systems (Pertinent positives)  Review of Systems   Constitutional: Negative for chills and fever.   Respiratory: Negative for cough and shortness of breath.    Cardiovascular: Negative for chest pain, palpitations and leg swelling.   Gastrointestinal: Negative for abdominal pain, diarrhea and vomiting.   Genitourinary: Negative for dysuria and hematuria.   Musculoskeletal: Positive for back pain.   Skin: Negative for rash.   Neurological: Positive for weakness and numbness. Negative for dizziness and light-headedness.   Psychiatric/Behavioral: Positive for dysphoric mood.       There were no vitals taken for this visit.    Physical Exam  Constitutional:       General: She is not in acute distress.  Neurological:      Mental Status: She is alert and oriented to person, place, and time.           Assessment/Plan:  Thom Krishna is a 66 y.o. female who presents today for :        ICD-10-CM ICD-9-CM    1. Right hip pain  M25.551 719.45    2. Primary osteoarthritis of right hip  M16.11 715.15    3. Postmenopausal osteoporosis  M81.0 733.01    4. Vitamin D deficiency  E55.9 268.9    5. Chronic neck and back pain  M54.2 723.1     M54.9 724.5     G89.29 338.29    6. DDD (degenerative disc disease), lumbar  M51.36 722.52    7. DDD (degenerative disc disease), cervical  M50.30 722.4    8. Failed back surgical syndrome  M96.1 722.80    9. Opioid dependence, daily use  F11.20 304.01     10. Tobacco abuse  Z72.0 305.1    11. Pulmonary emphysema, unspecified emphysema type  J43.9 492.8    12. Autoimmune hepatitis  K75.4 571.42    13. Acquired hypothyroidism  E03.9 244.9    14. Atherosclerosis of aorta  I70.0 440.0    15. Diabetes mellitus type 2 with neurological manifestations  E11.49 250.60    16. Chronic kidney disease, stage 3a  N18.31 585.3      67 yo female with no significant cardiovascular history who presents today for Virtual Visit for PreOperative Clearance for R Hip replacement.     Surgical Risk Factors include: suboptimally treated  osteoporosis, tobacco abuse, poor exercise tolerance and history of non-compliance that could jeopardize optimal post-operative recovery.     She reports outside labs/ EKG without concerns-- awaiting fax with these results.     Advised on the importance of Smoking Cessation, Continuation of Vitamin D, activity like walking as tolerated and resumption of Prolia injections to help optimizer her outcome.    Plane to explore infusion center options in [place] for Prolia to start aobut 6 weeks after her R hip replacment.    Per most recent available labs, LFTS/ Kidney function, CBC, TSH WNL/Stable. A1c 5.0 2/19/21.     Letter created to Fax to Dr Angel regarding the above and requesting clarification regarding her chronic pain contract (#90 Norco 10/325 most recently prescribed 5/2/22).     There are no Patient Instructions on file for this visit.      No follow-ups on file.
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
## 2022-05-12 ENCOUNTER — PATIENT MESSAGE (OUTPATIENT)
Dept: SMOKING CESSATION | Facility: CLINIC | Age: 67
End: 2022-05-12
Payer: MEDICARE

## 2022-05-13 ENCOUNTER — TELEPHONE (OUTPATIENT)
Dept: FAMILY MEDICINE | Facility: CLINIC | Age: 67
End: 2022-05-13
Payer: MEDICARE

## 2022-05-13 NOTE — TELEPHONE ENCOUNTER
----- Message from Chilo De León sent at 5/13/2022  2:23 PM CDT -----  Regarding: Advice  Contact: [phone]  Patient is calling to speak with doctor nurse about therapy for her hip an wants to it to be at home. Please contact pt
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
## 2022-05-16 NOTE — TELEPHONE ENCOUNTER
Called patient to get more information regarding message below.  Phone stopped ringing after about 3 rings, tried again with no success.

## 2022-05-31 ENCOUNTER — TELEPHONE (OUTPATIENT)
Dept: FAMILY MEDICINE | Facility: CLINIC | Age: 67
End: 2022-05-31
Payer: MEDICARE

## 2022-05-31 ENCOUNTER — PATIENT MESSAGE (OUTPATIENT)
Dept: ADMINISTRATIVE | Facility: HOSPITAL | Age: 67
End: 2022-05-31
Payer: MEDICARE

## 2022-05-31 DIAGNOSIS — U07.1 COVID: ICD-10-CM

## 2022-05-31 DIAGNOSIS — U07.1 COVID-19: Primary | ICD-10-CM

## 2022-05-31 RX ORDER — GUAIFENESIN AND DEXTROMETHORPHAN HYDROBROMIDE 1200; 60 MG/1; MG/1
1 TABLET, EXTENDED RELEASE ORAL 2 TIMES DAILY
Qty: 14 TABLET | Refills: 1 | Status: SHIPPED | OUTPATIENT
Start: 2022-05-31 | End: 2022-06-07

## 2022-05-31 RX ORDER — FLUTICASONE PROPIONATE 50 MCG
2 SPRAY, SUSPENSION (ML) NASAL DAILY
Qty: 16 G | Refills: 0 | Status: SHIPPED | OUTPATIENT
Start: 2022-05-31 | End: 2022-10-12 | Stop reason: ALTCHOICE

## 2022-05-31 RX ORDER — BENZONATATE 200 MG/1
200 CAPSULE ORAL 3 TIMES DAILY PRN
Qty: 30 CAPSULE | Refills: 1 | Status: SHIPPED | OUTPATIENT
Start: 2022-05-31 | End: 2022-10-12 | Stop reason: ALTCHOICE

## 2022-05-31 NOTE — TELEPHONE ENCOUNTER
----- Message from Sofie Reina sent at 5/31/2022  8:33 AM CDT -----  Type: Requesting to speak with nurse         Who Called: PT  Regarding: patient tested positive for covid and wanting to get some medication please advise also asking about the pain medication   Would the patient rather a call back or a response via [facility]? Call back  Best Call Back Number: [phone]  Additional Information: [facility] - [address]

## 2022-05-31 NOTE — TELEPHONE ENCOUNTER
Returned patient call about her wanting medication because she tested positive for covid. Please advise.      Oleg Doshi Patient is also asking about her pain medication. Patient stated that Dr. Angel filled her medication but there is no way for her to get the medication because it's in [place]. Patient want to know if you can fill her pain medication. I know the patient is on a pain contract and I am not sure what was talked about with Dr. Angel about the pain medication. Patient was informed that Dr. Dejesus is out of the office on vacation and will return on Monday. Please advise.       The patient was called and reports symptoms started yesterday.  Has sore throat, cough, earache, in congestion.  No chest pains or clear shortness of breath.  Continues to smoke.  Has not checked her oxygen.  She is more concerned of her pain medications after her hip surgery 3 weeks ago.  Reports cannot get the prescriptions from the orthopedist's office and nobody wants to come visit due to her COVID diagnosis.  Unclear how she got COVID.  Reports home health will not be visiting for 5 days due to her diagnosis of COVID.  Of note she received 2 doses of COVID vaccines.  Has a 90 year all sister who lives with her and is on hospice.  For COVID Paxlovid not recommended at this time considering interaction with medications and infusion recommended.  Not sure if could be given through home health or transportation possibly provided.  Has a knees who was also vaccinated.  Reports Dr. Dejesus was going to take over her pain medications.  I explained that her surgery still recent and she has not had her follow-up appointment yet to be discharged and Ortho should be continuing her medications.  Patient reports that ortho will not send the medication to her pharmacy and she is asking if we could send the prescription to her pharmacy.  Looking at the  she has received a prescription for 90 tablets of hydrocodone on early  May and then 2 prescriptions of 41 tablets of oxycodone (5/12 and 5/23) by the orthopedist.  She says she will ran out in a few days.  We will need to clarify with orthopedist.   In the meantime I will send prescription for Flonase, Mucinex DM, and Tessalon Perles.  If unable to receiving infusion and symptoms worsened advised to go to the ER.

## 2022-06-02 ENCOUNTER — TELEPHONE (OUTPATIENT)
Dept: FAMILY MEDICINE | Facility: CLINIC | Age: 67
End: 2022-06-02
Payer: MEDICARE

## 2022-06-02 NOTE — TELEPHONE ENCOUNTER
----- Message from Elvie Dent sent at 6/2/2022 12:12 PM CDT -----  Type:  RX Refill Request    Who Called: pt  Refill or New Rx:refill  RX Name and Strength:HYDROcodone-acetaminophen (NORCO)  mg per tablet  How is the patient currently taking it? (ex. 1XDay):Take 1 tablet by mouth every 6 (six) hours as needed for pain  Is this a 30 day or 90 day RX:90  Preferred Pharmacy with phone number:[address] [address]   Phone: [phone]  Fax:  [phone]        Local or Mail Order:local  Ordering Provider:Dr Vick  Would the patient rather a call back or a response via [facility]? Please call  Best Call Back Number:[phone]  Additional Information: Notes to Pharmacy: Quantity prescribed more than 7 day supply? Yes, quantity medically necessary

## 2022-06-02 NOTE — TELEPHONE ENCOUNTER
Called patient to inform her of what Dr. Angel's office said about releasing her back to Dr. Dejesus for pain medication. Patient is stating she is in so much pain and with covid it's worst. Patient was informed that due to her pain contract Dr. Dejesus is the only provider that can sign her pain medication. Patient said she is hoping somebody can help her out. Patient was informed that Dr. Dejesus is out of the office on vacation this week and will return on Monday. Patient verbalized understanding.

## 2022-06-02 NOTE — TELEPHONE ENCOUNTER
----- Message from Viet Sheridan sent at 6/2/2022  3:46 PM CDT -----  Type:  Needs Medical Advice    Who Called: self  Reason:Still havent heard back from dr chang office and the office said they was going to call you. Di anyone call because the patient is in severe pain  Would the patient rather a call back or a response via [facility]? call  Best Call Back Number:[phone]  Additional Information: none

## 2022-06-02 NOTE — TELEPHONE ENCOUNTER
Returned Dr. Angel's office back in regards to releasing patient back to Dr. Dejesus for pain medication. Left message for Dr. Angel's nurse Cheryl.

## 2022-06-02 NOTE — TELEPHONE ENCOUNTER
----- Message from Inga Forbes sent at 6/2/2022  3:42 PM CDT -----  Type:  Needs Medical Advice    Who Called: [facility]  Symptoms (please be specific): Dr. Angel will be releasing pt back to [facility] for pain medication    Would the patient rather a call back or a response via [facility]? Call if necessary   Best Call Back Number: [phone] (Cheryl)  Additional Information: n/a

## 2022-06-02 NOTE — TELEPHONE ENCOUNTER
Returned patient call about her pain medication and to inform her that she is on a pain contract and Dr. Dejesus is still out of the office and won't be back until Monday. Patient was informed to contact Dr. Angel office about the post op pain. Patient stated that she is awaiting a call back from them. Patient verbalized understanding.

## 2022-06-03 DIAGNOSIS — S12.9XXS CLOSED FRACTURE OF CERVICAL VERTEBRA, UNSPECIFIED CERVICAL VERTEBRAL LEVEL, SEQUELA: ICD-10-CM

## 2022-06-03 DIAGNOSIS — G89.29 CHRONIC NECK AND BACK PAIN: ICD-10-CM

## 2022-06-03 DIAGNOSIS — M54.9 CHRONIC NECK AND BACK PAIN: ICD-10-CM
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
## 2022-06-03 DIAGNOSIS — M54.2 CHRONIC NECK AND BACK PAIN: ICD-10-CM

## 2022-06-03 RX ORDER — HYDROCODONE BITARTRATE AND ACETAMINOPHEN 10; 325 MG/1; MG/1
1 TABLET ORAL EVERY 6 HOURS PRN
Qty: 30 TABLET | Refills: 0 | Status: SHIPPED | OUTPATIENT
Start: 2022-06-03 | End: 2022-06-06 | Stop reason: SDUPTHER

## 2022-06-03 NOTE — TELEPHONE ENCOUNTER
No new care gaps identified.  [facility] Embedded Care Gaps. Reference number: [id number]. 6/03/2022   2:37:30 PM CDT

## 2022-06-03 NOTE — TELEPHONE ENCOUNTER
Spoke with patient.Patient stated she was referring  her Norco medication.Patient stated he is in pain but didn't specific where. Patient stated she would be fine with just a few pills if the covering doctor can order them just til  come back Monday Patient stated the drug store closes at 5 or 4. Patient states she would like to know the status of her medicine.

## 2022-06-03 NOTE — TELEPHONE ENCOUNTER
----- Message from Mateo Briseno sent at 6/3/2022 11:10 AM CDT -----  Contact: pt [phone]  Type: Requesting to speak with nurse        Who Called: PT  Regarding: requesting a few pills. Pt knows that provider Brandy Vick is not in the office, but in the meantime would like to get another doctor taking her place to prescribe them for her. PT stating she's in extreme pain and needs help as soon as possible.  Would the patient rather a call back or a response via [facility]? Call back  Best Call Back Number: [phone]   Additional Information: HYDROcodone-acetaminophen (NORCO)  mg per tablet

## 2022-06-06 DIAGNOSIS — M54.9 CHRONIC NECK AND BACK PAIN: ICD-10-CM

## 2022-06-06 DIAGNOSIS — G89.29 CHRONIC NECK AND BACK PAIN: ICD-10-CM

## 2022-06-06 DIAGNOSIS — S12.9XXS CLOSED FRACTURE OF CERVICAL VERTEBRA, UNSPECIFIED CERVICAL VERTEBRAL LEVEL, SEQUELA: ICD-10-CM
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
## 2022-06-06 DIAGNOSIS — M54.2 CHRONIC NECK AND BACK PAIN: ICD-10-CM

## 2022-06-06 RX ORDER — HYDROCODONE BITARTRATE AND ACETAMINOPHEN 10; 325 MG/1; MG/1
1 TABLET ORAL EVERY 6 HOURS PRN
Qty: 30 TABLET | Refills: 0 | Status: CANCELLED | OUTPATIENT
Start: 2022-06-06

## 2022-06-06 RX ORDER — HYDROCODONE BITARTRATE AND ACETAMINOPHEN 10; 325 MG/1; MG/1
1 TABLET ORAL 3 TIMES DAILY PRN
Qty: 90 TABLET | Refills: 0 | Status: SHIPPED | OUTPATIENT
Start: 2022-06-06 | End: 2022-07-05 | Stop reason: SDUPTHER

## 2022-06-06 NOTE — TELEPHONE ENCOUNTER
----- Message from Bianca Montemayor sent at 6/6/2022 10:49 AM CDT -----  Needs advice from nurse:      Who Called:pt  Regarding:needs refill on HYDROcodone-acetaminophen (NORCO)  mg per tablet  Take 1 tablet by mouth every 6 (six) hours as needed for pain/would like to cancel refill at [facility] and send it to [facility] - [address] (Ph: [phone])//please call patient when complete      Would the patient rather a call back or VIA [facility]?  Best Call Back number:[phone]  Additional Info:

## 2022-06-06 NOTE — TELEPHONE ENCOUNTER
Called patient to confirmed per Dr. Dejesus that patient did not  the pain medication from [facility]. Patient said she did not. Dr. Dejesus wanted to know where would she like the medication to be sent to and patient stated [facility]. Patient was informed that Dr. Dejesus will send the medication for her today. Patient verbalized understanding.

## 2022-06-06 NOTE — TELEPHONE ENCOUNTER
----- Message from Myla Perera sent at 6/6/2022  8:30 AM CDT -----  Regarding: Refill  Type:  RX Refill Request    Who Called: pt    Refill or New Rx: refill    RX Name and Strength: HYDROcodone-acetaminophen (NORCO)  mg per tablet    Preferred Pharmacy with phone number: [facility] [address] [facility] [address]  [address] [facility] [address]  Phone: [phone] Fax: [phone]    Would the patient rather a call back or a response via [facility]? Call    Best Call Back Number: [phone]    Additional Information: needs medication sent to new pharmacy closer to her home, would like phone call when sent

## 2022-06-06 NOTE — TELEPHONE ENCOUNTER
No new care gaps identified.  [facility] Embedded Care Gaps. Reference number: [id number]. 6/06/2022   11:15:23 AM ZARINAT

## 2022-06-06 NOTE — TELEPHONE ENCOUNTER
Spoke to pt, informed her that a refill request was sent to Dr. Dejesus. Pt verbalized an understanding.

## 2022-06-06 NOTE — TELEPHONE ENCOUNTER
Patient did go get the pain medication that Dr. Díaz sent to [facility] on Friday. Patient stated that she didn't want to  the medication because she didn't want anything to happen or anything to get messed up. Patient said she usually get 90 tablets and Dr. Díaz sent in 30 tablets. I explained to the patient that Dr. Díaz sent in what he felt was right due to the patient being on a pain contract and Dr. Dejesus was out on vacation. Patient said she understand but was not sure if that was going to mess up her getting the rest of her medication. Please advise.

## 2022-06-07 ENCOUNTER — TELEPHONE (OUTPATIENT)
Dept: PAIN MEDICINE | Facility: CLINIC | Age: 67
End: 2022-06-07
Payer: MEDICARE

## 2022-06-15 ENCOUNTER — TELEPHONE (OUTPATIENT)
Dept: FAMILY MEDICINE | Facility: CLINIC | Age: 67
End: 2022-06-15
Payer: MEDICARE

## 2022-06-15 NOTE — TELEPHONE ENCOUNTER
Returned patient call about low blood pressure. Patient said the Physical Therapist said her blood pressure has been low for 2 days now, and to call her PCP. Blood pressure reading was 101/60 something on 6/14/22 and today 6/15/22 it was 96/60 something. Patient would like to know what to do because she's been really tired. Patient said it's been a week that she's been having the tired symptom. Please advise.

## 2022-06-15 NOTE — TELEPHONE ENCOUNTER
She is not on medications that directly lower her blood pressure like blood pressure pills, however, her Xanax and Norco can both lower her blood pressure, so she should not take these when her blood pressure is low.    She should also be sure to drink plenty of water and eat regularly throughout the day.  She does not need to limit salt as some salt with meals can improve her blood pressure some.    She should also have updated labs-orders are in the system--maybe they can be scheduled at a location that closer to where she is currently living.  Would advise labs to check in on other issues that could be leading to the low blood pressure.

## 2022-06-15 NOTE — TELEPHONE ENCOUNTER
----- Message from Anmol Hancock sent at 6/15/2022  2:58 PM CDT -----  Contact: pt.  .Type:  Needs Medical Advice    Who Called: pt  Symptoms (please be specific): low blood pressure  How long has patient had these symptoms:  tiredness  Would the patient rather a call back or a response via [facility]? Call back  Best Call Back Number: [phone]  Additional Information: Pt. States her physical therapist told her to call her PCP doctor because her blood pressure has been running low.  Pt. States she recent had hip surgery but was told to connect her PCP regarding her low blood pressure reading.

## 2022-06-16 ENCOUNTER — TELEPHONE (OUTPATIENT)
Dept: FAMILY MEDICINE | Facility: CLINIC | Age: 67
End: 2022-06-16
Payer: MEDICARE

## 2022-06-16 NOTE — TELEPHONE ENCOUNTER
----- Message from Bárbara John sent at 6/16/2022 10:50 AM CDT -----  Contact: Thom sydnie [phone]  Patient is returning a phone call.  Who left a message for the patient: Cami  Does patient know what this is regarding:    Would you like a call back, or a response through your [facility] portal?: call back  Comments:  Pt was returning the nurses call

## 2022-06-16 NOTE — TELEPHONE ENCOUNTER
Returned patient call in regards to the message that was left on her voicemail. Patient said she will give us a call to schedule her labs because she have to wait in her niece to bring her to get the labs done.

## 2022-06-16 NOTE — TELEPHONE ENCOUNTER
"Called patient to inform her of the message below from Dr. Dejesus. No answer. Left message of message below.     "She is not on medications that directly lower her blood pressure like blood pressure pills, however, her Xanax and Norco can both lower her blood pressure, so she should not take these when her blood pressure is low.     She should also be sure to drink plenty of water and eat regularly throughout the day.  She does not need to limit salt as some salt with meals can improve her blood pressure some.     She should also have updated labs-orders are in the system--maybe they can be scheduled at a location that closer to where she is currently living.  Would advise labs to check in on other issues that could be leading to the low blood pressure."    "

## 2022-06-17 DIAGNOSIS — F41.9 ANXIETY: ICD-10-CM

## 2022-06-17 RX ORDER — ALPRAZOLAM 1 MG/1
TABLET ORAL
Qty: 45 TABLET | Refills: 5 | Status: SHIPPED | OUTPATIENT
Start: 2022-06-17 | End: 2022-06-17 | Stop reason: SDUPTHER

## 2022-06-17 RX ORDER — ALPRAZOLAM 1 MG/1
TABLET ORAL
Qty: 45 TABLET | Refills: 5 | Status: SHIPPED | OUTPATIENT
Start: 2022-06-17 | End: 2022-07-14

## 2022-06-17 NOTE — TELEPHONE ENCOUNTER
Returned patient call about medication needing to be sent to [facility] and they close at 12 today. Patient needed a refill on her Xanax. Patient was informed that the medication is pending and is awaiting approval. Patient was informed that Dr. Dejesus is currently in clinic and she will be able to get to her refill request as soon as she can. Patient asked if the medication can be sent to [facility] because [facility] close at 12 pm today and will be closed over the weekend. I informed the patient that I will sent the message to Dr. Dejesus. Patient verbalized understanding.

## 2022-06-17 NOTE — TELEPHONE ENCOUNTER
----- Message from Suresh Stephenson sent at 6/17/2022 11:46 AM CDT -----  Regarding: call back  Type:  Patient Returning Call    Who Called:patient    Who Left Message for Patient:n/a    Does the patient know what this is regarding?:rx sent to another pharmacy    Would the patient rather a call back or a response via [facility]? Call back       Best Call Back Number:[phone]  Additional Information: n/a

## 2022-06-17 NOTE — TELEPHONE ENCOUNTER
----- Message from Suresh Russoeno sent at 6/17/2022  9:48 AM CDT -----  Regarding: medication  Type:  RX Refill Request    Who Called: patient    Refill or New Rx:refill    RX Name and Strength:ALPRAZolam (XANAX) 1 MG tablet    How is the patient currently taking it? (ex. 1XDay):Route: Take 1 tablet (1 mg total) by mouth 2 (two) times daily as needed. - Oral    Is this a 30 day or 90 day RX:45  Preferred Pharmacy with phone number:[facility] [place] [address] [phone]  Local or Mail Order:local    Ordering Provider: Brandy Dejesus MD  Would the patient rather a call back or a response via [facility]? Call back to let patient know when its sent    Best Call Back Number:[phone]    Additional Information: patient pharmacy closes at noon, and will not be open on Monday. Please send before noon

## 2022-06-17 NOTE — TELEPHONE ENCOUNTER
Care Due:                  Date            Visit Type   Department     Provider  --------------------------------------------------------------------------------                                ESTABLISHED                              PATIENT -    [facility] FAMILY  Last Visit: 05-      [facility]      MEDICINE       Brandy Dejesus  Next Visit: None Scheduled  None         None Found                                                            Last  Test          Frequency    Reason                     Performed    Due Date  --------------------------------------------------------------------------------    TSH.........  12 months..  levothyroxine............  02- 02-    [facility] Embedded Care Gaps. Reference number: [id number]. 6/17/2022   9:44:52 AM CDT

## 2022-06-18 ENCOUNTER — PATIENT MESSAGE (OUTPATIENT)
Dept: ADMINISTRATIVE | Facility: HOSPITAL | Age: 67
End: 2022-06-18
Payer: MEDICARE

## 2022-06-30 ENCOUNTER — PATIENT OUTREACH (OUTPATIENT)
Dept: ADMINISTRATIVE | Facility: HOSPITAL | Age: 67
End: 2022-06-30
Payer: MEDICARE

## 2022-06-30 ENCOUNTER — PATIENT MESSAGE (OUTPATIENT)
Dept: ADMINISTRATIVE | Facility: HOSPITAL | Age: 67
End: 2022-06-30
Payer: MEDICARE

## 2022-06-30 DIAGNOSIS — Z12.31 ENCOUNTER FOR SCREENING MAMMOGRAM FOR MALIGNANT NEOPLASM OF BREAST: Primary | ICD-10-CM

## 2022-07-05 DIAGNOSIS — M54.2 CHRONIC NECK AND BACK PAIN: ICD-10-CM
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
## 2022-07-05 DIAGNOSIS — S12.9XXS CLOSED FRACTURE OF CERVICAL VERTEBRA, UNSPECIFIED CERVICAL VERTEBRAL LEVEL, SEQUELA: ICD-10-CM

## 2022-07-05 DIAGNOSIS — G89.29 CHRONIC NECK AND BACK PAIN: ICD-10-CM

## 2022-07-05 DIAGNOSIS — M54.9 CHRONIC NECK AND BACK PAIN: ICD-10-CM

## 2022-07-05 RX ORDER — HYDROCODONE BITARTRATE AND ACETAMINOPHEN 10; 325 MG/1; MG/1
1 TABLET ORAL 3 TIMES DAILY PRN
Qty: 90 TABLET | Refills: 0 | Status: SHIPPED | OUTPATIENT
Start: 2022-07-05 | End: 2022-08-02 | Stop reason: SDUPTHER

## 2022-07-05 NOTE — TELEPHONE ENCOUNTER
No new care gaps identified.  [facility] Embedded Care Gaps. Reference number: [id number]. 7/05/2022   10:19:11 AM ZARINAT

## 2022-07-05 NOTE — TELEPHONE ENCOUNTER
----- Message from Dorene Romero sent at 7/5/2022  2:04 PM CDT -----  Regarding: call back  Contact: [phone]  Type:  RX Refill Request    Who Called: PT   Refill or New Rx: Refills   RX Name and Strength: HYDROcodone-acetaminophen (NORCO)  mg per tablet 90 tablet   How is the patient currently taking it? (ex. 1XDay): Take 1 tablet by mouth 3 (three) times daily as needed for Pain  Is this a 30 day or 90 day RX: 90  Preferred Pharmacy with phone number: [address] Phone: [phone] Fax:  [phone]

## 2022-07-05 NOTE — TELEPHONE ENCOUNTER
----- Message from Adela Villatoro sent at 7/5/2022  7:34 AM CDT -----  Contact: [phone]  Type:  RX Refill Request    Who Called:  pt   Refill or New Rx: refill  RX Name and Strength:HYDROcodone-acetaminophen (NORCO)  mg per tablet  How is the patient currently taking it? (ex. 1XDay):  Is this a 30 day or 90 day RX: 30  Preferred Pharmacy with phone number:[facility] [phone]  Local or Mail Order: local   Ordering Provider: Oleg   Would the patient rather a call back or a response via [facility]? Call back  Best Call Back Number:[phone]  Additional Information:  pt says she is in severe pain and would like to be notified when the script is sent to the pharmacy

## 2022-07-05 NOTE — TELEPHONE ENCOUNTER
Returned patient call again to inform her that the message was sent to the box but again Dr. Dejesus is out on vacation and as soon as the covering provider can get to it they will make a decision.

## 2022-07-05 NOTE — TELEPHONE ENCOUNTER
Called patient to inform her that a refill will be submitted and Dr. Dejesus is out on vacation but hopefully a covering provider can make a decision.

## 2022-07-05 NOTE — TELEPHONE ENCOUNTER
----- Message from Adela Villatoro sent at 7/5/2022  7:34 AM CDT -----  Contact: [phone]  Type:  RX Refill Request    Who Called:  pt   Refill or New Rx: refill  RX Name and Strength:HYDROcodone-acetaminophen (NORCO)  mg per tablet  How is the patient currently taking it? (ex. 1XDay):  Is this a 30 day or 90 day RX: 30  Preferred Pharmacy with phone number:[facility] [phone]  Local or Mail Order: local   Ordering Provider: Oleg   Would the patient rather a call back or a response via [facility]? Call back  Best Call Back Number:[phone]  Additional Information:  pt says she is in severe pain and would like to be notified when the script is sent to the pharmacy

## 2022-07-13 ENCOUNTER — PATIENT OUTREACH (OUTPATIENT)
Dept: ADMINISTRATIVE | Facility: HOSPITAL | Age: 67
End: 2022-07-13
Payer: MEDICARE

## 2022-07-20 ENCOUNTER — TELEPHONE (OUTPATIENT)
Dept: FAMILY MEDICINE | Facility: CLINIC | Age: 67
End: 2022-07-20
Payer: MEDICARE

## 2022-07-20 NOTE — TELEPHONE ENCOUNTER
----- Message from Ena Mckenzie sent at 7/19/2022 10:18 AM CDT -----  Contact: [phone]  Pt states she does not have the lab work sent to where she had called and asked for it to be sent to for her     She wanted it sent to Dr Kurtz in [place]     [phone]

## 2022-07-20 NOTE — TELEPHONE ENCOUNTER
Spoke to pt and stated that she fatigue because of the excessive diarrhea for the last week and half. Pt was prescribed Loperamide by Urgent Care. She was also prescribed Colestipol. Pt would like to know if there is anything else that can be done. Pls advise.

## 2022-07-21 ENCOUNTER — TELEPHONE (OUTPATIENT)
Dept: FAMILY MEDICINE | Facility: CLINIC | Age: 67
End: 2022-07-21
Payer: MEDICARE

## 2022-07-21 DIAGNOSIS — R19.7 DIARRHEA, UNSPECIFIED TYPE: Primary | ICD-10-CM
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
## 2022-07-21 NOTE — TELEPHONE ENCOUNTER
"Bri-- please see note from patient-- she is requesting a GI referral to a GI in [place]-- he is coming up as an "Internal Referral," but I'm not sure if you can help with scheduling it?   Patient needs a referral with her insurance, so maybe you can confirm it's in the system correctly (I just entered it), even if you can help actually schedule the appointment? Please let me and Cami know so we can inform the patient, thanks!   "

## 2022-07-21 NOTE — TELEPHONE ENCOUNTER
----- Message from Selena Valle sent at 7/21/2022 11:17 AM CDT -----  Type: Patient Call Back    Who called:pt    What is the request in detail:pt requesting to be referred to  [facility] dr petty waite, gastro [phone]    Can the clinic reply by [facility]?    Would the patient rather a call back or a response via [facility]? call    Best call back number:[phone] (home)       Additional Information:
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
## 2022-07-21 NOTE — TELEPHONE ENCOUNTER
----- Message from Bri Cobos sent at 7/21/2022  4:28 PM CDT -----  I called patient informed her of the GI referral you placed, she said she has seen him before and she stated she will schedule an appointment herself.

## 2022-08-02 ENCOUNTER — TELEPHONE (OUTPATIENT)
Dept: FAMILY MEDICINE | Facility: CLINIC | Age: 67
End: 2022-08-02
Payer: MEDICARE

## 2022-08-02 DIAGNOSIS — G89.29 CHRONIC NECK AND BACK PAIN: ICD-10-CM

## 2022-08-02 DIAGNOSIS — M54.9 CHRONIC NECK AND BACK PAIN: ICD-10-CM

## 2022-08-02 DIAGNOSIS — M54.2 CHRONIC NECK AND BACK PAIN: ICD-10-CM
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
## 2022-08-02 DIAGNOSIS — S12.9XXS CLOSED FRACTURE OF CERVICAL VERTEBRA, UNSPECIFIED CERVICAL VERTEBRAL LEVEL, SEQUELA: ICD-10-CM

## 2022-08-02 RX ORDER — HYDROCODONE BITARTRATE AND ACETAMINOPHEN 10; 325 MG/1; MG/1
1 TABLET ORAL 3 TIMES DAILY PRN
Qty: 90 TABLET | Refills: 0 | Status: SHIPPED | OUTPATIENT
Start: 2022-08-05 | End: 2022-09-01

## 2022-08-02 NOTE — TELEPHONE ENCOUNTER
----- Message from Suresh Russoeno sent at 8/2/2022  2:54 PM CDT -----  Regarding: refill  Type:  RX Refill Request    Who Called: patient     Refill or New Rx:refill     RX Name and Strength:HYDROcodone-acetaminophen (NORCO)  mg per tablet    How is the patient currently taking it? (ex. 1XDay):Sig - Route: Take 1 tablet by mouth 3 (three) times daily as needed for Pain. - Oral    Is this a 30 day or 90 day RX:90    [facility] with phone number[facility] [place] [address] [address]    Local or Mail Order:local    Ordering Provider:Brandy Dejesus MD    Would the patient rather a call back or a response via [facility]? Call back     Best Call Back Number:[phone]  Additional Information: n/a

## 2022-08-24 ENCOUNTER — PATIENT MESSAGE (OUTPATIENT)
Dept: ADMINISTRATIVE | Facility: HOSPITAL | Age: 67
End: 2022-08-24
Payer: MEDICARE

## 2022-09-07 ENCOUNTER — PATIENT OUTREACH (OUTPATIENT)
Dept: ADMINISTRATIVE | Facility: HOSPITAL | Age: 67
End: 2022-09-07
Payer: MEDICARE

## 2022-09-07 NOTE — PROGRESS NOTES
Non-compliant GAP report chart review - Chart review completed for the following HM test if overdue  (Mammogram, Colonoscopy, Cervical Cancer Screening,  Diabetic lab testing, and/or Dilated EYE EXAM)  09/07/2022    Care Everywhere and Media reports - updates requested and reviewed.        [facility] and [facility] reviewed. LABS            Health Maintenance Due   Topic Date Due    Pneumococcal Vaccines (Age 65+) (1 - PCV) Never done    TETANUS VACCINE  Never done    Shingles Vaccine (1 of 2) Never done    Foot Exam  06/27/2017    Diabetes Urine Screening  11/08/2020    Mammogram  11/19/2020    Colorectal Cancer Screening  10/10/2021    COVID-19 Vaccine (3 - Booster for Pfizer series) 11/11/2021    DEXA Scan  11/19/2021    LDCT Lung Screen  12/17/2021    Hemoglobin A1c  03/20/2022    Influenza Vaccine (1) 09/01/2022

## 2022-09-12 ENCOUNTER — TELEPHONE (OUTPATIENT)
Dept: FAMILY MEDICINE | Facility: CLINIC | Age: 67
End: 2022-09-12
Payer: MEDICARE

## 2022-09-12 NOTE — TELEPHONE ENCOUNTER
"Called patient to inform her that the refill was refused by Dr. Dejesus because....      "I received a refill request for Xanax, but per her chart it looks like I sent a 1 month xanax prescription to [facility]'s pharmacy 7/14/22 with 5 refills, so from what I can see she should still have some through December. Please touch base with her/ the pharmacy to see why  a refill request was just sent, thanks."        No answer left message   "

## 2022-09-12 NOTE — TELEPHONE ENCOUNTER
----- Message from Jennyfer Ashraf sent at 9/8/2022 10:35 AM CDT -----  Type:  RX Refill Request    Who Called: Pt   Refill or New Rx:Rx  RX Name and Strength:ALPRAZolam (XANAX) 1 MG tablet  How is the patient currently taking it? (ex. 1XDay):2XDay   Is this a 30 day or 90 day RX:30  Preferred Pharmacy with phone number:[facility] - [facility] [address]   Phone: [phone]  Fax:  [phone]  Would the patient rather a call back or a response via [facility]? Call back   Best Call Back Number:[phone]  Additional Information:

## 2022-09-26 ENCOUNTER — TELEPHONE (OUTPATIENT)
Dept: FAMILY MEDICINE | Facility: CLINIC | Age: 67
End: 2022-09-26
Payer: MEDICARE

## 2022-09-26 DIAGNOSIS — G89.29 CHRONIC NECK AND BACK PAIN: ICD-10-CM

## 2022-09-26 DIAGNOSIS — M54.2 CHRONIC NECK AND BACK PAIN: ICD-10-CM

## 2022-09-26 DIAGNOSIS — S12.9XXS CLOSED FRACTURE OF CERVICAL VERTEBRA, UNSPECIFIED CERVICAL VERTEBRAL LEVEL, SEQUELA: ICD-10-CM
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
## 2022-09-26 DIAGNOSIS — M54.9 CHRONIC NECK AND BACK PAIN: ICD-10-CM

## 2022-09-26 NOTE — TELEPHONE ENCOUNTER
Returned patient call in regards to her medication needing to be refilled. No answer. Left message.

## 2022-09-26 NOTE — TELEPHONE ENCOUNTER
Returned patient call in regards to her needing a refill for her [place]. Patient said [facility] close early on Friday and close on weekends so she was wondering if you can send a refill to [facility]. Please advise.

## 2022-09-26 NOTE — TELEPHONE ENCOUNTER
----- Message from Anmol Hancock sent at 9/26/2022  3:04 PM CDT -----  Contact: pt  .Type:  Needs Medical Advice    Who Called: pt    Would the patient rather a call back or a response via [facility]? Call back  Best Call Back Number: [phone]   Additional Information: Pt. Is returning a call to the office.

## 2022-09-26 NOTE — TELEPHONE ENCOUNTER
----- Message from Jalengregorio Irving sent at 9/26/2022  1:51 PM CDT -----  Contact: [phone]  Who Called: PT  Regarding: speak with staff regarding upcoming refills. Her pharmacy is closed on saturday and closes early on friday.   Would the patient rather a call back or a response via [facility]? Call back  Best Call Back Number: [phone]  Additional Information: n/a

## 2022-09-27 RX ORDER — HYDROCODONE BITARTRATE AND ACETAMINOPHEN 10; 325 MG/1; MG/1
TABLET ORAL
Qty: 90 TABLET | Refills: 0 | Status: SHIPPED | OUTPATIENT
Start: 2022-09-27 | End: 2022-10-25 | Stop reason: SDUPTHER

## 2022-09-27 NOTE — TELEPHONE ENCOUNTER
"Called patient to inform her of the message below from Dr. Dejesus.  No answer. Left message.     "Sent [place] refill in to [facility] pharmacy today (3 days early) to  and start using on 10/1/2022 when her new RX is due.      We need to try to use the same pharmacy if at all possible due to controlled substance regulations.please let her know, thanks"  "

## 2022-09-27 NOTE — TELEPHONE ENCOUNTER
Sent [place] refill in to [facility] her usual pharmacy today (3 days early) to  and start using on 10/1/2022 when her new RX is due.     We need to try to use the same pharmacy if at all possible due to controlled substance regulations.please let her know, thanks

## 2022-09-28 ENCOUNTER — PATIENT OUTREACH (OUTPATIENT)
Dept: ADMINISTRATIVE | Facility: HOSPITAL | Age: 67
End: 2022-09-28
Payer: MEDICARE

## 2022-09-28 ENCOUNTER — PATIENT MESSAGE (OUTPATIENT)
Dept: ADMINISTRATIVE | Facility: HOSPITAL | Age: 67
End: 2022-09-28
Payer: MEDICARE

## 2022-09-28 NOTE — PROGRESS NOTES
Care Everywhere updates requested and reviewed.  Immunizations reconciled. Media reports reviewed.  Duplicate HM overrides and  orders removed.  Overdue HM topic chart audit and/or requested.  Overdue lab testing linked to upcoming lab appointments if applies.    Health Maintenance Due   Topic Date Due    Pneumococcal Vaccines (Age 65+) (1 - PCV) Never done    TETANUS VACCINE  Never done    Shingles Vaccine (1 of 2) Never done    Foot Exam  2017    Diabetes Urine Screening  2020    Mammogram  2020    COVID-19 Vaccine (3 - Booster for Pfizer series) 2021    Colorectal Cancer Screening  10/10/2021    DEXA Scan  2021    LDCT Lung Screen  2021    Hemoglobin A1c  2022    Influenza Vaccine (1) 2022    Lipid Panel  2022

## 2022-10-10 ENCOUNTER — PATIENT MESSAGE (OUTPATIENT)
Dept: ADMINISTRATIVE | Facility: HOSPITAL | Age: 67
End: 2022-10-10
Payer: MEDICARE

## 2022-10-10 ENCOUNTER — PATIENT OUTREACH (OUTPATIENT)
Dept: ADMINISTRATIVE | Facility: HOSPITAL | Age: 67
End: 2022-10-10
Payer: MEDICARE

## 2022-10-12 ENCOUNTER — OFFICE VISIT (OUTPATIENT)
Dept: FAMILY MEDICINE | Facility: CLINIC | Age: 67
End: 2022-10-12
Payer: MEDICARE

## 2022-10-12 ENCOUNTER — LAB VISIT (OUTPATIENT)
Dept: LAB | Facility: HOSPITAL | Age: 67
End: 2022-10-12
Attending: FAMILY MEDICINE
Payer: MEDICARE

## 2022-10-12 VITALS
WEIGHT: 141.13 LBS | SYSTOLIC BLOOD PRESSURE: 138 MMHG | BODY MASS INDEX: 25.97 KG/M2 | OXYGEN SATURATION: 98 % | HEART RATE: 71 BPM | HEIGHT: 62 IN | DIASTOLIC BLOOD PRESSURE: 76 MMHG

## 2022-10-12 DIAGNOSIS — K75.4 AUTOIMMUNE HEPATITIS: ICD-10-CM

## 2022-10-12 DIAGNOSIS — M50.30 DDD (DEGENERATIVE DISC DISEASE), CERVICAL: ICD-10-CM

## 2022-10-12 DIAGNOSIS — M96.1 FAILED BACK SURGICAL SYNDROME: ICD-10-CM

## 2022-10-12 DIAGNOSIS — Z72.0 TOBACCO ABUSE: ICD-10-CM

## 2022-10-12 DIAGNOSIS — M51.36 DDD (DEGENERATIVE DISC DISEASE), LUMBAR: ICD-10-CM

## 2022-10-12 DIAGNOSIS — Z79.899 ENCOUNTER FOR MONITORING LONG-TERM PROTON PUMP INHIBITOR THERAPY: ICD-10-CM

## 2022-10-12 DIAGNOSIS — Z51.81 ENCOUNTER FOR MONITORING LONG-TERM PROTON PUMP INHIBITOR THERAPY: ICD-10-CM

## 2022-10-12 DIAGNOSIS — E11.49 DIABETES MELLITUS TYPE 2 WITH NEUROLOGICAL MANIFESTATIONS: ICD-10-CM

## 2022-10-12 DIAGNOSIS — E53.8 VITAMIN B12 DEFICIENCY: ICD-10-CM

## 2022-10-12 DIAGNOSIS — Z90.722 S/P TAH-BSO (TOTAL ABDOMINAL HYSTERECTOMY AND BILATERAL SALPINGO-OOPHORECTOMY): ICD-10-CM

## 2022-10-12 DIAGNOSIS — M48.02 FORAMINAL STENOSIS OF CERVICAL REGION: ICD-10-CM

## 2022-10-12 DIAGNOSIS — I70.0 ATHEROSCLEROSIS OF AORTA: ICD-10-CM

## 2022-10-12 DIAGNOSIS — Z00.00 ROUTINE GENERAL MEDICAL EXAMINATION AT A HEALTH CARE FACILITY: Primary | ICD-10-CM

## 2022-10-12 DIAGNOSIS — R11.0 NAUSEA: ICD-10-CM

## 2022-10-12 DIAGNOSIS — Z86.19 HISTORY OF HEPATITIS C: ICD-10-CM

## 2022-10-12 DIAGNOSIS — F32.A ANXIETY AND DEPRESSION: ICD-10-CM

## 2022-10-12 DIAGNOSIS — F10.11 HISTORY OF ALCOHOL ABUSE: ICD-10-CM

## 2022-10-12 DIAGNOSIS — K59.09 OTHER CONSTIPATION: ICD-10-CM

## 2022-10-12 DIAGNOSIS — J43.9 PULMONARY EMPHYSEMA, UNSPECIFIED EMPHYSEMA TYPE: ICD-10-CM

## 2022-10-12 DIAGNOSIS — F41.9 ANXIETY AND DEPRESSION: ICD-10-CM

## 2022-10-12 DIAGNOSIS — K74.60 HEPATIC CIRRHOSIS, UNSPECIFIED HEPATIC CIRRHOSIS TYPE, UNSPECIFIED WHETHER ASCITES PRESENT: ICD-10-CM

## 2022-10-12 DIAGNOSIS — Z12.31 ENCOUNTER FOR SCREENING MAMMOGRAM FOR BREAST CANCER: ICD-10-CM

## 2022-10-12 DIAGNOSIS — E55.9 VITAMIN D DEFICIENCY: ICD-10-CM

## 2022-10-12 DIAGNOSIS — F11.20 OPIOID DEPENDENCE, DAILY USE: ICD-10-CM

## 2022-10-12 DIAGNOSIS — Z78.0 POSTMENOPAUSAL: ICD-10-CM

## 2022-10-12 DIAGNOSIS — E03.9 ACQUIRED HYPOTHYROIDISM: ICD-10-CM

## 2022-10-12 DIAGNOSIS — N18.31 CHRONIC KIDNEY DISEASE, STAGE 3A: ICD-10-CM

## 2022-10-12 DIAGNOSIS — Z96.641 HISTORY OF RIGHT HIP REPLACEMENT: ICD-10-CM

## 2022-10-12 DIAGNOSIS — Z90.79 S/P TAH-BSO (TOTAL ABDOMINAL HYSTERECTOMY AND BILATERAL SALPINGO-OOPHORECTOMY): ICD-10-CM

## 2022-10-12 DIAGNOSIS — Z79.899 MEDICATION MANAGEMENT: ICD-10-CM

## 2022-10-12 DIAGNOSIS — M15.9 PRIMARY OSTEOARTHRITIS INVOLVING MULTIPLE JOINTS: ICD-10-CM

## 2022-10-12 DIAGNOSIS — Z90.710 S/P TAH-BSO (TOTAL ABDOMINAL HYSTERECTOMY AND BILATERAL SALPINGO-OOPHORECTOMY): ICD-10-CM

## 2022-10-12 DIAGNOSIS — K21.9 GASTROESOPHAGEAL REFLUX DISEASE, UNSPECIFIED WHETHER ESOPHAGITIS PRESENT: ICD-10-CM

## 2022-10-12 DIAGNOSIS — M81.0 POSTMENOPAUSAL OSTEOPOROSIS: ICD-10-CM

## 2022-10-12 DIAGNOSIS — G47.00 INSOMNIA, UNSPECIFIED TYPE: ICD-10-CM

## 2022-10-12 DIAGNOSIS — G89.4 CHRONIC PAIN SYNDROME: ICD-10-CM

## 2022-10-12 LAB
25(OH)D3+25(OH)D2 SERPL-MCNC: 10 NG/ML (ref 30–96)
ALBUMIN SERPL BCP-MCNC: 3.7 G/DL (ref 3.5–5.2)
ALP SERPL-CCNC: 105 U/L (ref 55–135)
ALT SERPL W/O P-5'-P-CCNC: 12 U/L (ref 10–44)
ANION GAP SERPL CALC-SCNC: 13 MMOL/L (ref 8–16)
AST SERPL-CCNC: 17 U/L (ref 10–40)
BASOPHILS # BLD AUTO: 0.08 K/UL (ref 0–0.2)
BASOPHILS NFR BLD: 1 % (ref 0–1.9)
BILIRUB SERPL-MCNC: 0.3 MG/DL (ref 0.1–1)
BUN SERPL-MCNC: 20 MG/DL (ref 8–23)
CALCIUM SERPL-MCNC: 9.2 MG/DL (ref 8.7–10.5)
CHLORIDE SERPL-SCNC: 111 MMOL/L (ref 95–110)
CHOLEST SERPL-MCNC: 177 MG/DL (ref 120–199)
CHOLEST/HDLC SERPL: 3.9 {RATIO} (ref 2–5)
CO2 SERPL-SCNC: 19 MMOL/L (ref 23–29)
CREAT SERPL-MCNC: 1 MG/DL (ref 0.5–1.4)
DIFFERENTIAL METHOD: ABNORMAL
EOSINOPHIL # BLD AUTO: 0.3 K/UL (ref 0–0.5)
EOSINOPHIL NFR BLD: 4.3 % (ref 0–8)
ERYTHROCYTE [DISTWIDTH] IN BLOOD BY AUTOMATED COUNT: 15.4 % (ref 11.5–14.5)
EST. GFR  (NO RACE VARIABLE): >60 ML/MIN/1.73 M^2
ESTIMATED AVG GLUCOSE: 108 MG/DL (ref 68–131)
GLUCOSE SERPL-MCNC: 70 MG/DL (ref 70–110)
HBA1C MFR BLD: 5.4 % (ref 4–5.6)
HCT VFR BLD AUTO: 40.8 % (ref 37–48.5)
HDLC SERPL-MCNC: 45 MG/DL (ref 40–75)
HDLC SERPL: 25.4 % (ref 20–50)
HGB BLD-MCNC: 12.8 G/DL (ref 12–16)
IMM GRANULOCYTES # BLD AUTO: 0.01 K/UL (ref 0–0.04)
IMM GRANULOCYTES NFR BLD AUTO: 0.1 % (ref 0–0.5)
LDLC SERPL CALC-MCNC: 106.6 MG/DL (ref 63–159)
LYMPHOCYTES # BLD AUTO: 3.2 K/UL (ref 1–4.8)
LYMPHOCYTES NFR BLD: 41 % (ref 18–48)
MAGNESIUM SERPL-MCNC: 1.9 MG/DL (ref 1.6–2.6)
MCH RBC QN AUTO: 30.5 PG (ref 27–31)
MCHC RBC AUTO-ENTMCNC: 31.4 G/DL (ref 32–36)
MCV RBC AUTO: 97 FL (ref 82–98)
MONOCYTES # BLD AUTO: 0.6 K/UL (ref 0.3–1)
MONOCYTES NFR BLD: 7.7 % (ref 4–15)
NEUTROPHILS # BLD AUTO: 3.6 K/UL (ref 1.8–7.7)
NEUTROPHILS NFR BLD: 45.9 % (ref 38–73)
NONHDLC SERPL-MCNC: 132 MG/DL
NRBC BLD-RTO: 0 /100 WBC
PLATELET # BLD AUTO: 202 K/UL (ref 150–450)
PMV BLD AUTO: 11.1 FL (ref 9.2–12.9)
POTASSIUM SERPL-SCNC: 4 MMOL/L (ref 3.5–5.1)
PREALB SERPL-MCNC: 19 MG/DL (ref 20–43)
PROT SERPL-MCNC: 7.2 G/DL (ref 6–8.4)
RBC # BLD AUTO: 4.2 M/UL (ref 4–5.4)
SODIUM SERPL-SCNC: 143 MMOL/L (ref 136–145)
TRIGL SERPL-MCNC: 127 MG/DL (ref 30–150)
TSH SERPL DL<=0.005 MIU/L-ACNC: 3.86 UIU/ML (ref 0.4–4)
VIT B12 SERPL-MCNC: 299 PG/ML (ref 210–950)
WBC # BLD AUTO: 7.88 K/UL (ref 3.9–12.7)

## 2022-10-12 PROCEDURE — 83735 ASSAY OF MAGNESIUM: CPT | Performed by: FAMILY MEDICINE

## 2022-10-12 PROCEDURE — 99999 PR PBB SHADOW E&M-EST. PATIENT-LVL IV: CPT | Mod: PBBFAC,,, | Performed by: FAMILY MEDICINE

## 2022-10-12 PROCEDURE — 1159F PR MEDICATION LIST DOCUMENTED IN MEDICAL RECORD: ICD-10-PCS | Mod: CPTII,S$GLB,, | Performed by: FAMILY MEDICINE

## 2022-10-12 PROCEDURE — 99397 PR PREVENTIVE VISIT,EST,65 & OVER: ICD-10-PCS | Mod: S$GLB,,, | Performed by: FAMILY MEDICINE

## 2022-10-12 PROCEDURE — 83036 HEMOGLOBIN GLYCOSYLATED A1C: CPT | Performed by: FAMILY MEDICINE

## 2022-10-12 PROCEDURE — 84443 ASSAY THYROID STIM HORMONE: CPT | Performed by: FAMILY MEDICINE

## 2022-10-12 PROCEDURE — 3075F PR MOST RECENT SYSTOLIC BLOOD PRESS GE 130-139MM HG: ICD-10-PCS | Mod: CPTII,S$GLB,, | Performed by: FAMILY MEDICINE

## 2022-10-12 PROCEDURE — 3044F HG A1C LEVEL LT 7.0%: CPT | Mod: CPTII,S$GLB,, | Performed by: FAMILY MEDICINE

## 2022-10-12 PROCEDURE — 82306 VITAMIN D 25 HYDROXY: CPT | Performed by: FAMILY MEDICINE

## 2022-10-12 PROCEDURE — 3008F PR BODY MASS INDEX (BMI) DOCUMENTED: ICD-10-PCS | Mod: CPTII,S$GLB,, | Performed by: FAMILY MEDICINE

## 2022-10-12 PROCEDURE — 80053 COMPREHEN METABOLIC PANEL: CPT | Performed by: FAMILY MEDICINE

## 2022-10-12 PROCEDURE — 1159F MED LIST DOCD IN RCRD: CPT | Mod: CPTII,S$GLB,, | Performed by: FAMILY MEDICINE

## 2022-10-12 PROCEDURE — 3008F BODY MASS INDEX DOCD: CPT | Mod: CPTII,S$GLB,, | Performed by: FAMILY MEDICINE

## 2022-10-12 PROCEDURE — 3078F PR MOST RECENT DIASTOLIC BLOOD PRESSURE < 80 MM HG: ICD-10-PCS | Mod: CPTII,S$GLB,, | Performed by: FAMILY MEDICINE

## 2022-10-12 PROCEDURE — 1125F PR PAIN SEVERITY QUANTIFIED, PAIN PRESENT: ICD-10-PCS | Mod: CPTII,S$GLB,, | Performed by: FAMILY MEDICINE

## 2022-10-12 PROCEDURE — 84134 ASSAY OF PREALBUMIN: CPT | Performed by: FAMILY MEDICINE

## 2022-10-12 PROCEDURE — 3075F SYST BP GE 130 - 139MM HG: CPT | Mod: CPTII,S$GLB,, | Performed by: FAMILY MEDICINE

## 2022-10-12 PROCEDURE — 99397 PER PM REEVAL EST PAT 65+ YR: CPT | Mod: S$GLB,,, | Performed by: FAMILY MEDICINE

## 2022-10-12 PROCEDURE — 3044F PR MOST RECENT HEMOGLOBIN A1C LEVEL <7.0%: ICD-10-PCS | Mod: CPTII,S$GLB,, | Performed by: FAMILY MEDICINE

## 2022-10-12 PROCEDURE — 1160F RVW MEDS BY RX/DR IN RCRD: CPT | Mod: CPTII,S$GLB,, | Performed by: FAMILY MEDICINE

## 2022-10-12 PROCEDURE — 99499 UNLISTED E&M SERVICE: CPT | Mod: S$GLB,,, | Performed by: FAMILY MEDICINE

## 2022-10-12 PROCEDURE — 85025 COMPLETE CBC W/AUTO DIFF WBC: CPT | Performed by: FAMILY MEDICINE

## 2022-10-12 PROCEDURE — 1160F PR REVIEW ALL MEDS BY PRESCRIBER/CLIN PHARMACIST DOCUMENTED: ICD-10-PCS | Mod: CPTII,S$GLB,, | Performed by: FAMILY MEDICINE

## 2022-10-12 PROCEDURE — 82607 VITAMIN B-12: CPT | Performed by: FAMILY MEDICINE

## 2022-10-12 PROCEDURE — 99499 RISK ADDL DX/OHS AUDIT: ICD-10-PCS | Mod: S$GLB,,, | Performed by: FAMILY MEDICINE

## 2022-10-12 PROCEDURE — 36415 COLL VENOUS BLD VENIPUNCTURE: CPT | Performed by: FAMILY MEDICINE

## 2022-10-12 PROCEDURE — 80061 LIPID PANEL: CPT | Performed by: FAMILY MEDICINE

## 2022-10-12 PROCEDURE — 3078F DIAST BP <80 MM HG: CPT | Mod: CPTII,S$GLB,, | Performed by: FAMILY MEDICINE

## 2022-10-12 PROCEDURE — 1125F AMNT PAIN NOTED PAIN PRSNT: CPT | Mod: CPTII,S$GLB,, | Performed by: FAMILY MEDICINE

## 2022-10-12 PROCEDURE — 99999 PR PBB SHADOW E&M-EST. PATIENT-LVL IV: ICD-10-PCS | Mod: PBBFAC,,, | Performed by: FAMILY MEDICINE

## 2022-10-12 RX ORDER — CHOLECALCIFEROL (VITAMIN D3) 125 MCG
CAPSULE ORAL
Qty: 100 CAPSULE | Refills: 4 | Status: SHIPPED | OUTPATIENT
Start: 2022-10-12 | End: 2024-01-10 | Stop reason: SDUPTHER

## 2022-10-12 RX ORDER — DICLOFENAC SODIUM 10 MG/G
2 GEL TOPICAL 4 TIMES DAILY
Qty: 100 G | Refills: 4 | Status: SHIPPED | OUTPATIENT
Start: 2022-10-12

## 2022-10-12 RX ORDER — AMITRIPTYLINE HYDROCHLORIDE 25 MG/1
TABLET, FILM COATED ORAL
Qty: 60 TABLET | Refills: 11 | Status: SHIPPED | OUTPATIENT
Start: 2022-10-12 | End: 2024-01-10 | Stop reason: SDUPTHER

## 2022-10-12 RX ORDER — ONDANSETRON 8 MG/1
8 TABLET, ORALLY DISINTEGRATING ORAL 3 TIMES DAILY PRN
Qty: 30 TABLET | Refills: 11 | Status: SHIPPED | OUTPATIENT
Start: 2022-10-12 | End: 2023-12-12 | Stop reason: SDUPTHER
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
## 2022-10-13 ENCOUNTER — TELEPHONE (OUTPATIENT)
Dept: FAMILY MEDICINE | Facility: CLINIC | Age: 67
End: 2022-10-13
Payer: MEDICARE

## 2022-10-13 DIAGNOSIS — E55.9 VITAMIN D DEFICIENCY: Primary | ICD-10-CM

## 2022-10-13 RX ORDER — ERGOCALCIFEROL 1.25 MG/1
50000 CAPSULE ORAL
Qty: 15 CAPSULE | Refills: 4 | Status: SHIPPED | OUTPATIENT
Start: 2022-10-13 | End: 2024-01-10 | Stop reason: SDUPTHER

## 2022-10-17 ENCOUNTER — PATIENT MESSAGE (OUTPATIENT)
Dept: ADMINISTRATIVE | Facility: HOSPITAL | Age: 67
End: 2022-10-17
Payer: MEDICARE

## 2022-10-18 ENCOUNTER — PATIENT MESSAGE (OUTPATIENT)
Dept: ADMINISTRATIVE | Facility: OTHER | Age: 67
End: 2022-10-18
Payer: MEDICARE

## 2022-10-18 ENCOUNTER — OUTPATIENT CASE MANAGEMENT (OUTPATIENT)
Dept: ADMINISTRATIVE | Facility: OTHER | Age: 67
End: 2022-10-18
Payer: MEDICARE

## 2022-10-18 NOTE — LETTER
October 18, 2022           Dear Ms. Thom Krishna,     Welcome to [facility]s Complex Care Management Program.  It was a pleasure talking with you today.  My name is Trish Razo. I look forward to working with you as your .  My goal is to help you function at the healthiest and highest level possible.  You can contact me directly at [phone].    As an [facility] patient with [employer], some of the services we may be able to provide include:      Development of an individualized care plan with a Registered Nurse    Connection with a    Connection with available resources and services     Coordinate communication among your care team members    Provide coaching and education    Help you understand your doctors treatment plan   Help you obtain information about your insurance coverage.     All services provided by [facility]s Complex Care Managers and other care team members are coordinated with and communicated to your primary care team.    As part of your enrollment, you will be receiving education materials and more information about these services in your [facility] account, by phone or through the mail.  If you do not wish to participate or receive information, please contact our office at [phone].      Sincerely,      Trish Razo, RN  [facility]   Out-patient RN Complex Care Manager

## 2022-10-18 NOTE — PROGRESS NOTES
Outpatient Care Management  Initial Patient Assessment    Patient: Thom Krishna  MRN: [id number]  Date of Service: 10/18/2022  Completed by: Trish Razo RN  Referral Date: 10/12/2022  Program: High Risk  Status: Ongoing  Effective Dates: 10/18/2022 - present  Responsible Staff: Trish Razo RN      Reason for Visit   Patient presents with    OPCM Chart Review    OPCM Enrollment Call    Initial Assessment    OPCM RN First Assessment Attempt    Plan Of Care    PHQ-9     10/18/22       Brief Summary:  Thom Krishna was referred by PCP Dr. Brandy Dejesus for Anxiety and Depression, Failed back surgical syndrome, H/O R hip replacement. Patient qualifies for program based on risk score of 84.9%.   Active problem list, medical, surgical and social history reviewed. Active comorbidities include chronic pain . Areas of need identified by patient include Pain has been having chronic pain (constant,rates 10/10) following R Hip replacement in June of this year. Pain has been hindering patient's ability to effectively complete her ADL's (needs to lift her leg with alternate hand to bathe/ get into bed).   Complex care plan created with patient/caregiver input. By next encounter, patient agrees to read/review educational information being sent by OPCM RN.  Patient plans to attend scheduled appointment with Ortho surgeon Dr. Angel tomorrow 10/19/22 (niece will be accompanying patient). Patient would like to discuss with Dr. Angel her chronic pain symptoms are not improving following her R hip replacement. Wants to see what is next course of treatment. Patient plans to proceed using heating pad and or cold compress alternating between the two, use 20 mins at a time several times a day..     Assessment Documentation     OPCM Initial Assessment    Involvement of Care  Do I have permission to speak with other family members about your care?: Yes  Assessment completed by: Patient  Identified Areas of Need  Advanced Care  Planning: No  Housing: no  Medication Adherence: No  *Active medication list was reviewed and reconciled with patient and/or caregiver:   Nutrition: no  Lab Adherence: no  Depression: No (Comment: score 2/ advised pt can be referred to SW at her discretion)  Cognitive/Behavioral Health: no  Health Literacy: no  Fall risk?: No  Equipment/Supplies/Services: no          Problem List and History     Problems Addressed This Visit    COPD: Not identified by patient as current problem  Depression: Not identified by patient as current problem  Diabetes: Not identified by patient as current problem  Osteoporosis: Not identified by patient as current problem  Chronic Kidney Disease: Not identified by patient as current problem  Hyperlipidemia: Not identified by patient as current problem         Reviewed medical and social history with patient and/or caregiver. A complex care plan was discussed and completed today, with input from patient and/or caregiver.    Patient Instructions     Instructions were provided via the [employer] patient resources and are available for the patient to view on the patient portal, if active.    Patient Education        Hip Pain   About this topic   Your hips are ball and socket joints. Cartilage covers the ends of the bones inside your hip joint and allows them to move smoothly. Ligaments are strong bands of tissue that hold your bones together. Tendons are bands of tissue that attach muscles to bones. A group of muscles surround your hips and attach to bones in your hip, leg, and pelvis. These allow you to bend your hip and move your leg. Nerves and blood vessels travel near your hip as they enter your legs.  Most hip pain is caused by damage to the cartilage or an injury to a ligament, tendon, or muscle. You also have nerves and blood vessels in your hip. However, other more serious problems, such as infection or injury to a nerve or blood vessel, can also cause hip pain.     What are the causes?    Talk to your doctor about what is causing your hip pain. Hip pain may be caused by many things. A few of them are:  Wear and tear of the joint that leads to swelling. This is arthritis.  Swelling of the small fluid filled sacs in the hip. This is bursitis.  Stretching or tearing of ligaments or muscles. This is a strain or sprain.  Pain that is referred from another area. Problems like back pain or a hernia may be felt as hip pain.  Infection  Using the hip too much may lead to swelling in the tendons. This is tendinitis.  Break in the bone. This is called a fracture.  Lack of blood supply to the bone. This is called osteonecrosis.  Many other causes  What can make this more likely to happen?   Older age  Osteoporosis  Taking a corticosteroid drug over a long period of time  Repetitive stress (overuse) injury  Having a previous surgery on or around the hip  Having trauma to the hip, such as a fall  What are the main signs?   Hip pain which may also go down the thigh, into the groin, or up into the buttock  Swelling  Bruising  Trouble walking or walking with a limp  Weakness in the hip  Less movement in the hip  Leg looks deformed or shorter  How does the doctor diagnose this health problem?   Your doctor will look at your hip and other nearby areas such as the back, leg, and knee. Your doctor will feel around your hip to find where the pain is. Your doctor may move your hip and push or pull on your leg. This is to check your motion and see how strong the muscles and joint are. Your doctor may have you stand and walk to look at your hip. The doctor might check for numbness and feel your pulse in your leg. Your doctor may order:  Blood tests  X-ray  Ultrasound  CT or MRI scan  Bone scan  How does the doctor treat this health problem?   Your doctor will want to find the cause of your hip pain. Some things that may lessen your hip pain are:  Rest  Ice  Heat may be used later but not right away. Heat can make swelling  worse.  Brace, padding, or compression wrap or shorts  Crutches, cane, or walker to take pressure off the injured hip  Exercises  Physical therapy (PT)  Chiropractor  Surgery if there is a bone break, serious hip injury, or very bad arthritis  What drugs may be needed?   The doctor may order drugs to:  Help with pain and swelling, like ibuprofen. These are nonsteroidal anti-inflammatory drugs (NSAIDS).  Help with pain, such as acetaminophen.  The doctor may give you a shot of an anti-inflammatory drug called a corticosteroid. This will help with swelling and pain. Talk with your doctor about the risks of this shot.  What can be done to prevent this health problem?   Stay active and work out to keep your muscles strong and flexible.  Keep a healthy weight so there is not extra stress on your joints. This makes it more likely to be hurt.  Stand with your weight equal on both legs.  Bend your knees when you lift to avoid extra pressure on your hips.  Wear the right equipment when playing sports. This includes protective equipment and padding.  Warm up slowly and stretch before you work out. Use good ways to train, such as slowly adding to how far you run. Do not work out if you are overly tired. Take extra care if working out in cold weather.  Wear supportive shoes. If your feet are flat, talk with your doctor about getting inserts or orthotics for your shoes.  If your legs are different lengths, use a lift in your shoe to make them even.  Helpful tips   If you have hip pain:  Take breaks often when doing things that use repeat movements.  Try to limit working in a stooped position.  Do not sit or lie in one position for a long time. Do not lie on your painful hip. Use a pillow between your legs if you lie on your side.  Avoid activities that put a lot of stress on your hip joints. These are ones with a lot of twisting, bending, running, and jumping. Also stay away from those with a lot of sudden stopping and starting.  Sports like basketball and tennis can put stress on your hip joints. Better sports if you have hip pain are swimming or bicycling.  Try to go up and down stairs as little as possible.  Where can I learn more?   Better Health Channel  https://www.betterhealth.[place].gov.au/health/ConditionsAndTreatments/hip-disorders   NHS Choices  http://www.nhs.uk/conditions/irritable-hip/Pages/Introduction.aspx   Last Reviewed Date   2021-06-16  Consumer Information Use and Disclaimer   This information is not specific medical advice and does not replace information you receive from your health care provider. This is only a brief summary of general information. It does NOT include all information about conditions, illnesses, injuries, tests, procedures, treatments, therapies, discharge instructions or life-style choices that may apply to you. You must talk with your health care provider for complete information about your health and treatment options. This information should not be used to decide whether or not to accept your health care providers advice, instructions or recommendations. Only your health care provider has the knowledge and training to provide advice that is right for you.  Copyright   Copyright © 2021 UpToDate, Inc. and its affiliates and/or licensors. All rights reserved.     Patient Education        Chronic Pain   The Basics   Written by the doctors and editors at [employer]   What is chronic pain? -- Chronic pain is pain that lasts longer than 3 to 6 months. In many cases, this means that pain continues even after the injury or condition that first caused it has healed.  What causes chronic pain? -- The cause of chronic pain is not always clear. Sometimes it is caused by an ongoing medical problem, such as arthritis or diabetic neuropathy (a form of nerve damage from diabetes). But doctors cannot always find the cause of chronic pain.  In some cases, people with chronic pain must accept that their pain will never  "be explained. This means that they have to work with their healthcare team to address the pain, even if they don't know its cause.  What are the symptoms of chronic pain? -- The main symptom of chronic pain is, of course, pain. But the pain can affect the body in different ways. Some people have aches deep inside their muscle or bone. Some people have stabbing or shooting pain, often with tingling or numbness. And others have dull, throbbing pain.  People who have chronic pain might have a hard time doing their usual activities, such as bathing or dressing. This can lead to depression and anxiety, and it can cause problems with sleep.  Will I need tests? -- When you first start having pain, your doctor might do tests to figure out the cause. You might get:  Blood tests to check for infection, signs of inflammation, or diseases that can cause pain  X-rays or other imaging tests to check for bone fractures, joint damage, cancer, or other changes in your body that could cause pain  Nerve tests to check whether the nerves are working normally  However, tests cannot always show the cause of pain. Scientists think that in some people, the pain signals in the brain stop working normally. The signals get "stuck" in the on position, even when the source of pain is gone.  How is chronic pain treated? -- Treatments for chronic pain include both medicines and activities. No single treatment works for everyone. Your doctor or nurse will help you find the right mix of treatments for you. Treatment options include:  Medicines to relieve pain, improve sleep, or improve mood  Physical therapy to learn exercises and stretches  Working with a counselor  Relaxation therapy  Massage therapy  Injections (shots) of numbing or pain-relieving medicines into the spine or area with pain  Acupuncture  Devices that affect nerve signals  Surgery  To find the best treatment for you:  Be open to trying new treatments and combinations of " treatments. Sometimes you have to try a few different options before you find one that works best.  Set realistic goals for your treatment. Even if you can't completely get rid of your pain, you might be able to control it enough so that you can do the things you want to do.  If your doctor suggests a medicine that seems out of place, keep an open mind. Sometimes, doctors treat pain with medicines made to treat other medical problems. For example, doctors can use medicines for depression to treat pain because they work on areas of the brain that process pain. Doctors can also use medicines for seizures to treat pain, because they help with overactive nerves.  Keep in mind, too, that many people need a team to help manage their care. A treatment team usually includes:  Doctors or specialists  A physical therapist  Someone trained in mental health (such as a  or counselor)  Is there anything I can do on my own to feel better? -- Yes. Some things to try include:  Use a heating pad or a cold pack on the painful area. Check with your doctor before trying this to make sure it is OK for your individual condition.  Practice relaxing. You can learn methods to relax your body, such as doing deep breathing exercises. Ask your doctor or nurse about these methods. Relaxing the mind can help with how the body feels pain. People can learn to quiet their pain or make it less bothersome.  Stay as active as possible. Walking, swimming, mario chi (a kind of martial art), or biking can all help ease muscle and joint pain. If you are not active, your pain might get worse.  If you haven't been active for a while, start slowly. Make small increases in the intensity and amount of time you spend exercising. If exercising increases your pain, talk with your doctor. They might recommend a program that can help you get more active.  If you feel depressed, talk to your doctor or nurse about it. Chronic pain and depression often go  together, and each can make the other worse. Getting treatment for your depression can make it easier to cope with your pain.  All topics are updated as new evidence becomes available and our peer review process is complete.  This topic retrieved from [employer] on: Sep 21, 2021.  Topic 94735 Version 14.0  Release: 29.4.2 - C29.263  © 2021 UpToDate, Inc. and/or its affiliates. All rights reserved.  Consumer Information Use and Disclaimer   This information is not specific medical advice and does not replace information you receive from your health care provider. This is only a brief summary of general information. It does NOT include all information about conditions, illnesses, injuries, tests, procedures, treatments, therapies, discharge instructions or life-style choices that may apply to you. You must talk with your health care provider for complete information about your health and treatment options. This information should not be used to decide whether or not to accept your health care provider's advice, instructions or recommendations. Only your health care provider has the knowledge and training to provide advice that is right for you. The use of this information is governed by the [employer] End User License Agreement, available at https://www.[employer]/en/solutions/[employer]/about/kurt.The use of [employer] content is governed by the [employer] Terms of Use. ©2021 UpToDate, Inc. All rights reserved.  Copyright   © 2021 UpToDate, Inc. and/or its affiliates. All rights reserved.    Next steps: OPCM will discuss educational information with patient answer questions or concerns.   Inquire with patient what recommendations were advised to patient at appointment with Ortho surgeon (Dr. Angel on 10/19/22).  Assist with referring patient to PT/OT, Functional Restoration Program if appropriate.   Continue to evaluate patient rate of pain.   Reinforce using cold/heat therapy 2-3 times a day to help minimize  pain    Follow up in about 8 days (around 10/26/2022) for RN follow up.    Todays OPCM Self-Management Care Plan was developed with the patients/caregivers input and was based on identified barriers from todays assessment.  Goals were written today with the patient/caregiver and the patient has agreed to work towards these goals to improve his/her overall well-being. Patient verbalized understanding of the care plan, goals, and all of today's instructions. Encouraged patient/caregiver to communicate with his/her physician and health care team about health conditions and the treatment plan.  Provided my contact information today and encouraged patient/caregiver to call me with any questions as needed.

## 2022-10-24 ENCOUNTER — TELEPHONE (OUTPATIENT)
Dept: FAMILY MEDICINE | Facility: CLINIC | Age: 67
End: 2022-10-24
Payer: MEDICARE
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
## 2022-10-25 DIAGNOSIS — M54.9 CHRONIC NECK AND BACK PAIN: ICD-10-CM

## 2022-10-25 DIAGNOSIS — S12.9XXS CLOSED FRACTURE OF CERVICAL VERTEBRA, UNSPECIFIED CERVICAL VERTEBRAL LEVEL, SEQUELA: ICD-10-CM

## 2022-10-25 DIAGNOSIS — M54.2 CHRONIC NECK AND BACK PAIN: ICD-10-CM

## 2022-10-25 DIAGNOSIS — G89.29 CHRONIC NECK AND BACK PAIN: ICD-10-CM

## 2022-10-25 NOTE — TELEPHONE ENCOUNTER
No new care gaps identified.  [facility] Embedded Care Gaps. Reference number: [id number]. 10/25/2022   4:29:25 PM CDT

## 2022-10-26 RX ORDER — HYDROCODONE BITARTRATE AND ACETAMINOPHEN 10; 325 MG/1; MG/1
TABLET ORAL
Qty: 90 TABLET | Refills: 0 | Status: SHIPPED | OUTPATIENT
Start: 2022-10-26 | End: 2022-11-22 | Stop reason: SDUPTHER

## 2022-10-31 ENCOUNTER — OUTPATIENT CASE MANAGEMENT (OUTPATIENT)
Dept: ADMINISTRATIVE | Facility: OTHER | Age: 67
End: 2022-10-31
Payer: MEDICARE

## 2022-10-31 NOTE — PROGRESS NOTES
Outpatient Care Management  Plan of Care Follow Up Visit    Patient: Thom Krishna  MRN: [id number]  Date of Service: 10/31/2022  Completed by: Trish Razo RN  Referral Date: 10/12/2022    Reason for Visit   Patient presents with    Update Plan Of Care     10/31/22       Brief Summary: Updated care plan    Patient Summary     Involvement of Care:  Do I have permission to speak with other family members about your care?   No    Patient Reported Labs & Vitals:  1.  Any Patient Reported Labs & Vitals?     2.  Patient Reported Blood Pressure:     3.  Patient Reported Pulse:     4.  Patient Reported Weight (Kg):     5.  Patient Reported Blood Glucose (mg/dl):       Medical and social history was reviewed with patient and/or caregiver.     Clinical Assessment     Reviewed and provided basic information on available community resources for mental health, transportation, wellness resources, and palliative care programs with patient and/or caregiver.     Complex Care Plan     Care plan was discussed and completed today with input from patient and/or caregiver.    Patient Instructions     Instructions were provided via the [employer] patient resources and are available for the patient to view on the patient portal.    Next Steps:  OPCM will follow up with patient after her u/s is performed, inquire what are the results are.  OPCM will assist with referring patient to PT/OT therapies when appropriate for patient.    Follow up in about 8 days (around 11/8/2022) for RN follow up.    [facility] OPCM Self-Management Care Plan was developed with the patients/caregivers input and was based on identified barriers from [facility] assessment.  Goals were written today with the patient/caregiver and the patient has agreed to work towards these goals to improve his/her overall well-being. Patient verbalized understanding of the care plan, goals, and all of today's instructions. Encouraged patient/caregiver to communicate with his/her physician  and health care team about health conditions and the treatment plan.  Provided my contact information today and encouraged patient/caregiver to call me with any questions as needed.

## 2022-11-10 ENCOUNTER — OUTPATIENT CASE MANAGEMENT (OUTPATIENT)
Dept: ADMINISTRATIVE | Facility: OTHER | Age: 67
End: 2022-11-10

## 2022-11-11 ENCOUNTER — TELEPHONE (OUTPATIENT)
Dept: FAMILY MEDICINE | Facility: CLINIC | Age: 67
End: 2022-11-11
Payer: MEDICARE

## 2022-11-11 NOTE — TELEPHONE ENCOUNTER
Could not locate this provider through the computer system so I hand wrote a referral on a prescription pad for faxing.  Thanks

## 2022-11-11 NOTE — TELEPHONE ENCOUNTER
----- Message from Prateek Recio MA sent at 11/11/2022  9:45 AM CST -----  Pt needs external referral    ----- Message -----  From: Kamlesh Sahu  Sent: 11/11/2022   9:29 AM CST  To: Oleg HERNÁNDEZ Staff    .Type:  Referral For Different Location    Who Called:pt  Would the patient rather a call back or a response via [facility]? yes  Best Call Back Number: [phone]  Additional Information:     Requesting referral to be sent to [facility]  to Jcarlos Ballard general surgeon [address] [place], [place] [address]   Pt says that it is urgent to see about her esophagus

## 2022-11-14 ENCOUNTER — TELEPHONE (OUTPATIENT)
Dept: FAMILY MEDICINE | Facility: CLINIC | Age: 67
End: 2022-11-14
Payer: MEDICARE

## 2022-11-14 NOTE — TELEPHONE ENCOUNTER
Called pt about referral. Told pt that I already faxed it, and would call and fax again. Called JUN and received a different fax number and faxed referral again

## 2022-11-14 NOTE — TELEPHONE ENCOUNTER
----- Message from Hieu Diehl sent at 11/14/2022  9:26 AM CST -----  Contact: Pt  Type: Referral Request    Who Called:Pt  Who Left Message for Patient:Prateek   Does the patient know what this is regarding?: was the referral sent?  Would the patient rather a call back or a response via [facility]? Call  Best Call Back Number:[phone]

## 2022-11-21 ENCOUNTER — TELEPHONE (OUTPATIENT)
Dept: FAMILY MEDICINE | Facility: CLINIC | Age: 67
End: 2022-11-21
Payer: MEDICARE

## 2022-11-21 NOTE — TELEPHONE ENCOUNTER
----- Message from Anmol Hancock sent at 11/21/2022  1:54 PM CST -----  Contact: pt  .Type:  Needs Medical Advice    Who Called: pt    Pharmacy name and phone #:  [facility] - [address]   Phone: [phone]  Fax:  [phone]  Would the patient rather a call back or a response via [facility]? Call back  Best Call Back Number: [phone]  Additional Information: Pt needs her new script for   HYDROcodone-acetaminophen (NORCO)  mg per tablet.  Pharmacy is closed Thurs-Sunday for the Holiday.

## 2022-11-22 ENCOUNTER — TELEPHONE (OUTPATIENT)
Dept: FAMILY MEDICINE | Facility: CLINIC | Age: 67
End: 2022-11-22
Payer: MEDICARE

## 2022-11-22 DIAGNOSIS — S12.9XXS CLOSED FRACTURE OF CERVICAL VERTEBRA, UNSPECIFIED CERVICAL VERTEBRAL LEVEL, SEQUELA: ICD-10-CM

## 2022-11-22 DIAGNOSIS — G89.29 CHRONIC NECK AND BACK PAIN: ICD-10-CM

## 2022-11-22 DIAGNOSIS — M54.9 CHRONIC NECK AND BACK PAIN: ICD-10-CM
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
## 2022-11-22 DIAGNOSIS — M54.2 CHRONIC NECK AND BACK PAIN: ICD-10-CM

## 2022-11-22 RX ORDER — HYDROCODONE BITARTRATE AND ACETAMINOPHEN 10; 325 MG/1; MG/1
TABLET ORAL
Qty: 90 TABLET | Refills: 0 | Status: SHIPPED | OUTPATIENT
Start: 2022-11-22 | End: 2022-12-19 | Stop reason: SDUPTHER

## 2022-11-22 NOTE — TELEPHONE ENCOUNTER
Called pt to inform her that the RX refill had been sent in. Pt did not answer. Left message to call us back

## 2022-11-22 NOTE — TELEPHONE ENCOUNTER
----- Message from Evelyne Cutler sent at 11/22/2022 10:22 AM CST -----  Type:  Needs Medical Advice    Who Called: PT  Symptoms (please be specific):    How long has patient had these symptoms:    Pharmacy name and phone #:    Would the patient rather a call back or a response via [facility]?   Best Call Back Number: [phone] (M)   Additional Information: F/U ON PREV MSG ABOUT HER NORCO REFILL AND PHARMACY WILL BE CLOSED DUE TO HOLIDAYS

## 2022-12-19 DIAGNOSIS — G89.29 CHRONIC NECK AND BACK PAIN: ICD-10-CM
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
## 2022-12-19 DIAGNOSIS — M54.2 CHRONIC NECK AND BACK PAIN: ICD-10-CM

## 2022-12-19 DIAGNOSIS — S12.9XXS CLOSED FRACTURE OF CERVICAL VERTEBRA, UNSPECIFIED CERVICAL VERTEBRAL LEVEL, SEQUELA: ICD-10-CM

## 2022-12-19 DIAGNOSIS — M54.9 CHRONIC NECK AND BACK PAIN: ICD-10-CM

## 2022-12-19 RX ORDER — NALOXONE HYDROCHLORIDE 4 MG/.1ML
SPRAY NASAL
Qty: 1 EACH | Refills: 11 | Status: SHIPPED | OUTPATIENT
Start: 2022-12-19

## 2022-12-19 RX ORDER — HYDROCODONE BITARTRATE AND ACETAMINOPHEN 10; 325 MG/1; MG/1
TABLET ORAL
Qty: 90 TABLET | Refills: 0 | Status: SHIPPED | OUTPATIENT
Start: 2022-12-19 | End: 2023-01-19 | Stop reason: SDUPTHER

## 2022-12-19 NOTE — TELEPHONE ENCOUNTER
No new care gaps identified.  [facility] Embedded Care Gaps. Reference number: [id number]. 12/19/2022   3:53:10 PM CST

## 2023-01-09 DIAGNOSIS — F41.9 ANXIETY: ICD-10-CM

## 2023-01-09 NOTE — TELEPHONE ENCOUNTER
No new care gaps identified.  [facility] Embedded Care Gaps. Reference number: [id number]. 1/09/2023   3:12:31 PM CST

## 2023-01-10 ENCOUNTER — TELEPHONE (OUTPATIENT)
Dept: FAMILY MEDICINE | Facility: CLINIC | Age: 68
End: 2023-01-10
Payer: MEDICARE

## 2023-01-10 RX ORDER — ALPRAZOLAM 1 MG/1
TABLET ORAL
Qty: 45 TABLET | Refills: 5 | Status: SHIPPED | OUTPATIENT
Start: 2023-01-10 | End: 2023-07-07 | Stop reason: SDUPTHER

## 2023-01-10 NOTE — TELEPHONE ENCOUNTER
Pt called back regarding message. Pt stated she is supposed to get her rx from [facility] tomorrow. If she did then she would get her next one from [facility]

## 2023-01-10 NOTE — TELEPHONE ENCOUNTER
----- Message from Viet Sheridan sent at 1/10/2023 12:39 PM CST -----  Type:  Needs Medical Advice    Who Called: self  Reason:her ALPRAZolam (XANAX) 1 MG tablet was sent to wrong pharmacy  Would the patient rather a call back or a response via [facility]? call  Best Call Back [facility] [place] [facility] [address]   Phone:  [phone]  Fax:  [phone]        Additional Information:none

## 2023-01-11 DIAGNOSIS — Z78.0 MENOPAUSE: ICD-10-CM

## 2023-01-12 ENCOUNTER — TELEPHONE (OUTPATIENT)
Dept: FAMILY MEDICINE | Facility: CLINIC | Age: 68
End: 2023-01-12
Payer: MEDICARE

## 2023-01-12 NOTE — TELEPHONE ENCOUNTER
[facility]

## 2023-01-12 NOTE — TELEPHONE ENCOUNTER
----- Message from Prateek Recio MA sent at 1/10/2023  4:55 PM CST -----    ----- Message -----  From: Kassidy Seaman  Sent: 1/10/2023   3:23 PM CST  To: Oleg HERNÁNDEZ Staff    Type:  Needs Medical Advice    Who Called:  [facility]  Would the patient rather a call back or a response via [facility]?  call  Best Call Back Number: [phone]  Additional Information:  Pharmacy needs Leti Vera to refill ALPRAZolam (XANAX) 1 MG tablet  because it was mail ordered but it is lost.

## 2023-01-17 ENCOUNTER — PATIENT MESSAGE (OUTPATIENT)
Dept: ADMINISTRATIVE | Facility: HOSPITAL | Age: 68
End: 2023-01-17
Payer: MEDICARE

## 2023-01-19 DIAGNOSIS — G89.29 CHRONIC NECK AND BACK PAIN: ICD-10-CM

## 2023-01-19 DIAGNOSIS — S12.9XXS CLOSED FRACTURE OF CERVICAL VERTEBRA, UNSPECIFIED CERVICAL VERTEBRAL LEVEL, SEQUELA: ICD-10-CM
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
## 2023-01-19 DIAGNOSIS — M54.9 CHRONIC NECK AND BACK PAIN: ICD-10-CM

## 2023-01-19 DIAGNOSIS — M54.2 CHRONIC NECK AND BACK PAIN: ICD-10-CM

## 2023-01-19 RX ORDER — HYDROCODONE BITARTRATE AND ACETAMINOPHEN 10; 325 MG/1; MG/1
TABLET ORAL
Qty: 90 TABLET | Refills: 0 | Status: SHIPPED | OUTPATIENT
Start: 2023-01-19 | End: 2023-02-16 | Stop reason: SDUPTHER

## 2023-01-19 NOTE — TELEPHONE ENCOUNTER
----- Message from Bianca Montemayor sent at 1/19/2023  9:34 AM CST -----  Needs advice from nurse:      Who Called:pt  Regarding:needs refill on HYDROcodone-acetaminophen (NORCO)  mg per tablet  TAKE 1 TABLET BY MOUTH THREE (3) TIMES DAILY AS NEEDED FOR PAIN--2nd request      Would the patient rather a call back or [facility]?  Best Call Back number:[phone]  Additional Info:[facility] - [address] (Ph: [phone])

## 2023-01-19 NOTE — TELEPHONE ENCOUNTER
Called pt and left message stating I seen her refill request and will be sending it to Dr. Dejesus

## 2023-01-19 NOTE — TELEPHONE ENCOUNTER
No new care gaps identified.  [facility] Embedded Care Gaps. Reference number: [id number]. 1/19/2023   11:43:06 AM CST

## 2023-01-25 ENCOUNTER — OUTPATIENT CASE MANAGEMENT (OUTPATIENT)
Dept: ADMINISTRATIVE | Facility: OTHER | Age: 68
End: 2023-01-25
Payer: MEDICARE

## 2023-01-26 ENCOUNTER — TELEPHONE (OUTPATIENT)
Dept: PAIN MEDICINE | Facility: CLINIC | Age: 68
End: 2023-01-26
Payer: MEDICARE

## 2023-01-26 NOTE — TELEPHONE ENCOUNTER
"Called Pt as # left "Marta"   Pt states she is having pain following hip surgery, notified to call surgeon and follow up with him as we do not manage post op pain.     "

## 2023-01-26 NOTE — TELEPHONE ENCOUNTER
----- Message from Viviane Hager sent at 1/26/2023  9:56 AM CST -----  Contact: PT Marta [phone]  Marta calling to see if the pt can be seen on 2/2  because she will be at the location on that day and she her ride. Please call to advise.

## 2023-02-07 ENCOUNTER — OUTPATIENT CASE MANAGEMENT (OUTPATIENT)
Dept: ADMINISTRATIVE | Facility: OTHER | Age: 68
End: 2023-02-07

## 2023-02-07 DIAGNOSIS — Z00.00 ENCOUNTER FOR MEDICARE ANNUAL WELLNESS EXAM: ICD-10-CM

## 2023-02-09 DIAGNOSIS — Z00.00 ENCOUNTER FOR MEDICARE ANNUAL WELLNESS EXAM: ICD-10-CM

## 2023-02-16 DIAGNOSIS — M54.2 CHRONIC NECK AND BACK PAIN: ICD-10-CM
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
## 2023-02-16 DIAGNOSIS — S12.9XXS CLOSED FRACTURE OF CERVICAL VERTEBRA, UNSPECIFIED CERVICAL VERTEBRAL LEVEL, SEQUELA: ICD-10-CM

## 2023-02-16 DIAGNOSIS — G89.29 CHRONIC NECK AND BACK PAIN: ICD-10-CM

## 2023-02-16 DIAGNOSIS — M54.9 CHRONIC NECK AND BACK PAIN: ICD-10-CM

## 2023-02-16 NOTE — TELEPHONE ENCOUNTER
No new care gaps identified.  [facility] Embedded Care Gaps. Reference number: [id number]. 2/16/2023   4:50:07 PM CST

## 2023-02-16 NOTE — TELEPHONE ENCOUNTER
----- Message from Viet Sheridan sent at 2/16/2023  8:45 AM CST -----  Type:  Needs Medical Advice    Who Called: self  Reason:refill on HYDROcodone-acetaminophen (NORCO)  mg per tablet  Would the patient rather a call back or a response via [facility]? call  Best Call Back Number: [facility] [place] - [address]   Phone:  [phone]  Fax:  [phone]        Additional Information: none

## 2023-02-17 RX ORDER — HYDROCODONE BITARTRATE AND ACETAMINOPHEN 10; 325 MG/1; MG/1
TABLET ORAL
Qty: 90 TABLET | Refills: 0 | Status: SHIPPED | OUTPATIENT
Start: 2023-02-17 | End: 2023-03-14 | Stop reason: SDUPTHER

## 2023-02-17 NOTE — TELEPHONE ENCOUNTER
----- Message from Mateo Briseno sent at 2/17/2023  9:42 AM CST -----  Contact: PT  Type: Requesting REFILL         Who Called: PT  Regarding: HYDROcodone-acetaminophen (NORCO)  mg per tablet  Would the patient rather a call back or a response via [facility]? Call back  Best Call Back Number: [phone]  Additional [facility]  [facility] [address]   Phone: [phone]  Fax:  [phone]    ADDITIONAL: CLOSES TODAY AT 12

## 2023-02-17 NOTE — TELEPHONE ENCOUNTER
Called pt and told her I sent her refill request yesterday to Dr. Dejesus. Since it was past the time for pharmacy did she have a secondary pharmacy that we could send it to. Pt stated humberto.

## 2023-03-14 DIAGNOSIS — M54.9 CHRONIC NECK AND BACK PAIN: ICD-10-CM

## 2023-03-14 DIAGNOSIS — S12.9XXS CLOSED FRACTURE OF CERVICAL VERTEBRA, UNSPECIFIED CERVICAL VERTEBRAL LEVEL, SEQUELA: ICD-10-CM

## 2023-03-14 DIAGNOSIS — G89.29 CHRONIC NECK AND BACK PAIN: ICD-10-CM
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
## 2023-03-14 DIAGNOSIS — M54.2 CHRONIC NECK AND BACK PAIN: ICD-10-CM

## 2023-03-14 NOTE — TELEPHONE ENCOUNTER
No new care gaps identified.  [facility] Embedded Care Gaps. Reference number: [id number]. 3/14/2023   3:49:05 PM CDT

## 2023-03-15 RX ORDER — HYDROCODONE BITARTRATE AND ACETAMINOPHEN 10; 325 MG/1; MG/1
TABLET ORAL
Qty: 90 TABLET | Refills: 0 | Status: SHIPPED | OUTPATIENT
Start: 2023-03-15 | End: 2023-04-13 | Stop reason: SDUPTHER

## 2023-04-12 ENCOUNTER — TELEPHONE (OUTPATIENT)
Dept: FAMILY MEDICINE | Facility: CLINIC | Age: 68
End: 2023-04-12
Payer: MEDICARE

## 2023-04-12 DIAGNOSIS — G89.29 CHRONIC NECK AND BACK PAIN: ICD-10-CM

## 2023-04-12 DIAGNOSIS — S12.9XXS CLOSED FRACTURE OF CERVICAL VERTEBRA, UNSPECIFIED CERVICAL VERTEBRAL LEVEL, SEQUELA: ICD-10-CM

## 2023-04-12 DIAGNOSIS — M54.9 CHRONIC NECK AND BACK PAIN: ICD-10-CM
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
## 2023-04-12 DIAGNOSIS — M54.2 CHRONIC NECK AND BACK PAIN: ICD-10-CM

## 2023-04-12 NOTE — TELEPHONE ENCOUNTER
----- Message from Kamlesh Sahu sent at 4/12/2023  9:57 AM CDT -----  Type:  Patient Returning Call    Who Called:pt   Does the patient know what this is regarding?:pt would like to know if it's time for a refill for HYDROcodone-acetaminophen (NORCO)  mg per tablet she stated she just lost her sister and she missed placed the bottle   Would the patient rather a call back or a response via [facility]? Call   Best Call Back Number:[phone]  Additional Information:   Pt would like to speak with the staff
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
## 2023-04-13 DIAGNOSIS — M54.9 CHRONIC NECK AND BACK PAIN: ICD-10-CM

## 2023-04-13 DIAGNOSIS — M54.2 CHRONIC NECK AND BACK PAIN: ICD-10-CM

## 2023-04-13 DIAGNOSIS — G89.29 CHRONIC NECK AND BACK PAIN: ICD-10-CM

## 2023-04-13 DIAGNOSIS — S12.9XXS CLOSED FRACTURE OF CERVICAL VERTEBRA, UNSPECIFIED CERVICAL VERTEBRAL LEVEL, SEQUELA: ICD-10-CM

## 2023-04-13 RX ORDER — HYDROCODONE BITARTRATE AND ACETAMINOPHEN 10; 325 MG/1; MG/1
TABLET ORAL
Qty: 90 TABLET | Refills: 0 | Status: SHIPPED | OUTPATIENT
Start: 2023-04-13 | End: 2023-05-09

## 2023-04-13 RX ORDER — HYDROCODONE BITARTRATE AND ACETAMINOPHEN 10; 325 MG/1; MG/1
TABLET ORAL
Qty: 90 TABLET | Refills: 0 | OUTPATIENT
Start: 2023-04-13

## 2023-04-13 NOTE — TELEPHONE ENCOUNTER
No new care gaps identified.  [facility] Embedded Care Gaps. Reference number: [id number]. 4/13/2023   1:47:22 PM CDT

## 2023-04-13 NOTE — TELEPHONE ENCOUNTER
----- Message from Bianca Rutledge sent at 4/13/2023 10:31 AM CDT -----  Type:  RX Refill Request    Who Called: pt  Refill or New Rx:refill  RX Name and Strength:HYDROcodone-acetaminophen (NORCO)  mg per tablet  How is the patient currently taking it? (ex. 1XDay):TAKE 1 TABLET BY MOUTH THREE (3) TIMES DAILY AS NEEDED FOR PAIN  Is this a 30 day or 90 day RX:90  [facility] with phone number[facility] [address]   Phone: [phone]  Fax:  [phone]        Local or Mail Order:local  Ordering Provider:Dr Vick  Would the patient rather a call back or a response via [facility]? call  Best Call Back Number:[phone]   Additional Information: pt needs it sent today closed at noon tomorrow and closed all weekend pt would like you to call her once you [place]

## 2023-04-25 ENCOUNTER — TELEPHONE (OUTPATIENT)
Dept: FAMILY MEDICINE | Facility: CLINIC | Age: 68
End: 2023-04-25
Payer: MEDICARE

## 2023-04-25 NOTE — TELEPHONE ENCOUNTER
----- Message from Kamlesh Sahu sent at 4/25/2023  9:22 AM CDT -----  Type:  RX Refill Request    Who Called: pt  Refill or New Rx:refill  RX Name and Strength:ROcodone-acetaminophen (NORCO)  mg per tablet and ALPRAZolam (XANAX) 1 MG tablet  Preferred Pharmacy with phone number: [address]  Local or Mail Order:local   Ordering Provider:natalie  Would the patient rather a call back or a response via [facility]? Call   Best Call Back Number:[phone]  Additional Information:     .

## 2023-04-27 DIAGNOSIS — E11.9 TYPE 2 DIABETES MELLITUS WITHOUT COMPLICATION: ICD-10-CM

## 2023-04-28 NOTE — TELEPHONE ENCOUNTER
I have that Xanax prescription was sent on 01/10/23 for 1 month with 5 months of refills.      I also have that her hydrocodone for the month was sent on 04/13/2023 so she should not be due until May 13th.      Please clarify if any new prescriptions are needed, thank you

## 2023-05-01 ENCOUNTER — PATIENT MESSAGE (OUTPATIENT)
Dept: ADMINISTRATIVE | Facility: HOSPITAL | Age: 68
End: 2023-05-01
Payer: MEDICARE

## 2023-05-01 DIAGNOSIS — E03.9 ACQUIRED HYPOTHYROIDISM: ICD-10-CM

## 2023-05-01 NOTE — TELEPHONE ENCOUNTER
No care due was identified.  [facility] Embedded Care Due Messages. Reference number: [id number].   5/01/2023 3:18:35 PM CDT

## 2023-05-01 NOTE — TELEPHONE ENCOUNTER
Called pt and she stated that she realized she called in to early for her medication. Pt stated she has just lost her sister and has been a nervous wreck.

## 2023-05-01 NOTE — TELEPHONE ENCOUNTER
----- Message from Myla Middleton sent at 5/1/2023  1:33 PM CDT -----  Regarding: Return Call  Contact: Patient  Type:  Patient Returning Call    Who Called:Patient   Who Left Message for Patient: Prateek   Does the patient know what this is regarding?: n.a   Would the patient rather a call back or a response via [facility]? Call back   Best Call Back Number: [phone]  Additional Information: Pt missed a call and would like a call back please assist

## 2023-05-02 ENCOUNTER — TELEPHONE (OUTPATIENT)
Dept: FAMILY MEDICINE | Facility: CLINIC | Age: 68
End: 2023-05-02
Payer: MEDICARE

## 2023-05-02 RX ORDER — LEVOTHYROXINE SODIUM 75 UG/1
TABLET ORAL
Qty: 90 TABLET | Refills: 1 | Status: SHIPPED | OUTPATIENT
Start: 2023-05-02 | End: 2023-08-10

## 2023-05-02 NOTE — TELEPHONE ENCOUNTER
Refill Decision Note   Thom Krishna  is requesting a refill authorization.  Brief Assessment and Rationale for Refill:  Approve     Medication Therapy Plan:       Medication Reconciliation Completed: No   Comments:     No Care Gaps recommended.     Note composed:4:00 AM 05/02/2023

## 2023-05-02 NOTE — TELEPHONE ENCOUNTER
----- Message from Jennyfer Ashraf sent at 5/2/2023 11:48 AM CDT -----  Type:  Patient Returning Call    Who Called:Pt   Would the patient rather a call back or a response via [facility]? Call back   Best Call Back Number:[phone]  Additional Information: pt would like lab order sent to [facility]   [phone] for her diabetic labs

## 2023-05-04 ENCOUNTER — TELEPHONE (OUTPATIENT)
Dept: FAMILY MEDICINE | Facility: CLINIC | Age: 68
End: 2023-05-04
Payer: MEDICARE

## 2023-05-04 DIAGNOSIS — N18.31 CHRONIC KIDNEY DISEASE, STAGE 3A: Primary | ICD-10-CM

## 2023-05-04 DIAGNOSIS — E53.8 VITAMIN B12 DEFICIENCY: ICD-10-CM

## 2023-05-04 DIAGNOSIS — E44.1 MILD PROTEIN-CALORIE MALNUTRITION: ICD-10-CM

## 2023-05-04 DIAGNOSIS — E11.49 DIABETES MELLITUS TYPE 2 WITH NEUROLOGICAL MANIFESTATIONS: ICD-10-CM

## 2023-05-04 DIAGNOSIS — E03.9 ACQUIRED HYPOTHYROIDISM: ICD-10-CM

## 2023-05-04 DIAGNOSIS — E78.1 HYPERTRIGLYCERIDEMIA: ICD-10-CM

## 2023-05-04 DIAGNOSIS — E55.9 VITAMIN D DEFICIENCY: ICD-10-CM

## 2023-05-09 ENCOUNTER — TELEPHONE (OUTPATIENT)
Dept: FAMILY MEDICINE | Facility: CLINIC | Age: 68
End: 2023-05-09
Payer: MEDICARE

## 2023-05-09 DIAGNOSIS — M54.9 CHRONIC NECK AND BACK PAIN: ICD-10-CM

## 2023-05-09 DIAGNOSIS — S12.9XXS CLOSED FRACTURE OF CERVICAL VERTEBRA, UNSPECIFIED CERVICAL VERTEBRAL LEVEL, SEQUELA: ICD-10-CM

## 2023-05-09 DIAGNOSIS — G89.29 CHRONIC NECK AND BACK PAIN: ICD-10-CM
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
## 2023-05-09 DIAGNOSIS — M54.2 CHRONIC NECK AND BACK PAIN: ICD-10-CM

## 2023-05-09 RX ORDER — HYDROCODONE BITARTRATE AND ACETAMINOPHEN 10; 325 MG/1; MG/1
TABLET ORAL
Qty: 90 TABLET | Refills: 0 | Status: SHIPPED | OUTPATIENT
Start: 2023-05-12 | End: 2023-05-09 | Stop reason: SDUPTHER

## 2023-05-09 RX ORDER — HYDROCODONE BITARTRATE AND ACETAMINOPHEN 10; 325 MG/1; MG/1
TABLET ORAL
Qty: 90 TABLET | Refills: 0 | Status: SHIPPED | OUTPATIENT
Start: 2023-05-11 | End: 2023-06-05 | Stop reason: SDUPTHER

## 2023-05-09 NOTE — TELEPHONE ENCOUNTER
----- Message from Yuko Caldwell sent at 5/9/2023  4:30 PM CDT -----  Contact: [phone]  Pt is requesting for her HYDROcodone-acetaminophen (NORCO)  mg per tablet 90 tablet to be called in a little early. Per pt, the pharmacy closes early on Friday and she wants to make sure she has her medicine. Pt is using   [facility] [address]  [address] [place] [address]  [address] [address]  Phone: [phone] Fax: [phone]              Thank you

## 2023-05-09 NOTE — TELEPHONE ENCOUNTER
No care due was identified.  [facility] Embedded Care Due Messages. Reference number: [id number].   5/09/2023 10:57:13 AM CDT

## 2023-05-17 NOTE — ADDENDUM NOTE
Addended by: ELLEN BENITEZ on: 4/27/2020 12:39 PM     Modules accepted: Orders     Double Z Plasty Text: The lesion was extirpated to the level of the fat with a #15 scalpel blade. Given the location of the defect, shape of the defect and the proximity to free margins a double Z-plasty was deemed most appropriate for repair. Using a sterile surgical marker, the appropriate transposition arms of the double Z-plasty were drawn incorporating the defect and placing the expected incisions within the relaxed skin tension lines where possible. The area thus outlined was incised deep to adipose tissue with a #15 scalpel blade. The skin margins were undermined to an appropriate distance in all directions utilizing iris scissors. The opposing transposition arms were then transposed and carried over into place in opposite direction and anchored with interrupted buried subcutaneous sutures.

## 2023-06-05 ENCOUNTER — TELEPHONE (OUTPATIENT)
Dept: FAMILY MEDICINE | Facility: CLINIC | Age: 68
End: 2023-06-05
Payer: MEDICARE

## 2023-06-05 DIAGNOSIS — S12.9XXS CLOSED FRACTURE OF CERVICAL VERTEBRA, UNSPECIFIED CERVICAL VERTEBRAL LEVEL, SEQUELA: ICD-10-CM

## 2023-06-05 DIAGNOSIS — G89.29 CHRONIC NECK AND BACK PAIN: ICD-10-CM

## 2023-06-05 DIAGNOSIS — M54.9 CHRONIC NECK AND BACK PAIN: ICD-10-CM
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
## 2023-06-05 DIAGNOSIS — M54.2 CHRONIC NECK AND BACK PAIN: ICD-10-CM

## 2023-06-05 RX ORDER — HYDROCODONE BITARTRATE AND ACETAMINOPHEN 10; 325 MG/1; MG/1
TABLET ORAL
Qty: 90 TABLET | Refills: 0 | Status: SHIPPED | OUTPATIENT
Start: 2023-06-05 | End: 2023-07-18 | Stop reason: SDUPTHER

## 2023-06-05 NOTE — TELEPHONE ENCOUNTER
----- Message from Devin Mills sent at 6/5/2023 11:15 AM CDT -----  Contact: pt  Type:  RX Refill Request    Who Called: pt   Refill or New Rx:refilll  RX Name and Strength:HYDROcodone-acetaminophen (NORCO)  mg per tablet  Preferred Pharmacy with phone number:[facility] - [address]  Local or Mail Order:local  Ordering Provider:Nicanor  Would the patient rather a call back or a response via [facility]? call  Best Call Back Number:[phone]  Additional Information:   Pt stated she in pain and cant walk   Pt stated the imaging center put a needle in her back

## 2023-06-05 NOTE — TELEPHONE ENCOUNTER
No care due was identified.  [facility] Embedded Care Due Messages. Reference number: [id number].   6/05/2023 11:46:22 AM CDT

## 2023-06-05 NOTE — TELEPHONE ENCOUNTER
----- Message from Juan Miller sent at 6/5/2023 10:18 AM CDT -----  .Type:  Needs Medical Advice    Who Called: Pt  Would the patient rather a call back or a response via [facility]? call  Best Call Back Number: [phone]  Additional Information:    Pt requested a call back.

## 2023-06-11 ENCOUNTER — HOSPITAL ENCOUNTER (INPATIENT)
Facility: HOSPITAL | Age: 68
LOS: 8 days | Discharge: NURSING, REHAB OR LONG-TERM CARE | DRG: 039 | End: 2023-06-19
Attending: EMERGENCY MEDICINE | Admitting: STUDENT IN AN ORGANIZED HEALTH CARE EDUCATION/TRAINING PROGRAM
Payer: MEDICARE

## 2023-06-11 DIAGNOSIS — I10 BENIGN ESSENTIAL HYPERTENSION: ICD-10-CM

## 2023-06-11 DIAGNOSIS — R07.9 ACUTE CHEST PAIN: ICD-10-CM

## 2023-06-11 DIAGNOSIS — R00.1 BRADYCARDIA: ICD-10-CM

## 2023-06-11 DIAGNOSIS — I63.9 STROKE: Primary | ICD-10-CM

## 2023-06-11 DIAGNOSIS — I65.21 CAROTID STENOSIS, RIGHT: ICD-10-CM

## 2023-06-11 DIAGNOSIS — I63.9 CEREBRAL INFARCTION, UNSPECIFIED MECHANISM: ICD-10-CM

## 2023-06-11 DIAGNOSIS — G89.4 CHRONIC PAIN SYNDROME: ICD-10-CM

## 2023-06-11 DIAGNOSIS — I63.9 CVA (CEREBRAL VASCULAR ACCIDENT): ICD-10-CM

## 2023-06-11 LAB
ALBUMIN SERPL-MCNC: 3.7 G/DL (ref 3.4–4.8)
ALBUMIN/GLOB SERPL: 1.1 RATIO (ref 1.1–2)
ALP SERPL-CCNC: 98 UNIT/L (ref 40–150)
ALT SERPL-CCNC: 12 UNIT/L (ref 0–55)
AMPHET UR QL SCN: NEGATIVE
APPEARANCE UR: CLEAR
AST SERPL-CCNC: 16 UNIT/L (ref 5–34)
AV INDEX (PROSTH): 0.87
AV MEAN GRADIENT: 2 MMHG
AV PEAK GRADIENT: 4 MMHG
AV VELOCITY RATIO: 0.89
BACTERIA #/AREA URNS AUTO: NORMAL /HPF
BARBITURATE SCN PRESENT UR: NEGATIVE
BASOPHILS # BLD AUTO: 0.05 X10(3)/MCL
BASOPHILS NFR BLD AUTO: 0.7 %
BENZODIAZ UR QL SCN: POSITIVE
BILIRUB UR QL STRIP.AUTO: NEGATIVE MG/DL
BILIRUBIN DIRECT+TOT PNL SERPL-MCNC: 0.3 MG/DL
BSA FOR ECHO PROCEDURE: 1.79 M2
BUN SERPL-MCNC: 15.1 MG/DL (ref 9.8–20.1)
CALCIUM SERPL-MCNC: 9.2 MG/DL (ref 8.4–10.2)
CANNABINOIDS UR QL SCN: NEGATIVE
CHLORIDE SERPL-SCNC: 109 MMOL/L (ref 98–107)
CHOLEST SERPL-MCNC: 182 MG/DL
CHOLEST/HDLC SERPL: 3 {RATIO} (ref 0–5)
CO2 SERPL-SCNC: 23 MMOL/L (ref 23–31)
COCAINE UR QL SCN: NEGATIVE
COLOR UR: YELLOW
CREAT SERPL-MCNC: 1.04 MG/DL (ref 0.55–1.02)
CV ECHO LV RWT: 0.46 CM
DOP CALC AO PEAK VEL: 0.97 M/S
DOP CALC AO VTI: 23.9 CM
DOP CALC LVOT PEAK VEL: 0.86 M/S
DOP CALCLVOT PEAK VEL VTI: 20.7 CM
E WAVE DECELERATION TIME: 222 MSEC
E/A RATIO: 1.15
E/E' RATIO: 5.77 M/S
ECHO LV POSTERIOR WALL: 0.87 CM (ref 0.6–1.1)
EJECTION FRACTION: 60 %
EOSINOPHIL # BLD AUTO: 0.19 X10(3)/MCL (ref 0–0.9)
EOSINOPHIL NFR BLD AUTO: 2.8 %
ERYTHROCYTE [DISTWIDTH] IN BLOOD BY AUTOMATED COUNT: 14.7 % (ref 11.5–17)
ETHANOL SERPL-MCNC: <10 MG/DL
FENTANYL UR QL SCN: NEGATIVE
FRACTIONAL SHORTENING: 32 % (ref 28–44)
GFR SERPLBLD CREATININE-BSD FMLA CKD-EPI: 58 MLS/MIN/1.73/M2
GLOBULIN SER-MCNC: 3.4 GM/DL (ref 2.4–3.5)
GLUCOSE SERPL-MCNC: 78 MG/DL (ref 82–115)
GLUCOSE UR QL STRIP.AUTO: NEGATIVE MG/DL
HCT VFR BLD AUTO: 45 % (ref 37–47)
HDLC SERPL-MCNC: 54 MG/DL (ref 35–60)
HGB BLD-MCNC: 14.1 G/DL (ref 12–16)
IMM GRANULOCYTES # BLD AUTO: 0.02 X10(3)/MCL (ref 0–0.04)
IMM GRANULOCYTES NFR BLD AUTO: 0.3 %
INR BLD: 1.07 (ref 0–1.3)
INTERVENTRICULAR SEPTUM: 0.99 CM (ref 0.6–1.1)
KETONES UR QL STRIP.AUTO: NEGATIVE MG/DL
LDLC SERPL CALC-MCNC: 112 MG/DL (ref 50–140)
LEFT ATRIUM SIZE: 3.1 CM
LEFT ATRIUM VOLUME INDEX MOD: 24.6 ML/M2
LEFT ATRIUM VOLUME MOD: 43.7 CM3
LEFT INTERNAL DIMENSION IN SYSTOLE: 2.58 CM (ref 2.1–4)
LEFT VENTRICLE DIASTOLIC VOLUME INDEX: 35 ML/M2
LEFT VENTRICLE DIASTOLIC VOLUME: 62.3 ML
LEFT VENTRICLE MASS INDEX: 60 G/M2
LEFT VENTRICLE SYSTOLIC VOLUME INDEX: 13.5 ML/M2
LEFT VENTRICLE SYSTOLIC VOLUME: 24.1 ML
LEFT VENTRICULAR INTERNAL DIMENSION IN DIASTOLE: 3.81 CM (ref 3.5–6)
LEFT VENTRICULAR MASS: 106.25 G
LEUKOCYTE ESTERASE UR QL STRIP.AUTO: NEGATIVE UNIT/L
LV LATERAL E/E' RATIO: 5.36 M/S
LV SEPTAL E/E' RATIO: 6.25 M/S
LVOT MG: 1 MMHG
LVOT MV: 0.55 CM/S
LYMPHOCYTES # BLD AUTO: 2.13 X10(3)/MCL (ref 0.6–4.6)
LYMPHOCYTES NFR BLD AUTO: 31.3 %
MCH RBC QN AUTO: 31.6 PG (ref 27–31)
MCHC RBC AUTO-ENTMCNC: 31.3 G/DL (ref 33–36)
MCV RBC AUTO: 100.9 FL (ref 80–94)
MDMA UR QL SCN: NEGATIVE
MONOCYTES # BLD AUTO: 0.54 X10(3)/MCL (ref 0.1–1.3)
MONOCYTES NFR BLD AUTO: 7.9 %
MV PEAK A VEL: 0.65 M/S
MV PEAK E VEL: 0.75 M/S
NEUTROPHILS # BLD AUTO: 3.88 X10(3)/MCL (ref 2.1–9.2)
NEUTROPHILS NFR BLD AUTO: 57 %
NITRITE UR QL STRIP.AUTO: NEGATIVE
NRBC BLD AUTO-RTO: 0 %
OHS LV EJECTION FRACTION SIMPSONS BIPLANE MOD: 6 %
OPIATES UR QL SCN: POSITIVE
PCP UR QL: NEGATIVE
PH UR STRIP.AUTO: 5.5 [PH]
PH UR: 5.5 [PH] (ref 3–11)
PLATELET # BLD AUTO: 207 X10(3)/MCL (ref 130–400)
PMV BLD AUTO: 10.8 FL (ref 7.4–10.4)
POC PTINR: 1.2 (ref 0.9–1.2)
POC PTWBT: 14.5 SEC (ref 9.7–14.3)
POTASSIUM SERPL-SCNC: 4.2 MMOL/L (ref 3.5–5.1)
PROT SERPL-MCNC: 7.1 GM/DL (ref 5.8–7.6)
PROT UR QL STRIP.AUTO: NEGATIVE MG/DL
PROTHROMBIN TIME: 13.8 SECONDS (ref 12.5–14.5)
PV PEAK VELOCITY: 0.83 CM/S
RBC # BLD AUTO: 4.46 X10(6)/MCL (ref 4.2–5.4)
RBC #/AREA URNS AUTO: <5 /HPF
RBC UR QL AUTO: NEGATIVE UNIT/L
RIGHT VENTRICULAR END-DIASTOLIC DIMENSION: 3.66 CM
SAMPLE: ABNORMAL
SODIUM SERPL-SCNC: 143 MMOL/L (ref 136–145)
SP GR UR STRIP.AUTO: 1.02 (ref 1–1.03)
SPECIFIC GRAVITY, URINE AUTO (.000) (OHS): 1.02 (ref 1–1.03)
SQUAMOUS #/AREA URNS AUTO: <5 /HPF
TDI LATERAL: 0.14 M/S
TDI SEPTAL: 0.12 M/S
TDI: 0.13 M/S
TRICUSPID ANNULAR PLANE SYSTOLIC EXCURSION: 2.11 CM
TRIGL SERPL-MCNC: 78 MG/DL (ref 37–140)
TROPONIN I SERPL-MCNC: 0.03 NG/ML (ref 0–0.04)
TSH SERPL-ACNC: 0.95 UIU/ML (ref 0.35–4.94)
UROBILINOGEN UR STRIP-ACNC: 0.2 MG/DL
VLDLC SERPL CALC-MCNC: 16 MG/DL
WBC # SPEC AUTO: 6.81 X10(3)/MCL (ref 4.5–11.5)
WBC #/AREA URNS AUTO: <5 /HPF

## 2023-06-11 PROCEDURE — 11000001 HC ACUTE MED/SURG PRIVATE ROOM

## 2023-06-11 PROCEDURE — 25000003 PHARM REV CODE 250: Performed by: EMERGENCY MEDICINE

## 2023-06-11 PROCEDURE — 99223 PR INITIAL HOSPITAL CARE,LEVL III: ICD-10-PCS | Mod: ,,, | Performed by: PSYCHIATRY & NEUROLOGY

## 2023-06-11 PROCEDURE — 85025 COMPLETE CBC W/AUTO DIFF WBC: CPT | Performed by: EMERGENCY MEDICINE

## 2023-06-11 PROCEDURE — 99285 EMERGENCY DEPT VISIT HI MDM: CPT | Mod: 25

## 2023-06-11 PROCEDURE — 81001 URINALYSIS AUTO W/SCOPE: CPT | Performed by: EMERGENCY MEDICINE

## 2023-06-11 PROCEDURE — 85610 PROTHROMBIN TIME: CPT | Performed by: EMERGENCY MEDICINE

## 2023-06-11 PROCEDURE — 80053 COMPREHEN METABOLIC PANEL: CPT | Performed by: EMERGENCY MEDICINE

## 2023-06-11 PROCEDURE — 84443 ASSAY THYROID STIM HORMONE: CPT | Performed by: EMERGENCY MEDICINE

## 2023-06-11 PROCEDURE — 25500020 PHARM REV CODE 255: Performed by: EMERGENCY MEDICINE

## 2023-06-11 PROCEDURE — 63600175 PHARM REV CODE 636 W HCPCS: Performed by: EMERGENCY MEDICINE

## 2023-06-11 PROCEDURE — 93005 ELECTROCARDIOGRAM TRACING: CPT

## 2023-06-11 PROCEDURE — 80307 DRUG TEST PRSMV CHEM ANLYZR: CPT | Performed by: EMERGENCY MEDICINE

## 2023-06-11 PROCEDURE — 93010 ELECTROCARDIOGRAM REPORT: CPT | Mod: ,,, | Performed by: INTERNAL MEDICINE

## 2023-06-11 PROCEDURE — 80061 LIPID PANEL: CPT | Performed by: EMERGENCY MEDICINE

## 2023-06-11 PROCEDURE — 84484 ASSAY OF TROPONIN QUANT: CPT | Performed by: EMERGENCY MEDICINE

## 2023-06-11 PROCEDURE — 82962 GLUCOSE BLOOD TEST: CPT

## 2023-06-11 PROCEDURE — 82077 ASSAY SPEC XCP UR&BREATH IA: CPT | Performed by: EMERGENCY MEDICINE

## 2023-06-11 PROCEDURE — 25000242 PHARM REV CODE 250 ALT 637 W/ HCPCS: Performed by: EMERGENCY MEDICINE

## 2023-06-11 PROCEDURE — 93010 EKG 12-LEAD: ICD-10-PCS | Mod: ,,, | Performed by: INTERNAL MEDICINE

## 2023-06-11 PROCEDURE — 99223 1ST HOSP IP/OBS HIGH 75: CPT | Mod: ,,, | Performed by: PSYCHIATRY & NEUROLOGY

## 2023-06-11 PROCEDURE — 63600175 PHARM REV CODE 636 W HCPCS: Performed by: NURSE PRACTITIONER

## 2023-06-11 PROCEDURE — 93010 ELECTROCARDIOGRAM REPORT: CPT | Mod: 76,,, | Performed by: INTERNAL MEDICINE

## 2023-06-11 RX ORDER — SODIUM CHLORIDE 0.9 % (FLUSH) 0.9 %
10 SYRINGE (ML) INJECTION
Status: DISCONTINUED | OUTPATIENT
Start: 2023-06-11 | End: 2023-06-19 | Stop reason: HOSPADM

## 2023-06-11 RX ORDER — ACETAMINOPHEN 10 MG/ML
1000 INJECTION, SOLUTION INTRAVENOUS ONCE
Status: COMPLETED | OUTPATIENT
Start: 2023-06-11 | End: 2023-06-12

## 2023-06-11 RX ORDER — ACETAMINOPHEN 10 MG/ML
1000 INJECTION, SOLUTION INTRAVENOUS ONCE
Status: COMPLETED | OUTPATIENT
Start: 2023-06-11 | End: 2023-06-11

## 2023-06-11 RX ORDER — METHOCARBAMOL 100 MG/ML
500 INJECTION, SOLUTION INTRAMUSCULAR; INTRAVENOUS EVERY 12 HOURS PRN
Status: DISPENSED | OUTPATIENT
Start: 2023-06-11 | End: 2023-06-14

## 2023-06-11 RX ORDER — ACETAMINOPHEN 500 MG
1000 TABLET ORAL
Status: DISCONTINUED | OUTPATIENT
Start: 2023-06-11 | End: 2023-06-11

## 2023-06-11 RX ORDER — ATORVASTATIN CALCIUM 40 MG/1
40 TABLET, FILM COATED ORAL DAILY
Status: DISCONTINUED | OUTPATIENT
Start: 2023-06-11 | End: 2023-06-19 | Stop reason: HOSPADM

## 2023-06-11 RX ORDER — LABETALOL HYDROCHLORIDE 5 MG/ML
10 INJECTION, SOLUTION INTRAVENOUS
Status: DISCONTINUED | OUTPATIENT
Start: 2023-06-11 | End: 2023-06-13

## 2023-06-11 RX ORDER — HYDRALAZINE HYDROCHLORIDE 20 MG/ML
10 INJECTION INTRAMUSCULAR; INTRAVENOUS EVERY 4 HOURS PRN
Status: DISCONTINUED | OUTPATIENT
Start: 2023-06-11 | End: 2023-06-14

## 2023-06-11 RX ORDER — ASPIRIN 300 MG/1
300 SUPPOSITORY RECTAL DAILY
Status: DISCONTINUED | OUTPATIENT
Start: 2023-06-12 | End: 2023-06-14

## 2023-06-11 RX ORDER — NITROGLYCERIN 0.4 MG/1
0.4 TABLET SUBLINGUAL EVERY 5 MIN PRN
Status: DISCONTINUED | OUTPATIENT
Start: 2023-06-11 | End: 2023-06-19 | Stop reason: HOSPADM

## 2023-06-11 RX ORDER — ONDANSETRON 2 MG/ML
4 INJECTION INTRAMUSCULAR; INTRAVENOUS EVERY 4 HOURS PRN
Status: DISCONTINUED | OUTPATIENT
Start: 2023-06-11 | End: 2023-06-14

## 2023-06-11 RX ORDER — ENOXAPARIN SODIUM 100 MG/ML
40 INJECTION SUBCUTANEOUS EVERY 24 HOURS
Status: DISCONTINUED | OUTPATIENT
Start: 2023-06-11 | End: 2023-06-19 | Stop reason: HOSPADM

## 2023-06-11 RX ORDER — ASPIRIN 300 MG/1
300 SUPPOSITORY RECTAL
Status: COMPLETED | OUTPATIENT
Start: 2023-06-11 | End: 2023-06-11

## 2023-06-11 RX ADMIN — ONDANSETRON 4 MG: 2 INJECTION INTRAMUSCULAR; INTRAVENOUS at 07:06

## 2023-06-11 RX ADMIN — METHOCARBAMOL 500 MG: 100 INJECTION, SOLUTION INTRAMUSCULAR; INTRAVENOUS at 06:06

## 2023-06-11 RX ADMIN — IOPAMIDOL 100 ML: 755 INJECTION, SOLUTION INTRAVENOUS at 11:06

## 2023-06-11 RX ADMIN — ACETAMINOPHEN 1000 MG: 10 INJECTION, SOLUTION INTRAVENOUS at 01:06

## 2023-06-11 RX ADMIN — NITROGLYCERIN 0.4 MG: 0.4 TABLET, ORALLY DISINTEGRATING SUBLINGUAL at 06:06

## 2023-06-11 RX ADMIN — ENOXAPARIN SODIUM 40 MG: 40 INJECTION SUBCUTANEOUS at 05:06

## 2023-06-11 RX ADMIN — ASPIRIN 300 MG: 300 SUPPOSITORY RECTAL at 02:06

## 2023-06-11 NOTE — SUBJECTIVE & OBJECTIVE
Past Medical History:   Diagnosis Date    Anxiety and depression 2015    Arthritis     Cervical radiculopathy 2016    Cirrhosis of liver     Colon polyps 2015    Diabetes mellitus type 2 with neurological manifestations 2015    no current medications, only one episode of elevated sugars with acute illness, will monitor     Encounter for blood transfusion     Hepatitis C     s/p treatment per patient report; managed by Dr. Perez    Hip fracture     Macrocytosis 2015    Thyroid disease     Transfusion reaction     hep c       Past Surgical History:   Procedure Laterality Date    APPENDECTOMY      BACK SURGERY      CAUDAL EPIDURAL STEROID INJECTION N/A 2018    Procedure: Injection-steroid-epidural-caudal;  Surgeon: Roxana Gu Jr., MD;  Location: [facility] OR;  Service: Pain Management;  Laterality: N/A;  with cath target L4-5    CAUDAL EPIDURAL STEROID INJECTION N/A 2019    Procedure: Injection-steroid-epidural-caudal;  Surgeon: Roxana Gu Jr., MD;  Location: [facility] PAIN MGT;  Service: Pain Management;  Laterality: N/A;     SECTION      CHOLECYSTECTOMY      COLONOSCOPY  3/31/10    2 polyps, tubular adenoma    COLONOSCOPY  2015    Dr. Washington    COLONOSCOPY N/A 10/10/2018    Procedure: COLONOSCOPY;  Surgeon: Reuben Miller Jr., MD;  Location: [facility];  Service: Endoscopy;  Laterality: N/A;    ESOPHAGOGASTRODUODENOSCOPY N/A 10/10/2018    Procedure: EGD (ESOPHAGOGASTRODUODENOSCOPY);  Surgeon: Reuben Miller Jr., MD;  Location: [facility];  Service: Endoscopy;  Laterality: N/A;    ESOPHAGOGASTRODUODENOSCOPY N/A 12/10/2018    Procedure: EGD (ESOPHAGOGASTRODUODENOSCOPY)/poss emr;  Surgeon: Belle Kuo MD;  Location: [facility] ([address] [place] [address]);  Service: Endoscopy;  Laterality: N/A;    ESOPHAGOGASTRODUODENOSCOPY N/A 3/12/2019    Procedure: EGD (ESOPHAGOGASTRODUODENOSCOPY);  Surgeon: Polo Keyes MD;  Location: [facility];  Service: Endoscopy;  Laterality: N/A;  "   ESOPHAGOGASTRODUODENOSCOPY N/A 11/20/2020    Procedure: ESOPHAGOGASTRODUODENOSCOPY (EGD);  Surgeon: Belle Kuo MD;  Location: [facility];  Service: Endoscopy;  Laterality: N/A;    HERNIA REPAIR      HYSTERECTOMY  1989    Uterus and both ovaries    INCISIONAL HERNIA REPAIR      x 2    JOINT REPLACEMENT      LIVER BIOPSY  12/2003    autoimmune hepatitis    ROTATOR CUFF REPAIR      right    STOMACH SURGERY  1980's    "took lymph node off of liver"    TOTAL HIP ARTHROPLASTY      left hip    UPPER GASTROINTESTINAL ENDOSCOPY  3/24/10    normal    UPPER GASTROINTESTINAL ENDOSCOPY  04/27/2015    Dr. Washington       Review of patient's allergies indicates:   Allergen Reactions    Cefaclor Hives    Gabapentin Swelling    Penicillins      Other reaction(s): Hives    Sulfa (sulfonamide antibiotics) Other (See Comments)     Other reaction(s): Hives       Current Neurological Medications:     No current facility-administered medications on file prior to encounter.     Current Outpatient Medications on File Prior to Encounter   Medication Sig    ALPRAZolam (XANAX) 1 MG tablet TAKE ONE (1) TABLET BY MOUTH TWICE A DAY AS NEEDED    amitriptyline (ELAVIL) 25 MG tablet TAKE 1-2 TABS NIGHTLY AS NEEDED FOR INSOMNIA RELATED TO CHRONIC PAIN    blood-glucose meter kit Test tid please dispense test strips and lancets    cholecalciferol, vitamin D3, 125 mcg (5,000 unit) capsule Take 1 tablet  by mouth daily with breakfast.    diclofenac sodium (VOLTAREN) 1 % Gel Apply 2 g topically 4 (four) times daily.    ergocalciferol (ERGOCALCIFEROL) 50,000 unit Cap Take 1 capsule (50,000 Units total) by mouth every 7 days.    HYDROcodone-acetaminophen (NORCO)  mg per tablet TAKE ONE (1) TABLET BY MOUTH THREE (3) TIMES DAILY AS NEEDED FOR PAIN    levothyroxine (SYNTHROID) 75 MCG tablet TAKE 1 TABLET EVERY DAY ON AN EMPTY STOMACH    naloxone (NARCAN) 4 mg/actuation Spry 4mg by nasal route as needed for opioid overdose; may repeat every 2-3 " minutes in alternating nostrils until medical help arrives. Call 911    ondansetron (ZOFRAN-ODT) 8 MG TbDL Dissolve 1 tablet (8 mg total) by mouth under the tongue 3 (three) times daily as needed (nausea).    pantoprazole (PROTONIX) 40 MG tablet TAKE 1 TABLET EVERY DAY    senna-docusate 8.6-50 mg (SENNA WITH DOCUSATE SODIUM) 8.6-50 mg per tablet Take 3 tablets by mouth once daily. Take 1-2 tabs daily-- take any time a pain pill( norco is taken) to help reduce opiod induced constipation     Family History       Problem Relation (Age of Onset)    Brain cancer Brother    Breast cancer Sister    Cataracts Sister, Sister    Colon cancer Brother    Diabetes Mother, Brother, Other, Son    Diabetes Mellitus Mother    Liver cancer Sister    Lung cancer Brother    Pancreatic cancer Brother    Stomach cancer Other    Throat cancer Brother          Tobacco Use    Smoking status: Every Day     Packs/day: 2.00     Years: 45.00     Pack years: 90.00     Types: Cigarettes    Smokeless tobacco: Never   Substance and Sexual Activity    Alcohol use: No     Comment: used to drink heavily, stopped in 1990's    Drug use: No    Sexual activity: Not Currently     Partners: Male     Review of Systems   All other systems reviewed and are negative.  Objective:     Vital Signs (Most Recent):  Temp: 98.1 °F (36.7 °C) (06/11/23 1121)  Pulse: (!) 51 (06/11/23 1503)  Resp: 10 (06/11/23 1503)  BP: (!) 131/49 (06/11/23 1503)  SpO2: 98 % (06/11/23 1503) Vital Signs (24h Range):  Temp:  [98.1 °F (36.7 °C)] 98.1 °F (36.7 °C)  Pulse:  [51-65] 51  Resp:  [10-19] 10  SpO2:  [94 %-99 %] 98 %  BP: (115-137)/(49-58) 131/49     Weight: 68.9 kg (152 lb)  Body mass index is 24.53 kg/m².     Physical Exam  Constitutional:       Appearance: Normal appearance.   HENT:      Head: Normocephalic and atraumatic.      Mouth/Throat:      Mouth: Mucous membranes are moist.   Eyes:      Extraocular Movements: Extraocular movements intact.      Pupils: Pupils are equal,  round, and reactive to light.   Pulmonary:      Effort: Pulmonary effort is normal.   Abdominal:      Palpations: Abdomen is soft.   Musculoskeletal:         General: Normal range of motion.      Cervical back: Normal range of motion.   Skin:     General: Skin is warm and dry.      Capillary Refill: Capillary refill takes less than 2 seconds.   Neurological:      Mental Status: She is alert and oriented to person, place, and time.   Psychiatric:         Mood and Affect: Mood normal.        NEUROLOGICAL EXAMINATION:     MENTAL STATUS   Oriented to person, place, and time.        Patient is alert and oriented. Very mild dysarthria. LLE weakness. I do not see any facial asymmetry. Full sensation to light touch.      CRANIAL NERVES     CN III, IV, VI   Pupils are equal, round, and reactive to light.    Significant Labs: CBC:   Recent Labs   Lab 06/11/23  1301   WBC 6.81   HGB 14.1   HCT 45.0        CMP:   Recent Labs   Lab 06/11/23  1301      K 4.2   CO2 23   BUN 15.1   CREATININE 1.04*   CALCIUM 9.2   ALBUMIN 3.7   BILITOT 0.3   ALKPHOS 98   AST 16   ALT 12       Significant Imaging: I have reviewed all pertinent imaging results/findings within the past 24 hours.

## 2023-06-11 NOTE — HPI
Mrs. Thom Krishna is a 67 year-old female with past medical history of  Chronic back pain (with nerve stimulator), anxiety/depression, Cirrhosis. DM II, Hepatitis C, and Thyroid Disease. She is a transfer from OSH via air med with complaints of LE weakness, ataxia, slurred speech and left sided facial droop. Symptoms present upon awakening this morning around 0900. Code FAST initiated at 1120. However, delayed presentation places her OOW for thrombolytic. I arrived 1121. Dr. Goyal contacted 1120.  Patient had mild dysarthria and LLE weakness, NIH 2. I did not note any slurred speech of facial droop. CT head negative. CTA negative for LVO. Neurology consulted for stroke workup.

## 2023-06-11 NOTE — PROGRESS NOTES
[facility] - Emergency Dept  Cardiothoracic Surgery  Progress Note    Patient Name: Thom Krishna  MRN: [id number]  Admission Date: 6/11/2023  Hospital Length of Stay: 0 days  Code Status: Full Code   Attending Physician: Emil Barahona MD   Referring Provider: Self, Aaareferral  Principal Problem:<principal problem not specified>            Subjective:     Post-Op Info:  * No surgery found *         Interval History:  67-year-old female with recent history of left upper and lower extremity weakness with left facial droop which is now resolved.  Workup ongoing.  Patient appears to have right internal carotid artery stenosis which is symptomatic.    Review of Systems   Neurological:  Positive for focal weakness.   Medications:  Continuous Infusions:  Scheduled Meds:   [START ON 6/12/2023] aspirin  300 mg Rectal Daily    atorvastatin  40 mg Oral Daily    enoxparin  40 mg Subcutaneous Daily     PRN Meds:hydrALAZINE, labetalol, methocarbamoL, nitroGLYCERIN, sodium chloride 0.9%     Objective:     Vital Signs (Most Recent):  Temp: 98.3 °F (36.8 °C) (06/11/23 1503)  Pulse: (!) 56 (06/11/23 1719)  Resp: 11 (06/11/23 1719)  BP: 113/77 (06/11/23 1719)  SpO2: 96 % (06/11/23 1719) Vital Signs (24h Range):  Temp:  [98.1 °F (36.7 °C)-98.3 °F (36.8 °C)] 98.3 °F (36.8 °C)  Pulse:  [47-65] 56  Resp:  [10-19] 11  SpO2:  [94 %-99 %] 96 %  BP: (113-142)/(49-77) 113/77     Weight: 68.9 kg (152 lb)  Body mass index is 24.53 kg/m².    SpO2: 96 %       Intake/Output - Last 3 Shifts       None            Lines/Drains/Airways       Peripheral Intravenous Line  Duration                  Peripheral IV - Single Lumen 06/11/23 1213 20 G Anterior;Distal;Right Upper Arm <1 day                     Physical Exam  Neurological:      General: No focal deficit present.          Significant Labs:  BMP:   Recent Labs   Lab 06/11/23  1301      K 4.2   CO2 23   BUN 15.1   CREATININE 1.04*   CALCIUM 9.2     CBC:   Recent Labs   Lab  06/11/23  1301   WBC 6.81   RBC 4.46   HGB 14.1   HCT 45.0      .9*   MCH 31.6*   MCHC 31.3*     All pertinent labs from the last 24 hours have been reviewed.    Significant Diagnostics:  U/S: I have reviewed all pertinent results/findings within the past 24 hours  I have reviewed and interpreted all pertinent imaging results/findings within the past 24 hours.    Assessment/Plan:     Carotid artery stenosis, symptomatic, right  Workup ongoing.  Full consult to follow.        Juan James MD  Cardiothoracic Surgery  [facility] - Emergency Dept

## 2023-06-11 NOTE — SUBJECTIVE & OBJECTIVE
Interval History:  67-year-old female with recent history of left upper and lower extremity weakness with left facial droop which is now resolved.  Workup ongoing.  Patient appears to have right internal carotid artery stenosis which is symptomatic.    Review of Systems   Neurological:  Positive for focal weakness.   Medications:  Continuous Infusions:  Scheduled Meds:   [START ON 6/12/2023] aspirin  300 mg Rectal Daily    atorvastatin  40 mg Oral Daily    enoxparin  40 mg Subcutaneous Daily     PRN Meds:hydrALAZINE, labetalol, methocarbamoL, nitroGLYCERIN, sodium chloride 0.9%     Objective:     Vital Signs (Most Recent):  Temp: 98.3 °F (36.8 °C) (06/11/23 1503)  Pulse: (!) 56 (06/11/23 1719)  Resp: 11 (06/11/23 1719)  BP: 113/77 (06/11/23 1719)  SpO2: 96 % (06/11/23 1719) Vital Signs (24h Range):  Temp:  [98.1 °F (36.7 °C)-98.3 °F (36.8 °C)] 98.3 °F (36.8 °C)  Pulse:  [47-65] 56  Resp:  [10-19] 11  SpO2:  [94 %-99 %] 96 %  BP: (113-142)/(49-77) 113/77     Weight: 68.9 kg (152 lb)  Body mass index is 24.53 kg/m².    SpO2: 96 %       Intake/Output - Last 3 Shifts       None            Lines/Drains/Airways       Peripheral Intravenous Line  Duration                  Peripheral IV - Single Lumen 06/11/23 1213 20 G Anterior;Distal;Right Upper Arm <1 day                     Physical Exam  Neurological:      General: No focal deficit present.          Significant Labs:  BMP:   Recent Labs   Lab 06/11/23  1301      K 4.2   CO2 23   BUN 15.1   CREATININE 1.04*   CALCIUM 9.2     CBC:   Recent Labs   Lab 06/11/23  1301   WBC 6.81   RBC 4.46   HGB 14.1   HCT 45.0      .9*   MCH 31.6*   MCHC 31.3*     All pertinent labs from the last 24 hours have been reviewed.    Significant Diagnostics:  U/S: I have reviewed all pertinent results/findings within the past 24 hours  I have reviewed and interpreted all pertinent imaging results/findings within the past 24 hours.

## 2023-06-11 NOTE — ASSESSMENT & PLAN NOTE
Rule out stroke      Antithrombotics for secondary stroke prevention: Antiplatelets: Aspirin: 81 mg daily    Statins for secondary stroke prevention and hyperlipidemia, if present:   Statins: Atorvastatin- 40 mg daily    Aggressive risk factor modification: HTN, DM     Rehab efforts: The patient has been evaluated by a stroke team provider and the therapy needs have been fully considered based off the presenting complaints and exam findings. The following therapy evaluations are needed: PT evaluate and treat, OT evaluate and treat, SLP evaluate and treat    Diagnostics ordered/pending: TTE to assess cardiac function/status     VTE prophylaxis: Heparin 5000 units SQ every 8 hours  Mechanical prophylaxis: Place SCDs    BP parameters: TIA: SBP <220 until imaging confirmation of no infarct      Continue to follow stroke workup. Imaging pending.   CUS revealed 70 -90% stenos of right internal carotid. CIS consulted   Further recommendations to follow.    Stroke workup   -CTh: No acute intracranial findings identified. Right parietal lobe small new encephalomalacia, mild chronic micro ischemia and moderate atrophy.  -MRI brain:  -CTA h/n: negative for LVO   -ECHO:  -CUS: The right internal carotid artery demonstrated 70-99% stenosis.  Stenosis is on upper end of range.  The left internal carotid artery demonstrated less than 50% stenosis.  Bilateral vertebral arteries were patent with antegrade flow.  LDL: 112  -A1c:   -ESR:  -CRP:  -TSH: 0.948

## 2023-06-11 NOTE — H&P
[facility] Medicine History & Physical Examination       Patient Name: Thom Krishna  MRN: [id number]  Patient Class: IP- Inpatient   Admission Date: 6/11/2023   Admitting Physician: BOBBY Service   Length of Stay: 0  Attending Physician: Dr. Emil Barahona  Primary Care Provider: Brandy Dejesus MD  Face-to-Face encounter date: 06/11/2023  Code Status: Full Code  Chief Complaint: Facial Droop (Left sided facial droop with slurred speech. Last known normal 0930. Arrvied via [employer].)        Patient information was obtained from patient, patient's family, past medical records and ER records.     HISTORY OF PRESENT ILLNESS:   Thom Krishna is a 67 y.o. female who past medical history includes anxiety, depression, arthritis, chronic neck and back pain status post stimulator placement, degenerative disc disease, DM type 2, hepatitis-C, cirrhosis of the liver, emphysema, CKD stage 3; presents to the ED via EMS air med with reports of left-sided facial droop with associated slurred speech.  It was reported that patient was last seen normal at around 930 this morning.  Patient reports she woke up with lower extremity weakness which she had a difficult time ambulating which she reported slurred speech with left facial droop and difficulty swallowing which started this morning.  She also reported left arm numbness and weakness and decreased  to her left hand.  She denies any injury, traumas, falls, or any syncopal events.  Patient denies any chest pain, shortness a breath, fever, chills, cough, congestion, nausea, vomiting, diarrhea, or any sick contacts.  CT of the head without contrast demonstrated no acute intracranial findings identified, right parietal lobe small new encephalomalacia mild chronic micro ischemia and moderate atrophy. CTA of head and neck without contrast demonstrated no short interval change of CT head appearance, no evidence of flow-limiting stenosis or other acute  focal intracranial vascular abnormality, atherosclerotic changes of the bilateral extracranial carotid vasculature with severe short segment stenosis of the right proximal ICA.  Lab work done reviewed demonstrated creatinine 1.04, glucose 78; other indicis unremarkable.   Initial vital signs reviewed /58 pulse 65 respirations 19 temperature 98.1° F O2 saturation 99% on room air.  Patient received IV hydration and aspirin rectally in the ED. Patient is admitted to hospital medicine services for further management.    PAST MEDICAL HISTORY:     Past Medical History:   Diagnosis Date    Anxiety and depression 2015    Arthritis     Cervical radiculopathy 2016    Cirrhosis of liver     Colon polyps 2015    Diabetes mellitus type 2 with neurological manifestations 2015    no current medications, only one episode of elevated sugars with acute illness, will monitor     Encounter for blood transfusion     Hepatitis C     s/p treatment per patient report; managed by Dr. Perez    Hip fracture     Macrocytosis 2015    Thyroid disease     Transfusion reaction     hep c       PAST SURGICAL HISTORY:     Past Surgical History:   Procedure Laterality Date    APPENDECTOMY      BACK SURGERY      CAUDAL EPIDURAL STEROID INJECTION N/A 2018    Procedure: Injection-steroid-epidural-caudal;  Surgeon: Roxana Gu Jr., MD;  Location: [facility] OR;  Service: Pain Management;  Laterality: N/A;  with cath target L4-5    CAUDAL EPIDURAL STEROID INJECTION N/A 2019    Procedure: Injection-steroid-epidural-caudal;  Surgeon: Roxana Gu Jr., MD;  Location: [facility] PAIN MGT;  Service: Pain Management;  Laterality: N/A;     SECTION      CHOLECYSTECTOMY      COLONOSCOPY  3/31/10    2 polyps, tubular adenoma    COLONOSCOPY  2015    Dr. Washington    COLONOSCOPY N/A 10/10/2018    Procedure: COLONOSCOPY;  Surgeon: Reuben Miller Jr., MD;  Location: [facility] ENDO;  Service: Endoscopy;  Laterality: N/A;  "   ESOPHAGOGASTRODUODENOSCOPY N/A 10/10/2018    Procedure: EGD (ESOPHAGOGASTRODUODENOSCOPY);  Surgeon: Reuben Miller Jr., MD;  Location: [facility];  Service: Endoscopy;  Laterality: N/A;    ESOPHAGOGASTRODUODENOSCOPY N/A 12/10/2018    Procedure: EGD (ESOPHAGOGASTRODUODENOSCOPY)/poss emr;  Surgeon: Belle Kou MD;  Location: [address]);  Service: Endoscopy;  Laterality: N/A;    ESOPHAGOGASTRODUODENOSCOPY N/A 3/12/2019    Procedure: EGD (ESOPHAGOGASTRODUODENOSCOPY);  Surgeon: Ploo Keyes MD;  Location: [facility];  Service: Endoscopy;  Laterality: N/A;    ESOPHAGOGASTRODUODENOSCOPY N/A 11/20/2020    Procedure: ESOPHAGOGASTRODUODENOSCOPY (EGD);  Surgeon: Belle Kuo MD;  Location: [facility];  Service: Endoscopy;  Laterality: N/A;    HERNIA REPAIR      HYSTERECTOMY  1989    Uterus and both ovaries    INCISIONAL HERNIA REPAIR      x 2    JOINT REPLACEMENT      LIVER BIOPSY  12/2003    autoimmune hepatitis    ROTATOR CUFF REPAIR      right    STOMACH SURGERY  1980's    "took lymph node off of liver"    TOTAL HIP ARTHROPLASTY      left hip    UPPER GASTROINTESTINAL ENDOSCOPY  3/24/10    normal    UPPER GASTROINTESTINAL ENDOSCOPY  04/27/2015    Dr. Washington       ALLERGIES:   Cefaclor, Gabapentin, Penicillins, and Sulfa (sulfonamide antibiotics)    FAMILY HISTORY:   Reviewed and negative    SOCIAL HISTORY:     Social History     Tobacco Use    Smoking status: Every Day     Packs/day: 2.00     Years: 45.00     Pack years: 90.00     Types: Cigarettes    Smokeless tobacco: Never   Substance Use Topics    Alcohol use: No     Comment: used to drink heavily, stopped in 1990's        HOME MEDICATIONS:   As documented  Prior to Admission medications    Medication Sig Start Date End Date Taking? Authorizing Provider   ALPRAZolam (XANAX) 1 MG tablet TAKE ONE (1) TABLET BY MOUTH TWICE A DAY AS NEEDED 1/10/23   Brandy Dejesus MD   amitriptyline (ELAVIL) 25 MG tablet TAKE 1-2 TABS NIGHTLY AS NEEDED FOR " INSOMNIA RELATED TO CHRONIC PAIN 10/12/22   Brandy Dejesus MD   blood-glucose meter kit Test tid please dispense test strips and lancets 7/31/15   Historical Provider   cholecalciferol, vitamin D3, 125 mcg (5,000 unit) capsule Take 1 tablet  by mouth daily with breakfast. 10/12/22   Brandy Dejesus MD   diclofenac sodium (VOLTAREN) 1 % Gel Apply 2 g topically 4 (four) times daily. 10/12/22   Brandy Dejesus MD   ergocalciferol (ERGOCALCIFEROL) 50,000 unit Cap Take 1 capsule (50,000 Units total) by mouth every 7 days. 10/13/22   Brandy Dejesus MD   HYDROcodone-acetaminophen (NORCO)  mg per tablet TAKE ONE (1) TABLET BY MOUTH THREE (3) TIMES DAILY AS NEEDED FOR PAIN 6/5/23   Gerry Díaz MD   levothyroxine (SYNTHROID) 75 MCG tablet TAKE 1 TABLET EVERY DAY ON AN EMPTY STOMACH 5/2/23   Brandy Dejesus MD   naloxone (NARCAN) 4 mg/actuation Spry 4mg by nasal route as needed for opioid overdose; may repeat every 2-3 minutes in alternating nostrils until medical help arrives. Call 911 12/19/22   Jr Garber MD   ondansetron (ZOFRAN-ODT) 8 MG TbDL Dissolve 1 tablet (8 mg total) by mouth under the tongue 3 (three) times daily as needed (nausea). 10/12/22   Brandy Dejesus MD   pantoprazole (PROTONIX) 40 MG tablet TAKE 1 TABLET EVERY DAY 1/2/23   Brandy Dejesus MD   senna-docusate 8.6-50 mg (SENNA WITH DOCUSATE SODIUM) 8.6-50 mg per tablet Take 3 tablets by mouth once daily. Take 1-2 tabs daily-- take any time a pain pill( norco is taken) to help reduce opiod induced constipation 12/20/21   Brandy Dejesus MD       REVIEW OF SYSTEMS:   Except as documented, all other systems reviewed and negative     PHYSICAL EXAM:     VITAL SIGNS: 24 HRS MIN & MAX LAST   Temp  Min: 98.1 °F (36.7 °C)  Max: 98.1 °F (36.7 °C) 98.1 °F (36.7 °C)   BP  Min: 115/53  Max: 137/58 (!) 115/53   Pulse  Min: 54  Max: 65  (!) 54   Resp  Min: 12  Max: 19 13   SpO2  Min: 94 %  Max: 99 % (!) 94 %        General appearance: Well-developed, well-nourished female chronically ill-appearing; nontoxic, fatigued, NAD, echocardiogram in progress  HENT: Atraumatic head. Moist mucous membranes of oral cavity.  Eyes: PERRL  Neck: Supple.   Lungs: Clear to auscultation bilaterally. No wheezing present.   Heart: Regular rate and rhythm. S1 and S2 present cap refill brisk  Abdomen: Soft, non-distended, non-tender. No rebound tenderness/guarding. Bowel sounds are normal.   Extremities: No cyanosis, clubbing, LEE, left side weakness  Skin: warm and dry  Neuro: AAOx3, slowed, slurred speech  Neuro  CN 2, 3, 4, 6: EOMI, nl visual fields, nl gross vision test  CN 5: nl chewing, left facial numbness  CN 7: nl smile, nl brow wrinkle, nl eyes closure  CN 8: nl hearing  CN 9, 10: nl uvular elevation on phonation  CN 11: nl shoulder shrug  CN 12: nl tongue protrusion, midline, nl lateral deviation   Strength: nl strength proximally and distally, upper and lower extremity  Sensation: nl to light touch proximally and distally, upper and lower extremity  Psych/mental status: flat  affect. Responds appropriately to questions.     LABS AND IMAGING:     Recent Labs   Lab 06/11/23  1301   WBC 6.81   RBC 4.46   HGB 14.1   HCT 45.0   .9*   MCH 31.6*   MCHC 31.3*   RDW 14.7      MPV 10.8*       Recent Labs   Lab 06/11/23  1301      K 4.2   CO2 23   BUN 15.1   CREATININE 1.04*   CALCIUM 9.2   ALBUMIN 3.7   ALKPHOS 98   ALT 12   AST 16   BILITOT 0.3       Microbiology Results (last 7 days)       ** No results found for the last 168 hours. **             CTA STROKE MULTI-PHASE  EXAMINATION  CTA STROKE MULTI-PHASE    CLINICAL HISTORY  Neuro deficit, acute, stroke suspected;left sided facial droop.;    TECHNIQUE  Pre- and post-contrast helical-acquisition CT images were obtained, imaging timed to coincide with arterial opacification for purposes of CT angiography.  Multiplanar reconstructions, to include MIP and volume-rendered  (3D) images, were accomplished by a CT technologist at a separate workstation, pushed to PACS for physician review.    Evaluation of any visualized ICA stenosis was performed using NASCET criteria.    CONTRAST  IV: ISOVUE-370, 100 mL    COMPARISON  *No prior CTA is available at the time of initial interpretation.  *Non-contrast head CT obtained earlier the same day was reviewed.    FINDINGS  Images were reviewed in soft tissue, brain, subdural, and bone windows.    Exam quality: adequate for evaluation    Non-contrast Head: No significant change from the above-referenced non-contrast head CT comparison.    Intracranial Vasculature: The included intracranial ICA segments are without evidence of flow limiting stenosis or other focal abnormality.  No definite evidence of acute or focal abnormality of the CoW, to include the bilateral MARLA A1 and PCA P1 segments.  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  Patency of the anterior and bilateral posterior communicating arteries grossly maintained.  There is no evidence of focal contour irregularity, aneurysmal dilatation, or angiographic cut off involving the bilateral MARLA and MCA vessels.  Unremarkable course, caliber, and intraluminal contrast opacification appreciated throughout the bilateral PCA vessels, as well as the basilar artery and intracranial components of the vertebral arteries. Normal contour and intraluminal contrast opacification of the dural sinuses.    Extracranial Vasculature: Mild burden of atherosclerotic calcification and mural plaquing noted at the bilateral common carotid arteries and carotid bifurcations.  There is notable burden of atherosclerotic disease with associated severe stenosis at the right ICA origin and proximal segment, 77% luminal narrowing by NASCET criteria.  No significant stenosis of the atherosclerotic proximal left ICA is appreciated.  The mid through distal  bilateral extracranial ICA segments are unremarkable.  No suspicious findings of the bilateral ECA branches. Right-dominant vertebral artery system is present, with both vessels well opacified throughout their extracranial course and no focal narrowing or intraluminal abnormality. Visualized aortic arch is without focal contour irregularity, aneurysmal dilatation, or intraluminal abnormality. Normal 3-branch arch configuration is present.    Nonvascular: The visualized nonvascular soft tissues are without convincing evidence of acute or focal abnormality.  There is notable debris distending the partially included upper through mid esophageal lumen, with no focal mural irregularity or thickening identified through the scanned region.  Chronic emphysematous changes of the upper lung zones are present, with no acute airspace consolidation identified.  No acute or destructive skeletal lesion is identified.  There are degenerative alterations throughout the cervical spine, with associated chronic grade 1 anterolisthesis of C4 on C5.    IMPRESSION  1. No short interval change of non-contrast head CT appearance.  2. No evidence of flow limiting stenosis or other acute/focal intracranial vascular abnormality.  3. Atherosclerotic changes of the bilateral extracranial carotid vasculature with severe short-segment stenosis of the proximal right ICA (77% narrowing by NASCET criteria).  4. Additional secondary details discussed above.  ==========    This report was flagged in Epic as abnormal.    RADIATION DOSE  Automated tube current modulation, weight-based exposure dosing, and/or iterative reconstruction technique utilized to reach lowest reasonably achievable exposure rate.    DLP: 1380 mGy*cm    Electronically signed by: Mandeep Belcher  Date:    06/11/2023  Time:    13:00  CT HEAD FOR STROKE  Narrative: EXAMINATION:  CT HEAD FOR STROKE    TECHNIQUE:  Sequential axial images were performed of the brain without  contrast.    Dose product length of 911 mGycm. Automated exposure control was utilized to minimize radiation dose.    COMPARISON:  July 20, 2015.    FINDINGS:  There is no intracranial mass effect, midline shift, hydrocephalus or hemorrhage. There is no sulcal effacement or low attenuation changes to suggest recent large vessel territory infarction.  There is small new encephalomalacia which involve the right parietal lobe.  Chronic microvascular ischemic changes are mild. The ventricular system and sulcal markings prominence is consistent with atrophy. There is no acute extra axial fluid collection. Visualized paranasal sinuses are clear without mucosal thickening, polypoidal abnormality or air-fluid levels. Mastoid air cells aeration is optimal.  Impression: 1.  No acute intracranial findings identified.    2.  Right parietal lobe small new encephalomalacia, mild chronic micro ischemia and moderate atrophy.    Findings were notified to Dr. Santos June 11, 2023 at 11:42 hours.    Electronically signed by: Ruben Peace  Date:    06/11/2023  Time:    11:43        ASSESSMENT & PLAN:   ASSESSMENT:  Acute CVA rule out versus TIA with associated facial droop and slurred speech-POA   Acute aphasia with slurred speech-secondary to suspected CVA-POA   Left side numbness- secondary to CVA- POA  Chronic back pain - with chronic pain management and prior back stimulator implant  Chest pain- reproducible- POA  DM type II   Acute on chronic kidney disease stage III- POA  Hx of hepatitis C with chronic cirrhosis- POA  Weakness- POA    PLAN:  Consult Neurology Services appreciate assistance and recommendations  CVA workup  Fall precautions   trend out troponin level  Robaxin PRN for chronic back pain  NPO status as patient has failed swallow screen   PRN antihypertensive per parameters   Repeat lab work in a.m.    VTE Prophylaxis: Lovenox for DVT prophylaxis and will be advised to be as mobile as possible     Patient condition:   Stable  __________________________________________________________________________  INPATIENT LIST OF MEDICATIONS     Scheduled Meds:   [START ON 6/12/2023] aspirin  300 mg Rectal Daily    atorvastatin  40 mg Oral Daily    enoxparin  40 mg Subcutaneous Daily     Continuous Infusions:  PRN Meds:.hydrALAZINE, labetalol, sodium chloride 0.9%      ICatalina FNP have reviewed and discussed the case with Dr. Emil Barahona Please see the following addendum for further assessment and plan from there attending MAGGY Catherine   06/11/2023

## 2023-06-11 NOTE — CONSULTS
[facility] - Emergency Dept  Neurology  Consult Note    Patient Name: Thom Krishna  MRN: [id number]  Admission Date: 6/11/2023  Hospital Length of Stay: 0 days  Code Status: Full Code   Attending Provider: Emil Barahona MD   Consulting Provider: Opal Kohler NP  Primary Care Physician: Brandy Dejesus MD  Principal Problem:<principal problem not specified>    Inpatient consult to Neurology  Consult performed by: Opal Kohler NP  Consult ordered by: MAGGY Broussard    Inpatient consult to Vascular (Stroke) Neurology  Consult performed by: Opal Kohler NP  Consult ordered by: Ny Santos MD         Subjective:     Chief Complaint:    Chief Complaint   Patient presents with    Facial Droop     Left sided facial droop with slurred speech. Last known normal 0930. Arrvied via "[employer].         HPI:   Mrs. Thom Krishna is a 67 year-old female with past medical history of  Chronic back pain (with nerve stimulator), anxiety/depression, Cirrhosis. DM II, Hepatitis C, and Thyroid Disease. She is a transfer from [facility] via [facility] with complaints of LE weakness, ataxia, slurred speech and left sided facial droop. Symptoms present upon awakening this morning around 0900. Code FAST initiated at 1120. However, delayed presentation places her OOW for thrombolytic. I arrived 1121. Dr. Goyal contacted 1120.  Patient had mild dysarthria and LLE weakness, NIH 2. I did not note any slurred speech of facial droop. CT head negative. CTA negative for LVO. Neurology consulted for stroke workup.               Past Medical History:   Diagnosis Date    Anxiety and depression 7/21/2015    Arthritis     Cervical radiculopathy 4/8/2016    Cirrhosis of liver     Colon polyps 4/27/2015    Diabetes mellitus type 2 with neurological manifestations 11/6/2015    no current medications, only one episode of elevated sugars with acute illness, will monitor     Encounter for blood transfusion     Hepatitis C      s/p treatment per patient report; managed by Dr. Perez    Hip fracture     Macrocytosis 2015    Thyroid disease     Transfusion reaction     hep c       Past Surgical History:   Procedure Laterality Date    APPENDECTOMY      BACK SURGERY      CAUDAL EPIDURAL STEROID INJECTION N/A 2018    Procedure: Injection-steroid-epidural-caudal;  Surgeon: Roxana Gu Jr., MD;  Location: [facility] OR;  Service: Pain Management;  Laterality: N/A;  with cath target L4-5    CAUDAL EPIDURAL STEROID INJECTION N/A 2019    Procedure: Injection-steroid-epidural-caudal;  Surgeon: Roxana Gu Jr., MD;  Location: [facility] PAIN MGT;  Service: Pain Management;  Laterality: N/A;     SECTION      CHOLECYSTECTOMY      COLONOSCOPY  3/31/10    2 polyps, tubular adenoma    COLONOSCOPY  2015    Dr. Washington    COLONOSCOPY N/A 10/10/2018    Procedure: COLONOSCOPY;  Surgeon: Reuben Miller Jr., MD;  Location: [facility];  Service: Endoscopy;  Laterality: N/A;    ESOPHAGOGASTRODUODENOSCOPY N/A 10/10/2018    Procedure: EGD (ESOPHAGOGASTRODUODENOSCOPY);  Surgeon: Reuben Miller Jr., MD;  Location: [facility];  Service: Endoscopy;  Laterality: N/A;    ESOPHAGOGASTRODUODENOSCOPY N/A 12/10/2018    Procedure: EGD (ESOPHAGOGASTRODUODENOSCOPY)/poss emr;  Surgeon: Belle Kuo MD;  Location: [address]);  Service: Endoscopy;  Laterality: N/A;    ESOPHAGOGASTRODUODENOSCOPY N/A 3/12/2019    Procedure: EGD (ESOPHAGOGASTRODUODENOSCOPY);  Surgeon: Polo Keyes MD;  Location: [facility];  Service: Endoscopy;  Laterality: N/A;    ESOPHAGOGASTRODUODENOSCOPY N/A 2020    Procedure: ESOPHAGOGASTRODUODENOSCOPY (EGD);  Surgeon: Belle Kuo MD;  Location: [facility];  Service: Endoscopy;  Laterality: N/A;    HERNIA REPAIR      HYSTERECTOMY      Uterus and both ovaries    INCISIONAL HERNIA REPAIR      x 2    JOINT REPLACEMENT      LIVER BIOPSY  2003    autoimmune hepatitis    ROTATOR CUFF REPAIR       "right    STOMACH SURGERY  1980's    "took lymph node off of liver"    TOTAL HIP ARTHROPLASTY      left hip    UPPER GASTROINTESTINAL ENDOSCOPY  3/24/10    normal    UPPER GASTROINTESTINAL ENDOSCOPY  04/27/2015    Dr. Washington       Review of patient's allergies indicates:   Allergen Reactions    Cefaclor Hives    Gabapentin Swelling    Penicillins      Other reaction(s): Hives    Sulfa (sulfonamide antibiotics) Other (See Comments)     Other reaction(s): Hives       Current Neurological Medications:     No current facility-administered medications on file prior to encounter.     Current Outpatient Medications on File Prior to Encounter   Medication Sig    ALPRAZolam (XANAX) 1 MG tablet TAKE ONE (1) TABLET BY MOUTH TWICE A DAY AS NEEDED    amitriptyline (ELAVIL) 25 MG tablet TAKE 1-2 TABS NIGHTLY AS NEEDED FOR INSOMNIA RELATED TO CHRONIC PAIN    blood-glucose meter kit Test tid please dispense test strips and lancets    cholecalciferol, vitamin D3, 125 mcg (5,000 unit) capsule Take 1 tablet  by mouth daily with breakfast.    diclofenac sodium (VOLTAREN) 1 % Gel Apply 2 g topically 4 (four) times daily.    ergocalciferol (ERGOCALCIFEROL) 50,000 unit Cap Take 1 capsule (50,000 Units total) by mouth every 7 days.    HYDROcodone-acetaminophen (NORCO)  mg per tablet TAKE ONE (1) TABLET BY MOUTH THREE (3) TIMES DAILY AS NEEDED FOR PAIN    levothyroxine (SYNTHROID) 75 MCG tablet TAKE 1 TABLET EVERY DAY ON AN EMPTY STOMACH    naloxone (NARCAN) 4 mg/actuation Spry 4mg by nasal route as needed for opioid overdose; may repeat every 2-3 minutes in alternating nostrils until medical help arrives. Call 911    ondansetron (ZOFRAN-ODT) 8 MG TbDL Dissolve 1 tablet (8 mg total) by mouth under the tongue 3 (three) times daily as needed (nausea).    pantoprazole (PROTONIX) 40 MG tablet TAKE 1 TABLET EVERY DAY    senna-docusate 8.6-50 mg (SENNA WITH DOCUSATE SODIUM) 8.6-50 mg per tablet Take 3 tablets by mouth once daily. Take 1-2 " tabs daily-- take any time a pain pill( norco is taken) to help reduce opiod induced constipation     Family History       Problem Relation (Age of Onset)    Brain cancer Brother    Breast cancer Sister    Cataracts Sister, Sister    Colon cancer Brother    Diabetes Mother, Brother, Other, Son    Diabetes Mellitus Mother    Liver cancer Sister    Lung cancer Brother    Pancreatic cancer Brother    Stomach cancer Other    Throat cancer Brother          Tobacco Use    Smoking status: Every Day     Packs/day: 2.00     Years: 45.00     Pack years: 90.00     Types: Cigarettes    Smokeless tobacco: Never   Substance and Sexual Activity    Alcohol use: No     Comment: used to drink heavily, stopped in 1990's    Drug use: No    Sexual activity: Not Currently     Partners: Male     Review of Systems   All other systems reviewed and are negative.  Objective:     Vital Signs (Most Recent):  Temp: 98.1 °F (36.7 °C) (06/11/23 1121)  Pulse: (!) 51 (06/11/23 1503)  Resp: 10 (06/11/23 1503)  BP: (!) 131/49 (06/11/23 1503)  SpO2: 98 % (06/11/23 1503) Vital Signs (24h Range):  Temp:  [98.1 °F (36.7 °C)] 98.1 °F (36.7 °C)  Pulse:  [51-65] 51  Resp:  [10-19] 10  SpO2:  [94 %-99 %] 98 %  BP: (115-137)/(49-58) 131/49     Weight: 68.9 kg (152 lb)  Body mass index is 24.53 kg/m².     Physical Exam  Constitutional:       Appearance: Normal appearance.   HENT:      Head: Normocephalic and atraumatic.      Mouth/Throat:      Mouth: Mucous membranes are moist.   Eyes:      Extraocular Movements: Extraocular movements intact.      Pupils: Pupils are equal, round, and reactive to light.   Pulmonary:      Effort: Pulmonary effort is normal.   Abdominal:      Palpations: Abdomen is soft.   Musculoskeletal:         General: Normal range of motion.      Cervical back: Normal range of motion.   Skin:     General: Skin is warm and dry.      Capillary Refill: Capillary refill takes less than 2 seconds.   Neurological:      Mental Status: She is alert  and oriented to person, place, and time.   Psychiatric:         Mood and Affect: Mood normal.        NEUROLOGICAL EXAMINATION:     MENTAL STATUS   Oriented to person, place, and time.        Patient is alert and oriented. Very mild dysarthria. :eft facial droop LLE weakness. Full sensation to light touch.      CRANIAL NERVES     CN III, IV, VI   Pupils are equal, round, and reactive to light.    Significant Labs: CBC:   Recent Labs   Lab 06/11/23  1301   WBC 6.81   HGB 14.1   HCT 45.0        CMP:   Recent Labs   Lab 06/11/23  1301      K 4.2   CO2 23   BUN 15.1   CREATININE 1.04*   CALCIUM 9.2   ALBUMIN 3.7   BILITOT 0.3   ALKPHOS 98   AST 16   ALT 12       Significant Imaging: I have reviewed all pertinent imaging results/findings within the past 24 hours.    Assessment and Plan:     Stroke  Rule out stroke      Antithrombotics for secondary stroke prevention: Antiplatelets: Aspirin: 81 mg daily    Statins for secondary stroke prevention and hyperlipidemia, if present:   Statins: Atorvastatin- 40 mg daily    Aggressive risk factor modification: HTN, DM     Rehab efforts: The patient has been evaluated by a stroke team provider and the therapy needs have been fully considered based off the presenting complaints and exam findings. The following therapy evaluations are needed: PT evaluate and treat, OT evaluate and treat, SLP evaluate and treat    Diagnostics ordered/pending: TTE to assess cardiac function/status     VTE prophylaxis: Heparin 5000 units SQ every 8 hours  Mechanical prophylaxis: Place SCDs    BP parameters: TIA: SBP <220 until imaging confirmation of no infarct      Continue to follow stroke workup. Imaging pending.   CUS revealed 70 -90% stenos of right internal carotid. Atherosclerotic changes of the bilateral extracranial carotid vasculature with severe short-segment stenosis of the proximal right ICA (77% narrowing by NASCET criteria). CIS consulted   Further recommendations to  follow.    Stroke workup   -CTh: No acute intracranial findings identified. Right parietal lobe small new encephalomalacia, mild chronic micro ischemia and moderate atrophy.  -MRI brain:  -CTA h/n: Atherosclerotic changes of the bilateral extracranial carotid vasculature with severe short-segment stenosis of the proximal right ICA (77% narrowing by NASCET criteria).  -ECHO:  -CUS: The right internal carotid artery demonstrated 70-99% stenosis.  Stenosis is on upper end of range.  The left internal carotid artery demonstrated less than 50% stenosis.  Bilateral vertebral arteries were patent with antegrade flow.  LDL: 112  -A1c:   -ESR:  -CRP:  -TSH: 0.948                VTE Risk Mitigation (From admission, onward)           Ordered     enoxaparin injection 40 mg  Daily         06/11/23 1410     IP VTE HIGH RISK PATIENT  Once         06/11/23 1410     Place sequential compression device  Until discontinued         06/11/23 1410                    Thank you for your consult. Will continue to follow. Please call with any further questions.     Opal Kohler NP  Neurology  [facility] - Emergency Dept

## 2023-06-11 NOTE — ED PROVIDER NOTES
Encounter Date: 6/11/2023    SCRIBE #1 NOTE: I, Ishmael Oropeza, am scribing for, and in the presence of,  Dr. Santos. I have scribed the following portions of the note - Other sections scribed: HPI, ROS, Physical Exam, MDM, Attending.     History     Chief Complaint   Patient presents with    Facial Droop     Left sided facial droop with slurred speech. Last known normal 0930. Arrvied via [facility].     68 y/o female with history of thyroid disease and cirrhosis presents to ED via [facility] for slurred speech onset around 0915 this morning.  Pt states she woke up with some LE weakness causing difficulty walking.  She says she went to bad feeling normal last night.  EMS reports some slurred speech and L-sided facial droop, and pt says she had some difficulty swallowing this morning as well.  Pt reports some chronic intermittent L arm numbness which makes it hard for her to  things with her L hand.  CBG was 82 with stable vitals en route.  EMS reports VAN negative.  Pt has no history of CVA, but she does have nerve stimulator secondary to chronic back pain.      The history is provided by the patient and the EMS personnel.   Neurologic Problem  The primary symptoms include speech change. Primary symptoms do not include headaches, dizziness, fever, nausea or vomiting. The symptoms began today. The symptoms are unchanged. The symptoms occurred while sleeping.   Additional symptoms include weakness.   Review of patient's allergies indicates:   Allergen Reactions    Cefaclor Hives    Gabapentin Swelling    Penicillins      Other reaction(s): Hives    Sulfa (sulfonamide antibiotics) Other (See Comments)     Other reaction(s): Hives     Past Medical History:   Diagnosis Date    Anxiety and depression 7/21/2015    Arthritis     Cervical radiculopathy 4/8/2016    Cirrhosis of liver     Colon polyps 4/27/2015    Diabetes mellitus type 2 with neurological manifestations 11/6/2015    no current medications, only one episode of  elevated sugars with acute illness, will monitor     Encounter for blood transfusion     Hepatitis C     s/p treatment per patient report; managed by Dr. Perez    Hip fracture     Macrocytosis 2015    Thyroid disease     Transfusion reaction     hep c     Past Surgical History:   Procedure Laterality Date    APPENDECTOMY      BACK SURGERY      CAUDAL EPIDURAL STEROID INJECTION N/A 2018    Procedure: Injection-steroid-epidural-caudal;  Surgeon: Roxana Gu Jr., MD;  Location: [facility] OR;  Service: Pain Management;  Laterality: N/A;  with cath target L4-5    CAUDAL EPIDURAL STEROID INJECTION N/A 2019    Procedure: Injection-steroid-epidural-caudal;  Surgeon: Roxana Gu Jr., MD;  Location: [facility];  Service: Pain Management;  Laterality: N/A;     SECTION      CHOLECYSTECTOMY      COLONOSCOPY  3/31/10    2 polyps, tubular adenoma    COLONOSCOPY  2015    Dr. Washington    COLONOSCOPY N/A 10/10/2018    Procedure: COLONOSCOPY;  Surgeon: Reuben Miller Jr., MD;  Location: [facility];  Service: Endoscopy;  Laterality: N/A;    ESOPHAGOGASTRODUODENOSCOPY N/A 10/10/2018    Procedure: EGD (ESOPHAGOGASTRODUODENOSCOPY);  Surgeon: Reuben Miller Jr., MD;  Location: [facility];  Service: Endoscopy;  Laterality: N/A;    ESOPHAGOGASTRODUODENOSCOPY N/A 12/10/2018    Procedure: EGD (ESOPHAGOGASTRODUODENOSCOPY)/poss emr;  Surgeon: Belle Kuo MD;  Location: [address]);  Service: Endoscopy;  Laterality: N/A;    ESOPHAGOGASTRODUODENOSCOPY N/A 3/12/2019    Procedure: EGD (ESOPHAGOGASTRODUODENOSCOPY);  Surgeon: Polo Keyes MD;  Location: [facility];  Service: Endoscopy;  Laterality: N/A;    ESOPHAGOGASTRODUODENOSCOPY N/A 2020    Procedure: ESOPHAGOGASTRODUODENOSCOPY (EGD);  Surgeon: Belle Kuo MD;  Location: [facility];  Service: Endoscopy;  Laterality: N/A;    HERNIA REPAIR      HYSTERECTOMY  1989    Uterus and both ovaries    INCISIONAL HERNIA REPAIR      x 2  "   JOINT REPLACEMENT      LIVER BIOPSY  12/2003    autoimmune hepatitis    ROTATOR CUFF REPAIR      right    STOMACH SURGERY  1980's    "took lymph node off of liver"    TOTAL HIP ARTHROPLASTY      left hip    UPPER GASTROINTESTINAL ENDOSCOPY  3/24/10    normal    UPPER GASTROINTESTINAL ENDOSCOPY  04/27/2015    Dr. Washington     Family History   Problem Relation Age of Onset    Diabetes Mellitus Mother     Diabetes Mother     Liver cancer Sister     Breast cancer Sister     Cataracts Sister     Pancreatic cancer Brother     Diabetes Brother     Lung cancer Brother     Colon cancer Brother     Brain cancer Brother     Stomach cancer Other     Diabetes Other     Throat cancer Brother     Cataracts Sister     Diabetes Son     Liver disease Neg Hx      Social History     Tobacco Use    Smoking status: Every Day     Packs/day: 2.00     Years: 45.00     Pack years: 90.00     Types: Cigarettes    Smokeless tobacco: Never   Substance Use Topics    Alcohol use: No     Comment: used to drink heavily, stopped in 1990's    Drug use: No     Review of Systems   Constitutional:  Negative for chills, diaphoresis and fever.   HENT:  Negative for congestion, ear pain, sinus pain and sore throat.    Eyes:  Negative for pain, discharge and visual disturbance.   Respiratory:  Negative for cough, shortness of breath, wheezing and stridor.    Cardiovascular:  Negative for chest pain and palpitations.   Gastrointestinal:  Negative for abdominal pain, constipation, diarrhea, nausea, rectal pain and vomiting.   Genitourinary:  Negative for dysuria and hematuria.   Musculoskeletal:  Negative for back pain and myalgias.   Skin:  Negative for rash.   Neurological:  Positive for speech change, facial asymmetry, speech difficulty and weakness. Negative for dizziness, syncope, numbness and headaches.   Hematological: Negative.    Psychiatric/Behavioral: Negative.     All other systems reviewed and are negative.    Physical Exam     Initial Vitals " [06/11/23 1121]   BP Pulse Resp Temp SpO2   (!) 137/58 65 19 98.1 °F (36.7 °C) 99 %      MAP       --         Physical Exam    Nursing note and vitals reviewed.  Constitutional: No distress.   HENT:   Head: Normocephalic and atraumatic.   Eyes: Conjunctivae and EOM are normal. Pupils are equal, round, and reactive to light.   Neck: Trachea normal. Neck supple.   Normal range of motion.  Cardiovascular:  Normal rate and regular rhythm.           No murmur heard.  Pulmonary/Chest: Breath sounds normal. No respiratory distress. She exhibits no tenderness.   Abdominal: Abdomen is soft. Bowel sounds are normal. There is no abdominal tenderness.   Musculoskeletal:         General: Normal range of motion.      Cervical back: Normal range of motion and neck supple.      Lumbar back: Normal. Normal range of motion.     Neurological: She is alert and oriented to person, place, and time. No cranial nerve deficit or sensory deficit. GCS score is 15. GCS eye subscore is 4. GCS verbal subscore is 5. GCS motor subscore is 6.   No facial droop; slowed speech; not moving RLE (secondary to chronic pain per pt)   Skin: Skin is warm and dry.   Psychiatric: She has a normal mood and affect.       ED Course   Procedures  Labs Reviewed   COMPREHENSIVE METABOLIC PANEL - Abnormal; Notable for the following components:       Result Value    Chloride 109 (*)     Glucose Level 78 (*)     Creatinine 1.04 (*)     All other components within normal limits   CBC WITH DIFFERENTIAL - Abnormal; Notable for the following components:    .9 (*)     MCH 31.6 (*)     MCHC 31.3 (*)     MPV 10.8 (*)     All other components within normal limits   DRUG SCREEN, URINE (BEAKER) - Abnormal; Notable for the following components:    Benzodiazepine, Urine Positive (*)     Opiates, Urine Positive (*)     All other components within normal limits    Narrative:     Cut off concentrations:    Amphetamines - 1000 ng/ml  Barbiturates - 200 ng/ml  Benzodiazepine -  200 ng/ml  Cannabinoids (THC) - 50 ng/ml  Cocaine - 300 ng/ml  Fentanyl - 1.0 ng/ml  MDMA - 500 ng/ml  Opiates - 300 ng/ml   Phencyclidine (PCP) - 25 ng/ml    Specimen submitted for drug analysis and tested for pH and specific gravity in order to evaluate sample integrity. Suspect tampering if specific gravity is <1.003 and/or pH is not within the range of 4.5 - 8.0  False negatives may result form substances such as bleach added to urine.  False positives may result for the presence of a substance with similar chemical structure to the drug or its metabolite.    This test provides only a PRELIMINARY analytical test result. A more specific alternate chemical method must be used in order to obtain a confirmed analytical result. Gas chromatography/mass spectrometry (GC/MS) is the preferred confirmatory method. Other chemical confirmation methods are available. Clinical consideration and professional judgement should be applied to any drug of abuse test result, particularly when preliminary positive results are used.    Positive results will be confirmed only at the physicians request. Unconfirmed screening results are to be used only for medical purposes (treatment).        ISTAT PROCEDURE - Abnormal; Notable for the following components:    POC PTWBT 14.5 (*)     All other components within normal limits   PROTIME-INR - Normal   TSH - Normal   TROPONIN I - Normal   ALCOHOL,MEDICAL (ETHANOL) - Normal   URINALYSIS, REFLEX TO URINE CULTURE - Normal   URINALYSIS, MICROSCOPIC - Normal   CBC W/ AUTO DIFFERENTIAL    Narrative:     The following orders were created for panel order CBC W/ AUTO DIFFERENTIAL.  Procedure                               Abnormality         Status                     ---------                               -----------         ------                     CBC with Differential[[id number]]        Abnormal            Final result                 Please view results for these tests on the individual  orders.   LIPID PANEL   URINALYSIS, REFLEX TO URINE CULTURE   TROPONIN I   POCT GLUCOSE, HAND-HELD DEVICE   POCT PT/INR   ISTAT CHEM8          Imaging Results               CTA STROKE MULTI-PHASE (Final result)  Result time 06/11/23 13:00:44      Final result by Mandeep Belcher MD (06/11/23 13:00:44)                   Narrative:    EXAMINATION  CTA STROKE MULTI-PHASE    CLINICAL HISTORY  Neuro deficit, acute, stroke suspected;left sided facial droop.;    TECHNIQUE  Pre- and post-contrast helical-acquisition CT images were obtained, imaging timed to coincide with arterial opacification for purposes of CT angiography.  Multiplanar reconstructions, to include MIP and volume-rendered (3D) images, were accomplished by a CT technologist at a separate workstation, pushed to PACS for physician review.    Evaluation of any visualized ICA stenosis was performed using NASCET criteria.    CONTRAST  IV: ISOVUE-370, 100 mL    COMPARISON  *No prior CTA is available at the time of initial interpretation.  *Non-contrast head CT obtained earlier the same day was reviewed.    FINDINGS  Images were reviewed in soft tissue, brain, subdural, and bone windows.    Exam quality: adequate for evaluation    Non-contrast Head: No significant change from the above-referenced non-contrast head CT comparison.    Intracranial Vasculature: The included intracranial ICA segments are without evidence of flow limiting stenosis or other focal abnormality.  No definite evidence of acute or focal abnormality of the CoW, to include the bilateral MARLA A1 and PCA P1 segments.  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  Patency of the anterior and bilateral posterior communicating arteries grossly maintained.  There is no evidence of focal contour irregularity, aneurysmal dilatation, or angiographic cut off involving the bilateral MARLA and MCA vessels.  Unremarkable course, caliber, and  intraluminal contrast opacification appreciated throughout the bilateral PCA vessels, as well as the basilar artery and intracranial components of the vertebral arteries. Normal contour and intraluminal contrast opacification of the dural sinuses.    Extracranial Vasculature: Mild burden of atherosclerotic calcification and mural plaquing noted at the bilateral common carotid arteries and carotid bifurcations.  There is notable burden of atherosclerotic disease with associated severe stenosis at the right ICA origin and proximal segment, 77% luminal narrowing by NASCET criteria.  No significant stenosis of the atherosclerotic proximal left ICA is appreciated.  The mid through distal bilateral extracranial ICA segments are unremarkable.  No suspicious findings of the bilateral ECA branches. Right-dominant vertebral artery system is present, with both vessels well opacified throughout their extracranial course and no focal narrowing or intraluminal abnormality. Visualized aortic arch is without focal contour irregularity, aneurysmal dilatation, or intraluminal abnormality. Normal 3-branch arch configuration is present.    Nonvascular: The visualized nonvascular soft tissues are without convincing evidence of acute or focal abnormality.  There is notable debris distending the partially included upper through mid esophageal lumen, with no focal mural irregularity or thickening identified through the scanned region.  Chronic emphysematous changes of the upper lung zones are present, with no acute airspace consolidation identified.  No acute or destructive skeletal lesion is identified.  There are degenerative alterations throughout the cervical spine, with associated chronic grade 1 anterolisthesis of C4 on C5.    IMPRESSION  1. No short interval change of non-contrast head CT appearance.  2. No evidence of flow limiting stenosis or other acute/focal intracranial vascular abnormality.  3. Atherosclerotic changes of the  bilateral extracranial carotid vasculature with severe short-segment stenosis of the proximal right ICA (77% narrowing by NASCET criteria).  4. Additional secondary details discussed above.  ==========    This report was flagged in Epic as abnormal.    RADIATION DOSE  Automated tube current modulation, weight-based exposure dosing, and/or iterative reconstruction technique utilized to reach lowest reasonably achievable exposure rate.    DLP: 1380 mGy*cm      Electronically signed by: Mandeep Belcher  Date:    06/11/2023  Time:    13:00                                     CT HEAD FOR STROKE (Final result)  Result time 06/11/23 11:43:43      Final result by Ruben Peace MD (06/11/23 11:43:43)                   Impression:      1.  No acute intracranial findings identified.    2.  Right parietal lobe small new encephalomalacia, mild chronic micro ischemia and moderate atrophy.    Findings were notified to Dr. Santos June 11, 2023 at 11:42 hours.      Electronically signed by: Ruben Peace  Date:    06/11/2023  Time:    11:43               Narrative:    EXAMINATION:  CT HEAD FOR STROKE    TECHNIQUE:  Sequential axial images were performed of the brain without contrast.    Dose product length of 911 mGycm. Automated exposure control was utilized to minimize radiation dose.    COMPARISON:  July 20, 2015.    FINDINGS:  There is no intracranial mass effect, midline shift, hydrocephalus or hemorrhage. There is no sulcal effacement or low attenuation changes to suggest recent large vessel territory infarction.  There is small new encephalomalacia which involve the right parietal lobe.  Chronic microvascular ischemic changes are mild. The ventricular system and sulcal markings prominence is consistent with atrophy. There is no acute extra axial fluid collection. Visualized paranasal sinuses are clear without mucosal thickening, polypoidal abnormality or air-fluid levels. Mastoid air cells aeration is optimal.                                        Medications   sodium chloride 0.9% flush 10 mL (has no administration in time range)   labetaloL injection 10 mg (has no administration in time range)   enoxaparin injection 40 mg (has no administration in time range)   hydrALAZINE injection 10 mg (has no administration in time range)   atorvastatin tablet 40 mg (has no administration in time range)   aspirin suppository 300 mg (has no administration in time range)   nitroGLYCERIN SL tablet 0.4 mg (has no administration in time range)   iopamidoL (ISOVUE-370) injection 100 mL (100 mLs Intravenous Given 6/11/23 1144)   acetaminophen 1,000 mg/100 mL (10 mg/mL) injection 1,000 mg (0 mg Intravenous Stopped 6/11/23 1339)   aspirin suppository 300 mg (300 mg Rectal Given 6/11/23 1406)     Medical Decision Making:   History:   I obtained history from: EMS provider.  Old Medical Records: I decided to obtain old medical records.  Old Records Summarized: records from clinic visits.  Initial Assessment:   See hpi  Independently Interpreted Test(s):   I have ordered and independently interpreted EKG Reading(s) - see prior notes  Clinical Tests:   Lab Tests: Ordered and Reviewed  Radiological Study: Ordered and Reviewed  Medical Tests: Ordered and Reviewed  Other:   I have discussed this case with another health care provider.  Additional MDM:     NIH Stroke Scale:   Interval = baseline (upon arrival/admit)  Level of consciousness = 0 - alert  LOC questions = 0 - answers both correctly  LOC commands = 0 - performs both correctly  Best gaze = 0 - normal  Visual = 0 - no visual loss  Facial palsy = 0 - normal  Motor left arm =  0 - no drift  Motor right arm =  0 - no drift  Motor left leg = 0 - no drift  Motor right leg =  1 - drift  Limb ataxia = 0 - absent  Sensory = 0 - normal  Best language = 0 - no aphasia  Dysarthria = 0 - normal articulation  Extinction and inattention = 0 - no neglect  NIH Stroke Scale Total = 1     Scribe Attestation:   Scribe #1: I  performed the above scribed service and the documentation accurately describes the services I performed. I attest to the accuracy of the note.  Comments: Attending:   Physician Attestation Statement for Scribe #1: I, Ny Santos MD, personally performed the services described in this documentation. All medical record entries made by the scribe were at my direction and in my presence.  I have reviewed the chart and agree that the record reflects my personal performance and is accurate and complete.        Attending Attestation:           Physician Attestation for Scribe:  Physician Attestation Statement for Scribe #1: I, reviewed documentation, as scribed by Ishmael Oropeza in my presence, and it is both accurate and complete.         Medical Decision Making  Differential diagnoses include, but are not limited to: CVA, TIA, anemia, electrolyte derangement, UTI, acs, polypharmacy  Not candidate for thrombolytics or thrombectomy  Labs ordered and reviewed without acute abnormality  Ekg x 2 negative  Cta with chronic stenosis, no lvo  Seen by neuro  Given asa, admit to hospitalist      Problems Addressed:  Acute chest pain: acute illness or injury that poses a threat to life or bodily functions  Benign essential hypertension: chronic illness or injury  Chronic pain syndrome: chronic illness or injury  Stroke: acute illness or injury that poses a threat to life or bodily functions    Amount and/or Complexity of Data Reviewed  Independent Historian: EMS     Details: EMS reports some slurred speech and L-sided facial droop.  CBG was 82 with stable vitals en route.  EMS reports VAN negative.  Pt has no history of CVA, but she does have nerve stimulator secondary to chronic back pain.  Labs: ordered.  Radiology: ordered.  ECG/medicine tests: ordered and independent interpretation performed.    Risk  OTC drugs.  Prescription drug management.  Decision regarding hospitalization.            ED Course as of 06/11/23 8761    Sun Jun 11, 2023   1128 Does not meet lvo criteria and is far out of the window for thrombolytics, lacks focal deficits to suggest that thrombolytics would be indicated [BS]   1141 Notified of negative Ct by Dr Peace at 1142 [BS]   1223 Asking for pain meds for her chronic back pain [BS]   1225 Per  review on benzos and pain meds [BS]   1312 Ekg performed at 1258 rate 52 sinus bradycardia no acute abnormality [BS]   1331 Hospitalist consult for admission [BS]   1411 Now stating she has had chest pain radiating down left arm since this am which she did not endorse previously [BS]   1417 1415 rate 51 sinus bradycardia without acute st abnormality [BS]      ED Course User Index  [BS] Ny Santos MD                   Clinical Impression:   Final diagnoses:  [I63.9] Stroke (Primary)  [I10] Benign essential hypertension  [G89.4] Chronic pain syndrome  [R07.9] Acute chest pain        ED Disposition Condition    Admit Stable                Ny Santos MD  06/11/23 1421       Ny Santos MD  06/11/23 1941

## 2023-06-12 ENCOUNTER — TELEPHONE (OUTPATIENT)
Dept: FAMILY MEDICINE | Facility: CLINIC | Age: 68
End: 2023-06-12
Payer: MEDICARE

## 2023-06-12 LAB
ALBUMIN SERPL-MCNC: 3.4 G/DL (ref 3.4–4.8)
ALBUMIN/GLOB SERPL: 1.2 RATIO (ref 1.1–2)
ALP SERPL-CCNC: 87 UNIT/L (ref 40–150)
ALT SERPL-CCNC: 9 UNIT/L (ref 0–55)
ANION GAP SERPL CALC-SCNC: 18 MMOL/L (ref 8–16)
APPEARANCE UR: CLEAR
APTT PPP: 33.4 SECONDS (ref 23.2–33.7)
AST SERPL-CCNC: 12 UNIT/L (ref 5–34)
BACTERIA #/AREA URNS AUTO: ABNORMAL /HPF
BASOPHILS # BLD AUTO: 0.03 X10(3)/MCL
BASOPHILS NFR BLD AUTO: 0.5 %
BILIRUB UR QL STRIP.AUTO: NEGATIVE MG/DL
BILIRUBIN DIRECT+TOT PNL SERPL-MCNC: 0.4 MG/DL
BUN SERPL-MCNC: 14.1 MG/DL (ref 9.8–20.1)
BUN SERPL-MCNC: 16 MG/DL (ref 6–30)
CALCIUM SERPL-MCNC: 9.1 MG/DL (ref 8.4–10.2)
CHLORIDE SERPL-SCNC: 103 MMOL/L (ref 95–110)
CHLORIDE SERPL-SCNC: 106 MMOL/L (ref 98–107)
CK MB SERPL-MCNC: 0.8 NG/ML
CO2 SERPL-SCNC: 24 MMOL/L (ref 23–31)
COLOR UR: YELLOW
CREAT SERPL-MCNC: 0.89 MG/DL (ref 0.55–1.02)
CREAT SERPL-MCNC: 1.1 MG/DL (ref 0.5–1.4)
CRP SERPL-MCNC: 1.5 MG/L
EOSINOPHIL # BLD AUTO: 0.2 X10(3)/MCL (ref 0–0.9)
EOSINOPHIL NFR BLD AUTO: 3.1 %
ERYTHROCYTE [DISTWIDTH] IN BLOOD BY AUTOMATED COUNT: 14.7 % (ref 11.5–17)
ERYTHROCYTE [SEDIMENTATION RATE] IN BLOOD: 12 MM/HR (ref 0–20)
EST. AVERAGE GLUCOSE BLD GHB EST-MCNC: 99.7 MG/DL
GFR SERPLBLD CREATININE-BSD FMLA CKD-EPI: >60 MLS/MIN/1.73/M2
GLOBULIN SER-MCNC: 2.9 GM/DL (ref 2.4–3.5)
GLUCOSE SERPL-MCNC: 79 MG/DL (ref 82–115)
GLUCOSE SERPL-MCNC: 92 MG/DL (ref 70–110)
GLUCOSE UR QL STRIP.AUTO: NEGATIVE MG/DL
GROUP & RH: NORMAL
HBA1C MFR BLD: 5.1 %
HCT VFR BLD AUTO: 41.3 % (ref 37–47)
HCT VFR BLD CALC: 44 %PCV (ref 36–54)
HGB BLD-MCNC: 13.3 G/DL (ref 12–16)
HGB BLD-MCNC: 15 G/DL
IMM GRANULOCYTES # BLD AUTO: 0.02 X10(3)/MCL (ref 0–0.04)
IMM GRANULOCYTES NFR BLD AUTO: 0.3 %
INDIRECT COOMBS GEL: NORMAL
INR BLD: 1.05 (ref 0–1.3)
KETONES UR QL STRIP.AUTO: NEGATIVE MG/DL
LEUKOCYTE ESTERASE UR QL STRIP.AUTO: ABNORMAL UNIT/L
LYMPHOCYTES # BLD AUTO: 2.1 X10(3)/MCL (ref 0.6–4.6)
LYMPHOCYTES NFR BLD AUTO: 32.2 %
MAGNESIUM SERPL-MCNC: 1.8 MG/DL (ref 1.6–2.6)
MCH RBC QN AUTO: 31.8 PG (ref 27–31)
MCHC RBC AUTO-ENTMCNC: 32.2 G/DL (ref 33–36)
MCV RBC AUTO: 98.8 FL (ref 80–94)
MONOCYTES # BLD AUTO: 0.55 X10(3)/MCL (ref 0.1–1.3)
MONOCYTES NFR BLD AUTO: 8.4 %
NEUTROPHILS # BLD AUTO: 3.62 X10(3)/MCL (ref 2.1–9.2)
NEUTROPHILS NFR BLD AUTO: 55.5 %
NITRITE UR QL STRIP.AUTO: NEGATIVE
NRBC BLD AUTO-RTO: 0 %
PH UR STRIP.AUTO: 6 [PH]
PHOSPHATE SERPL-MCNC: 4 MG/DL (ref 2.3–4.7)
PLATELET # BLD AUTO: 192 X10(3)/MCL (ref 130–400)
PMV BLD AUTO: 10.6 FL (ref 7.4–10.4)
POC IONIZED CALCIUM: 1.27 MMOL/L (ref 1.06–1.42)
POC TCO2 (MEASURED): 28 MMOL/L (ref 23–29)
POCT GLUCOSE: 79 MG/DL (ref 70–110)
POCT GLUCOSE: 86 MG/DL (ref 70–110)
POTASSIUM BLD-SCNC: 4 MMOL/L (ref 3.5–5.1)
POTASSIUM SERPL-SCNC: 4 MMOL/L (ref 3.5–5.1)
PROT SERPL-MCNC: 6.3 GM/DL (ref 5.8–7.6)
PROT UR QL STRIP.AUTO: NEGATIVE MG/DL
PROTHROMBIN TIME: 13.6 SECONDS (ref 12.5–14.5)
RBC # BLD AUTO: 4.18 X10(6)/MCL (ref 4.2–5.4)
RBC #/AREA URNS AUTO: ABNORMAL /HPF
RBC UR QL AUTO: NEGATIVE UNIT/L
SAMPLE: ABNORMAL
SODIUM BLD-SCNC: 144 MMOL/L (ref 136–145)
SODIUM SERPL-SCNC: 137 MMOL/L (ref 136–145)
SP GR UR STRIP.AUTO: 1.04 (ref 1–1.03)
SPECIMEN OUTDATE: NORMAL
SQUAMOUS #/AREA URNS AUTO: <5 /HPF
TROPONIN I SERPL-MCNC: 0.02 NG/ML (ref 0–0.04)
TROPONIN I SERPL-MCNC: 0.03 NG/ML (ref 0–0.04)
TROPONIN I SERPL-MCNC: 0.03 NG/ML (ref 0–0.04)
TROPONIN I SERPL-MCNC: <0.01 NG/ML (ref 0–0.04)
UROBILINOGEN UR STRIP-ACNC: 1 MG/DL
WBC # SPEC AUTO: 6.52 X10(3)/MCL (ref 4.5–11.5)
WBC #/AREA URNS AUTO: 7 /HPF

## 2023-06-12 PROCEDURE — 83036 HEMOGLOBIN GLYCOSYLATED A1C: CPT

## 2023-06-12 PROCEDURE — 63600175 PHARM REV CODE 636 W HCPCS: Performed by: INTERNAL MEDICINE

## 2023-06-12 PROCEDURE — 84484 ASSAY OF TROPONIN QUANT: CPT | Performed by: NURSE PRACTITIONER

## 2023-06-12 PROCEDURE — 63600175 PHARM REV CODE 636 W HCPCS: Performed by: NURSE PRACTITIONER

## 2023-06-12 PROCEDURE — 82553 CREATINE MB FRACTION: CPT | Performed by: NURSE PRACTITIONER

## 2023-06-12 PROCEDURE — 83735 ASSAY OF MAGNESIUM: CPT | Performed by: NURSE PRACTITIONER

## 2023-06-12 PROCEDURE — 92610 EVALUATE SWALLOWING FUNCTION: CPT

## 2023-06-12 PROCEDURE — 11000001 HC ACUTE MED/SURG PRIVATE ROOM

## 2023-06-12 PROCEDURE — 86140 C-REACTIVE PROTEIN: CPT

## 2023-06-12 PROCEDURE — 86923 COMPATIBILITY TEST ELECTRIC: CPT | Mod: 91 | Performed by: THORACIC SURGERY (CARDIOTHORACIC VASCULAR SURGERY)

## 2023-06-12 PROCEDURE — 80053 COMPREHEN METABOLIC PANEL: CPT | Performed by: NURSE PRACTITIONER

## 2023-06-12 PROCEDURE — 85610 PROTHROMBIN TIME: CPT | Performed by: NURSE PRACTITIONER

## 2023-06-12 PROCEDURE — 85730 THROMBOPLASTIN TIME PARTIAL: CPT | Performed by: NURSE PRACTITIONER

## 2023-06-12 PROCEDURE — 85652 RBC SED RATE AUTOMATED: CPT

## 2023-06-12 PROCEDURE — 84100 ASSAY OF PHOSPHORUS: CPT | Performed by: NURSE PRACTITIONER

## 2023-06-12 PROCEDURE — 85025 COMPLETE CBC W/AUTO DIFF WBC: CPT | Performed by: NURSE PRACTITIONER

## 2023-06-12 PROCEDURE — 25000003 PHARM REV CODE 250: Performed by: NURSE PRACTITIONER

## 2023-06-12 PROCEDURE — 86900 BLOOD TYPING SEROLOGIC ABO: CPT | Performed by: THORACIC SURGERY (CARDIOTHORACIC VASCULAR SURGERY)

## 2023-06-12 PROCEDURE — 25000003 PHARM REV CODE 250: Performed by: INTERNAL MEDICINE

## 2023-06-12 PROCEDURE — 92611 MOTION FLUOROSCOPY/SWALLOW: CPT

## 2023-06-12 PROCEDURE — 94761 N-INVAS EAR/PLS OXIMETRY MLT: CPT

## 2023-06-12 PROCEDURE — 81001 URINALYSIS AUTO W/SCOPE: CPT | Performed by: NURSE PRACTITIONER

## 2023-06-12 RX ORDER — MORPHINE SULFATE 4 MG/ML
2 INJECTION, SOLUTION INTRAMUSCULAR; INTRAVENOUS ONCE
Status: COMPLETED | OUTPATIENT
Start: 2023-06-12 | End: 2023-06-12

## 2023-06-12 RX ORDER — HYDROCODONE BITARTRATE AND ACETAMINOPHEN 10; 325 MG/1; MG/1
1 TABLET ORAL EVERY 8 HOURS PRN
Status: DISCONTINUED | OUTPATIENT
Start: 2023-06-12 | End: 2023-06-19 | Stop reason: HOSPADM

## 2023-06-12 RX ORDER — ACETAMINOPHEN 325 MG/1
650 TABLET ORAL EVERY 6 HOURS PRN
Status: DISCONTINUED | OUTPATIENT
Start: 2023-06-12 | End: 2023-06-12

## 2023-06-12 RX ORDER — SODIUM CHLORIDE, SODIUM LACTATE, POTASSIUM CHLORIDE, CALCIUM CHLORIDE 600; 310; 30; 20 MG/100ML; MG/100ML; MG/100ML; MG/100ML
INJECTION, SOLUTION INTRAVENOUS CONTINUOUS
Status: DISCONTINUED | OUTPATIENT
Start: 2023-06-12 | End: 2023-06-14

## 2023-06-12 RX ORDER — PANTOPRAZOLE SODIUM 40 MG/1
40 TABLET, DELAYED RELEASE ORAL DAILY
Status: DISCONTINUED | OUTPATIENT
Start: 2023-06-13 | End: 2023-06-19 | Stop reason: HOSPADM

## 2023-06-12 RX ORDER — AMOXICILLIN 250 MG
3 CAPSULE ORAL DAILY
Status: DISCONTINUED | OUTPATIENT
Start: 2023-06-12 | End: 2023-06-19 | Stop reason: HOSPADM

## 2023-06-12 RX ORDER — ALPRAZOLAM 0.5 MG/1
1 TABLET ORAL 2 TIMES DAILY PRN
Status: DISCONTINUED | OUTPATIENT
Start: 2023-06-12 | End: 2023-06-19 | Stop reason: HOSPADM

## 2023-06-12 RX ORDER — HYDROCODONE BITARTRATE AND ACETAMINOPHEN 5; 325 MG/1; MG/1
1 TABLET ORAL EVERY 6 HOURS PRN
Status: DISCONTINUED | OUTPATIENT
Start: 2023-06-12 | End: 2023-06-12

## 2023-06-12 RX ORDER — AMITRIPTYLINE HYDROCHLORIDE 25 MG/1
25 TABLET, FILM COATED ORAL NIGHTLY PRN
Status: DISCONTINUED | OUTPATIENT
Start: 2023-06-12 | End: 2023-06-19 | Stop reason: HOSPADM

## 2023-06-12 RX ORDER — SODIUM CHLORIDE 0.9 % (FLUSH) 0.9 %
2 SYRINGE (ML) INJECTION EVERY 8 HOURS PRN
Status: DISCONTINUED | OUTPATIENT
Start: 2023-06-12 | End: 2023-06-19 | Stop reason: HOSPADM

## 2023-06-12 RX ORDER — ACETAMINOPHEN 325 MG/1
650 TABLET ORAL EVERY 6 HOURS PRN
Status: DISCONTINUED | OUTPATIENT
Start: 2023-06-12 | End: 2023-06-19 | Stop reason: HOSPADM

## 2023-06-12 RX ADMIN — HYDROCODONE BITARTRATE AND ACETAMINOPHEN 1 TABLET: 10; 325 TABLET ORAL at 05:06

## 2023-06-12 RX ADMIN — SENNOSIDES AND DOCUSATE SODIUM 3 TABLET: 50; 8.6 TABLET ORAL at 03:06

## 2023-06-12 RX ADMIN — ASPIRIN 300 MG: 300 SUPPOSITORY RECTAL at 11:06

## 2023-06-12 RX ADMIN — ENOXAPARIN SODIUM 40 MG: 40 INJECTION SUBCUTANEOUS at 04:06

## 2023-06-12 RX ADMIN — ACETAMINOPHEN 1000 MG: 10 INJECTION, SOLUTION INTRAVENOUS at 12:06

## 2023-06-12 RX ADMIN — METHOCARBAMOL 500 MG: 100 INJECTION, SOLUTION INTRAMUSCULAR; INTRAVENOUS at 06:06

## 2023-06-12 RX ADMIN — ATORVASTATIN CALCIUM 40 MG: 40 TABLET, FILM COATED ORAL at 11:06

## 2023-06-12 RX ADMIN — MORPHINE SULFATE 2 MG: 4 INJECTION INTRAVENOUS at 11:06

## 2023-06-12 RX ADMIN — ONDANSETRON 4 MG: 2 INJECTION INTRAMUSCULAR; INTRAVENOUS at 07:06

## 2023-06-12 RX ADMIN — ALPRAZOLAM 1 MG: 0.5 TABLET ORAL at 03:06

## 2023-06-12 RX ADMIN — SODIUM CHLORIDE, POTASSIUM CHLORIDE, SODIUM LACTATE AND CALCIUM CHLORIDE: 600; 310; 30; 20 INJECTION, SOLUTION INTRAVENOUS at 01:06

## 2023-06-12 RX ADMIN — AMITRIPTYLINE HYDROCHLORIDE 25 MG: 25 TABLET, FILM COATED ORAL at 10:06

## 2023-06-12 RX ADMIN — ACETAMINOPHEN 650 MG: 325 TABLET, FILM COATED ORAL at 10:06

## 2023-06-12 NOTE — PROGRESS NOTES
[facility]  Neurology  Progress Note    Patient Name: Thom Krishna  MRN: [id number]  Admission Date: 6/11/2023  Hospital Length of Stay: 1 days  Code Status: Full Code   Attending Provider: Emil Barahona MD  Primary Care Physician: Brandy Dejesus MD   Principal Problem:Stroke    HPI:   Mrs. Thom Krishna is a 67 year-old female with past medical history of  Chronic back pain (with nerve stimulator), anxiety/depression, Cirrhosis. DM II, Hepatitis C, and Thyroid Disease. She is a transfer from [facility] via air med with complaints of LE weakness, ataxia, slurred speech and left sided facial droop. Symptoms present upon awakening this morning around 0900. Code FAST initiated at 1120. However, delayed presentation places her OOW for thrombolytic. I arrived 1121. Dr. Goyal contacted 1120.  Patient had mild dysarthria and LLE weakness, NIH 2. I did not note any slurred speech of facial droop. CT head negative. CTA negative for LVO. Neurology consulted for stroke workup.                    Subjective:     Interval History: Patient sitting in bed in NAD. Sister at bedside. Left facial droop not present.     Current Neurological Medication    Current Facility-Administered Medications   Medication Dose Route Frequency Provider Last Rate Last Admin    aspirin suppository 300 mg  300 mg Rectal Daily MIGUELANGEL BroussardP        atorvastatin tablet 40 mg  40 mg Oral Daily Catalina Deutsch, MIGUELANGELP        enoxaparin injection 40 mg  40 mg Subcutaneous Daily Catalina Deutsch FNP   40 mg at 06/11/23 1712    hydrALAZINE injection 10 mg  10 mg Intravenous Q4H PRN MAGGY Broussard        labetaloL injection 10 mg  10 mg Intravenous Q15 Min PRN Catalina Deutsch, FNP        lactated ringers infusion   Intravenous Continuous Pamela Carney, MIGUELINAP-BC 75 mL/hr at 06/12/23 0128 New Bag at 06/12/23 0128    methocarbamoL injection 500 mg  500 mg Intravenous Q12H PRN  Catalina Deutsch, FNP   500 mg at 06/12/23 0655    nitroGLYCERIN SL tablet 0.4 mg  0.4 mg Sublingual Q5 Min PRN Ny Santos MD   0.4 mg at 06/11/23 1804    ondansetron injection 4 mg  4 mg Intravenous Q4H PRN Pamela Carney, AGACNP-BC   4 mg at 06/11/23 1931    sodium chloride 0.9% flush 10 mL  10 mL Intravenous PRN Catalina Deutsch, FNP           Review of Systems  Objective:     Vital Signs (Most Recent):  Temp: 97.7 °F (36.5 °C) (06/12/23 0925)  Pulse: 65 (06/12/23 0651)  Resp: 13 (06/12/23 0651)  BP: (!) 149/81 (06/12/23 0925)  SpO2: 97 % (06/12/23 0651) Vital Signs (24h Range):  Temp:  [97.7 °F (36.5 °C)-98.3 °F (36.8 °C)] 97.7 °F (36.5 °C)  Pulse:  [47-65] 65  Resp:  [10-19] 13  SpO2:  [93 %-100 %] 97 %  BP: (113-151)/(49-81) 149/81     Weight: 68.9 kg (152 lb)  Body mass index is 24.53 kg/m².     Physical Exam      Alert and Oriented X 3. No facial droop. 5/5 strength in BUE. 4/5 in BLE ( chronic). Without any loss of sensation.    Significant Labs: CBC:   Recent Labs   Lab 06/11/23  1143 06/11/23  1301 06/12/23  0429   WBC  --  6.81 6.52   HGB  --  14.1 13.3   HCT 44 45.0 41.3   PLT  --  207 192     CMP:   Recent Labs   Lab 06/11/23  1301 06/12/23  0429    137   K 4.2 4.0   CO2 23 24   BUN 15.1 14.1   CREATININE 1.04* 0.89   CALCIUM 9.2 9.1   MG  --  1.80   ALBUMIN 3.7 3.4   BILITOT 0.3 0.4   ALKPHOS 98 87   AST 16 12   ALT 12 9       Significant Imaging: I have reviewed and interpreted all pertinent imaging results/findings within the past 24 hours.    Assessment and Plan:     * Stroke  Rule out stroke      Antithrombotics for secondary stroke prevention: Antiplatelets: Aspirin: 81 mg daily    Statins for secondary stroke prevention and hyperlipidemia, if present:   Statins: Atorvastatin- 40 mg daily    Aggressive risk factor modification: HTN, DM     Rehab efforts: The patient has been evaluated by a stroke team provider and the therapy needs have been fully considered based off  the presenting complaints and exam findings. The following therapy evaluations are needed: PT evaluate and treat, OT evaluate and treat, SLP evaluate and treat    Diagnostics ordered/pending: TTE to assess cardiac function/status     VTE prophylaxis: Heparin 5000 units SQ every 8 hours  Mechanical prophylaxis: Place SCDs    BP parameters: TIA: SBP <220 until imaging confirmation of no infarct      Plans for CEA tomorrow.   Repeat CT today. Unable to have MRI r/t pain stimulator.  Will need to continue ASA and statin upon discharge.  No further recommendations at this time.                  VTE Risk Mitigation (From admission, onward)         Ordered     enoxaparin injection 40 mg  Daily         06/11/23 1410     IP VTE HIGH RISK PATIENT  Once         06/11/23 1410     Place sequential compression device  Until discontinued         06/11/23 1410                Opal Kohler NP  Neurology  [facility]

## 2023-06-12 NOTE — HPI
Ms. Krishna is a 68yo female with PMH of DM type 2, hepatitis C, cirrhosis of liver, CKD stage 3, emphysema, anxiety, depression, and chronic neck and pack pain s/p stimulator placement.    Yesterday she woke up with left sided weakness, slurred speech and left facial droop.  She presented to the ED via EMS.  CTA of the head and neck reveals 77% stenosis of right ICA.    She has been seen by ST and an MBS was done this morning, results pending.  CV surgery has been consulted for right carotid endarterectomy.

## 2023-06-12 NOTE — ASSESSMENT & PLAN NOTE
Rule out stroke      Antithrombotics for secondary stroke prevention: Antiplatelets: Aspirin: 81 mg daily    Statins for secondary stroke prevention and hyperlipidemia, if present:   Statins: Atorvastatin- 40 mg daily    Aggressive risk factor modification: HTN, DM     Rehab efforts: The patient has been evaluated by a stroke team provider and the therapy needs have been fully considered based off the presenting complaints and exam findings. The following therapy evaluations are needed: PT evaluate and treat, OT evaluate and treat, SLP evaluate and treat    Diagnostics ordered/pending: TTE to assess cardiac function/status     VTE prophylaxis: Heparin 5000 units SQ every 8 hours  Mechanical prophylaxis: Place SCDs    BP parameters: TIA: SBP <220 until imaging confirmation of no infarct      Plans for CEA tomorrow.   Repeat CT today. Unable to have MRI r/t pain stimulator.  Will need to continue ASA and statin upon discharge.  No further recommendations at this time.

## 2023-06-12 NOTE — PROGRESS NOTES
[facility]  Hospital Medicine Progress Note        Chief Complaint: Inpatient Follow-up for     HPI:   Thom Krishna is a 67 y.o. female who past medical history includes anxiety, depression, arthritis, chronic neck and back pain status post stimulator placement, degenerative disc disease, DM type 2, hepatitis-C, cirrhosis of the liver, emphysema, CKD stage 3; presents to the ED via EMS air med with reports of left-sided facial droop with associated slurred speech.  It was reported that patient was last seen normal at around 930 this morning.  Patient reports she woke up with lower extremity weakness which she had a difficult time ambulating which she reported slurred speech with left facial droop and difficulty swallowing which started this morning.  She also reported left arm numbness and weakness and decreased  to her left hand.  She denies any injury, traumas, falls, or any syncopal events.  Patient denies any chest pain, shortness a breath, fever, chills, cough, congestion, nausea, vomiting, diarrhea, or any sick contacts.  CT of the head without contrast demonstrated no acute intracranial findings identified, right parietal lobe small new encephalomalacia mild chronic micro ischemia and moderate atrophy. CTA of head and neck without contrast demonstrated no short interval change of CT head appearance, no evidence of flow-limiting stenosis or other acute focal intracranial vascular abnormality, atherosclerotic changes of the bilateral extracranial carotid vasculature with severe short segment stenosis of the right proximal ICA.  Lab work done reviewed demonstrated creatinine 1.04, glucose 78; other indicis unremarkable.   Initial vital signs reviewed /58 pulse 65 respirations 19 temperature 98.1° F O2 saturation 99% on room air.  Patient received IV hydration and aspirin rectally in the ED. Patient is admitted to hospital medicine services for further  management.    Interval Hx:   No acute events overnight.  Doing well on room air.  Blood pressure fluctuations noted.  Asymptomatic bradycardia seen overnight.    Labs from this morning showing nothing newly remarkable.        Objective/physical exam:  Vitals:    06/11/23 2359 06/12/23 0359 06/12/23 0651 06/12/23 0925   BP: 129/71 113/75 (!) 149/61 (!) 149/81   BP Location: Left arm Left arm     Patient Position: Lying Lying     Pulse: (!) 53 (!) 59 65    Resp: 13 15 13    Temp:    97.7 °F (36.5 °C)   TempSrc:    Oral   SpO2: 96% 100% 97%    Weight:       Height:         General: In no acute distress, afebrile  Respiratory: Clear to auscultation bilaterally  Cardiovascular: S1, S2, no appreciable murmur  Abdomen: Soft, nontender, BS +  MSK: Warm, no lower extremity edema, no clubbing or cyanosis  Neurologic: Alert and oriented x4, Left sided 4/5, CN intact      Lab Results   Component Value Date     06/12/2023    K 4.0 06/12/2023     (H) 10/12/2022    CO2 24 06/12/2023    BUN 14.1 06/12/2023    CREATININE 0.89 06/12/2023    CALCIUM 9.1 06/12/2023    ANIONGAP 13 10/12/2022    ESTGFRAFRICA >60 07/23/2021    EGFRNONAA 58 (A) 07/23/2021      Lab Results   Component Value Date    ALT 9 06/12/2023    AST 12 06/12/2023     (H) 05/06/2013    ALKPHOS 87 06/12/2023    BILITOT 0.4 06/12/2023      Lab Results   Component Value Date    WBC 6.52 06/12/2023    HGB 13.3 06/12/2023    HCT 41.3 06/12/2023    MCV 98.8 (H) 06/12/2023     06/12/2023           Medications:   aspirin  300 mg Rectal Daily    atorvastatin  40 mg Oral Daily    enoxparin  40 mg Subcutaneous Daily      hydrALAZINE, labetalol, methocarbamoL, nitroGLYCERIN, ondansetron, sodium chloride 0.9%     Assessment/Plan:      Acute CVA rule out versus TIA with associated facial droop and slurred speech  Acute aphasia with slurred speech-secondary to suspected CVA-improving  Left side numbness- secondary to CVA- POA  Chronic back pain - with  chronic pain management and prior back stimulator implant  DM type II   Acute on chronic kidney disease stage III  Hx of hepatitis C with chronic cirrhosis    Plan:   -CTA showed no evidence of stenosis, revealed atherosclerotic disease bilateral carotid.  Carotid Doppler showed 70-99% stenosis on the right side, less than 50% stenosis on the left.  Patient may qualify for CEA.  TTE reviewed.    -continue aspirin and statin.  -therapy services  -we will review the home medications.            Lobo Carmona MD

## 2023-06-12 NOTE — CONSULTS
[facility]  Cardiothoracic Surgery  Consult Note    Patient Name: Thom Krishna  MRN: [id number]  Admission Date: 6/11/2023  Attending Physician: Emil Barahona MD  Referring Provider: Self, Aaareferral    Patient information was obtained from patient and ER records.     Consults  Subjective:     Principal Problem: Stroke    History of Present Illness:     Ms. Krishna is a 66yo female with PMH of DM type 2, hepatitis C, cirrhosis of liver, CKD stage 3, emphysema, anxiety, depression, and chronic neck and pack pain s/p stimulator placement.    Yesterday she woke up with left sided weakness, slurred speech and left facial droop.  She presented to the ED via EMS.  CTA of the head and neck reveals 77% stenosis of right ICA.    She has been seen by ST and an MBS was done this morning, results pending.  CV surgery has been consulted for right carotid endarterectomy.         No current facility-administered medications on file prior to encounter.     Current Outpatient Medications on File Prior to Encounter   Medication Sig    ALPRAZolam (XANAX) 1 MG tablet TAKE ONE (1) TABLET BY MOUTH TWICE A DAY AS NEEDED    cholecalciferol, vitamin D3, 125 mcg (5,000 unit) capsule Take 1 tablet  by mouth daily with breakfast.    HYDROcodone-acetaminophen (NORCO)  mg per tablet TAKE ONE (1) TABLET BY MOUTH THREE (3) TIMES DAILY AS NEEDED FOR PAIN    levothyroxine (SYNTHROID) 75 MCG tablet TAKE 1 TABLET EVERY DAY ON AN EMPTY STOMACH    ondansetron (ZOFRAN-ODT) 8 MG TbDL Dissolve 1 tablet (8 mg total) by mouth under the tongue 3 (three) times daily as needed (nausea).    pantoprazole (PROTONIX) 40 MG tablet TAKE 1 TABLET EVERY DAY    senna-docusate 8.6-50 mg (SENNA WITH DOCUSATE SODIUM) 8.6-50 mg per tablet Take 3 tablets by mouth once daily. Take 1-2 tabs daily-- take any time a pain pill( norco is taken) to help reduce opiod induced constipation    amitriptyline (ELAVIL) 25 MG tablet TAKE  1-2 TABS NIGHTLY AS NEEDED FOR INSOMNIA RELATED TO CHRONIC PAIN    blood-glucose meter kit Test tid please dispense test strips and lancets    diclofenac sodium (VOLTAREN) 1 % Gel Apply 2 g topically 4 (four) times daily.    ergocalciferol (ERGOCALCIFEROL) 50,000 unit Cap Take 1 capsule (50,000 Units total) by mouth every 7 days.    naloxone (NARCAN) 4 mg/actuation Spry 4mg by nasal route as needed for opioid overdose; may repeat every 2-3 minutes in alternating nostrils until medical help arrives. Call 911       Review of patient's allergies indicates:   Allergen Reactions    Cefaclor Hives    Gabapentin Swelling    Penicillins      Other reaction(s): Hives    Sulfa (sulfonamide antibiotics) Other (See Comments)     Other reaction(s): Hives       Past Medical History:   Diagnosis Date    Anxiety and depression 2015    Arthritis     Cervical radiculopathy 2016    Cirrhosis of liver     Colon polyps 2015    Diabetes mellitus type 2 with neurological manifestations 2015    no current medications, only one episode of elevated sugars with acute illness, will monitor     Encounter for blood transfusion     Hepatitis C     s/p treatment per patient report; managed by Dr. Perez    Hip fracture     Macrocytosis 2015    Thyroid disease     Transfusion reaction     hep c     Past Surgical History:   Procedure Laterality Date    APPENDECTOMY      BACK SURGERY      CAUDAL EPIDURAL STEROID INJECTION N/A 2018    Procedure: Injection-steroid-epidural-caudal;  Surgeon: Roxana Gu Jr., MD;  Location: [facility] OR;  Service: Pain Management;  Laterality: N/A;  with cath target L4-5    CAUDAL EPIDURAL STEROID INJECTION N/A 2019    Procedure: Injection-steroid-epidural-caudal;  Surgeon: Roxana uG Jr., MD;  Location: [facility] PAIN MGT;  Service: Pain Management;  Laterality: N/A;     SECTION      CHOLECYSTECTOMY      COLONOSCOPY  3/31/10    2 polyps, tubular  "adenoma    COLONOSCOPY  04/27/2015    Dr. Washington    COLONOSCOPY N/A 10/10/2018    Procedure: COLONOSCOPY;  Surgeon: Reuben Miller Jr., MD;  Location: [facility];  Service: Endoscopy;  Laterality: N/A;    ESOPHAGOGASTRODUODENOSCOPY N/A 10/10/2018    Procedure: EGD (ESOPHAGOGASTRODUODENOSCOPY);  Surgeon: Reuben Miller Jr., MD;  Location: [facility];  Service: Endoscopy;  Laterality: N/A;    ESOPHAGOGASTRODUODENOSCOPY N/A 12/10/2018    Procedure: EGD (ESOPHAGOGASTRODUODENOSCOPY)/poss emr;  Surgeon: Belle Kuo MD;  Location: [address]);  Service: Endoscopy;  Laterality: N/A;    ESOPHAGOGASTRODUODENOSCOPY N/A 3/12/2019    Procedure: EGD (ESOPHAGOGASTRODUODENOSCOPY);  Surgeon: Polo Keyes MD;  Location: [facility];  Service: Endoscopy;  Laterality: N/A;    ESOPHAGOGASTRODUODENOSCOPY N/A 11/20/2020    Procedure: ESOPHAGOGASTRODUODENOSCOPY (EGD);  Surgeon: Belle Kuo MD;  Location: [facility];  Service: Endoscopy;  Laterality: N/A;    HERNIA REPAIR      HYSTERECTOMY  1989    Uterus and both ovaries    INCISIONAL HERNIA REPAIR      x 2    JOINT REPLACEMENT      LIVER BIOPSY  12/2003    autoimmune hepatitis    ROTATOR CUFF REPAIR      right    STOMACH SURGERY  1980's    "took lymph node off of liver"    TOTAL HIP ARTHROPLASTY      left hip    UPPER GASTROINTESTINAL ENDOSCOPY  3/24/10    normal    UPPER GASTROINTESTINAL ENDOSCOPY  04/27/2015    Dr. Washington     Family History       Problem Relation (Age of Onset)    Brain cancer Brother    Breast cancer Sister    Cataracts Sister, Sister    Colon cancer Brother    Diabetes Mother, Brother, Other, Son    Diabetes Mellitus Mother    Liver cancer Sister    Lung cancer Brother    Pancreatic cancer Brother    Stomach cancer Other    Throat cancer Brother          Tobacco Use    Smoking status: Every Day     Packs/day: 2.00     Years: 45.00     Pack years: 90.00     Types: Cigarettes    Smokeless tobacco: Never   Substance and Sexual " Activity    Alcohol use: No     Comment: used to drink heavily, stopped in 1990's    Drug use: No    Sexual activity: Not Currently     Partners: Male     Review of Systems   Constitutional:  Negative for fever.   HENT:  Positive for trouble swallowing.    Respiratory:  Negative for shortness of breath and wheezing.    Cardiovascular:  Negative for chest pain.   Gastrointestinal:  Negative for abdominal pain.   Genitourinary: Negative.    Neurological:  Positive for weakness and headaches.   Hematological: Negative.    Psychiatric/Behavioral: Negative.     Objective:     Vital Signs (Most Recent):  Temp: 97.7 °F (36.5 °C) (06/12/23 0925)  Pulse: 65 (06/12/23 0651)  Resp: 16 (06/12/23 1121)  BP: (!) 149/81 (06/12/23 0925)  SpO2: 97 % (06/12/23 0651) Vital Signs (24h Range):  Temp:  [97.7 °F (36.5 °C)-98.3 °F (36.8 °C)] 97.7 °F (36.5 °C)  Pulse:  [47-65] 65  Resp:  [10-19] 16  SpO2:  [93 %-100 %] 97 %  BP: (113-151)/(49-81) 149/81     Weight: 68.9 kg (152 lb)  Body mass index is 24.53 kg/m².    SpO2: 97 %        Intake/Output - Last 3 Shifts         06/10 0700  06/11 0659 06/11 0700  06/12 0659 06/12 0700  06/13 0659    IV Piggyback  100     Total Intake(mL/kg)  100 (1.5)     Urine (mL/kg/hr)   600 (1.9)    Total Output   600    Net  +100 -600                    Lines/Drains/Airways       Peripheral Intravenous Line  Duration                  Peripheral IV - Single Lumen 06/11/23 1213 20 G Anterior;Distal;Right Upper Arm <1 day                     STS Risk Score: not calculated     Physical Exam  Constitutional:       Appearance: She is not toxic-appearing.   HENT:      Head: Normocephalic and atraumatic.      Mouth/Throat:      Mouth: Mucous membranes are moist.   Eyes:      Conjunctiva/sclera: Conjunctivae normal.   Cardiovascular:      Rate and Rhythm: Normal rate and regular rhythm.   Pulmonary:      Effort: Pulmonary effort is normal.      Breath sounds: Normal breath sounds.   Abdominal:      General: Bowel  sounds are normal.      Palpations: Abdomen is soft.   Neurological:      Mental Status: She is alert and oriented to person, place, and time.      Comments: Right UE/LE 5/5; Left UE/LE 4/5; symmetric facies. Tongue midline   Psychiatric:         Mood and Affect: Mood normal.         Behavior: Behavior normal.          Significant Labs:  All pertinent labs from the last 24 hours have been reviewed.    Significant Diagnostics:  I have reviewed all pertinent imaging results/findings within the past 24 hours.      Assessment/Plan:    Symptomatic right carotid artery stenosis  TIA  DM type 2  Hepatitis C, cirrhosis of the liver  CKD 3  Anxiety  Depression  Chronic neck and back pain s/p spinal cord stimulator    Planning R CEA tomorrow  NPO after MN  Dr. James discussed surgery, risks and benefits with the patient and she elects to proceed.     NYHA Score:     No notes have been filed under this hospital service.  Service: Cardiothoracic Surgery      Thank you for your consult. I will follow-up with patient. Please contact us if you have any additional questions.    JEAN Toscano  Cardiothoracic Surgery  [facility]

## 2023-06-12 NOTE — NURSING
Nurses Note -- 4 Eyes      6/12/2023   9:32 AM      Skin assessed during: Admit      [x] No Altered Skin Integrity Present    []Prevention Measures Documented      [] Yes- Altered Skin Integrity Present or Discovered   [] LDA Added if Not in Epic (Describe Wound)   [] New Altered Skin Integrity was Present on Admit and Documented in LDA   [] Wound Image Taken    Wound Care Consulted? No    Attending Nurse:  Dave Blanton LPN     Second RN/Staff Member:  Yessica Weiss LPN

## 2023-06-12 NOTE — PLAN OF CARE
LTG: To tolerate least restrictive diet without clinical signs/sx of aspiration.   ST. Tongue base/laryngeal excursion exercise   2. Tolerate thermal stimulation x10 with 100% swallow response with less than a 2 second delay.

## 2023-06-12 NOTE — PT/OT/SLP EVAL
Speech Language Pathology Department  Clinical Swallow Evaluation    Patient Name:  Thom Krishna   MRN:  [id number]    Recommendations:     General recommendations:  Modified Barium Swallow Study  Diet recommendations:  NPO, Liquid Diet Level: NPO OK for meds in puree  Precautions: Standard,      History:     Thom Krishna is a/n 67 y.o. female admitted for CVA workup. Pt failed Zamudio swallow screener.     Past Medical History:   Diagnosis Date    Anxiety and depression 2015    Arthritis     Cervical radiculopathy 2016    Cirrhosis of liver     Colon polyps 2015    Diabetes mellitus type 2 with neurological manifestations 2015    no current medications, only one episode of elevated sugars with acute illness, will monitor     Encounter for blood transfusion     Hepatitis C     s/p treatment per patient report; managed by Dr. Perez    Hip fracture     Macrocytosis 2015    Thyroid disease     Transfusion reaction     hep c     Past Surgical History:   Procedure Laterality Date    APPENDECTOMY      BACK SURGERY      CAUDAL EPIDURAL STEROID INJECTION N/A 2018    Procedure: Injection-steroid-epidural-caudal;  Surgeon: Roxana Gu Jr., MD;  Location: [facility] OR;  Service: Pain Management;  Laterality: N/A;  with cath target L4-5    CAUDAL EPIDURAL STEROID INJECTION N/A 2019    Procedure: Injection-steroid-epidural-caudal;  Surgeon: Roxana Gu Jr., MD;  Location: [facility] PAIN MGT;  Service: Pain Management;  Laterality: N/A;     SECTION      CHOLECYSTECTOMY      COLONOSCOPY  3/31/10    2 polyps, tubular adenoma    COLONOSCOPY  2015    Dr. Washington    COLONOSCOPY N/A 10/10/2018    Procedure: COLONOSCOPY;  Surgeon: Reuben Miller Jr., MD;  Location: [facility];  Service: Endoscopy;  Laterality: N/A;    ESOPHAGOGASTRODUODENOSCOPY N/A 10/10/2018    Procedure: EGD (ESOPHAGOGASTRODUODENOSCOPY);  Surgeon: Reuben Miller Jr., MD;  Location: [facility];  Service: Endoscopy;   "Laterality: N/A;    ESOPHAGOGASTRODUODENOSCOPY N/A 12/10/2018    Procedure: EGD (ESOPHAGOGASTRODUODENOSCOPY)/poss emr;  Surgeon: Belle Kuo MD;  Location: [address]);  Service: Endoscopy;  Laterality: N/A;    ESOPHAGOGASTRODUODENOSCOPY N/A 3/12/2019    Procedure: EGD (ESOPHAGOGASTRODUODENOSCOPY);  Surgeon: Polo Keyes MD;  Location: [facility];  Service: Endoscopy;  Laterality: N/A;    ESOPHAGOGASTRODUODENOSCOPY N/A 11/20/2020    Procedure: ESOPHAGOGASTRODUODENOSCOPY (EGD);  Surgeon: Belle Kuo MD;  Location: [facility];  Service: Endoscopy;  Laterality: N/A;    HERNIA REPAIR      HYSTERECTOMY  1989    Uterus and both ovaries    INCISIONAL HERNIA REPAIR      x 2    JOINT REPLACEMENT      LIVER BIOPSY  12/2003    autoimmune hepatitis    ROTATOR CUFF REPAIR      right    STOMACH SURGERY  1980's    "took lymph node off of liver"    TOTAL HIP ARTHROPLASTY      left hip    UPPER GASTROINTESTINAL ENDOSCOPY  3/24/10    normal    UPPER GASTROINTESTINAL ENDOSCOPY  04/27/2015    Dr. Washington       Home Diet: Regular and thin liquids  Current Method of Nutrition: NPO    Patient complaint: denies    Subjective     Patient awake and alert.  Patient goals: "to get better"   Spiritual/Cultural/Pentecostalism Beliefs/Practices that affect care: no  Pain/Comfort: Pain Rating 1: 0/10    Respiratory status: nasal cannula  Restraints/positioning devices: none    Objective:     ORAL MUSCULATURE  Dentition: own teeth  Secretion Management: adequate  Mucosal Quality: good  Facial Movement: WFL  Buccal Strength & Mobility: WFL  Mandibular Strength & Mobility: WFL  Oral Labial Strength & Mobility: WFL    Consistency Fed By Oral Symptoms Pharyngeal Symptoms   Thin liquid by straw SLP None Multiple swallows  Cough during swallow   Puree SLP None None   Chewable solid SLP None None     Assessment:     Signs/sx of aspiration observed during evaluation. REC: MBS to further evaluate swallow function.     Patient Education: "     Patient provided with verbal education regarding POC.  Understanding was verbalized.     Time Tracking:     SLP Treatment Date:    6/12/23  Speech Start Time:   0820  Speech Stop Time:     0835   Speech Total Time (min):   15    Billable minutes:  Swallow and Oral Function Evaluation, 15 minutes     06/12/2023

## 2023-06-12 NOTE — PT/OT/SLP PROGRESS
Physical Therapy    Missed Treatment Session    Patient Name:  Thom Krishna   MRN:  [id number]      Patient not seen at this time secondary to pending surgery for R CEA tomorrow.    Will follow-up as patient is available to participate and as therapists' schedule allows.

## 2023-06-12 NOTE — TELEPHONE ENCOUNTER
Called pt and she told me that she just wanted us to know thaty she went into the Ed on Saturday and was dx with a stroke. She is having surgery tomorrow to remove the blockage from her but she wanted us to know what was happening.

## 2023-06-12 NOTE — TELEPHONE ENCOUNTER
----- Message from Devin Mills sent at 6/12/2023  3:39 PM CDT -----  Contact: pt  Type:  update on care     Who Called: pt   Would the patient rather a call back or a response via [facility]? call  Best Call Back Number: [phone]  Additional Information:    Pt stated she attended to ED at [facility] on Saturday   Pt stated they doing a procedure  on her neck tomorrow to get a blockage  out

## 2023-06-12 NOTE — SUBJECTIVE & OBJECTIVE
Subjective:     Interval History: Patient sitting in bed in NAD. Sister at bedside. Left facial droop not present.     Current Neurological Medication    Current Facility-Administered Medications   Medication Dose Route Frequency Provider Last Rate Last Admin    aspirin suppository 300 mg  300 mg Rectal Daily Catalina Deutsch, MAGGY        atorvastatin tablet 40 mg  40 mg Oral Daily Catalina Deutsch, FNP        enoxaparin injection 40 mg  40 mg Subcutaneous Daily Catalina Deutsch FNP   40 mg at 06/11/23 1712    hydrALAZINE injection 10 mg  10 mg Intravenous Q4H PRN Catalina Deutsch, MIGUELANGELP        labetaloL injection 10 mg  10 mg Intravenous Q15 Min PRN Catalina Deutsch, MIGUELANGELP        lactated ringers infusion   Intravenous Continuous MIGUELINA JimenezP-BC 75 mL/hr at 06/12/23 0128 New Bag at 06/12/23 0128    methocarbamoL injection 500 mg  500 mg Intravenous Q12H PRN Catalina Deutsch FNP   500 mg at 06/12/23 0655    nitroGLYCERIN SL tablet 0.4 mg  0.4 mg Sublingual Q5 Min PRN Ny Santos MD   0.4 mg at 06/11/23 1804    ondansetron injection 4 mg  4 mg Intravenous Q4H PRN MIGUELINA JimenezP-BC   4 mg at 06/11/23 1931    sodium chloride 0.9% flush 10 mL  10 mL Intravenous PRN Catalina Deutsch, MIGUELANGELP           Review of Systems  Objective:     Vital Signs (Most Recent):  Temp: 97.7 °F (36.5 °C) (06/12/23 0925)  Pulse: 65 (06/12/23 0651)  Resp: 13 (06/12/23 0651)  BP: (!) 149/81 (06/12/23 0925)  SpO2: 97 % (06/12/23 0651) Vital Signs (24h Range):  Temp:  [97.7 °F (36.5 °C)-98.3 °F (36.8 °C)] 97.7 °F (36.5 °C)  Pulse:  [47-65] 65  Resp:  [10-19] 13  SpO2:  [93 %-100 %] 97 %  BP: (113-151)/(49-81) 149/81     Weight: 68.9 kg (152 lb)  Body mass index is 24.53 kg/m².     Physical Exam      Alert and Oriented X 3. No facial droop. 5/5 strength in BUE. 4/5 in BLE ( chronic). Without any loss of sensation.    Significant Labs: CBC:   Recent Labs   Lab 06/11/23  1143 06/11/23  1301  06/12/23  0429   WBC  --  6.81 6.52   HGB  --  14.1 13.3   HCT 44 45.0 41.3   PLT  --  207 192     CMP:   Recent Labs   Lab 06/11/23  1301 06/12/23 0429    137   K 4.2 4.0   CO2 23 24   BUN 15.1 14.1   CREATININE 1.04* 0.89   CALCIUM 9.2 9.1   MG  --  1.80   ALBUMIN 3.7 3.4   BILITOT 0.3 0.4   ALKPHOS 98 87   AST 16 12   ALT 12 9       Significant Imaging: I have reviewed and interpreted all pertinent imaging results/findings within the past 24 hours.

## 2023-06-12 NOTE — PT/OT/SLP PROGRESS
Occupational Therapy      Patient Name:  Thom Krishna   MRN:  [id number]    OT eval orders received. Patient not seen today secondary to pending sx for R CEA tomorrow (6/13). Will follow-up as appropriate.    6/12/2023

## 2023-06-12 NOTE — PROCEDURES
Speech Language Pathology Department  Modified Barium Swallow (MBS) Study    Patient Name:  Thom Krishna   MRN:  [id number]    Recommendations:     General recommendations:  dysphagia therapy  Repeat MBS study: 1-3 weeks or as clinically warranted  Diet recommendations:  Soft & Bite Sized Diet - IDDSI Level 6, Liquid Diet Level: Moderately thick liquids - IDDSI Level 3   Swallow strategies/precautions: small bites/sips and slow rate  General precautions: Standard,      History/Reason for Referral:     Thom Krishna is a/n 67 y.o. female presented to the ED with Left facial droop and slurred speech.   Past Medical History:   Diagnosis Date    Anxiety and depression 2015    Arthritis     Cervical radiculopathy 2016    Cirrhosis of liver     Colon polyps 2015    Diabetes mellitus type 2 with neurological manifestations 2015    no current medications, only one episode of elevated sugars with acute illness, will monitor     Encounter for blood transfusion     Hepatitis C     s/p treatment per patient report; managed by Dr. Perez    Hip fracture     Macrocytosis 2015    Thyroid disease     Transfusion reaction     hep c     Past Surgical History:   Procedure Laterality Date    APPENDECTOMY      BACK SURGERY      CAUDAL EPIDURAL STEROID INJECTION N/A 2018    Procedure: Injection-steroid-epidural-caudal;  Surgeon: Roxana Gu Jr., MD;  Location: [facility] OR;  Service: Pain Management;  Laterality: N/A;  with cath target L4-5    CAUDAL EPIDURAL STEROID INJECTION N/A 2019    Procedure: Injection-steroid-epidural-caudal;  Surgeon: Roxana Gu Jr., MD;  Location: [facility] PAIN MGT;  Service: Pain Management;  Laterality: N/A;     SECTION      CHOLECYSTECTOMY      COLONOSCOPY  3/31/10    2 polyps, tubular adenoma    COLONOSCOPY  2015    Dr. Washington    COLONOSCOPY N/A 10/10/2018    Procedure: COLONOSCOPY;  Surgeon: Reuben Miller Jr., MD;  Location: [facility] ENDO;  Service:  "Endoscopy;  Laterality: N/A;    ESOPHAGOGASTRODUODENOSCOPY N/A 10/10/2018    Procedure: EGD (ESOPHAGOGASTRODUODENOSCOPY);  Surgeon: Reuben Miller Jr., MD;  Location: [facility];  Service: Endoscopy;  Laterality: N/A;    ESOPHAGOGASTRODUODENOSCOPY N/A 12/10/2018    Procedure: EGD (ESOPHAGOGASTRODUODENOSCOPY)/poss emr;  Surgeon: Belle Kuo MD;  [facility]  Service: Endoscopy;  Laterality: N/A;    ESOPHAGOGASTRODUODENOSCOPY N/A 3/12/2019    Procedure: EGD (ESOPHAGOGASTRODUODENOSCOPY);  Surgeon: Polo Keyes MD;  Location: [facility];  Service: Endoscopy;  Laterality: N/A;    ESOPHAGOGASTRODUODENOSCOPY N/A 11/20/2020    Procedure: ESOPHAGOGASTRODUODENOSCOPY (EGD);  Surgeon: Belle Kuo MD;  Location: [facility];  Service: Endoscopy;  Laterality: N/A;    HERNIA REPAIR      HYSTERECTOMY  1989    Uterus and both ovaries    INCISIONAL HERNIA REPAIR      x 2    JOINT REPLACEMENT      LIVER BIOPSY  12/2003    autoimmune hepatitis    ROTATOR CUFF REPAIR      right    STOMACH SURGERY  1980's    "took lymph node off of liver"    TOTAL HIP ARTHROPLASTY      left hip    UPPER GASTROINTESTINAL ENDOSCOPY  3/24/10    normal    UPPER GASTROINTESTINAL ENDOSCOPY  04/27/2015    Dr. Padmini MCCOY MBS Study was completed to assess the efficiency of her swallow function, rule out aspiration and make recommendations regarding safe dietary consistencies, effective compensatory strategies, and safe eating environment.     Home Diet: Regular and thin liquids  Current Method of Nutrition: NPO    Patient complaint: denies    Subjective:     Patient awake and alert.  Spiritual/Cultural/Yarsanism Beliefs/Practices that affect care: no  Pain/Comfort: Pain Rating 1: 0/10      Fluoroscopic Results:     Oral Musculature  Dentition: own teeth  Secretion Management: adequate  Mucosal Quality: good  Facial Movement: WFL  Buccal Strength & Mobility: WFL  Mandibular Strength & Mobility: WFL    Positioning: upright in " bed  Visualization  Patient was seen in the lateral view    Oral Phase:   Reduced bolus control    Pharyngeal Phase:   Swallow delay with spill to the to pyriform sinuses  Reduced base of tongue retraction  Reduced hyolaryngeal excursion  Laryngeal penetration with thin liquids   Consistency Laryngeal Penetration Aspiration Residue   Mildly thick liquid by straw During the swallow None Mild   Thin liquid by straw During the swallow None Mild   Moderately thick liquid by straw None None Mild   Puree None None None   Chewable solid None None Mild     Cervical Esophageal Phase:   residual material visualized    Assessment:     Pt exhibited mild-moderate oropharyngeal dysphagia characterized by the findings noted above.  Laryngeal penetration of thin and mildly thick liquids did NOT clear  .  Both swallow safety and efficiency are impaired.     Patient appears to be at moderate risk for aspiration related pneumonia when considering fair oral health status, overall health/immune status, reduced laryngeal vestibule closure, and severity of dysphagia.  Prognosis for behavioral swallow rehabilitation is fair.    Goals:     Multidisciplinary Problems       SLP Goals          Problem: SLP    Goal Priority Disciplines Outcome   SLP Goal     SLP    Description: LTG: To tolerate least restrictive diet without clinical signs/sx of aspiration.   ST. Tongue base/laryngeal excursion exercise   2. Tolerate thermal stimulation x10 with 100% swallow response with less than a 2 second delay.                      Patient Education:     Patient provided with verbal education regarding POC.  Understanding was verbalized.    Plan:     SLP Follow-Up:     23  Patient to be seen:    23  Plan of Care expires:   23  Plan of Care reviewed with:  patient     Time Tracking:     SLP Treatment Date:    23  Speech Start Time:   1030  Speech Stop Time:      1050  Speech Total Time (min):   20    Billable minutes:   Motion  Fluoroscopic Evaluation, Video Recording, 20 minutes     06/12/2023

## 2023-06-12 NOTE — SUBJECTIVE & OBJECTIVE
No current facility-administered medications on file prior to encounter.     Current Outpatient Medications on File Prior to Encounter   Medication Sig    ALPRAZolam (XANAX) 1 MG tablet TAKE ONE (1) TABLET BY MOUTH TWICE A DAY AS NEEDED    cholecalciferol, vitamin D3, 125 mcg (5,000 unit) capsule Take 1 tablet  by mouth daily with breakfast.    HYDROcodone-acetaminophen (NORCO)  mg per tablet TAKE ONE (1) TABLET BY MOUTH THREE (3) TIMES DAILY AS NEEDED FOR PAIN    levothyroxine (SYNTHROID) 75 MCG tablet TAKE 1 TABLET EVERY DAY ON AN EMPTY STOMACH    ondansetron (ZOFRAN-ODT) 8 MG TbDL Dissolve 1 tablet (8 mg total) by mouth under the tongue 3 (three) times daily as needed (nausea).    pantoprazole (PROTONIX) 40 MG tablet TAKE 1 TABLET EVERY DAY    senna-docusate 8.6-50 mg (SENNA WITH DOCUSATE SODIUM) 8.6-50 mg per tablet Take 3 tablets by mouth once daily. Take 1-2 tabs daily-- take any time a pain pill( norco is taken) to help reduce opiod induced constipation    amitriptyline (ELAVIL) 25 MG tablet TAKE 1-2 TABS NIGHTLY AS NEEDED FOR INSOMNIA RELATED TO CHRONIC PAIN    blood-glucose meter kit Test tid please dispense test strips and lancets    diclofenac sodium (VOLTAREN) 1 % Gel Apply 2 g topically 4 (four) times daily.    ergocalciferol (ERGOCALCIFEROL) 50,000 unit Cap Take 1 capsule (50,000 Units total) by mouth every 7 days.    naloxone (NARCAN) 4 mg/actuation Spry 4mg by nasal route as needed for opioid overdose; may repeat every 2-3 minutes in alternating nostrils until medical help arrives. Call 911       Review of patient's allergies indicates:   Allergen Reactions    Cefaclor Hives    Gabapentin Swelling    Penicillins      Other reaction(s): Hives    Sulfa (sulfonamide antibiotics) Other (See Comments)     Other reaction(s): Hives       Past Medical History:   Diagnosis Date    Anxiety and depression 7/21/2015    Arthritis     Cervical radiculopathy 4/8/2016    Cirrhosis of liver     Colon polyps  2015    Diabetes mellitus type 2 with neurological manifestations 2015    no current medications, only one episode of elevated sugars with acute illness, will monitor     Encounter for blood transfusion     Hepatitis C     s/p treatment per patient report; managed by Dr. Perez    Hip fracture     Macrocytosis 2015    Thyroid disease     Transfusion reaction     hep c     Past Surgical History:   Procedure Laterality Date    APPENDECTOMY      BACK SURGERY      CAUDAL EPIDURAL STEROID INJECTION N/A 2018    Procedure: Injection-steroid-epidural-caudal;  Surgeon: Roxana Gu Jr., MD;  Location: [facility] OR;  Service: Pain Management;  Laterality: N/A;  with cath target L4-5    CAUDAL EPIDURAL STEROID INJECTION N/A 2019    Procedure: Injection-steroid-epidural-caudal;  Surgeon: Roxana Gu Jr., MD;  Location: [facility] PAIN MGT;  Service: Pain Management;  Laterality: N/A;     SECTION      CHOLECYSTECTOMY      COLONOSCOPY  3/31/10    2 polyps, tubular adenoma    COLONOSCOPY  2015    Dr. Washington    COLONOSCOPY N/A 10/10/2018    Procedure: COLONOSCOPY;  Surgeon: Reuben Miller Jr., MD;  Location: [facility];  Service: Endoscopy;  Laterality: N/A;    ESOPHAGOGASTRODUODENOSCOPY N/A 10/10/2018    Procedure: EGD (ESOPHAGOGASTRODUODENOSCOPY);  Surgeon: Reuben Miller Jr., MD;  Location: [facility];  Service: Endoscopy;  Laterality: N/A;    ESOPHAGOGASTRODUODENOSCOPY N/A 12/10/2018    Procedure: EGD (ESOPHAGOGASTRODUODENOSCOPY)/poss emr;  Surgeon: Belle Kuo MD;  Location: [facility] ([address] [place] [address]);  Service: Endoscopy;  Laterality: N/A;    ESOPHAGOGASTRODUODENOSCOPY N/A 3/12/2019    Procedure: EGD (ESOPHAGOGASTRODUODENOSCOPY);  Surgeon: Polo Keyes MD;  Location: [facility];  Service: Endoscopy;  Laterality: N/A;    ESOPHAGOGASTRODUODENOSCOPY N/A 2020    Procedure: ESOPHAGOGASTRODUODENOSCOPY (EGD);  Surgeon: Belle Kuo MD;  Location: [facility];  Service:  "Endoscopy;  Laterality: N/A;    HERNIA REPAIR      HYSTERECTOMY  1989    Uterus and both ovaries    INCISIONAL HERNIA REPAIR      x 2    JOINT REPLACEMENT      LIVER BIOPSY  12/2003    autoimmune hepatitis    ROTATOR CUFF REPAIR      right    STOMACH SURGERY  1980's    "took lymph node off of liver"    TOTAL HIP ARTHROPLASTY      left hip    UPPER GASTROINTESTINAL ENDOSCOPY  3/24/10    normal    UPPER GASTROINTESTINAL ENDOSCOPY  04/27/2015    Dr. Washington     Family History       Problem Relation (Age of Onset)    Brain cancer Brother    Breast cancer Sister    Cataracts Sister, Sister    Colon cancer Brother    Diabetes Mother, Brother, Other, Son    Diabetes Mellitus Mother    Liver cancer Sister    Lung cancer Brother    Pancreatic cancer Brother    Stomach cancer Other    Throat cancer Brother          Tobacco Use    Smoking status: Every Day     Packs/day: 2.00     Years: 45.00     Pack years: 90.00     Types: Cigarettes    Smokeless tobacco: Never   Substance and Sexual Activity    Alcohol use: No     Comment: used to drink heavily, stopped in 1990's    Drug use: No    Sexual activity: Not Currently     Partners: Male     Review of Systems   Constitutional:  Negative for fever.   HENT:  Positive for trouble swallowing.    Respiratory:  Negative for shortness of breath and wheezing.    Cardiovascular:  Negative for chest pain.   Gastrointestinal:  Negative for abdominal pain.   Genitourinary: Negative.    Neurological:  Positive for weakness and headaches.   Hematological: Negative.    Psychiatric/Behavioral: Negative.     Objective:     Vital Signs (Most Recent):  Temp: 97.7 °F (36.5 °C) (06/12/23 0925)  Pulse: 65 (06/12/23 0651)  Resp: 16 (06/12/23 1121)  BP: (!) 149/81 (06/12/23 0925)  SpO2: 97 % (06/12/23 0651) Vital Signs (24h Range):  Temp:  [97.7 °F (36.5 °C)-98.3 °F (36.8 °C)] 97.7 °F (36.5 °C)  Pulse:  [47-65] 65  Resp:  [10-19] 16  SpO2:  [93 %-100 %] 97 %  BP: (113-151)/(49-81) 149/81     Weight: 68.9 " kg (152 lb)  Body mass index is 24.53 kg/m².    SpO2: 97 %        Intake/Output - Last 3 Shifts         06/10 0700  06/11 0659 06/11 0700  06/12 0659 06/12 0700  06/13 0659    IV Piggyback  100     Total Intake(mL/kg)  100 (1.5)     Urine (mL/kg/hr)   600 (1.9)    Total Output   600    Net  +100 -600                    Lines/Drains/Airways       Peripheral Intravenous Line  Duration                  Peripheral IV - Single Lumen 06/11/23 1213 20 G Anterior;Distal;Right Upper Arm <1 day                     STS Risk Score: not calculated     Physical Exam  Constitutional:       Appearance: She is not toxic-appearing.   HENT:      Head: Normocephalic and atraumatic.      Mouth/Throat:      Mouth: Mucous membranes are moist.   Eyes:      Conjunctiva/sclera: Conjunctivae normal.   Cardiovascular:      Rate and Rhythm: Normal rate and regular rhythm.   Pulmonary:      Effort: Pulmonary effort is normal.      Breath sounds: Normal breath sounds.   Abdominal:      General: Bowel sounds are normal.      Palpations: Abdomen is soft.   Neurological:      Mental Status: She is alert and oriented to person, place, and time.      Comments: Right UE/LE 5/5; Left UE/LE 4/5; symmetric facies. Tongue midline   Psychiatric:         Mood and Affect: Mood normal.         Behavior: Behavior normal.          Significant Labs:  All pertinent labs from the last 24 hours have been reviewed.    Significant Diagnostics:  I have reviewed all pertinent imaging results/findings within the past 24 hours.

## 2023-06-12 NOTE — H&P (VIEW-ONLY)
[facility] - [facility] Tele[facility]  Cardiothoracic Surgery  Consult Note    Patient Name: Thom Krishna  MRN: [id number]  Admission Date: 6/11/2023  Attending Physician: Emil Barahona MD  Referring Provider: Self, Aaareferral    Patient information was obtained from patient and ER records.     Consults  Subjective:     Principal Problem: Stroke    History of Present Illness:     Ms. Krishna is a 66yo female with PMH of DM type 2, hepatitis C, cirrhosis of liver, CKD stage 3, emphysema, anxiety, depression, and chronic neck and pack pain s/p stimulator placement.    Yesterday she woke up with left sided weakness, slurred speech and left facial droop.  She presented to the ED via EMS.  CTA of the head and neck reveals 77% stenosis of right ICA.    She has been seen by ST and an MBS was done this morning, results pending.  CV surgery has been consulted for right carotid endarterectomy.         No current facility-administered medications on file prior to encounter.     Current Outpatient Medications on File Prior to Encounter   Medication Sig    ALPRAZolam (XANAX) 1 MG tablet TAKE ONE (1) TABLET BY MOUTH TWICE A DAY AS NEEDED    cholecalciferol, vitamin D3, 125 mcg (5,000 unit) capsule Take 1 tablet  by mouth daily with breakfast.    HYDROcodone-acetaminophen (NORCO)  mg per tablet TAKE ONE (1) TABLET BY MOUTH THREE (3) TIMES DAILY AS NEEDED FOR PAIN    levothyroxine (SYNTHROID) 75 MCG tablet TAKE 1 TABLET EVERY DAY ON AN EMPTY STOMACH    ondansetron (ZOFRAN-ODT) 8 MG TbDL Dissolve 1 tablet (8 mg total) by mouth under the tongue 3 (three) times daily as needed (nausea).    pantoprazole (PROTONIX) 40 MG tablet TAKE 1 TABLET EVERY DAY    senna-docusate 8.6-50 mg (SENNA WITH DOCUSATE SODIUM) 8.6-50 mg per tablet Take 3 tablets by mouth once daily. Take 1-2 tabs daily-- take any time a pain pill( norco is taken) to help reduce opiod induced constipation    amitriptyline (ELAVIL) 25 MG tablet TAKE  1-2 TABS NIGHTLY AS NEEDED FOR INSOMNIA RELATED TO CHRONIC PAIN    blood-glucose meter kit Test tid please dispense test strips and lancets    diclofenac sodium (VOLTAREN) 1 % Gel Apply 2 g topically 4 (four) times daily.    ergocalciferol (ERGOCALCIFEROL) 50,000 unit Cap Take 1 capsule (50,000 Units total) by mouth every 7 days.    naloxone (NARCAN) 4 mg/actuation Spry 4mg by nasal route as needed for opioid overdose; may repeat every 2-3 minutes in alternating nostrils until medical help arrives. Call 911       Review of patient's allergies indicates:   Allergen Reactions    Cefaclor Hives    Gabapentin Swelling    Penicillins      Other reaction(s): Hives    Sulfa (sulfonamide antibiotics) Other (See Comments)     Other reaction(s): Hives       Past Medical History:   Diagnosis Date    Anxiety and depression 2015    Arthritis     Cervical radiculopathy 2016    Cirrhosis of liver     Colon polyps 2015    Diabetes mellitus type 2 with neurological manifestations 2015    no current medications, only one episode of elevated sugars with acute illness, will monitor     Encounter for blood transfusion     Hepatitis C     s/p treatment per patient report; managed by Dr. Perez    Hip fracture     Macrocytosis 2015    Thyroid disease     Transfusion reaction     hep c     Past Surgical History:   Procedure Laterality Date    APPENDECTOMY      BACK SURGERY      CAUDAL EPIDURAL STEROID INJECTION N/A 2018    Procedure: Injection-steroid-epidural-caudal;  Surgeon: Roxana Gu Jr., MD;  Location: [facility] OR;  Service: Pain Management;  Laterality: N/A;  with cath target L4-5    CAUDAL EPIDURAL STEROID INJECTION N/A 2019    Procedure: Injection-steroid-epidural-caudal;  Surgeon: Roxana Gu Jr., MD;  Location: [facility] PAIN MGT;  Service: Pain Management;  Laterality: N/A;     SECTION      CHOLECYSTECTOMY      COLONOSCOPY  3/31/10    2 polyps, tubular  "adenoma    COLONOSCOPY  04/27/2015    Dr. Washington    COLONOSCOPY N/A 10/10/2018    Procedure: COLONOSCOPY;  Surgeon: Reuben Miller Jr., MD;  Location: [facility];  Service: Endoscopy;  Laterality: N/A;    ESOPHAGOGASTRODUODENOSCOPY N/A 10/10/2018    Procedure: EGD (ESOPHAGOGASTRODUODENOSCOPY);  Surgeon: Reuben Miller Jr., MD;  Location: [facility];  Service: Endoscopy;  Laterality: N/A;    ESOPHAGOGASTRODUODENOSCOPY N/A 12/10/2018    Procedure: EGD (ESOPHAGOGASTRODUODENOSCOPY)/poss emr;  Surgeon: Belle Kuo MD;  Location: [address]);  Service: Endoscopy;  Laterality: N/A;    ESOPHAGOGASTRODUODENOSCOPY N/A 3/12/2019    Procedure: EGD (ESOPHAGOGASTRODUODENOSCOPY);  Surgeon: Polo Keyes MD;  Location: [facility];  Service: Endoscopy;  Laterality: N/A;    ESOPHAGOGASTRODUODENOSCOPY N/A 11/20/2020    Procedure: ESOPHAGOGASTRODUODENOSCOPY (EGD);  Surgeon: Belle Kuo MD;  Location: [facility];  Service: Endoscopy;  Laterality: N/A;    HERNIA REPAIR      HYSTERECTOMY  1989    Uterus and both ovaries    INCISIONAL HERNIA REPAIR      x 2    JOINT REPLACEMENT      LIVER BIOPSY  12/2003    autoimmune hepatitis    ROTATOR CUFF REPAIR      right    STOMACH SURGERY  1980's    "took lymph node off of liver"    TOTAL HIP ARTHROPLASTY      left hip    UPPER GASTROINTESTINAL ENDOSCOPY  3/24/10    normal    UPPER GASTROINTESTINAL ENDOSCOPY  04/27/2015    Dr. Washington     Family History       Problem Relation (Age of Onset)    Brain cancer Brother    Breast cancer Sister    Cataracts Sister, Sister    Colon cancer Brother    Diabetes Mother, Brother, Other, Son    Diabetes Mellitus Mother    Liver cancer Sister    Lung cancer Brother    Pancreatic cancer Brother    Stomach cancer Other    Throat cancer Brother          Tobacco Use    Smoking status: Every Day     Packs/day: 2.00     Years: 45.00     Pack years: 90.00     Types: Cigarettes    Smokeless tobacco: Never   Substance and Sexual " Activity    Alcohol use: No     Comment: used to drink heavily, stopped in 1990's    Drug use: No    Sexual activity: Not Currently     Partners: Male     Review of Systems   Constitutional:  Negative for fever.   HENT:  Positive for trouble swallowing.    Respiratory:  Negative for shortness of breath and wheezing.    Cardiovascular:  Negative for chest pain.   Gastrointestinal:  Negative for abdominal pain.   Genitourinary: Negative.    Neurological:  Positive for weakness and headaches.   Hematological: Negative.    Psychiatric/Behavioral: Negative.     Objective:     Vital Signs (Most Recent):  Temp: 97.7 °F (36.5 °C) (06/12/23 0925)  Pulse: 65 (06/12/23 0651)  Resp: 16 (06/12/23 1121)  BP: (!) 149/81 (06/12/23 0925)  SpO2: 97 % (06/12/23 0651) Vital Signs (24h Range):  Temp:  [97.7 °F (36.5 °C)-98.3 °F (36.8 °C)] 97.7 °F (36.5 °C)  Pulse:  [47-65] 65  Resp:  [10-19] 16  SpO2:  [93 %-100 %] 97 %  BP: (113-151)/(49-81) 149/81     Weight: 68.9 kg (152 lb)  Body mass index is 24.53 kg/m².    SpO2: 97 %        Intake/Output - Last 3 Shifts         06/10 0700  06/11 0659 06/11 0700  06/12 0659 06/12 0700  06/13 0659    IV Piggyback  100     Total Intake(mL/kg)  100 (1.5)     Urine (mL/kg/hr)   600 (1.9)    Total Output   600    Net  +100 -600                    Lines/Drains/Airways       Peripheral Intravenous Line  Duration                  Peripheral IV - Single Lumen 06/11/23 1213 20 G Anterior;Distal;Right Upper Arm <1 day                     STS Risk Score: not calculated     Physical Exam  Constitutional:       Appearance: She is not toxic-appearing.   HENT:      Head: Normocephalic and atraumatic.      Mouth/Throat:      Mouth: Mucous membranes are moist.   Eyes:      Conjunctiva/sclera: Conjunctivae normal.   Cardiovascular:      Rate and Rhythm: Normal rate and regular rhythm.   Pulmonary:      Effort: Pulmonary effort is normal.      Breath sounds: Normal breath sounds.   Abdominal:      General: Bowel  sounds are normal.      Palpations: Abdomen is soft.   Neurological:      Mental Status: She is alert and oriented to person, place, and time.      Comments: Right UE/LE 5/5; Left UE/LE 4/5; symmetric facies. Tongue midline   Psychiatric:         Mood and Affect: Mood normal.         Behavior: Behavior normal.          Significant Labs:  All pertinent labs from the last 24 hours have been reviewed.    Significant Diagnostics:  I have reviewed all pertinent imaging results/findings within the past 24 hours.      Assessment/Plan:    Symptomatic right carotid artery stenosis  TIA  DM type 2  Hepatitis C, cirrhosis of the liver  CKD 3  Anxiety  Depression  Chronic neck and back pain s/p spinal cord stimulator    Planning R CEA tomorrow  NPO after MN  Dr. James discussed surgery, risks and benefits with the patient and she elects to proceed.     NYHA Score:     No notes have been filed under this hospital service.  Service: Cardiothoracic Surgery      Thank you for your consult. I will follow-up with patient. Please contact us if you have any additional questions.    JEAN Toscano  Cardiothoracic Surgery  [facility] - [facility] Tele[facility]

## 2023-06-13 ENCOUNTER — ANESTHESIA (OUTPATIENT)
Dept: SURGERY | Facility: HOSPITAL | Age: 68
DRG: 039 | End: 2023-06-13
Payer: MEDICARE

## 2023-06-13 ENCOUNTER — ANESTHESIA EVENT (OUTPATIENT)
Dept: SURGERY | Facility: HOSPITAL | Age: 68
DRG: 039 | End: 2023-06-13
Payer: MEDICARE

## 2023-06-13 LAB
ABORH RETYPE: NORMAL
ALBUMIN SERPL-MCNC: 3.5 G/DL (ref 3.4–4.8)
ALBUMIN/GLOB SERPL: 1.3 RATIO (ref 1.1–2)
ALP SERPL-CCNC: 84 UNIT/L (ref 40–150)
ALT SERPL-CCNC: 8 UNIT/L (ref 0–55)
AST SERPL-CCNC: 13 UNIT/L (ref 5–34)
BASOPHILS # BLD AUTO: 0.05 X10(3)/MCL
BASOPHILS NFR BLD AUTO: 0.8 %
BILIRUBIN DIRECT+TOT PNL SERPL-MCNC: 0.4 MG/DL
BUN SERPL-MCNC: 13.2 MG/DL (ref 9.8–20.1)
CALCIUM SERPL-MCNC: 9 MG/DL (ref 8.4–10.2)
CHLORIDE SERPL-SCNC: 108 MMOL/L (ref 98–107)
CO2 SERPL-SCNC: 26 MMOL/L (ref 23–31)
CREAT SERPL-MCNC: 0.91 MG/DL (ref 0.55–1.02)
EOSINOPHIL # BLD AUTO: 0.17 X10(3)/MCL (ref 0–0.9)
EOSINOPHIL NFR BLD AUTO: 2.8 %
ERYTHROCYTE [DISTWIDTH] IN BLOOD BY AUTOMATED COUNT: 14.3 % (ref 11.5–17)
GFR SERPLBLD CREATININE-BSD FMLA CKD-EPI: >60 MLS/MIN/1.73/M2
GLOBULIN SER-MCNC: 2.7 GM/DL (ref 2.4–3.5)
GLUCOSE SERPL-MCNC: 77 MG/DL (ref 82–115)
HCT VFR BLD AUTO: 40.4 % (ref 37–47)
HGB BLD-MCNC: 12.9 G/DL (ref 12–16)
IMM GRANULOCYTES # BLD AUTO: 0.02 X10(3)/MCL (ref 0–0.04)
IMM GRANULOCYTES NFR BLD AUTO: 0.3 %
LEFT CCA DIST DIAS: 14 CM/S
LEFT CCA DIST SYS: 56 CM/S
LEFT CCA PROX DIAS: 13 CM/S
LEFT CCA PROX SYS: 65 CM/S
LEFT ECA DIAS: 9 CM/S
LEFT ECA SYS: 59 CM/S
LEFT ICA DIST DIAS: 15 CM/S
LEFT ICA DIST SYS: 49 CM/S
LEFT ICA MID DIAS: 18 CM/S
LEFT ICA MID SYS: 56 CM/S
LEFT ICA PROX DIAS: 11 CM/S
LEFT ICA PROX SYS: 64 CM/S
LEFT VERTEBRAL DIAS: 4 CM/S
LEFT VERTEBRAL SYS: 33 CM/S
LYMPHOCYTES # BLD AUTO: 2.3 X10(3)/MCL (ref 0.6–4.6)
LYMPHOCYTES NFR BLD AUTO: 38.4 %
MCH RBC QN AUTO: 31.4 PG (ref 27–31)
MCHC RBC AUTO-ENTMCNC: 31.9 G/DL (ref 33–36)
MCV RBC AUTO: 98.3 FL (ref 80–94)
MONOCYTES # BLD AUTO: 0.52 X10(3)/MCL (ref 0.1–1.3)
MONOCYTES NFR BLD AUTO: 8.7 %
NEUTROPHILS # BLD AUTO: 2.93 X10(3)/MCL (ref 2.1–9.2)
NEUTROPHILS NFR BLD AUTO: 49 %
NRBC BLD AUTO-RTO: 0 %
OHS CV CAROTID RIGHT ICA EDV HIGHEST: 35
OHS CV CAROTID ULTRASOUND LEFT ICA/CCA RATIO: 1.14
OHS CV CAROTID ULTRASOUND RIGHT ICA/CCA RATIO: 10.43
OHS CV PV CAROTID LEFT HIGHEST CCA: 65
OHS CV PV CAROTID LEFT HIGHEST ICA: 64
OHS CV PV CAROTID RIGHT HIGHEST CCA: 36
OHS CV PV CAROTID RIGHT HIGHEST ICA: 365
OHS CV US CAROTID LEFT HIGHEST EDV: 18
PLATELET # BLD AUTO: 172 X10(3)/MCL (ref 130–400)
PMV BLD AUTO: 10.9 FL (ref 7.4–10.4)
POC ACTIVATED CLOTTING TIME K: 354 SEC (ref 74–137)
POCT GLUCOSE: 102 MG/DL (ref 70–110)
POCT GLUCOSE: 118 MG/DL (ref 70–110)
POTASSIUM SERPL-SCNC: 4.2 MMOL/L (ref 3.5–5.1)
PROT SERPL-MCNC: 6.2 GM/DL (ref 5.8–7.6)
RBC # BLD AUTO: 4.11 X10(6)/MCL (ref 4.2–5.4)
RIGHT CCA DIST DIAS: 8 CM/S
RIGHT CCA DIST SYS: 35 CM/S
RIGHT CCA PROX DIAS: 8 CM/S
RIGHT CCA PROX SYS: 36 CM/S
RIGHT ECA DIAS: 7 CM/S
RIGHT ECA SYS: 64 CM/S
RIGHT ICA DIST DIAS: 35 CM/S
RIGHT ICA DIST SYS: 101 CM/S
RIGHT ICA MID DIAS: 26 CM/S
RIGHT ICA MID SYS: 154 CM/S
RIGHT ICA PROX DIAS: 0 CM/S
RIGHT ICA PROX SYS: 365 CM/S
RIGHT VERTEBRAL SYS: 23 CM/S
SAMPLE: ABNORMAL
SODIUM SERPL-SCNC: 142 MMOL/L (ref 136–145)
WBC # SPEC AUTO: 5.99 X10(3)/MCL (ref 4.5–11.5)

## 2023-06-13 PROCEDURE — 27201423 OPTIME MED/SURG SUP & DEVICES STERILE SUPPLY: Performed by: THORACIC SURGERY (CARDIOTHORACIC VASCULAR SURGERY)

## 2023-06-13 PROCEDURE — 63600175 PHARM REV CODE 636 W HCPCS: Performed by: ANESTHESIOLOGY

## 2023-06-13 PROCEDURE — 36620 INSERTION CATHETER ARTERY: CPT | Mod: 59,LT,, | Performed by: ANESTHESIOLOGY

## 2023-06-13 PROCEDURE — 37000009 HC ANESTHESIA EA ADD 15 MINS: Performed by: THORACIC SURGERY (CARDIOTHORACIC VASCULAR SURGERY)

## 2023-06-13 PROCEDURE — 63600175 PHARM REV CODE 636 W HCPCS: Performed by: NURSE PRACTITIONER

## 2023-06-13 PROCEDURE — 37000008 HC ANESTHESIA 1ST 15 MINUTES: Performed by: THORACIC SURGERY (CARDIOTHORACIC VASCULAR SURGERY)

## 2023-06-13 PROCEDURE — 93010 EKG 12-LEAD: ICD-10-PCS | Mod: ,,, | Performed by: INTERNAL MEDICINE

## 2023-06-13 PROCEDURE — 36620 ARTERIAL: ICD-10-PCS | Mod: 59,LT,, | Performed by: ANESTHESIOLOGY

## 2023-06-13 PROCEDURE — 35301 RECHANNELING OF ARTERY: CPT | Mod: RT,,, | Performed by: THORACIC SURGERY (CARDIOTHORACIC VASCULAR SURGERY)

## 2023-06-13 PROCEDURE — 25000003 PHARM REV CODE 250: Performed by: PHYSICIAN ASSISTANT

## 2023-06-13 PROCEDURE — 63600175 PHARM REV CODE 636 W HCPCS

## 2023-06-13 PROCEDURE — 94761 N-INVAS EAR/PLS OXIMETRY MLT: CPT

## 2023-06-13 PROCEDURE — 35301 RECHANNELING OF ARTERY: CPT | Mod: AS,RT,, | Performed by: PHYSICIAN ASSISTANT

## 2023-06-13 PROCEDURE — D9220A PRA ANESTHESIA: Mod: CRNA,,, | Performed by: NURSE ANESTHETIST, CERTIFIED REGISTERED

## 2023-06-13 PROCEDURE — 35301 PR THROMBOENDARTECTMY NECK,NECK INCIS: ICD-10-PCS | Mod: AS,RT,, | Performed by: PHYSICIAN ASSISTANT

## 2023-06-13 PROCEDURE — 63600175 PHARM REV CODE 636 W HCPCS: Performed by: THORACIC SURGERY (CARDIOTHORACIC VASCULAR SURGERY)

## 2023-06-13 PROCEDURE — 88304 TISSUE EXAM BY PATHOLOGIST: CPT | Performed by: THORACIC SURGERY (CARDIOTHORACIC VASCULAR SURGERY)

## 2023-06-13 PROCEDURE — 93005 ELECTROCARDIOGRAM TRACING: CPT

## 2023-06-13 PROCEDURE — 25000003 PHARM REV CODE 250: Performed by: INTERNAL MEDICINE

## 2023-06-13 PROCEDURE — 36000706: Performed by: THORACIC SURGERY (CARDIOTHORACIC VASCULAR SURGERY)

## 2023-06-13 PROCEDURE — 63600175 PHARM REV CODE 636 W HCPCS: Performed by: PHYSICIAN ASSISTANT

## 2023-06-13 PROCEDURE — 71000033 HC RECOVERY, INTIAL HOUR: Performed by: THORACIC SURGERY (CARDIOTHORACIC VASCULAR SURGERY)

## 2023-06-13 PROCEDURE — 35301 PR THROMBOENDARTECTMY NECK,NECK INCIS: ICD-10-PCS | Mod: RT,,, | Performed by: THORACIC SURGERY (CARDIOTHORACIC VASCULAR SURGERY)

## 2023-06-13 PROCEDURE — C1729 CATH, DRAINAGE: HCPCS | Performed by: THORACIC SURGERY (CARDIOTHORACIC VASCULAR SURGERY)

## 2023-06-13 PROCEDURE — 63600175 PHARM REV CODE 636 W HCPCS: Performed by: NURSE ANESTHETIST, CERTIFIED REGISTERED

## 2023-06-13 PROCEDURE — 93010 ELECTROCARDIOGRAM REPORT: CPT | Mod: ,,, | Performed by: INTERNAL MEDICINE

## 2023-06-13 PROCEDURE — 80053 COMPREHEN METABOLIC PANEL: CPT | Performed by: NURSE PRACTITIONER

## 2023-06-13 PROCEDURE — 36000707: Performed by: THORACIC SURGERY (CARDIOTHORACIC VASCULAR SURGERY)

## 2023-06-13 PROCEDURE — D9220A PRA ANESTHESIA: Mod: ANES,,, | Performed by: ANESTHESIOLOGY

## 2023-06-13 PROCEDURE — 21400001 HC TELEMETRY ROOM

## 2023-06-13 PROCEDURE — S4991 NICOTINE PATCH NONLEGEND: HCPCS | Performed by: INTERNAL MEDICINE

## 2023-06-13 PROCEDURE — 99900031 HC PATIENT EDUCATION (STAT)

## 2023-06-13 PROCEDURE — 71000039 HC RECOVERY, EACH ADD'L HOUR: Performed by: THORACIC SURGERY (CARDIOTHORACIC VASCULAR SURGERY)

## 2023-06-13 PROCEDURE — 25000003 PHARM REV CODE 250: Performed by: NURSE ANESTHETIST, CERTIFIED REGISTERED

## 2023-06-13 PROCEDURE — 88311 DECALCIFY TISSUE: CPT

## 2023-06-13 PROCEDURE — 25500020 PHARM REV CODE 255: Performed by: INTERNAL MEDICINE

## 2023-06-13 PROCEDURE — 85025 COMPLETE CBC W/AUTO DIFF WBC: CPT | Performed by: NURSE PRACTITIONER

## 2023-06-13 PROCEDURE — 94799 UNLISTED PULMONARY SVC/PX: CPT

## 2023-06-13 PROCEDURE — 27000221 HC OXYGEN, UP TO 24 HOURS

## 2023-06-13 PROCEDURE — D9220A PRA ANESTHESIA: ICD-10-PCS | Mod: CRNA,,, | Performed by: NURSE ANESTHETIST, CERTIFIED REGISTERED

## 2023-06-13 PROCEDURE — P9047 ALBUMIN (HUMAN), 25%, 50ML: HCPCS | Mod: JZ,JG

## 2023-06-13 PROCEDURE — D9220A PRA ANESTHESIA: ICD-10-PCS | Mod: ANES,,, | Performed by: ANESTHESIOLOGY

## 2023-06-13 PROCEDURE — 63600175 PHARM REV CODE 636 W HCPCS: Mod: JZ,JG

## 2023-06-13 PROCEDURE — C1768 GRAFT, VASCULAR: HCPCS | Performed by: THORACIC SURGERY (CARDIOTHORACIC VASCULAR SURGERY)

## 2023-06-13 DEVICE — PATCH XENOSURE TAPR .8X8CM: Type: IMPLANTABLE DEVICE | Site: ARTERIAL | Status: FUNCTIONAL

## 2023-06-13 RX ORDER — METHYLPREDNISOLONE SOD SUCC 125 MG
VIAL (EA) INJECTION
Status: DISPENSED
Start: 2023-06-13 | End: 2023-06-13

## 2023-06-13 RX ORDER — HYDROMORPHONE HYDROCHLORIDE 2 MG/ML
0.2 INJECTION, SOLUTION INTRAMUSCULAR; INTRAVENOUS; SUBCUTANEOUS EVERY 5 MIN PRN
Status: DISCONTINUED | OUTPATIENT
Start: 2023-06-13 | End: 2023-06-13 | Stop reason: HOSPADM

## 2023-06-13 RX ORDER — MUPIROCIN 20 MG/G
OINTMENT TOPICAL 2 TIMES DAILY
Status: DISPENSED | OUTPATIENT
Start: 2023-06-13 | End: 2023-06-18

## 2023-06-13 RX ORDER — HYDROMORPHONE HYDROCHLORIDE 2 MG/ML
0.5 INJECTION, SOLUTION INTRAMUSCULAR; INTRAVENOUS; SUBCUTANEOUS EVERY 5 MIN PRN
Status: DISCONTINUED | OUTPATIENT
Start: 2023-06-13 | End: 2023-06-13 | Stop reason: HOSPADM

## 2023-06-13 RX ORDER — VANCOMYCIN HYDROCHLORIDE 1 G/20ML
INJECTION, POWDER, LYOPHILIZED, FOR SOLUTION INTRAVENOUS
Status: COMPLETED
Start: 2023-06-13 | End: 2023-06-13

## 2023-06-13 RX ORDER — PROTAMINE SULFATE 10 MG/ML
INJECTION, SOLUTION INTRAVENOUS
Status: DISCONTINUED | OUTPATIENT
Start: 2023-06-13 | End: 2023-06-13

## 2023-06-13 RX ORDER — HEPARIN SODIUM 5000 [USP'U]/ML
INJECTION, SOLUTION INTRAVENOUS; SUBCUTANEOUS
Status: DISCONTINUED | OUTPATIENT
Start: 2023-06-13 | End: 2023-06-13 | Stop reason: HOSPADM

## 2023-06-13 RX ORDER — DEXAMETHASONE SODIUM PHOSPHATE 4 MG/ML
INJECTION, SOLUTION INTRA-ARTICULAR; INTRALESIONAL; INTRAMUSCULAR; INTRAVENOUS; SOFT TISSUE
Status: DISCONTINUED | OUTPATIENT
Start: 2023-06-13 | End: 2023-06-13

## 2023-06-13 RX ORDER — FENTANYL CITRATE 50 UG/ML
INJECTION, SOLUTION INTRAMUSCULAR; INTRAVENOUS
Status: DISCONTINUED | OUTPATIENT
Start: 2023-06-13 | End: 2023-06-13

## 2023-06-13 RX ORDER — ASPIRIN 81 MG/1
81 TABLET ORAL DAILY
Status: DISCONTINUED | OUTPATIENT
Start: 2023-06-13 | End: 2023-06-14

## 2023-06-13 RX ORDER — SODIUM CHLORIDE, SODIUM LACTATE, POTASSIUM CHLORIDE, CALCIUM CHLORIDE 600; 310; 30; 20 MG/100ML; MG/100ML; MG/100ML; MG/100ML
INJECTION, SOLUTION INTRAVENOUS CONTINUOUS
Status: DISCONTINUED | OUTPATIENT
Start: 2023-06-13 | End: 2023-06-19 | Stop reason: HOSPADM

## 2023-06-13 RX ORDER — ONDANSETRON 2 MG/ML
4 INJECTION INTRAMUSCULAR; INTRAVENOUS EVERY 12 HOURS PRN
Status: DISCONTINUED | OUTPATIENT
Start: 2023-06-13 | End: 2023-06-19 | Stop reason: HOSPADM

## 2023-06-13 RX ORDER — HYDROCODONE BITARTRATE AND ACETAMINOPHEN 5; 325 MG/1; MG/1
1 TABLET ORAL EVERY 4 HOURS PRN
Status: DISCONTINUED | OUTPATIENT
Start: 2023-06-13 | End: 2023-06-13

## 2023-06-13 RX ORDER — VANCOMYCIN HCL IN 5 % DEXTROSE 1G/250ML
15 PLASTIC BAG, INJECTION (ML) INTRAVENOUS
Status: COMPLETED | OUTPATIENT
Start: 2023-06-13 | End: 2023-06-13

## 2023-06-13 RX ORDER — HYDROCODONE BITARTRATE AND ACETAMINOPHEN 5; 325 MG/1; MG/1
1 TABLET ORAL EVERY 4 HOURS PRN
Status: DISCONTINUED | OUTPATIENT
Start: 2023-06-13 | End: 2023-06-14

## 2023-06-13 RX ORDER — ASPIRIN 325 MG
325 TABLET, DELAYED RELEASE (ENTERIC COATED) ORAL DAILY
Status: DISCONTINUED | OUTPATIENT
Start: 2023-06-14 | End: 2023-06-14

## 2023-06-13 RX ORDER — IBUPROFEN 200 MG
1 TABLET ORAL DAILY
Status: DISCONTINUED | OUTPATIENT
Start: 2023-06-13 | End: 2023-06-19 | Stop reason: HOSPADM

## 2023-06-13 RX ORDER — HEPARIN SODIUM 1000 [USP'U]/ML
INJECTION, SOLUTION INTRAVENOUS; SUBCUTANEOUS
Status: DISCONTINUED | OUTPATIENT
Start: 2023-06-13 | End: 2023-06-13

## 2023-06-13 RX ORDER — LIDOCAINE HYDROCHLORIDE 20 MG/ML
INJECTION, SOLUTION EPIDURAL; INFILTRATION; INTRACAUDAL; PERINEURAL
Status: DISCONTINUED | OUTPATIENT
Start: 2023-06-13 | End: 2023-06-13

## 2023-06-13 RX ORDER — MEPERIDINE HYDROCHLORIDE 25 MG/ML
12.5 INJECTION INTRAMUSCULAR; INTRAVENOUS; SUBCUTANEOUS ONCE
Status: DISCONTINUED | OUTPATIENT
Start: 2023-06-13 | End: 2023-06-13 | Stop reason: HOSPADM

## 2023-06-13 RX ORDER — DIPHENHYDRAMINE HYDROCHLORIDE 50 MG/ML
25 INJECTION INTRAMUSCULAR; INTRAVENOUS EVERY 6 HOURS PRN
Status: DISCONTINUED | OUTPATIENT
Start: 2023-06-13 | End: 2023-06-13 | Stop reason: HOSPADM

## 2023-06-13 RX ORDER — DOCUSATE SODIUM 100 MG/1
100 CAPSULE, LIQUID FILLED ORAL 2 TIMES DAILY
Status: DISCONTINUED | OUTPATIENT
Start: 2023-06-13 | End: 2023-06-19 | Stop reason: HOSPADM

## 2023-06-13 RX ORDER — METHYLPREDNISOLONE ACETATE 80 MG/ML
INJECTION, SUSPENSION INTRA-ARTICULAR; INTRALESIONAL; INTRAMUSCULAR; SOFT TISSUE
Status: DISCONTINUED | OUTPATIENT
Start: 2023-06-13 | End: 2023-06-13

## 2023-06-13 RX ORDER — CHOLECALCIFEROL (VITAMIN D3) 25 MCG
5000 TABLET ORAL
Status: DISCONTINUED | OUTPATIENT
Start: 2023-06-14 | End: 2023-06-19 | Stop reason: HOSPADM

## 2023-06-13 RX ORDER — PROCHLORPERAZINE EDISYLATE 5 MG/ML
5 INJECTION INTRAMUSCULAR; INTRAVENOUS EVERY 6 HOURS PRN
Status: DISCONTINUED | OUTPATIENT
Start: 2023-06-13 | End: 2023-06-19 | Stop reason: HOSPADM

## 2023-06-13 RX ORDER — HYDRALAZINE HYDROCHLORIDE 20 MG/ML
5 INJECTION INTRAMUSCULAR; INTRAVENOUS
Status: DISCONTINUED | OUTPATIENT
Start: 2023-06-13 | End: 2023-06-19 | Stop reason: HOSPADM

## 2023-06-13 RX ORDER — GLYCOPYRROLATE 0.2 MG/ML
INJECTION INTRAMUSCULAR; INTRAVENOUS
Status: DISCONTINUED | OUTPATIENT
Start: 2023-06-13 | End: 2023-06-13

## 2023-06-13 RX ORDER — LOPERAMIDE HYDROCHLORIDE 2 MG/1
4 CAPSULE ORAL ONCE
Status: DISCONTINUED | OUTPATIENT
Start: 2023-06-13 | End: 2023-06-19 | Stop reason: HOSPADM

## 2023-06-13 RX ORDER — ONDANSETRON 2 MG/ML
4 INJECTION INTRAMUSCULAR; INTRAVENOUS ONCE
Status: DISCONTINUED | OUTPATIENT
Start: 2023-06-13 | End: 2023-06-13 | Stop reason: HOSPADM

## 2023-06-13 RX ORDER — PROPOFOL 10 MG/ML
VIAL (ML) INTRAVENOUS
Status: DISCONTINUED | OUTPATIENT
Start: 2023-06-13 | End: 2023-06-13

## 2023-06-13 RX ORDER — ALBUMIN HUMAN 250 G/1000ML
SOLUTION INTRAVENOUS
Status: COMPLETED
Start: 2023-06-13 | End: 2023-06-13

## 2023-06-13 RX ORDER — NAPROXEN SODIUM 220 MG/1
TABLET, FILM COATED ORAL
Status: DISPENSED
Start: 2023-06-13 | End: 2023-06-14

## 2023-06-13 RX ORDER — ROCURONIUM BROMIDE 10 MG/ML
INJECTION, SOLUTION INTRAVENOUS
Status: DISCONTINUED | OUTPATIENT
Start: 2023-06-13 | End: 2023-06-13

## 2023-06-13 RX ADMIN — HYDRALAZINE HYDROCHLORIDE 2 MG: 20 INJECTION INTRAMUSCULAR; INTRAVENOUS at 12:06

## 2023-06-13 RX ADMIN — DOCUSATE SODIUM 100 MG: 100 CAPSULE, LIQUID FILLED ORAL at 08:06

## 2023-06-13 RX ADMIN — GLYCOPYRROLATE 0.2 MG: 0.2 INJECTION INTRAMUSCULAR; INTRAVENOUS at 10:06

## 2023-06-13 RX ADMIN — ROCURONIUM BROMIDE 20 MG: 10 SOLUTION INTRAVENOUS at 11:06

## 2023-06-13 RX ADMIN — FENTANYL CITRATE 50 MCG: 50 INJECTION, SOLUTION INTRAMUSCULAR; INTRAVENOUS at 10:06

## 2023-06-13 RX ADMIN — HYDROMORPHONE HYDROCHLORIDE 0.5 MG: 2 INJECTION INTRAMUSCULAR; INTRAVENOUS; SUBCUTANEOUS at 01:06

## 2023-06-13 RX ADMIN — ROCURONIUM BROMIDE 80 MG: 10 SOLUTION INTRAVENOUS at 10:06

## 2023-06-13 RX ADMIN — AMITRIPTYLINE HYDROCHLORIDE 25 MG: 25 TABLET, FILM COATED ORAL at 11:06

## 2023-06-13 RX ADMIN — HYDROCODONE BITARTRATE AND ACETAMINOPHEN 1 TABLET: 10; 325 TABLET ORAL at 01:06

## 2023-06-13 RX ADMIN — HEPARIN SODIUM 8000 UNITS: 1000 INJECTION, SOLUTION INTRAVENOUS; SUBCUTANEOUS at 11:06

## 2023-06-13 RX ADMIN — METHYLPREDNISOLONE ACETATE 75 MG: 80 INJECTION, SUSPENSION INTRA-ARTICULAR; INTRALESIONAL; INTRAMUSCULAR; SOFT TISSUE at 11:06

## 2023-06-13 RX ADMIN — ONDANSETRON 4 MG: 2 INJECTION INTRAMUSCULAR; INTRAVENOUS at 10:06

## 2023-06-13 RX ADMIN — SODIUM CHLORIDE, SODIUM GLUCONATE, SODIUM ACETATE, POTASSIUM CHLORIDE AND MAGNESIUM CHLORIDE: 526; 502; 368; 37; 30 INJECTION, SOLUTION INTRAVENOUS at 10:06

## 2023-06-13 RX ADMIN — SODIUM CHLORIDE, POTASSIUM CHLORIDE, SODIUM LACTATE AND CALCIUM CHLORIDE: 600; 310; 30; 20 INJECTION, SOLUTION INTRAVENOUS at 09:06

## 2023-06-13 RX ADMIN — HYDRALAZINE HYDROCHLORIDE 5 MG: 20 INJECTION INTRAMUSCULAR; INTRAVENOUS at 06:06

## 2023-06-13 RX ADMIN — MUPIROCIN: 20 OINTMENT TOPICAL at 08:06

## 2023-06-13 RX ADMIN — IOPAMIDOL 100 ML: 755 INJECTION, SOLUTION INTRAVENOUS at 07:06

## 2023-06-13 RX ADMIN — HYDROMORPHONE HYDROCHLORIDE 0.5 MG: 2 INJECTION INTRAMUSCULAR; INTRAVENOUS; SUBCUTANEOUS at 12:06

## 2023-06-13 RX ADMIN — LIDOCAINE HYDROCHLORIDE 5 ML: 20 INJECTION, SOLUTION EPIDURAL; INFILTRATION; INTRACAUDAL; PERINEURAL at 12:06

## 2023-06-13 RX ADMIN — ACETAMINOPHEN 650 MG: 325 TABLET, FILM COATED ORAL at 08:06

## 2023-06-13 RX ADMIN — ENOXAPARIN SODIUM 40 MG: 40 INJECTION SUBCUTANEOUS at 05:06

## 2023-06-13 RX ADMIN — ALBUMIN (HUMAN): 5 SOLUTION INTRAVENOUS at 03:06

## 2023-06-13 RX ADMIN — ASPIRIN 81 MG: 81 TABLET, COATED ORAL at 03:06

## 2023-06-13 RX ADMIN — DEXAMETHASONE SODIUM PHOSPHATE 4 MG: 4 INJECTION, SOLUTION INTRA-ARTICULAR; INTRALESIONAL; INTRAMUSCULAR; INTRAVENOUS; SOFT TISSUE at 10:06

## 2023-06-13 RX ADMIN — PROTAMINE SULFATE 80 MG: 10 INJECTION, SOLUTION INTRAVENOUS at 12:06

## 2023-06-13 RX ADMIN — PROPOFOL 200 MG: 10 INJECTION, EMULSION INTRAVENOUS at 10:06

## 2023-06-13 RX ADMIN — VANCOMYCIN HYDROCHLORIDE 1000 MG: 1 INJECTION, POWDER, LYOPHILIZED, FOR SOLUTION INTRAVENOUS at 10:06

## 2023-06-13 RX ADMIN — LIDOCAINE HYDROCHLORIDE 4 ML: 20 INJECTION, SOLUTION EPIDURAL; INFILTRATION; INTRACAUDAL; PERINEURAL at 10:06

## 2023-06-13 RX ADMIN — METHOCARBAMOL 500 MG: 100 INJECTION, SOLUTION INTRAMUSCULAR; INTRAVENOUS at 08:06

## 2023-06-13 RX ADMIN — ALPRAZOLAM 1 MG: 0.5 TABLET ORAL at 08:06

## 2023-06-13 RX ADMIN — NICOTINE 1 PATCH: 21 PATCH, EXTENDED RELEASE TRANSDERMAL at 08:06

## 2023-06-13 RX ADMIN — SUGAMMADEX 200 MG: 100 INJECTION, SOLUTION INTRAVENOUS at 12:06

## 2023-06-13 RX ADMIN — Medication 1000 MG: at 10:06

## 2023-06-13 RX ADMIN — HYDROCODONE BITARTRATE AND ACETAMINOPHEN 1 TABLET: 5; 325 TABLET ORAL at 10:06

## 2023-06-13 RX ADMIN — HYDROCODONE BITARTRATE AND ACETAMINOPHEN 1 TABLET: 10; 325 TABLET ORAL at 03:06

## 2023-06-13 NOTE — NURSING
Nurses Note -- 4 Eyes      6/13/2023   5:21 PM      Skin assessed during: Admit      [x] No Altered Skin Integrity Present    [x]Prevention Measures Documented      [] Yes- Altered Skin Integrity Present or Discovered   [] LDA Added if Not in Epic (Describe Wound)   [] New Altered Skin Integrity was Present on Admit and Documented in LDA   [] Wound Image Taken    Wound Care Consulted? No    Attending Nurse:  Gill Pro RN     Second RN/Staff Member:  Yara Hilton

## 2023-06-13 NOTE — PLAN OF CARE
Problem: Adult Inpatient Plan of Care  Goal: Plan of Care Review  Outcome: Ongoing, Progressing  Goal: Patient-Specific Goal (Individualized)  Outcome: Ongoing, Progressing  Goal: Absence of Hospital-Acquired Illness or Injury  Outcome: Ongoing, Progressing  Goal: Optimal Comfort and Wellbeing  Outcome: Ongoing, Progressing  Goal: Readiness for Transition of Care  Outcome: Ongoing, Progressing     Problem: Infection  Goal: Absence of Infection Signs and Symptoms  Outcome: Ongoing, Progressing     Problem: Adjustment to Illness (Stroke, Ischemic/Transient Ischemic Attack)  Goal: Optimal Coping  Outcome: Ongoing, Progressing     Problem: Bowel Elimination Impaired (Stroke, Ischemic/Transient Ischemic Attack)  Goal: Effective Bowel Elimination  Outcome: Ongoing, Progressing     Problem: Cerebral Tissue Perfusion (Stroke, Ischemic/Transient Ischemic Attack)  Goal: Optimal Cerebral Tissue Perfusion  Outcome: Ongoing, Progressing     Problem: Diabetes Comorbidity  Goal: Blood Glucose Level Within Targeted Range  Outcome: Ongoing, Progressing

## 2023-06-13 NOTE — ANESTHESIA PROCEDURE NOTES
Arterial    Diagnosis: Carotid Artery Disease    Patient location during procedure: done in OR    Staffing  Authorizing Provider: Fariba Artis MD  Performing Provider: Skip Ferreira CRNA    Staffing  Other anesthesia staff: Fariba Artis MD  Anesthesiologist was present at the time of the procedure.    Preanesthetic Checklist  Completed: patient identified, IV checked, site marked, risks and benefits discussed, surgical consent, monitors and equipment checked, pre-op evaluation, timeout performed and anesthesia consent givenArterial  Skin Prep: chlorhexidine gluconate  Local Infiltration: lidocaine  Orientation: left  Location: radial    Catheter Size: 20 G  Catheter placement by Anatomical landmarks. Heme positive aspiration all ports. Insertion Attempts: 2  Assessment  Dressing: secured with tape and tegaderm  Patient: Tolerated well  Additional Notes  Once by MARA in holding, FARHAN placing in OR

## 2023-06-13 NOTE — PT/OT/SLP PROGRESS
Physical Therapy Treatment    Patient Name:  Thom Krishna   MRN:  [id number]      Patient in surgery. PT to f/u in afternoon if schedule permits.

## 2023-06-13 NOTE — TRANSFER OF CARE
"Anesthesia Transfer of Care Note    Patient: Thom Krishna    Procedure(s) Performed: Procedure(s) (LRB):  ENDARTERECTOMY, CAROTID (Right)    Patient location: PACU    Anesthesia Type: general    Transport from OR: Transported from OR on room air with adequate spontaneous ventilation    Post pain: adequate analgesia    Post assessment: no apparent anesthetic complications    Post vital signs: stable    Level of consciousness: awake and alert    Nausea/Vomiting: no nausea/vomiting    Complications: none    Transfer of care protocol was followedComments: Patient LEE as per baseline upon entering PACU      Last vitals:   Visit Vitals  BP (!) 155/67 (BP Location: Left arm, Patient Position: Lying)   Pulse 72   Temp 36.4 °C (97.5 °F) (Skin)   Resp 14   Ht 5' 6" (1.676 m)   Wt 68.9 kg (152 lb)   SpO2 99%   BMI 24.53 kg/m²     "

## 2023-06-13 NOTE — PROGRESS NOTES
[facility]  Hospital Medicine Progress Note        Chief Complaint: Inpatient Follow-up for     HPI:   Thom Krishna is a 67 y.o. female who past medical history includes anxiety, depression, arthritis, chronic neck and back pain status post stimulator placement, degenerative disc disease, DM type 2, hepatitis-C, cirrhosis of the liver, emphysema, CKD stage 3; presents to the ED via EMS air med with reports of left-sided facial droop with associated slurred speech.  It was reported that patient was last seen normal at around 930 this morning.  Patient reports she woke up with lower extremity weakness which she had a difficult time ambulating which she reported slurred speech with left facial droop and difficulty swallowing which started this morning.  She also reported left arm numbness and weakness and decreased  to her left hand.  She denies any injury, traumas, falls, or any syncopal events.  Patient denies any chest pain, shortness a breath, fever, chills, cough, congestion, nausea, vomiting, diarrhea, or any sick contacts.  CT of the head without contrast demonstrated no acute intracranial findings identified, right parietal lobe small new encephalomalacia mild chronic micro ischemia and moderate atrophy. CTA of head and neck without contrast demonstrated no short interval change of CT head appearance, no evidence of flow-limiting stenosis or other acute focal intracranial vascular abnormality, atherosclerotic changes of the bilateral extracranial carotid vasculature with severe short segment stenosis of the right proximal ICA.  Lab work done reviewed demonstrated creatinine 1.04, glucose 78; other indicis unremarkable.     Initial vital signs reviewed /58 pulse 65 respirations 19 temperature 98.1° F O2 saturation 99% on room air.  Patient received IV hydration and aspirin rectally in the ED. Patient is admitted to hospital medicine services for further  management.    Interval Hx:     Afebrile.  Borderline asymptomatic bradycardia.  Scheduled for CTA today.  She was alert, oriented, comfortably resting in the bed.  Has no new complaints or concerns.    Objective/physical exam:  Vitals:    06/12/23 1741 06/13/23 0000 06/13/23 0136 06/13/23 0408   BP:  137/79  114/72   BP Location:       Patient Position:       Pulse:  (!) 54  (!) 50   Resp: 18  18 18   Temp:  98.4 °F (36.9 °C)  97.8 °F (36.6 °C)   TempSrc:  Oral  Oral   SpO2:  99%  99%   Weight:       Height:         General: In no acute distress, afebrile  Respiratory: Clear to auscultation bilaterally  Cardiovascular: S1, S2, no appreciable murmur  Abdomen: Soft, nontender, BS +  MSK: Warm, no lower extremity edema, no clubbing or cyanosis  Neurologic: Alert and oriented x4, Left sided 4/5, CN intact      Lab Results   Component Value Date     06/13/2023    K 4.2 06/13/2023     (H) 10/12/2022    CO2 26 06/13/2023    BUN 13.2 06/13/2023    CREATININE 0.91 06/13/2023    CALCIUM 9.0 06/13/2023    ANIONGAP 13 10/12/2022    ESTGFRAFRICA >60 07/23/2021    EGFRNONAA 58 (A) 07/23/2021      Lab Results   Component Value Date    ALT 8 06/13/2023    AST 13 06/13/2023     (H) 05/06/2013    ALKPHOS 84 06/13/2023    BILITOT 0.4 06/13/2023      Lab Results   Component Value Date    WBC 5.99 06/13/2023    HGB 12.9 06/13/2023    HCT 40.4 06/13/2023    MCV 98.3 (H) 06/13/2023     06/13/2023           Medications:   aspirin  300 mg Rectal Daily    atorvastatin  40 mg Oral Daily    enoxparin  40 mg Subcutaneous Daily    levothyroxine  75 mcg Oral Before breakfast    pantoprazole  40 mg Oral Daily    senna-docusate 8.6-50 mg  3 tablet Oral Daily      acetaminophen, ALPRAZolam, amitriptyline, hydrALAZINE, HYDROcodone-acetaminophen, methocarbamoL, nitroGLYCERIN, ondansetron, sodium chloride 0.9%, sodium chloride 0.9%, vancomycin (VANCOCIN) IVPB     Assessment/Plan:       TIA with associated left sided deficits,  facial droop and slurred speech-improving  77% stenosis of right ICA s/p CEA 6/13/23  MICHOACANO on CKD- improving    HX: T2DM, HTN, CKD, hepatitis C with chronic cirrhosis, Chronic back pain - with chronic pain management and prior back stimulator implant      Plan:   -scheduled for CEA today.  Rest of the stroke workup reviewed.  Neurology, therapy services following  -continue aspirin and statin   -other home medications were reviewed and renewed   -continue current analgesic regimen      Lobo Carmona MD

## 2023-06-13 NOTE — BRIEF OP NOTE
[facility] - Periop Services  Cardiothoracic Surgery  Operative Note    SUMMARY     Date of Procedure: 6/13/2023     Procedure: Procedure(s) (LRB):  ENDARTERECTOMY, CAROTID (Right)    Surgeon(s) and Role:     * Juan James MD - Primary    Assistant: Polo Bruner PA-C    Pre-Operative Diagnosis: Carotid stenosis, right [I65.21]    Post-Operative Diagnosis: Post-Op Diagnosis Codes:     * Carotid stenosis, right [I65.21]    Anesthesia: General    Operative Findings (including complications, if any):            Specimens:   Specimen (24h ago, onward)      None                    Condition: Good    Disposition: PACU - hemodynamically stable.

## 2023-06-13 NOTE — PT/OT/SLP PROGRESS
Occupational Therapy      Patient Name:  Thom Krishna   MRN:  [id number]    Pt off floor for R CEA. Will follow up as appropriate.     6/13/2023

## 2023-06-13 NOTE — ANESTHESIA PROCEDURE NOTES
Intubation    Date/Time: 6/13/2023 10:32 AM  Performed by: Skip Ferreira CRNA  Authorized by: Fariba Artis MD     Intubation:     Induction:  Intravenous    Intubated:  Postinduction    Mask Ventilation:  Easy with oral airway    Attempts:  1    Attempted By:  Student    Method of Intubation:  Direct    Blade:  [place] 3    Laryngeal View Grade: Grade I - full view of cords      Difficult Airway Encountered?: No      Complications:  None    Airway Device:  Oral endotracheal tube    Airway Device Size:  7.0    Style/Cuff Inflation:  Cuffed    Inflation Amount (mL):  7    Tube secured:  20    Secured at:  The lips    Placement Verified By:  Capnometry and other (see comments)    Complicating Factors:  None    Findings Post-Intubation:  BS equal bilateral and atraumatic/condition of teeth unchanged

## 2023-06-13 NOTE — ANESTHESIA PREPROCEDURE EVALUATION
06/13/2023  Thom Krishna is a 67 y.o., female.  PMH of DM type 2, hepatitis C, cirrhosis of liver, CKD stage 3, emphysema, anxiety, depression, and chronic neck and pack pain s/p stimulator placement.    Yesterday she woke up with left sided weakness, slurred speech and left facial droop.  She presented to the ED via EMS.  CTA of the head and neck reveals 77% stenosis of right ICA.      EF 60%      Pre-op Assessment    I have reviewed the Patient Summary Reports.     I have reviewed the Nursing Notes. I have reviewed the NPO Status.   I have reviewed the Medications.     Review of Systems  Social:  Former Smoker    Pulmonary:   COPD    Renal/:   Chronic Renal Disease    Hepatic/GI:   GERD Liver Disease, Hepatitis, C    Musculoskeletal:   Arthritis     Neurological:   CVA Neuromuscular Disease,    Endocrine:   Diabetes, well controlled Hypothyroidism    Psych:   Psychiatric History          Physical Exam  General: Well nourished, Cooperative, Alert and Oriented    Airway:  Mallampati: II   Mouth Opening: Normal  TM Distance: Normal  Tongue: Normal  Neck ROM: Normal ROM        Anesthesia Plan  Type of Anesthesia, risks & benefits discussed:    Anesthesia Type: Gen ETT  Intra-op Monitoring Plan: Standard ASA Monitors and Art Line  Post Op Pain Control Plan: multimodal analgesia and IV/PO Opioids PRN  Airway Plan: Direct, Post-Induction  Informed Consent: Informed consent signed with the Patient and all parties understand the risks and agree with anesthesia plan.  All questions answered. Patient consented to blood products? Yes  ASA Score: 3  Day of Surgery Review of History & Physical: H&P Update referred to the surgeon/provider.  Anesthesia Plan Notes: Cerebral oximetry    Ready For Surgery From Anesthesia Perspective.     .

## 2023-06-13 NOTE — NURSING
Gave report To Nurse Hagen from [address] [place] [address] pt is being transferred to Room 984

## 2023-06-14 LAB
ALBUMIN SERPL-MCNC: 3.8 G/DL (ref 3.4–4.8)
ALBUMIN/GLOB SERPL: 1.7 RATIO (ref 1.1–2)
ALP SERPL-CCNC: 74 UNIT/L (ref 40–150)
ALT SERPL-CCNC: 10 UNIT/L (ref 0–55)
AST SERPL-CCNC: 13 UNIT/L (ref 5–34)
BASOPHILS # BLD AUTO: 0.01 X10(3)/MCL
BASOPHILS NFR BLD AUTO: 0.1 %
BILIRUBIN DIRECT+TOT PNL SERPL-MCNC: 0.4 MG/DL
BUN SERPL-MCNC: 13.2 MG/DL (ref 9.8–20.1)
CALCIUM SERPL-MCNC: 8.9 MG/DL (ref 8.4–10.2)
CHLORIDE SERPL-SCNC: 109 MMOL/L (ref 98–107)
CO2 SERPL-SCNC: 25 MMOL/L (ref 23–31)
CREAT SERPL-MCNC: 0.87 MG/DL (ref 0.55–1.02)
EOSINOPHIL # BLD AUTO: 0 X10(3)/MCL (ref 0–0.9)
EOSINOPHIL NFR BLD AUTO: 0 %
ERYTHROCYTE [DISTWIDTH] IN BLOOD BY AUTOMATED COUNT: 14.4 % (ref 11.5–17)
GFR SERPLBLD CREATININE-BSD FMLA CKD-EPI: >60 MLS/MIN/1.73/M2
GLOBULIN SER-MCNC: 2.3 GM/DL (ref 2.4–3.5)
GLUCOSE SERPL-MCNC: 73 MG/DL (ref 82–115)
HCT VFR BLD AUTO: 37.8 % (ref 37–47)
HGB BLD-MCNC: 12.3 G/DL (ref 12–16)
IMM GRANULOCYTES # BLD AUTO: 0.04 X10(3)/MCL (ref 0–0.04)
IMM GRANULOCYTES NFR BLD AUTO: 0.4 %
LYMPHOCYTES # BLD AUTO: 1.23 X10(3)/MCL (ref 0.6–4.6)
LYMPHOCYTES NFR BLD AUTO: 13.1 %
MAGNESIUM SERPL-MCNC: 1.8 MG/DL (ref 1.6–2.6)
MCH RBC QN AUTO: 32.2 PG (ref 27–31)
MCHC RBC AUTO-ENTMCNC: 32.5 G/DL (ref 33–36)
MCV RBC AUTO: 99 FL (ref 80–94)
MONOCYTES # BLD AUTO: 0.58 X10(3)/MCL (ref 0.1–1.3)
MONOCYTES NFR BLD AUTO: 6.2 %
NEUTROPHILS # BLD AUTO: 7.51 X10(3)/MCL (ref 2.1–9.2)
NEUTROPHILS NFR BLD AUTO: 80.2 %
NRBC BLD AUTO-RTO: 0 %
OHS CV CAROTID RIGHT ICA EDV HIGHEST: 19
OHS CV CAROTID ULTRASOUND RIGHT ICA/CCA RATIO: 1.64
OHS CV PV CAROTID RIGHT HIGHEST CCA: 42
OHS CV PV CAROTID RIGHT HIGHEST ICA: 69
PLATELET # BLD AUTO: 184 X10(3)/MCL (ref 130–400)
PMV BLD AUTO: 11.3 FL (ref 7.4–10.4)
POTASSIUM SERPL-SCNC: 4.3 MMOL/L (ref 3.5–5.1)
PROT SERPL-MCNC: 6.1 GM/DL (ref 5.8–7.6)
PSYCHE PATHOLOGY RESULT: NORMAL
RBC # BLD AUTO: 3.82 X10(6)/MCL (ref 4.2–5.4)
RIGHT CCA DIST DIAS: 11 CM/S
RIGHT CCA DIST SYS: 42 CM/S
RIGHT ICA DIST DIAS: 19 CM/S
RIGHT ICA DIST SYS: 65 CM/S
RIGHT ICA MID DIAS: 16 CM/S
RIGHT ICA MID SYS: 69 CM/S
RIGHT ICA PROX DIAS: 12 CM/S
RIGHT ICA PROX SYS: 67 CM/S
SODIUM SERPL-SCNC: 144 MMOL/L (ref 136–145)
WBC # SPEC AUTO: 9.37 X10(3)/MCL (ref 4.5–11.5)

## 2023-06-14 PROCEDURE — 99024 PR POST-OP FOLLOW-UP VISIT: ICD-10-PCS | Mod: ,,, | Performed by: PHYSICIAN ASSISTANT

## 2023-06-14 PROCEDURE — 27000221 HC OXYGEN, UP TO 24 HOURS

## 2023-06-14 PROCEDURE — 25000003 PHARM REV CODE 250: Performed by: PHYSICIAN ASSISTANT

## 2023-06-14 PROCEDURE — 97162 PT EVAL MOD COMPLEX 30 MIN: CPT

## 2023-06-14 PROCEDURE — 63600175 PHARM REV CODE 636 W HCPCS: Performed by: NURSE PRACTITIONER

## 2023-06-14 PROCEDURE — 99024 POSTOP FOLLOW-UP VISIT: CPT | Mod: ,,, | Performed by: PHYSICIAN ASSISTANT

## 2023-06-14 PROCEDURE — 99233 PR SUBSEQUENT HOSPITAL CARE,LEVL III: ICD-10-PCS | Mod: ,,, | Performed by: PSYCHIATRY & NEUROLOGY

## 2023-06-14 PROCEDURE — 80053 COMPREHEN METABOLIC PANEL: CPT | Performed by: INTERNAL MEDICINE

## 2023-06-14 PROCEDURE — 21400001 HC TELEMETRY ROOM

## 2023-06-14 PROCEDURE — 92610 EVALUATE SWALLOWING FUNCTION: CPT

## 2023-06-14 PROCEDURE — 25000003 PHARM REV CODE 250: Performed by: INTERNAL MEDICINE

## 2023-06-14 PROCEDURE — 99233 SBSQ HOSP IP/OBS HIGH 50: CPT | Mod: ,,, | Performed by: PSYCHIATRY & NEUROLOGY

## 2023-06-14 PROCEDURE — 97166 OT EVAL MOD COMPLEX 45 MIN: CPT

## 2023-06-14 PROCEDURE — 25000003 PHARM REV CODE 250: Performed by: NURSE PRACTITIONER

## 2023-06-14 PROCEDURE — 94761 N-INVAS EAR/PLS OXIMETRY MLT: CPT

## 2023-06-14 PROCEDURE — 63600175 PHARM REV CODE 636 W HCPCS: Performed by: HOSPITALIST

## 2023-06-14 PROCEDURE — 83735 ASSAY OF MAGNESIUM: CPT | Performed by: INTERNAL MEDICINE

## 2023-06-14 PROCEDURE — S4991 NICOTINE PATCH NONLEGEND: HCPCS | Performed by: INTERNAL MEDICINE

## 2023-06-14 PROCEDURE — 25000003 PHARM REV CODE 250: Performed by: STUDENT IN AN ORGANIZED HEALTH CARE EDUCATION/TRAINING PROGRAM

## 2023-06-14 PROCEDURE — 85025 COMPLETE CBC W/AUTO DIFF WBC: CPT | Performed by: INTERNAL MEDICINE

## 2023-06-14 RX ORDER — TRAMADOL HYDROCHLORIDE 50 MG/1
50 TABLET ORAL EVERY 8 HOURS PRN
Status: DISCONTINUED | OUTPATIENT
Start: 2023-06-14 | End: 2023-06-19 | Stop reason: HOSPADM

## 2023-06-14 RX ORDER — TALC
6 POWDER (GRAM) TOPICAL NIGHTLY PRN
Status: DISCONTINUED | OUTPATIENT
Start: 2023-06-14 | End: 2023-06-19 | Stop reason: HOSPADM

## 2023-06-14 RX ORDER — ASPIRIN 325 MG
325 TABLET, DELAYED RELEASE (ENTERIC COATED) ORAL DAILY
Status: DISCONTINUED | OUTPATIENT
Start: 2023-06-15 | End: 2023-06-14

## 2023-06-14 RX ORDER — MORPHINE SULFATE 4 MG/ML
4 INJECTION, SOLUTION INTRAMUSCULAR; INTRAVENOUS ONCE
Status: COMPLETED | OUTPATIENT
Start: 2023-06-14 | End: 2023-06-14

## 2023-06-14 RX ORDER — MORPHINE SULFATE 4 MG/ML
2 INJECTION, SOLUTION INTRAMUSCULAR; INTRAVENOUS ONCE
Status: DISCONTINUED | OUTPATIENT
Start: 2023-06-14 | End: 2023-06-14

## 2023-06-14 RX ORDER — ASPIRIN 325 MG
325 TABLET, DELAYED RELEASE (ENTERIC COATED) ORAL DAILY
Status: DISCONTINUED | OUTPATIENT
Start: 2023-06-14 | End: 2023-06-19 | Stop reason: HOSPADM

## 2023-06-14 RX ORDER — MORPHINE SULFATE 4 MG/ML
2 INJECTION, SOLUTION INTRAMUSCULAR; INTRAVENOUS ONCE
Status: COMPLETED | OUTPATIENT
Start: 2023-06-14 | End: 2023-06-14

## 2023-06-14 RX ADMIN — HYDROCODONE BITARTRATE AND ACETAMINOPHEN 1 TABLET: 5; 325 TABLET ORAL at 06:06

## 2023-06-14 RX ADMIN — Medication 6 MG: at 09:06

## 2023-06-14 RX ADMIN — PANTOPRAZOLE SODIUM 40 MG: 40 TABLET, DELAYED RELEASE ORAL at 01:06

## 2023-06-14 RX ADMIN — NICOTINE 1 PATCH: 21 PATCH, EXTENDED RELEASE TRANSDERMAL at 09:06

## 2023-06-14 RX ADMIN — ENOXAPARIN SODIUM 40 MG: 40 INJECTION SUBCUTANEOUS at 05:06

## 2023-06-14 RX ADMIN — TRAMADOL HYDROCHLORIDE 50 MG: 50 TABLET, COATED ORAL at 05:06

## 2023-06-14 RX ADMIN — HYDROCODONE BITARTRATE AND ACETAMINOPHEN 1 TABLET: 5; 325 TABLET ORAL at 03:06

## 2023-06-14 RX ADMIN — ALPRAZOLAM 1 MG: 0.5 TABLET ORAL at 01:06

## 2023-06-14 RX ADMIN — HYDROCODONE BITARTRATE AND ACETAMINOPHEN 1 TABLET: 10; 325 TABLET ORAL at 01:06

## 2023-06-14 RX ADMIN — MORPHINE SULFATE 4 MG: 4 INJECTION INTRAVENOUS at 09:06

## 2023-06-14 RX ADMIN — ATORVASTATIN CALCIUM 40 MG: 40 TABLET, FILM COATED ORAL at 01:06

## 2023-06-14 RX ADMIN — ACETAMINOPHEN 650 MG: 325 TABLET, FILM COATED ORAL at 02:06

## 2023-06-14 RX ADMIN — MORPHINE SULFATE 2 MG: 4 INJECTION INTRAVENOUS at 01:06

## 2023-06-14 RX ADMIN — DOCUSATE SODIUM 100 MG: 100 CAPSULE, LIQUID FILLED ORAL at 09:06

## 2023-06-14 RX ADMIN — LEVOTHYROXINE SODIUM 75 MCG: 25 TABLET ORAL at 05:06

## 2023-06-14 RX ADMIN — HYDROCODONE BITARTRATE AND ACETAMINOPHEN 1 TABLET: 10; 325 TABLET ORAL at 10:06

## 2023-06-14 RX ADMIN — ASPIRIN 325 MG: 325 TABLET, COATED ORAL at 05:06

## 2023-06-14 RX ADMIN — AMITRIPTYLINE HYDROCHLORIDE 25 MG: 25 TABLET, FILM COATED ORAL at 09:06

## 2023-06-14 RX ADMIN — SENNOSIDES AND DOCUSATE SODIUM 3 TABLET: 50; 8.6 TABLET ORAL at 01:06

## 2023-06-14 RX ADMIN — MUPIROCIN: 20 OINTMENT TOPICAL at 09:06

## 2023-06-14 NOTE — PLAN OF CARE
Problem: Physical Therapy  Goal: Physical Therapy Goal  Description: Goals to be met by: 23     Patient will increase functional independence with mobility by performin. Supine to sit with [place]  2. Sit to supine with [place]  3. Sit to stand transfer with Stand-by Assistance  4. Gait  x 150 feet with Stand-by Assistance using Rolling Walker.     Outcome: Ongoing, Progressing

## 2023-06-14 NOTE — SUBJECTIVE & OBJECTIVE
Subjective:     Interval History:   Reports she had an episode of intermittent left facial droop and left arm numbness. She also reports during this episode she was unable to talk or swallow. She states she feels closer to baseline now. Nursing reports she had an episode of anxiety last night.     Current Neurological Medications:     Current Facility-Administered Medications   Medication Dose Route Frequency Provider Last Rate Last Admin    acetaminophen tablet 650 mg  650 mg Oral Q6H PRN Lobo Carmona MD   650 mg at 06/14/23 0202    ALPRAZolam tablet 1 mg  1 mg Oral BID PRN Lobo Carmoan MD   1 mg at 06/14/23 1329    amitriptyline tablet 25 mg  25 mg Oral Nightly PRN Lobo Carmona MD   25 mg at 06/13/23 2327    aspirin EC tablet 325 mg  325 mg Oral Daily Emil Barahona MD        atorvastatin tablet 40 mg  40 mg Oral Daily Catalina Deutsch FNP   40 mg at 06/14/23 1329    docusate sodium capsule 100 mg  100 mg Oral BID Polo Bruner PA-C   100 mg at 06/13/23 2023    enoxaparin injection 40 mg  40 mg Subcutaneous Daily MIGUELANGEL BroussardP   40 mg at 06/13/23 1737    hydrALAZINE injection 5 mg  5 mg Intravenous Q1H PRN Polo Bruner PA-C   5 mg at 06/13/23 1823    HYDROcodone-acetaminophen  mg per tablet 1 tablet  1 tablet Oral Q8H PRN Lobo Carmona MD   1 tablet at 06/14/23 1329    lactated ringers infusion   Intravenous Continuous JEAN Figueroa 50 mL/hr at 06/13/23 0938 New Bag at 06/13/23 0938    levothyroxine tablet 75 mcg  75 mcg Oral Before breakfast Lobo Carmona MD   75 mcg at 06/14/23 0535    loperamide capsule 4 mg  4 mg Oral Once Polo Bruner PA-C        melatonin tablet 6 mg  6 mg Oral Nightly PRN Lobo Carmona MD        methocarbamoL injection 500 mg  500 mg Intravenous Q12H PRN Catalina Deutsch FNP   500 mg at 06/13/23 2024    mupirocin 2 % ointment   Nasal BID JEAN Levin   Given at 06/13/23 2024    nicotine 21 mg/24 hr  1 patch  1 patch Transdermal Daily Lobo Carmona MD   1 patch at 06/13/23 2024    nitroGLYCERIN SL tablet 0.4 mg  0.4 mg Sublingual Q5 Min PRN Ny Santos MD   0.4 mg at 06/11/23 1804    ondansetron injection 4 mg  4 mg Intravenous Q12H PRN Polo Bruner PA-C        pantoprazole EC tablet 40 mg  40 mg Oral Daily Lobo Carmona MD   40 mg at 06/14/23 1328    prochlorperazine injection Soln 5 mg  5 mg Intravenous Q6H PRN Polo Bruner PA-C        senna-docusate 8.6-50 mg per tablet 3 tablet  3 tablet Oral Daily Lobo Carmona MD   3 tablet at 06/14/23 1328    sodium chloride 0.9% flush 10 mL  10 mL Intravenous PRN Catalina Deutsch, FNRODNEY        sodium chloride 0.9% flush 2 mL  2 mL Intravenous Q8H PRN JEAN Figueroa        traMADoL tablet 50 mg  50 mg Oral Q8H PRN Lobo Carmona MD        vitamin D 1000 units tablet 5,000 Units  5,000 Units Oral Daily with breakfast Polo Bruner PA-C           Review of Systems   HENT:  Positive for trouble swallowing.    Neurological:  Positive for facial asymmetry, speech difficulty and numbness. Negative for tremors, seizures, syncope, weakness, light-headedness and headaches.   All other systems reviewed and are negative.  Objective:     Vital Signs (Most Recent):  Temp: 98.4 °F (36.9 °C) (06/14/23 0731)  Pulse: 66 (06/14/23 0731)  Resp: 18 (06/14/23 1329)  BP: (!) 156/81 (06/14/23 0731)  SpO2: 98 % (06/14/23 0731) Vital Signs (24h Range):  Temp:  [98.1 °F (36.7 °C)-99 °F (37.2 °C)] 98.4 °F (36.9 °C)  Pulse:  [52-94] 66  Resp:  [13-24] 18  SpO2:  [97 %-100 %] 98 %  BP: ()/(50-83) 156/81     Weight: 68.9 kg (152 lb)  Body mass index is 24.53 kg/m².     Physical Exam  Vitals and nursing note reviewed.   Constitutional:       General: She is not in acute distress.     Appearance: She is not ill-appearing, toxic-appearing or diaphoretic.   Eyes:      General: No visual field deficit.     Pupils: Pupils are equal, round, and reactive to  light.   Pulmonary:      Effort: Pulmonary effort is normal.   Musculoskeletal:         General: Normal range of motion.      Cervical back: Normal range of motion.      Right lower leg: No edema.      Left lower leg: No edema.   Skin:     General: Skin is warm and dry.      Capillary Refill: Capillary refill takes less than 2 seconds.   Neurological:      Mental Status: She is alert.      Cranial Nerves: Facial asymmetry (intermittent left facial droop) present. No dysarthria.      Sensory: No sensory deficit.      Motor: No weakness, tremor, atrophy, abnormal muscle tone, seizure activity or pronator drift.      Coordination: Coordination normal. Finger-Nose-Finger Test normal.        NEUROLOGICAL EXAMINATION:     CRANIAL NERVES     CN III, IV, VI   Pupils are equal, round, and reactive to light.    GAIT AND COORDINATION      Coordination   Finger to nose coordination: normal    Significant Labs:   Recent Lab Results         06/14/23  0347   06/13/23  1956   06/13/23  1819        Albumin/Globulin Ratio 1.7           Albumin 3.8           Alkaline Phosphatase 74           ALT 10           AST 13           Baso # 0.01           Basophil % 0.1           BILIRUBIN TOTAL 0.4           BUN 13.2           Calcium 8.9           Chloride 109           CO2 25           Creatinine 0.87           eGFR >60           Eos # 0.00           Eosinophil % 0.0           Globulin, Total 2.3           Glucose 73           Hematocrit 37.8           Hemoglobin 12.3           Immature Grans (Abs) 0.04           Immature Granulocytes 0.4           Lymph # 1.23           LYMPH % 13.1           Magnesium 1.80           MCH 32.2           MCHC 32.5           MCV 99.0           Mono # 0.58           Mono % 6.2           MPV 11.3           Neut # 7.51           Neut % 80.2           nRBC 0.0           Right Highest EDV   19.00         Right ICA/CCA ratio   1.64         Right Highest CCA   42         Right Highest ICA   69.00         Platelets  184           POCT Glucose     118       Potassium 4.3           PROTEIN TOTAL 6.1           RBC 3.82           Right CCA dist baker   11         Right CCA dist sys   42         RDW 14.4           Right ICA dist baker   19         Right ICA dist sys   65         Right ICA mid baker   16         Right ICA mid sys   69         Right ICA prox baker   12         Right ICA prox sys   67         Sodium 144           WBC 9.37                   Significant Imaging: I have reviewed all pertinent imaging results/findings within the past 24 hours.

## 2023-06-14 NOTE — ANESTHESIA POSTPROCEDURE EVALUATION
Anesthesia Post Evaluation    Patient: Thom Krishna    Procedure(s) Performed: Procedure(s) (LRB):  ENDARTERECTOMY, CAROTID (Right)    Final Anesthesia Type: general      Patient location during evaluation: PACU  Patient participation: Yes- Able to Participate  Level of consciousness: awake and alert  Post-procedure vital signs: reviewed and stable  Pain management: adequate  Airway patency: patent      Anesthetic complications: no      Cardiovascular status: hemodynamically stable  Respiratory status: unassisted  Hydration status: euvolemic  Follow-up not needed.          Vitals Value Taken Time   /81 06/14/23 0731   Temp 36.9 °C (98.4 °F) 06/14/23 0731   Pulse 66 06/14/23 0731   Resp 18 06/14/23 0933   SpO2 98 % 06/14/23 0731         Event Time   Out of Recovery 06/13/2023 16:32:00         Pain/More Score: Pain Rating Prior to Med Admin: 10 (6/14/2023  9:33 AM)  Pain Rating Post Med Admin: 3 (6/14/2023 10:03 AM)  More Score: 10 (6/13/2023  4:30 PM)

## 2023-06-14 NOTE — PROGRESS NOTES
[facility] Hospital Medicine Progress Note        Chief Complaint: Inpatient Follow-up for CVA    HPI:   Thom Krishna is a 67 y.o. female who past medical history includes anxiety, depression, arthritis, chronic neck and back pain status post stimulator placement, degenerative disc disease, DM type 2, hepatitis-C, cirrhosis of the liver, emphysema, CKD stage 3; presents to the ED via EMS air med with reports of left-sided facial droop with associated slurred speech.  It was reported that patient was last seen normal at around 930 this morning.  Patient reports she woke up with lower extremity weakness which she had a difficult time ambulating which she reported slurred speech with left facial droop and difficulty swallowing which started this morning.  She also reported left arm numbness and weakness and decreased  to her left hand.  She denies any injury, traumas, falls, or any syncopal events.  Patient denies any chest pain, shortness a breath, fever, chills, cough, congestion, nausea, vomiting, diarrhea, or any sick contacts.  CT of the head without contrast demonstrated no acute intracranial findings identified, right parietal lobe small new encephalomalacia mild chronic micro ischemia and moderate atrophy. CTA of head and neck without contrast demonstrated no short interval change of CT head appearance, no evidence of flow-limiting stenosis or other acute focal intracranial vascular abnormality, atherosclerotic changes of the bilateral extracranial carotid vasculature with severe short segment stenosis of the right proximal ICA.  Lab work done reviewed demonstrated creatinine 1.04, glucose 78; other indicis unremarkable.      Initial vital signs reviewed /58 pulse 65 respirations 19 temperature 98.1° F O2 saturation 99% on room air.  Patient received IV hydration and aspirin rectally in the ED. Patient is admitted to hospital medicine services for further  management.    Workup revealed rt ICA stenosis for which CV surgery was consulted and patient underwent CTA.        Interval Hx:   Had episode of acute onset left-sided weakness and speech difficulties yesterday postoperatively.  Symptoms have resolved without any intervention.  Stat CT head showed no acute interval changes.  CT head revealed calcified plaques in left ICA with no significant hemodynamic stenosis, bilateral plaques in the cavernous portions.    When seen at bedside this morning she was alert, oriented, comfortably resting.  Speech is improved.  Start tolerating p.o..  Complains of pain at surgical site.  Otherwise no new complaints or concerns.  No family at bedside.        Objective/physical exam:  Vitals:    06/14/23 0322 06/14/23 0354 06/14/23 0657 06/14/23 0731   BP:  (!) 153/77  (!) 156/81   BP Location:       Patient Position:       Pulse:  (!) 59  66   Resp: 18 18 18    Temp:  98.5 °F (36.9 °C)  98.4 °F (36.9 °C)   TempSrc:  Oral  Oral   SpO2:  98%  98%   Weight:       Height:         General: In no acute distress, afebrile  Respiratory: Clear to auscultation bilaterally  Cardiovascular: S1, S2, no appreciable murmur  Abdomen: Soft, nontender, BS +  MSK: Warm, no lower extremity edema, no clubbing or cyanosis  Neurologic: Alert and oriented x4, Left sided 4/5, CN intact      Lab Results   Component Value Date     06/14/2023    K 4.3 06/14/2023     (H) 10/12/2022    CO2 25 06/14/2023    BUN 13.2 06/14/2023    CREATININE 0.87 06/14/2023    CALCIUM 8.9 06/14/2023    ANIONGAP 13 10/12/2022    ESTGFRAFRICA >60 07/23/2021    EGFRNONAA 58 (A) 07/23/2021      Lab Results   Component Value Date    ALT 10 06/14/2023    AST 13 06/14/2023     (H) 05/06/2013    ALKPHOS 74 06/14/2023    BILITOT 0.4 06/14/2023      Lab Results   Component Value Date    WBC 9.37 06/14/2023    HGB 12.3 06/14/2023    HCT 37.8 06/14/2023    MCV 99.0 (H) 06/14/2023     06/14/2023           Medications:    aspirin  325 mg Oral Daily    aspirin  81 mg Oral Daily    aspirin  300 mg Rectal Daily    atorvastatin  40 mg Oral Daily    docusate sodium  100 mg Oral BID    enoxparin  40 mg Subcutaneous Daily    levothyroxine  75 mcg Oral Before breakfast    loperamide  4 mg Oral Once    morphine  4 mg Intravenous Once    mupirocin   Nasal BID    nicotine  1 patch Transdermal Daily    pantoprazole  40 mg Oral Daily    senna-docusate 8.6-50 mg  3 tablet Oral Daily    vitamin D  5,000 Units Oral Daily with breakfast      acetaminophen, ALPRAZolam, amitriptyline, hydrALAZINE, hydrALAZINE, HYDROcodone-acetaminophen, HYDROcodone-acetaminophen, methocarbamoL, nitroGLYCERIN, ondansetron, ondansetron, prochlorperazine, sodium chloride 0.9%, sodium chloride 0.9%     Assessment/Plan:     TIA with associated left sided deficits, facial droop and slurred speech-improving  77% stenosis of right ICA s/p CEA 6/13/23  MICHOACANO on CKD- improved     HX: T2DM, HTN, CKD, hepatitis C with chronic cirrhosis, Chronic back pain - with chronic pain management and prior back stimulator implant    Plan:  -continue aspirin and statin.  CTA and CT head obtained yesterday reviewed  -CV surgery signing off.  Continue surgical site wound care  -therapy , needs placement  -add tramadol for pain control.  Continue Norco.        Lobo Carmona MD

## 2023-06-14 NOTE — PT/OT/SLP EVAL
Occupational Therapy  Evaluation    Name: Thom Krishna  MRN: [id number]  Admitting Diagnosis: TIA, R carotid artery stenosis  Recent Surgery: Procedure(s) (LRB):  ENDARTERECTOMY, CAROTID (Right) 1 Day Post-Op    Recommendations:     Discharge Recommendations: home with home health  Discharge Equipment Recommendations:  walker, rolling  Barriers to discharge:  None    Assessment:     Thom Krishna is a 67 y.o. female with a medical diagnosis of TIA, R carotid artery stenosis. Performance deficits affecting function: weakness, impaired functional mobility, impaired endurance, impaired sensation, impaired self care skills. Pt appeared motivated to participate in therapy today. Pt appeared alert throughout session. When seated EOB, pt demonstrated L facial droop and minimal slurred speech for ~10-15 seconds. Symptoms quickly resolved once returned to supine. RN notified. Pt reported she would like some help at home upon d/c. Pt would benefit from  services upon d/c.    Rehab Prognosis: Good; patient would benefit from acute skilled OT services to address these deficits and reach maximum level of function.       Plan:     Patient to be seen 3 x/week to address the above listed problems via self-care/home management, therapeutic activities, therapeutic exercises  Plan of Care Expires: 06/28/23  Plan of Care Reviewed with: patient    Subjective     Chief Complaint: pain   Patient/Family Comments/goals: to return home    Occupational Profile:  Living Environment: Pt reported she lives alone in an apt on ground level. Pt has a standard tub shower combo with a bench  Previous level of function: Pt reported she was independent in all ADLs and mobility   Equipment Used at Home: none  Assistance upon Discharge: Pt reported her niece would provide assistance upon d/c.     Pain/Comfort:  Pain Rating 1: 9/10  Location 1: back    Patients cultural, spiritual, Congregational conflicts given the current situation: no    Objective:      Patient found supine with telemetry, pulse ox (continuous), PureWick upon OT entry to room.    General Precautions: Standard, fall  Orthopedic Precautions: N/A  Braces: N/A  Respiratory Status: Room air  Vital Signs: Blood Pressure: 150/73 (before movement) 143/80 (seated EOB)  HR: 96 (before movement) 91 (seated EOB)    Occupational Performance:    Bed Mobility:    Patient completed Rolling/Turning to Left with  independence  Patient completed Scooting/Bridging with independence  Patient completed Supine to Sit with stand by assistance  Patient completed Sit to Supine with contact guard assistance    Functional Mobility/Transfers:  Patient completed Sit <> Stand Transfer with minimum assistance  with  rolling walker   Functional Mobility: Pt took 2-3 lateral steps with min A using rolling walker. Pt reported dizziness in standing- vitals WNL.     Activities of Daily Living:  Lower Body Dressing: moderate assistance Pt donned L sock seated EOB. Pt unable to kimberly R sock while seated EOB    Cognitive/Visual Perceptual:  Cognitive/Psychosocial Skills:     -       Oriented to: Person, Place, Time, and Situation   -       Follows Commands/attention:Follows multistep  commands  -       Safety awareness/insight to disability: intact   -       Mood/Affect/Coping skills/emotional control: Appropriate to situation, Cooperative, and Pleasant    Physical Exam:  Upper Extremity Range of Motion:     -       Right Upper Extremity: WFL  -       Left Upper Extremity: WFL  Upper Extremity Strength:    -       Right Upper Extremity: WFL  -       Left Upper Extremity: WFL  Pt demonstrated diminished sensation of L 2nd digit on palmar and dorsal side.   Pt demonstrated L facial droop, eye rolling, and minimal slurred speech when seated EOB. Symptoms diminished when returned to supine-RN notified.     Therapeutic Positioning  Risk for acquired pressure injuries is decreased due to ability to get to BSC/toilet with assist.    OT  interventions performed during the course of today's session in an effort to prevent and/or reduce acquired pressure injuries:   Therapeutic positioning completed       OT recommendations for therapeutic positioning throughout hospitalization:   Follow [facility] Pressure Injury Prevention Protocol        Patient Education:  Patient provided with verbal education regarding OT role/goals/POC, fall prevention, safety awareness, and Discharge/DME recommendations.  Understanding was verbalized.     Patient left supine with all lines intact and call button in reach    GOALS:   Multidisciplinary Problems       Occupational Therapy Goals          Problem: Occupational Therapy    Goal Priority Disciplines Outcome Interventions   Occupational Therapy Goal     OT, PT/OT Ongoing, Progressing    Description: Goals to be met by: 6/28/23     Patient will increase functional independence with ADLs by performing:    UE Dressing with Modified [place].  LE Dressing with Modified [place].  Grooming while standing at sink with Modified [place].  Toileting from bedside commode with Modified [place] for hygiene and clothing management.   Step transfer with Modified [place]                         History:     Past Medical History:   Diagnosis Date    Anxiety and depression 7/21/2015    Arthritis     Cervical radiculopathy 4/8/2016    Cirrhosis of liver     Colon polyps 4/27/2015    Diabetes mellitus type 2 with neurological manifestations 11/6/2015    no current medications, only one episode of elevated sugars with acute illness, will monitor     Encounter for blood transfusion     Hepatitis C     s/p treatment per patient report; managed by Dr. Perez    Hip fracture     Macrocytosis 7/21/2015    Thyroid disease     Transfusion reaction     hep c         Past Surgical History:   Procedure Laterality Date    APPENDECTOMY      BACK SURGERY      CAROTID ENDARTERECTOMY Right 6/13/2023    Procedure: ENDARTERECTOMY,  "CAROTID;  Surgeon: Juan James MD;  Location: [facility] OR;  Service: Cardiology;  Laterality: Right;  RIGHT    CAUDAL EPIDURAL STEROID INJECTION N/A 2018    Procedure: Injection-steroid-epidural-caudal;  Surgeon: Roxana Gu Jr., MD;  Location: [facility] OR;  Service: Pain Management;  Laterality: N/A;  with cath target L4-5    CAUDAL EPIDURAL STEROID INJECTION N/A 2019    Procedure: Injection-steroid-epidural-caudal;  Surgeon: Roxana Gu Jr., MD;  Location: [facility] PAIN MGT;  Service: Pain Management;  Laterality: N/A;     SECTION      CHOLECYSTECTOMY      COLONOSCOPY  3/31/10    2 polyps, tubular adenoma    COLONOSCOPY  2015    Dr. Washington    COLONOSCOPY N/A 10/10/2018    Procedure: COLONOSCOPY;  Surgeon: Reuben Miller Jr., MD;  Location: [facility];  Service: Endoscopy;  Laterality: N/A;    ESOPHAGOGASTRODUODENOSCOPY N/A 10/10/2018    Procedure: EGD (ESOPHAGOGASTRODUODENOSCOPY);  Surgeon: Reuben Miller Jr., MD;  Location: [facility];  Service: Endoscopy;  Laterality: N/A;    ESOPHAGOGASTRODUODENOSCOPY N/A 12/10/2018    Procedure: EGD (ESOPHAGOGASTRODUODENOSCOPY)/poss emr;  Surgeon: Belle Kuo MD;  Location: [address]);  Service: Endoscopy;  Laterality: N/A;    ESOPHAGOGASTRODUODENOSCOPY N/A 3/12/2019    Procedure: EGD (ESOPHAGOGASTRODUODENOSCOPY);  Surgeon: Polo Keyes MD;  Location: [facility];  Service: Endoscopy;  Laterality: N/A;    ESOPHAGOGASTRODUODENOSCOPY N/A 2020    Procedure: ESOPHAGOGASTRODUODENOSCOPY (EGD);  Surgeon: Belle Kuo MD;  Location: [facility];  Service: Endoscopy;  Laterality: N/A;    HERNIA REPAIR      HYSTERECTOMY      Uterus and both ovaries    INCISIONAL HERNIA REPAIR      x 2    JOINT REPLACEMENT      LIVER BIOPSY  2003    autoimmune hepatitis    ROTATOR CUFF REPAIR      right    STOMACH SURGERY      "took lymph node off of liver"    TOTAL HIP ARTHROPLASTY      left hip    UPPER GASTROINTESTINAL ENDOSCOPY  " 3/24/10    normal    UPPER GASTROINTESTINAL ENDOSCOPY  04/27/2015    Dr. Washington       Time Tracking:     OT Date of Treatment:    OT Start Time: 1353  OT Stop Time: 1411  OT Total Time (min): 18 min    Billable Minutes:Evaluation moderate complexity    6/14/2023

## 2023-06-14 NOTE — PROGRESS NOTES
[facility] - [address] Medical Telemetry  Neurology  Progress Note    Patient Name: Thom Krishna  MRN: [id number]  Admission Date: 6/11/2023  Hospital Length of Stay: 3 days  Code Status: Full Code   Attending Provider: Emil Barahona MD  Primary Care Physician: Brandy Dejesus MD   Principal Problem:Stroke      Overview/Hospital Course:  6/11 LSW, ataxia, slurred speech, and left facial droop, no recommendation for TNK       Subjective:     Interval History:   Reports she had an episode of intermittent left facial droop and left arm numbness. She also reports during this episode she was unable to talk or swallow. She states she feels closer to baseline now. Nursing reports she had an episode of anxiety last night.     Current Neurological Medications:     Current Facility-Administered Medications   Medication Dose Route Frequency Provider Last Rate Last Admin    acetaminophen tablet 650 mg  650 mg Oral Q6H PRN Lobo Carmona MD   650 mg at 06/14/23 0202    ALPRAZolam tablet 1 mg  1 mg Oral BID PRN Lobo Carmona MD   1 mg at 06/14/23 1329    amitriptyline tablet 25 mg  25 mg Oral Nightly PRN Lobo Carmona MD   25 mg at 06/13/23 2327    aspirin EC tablet 325 mg  325 mg Oral Daily Emil Barahona MD        atorvastatin tablet 40 mg  40 mg Oral Daily Catalina Deutsch FNP   40 mg at 06/14/23 1329    docusate sodium capsule 100 mg  100 mg Oral BID Polo Bruner PA-C   100 mg at 06/13/23 2023    enoxaparin injection 40 mg  40 mg Subcutaneous Daily MIGUELANGEL BroussardP   40 mg at 06/13/23 1737    hydrALAZINE injection 5 mg  5 mg Intravenous Q1H PRN Polo Bruner PA-C   5 mg at 06/13/23 1823    HYDROcodone-acetaminophen  mg per tablet 1 tablet  1 tablet Oral Q8H PRN Lobo Carmona MD   1 tablet at 06/14/23 1329    lactated ringers infusion   Intravenous Continuous JEAN Figueroa 50 mL/hr at 06/13/23 0938 New Bag at 06/13/23 0938    levothyroxine tablet 75  mcg  75 mcg Oral Before breakfast Lobo Carmona MD   75 mcg at 06/14/23 0535    loperamide capsule 4 mg  4 mg Oral Once Polo Bruner PA-C        melatonin tablet 6 mg  6 mg Oral Nightly PRN Lobo Carmona MD        methocarbamoL injection 500 mg  500 mg Intravenous Q12H PRN MIGUELANGEL BroussardP   500 mg at 06/13/23 2024    mupirocin 2 % ointment   Nasal BID JEAN Levin   Given at 06/13/23 2024    nicotine 21 mg/24 hr 1 patch  1 patch Transdermal Daily Lobo Carmona MD   1 patch at 06/13/23 2024    nitroGLYCERIN SL tablet 0.4 mg  0.4 mg Sublingual Q5 Min PRN Ny Santos MD   0.4 mg at 06/11/23 1804    ondansetron injection 4 mg  4 mg Intravenous Q12H PRN Polo Bruner PA-C        pantoprazole EC tablet 40 mg  40 mg Oral Daily Lobo Carmona MD   40 mg at 06/14/23 1328    prochlorperazine injection Soln 5 mg  5 mg Intravenous Q6H PRN Polo Bruner PA-C        senna-docusate 8.6-50 mg per tablet 3 tablet  3 tablet Oral Daily Lobo Carmona MD   3 tablet at 06/14/23 1328    sodium chloride 0.9% flush 10 mL  10 mL Intravenous PRN MAGGY Broussard        sodium chloride 0.9% flush 2 mL  2 mL Intravenous Q8H PRN JEAN Figueroa        traMADoL tablet 50 mg  50 mg Oral Q8H PRN Lobo Carmona MD        vitamin D 1000 units tablet 5,000 Units  5,000 Units Oral Daily with breakfast Polo Bruner PA-C           Review of Systems   HENT:  Positive for trouble swallowing.    Neurological:  Positive for facial asymmetry, speech difficulty and numbness. Negative for tremors, seizures, syncope, weakness, light-headedness and headaches.   All other systems reviewed and are negative.  Objective:     Vital Signs (Most Recent):  Temp: 98.4 °F (36.9 °C) (06/14/23 0731)  Pulse: 66 (06/14/23 0731)  Resp: 18 (06/14/23 1329)  BP: (!) 156/81 (06/14/23 0731)  SpO2: 98 % (06/14/23 0731) Vital Signs (24h Range):  Temp:  [98.1 °F (36.7 °C)-99 °F (37.2 °C)]  98.4 °F (36.9 °C)  Pulse:  [52-94] 66  Resp:  [13-24] 18  SpO2:  [97 %-100 %] 98 %  BP: ()/(50-83) 156/81     Weight: 68.9 kg (152 lb)  Body mass index is 24.53 kg/m².     Physical Exam  Vitals and nursing note reviewed.   Constitutional:       General: She is not in acute distress.     Appearance: She is not ill-appearing, toxic-appearing or diaphoretic.   Eyes:      General: No visual field deficit.     Pupils: Pupils are equal, round, and reactive to light.   Pulmonary:      Effort: Pulmonary effort is normal.   Musculoskeletal:         General: Normal range of motion.      Cervical back: Normal range of motion.      Right lower leg: No edema.      Left lower leg: No edema.   Skin:     General: Skin is warm and dry.      Capillary Refill: Capillary refill takes less than 2 seconds.   Neurological:      Mental Status: She is alert.      Cranial Nerves: Facial asymmetry (intermittent left facial droop) present. No dysarthria.      Sensory: No sensory deficit.      Motor: No weakness, tremor, atrophy, abnormal muscle tone, seizure activity or pronator drift.      Coordination: Coordination normal. Finger-Nose-Finger Test normal.        NEUROLOGICAL EXAMINATION:     CRANIAL NERVES     CN III, IV, VI   Pupils are equal, round, and reactive to light.    GAIT AND COORDINATION      Coordination   Finger to nose coordination: normal    Significant Labs:   Recent Lab Results         06/14/23  0347   06/13/23  1956   06/13/23  1819        Albumin/Globulin Ratio 1.7           Albumin 3.8           Alkaline Phosphatase 74           ALT 10           AST 13           Baso # 0.01           Basophil % 0.1           BILIRUBIN TOTAL 0.4           BUN 13.2           Calcium 8.9           Chloride 109           CO2 25           Creatinine 0.87           eGFR >60           Eos # 0.00           Eosinophil % 0.0           Globulin, Total 2.3           Glucose 73           Hematocrit 37.8           Hemoglobin 12.3            Immature Grans (Abs) 0.04           Immature Granulocytes 0.4           Lymph # 1.23           LYMPH % 13.1           Magnesium 1.80           MCH 32.2           MCHC 32.5           MCV 99.0           Mono # 0.58           Mono % 6.2           MPV 11.3           Neut # 7.51           Neut % 80.2           nRBC 0.0           Right Highest EDV   19.00         Right ICA/CCA ratio   1.64         Right Highest CCA   42         Right Highest ICA   69.00         Platelets 184           POCT Glucose     118       Potassium 4.3           PROTEIN TOTAL 6.1           RBC 3.82           Right CCA dist baker   11         Right CCA dist sys   42         RDW 14.4           Right ICA dist baker   19         Right ICA dist sys   65         Right ICA mid baker   16         Right ICA mid sys   69         Right ICA prox baker   12         Right ICA prox sys   67         Sodium 144           WBC 9.37                   Significant Imaging: I have reviewed all pertinent imaging results/findings within the past 24 hours.    Assessment and Plan:     * Stroke  S/p CEA on 6/13     -Unable to have MRI brain 2/2 pain stimulator   -CT head (6/13): negative  -CTA head and neck:   1.  Calcified plaque proximal left internal carotid artery without hemodynamically significant stenosis.  2.  Calcified plaques bilateral cavernous portion of the internal carotid arteries causing moderate narrowings and supraclinoid portion of bilateral internal carotid arteries causing marked narrowings.   -Episode yesterday does not sound like a new stroke          Further recommendations to follow from MD    VTE Risk Mitigation (From admission, onward)         Ordered     enoxaparin injection 40 mg  Daily         06/11/23 1410     IP VTE HIGH RISK PATIENT  Once         06/11/23 1410     Place sequential compression device  Until discontinued         06/11/23 1410                Enedina Monge NP  Neurology  [facility] - [facility] Medical Telemetry

## 2023-06-14 NOTE — PROGRESS NOTES
Pod 1  Some weakness yest  Carotid u/s shows flow ica  Has lip lag sec to cva before surgery  Wounds c/d/I  Will sign off

## 2023-06-14 NOTE — PLAN OF CARE
06/14/23 0853   Discharge Assessment   Assessment Type Discharge Planning Assessment   Confirmed/corrected address, phone number and insurance Yes   Confirmed Demographics Correct on Facesheet   Source of Information patient   When was your last doctors appointment?   (PCP: Dr. Dejesus)   Communicated MAHIN with patient/caregiver Yes   Reason For Admission CEA   People in Home alone   Do you expect to return to your current living situation? Yes   Do you have help at home or someone to help you manage your care at home? Yes   Who are your caregiver(s) and their phone number(s)? Radha alexander Hereal, assists as needed   Prior to hospitilization cognitive status: Alert/Oriented   Current cognitive status: Alert/Oriented   Walking or Climbing Stairs ambulation difficulty, requires equipment   Mobility Management rollator   Home Accessibility wheelchair accessible  (ramp)   Home Layout Able to live on 1st floor   Equipment Currently Used at Home rollator;shower chair   Readmission within 30 days? No   Patient currently being followed by outpatient case management? No   Do you currently have service(s) that help you manage your care at home? No   Do you take prescription medications? Yes  (fills rx at [facility])   Do you have prescription coverage? Yes   Coverage [employer] Medicare/Medicaid   Do you have any problems affording any of your prescribed medications? No   Is the patient taking medications as prescribed? yes   Who is going to help you get home at discharge? toriaudie will    How do you get to doctors appointments? car, drives self   Are you on dialysis? No   Do you take coumadin? No   Discharge Plan A Home   Discharge Plan B Home Health   DME Needed Upon Discharge  none   Discharge Plan discussed with: Patient   Transition of Care Barriers None

## 2023-06-14 NOTE — PLAN OF CARE
Problem: Occupational Therapy  Goal: Occupational Therapy Goal  Description: Goals to be met by: 6/28/23     Patient will increase functional independence with ADLs by performing:    UE Dressing with Modified [place].  LE Dressing with Modified [place].  Grooming while standing at sink with Modified [place].  Toileting from bedside commode with Modified [place] for hygiene and clothing management.   Step transfer with Modified [place]    Outcome: Ongoing, Progressing

## 2023-06-14 NOTE — PT/OT/SLP EVAL
Speech Language Pathology Department  Clinical Swallow Evaluation    Patient Name:  Thom Krishna   MRN:  [id number]    Recommendations:     General recommendations:  dysphagia therapy and Speech/Language and Cognitive Evaluation  Diet recommendations:  Soft & Bite Sized Diet - IDDSI Level 6, Liquid Diet Level: Moderately thick liquids - IDDSI Level 3   Swallow strategies/precautions: small bites/sips and slow rate  Precautions: Standard, aspiration    History:     Thom Krishna is a/n 67 y.o. female admitted for L facial droop and slurred speech.    Past Medical History:   Diagnosis Date    Anxiety and depression 2015    Arthritis     Cervical radiculopathy 2016    Cirrhosis of liver     Colon polyps 2015    Diabetes mellitus type 2 with neurological manifestations 2015    no current medications, only one episode of elevated sugars with acute illness, will monitor     Encounter for blood transfusion     Hepatitis C     s/p treatment per patient report; managed by Dr. Perez    Hip fracture     Macrocytosis 2015    Thyroid disease     Transfusion reaction     hep c     Past Surgical History:   Procedure Laterality Date    APPENDECTOMY      BACK SURGERY      CAROTID ENDARTERECTOMY Right 2023    Procedure: ENDARTERECTOMY, CAROTID;  Surgeon: Juan James MD;  Location: [facility] OR;  Service: Cardiology;  Laterality: Right;  RIGHT    CAUDAL EPIDURAL STEROID INJECTION N/A 2018    Procedure: Injection-steroid-epidural-caudal;  Surgeon: Roxana Gu Jr., MD;  Location: [facility] OR;  Service: Pain Management;  Laterality: N/A;  with cath target L4-5    CAUDAL EPIDURAL STEROID INJECTION N/A 2019    Procedure: Injection-steroid-epidural-caudal;  Surgeon: Roxana Gu Jr., MD;  Location: [facility];  Service: Pain Management;  Laterality: N/A;     SECTION      CHOLECYSTECTOMY      COLONOSCOPY  3/31/10    2 polyps, tubular adenoma    COLONOSCOPY  2015    Dr. Washington     "COLONOSCOPY N/A 10/10/2018    Procedure: COLONOSCOPY;  Surgeon: Reuben Miller Jr., MD;  Location: [facility];  Service: Endoscopy;  Laterality: N/A;    ESOPHAGOGASTRODUODENOSCOPY N/A 10/10/2018    Procedure: EGD (ESOPHAGOGASTRODUODENOSCOPY);  Surgeon: Reuben Miller Jr., MD;  Location: [facility];  Service: Endoscopy;  Laterality: N/A;    ESOPHAGOGASTRODUODENOSCOPY N/A 12/10/2018    Procedure: EGD (ESOPHAGOGASTRODUODENOSCOPY)/poss emr;  Surgeon: Belle Kuo MD;  Location: [facility] ([address] [place] [address]);  Service: Endoscopy;  Laterality: N/A;    ESOPHAGOGASTRODUODENOSCOPY N/A 3/12/2019    Procedure: EGD (ESOPHAGOGASTRODUODENOSCOPY);  Surgeon: Polo Keyes MD;  Location: [facility];  Service: Endoscopy;  Laterality: N/A;    ESOPHAGOGASTRODUODENOSCOPY N/A 11/20/2020    Procedure: ESOPHAGOGASTRODUODENOSCOPY (EGD);  Surgeon: Belle Kuo MD;  Location: [facility];  Service: Endoscopy;  Laterality: N/A;    HERNIA REPAIR      HYSTERECTOMY  1989    Uterus and both ovaries    INCISIONAL HERNIA REPAIR      x 2    JOINT REPLACEMENT      LIVER BIOPSY  12/2003    autoimmune hepatitis    ROTATOR CUFF REPAIR      right    STOMACH SURGERY  1980's    "took lymph node off of liver"    TOTAL HIP ARTHROPLASTY      left hip    UPPER GASTROINTESTINAL ENDOSCOPY  3/24/10    normal    UPPER GASTROINTESTINAL ENDOSCOPY  04/27/2015    Dr. Washington     Pt with MBS completed 6/12/23 with diet recommendations of soft and bite sized solids with moderately thick liquids.    Pt with RRT s/p C CEA due to stroke like symptoms.    Patient complaint: "I'm hungry"    Subjective     Patient awake and alert.    Patient goals: "I'm hungry"     Spiritual/Cultural/Faith Beliefs/Practices that affect care: no  Pain/Comfort: Pain Rating 1: 0/10      Objective:     ORAL MUSCULATURE  Dentition: own teeth  Secretion Management: adequate  Mucosal Quality: good  Facial Movement: reduced L movement, variable  Buccal Strength & Mobility: WFL  Mandibular " Strength & Mobility: WFL  Oral Labial Strength & Mobility: WFL  Lingual Strength & Mobility: WFL  Vocal Quality: clear during conversation      Consistency Fed By Oral Symptoms Pharyngeal Symptoms   Puree Self None None   Moderately thick liquid by cup Self Bolus holding None   Chewable solid Self Prolonged bolus formation/mastication None     Assessment:     Rec: ok to restart prior dysphagia diet based off of MBS recommendations (reference report 23) of soft and bite sized solids with moderately thick liquids.  SLP to continue POC.    Goals:     Multidisciplinary Problems       SLP Goals          Problem: SLP    Goal Priority Disciplines Outcome   SLP Goal     SLP    Description: LTG: To tolerate least restrictive diet without clinical signs/sx of aspiration.   ST. Tongue base/laryngeal excursion exercise   2. Tolerate thermal stimulation x10 with 100% swallow response with less than a 2 second delay.                      Patient Education:     Patient and son/s provided with verbal education regarding POC.  Understanding was verbalized, however additional teaching warranted.    Plan:     SLP Follow-Up:  Yes   Patient to be seen:  5 x/week   Plan of Care expires:  23  Plan of Care reviewed with:  patient, son      Time Tracking:     SLP Treatment Date:   23  Speech Start Time:  1240  Speech Stop Time:  1255     Speech Total Time (min):  15 min    Billable minutes:  Swallow and Oral Function Evaluation, 15 minutes     2023

## 2023-06-14 NOTE — NURSING
Patient called stating her fingers were numb, upon assessment noticed left sided weakness, and pupils fixed.  Patient was AAOX4. Notified charged nurse Gill PEREZ, she performed stroke assessment.(NIH 5) Patient started having significant  left sided facial droop, and left sided weakness. Called RRT. Then code fast. Notified MD Carmona, MD James, and Neuro consult. CTA, CTH and US of carotid was ordered. Pt. Vitals  159/63, 98% 2L NC, HR 90, Glucose 118.  Report given to Maximus PEREZ.

## 2023-06-14 NOTE — PT/OT/SLP EVAL
Physical Therapy Evaluation    Patient Name:  Thom Krishna   MRN:  [id number]    Recommendations:     Discharge Recommendations: rehabilitation facility, nursing facility, skilled (pending)   Discharge Equipment Recommendations:  (pending)   Barriers to discharge:  medical dx, impaired mobility, decreased independence     Assessment:     Thom Krishna is a 67 y.o. female admitted with a medical diagnosis of carotid stenosis s/p CEA; questionable TIA. She presents with the following impairments/functional limitations: weakness, gait instability, impaired endurance, impaired balance, decreased lower extremity function, impaired self care skills, impaired functional mobility.    Rehab Prognosis: Good; patient would benefit from acute skilled PT services to address these deficits and reach maximum level of function.    Recent Surgery: Procedure(s) (LRB):  ENDARTERECTOMY, CAROTID (Right) 1 Day Post-Op    Plan:     During this hospitalization, patient to be seen 6 x/week to address the identified rehab impairments via gait training, therapeutic activities, therapeutic exercises and progress toward the following goals:    Plan of Care Expires:  07/14/23    Subjective     Chief Complaint: n/a  Patient/Family Comments/goals: to get stronger and return PLOF  Pain/Comfort:  Pain Rating 1: 0/10    Patients cultural, spiritual, Catholic conflicts given the current situation: no    Living Environment:  Pt lives alone in a  senior citizen apartment  Prior to admission, patients level of function was independent.    Equipment used/owned at home: rollator, shower chair.    Upon discharge, patient will have assistance from family however might benefit from placement in order to maximize functional mobility and reduce burden of care.    Objective:     Communicated with NSG prior to session.  Patient found HOB elevated with PureWick, peripheral IV, telemetry, pulse ox (continuous)  upon PT entry to room.    General Precautions:  "Standard, fall  Orthopedic Precautions:N/A   Braces: N/A  Respiratory Status: Room air  Blood Pressure: 163/73      Exams:  Cognitive Exam:  Patient is oriented to Person, Place, Time, and Situation  RLE Strength: grossly 4/5-- pt stated this side was weaker at BL 2/2 "back problems"  LLE Strength: grossly 5/5  Skin integrity: Visible skin intact      Functional Mobility:  Bed Mobility:     Supine to Sit: stand by assistance  Transfers:     Sit to Stand:  minimum assistance with rolling walker  Gait: pt ambulated approx 16 ft with use of RW and Reshma; pt demonstrated B decreased step length and foot clearance; c/o LE weakness; limited by decreased tolerance to ax       Patient and son/s provided with verbal education regarding POC, mobility, safety.  Understanding was verbalized.   Educated pt further about the importance about needing to call the nursing staff for assistance before attempting to get up on her own in order to prevent a fall; verbalized understanding.     Patient left up in chair with all lines intact, call button in reach, RN notified, and son present.    GOALS:   Multidisciplinary Problems       Physical Therapy Goals          Problem: Physical Therapy    Goal Priority Disciplines Outcome Goal Variances Interventions   Physical Therapy Goal     PT, PT/OT Ongoing, Progressing     Description: Goals to be met by: 23     Patient will increase functional independence with mobility by performin. Supine to sit with [place]  2. Sit to supine with [place]  3. Sit to stand transfer with Stand-by Assistance  4. Gait  x 150 feet with Stand-by Assistance using Rolling Walker.                          History:     Past Medical History:   Diagnosis Date    Anxiety and depression 2015    Arthritis     Cervical radiculopathy 2016    Cirrhosis of liver     Colon polyps 2015    Diabetes mellitus type 2 with neurological manifestations 2015    no current medications, only one " episode of elevated sugars with acute illness, will monitor     Encounter for blood transfusion     Hepatitis C     s/p treatment per patient report; managed by Dr. Perez    Hip fracture     Macrocytosis 2015    Thyroid disease     Transfusion reaction     hep c       Past Surgical History:   Procedure Laterality Date    APPENDECTOMY      BACK SURGERY      CAROTID ENDARTERECTOMY Right 2023    Procedure: ENDARTERECTOMY, CAROTID;  Surgeon: Juan James MD;  Location: [facility] OR;  Service: Cardiology;  Laterality: Right;  RIGHT    CAUDAL EPIDURAL STEROID INJECTION N/A 2018    Procedure: Injection-steroid-epidural-caudal;  Surgeon: Roxana Gu Jr., MD;  Location: [facility] OR;  Service: Pain Management;  Laterality: N/A;  with cath target L4-5    CAUDAL EPIDURAL STEROID INJECTION N/A 2019    Procedure: Injection-steroid-epidural-caudal;  Surgeon: Roxana Gu Jr., MD;  Location: [facility] MGT;  Service: Pain Management;  Laterality: N/A;     SECTION      CHOLECYSTECTOMY      COLONOSCOPY  3/31/10    2 polyps, tubular adenoma    COLONOSCOPY  2015    Dr. Washington    COLONOSCOPY N/A 10/10/2018    Procedure: COLONOSCOPY;  Surgeon: Reuben Miller Jr., MD;  Location: [facility];  Service: Endoscopy;  Laterality: N/A;    ESOPHAGOGASTRODUODENOSCOPY N/A 10/10/2018    Procedure: EGD (ESOPHAGOGASTRODUODENOSCOPY);  Surgeon: Reuben Miller Jr., MD;  Location: [facility];  Service: Endoscopy;  Laterality: N/A;    ESOPHAGOGASTRODUODENOSCOPY N/A 12/10/2018    Procedure: EGD (ESOPHAGOGASTRODUODENOSCOPY)/poss emr;  Surgeon: Belle Kuo MD;  Location: [facility] ([address] [place] [address]);  Service: Endoscopy;  Laterality: N/A;    ESOPHAGOGASTRODUODENOSCOPY N/A 3/12/2019    Procedure: EGD (ESOPHAGOGASTRODUODENOSCOPY);  Surgeon: Polo Keyes MD;  Location: [facility];  Service: Endoscopy;  Laterality: N/A;    ESOPHAGOGASTRODUODENOSCOPY N/A 2020    Procedure: ESOPHAGOGASTRODUODENOSCOPY (EGD);  Surgeon:  "Belle Kuo MD;  Location: [facility];  Service: Endoscopy;  Laterality: N/A;    HERNIA REPAIR      HYSTERECTOMY  1989    Uterus and both ovaries    INCISIONAL HERNIA REPAIR      x 2    JOINT REPLACEMENT      LIVER BIOPSY  12/2003    autoimmune hepatitis    ROTATOR CUFF REPAIR      right    STOMACH SURGERY  1980's    "took lymph node off of liver"    TOTAL HIP ARTHROPLASTY      left hip    UPPER GASTROINTESTINAL ENDOSCOPY  3/24/10    normal    UPPER GASTROINTESTINAL ENDOSCOPY  04/27/2015    Dr. Washington       Time Tracking:     PT Received On: 06/14/23  PT Start Time: 1032     PT Stop Time: 1057  PT Total Time (min): 25 min     Billable Minutes: Evaluation mod      06/14/2023   "

## 2023-06-14 NOTE — NURSING
Response Team - Patient Flow Sheet     Date: 2023  Time:   Location:  [facility]  Name: Thom Krishna  : 1955  MRN: [id number]  PCP: Brandy Dejesus MD  Allergies:   Review of patient's allergies indicates:   Allergen Reactions    Cefaclor Hives    Gabapentin Swelling    Penicillins      Other reaction(s): Hives    Sulfa (sulfonamide antibiotics) Other (See Comments)     Other reaction(s): Hives     Past Medical History:    Past Medical History:   Diagnosis Date    Anxiety and depression 2015    Arthritis     Cervical radiculopathy 2016    Cirrhosis of liver     Colon polyps 2015    Diabetes mellitus type 2 with neurological manifestations 2015    no current medications, only one episode of elevated sugars with acute illness, will monitor     Encounter for blood transfusion     Hepatitis C     s/p treatment per patient report; managed by Dr. Perez    Hip fracture     Macrocytosis 2015    Thyroid disease     Transfusion reaction     hep c       Reason for response team call: Stroke symptoms      Injury:  No    Vitals:  Time BP HR Resp SPO2 Glucose   1823 178/81 90 20 96 118                                       Medication 1  Fluid / Medication:  Hydralazine  5mg IVP  Time: 1823      Time Notes   1820 RRT called due to worsened L facial droop. Upon arrival, pt AAOX4, LEE, FC. Significant L facial droop identified, L sided weakness in the UE & LE. Able to hold L leg up for only 2 seconds. Significant headache and hypertensive. Hydralazine given. NIH 5. Dr. James called who ordered stat carotid US. Hospitalist notified, who ordered stat CTH and neurology consult.                     Response Team Members: Roger Naqvi and Hakeem Medley  Provider Response Called:  Yes   Provider Name/Time: Dr. James and Dr. Carmona

## 2023-06-14 NOTE — ASSESSMENT & PLAN NOTE
S/p CEA on 6/13     -Unable to have MRI brain 2/2 pain stimulator   -CT head (6/13): negative  -CTA head and neck:   1.  Calcified plaque proximal left internal carotid artery without hemodynamically significant stenosis.  2.  Calcified plaques bilateral cavernous portion of the internal carotid arteries causing moderate narrowings and supraclinoid portion of bilateral internal carotid arteries causing marked narrowings.   -Episode yesterday does not sound like a new stroke

## 2023-06-15 PROCEDURE — 25000003 PHARM REV CODE 250: Performed by: PHYSICIAN ASSISTANT

## 2023-06-15 PROCEDURE — 21400001 HC TELEMETRY ROOM

## 2023-06-15 PROCEDURE — 92523 SPEECH SOUND LANG COMPREHEN: CPT

## 2023-06-15 PROCEDURE — 97535 SELF CARE MNGMENT TRAINING: CPT | Mod: CO

## 2023-06-15 PROCEDURE — S4991 NICOTINE PATCH NONLEGEND: HCPCS | Performed by: INTERNAL MEDICINE

## 2023-06-15 PROCEDURE — 63600175 PHARM REV CODE 636 W HCPCS: Performed by: PHYSICIAN ASSISTANT

## 2023-06-15 PROCEDURE — 25000003 PHARM REV CODE 250: Performed by: STUDENT IN AN ORGANIZED HEALTH CARE EDUCATION/TRAINING PROGRAM

## 2023-06-15 PROCEDURE — 25000003 PHARM REV CODE 250: Performed by: INTERNAL MEDICINE

## 2023-06-15 PROCEDURE — 97116 GAIT TRAINING THERAPY: CPT | Mod: CQ

## 2023-06-15 PROCEDURE — 63600175 PHARM REV CODE 636 W HCPCS: Performed by: NURSE PRACTITIONER

## 2023-06-15 PROCEDURE — 25000003 PHARM REV CODE 250: Performed by: NURSE PRACTITIONER

## 2023-06-15 RX ORDER — MORPHINE SULFATE 4 MG/ML
2 INJECTION, SOLUTION INTRAMUSCULAR; INTRAVENOUS EVERY 6 HOURS PRN
Status: DISCONTINUED | OUTPATIENT
Start: 2023-06-15 | End: 2023-06-19 | Stop reason: HOSPADM

## 2023-06-15 RX ADMIN — TRAMADOL HYDROCHLORIDE 50 MG: 50 TABLET, COATED ORAL at 11:06

## 2023-06-15 RX ADMIN — MUPIROCIN: 20 OINTMENT TOPICAL at 08:06

## 2023-06-15 RX ADMIN — DOCUSATE SODIUM 100 MG: 100 CAPSULE, LIQUID FILLED ORAL at 08:06

## 2023-06-15 RX ADMIN — TRAMADOL HYDROCHLORIDE 50 MG: 50 TABLET, COATED ORAL at 09:06

## 2023-06-15 RX ADMIN — TRAMADOL HYDROCHLORIDE 50 MG: 50 TABLET, COATED ORAL at 01:06

## 2023-06-15 RX ADMIN — ONDANSETRON 4 MG: 2 INJECTION INTRAMUSCULAR; INTRAVENOUS at 11:06

## 2023-06-15 RX ADMIN — Medication 5000 UNITS: at 08:06

## 2023-06-15 RX ADMIN — LEVOTHYROXINE SODIUM 75 MCG: 25 TABLET ORAL at 06:06

## 2023-06-15 RX ADMIN — SENNOSIDES AND DOCUSATE SODIUM 3 TABLET: 50; 8.6 TABLET ORAL at 08:06

## 2023-06-15 RX ADMIN — HYDROCODONE BITARTRATE AND ACETAMINOPHEN 1 TABLET: 10; 325 TABLET ORAL at 06:06

## 2023-06-15 RX ADMIN — ENOXAPARIN SODIUM 40 MG: 40 INJECTION SUBCUTANEOUS at 04:06

## 2023-06-15 RX ADMIN — DOCUSATE SODIUM 100 MG: 100 CAPSULE, LIQUID FILLED ORAL at 09:06

## 2023-06-15 RX ADMIN — MUPIROCIN: 20 OINTMENT TOPICAL at 09:06

## 2023-06-15 RX ADMIN — ALPRAZOLAM 1 MG: 0.5 TABLET ORAL at 01:06

## 2023-06-15 RX ADMIN — ATORVASTATIN CALCIUM 40 MG: 40 TABLET, FILM COATED ORAL at 08:06

## 2023-06-15 RX ADMIN — HYDROCODONE BITARTRATE AND ACETAMINOPHEN 1 TABLET: 10; 325 TABLET ORAL at 04:06

## 2023-06-15 RX ADMIN — NICOTINE 1 PATCH: 21 PATCH, EXTENDED RELEASE TRANSDERMAL at 09:06

## 2023-06-15 RX ADMIN — MORPHINE SULFATE 2 MG: 4 INJECTION INTRAVENOUS at 11:06

## 2023-06-15 RX ADMIN — PANTOPRAZOLE SODIUM 40 MG: 40 TABLET, DELAYED RELEASE ORAL at 08:06

## 2023-06-15 RX ADMIN — ASPIRIN 325 MG: 325 TABLET, COATED ORAL at 08:06

## 2023-06-15 NOTE — NURSING
"Stroke Coordinator Rounding.    Patient sitting in the chair when I arrived however asked to get back to bed.     Patient/Family Stroke Education:    I have discussed the patient's stroke risk factors and the importance to modify them in order to reduce the risk of future events.  Also, the importance of a healthy diet and daily exercise in order to reduce the risk of future strokes.  I went over the usual stroke warning signs and symptoms, and the need to activate EMS (call 911) as soon as the symptoms present.  -Sudden onset numbness or weakness of the face, arm, or leg; especially on one side of the body  -Sudden confusion, trouble speaking, or understanding  -Sudden trouble seeing in one or both eyes  -Sudden trouble walking, dizziness, loss of coordination  -Sudden severe headache with no known cause  -Patient will have a follow up appointment in the stroke clinic.       Additional Handouts given:  communicative disorders, stroke support group, and [facility].    Discussed the importance of stopping smoking and she stated "you all say that". She did say that she has a good support system around her including nieces and good neighbors.  "

## 2023-06-15 NOTE — PT/OT/SLP PROGRESS
Occupational Therapy   Treatment    Name: Thom Krishna  MRN: [id number]  Admitting Diagnosis:  Stroke  2 Days Post-Op    Recommendations:     Discharge Recommendations: rehabilitation facility  Discharge Equipment Recommendations:  walker, rolling  Barriers to discharge:       Assessment:     Thom Krishna is a 67 y.o. female with a medical diagnosis of Stroke.  She presents with decreased endurance and increased pain in neck. Performance deficits affecting function are impaired endurance, impaired functional mobility, impaired balance.     Rehab Prognosis:  Good; patient would benefit from acute skilled OT services to address these deficits and reach maximum level of function.       Plan:     Patient to be seen 3 x/week to address the above listed problems via self-care/home management, therapeutic activities, therapeutic exercises  Plan of Care Expires: 06/28/23  Plan of Care Reviewed with: patient    Subjective     Pain/Comfort:  Pain Rating 1: 7/10  Location 1: neck    Objective:     Communicated with: RN prior to session.  Patient found HOB elevated with PureWick upon OT entry to room.    General Precautions: Standard, fall    Orthopedic Precautions:N/A  Braces: N/A  Respiratory Status: Room air  Vital Signs: Blood Pressure: 111/73 (before FM), 115/74 (after FM)  HR: 84 (before FM), 92 (after FM)     Occupational Performance:     Bed Mobility:    Patient completed Supine to Sit with stand by assistance     Functional Mobility/Transfers:  Patient completed Sit <> Stand Transfer with contact guard assistance  with  rolling walker   Patient completed Toilet Transfer Step Transfer technique with contact guard assistance with  rolling walker and grab bars  Functional Mobility: Pt. Ambulated from bed to BR for toileting and grooming tasks requiring CGA for safety using RW. No LOB noted.    Activities of Daily Living:  Grooming: modified independence Pt. Completed oral care task (brushing teeth) using RW for support when  standing at sink. Pt. Also brushed hair requiring min assist for brushing back of head due to decreased ROM.   Toileting: minimum assistance Pt. Completed toileting task on toilet in BR. Pt used grab bars for support for decend onto low surface. Pt required min A for standing balance when completing pericare.     Therapeutic Positioning    OT interventions performed during the course of today's session in an effort to prevent and/or reduce acquired pressure injuries:   Education on Pressure Ulcer Prevention provided      [facility] 6 Click ADL:      Patient Education:  Patient provided with verbal education regarding OT role/goals/POC, fall prevention, safety awareness, and Discharge/DME recommendations.  Understanding was verbalized, however additional teaching warranted.      Patient left up in chair with all lines intact and call button in reach    GOALS:   Multidisciplinary Problems       Occupational Therapy Goals          Problem: Occupational Therapy    Goal Priority Disciplines Outcome Interventions   Occupational Therapy Goal     OT, PT/OT Ongoing, Progressing    Description: Goals to be met by: 6/28/23     Patient will increase functional independence with ADLs by performing:    UE Dressing with Modified [place].  LE Dressing with Modified [place].  Grooming while standing at sink with Modified [place].  Toileting from bedside commode with Modified [place] for hygiene and clothing management.   Step transfer with Modified [place]                         Time Tracking:     OT Date of Treatment: 06/15/23  OT Start Time: 1014  OT Stop Time: 1035  OT Total Time (min): 21 min    Billable Minutes:Self Care/Home Management 1    OT/ANA MARIA: ANA MARIA     Number of ANA MARIA visits since last OT visit: 1    6/15/2023

## 2023-06-15 NOTE — PROGRESS NOTES
[facility] Hospital Medicine Progress Note        Chief Complaint: Inpatient Follow-up for CVA      HPI:   Thom Krishna is a 67 y.o. female who past medical history includes anxiety, depression, arthritis, chronic neck and back pain status post stimulator placement, degenerative disc disease, DM type 2, hepatitis-C, cirrhosis of the liver, emphysema, CKD stage 3; presents to the ED via EMS air med with reports of left-sided facial droop with associated slurred speech.  It was reported that patient was last seen normal at around 930 this morning.  Patient reports she woke up with lower extremity weakness which she had a difficult time ambulating which she reported slurred speech with left facial droop and difficulty swallowing which started this morning.  She also reported left arm numbness and weakness and decreased  to her left hand.  She denies any injury, traumas, falls, or any syncopal events.  Patient denies any chest pain, shortness a breath, fever, chills, cough, congestion, nausea, vomiting, diarrhea, or any sick contacts.  CT of the head without contrast demonstrated no acute intracranial findings identified, right parietal lobe small new encephalomalacia mild chronic micro ischemia and moderate atrophy. CTA of head and neck without contrast demonstrated no short interval change of CT head appearance, no evidence of flow-limiting stenosis or other acute focal intracranial vascular abnormality, atherosclerotic changes of the bilateral extracranial carotid vasculature with severe short segment stenosis of the right proximal ICA.  Lab work done reviewed demonstrated creatinine 1.04, glucose 78; other indicis unremarkable.      Initial vital signs reviewed /58 pulse 65 respirations 19 temperature 98.1° F O2 saturation 99% on room air.  Patient received IV hydration and aspirin rectally in the ED. Patient is admitted to hospital medicine services for further  management.    Workup revealed rt ICA stenosis for which CV surgery was consulted and patient underwent CTA.      Had episode of acute onset left-sided weakness and speech difficulties postoperatively.  Symptoms have resolved without any intervention.  Stat CT head showed no acute interval changes.  CT head revealed calcified plaques in left ICA with no significant hemodynamic stenosis, bilateral plaques in the cavernous portions.    Interval Hx:     Patient was seen and examined at bedside, no complaints other than mild soreness around the operative site,noted to have some left sided weakness,  aware of waiting for placement    Objective/physical exam:  Vitals:    06/15/23 0627 06/15/23 0737 06/15/23 1101 06/15/23 1148   BP:  127/80  105/68   Pulse:  63  79   Resp: 18  18    Temp:  98 °F (36.7 °C)  98.2 °F (36.8 °C)   TempSrc:  Oral  Oral   SpO2:  (!) 93%  (!) 94%   Weight:       Height:           Physical Exam:  General: In no acute distress, afebrile  Respiratory: Clear to auscultation bilaterally  Cardiovascular: S1, S2, no appreciable murmur  Abdomen: Soft, nontender, BS +  MSK: Warm, no lower extremity edema, no clubbing or cyanosis  Neurologic: Alert and oriented x4, Left sided 4/5, CN intact      Lab Results   Component Value Date     06/14/2023    K 4.3 06/14/2023     (H) 10/12/2022    CO2 25 06/14/2023    BUN 13.2 06/14/2023    CREATININE 0.87 06/14/2023    CALCIUM 8.9 06/14/2023    ANIONGAP 13 10/12/2022    ESTGFRAFRICA >60 07/23/2021    EGFRNONAA 58 (A) 07/23/2021      Lab Results   Component Value Date    ALT 10 06/14/2023    AST 13 06/14/2023     (H) 05/06/2013    ALKPHOS 74 06/14/2023    BILITOT 0.4 06/14/2023      Lab Results   Component Value Date    WBC 9.37 06/14/2023    HGB 12.3 06/14/2023    HCT 37.8 06/14/2023    MCV 99.0 (H) 06/14/2023     06/14/2023           Medications:   aspirin  325 mg Oral Daily    atorvastatin  40 mg Oral Daily    docusate sodium  100 mg Oral  BID    enoxparin  40 mg Subcutaneous Daily    levothyroxine  75 mcg Oral Before breakfast    loperamide  4 mg Oral Once    mupirocin   Nasal BID    nicotine  1 patch Transdermal Daily    pantoprazole  40 mg Oral Daily    senna-docusate 8.6-50 mg  3 tablet Oral Daily    vitamin D  5,000 Units Oral Daily with breakfast      acetaminophen, ALPRAZolam, amitriptyline, hydrALAZINE, HYDROcodone-acetaminophen, melatonin, nitroGLYCERIN, ondansetron, prochlorperazine, sodium chloride 0.9%, sodium chloride 0.9%, traMADoL     Assessment/Plan:     TIA with associated left sided deficits, facial droop and slurred speech-improving  77% stenosis of right ICA s/p CEA 6/13/23  MICHOACANO on CKD- improved     HX: T2DM, HTN, CKD, hepatitis C with chronic cirrhosis, Chronic back pain - with chronic pain management and prior back stimulator implant    Plan:  -Continue aspirin and statin.   -CV surgery signing off.  Continue surgical site wound care  -PT Following , needs placement  -added tramadol for pain control.  Continue Norco.  Monitor bowel movements  -Case discussed with case management

## 2023-06-15 NOTE — OP NOTE
[facility] Medical Telemetry  Cardiothoracic Surgery  Operative Note    SUMMARY     Date of Procedure: 6/13/2023     Procedure:  1.  High-risk right carotid endarterectomy with patch angioplasty    Surgeon: BRANDON James     Assistant: Polo Bruner    Pre-Operative Diagnosis:  1.  Acute CVA   2. Right internal carotid artery stenosis       Post-Operative Diagnosis:  Same    Anesthesia: General    Operative Findings (including complications, if any):  Ulcerated right internal carotid artery plaque with large amounts of pasty debris    Description of Technical Procedures: Patient was lived in the operating room, support lines were placed, and general endotracheal anesthesia was induced.  Patient was prepped and draped usual sterile fashion.  An anterior sternocleidomastoid incision was made in the subcutaneous tissues were dissected with the Bovie electrocautery.  The common facial vein was ligated and divided.  The vagus nerve and hypoglossal nerves were both identified and preserved throughout the entire course in the wound.  The common carotid artery was identified within the carotid sheath and encircled with an umbilical tape.  The external carotid artery/superior thyroid artery complex was then encircled with an umbilical tape.  The internal carotid artery was then encircled with a heavy silk tie and a [place] fashion and tourniquet was loosely applied but not constricting the artery.  Heparin was administered.  Once the suitable amount of time for circulation had ensued the internal carotid artery was clamped followed by the common carotid artery and the superior thyroid/external carotid artery complex.  Continuous cerebral oximetry was used throughout the entire procedure and did not differ from baseline significantly throughout the procedure.  The endarterectomy knife was used to elevate the plaque from the carotid artery bed and the leading edge of the plaque was tacked with a 6 0  Prolene in the usual fashion.  Pericardial patch was then used to anastomosed to the arteriotomy in the usual fashion.  Prior to completion of the patch the internal carotid artery was back bled for several cardiac cycles and then re-clamped.  The patch was then tied.  The internal carotid artery was clamped and the external and common carotid arteries were opened for several cardiac cycles followed by release of the internal carotid artery.  Hemostasis was achieved.  The subcutaneous tissues were irrigated with saline irrigation and protamine was administered.  The platysma was closed with Vicryl and the skin was closed with Vicryl as well.  Patient tolerated the procedure well and will be delivered to the recovery room in stable condition.  She has a woken from anesthesia with no focal neurological deficit.     Estimated Blood Loss (EBL):  5 mL          Specimens:   Specimen (24h ago, onward)      None                    Condition: Stable    Disposition: PACU - hemodynamically stable.

## 2023-06-15 NOTE — PLAN OF CARE
Spoke to Zina with [facility], patient is clinically accepted for SNF, they are submitting for insurance auth. Possible dc Monday

## 2023-06-15 NOTE — PT/OT/SLP PROGRESS
Physical Therapy Treatment    Patient Name:  Thom Krishna   MRN:  [id number]    Recommendations:     Discharge Recommendations:  rehabilitation facility, nursing facility, skilled (pending)   Discharge Equipment Recommendations:  (pending)     Subjective     Patient awake and alert.     Objective:     General Precautions: Standard, fall   Orthopedic Precautions:N/A   Braces:    Respiratory Status: Room air  Communicated with nurse prior to treatment.     Functional Mobility:    Rolling:Stand-by Assistance  Supine to sit:Minimal Assistance  Sit to stand transfer: Minimal Assistance  Bed to chair transfer:Stand-by Assistance    Gait 20 ft and c/ hypotension with /72. Standing /61 and additional gait 30 ft SBA. OOB in chair after session.     Education Provided:  Role and goals of PT, transfer training, bed mobility, gait training, balance training, safety awareness, assistive device, strengthening exercises, deep breathing techniques, and importance of participating in PT to return to PLOF.    Expected compliance:  Moderate         Skin Integrity: Visible skin intact    PT interventions performed during the course of today's session in an effort to prevent and/or reduce acquired pressure injuries:   Therapeutic positioning completed       GOALS:   Multidisciplinary Problems       Physical Therapy Goals          Problem: Physical Therapy    Goal Priority Disciplines Outcome Goal Variances Interventions   Physical Therapy Goal     PT, PT/OT Ongoing, Progressing     Description: Goals to be met by: 23     Patient will increase functional independence with mobility by performin. Supine to sit with [place]  2. Sit to supine with [place]  3. Sit to stand transfer with Stand-by Assistance  4. Gait  x 150 feet with Stand-by Assistance using Rolling Walker.                          Assessment:     Thom Krishna is a 67 y.o. female admitted with a medical diagnosis of Stroke.     Patient Active  Problem List   Diagnosis    Hypothyroidism    Vitamin B12 deficiency    GERD (gastroesophageal reflux disease)    Autoimmune hepatitis    History of hepatitis C    Chronic pain    Spondylolisthesis    DDD (degenerative disc disease), lumbar    DDD (degenerative disc disease), cervical    Coccydynia    Colon polyps    Anxiety and depression    Pulmonary nodule    Orthostatic hypotension    Macrocytosis    Diabetes mellitus type 2 with neurological manifestations    Tobacco abuse    Hypertriglyceridemia    Cervical radiculopathy    Foraminal stenosis of cervical region    S/P ANASTASIA-BSO (total abdominal hysterectomy and bilateral salpingo-oophorectomy)    Elevated liver enzymes    Chronic neck and back pain    Difficulty walking    Weakness    Vitamin D deficiency    Epigastric pain    Other constipation    Failed back surgical syndrome    Lumbar radiculopathy    Postmenopausal osteoporosis    Adenomatous duodenal polyp    Opioid dependence, daily use    Compression fracture of T11 vertebra    Personal history of nicotine dependence     History of alcohol abuse    Closed compression fracture of L3 lumbar vertebra, sequela    Hepatic cirrhosis    Dilated bile duct    Atherosclerosis of aorta    Pulmonary emphysema, unspecified emphysema type    Chronic kidney disease, stage 3a    COVID    Stroke    Carotid artery stenosis, symptomatic, right        Rehab Prognosis: Good; patient would benefit from acute skilled PT services to address these deficits and reach maximum level of function.    Recent Surgery: Procedure(s) (LRB):  ENDARTERECTOMY, CAROTID (Right) 2 Days Post-Op    Plan:     During this hospitalization, patient to be seen 6 x/week to address the identified rehab impairments via gait training, therapeutic activities, therapeutic exercises and progress toward the following goals:    Plan of Care Expires:  07/14/23    Billable Minutes     Billable Minutes: Gait Training 25    Treatment Type: Treatment  PT/PTA: PTA      Number of PTA visits since last PT visit: 1     06/15/2023

## 2023-06-16 ENCOUNTER — PATIENT OUTREACH (OUTPATIENT)
Dept: ADMINISTRATIVE | Facility: HOSPITAL | Age: 68
End: 2023-06-16
Payer: MEDICARE

## 2023-06-16 ENCOUNTER — PATIENT MESSAGE (OUTPATIENT)
Dept: ADMINISTRATIVE | Facility: HOSPITAL | Age: 68
End: 2023-06-16
Payer: MEDICARE

## 2023-06-16 LAB
ABO + RH BLD: NORMAL
ABO + RH BLD: NORMAL
BLD PROD TYP BPU: NORMAL
BLD PROD TYP BPU: NORMAL
BLOOD UNIT EXPIRATION DATE: NORMAL
BLOOD UNIT EXPIRATION DATE: NORMAL
BLOOD UNIT TYPE CODE: 7300
BLOOD UNIT TYPE CODE: 7300
CROSSMATCH INTERPRETATION: NORMAL
CROSSMATCH INTERPRETATION: NORMAL
DISPENSE STATUS: NORMAL
DISPENSE STATUS: NORMAL
UNIT NUMBER: NORMAL
UNIT NUMBER: NORMAL

## 2023-06-16 PROCEDURE — 97116 GAIT TRAINING THERAPY: CPT

## 2023-06-16 PROCEDURE — 25000003 PHARM REV CODE 250: Performed by: INTERNAL MEDICINE

## 2023-06-16 PROCEDURE — 25000003 PHARM REV CODE 250: Performed by: PHYSICIAN ASSISTANT

## 2023-06-16 PROCEDURE — 21400001 HC TELEMETRY ROOM

## 2023-06-16 PROCEDURE — 63600175 PHARM REV CODE 636 W HCPCS: Performed by: NURSE PRACTITIONER

## 2023-06-16 PROCEDURE — 63600175 PHARM REV CODE 636 W HCPCS: Performed by: PHYSICIAN ASSISTANT

## 2023-06-16 PROCEDURE — 25000003 PHARM REV CODE 250: Performed by: STUDENT IN AN ORGANIZED HEALTH CARE EDUCATION/TRAINING PROGRAM

## 2023-06-16 PROCEDURE — 92526 ORAL FUNCTION THERAPY: CPT

## 2023-06-16 PROCEDURE — S4991 NICOTINE PATCH NONLEGEND: HCPCS | Performed by: INTERNAL MEDICINE

## 2023-06-16 PROCEDURE — 97535 SELF CARE MNGMENT TRAINING: CPT | Mod: CO

## 2023-06-16 PROCEDURE — 25000003 PHARM REV CODE 250: Performed by: NURSE PRACTITIONER

## 2023-06-16 RX ADMIN — PANTOPRAZOLE SODIUM 40 MG: 40 TABLET, DELAYED RELEASE ORAL at 08:06

## 2023-06-16 RX ADMIN — DOCUSATE SODIUM 100 MG: 100 CAPSULE, LIQUID FILLED ORAL at 08:06

## 2023-06-16 RX ADMIN — Medication 5000 UNITS: at 08:06

## 2023-06-16 RX ADMIN — SENNOSIDES AND DOCUSATE SODIUM 3 TABLET: 50; 8.6 TABLET ORAL at 08:06

## 2023-06-16 RX ADMIN — ATORVASTATIN CALCIUM 40 MG: 40 TABLET, FILM COATED ORAL at 08:06

## 2023-06-16 RX ADMIN — HYDROCODONE BITARTRATE AND ACETAMINOPHEN 1 TABLET: 10; 325 TABLET ORAL at 04:06

## 2023-06-16 RX ADMIN — MUPIROCIN: 20 OINTMENT TOPICAL at 09:06

## 2023-06-16 RX ADMIN — ONDANSETRON 4 MG: 2 INJECTION INTRAMUSCULAR; INTRAVENOUS at 09:06

## 2023-06-16 RX ADMIN — TRAMADOL HYDROCHLORIDE 50 MG: 50 TABLET, COATED ORAL at 04:06

## 2023-06-16 RX ADMIN — TRAMADOL HYDROCHLORIDE 50 MG: 50 TABLET, COATED ORAL at 08:06

## 2023-06-16 RX ADMIN — MUPIROCIN: 20 OINTMENT TOPICAL at 08:06

## 2023-06-16 RX ADMIN — HYDROCODONE BITARTRATE AND ACETAMINOPHEN 1 TABLET: 10; 325 TABLET ORAL at 12:06

## 2023-06-16 RX ADMIN — ALPRAZOLAM 1 MG: 0.5 TABLET ORAL at 04:06

## 2023-06-16 RX ADMIN — NICOTINE 1 PATCH: 21 PATCH, EXTENDED RELEASE TRANSDERMAL at 09:06

## 2023-06-16 RX ADMIN — ENOXAPARIN SODIUM 40 MG: 40 INJECTION SUBCUTANEOUS at 04:06

## 2023-06-16 RX ADMIN — HYDROCODONE BITARTRATE AND ACETAMINOPHEN 1 TABLET: 10; 325 TABLET ORAL at 09:06

## 2023-06-16 RX ADMIN — ASPIRIN 325 MG: 325 TABLET, COATED ORAL at 08:06

## 2023-06-16 RX ADMIN — LEVOTHYROXINE SODIUM 75 MCG: 25 TABLET ORAL at 05:06

## 2023-06-16 RX ADMIN — DOCUSATE SODIUM 100 MG: 100 CAPSULE, LIQUID FILLED ORAL at 09:06

## 2023-06-16 NOTE — PLAN OF CARE
Problem: Adult Inpatient Plan of Care  Goal: Plan of Care Review  Outcome: Ongoing, Progressing  Goal: Patient-Specific Goal (Individualized)  Outcome: Ongoing, Progressing  Goal: Absence of Hospital-Acquired Illness or Injury  Outcome: Ongoing, Progressing  Goal: Optimal Comfort and Wellbeing  Outcome: Ongoing, Progressing  Goal: Readiness for Transition of Care  Outcome: Ongoing, Progressing     Problem: Infection  Goal: Absence of Infection Signs and Symptoms  Outcome: Ongoing, Progressing     Problem: Adjustment to Illness (Stroke, Ischemic/Transient Ischemic Attack)  Goal: Optimal Coping  Outcome: Ongoing, Progressing     Problem: Bowel Elimination Impaired (Stroke, Ischemic/Transient Ischemic Attack)  Goal: Effective Bowel Elimination  Outcome: Ongoing, Progressing     Problem: Cerebral Tissue Perfusion (Stroke, Ischemic/Transient Ischemic Attack)  Goal: Optimal Cerebral Tissue Perfusion  Outcome: Ongoing, Progressing     Problem: Cognitive Impairment (Stroke, Ischemic/Transient Ischemic Attack)  Goal: Optimal Cognitive Function  Outcome: Ongoing, Progressing     Problem: Communication Impairment (Stroke, Ischemic/Transient Ischemic Attack)  Goal: Improved Communication Skills  Outcome: Ongoing, Progressing     Problem: Functional Ability Impaired (Stroke, Ischemic/Transient Ischemic Attack)  Goal: Optimal Functional Ability  Outcome: Ongoing, Progressing     Problem: Respiratory Compromise (Stroke, Ischemic/Transient Ischemic Attack)  Goal: Effective Oxygenation and Ventilation  Outcome: Ongoing, Progressing     Problem: Sensorimotor Impairment (Stroke, Ischemic/Transient Ischemic Attack)  Goal: Improved Sensorimotor Function  Outcome: Ongoing, Progressing     Problem: Swallowing Impairment (Stroke, Ischemic/Transient Ischemic Attack)  Goal: Optimal Eating and Swallowing without Aspiration  Outcome: Ongoing, Progressing     Problem: Urinary Elimination Impaired (Stroke, Ischemic/Transient Ischemic  Attack)  Goal: Effective Urinary Elimination  Outcome: Ongoing, Progressing     Problem: Diabetes Comorbidity  Goal: Blood Glucose Level Within Targeted Range  Outcome: Ongoing, Progressing     Problem: Fall Injury Risk  Goal: Absence of Fall and Fall-Related Injury  Outcome: Ongoing, Progressing

## 2023-06-16 NOTE — PT/OT/SLP PROGRESS
"Physical Therapy Treatment    Patient Name:  Thom Krishna   MRN:  [id number]    Recommendations:     Discharge Recommendations: nursing facility, skilled  Discharge Equipment Recommendations: none  Barriers to discharge:  medical dx, impaired mobility, decreased tolerance to ax     Assessment:     Thom Krishna is a 67 y.o. female admitted with a medical diagnosis of carotid stenosis s/p CEA; questionable TIA.  She presents with the following impairments/functional limitations: weakness, impaired balance, impaired endurance, impaired self care skills, impaired functional mobility.  Patient reported feeling "weak" and tired; still agreeable to attempt tx session.     Rehab Prognosis: Good; patient would benefit from acute skilled PT services to address these deficits and reach maximum level of function.    Recent Surgery: Procedure(s) (LRB):  ENDARTERECTOMY, CAROTID (Right) 3 Days Post-Op    Plan:     During this hospitalization, patient to be seen 6 x/week to address the identified rehab impairments via gait training, therapeutic activities, therapeutic exercises and progress toward the following goals:    Plan of Care Expires:  07/14/23    Subjective     Chief Complaint: dizzy  Patient/Family Comments/goals: to return PLOF   Pain/Comfort:  Pain Rating 1: 5/10  Location 1: neck      Objective:     Communicated with NSG prior to session.  Patient found up in chair with PureWick, telemetry, pulse ox (continuous) upon PT entry to room.     General Precautions: Standard, fall  Orthopedic Precautions: N/A  Braces: N/A  Respiratory Status: Room air  Blood Pressure, Heart Rate: 128/84, 89 sitting; 111/75, 109 standing * pt with complaints of dizziness   Skin Integrity: Visible skin intact      Functional Mobility:  Transfers:     Sit to Stand:  stand by assistance with rolling walker  Gait: pt ambulated approx 24 ft + 10 ft with use of RW and CGA; decreased shy, B decreased step length, limited by complaints of weakness and " increased dizziness; seated rest break taken in btw     Education:  Patient provided with verbal education regarding POC, mobility, safety, dc planning .  Understanding was verbalized.     Patient left up in chair with all lines intact and call button in reach..    GOALS:   Multidisciplinary Problems       Physical Therapy Goals          Problem: Physical Therapy    Goal Priority Disciplines Outcome Goal Variances Interventions   Physical Therapy Goal     PT, PT/OT Ongoing, Progressing     Description: Goals to be met by: 23     Patient will increase functional independence with mobility by performin. Supine to sit with [place]  2. Sit to supine with [place]  3. Sit to stand transfer with Stand-by Assistance  4. Gait  x 150 feet with Stand-by Assistance using Rolling Walker.                          Time Tracking:     PT Received On: 23  PT Start Time: 1039     PT Stop Time: 1055  PT Total Time (min): 16 min     Billable Minutes: Gait Training 1    Treatment Type: Treatment  PT/PTA: PT     Number of PTA visits since last PT visit: 2     2023

## 2023-06-16 NOTE — PROGRESS NOTES
Inpatient Nutrition Evaluation    Admit Date: 6/11/2023   Total duration of encounter: 5 days    Nutrition Recommendation/Prescription     Continue soft and bite sized, cardiac diet as tolerated and per SLP recs  RD to order vanilla Boost Glucose Control BID to provide an additional 250 kcal and 14 g protein per container  RD to monitor po intake and weight changes    Nutrition Assessment     Chart Review    Reason Seen: length of stay    Malnutrition Screening Tool Results   Have you recently lost weight without trying?: No  Have you been eating poorly because of a decreased appetite?: No   MST Score: 0     Diagnosis:  TIA with associated left sided deficits, facial droop and slurred speech-improving  77% stenosis of right ICA s/p CEA 6/13/23  MICHOACANO on CKD- improved    Relevant Medical History: anxiety, depression, arthritis, chronic neck and back pain s/p stimulator placement, DDD, DM 2, hepatitis-C with chronic cirrhosis, emphysema, CKD, HTN    Nutrition-Related Medications: colace, lactated ringers, zofran prn, protonix, senna, vitamin D    Nutrition-Related Labs: n/a      Diet Order: Diet Soft & Bite Sized (IDDSI Level 6) Cardiac  Oral Supplement Order: Boost Glucose Control  Appetite/Oral Intake: good/% of meals  Factors Affecting Nutritional Intake: constipation and nausea  Food/Rastafari/Cultural Preferences: none reported  Food Allergies: none reported    Skin Integrity: incision  Wound(s):       Comments    6/16: pt reports good appetite, eating ~75% of meals, with good appetite prior admission. Pt was on moderately thick liquids this AM, however does not like the thickened liquids and was not complaint. Speech upgraded to thin liquids. Pt agreed to ONS. Pt with nausea yesterday and constipation, last Bm 6/13. + lb with weight changes within past year, stating weight loss then re-gain.  Weight of 68.9 kg (152 lb) on 6/11. Weigh gain noted per EMR weights. Will continue to  "monitor.    Anthropometrics    Height: 5' 6" (167.6 cm) Height Method: Estimated  Last Weight: 68.9 kg (152 lb) (06/11/23 1121) Weight Method: Standard Scale  BMI (Calculated): 24.5  BMI Classification: normal (BMI 18.5-24.9)     Ideal Body Weight (IBW), Female: 130 lb     % Ideal Body Weight, Female (lb): 116.92 %                             Usual Weight Provided By: patient and EMR weight history    Wt Readings from Last 5 Encounters:   06/11/23 68.9 kg (152 lb)   10/12/22 64 kg (141 lb 1.5 oz)   04/27/22 61.1 kg (134 lb 11.2 oz)   12/28/21 60.8 kg (134 lb)   11/11/21 61 kg (134 lb 7.7 oz)     Weight Change(s) Since Admission:  Admit Weight: 68.9 kg (152 lb) (06/11/23 1121)      Patient Education    Not applicable.    Monitoring & Evaluation     Dietitian will monitor food and beverage intake and weight change.  Nutrition Risk/Follow-Up: low (follow-up in 5-7 days)  Patients assigned 'low nutrition risk' status do not qualify for a full nutritional assessment but will be monitored and re-evaluated in a 5-7 day time period. Please consult if re-evaluation needed sooner.    "

## 2023-06-16 NOTE — PT/OT/SLP PROGRESS
Occupational Therapy   Treatment    Name: Thom Krishna  MRN: [id number]  Admitting Diagnosis:  Stroke  3 Days Post-Op    Recommendations:     Discharge Recommendations: home with home health  Discharge Equipment Recommendations:  walker, rolling, grab bar  Barriers to discharge:       Assessment:     Thom Krishna is a 67 y.o. female with a medical diagnosis of Stroke.  She presents with increased fatigue. Performance deficits affecting function are impaired endurance, impaired self care skills, impaired functional mobility, impaired balance.     Rehab Prognosis:  Good; patient would benefit from acute skilled OT services to address these deficits and reach maximum level of function.       Plan:     Patient to be seen 3 x/week to address the above listed problems via self-care/home management, therapeutic activities, therapeutic exercises  Plan of Care Expires: 23  Plan of Care Reviewed with: patient    Subjective     Pain/Comfort:  Pain Rating 1: 8/10  Location 1:  (neck, HA)    Objective:     Communicated with: RN prior to session.  Patient found HOB elevated with telemetry, PureWick upon OT entry to room.    General Precautions: Standard, fall    Orthopedic Precautions:N/A  Braces: N/A  Respiratory Status: Room air  Vital Signs: Blood Pressure: (Sitting EOB: 107/73) (Standin/74)  HR: (Sitting EOB: 98) (Standin)     Occupational Performance:     Bed Mobility:    Patient completed Supine to Sit with modified independence     Functional Mobility/Transfers:  Patient completed Sit <> Stand Transfer with contact guard assistance  with  rolling walker   Patient completed Toilet Transfer Step Transfer technique with modified independence with  rolling walker and grab bars  Functional Mobility: Pt. Ambulated from bed to BR requiring SBA for safety using RW. No LOB noted.    Activities of Daily Living:  Grooming: modified independence Pt. Completed oral care and hair brushing task at sink in BR using RW for  support in standing.  Toileting: stand by assistance Pt. Completed toileting task including pericare requiring SBA for safety.     Therapeutic Positioning    OT interventions performed during the course of today's session in an effort to prevent and/or reduce acquired pressure injuries:   Education on Pressure Ulcer Prevention provided    [facility] 6 Click ADL:      Patient Education:  Patient provided with verbal education regarding OT role/goals/POC, fall prevention, and safety awareness.  Understanding was verbalized, however additional teaching warranted.      Patient left HOB elevated with all lines intact and call button in reach    GOALS:   Multidisciplinary Problems       Occupational Therapy Goals          Problem: Occupational Therapy    Goal Priority Disciplines Outcome Interventions   Occupational Therapy Goal     OT, PT/OT Ongoing, Progressing    Description: Goals to be met by: 6/28/23     Patient will increase functional independence with ADLs by performing:    UE Dressing with Modified [place].  LE Dressing with Modified [place].  Grooming while standing at sink with Modified [place].  Toileting from bedside commode with Modified [place] for hygiene and clothing management.   Step transfer with Modified [place]                         Time Tracking:     OT Date of Treatment: 06/16/23  OT Start Time: 1404  OT Stop Time: 1421  OT Total Time (min): 17 min    Billable Minutes:Self Care/Home Management 1    OT/ANA MARIA: ANA MARIA     Number of ANA MARIA visits since last OT visit: 3    6/16/2023

## 2023-06-16 NOTE — PT/OT/SLP PROGRESS
Speech Language Pathology Department  Dysphagia Therapy Progress Note    Patient Name:  Thom Krishna   MRN:  [id number]  Admitting Diagnosis: L facial droop and slurred speech, CVA workup    Recommendations:     General recommendations:  SLP follow up x1  Diet recommendations:  Soft & Bite Sized Diet - IDDSI Level 6, Liquid Diet Level: Thin liquids - IDDSI Level 0   Aspiration precautions: small bites/sips and slow rate    Discharge recommendations:  nursing facility, skilled   Barriers to safe discharge:  level of skilled assistance needed    Subjective     Patient calm and cooperative.    Pain/Comfort: Pain Rating 1: 0/10    Xray: No acute pulmonary process identified    Spiritual/Cultural/Catholic Beliefs/Practices that affect care: no    Respiratory Status: Room air     Objective:     Oral Musculature  Dentition: own teeth  Secretion Management: adequate  Mucosal Quality: good  Facial Movement: reduced left, variable   Buccal Strength & Mobility: WFL  Mandibular Strength & Mobility: WFL  Oral Labial Strength & Mobility: WFL  Lingual Strength & Mobility: WFL  Vocal Quality: adequate  Volitional Cough: Productive      Therapeutic PO Trials:  Consistency Amount Fed By Oral Symptoms Pharyngeal Symptoms   Thin liquid by cup 2 oz. Self None None     Assessment:     Pt requesting assessment for advancement of liquids. Pt is not consistently compliant with moderately thick liquids recommendations of MBS completed 2023.      Pt demonstrated no signs/sx of aspiration. ST recs: advance liquids to thin.     Will follow up x1 for diet tolerance    Goals:     Multidisciplinary Problems       SLP Goals          Problem: SLP    Goal Priority Disciplines Outcome   SLP Goal     SLP Ongoing, Progressing   Description: LTG: To tolerate least restrictive diet without clinical signs/sx of aspiration.   ST. Tongue base/laryngeal excursion exercise   2. Tolerate thermal stimulation x10 with 100% swallow response with less than  a 2 second delay.                      Patient Education:     Patient provided with verbal education regarding aspiration risk and signs/sx.  Understanding was verbalized, however additional teaching warranted.    Plan:     Will continue to follow and tx as appropriate.    SLP Follow-Up:  Yes   Patient to be seen:  5 x/week   Plan of Care expires:  06/21/23  Plan of Care reviewed with:  patient       Time Tracking:     SLP Treatment Date:   06/16/23  Speech Start Time:  1200  Speech Stop Time:  1215     Speech Total Time (min):  15 min    Billable minutes:  Treatment of Swallow Dysfunction, 15 minutes       06/16/2023

## 2023-06-16 NOTE — PROGRESS NOTES
[facility] Hospital Medicine Progress Note        Chief Complaint: Inpatient Follow-up for CVA      HPI:   Thom Krishna is a 67 y.o. female who past medical history includes anxiety, depression, arthritis, chronic neck and back pain status post stimulator placement, degenerative disc disease, DM type 2, hepatitis-C, cirrhosis of the liver, emphysema, CKD stage 3; presents to the ED via EMS air med with reports of left-sided facial droop with associated slurred speech.  It was reported that patient was last seen normal at around 930 this morning.  Patient reports she woke up with lower extremity weakness which she had a difficult time ambulating which she reported slurred speech with left facial droop and difficulty swallowing which started this morning.  She also reported left arm numbness and weakness and decreased  to her left hand.  She denies any injury, traumas, falls, or any syncopal events.  Patient denies any chest pain, shortness a breath, fever, chills, cough, congestion, nausea, vomiting, diarrhea, or any sick contacts.  CT of the head without contrast demonstrated no acute intracranial findings identified, right parietal lobe small new encephalomalacia mild chronic micro ischemia and moderate atrophy. CTA of head and neck without contrast demonstrated no short interval change of CT head appearance, no evidence of flow-limiting stenosis or other acute focal intracranial vascular abnormality, atherosclerotic changes of the bilateral extracranial carotid vasculature with severe short segment stenosis of the right proximal ICA.  Lab work done reviewed demonstrated creatinine 1.04, glucose 78; other indicis unremarkable.      Initial vital signs reviewed /58 pulse 65 respirations 19 temperature 98.1° F O2 saturation 99% on room air.  Patient received IV hydration and aspirin rectally in the ED. Patient is admitted to hospital medicine services for further  management.    Workup revealed rt ICA stenosis for which CV surgery was consulted and patient underwent CTA.      Had episode of acute onset left-sided weakness and speech difficulties postoperatively.  Symptoms have resolved without any intervention.  Stat CT head showed no acute interval changes.  CT head revealed calcified plaques in left ICA with no significant hemodynamic stenosis, bilateral plaques in the cavernous portions.    Case management working on the placement    Interval Hx:     Patient was seen and examined , sitting comfortably on the chair, no complaints other than soreness around the operative site,noted to have some left sided weakness,  aware of waiting for placement    Objective/physical exam:  Vitals:    06/16/23 0740 06/16/23 0806 06/16/23 1112 06/16/23 1233   BP: (!) 145/84  121/81    Pulse: 101  88    Resp:  18  18   Temp: 98.4 °F (36.9 °C)  98.9 °F (37.2 °C)    TempSrc: Oral  Oral    SpO2: 99%  95%    Weight:       Height:           Physical Exam:  General: In no acute distress, afebrile  Respiratory: Clear to auscultation bilaterally  Cardiovascular: S1, S2, no appreciable murmur  Abdomen: Soft, nontender, BS +  MSK: Warm, no lower extremity edema, no clubbing or cyanosis  Neurologic: Alert and oriented x4, Left sided 4/5, CN intact      Lab Results   Component Value Date     06/14/2023    K 4.3 06/14/2023     (H) 10/12/2022    CO2 25 06/14/2023    BUN 13.2 06/14/2023    CREATININE 0.87 06/14/2023    CALCIUM 8.9 06/14/2023    ANIONGAP 13 10/12/2022    ESTGFRAFRICA >60 07/23/2021    EGFRNONAA 58 (A) 07/23/2021      Lab Results   Component Value Date    ALT 10 06/14/2023    AST 13 06/14/2023     (H) 05/06/2013    ALKPHOS 74 06/14/2023    BILITOT 0.4 06/14/2023      Lab Results   Component Value Date    WBC 9.37 06/14/2023    HGB 12.3 06/14/2023    HCT 37.8 06/14/2023    MCV 99.0 (H) 06/14/2023     06/14/2023           Medications:   aspirin  325 mg Oral Daily     atorvastatin  40 mg Oral Daily    docusate sodium  100 mg Oral BID    enoxparin  40 mg Subcutaneous Daily    levothyroxine  75 mcg Oral Before breakfast    loperamide  4 mg Oral Once    mupirocin   Nasal BID    nicotine  1 patch Transdermal Daily    pantoprazole  40 mg Oral Daily    senna-docusate 8.6-50 mg  3 tablet Oral Daily    vitamin D  5,000 Units Oral Daily with breakfast      acetaminophen, ALPRAZolam, amitriptyline, hydrALAZINE, HYDROcodone-acetaminophen, melatonin, morphine, nitroGLYCERIN, ondansetron, prochlorperazine, sodium chloride 0.9%, sodium chloride 0.9%, traMADoL     Assessment/Plan:     TIA with associated left sided deficits, facial droop and slurred speech-improving  77% stenosis of right ICA s/p CEA 6/13/23  MICHOACANO on CKD- improved     HX: T2DM, HTN, CKD, hepatitis C with chronic cirrhosis, Chronic back pain - with chronic pain management and prior back stimulator implant    Plan:  -Continue aspirin and statin.   -CV surgery signing off.  Continue surgical site wound care   -added tramadol for pain control.  Continue Norco.  Monitor bowel movements  -PT Following , needs placement  -Speech therapy following  -Case management assisting with placement  -Continuing appropriate home medications

## 2023-06-17 LAB
POCT GLUCOSE: 84 MG/DL (ref 70–110)
POCT GLUCOSE: 91 MG/DL (ref 70–110)

## 2023-06-17 PROCEDURE — S4991 NICOTINE PATCH NONLEGEND: HCPCS | Performed by: INTERNAL MEDICINE

## 2023-06-17 PROCEDURE — 25000003 PHARM REV CODE 250: Performed by: NURSE PRACTITIONER

## 2023-06-17 PROCEDURE — 25000003 PHARM REV CODE 250: Performed by: STUDENT IN AN ORGANIZED HEALTH CARE EDUCATION/TRAINING PROGRAM

## 2023-06-17 PROCEDURE — 25000003 PHARM REV CODE 250: Performed by: PHYSICIAN ASSISTANT

## 2023-06-17 PROCEDURE — 21400001 HC TELEMETRY ROOM

## 2023-06-17 PROCEDURE — 25000003 PHARM REV CODE 250: Performed by: INTERNAL MEDICINE

## 2023-06-17 PROCEDURE — 63600175 PHARM REV CODE 636 W HCPCS: Performed by: NURSE PRACTITIONER

## 2023-06-17 RX ADMIN — PANTOPRAZOLE SODIUM 40 MG: 40 TABLET, DELAYED RELEASE ORAL at 09:06

## 2023-06-17 RX ADMIN — MUPIROCIN: 20 OINTMENT TOPICAL at 09:06

## 2023-06-17 RX ADMIN — ACETAMINOPHEN 650 MG: 325 TABLET, FILM COATED ORAL at 09:06

## 2023-06-17 RX ADMIN — Medication 5000 UNITS: at 09:06

## 2023-06-17 RX ADMIN — HYDROCODONE BITARTRATE AND ACETAMINOPHEN 1 TABLET: 10; 325 TABLET ORAL at 11:06

## 2023-06-17 RX ADMIN — TRAMADOL HYDROCHLORIDE 50 MG: 50 TABLET, COATED ORAL at 03:06

## 2023-06-17 RX ADMIN — SENNOSIDES AND DOCUSATE SODIUM 3 TABLET: 50; 8.6 TABLET ORAL at 09:06

## 2023-06-17 RX ADMIN — HYDROCODONE BITARTRATE AND ACETAMINOPHEN 1 TABLET: 10; 325 TABLET ORAL at 05:06

## 2023-06-17 RX ADMIN — HYDROCODONE BITARTRATE AND ACETAMINOPHEN 1 TABLET: 10; 325 TABLET ORAL at 02:06

## 2023-06-17 RX ADMIN — ALPRAZOLAM 1 MG: 0.5 TABLET ORAL at 07:06

## 2023-06-17 RX ADMIN — ENOXAPARIN SODIUM 40 MG: 40 INJECTION SUBCUTANEOUS at 05:06

## 2023-06-17 RX ADMIN — DOCUSATE SODIUM 100 MG: 100 CAPSULE, LIQUID FILLED ORAL at 09:06

## 2023-06-17 RX ADMIN — LEVOTHYROXINE SODIUM 75 MCG: 25 TABLET ORAL at 05:06

## 2023-06-17 RX ADMIN — NICOTINE 1 PATCH: 21 PATCH, EXTENDED RELEASE TRANSDERMAL at 09:06

## 2023-06-17 RX ADMIN — ATORVASTATIN CALCIUM 40 MG: 40 TABLET, FILM COATED ORAL at 09:06

## 2023-06-17 RX ADMIN — AMITRIPTYLINE HYDROCHLORIDE 25 MG: 25 TABLET, FILM COATED ORAL at 09:06

## 2023-06-17 RX ADMIN — Medication 6 MG: at 09:06

## 2023-06-17 RX ADMIN — ASPIRIN 325 MG: 325 TABLET, COATED ORAL at 09:06

## 2023-06-17 RX ADMIN — TRAMADOL HYDROCHLORIDE 50 MG: 50 TABLET, COATED ORAL at 07:06

## 2023-06-17 RX ADMIN — DOCUSATE SODIUM 100 MG: 100 CAPSULE, LIQUID FILLED ORAL at 07:06

## 2023-06-17 RX ADMIN — TRAMADOL HYDROCHLORIDE 50 MG: 50 TABLET, COATED ORAL at 11:06

## 2023-06-17 NOTE — PROGRESS NOTES
[facility] Hospital Medicine Progress Note        Chief Complaint: Inpatient Follow-up for CVA      HPI:   Thom Krishna is a 67 y.o. female who past medical history includes anxiety, depression, arthritis, chronic neck and back pain status post stimulator placement, degenerative disc disease, DM type 2, hepatitis-C, cirrhosis of the liver, emphysema, CKD stage 3; presents to the ED via EMS air med with reports of left-sided facial droop with associated slurred speech.  It was reported that patient was last seen normal at around 930 this morning.  Patient reports she woke up with lower extremity weakness which she had a difficult time ambulating which she reported slurred speech with left facial droop and difficulty swallowing which started this morning.  She also reported left arm numbness and weakness and decreased  to her left hand.  She denies any injury, traumas, falls, or any syncopal events.  Patient denies any chest pain, shortness a breath, fever, chills, cough, congestion, nausea, vomiting, diarrhea, or any sick contacts.  CT of the head without contrast demonstrated no acute intracranial findings identified, right parietal lobe small new encephalomalacia mild chronic micro ischemia and moderate atrophy. CTA of head and neck without contrast demonstrated no short interval change of CT head appearance, no evidence of flow-limiting stenosis or other acute focal intracranial vascular abnormality, atherosclerotic changes of the bilateral extracranial carotid vasculature with severe short segment stenosis of the right proximal ICA.  Lab work done reviewed demonstrated creatinine 1.04, glucose 78; other indicis unremarkable.      Initial vital signs reviewed /58 pulse 65 respirations 19 temperature 98.1° F O2 saturation 99% on room air.  Patient received IV hydration and aspirin rectally in the ED. Patient is admitted to hospital medicine services for further  management.    Workup revealed rt ICA stenosis for which CV surgery was consulted and patient underwent CTA.      Had episode of acute onset left-sided weakness and speech difficulties postoperatively.  Symptoms have resolved without any intervention.  Stat CT head showed no acute interval changes.  CT head revealed calcified plaques in left ICA with no significant hemodynamic stenosis, bilateral plaques in the cavernous portions.    Case management working on the placement    Interval Hx:     Patient was seen and examined , sitting comfortably on the chair, reports to me that the pain is getting better, reported to staff that she is been having headache around the operative site and in the head, noted to have similar left sided weakness,  aware of waiting for placement    Chart was reviewed, hemodynamically stable    Objective/physical exam:  Vitals:    06/17/23 0515 06/17/23 0757 06/17/23 1139 06/17/23 1214   BP:  119/76  138/82   Pulse:  78  86   Resp: 20 19 19 18   Temp:  98.5 °F (36.9 °C)  99.1 °F (37.3 °C)   TempSrc:  Oral  Oral   SpO2:  97%  (!) 93%   Weight:       Height:           Physical Exam:  General: In no acute distress, afebrile  Respiratory: Clear to auscultation bilaterally  Cardiovascular: S1, S2, no appreciable murmur  Abdomen: Soft, nontender, BS +  MSK: Warm, no lower extremity edema, no clubbing or cyanosis  Neurologic: Alert and oriented x4, Left sided 4/5, CN intact      Lab Results   Component Value Date     06/14/2023    K 4.3 06/14/2023     (H) 10/12/2022    CO2 25 06/14/2023    BUN 13.2 06/14/2023    CREATININE 0.87 06/14/2023    CALCIUM 8.9 06/14/2023    ANIONGAP 13 10/12/2022    ESTGFRAFRICA >60 07/23/2021    EGFRNONAA 58 (A) 07/23/2021      Lab Results   Component Value Date    ALT 10 06/14/2023    AST 13 06/14/2023     (H) 05/06/2013    ALKPHOS 74 06/14/2023    BILITOT 0.4 06/14/2023      Lab Results   Component Value Date    WBC 9.37 06/14/2023    HGB 12.3  06/14/2023    HCT 37.8 06/14/2023    MCV 99.0 (H) 06/14/2023     06/14/2023           Medications:   aspirin  325 mg Oral Daily    atorvastatin  40 mg Oral Daily    docusate sodium  100 mg Oral BID    enoxparin  40 mg Subcutaneous Daily    levothyroxine  75 mcg Oral Before breakfast    loperamide  4 mg Oral Once    mupirocin   Nasal BID    nicotine  1 patch Transdermal Daily    pantoprazole  40 mg Oral Daily    senna-docusate 8.6-50 mg  3 tablet Oral Daily    vitamin D  5,000 Units Oral Daily with breakfast      acetaminophen, ALPRAZolam, amitriptyline, hydrALAZINE, HYDROcodone-acetaminophen, melatonin, morphine, nitroGLYCERIN, ondansetron, prochlorperazine, sodium chloride 0.9%, sodium chloride 0.9%, traMADoL     Assessment/Plan:    TIA with associated left sided deficits, facial droop and slurred speech-improving  77% stenosis of right ICA s/p CEA 6/13/23  MICHOACANO on CKD- improved     HX: T2DM, HTN, CKD, hepatitis C with chronic cirrhosis, Chronic back pain - with chronic pain management and prior back stimulator implant    Plan:  -Continue aspirin and statin.   -CV surgery signed off.  Continue surgical site wound care   -Analgesics p.r.n. for pain Monitor bowel movements, we will obtain imaging today given concerns for pain  -PT Following , needs placement  -Speech therapy following  -Case management assisting with placement  -Continuing appropriate home medications

## 2023-06-17 NOTE — PT/OT/SLP DISCHARGE
Speech Language Pathology Department  Diet Tolerance Follow-up    Patient Name:  Thom Krishna   MRN:  [id number]  Admitting Diagnosis: L facial droop, CVA workup    Recommendations:     General recommendations:  SLP intervention not indicated  Diet recommendations:  Soft & Bite Sized Diet - IDDSI Level 6, Liquid Diet Level: Thin liquids - IDDSI Level 0   Swallow strategies/precautions: small bites/sips, slow rate, and medications per patient preference  Precautions: Standard, aspiration    Diet tolerance:     Nursing reports no signs of difficulty regarding diet tolerance. No further skilled intervention is warranted. ST to sign off at this time. Feel free to re-consult, as warranted            06/17/2023

## 2023-06-18 PROCEDURE — 97116 GAIT TRAINING THERAPY: CPT | Mod: CQ

## 2023-06-18 PROCEDURE — 25000003 PHARM REV CODE 250: Performed by: STUDENT IN AN ORGANIZED HEALTH CARE EDUCATION/TRAINING PROGRAM

## 2023-06-18 PROCEDURE — 25000003 PHARM REV CODE 250: Performed by: PHYSICIAN ASSISTANT

## 2023-06-18 PROCEDURE — 25000003 PHARM REV CODE 250: Performed by: NURSE PRACTITIONER

## 2023-06-18 PROCEDURE — 25500020 PHARM REV CODE 255: Performed by: INTERNAL MEDICINE

## 2023-06-18 PROCEDURE — S4991 NICOTINE PATCH NONLEGEND: HCPCS | Performed by: INTERNAL MEDICINE

## 2023-06-18 PROCEDURE — 97110 THERAPEUTIC EXERCISES: CPT | Mod: CQ

## 2023-06-18 PROCEDURE — 25000003 PHARM REV CODE 250: Performed by: INTERNAL MEDICINE

## 2023-06-18 PROCEDURE — 21400001 HC TELEMETRY ROOM

## 2023-06-18 PROCEDURE — 63600175 PHARM REV CODE 636 W HCPCS: Performed by: NURSE PRACTITIONER

## 2023-06-18 PROCEDURE — 63600175 PHARM REV CODE 636 W HCPCS: Performed by: PHYSICIAN ASSISTANT

## 2023-06-18 RX ADMIN — PANTOPRAZOLE SODIUM 40 MG: 40 TABLET, DELAYED RELEASE ORAL at 09:06

## 2023-06-18 RX ADMIN — DOCUSATE SODIUM 100 MG: 100 CAPSULE, LIQUID FILLED ORAL at 08:06

## 2023-06-18 RX ADMIN — Medication 6 MG: at 08:06

## 2023-06-18 RX ADMIN — ONDANSETRON 4 MG: 2 INJECTION INTRAMUSCULAR; INTRAVENOUS at 08:06

## 2023-06-18 RX ADMIN — TRAMADOL HYDROCHLORIDE 50 MG: 50 TABLET, COATED ORAL at 01:06

## 2023-06-18 RX ADMIN — ATORVASTATIN CALCIUM 40 MG: 40 TABLET, FILM COATED ORAL at 09:06

## 2023-06-18 RX ADMIN — MUPIROCIN: 20 OINTMENT TOPICAL at 09:06

## 2023-06-18 RX ADMIN — ALPRAZOLAM 1 MG: 0.5 TABLET ORAL at 08:06

## 2023-06-18 RX ADMIN — ASPIRIN 325 MG: 325 TABLET, COATED ORAL at 09:06

## 2023-06-18 RX ADMIN — HYDROCODONE BITARTRATE AND ACETAMINOPHEN 1 TABLET: 10; 325 TABLET ORAL at 09:06

## 2023-06-18 RX ADMIN — LEVOTHYROXINE SODIUM 75 MCG: 25 TABLET ORAL at 05:06

## 2023-06-18 RX ADMIN — NICOTINE 1 PATCH: 21 PATCH, EXTENDED RELEASE TRANSDERMAL at 08:06

## 2023-06-18 RX ADMIN — Medication 5000 UNITS: at 09:06

## 2023-06-18 RX ADMIN — AMITRIPTYLINE HYDROCHLORIDE 25 MG: 25 TABLET, FILM COATED ORAL at 08:06

## 2023-06-18 RX ADMIN — IOPAMIDOL 75 ML: 755 INJECTION, SOLUTION INTRAVENOUS at 02:06

## 2023-06-18 RX ADMIN — DOCUSATE SODIUM 100 MG: 100 CAPSULE, LIQUID FILLED ORAL at 09:06

## 2023-06-18 RX ADMIN — SENNOSIDES AND DOCUSATE SODIUM 3 TABLET: 50; 8.6 TABLET ORAL at 09:06

## 2023-06-18 RX ADMIN — ENOXAPARIN SODIUM 40 MG: 40 INJECTION SUBCUTANEOUS at 04:06

## 2023-06-18 RX ADMIN — HYDROCODONE BITARTRATE AND ACETAMINOPHEN 1 TABLET: 10; 325 TABLET ORAL at 05:06

## 2023-06-18 NOTE — PT/OT/SLP PROGRESS
Physical Therapy Treatment    Patient Name:  Thom Krishna   MRN:  [id number]    Recommendations:     Discharge Recommendations: home with home health  Discharge Equipment Recommendations: none  Barriers to discharge: None    Assessment:     Thom Krishna is a 67 y.o. female admitted with a medical diagnosis of Stroke.  She presents with the following impairments/functional limitations: weakness, impaired balance, impaired endurance, impaired self care skills, impaired functional mobility .    Rehab Prognosis: Good; patient would benefit from acute skilled PT services to address these deficits and reach maximum level of function.    Recent Surgery: Procedure(s) (LRB):  ENDARTERECTOMY, CAROTID (Right) 5 Days Post-Op    Plan:     During this hospitalization, patient to be seen 6 x/week to address the identified rehab impairments via gait training, therapeutic activities, therapeutic exercises and progress toward the following goals:    Plan of Care Expires:  07/14/23    Subjective     Chief Complaint: occasional head and neck pain but it is better  Patient/Family Comments/goals: to go home  Pain/Comfort:  Pain Rating 1: 0/10      Objective:     Communicated with NSG prior to session.  Patient found HOB elevated with telemetry, PureWick upon PT entry to room.     General Precautions: Standard, fall  Orthopedic Precautions: N/A  Braces: N/A  Respiratory Status: Room air  Blood Pressure: supine 114/75, sitting 102/57, following session 121/77  Skin Integrity: Visible skin intact      Functional Mobility:  Bed Mobility:     Scooting: contact guard assistance  Supine to Sit: contact guard assistance  Transfers:     Sit to Stand:  contact guard assistance with rolling walker  Bed to Chair: contact guard assistance with  rolling walker  using  Step Transfer  Gait: The pt ambulated 30 ft x 2 trials working on increase SLS, foot clearance and heel strike.  Pt required CGA and occasional Min A for steering walker.     Therapeutic  Activities/Exercises:  Pt performed sit to stand for  x 5 reps from chair, w/ emphasis Ue use to push to stand and to control descent to sit, obtaining balance prior to reaching for support.     Education:  Patient provided with verbal and demonstration education regarding bed mobility transfers and quality of gait.  Understanding was verbalized, however additional teaching warranted.     Patient left up in chair with all lines intact and call button in reach..    GOALS:   Multidisciplinary Problems       Physical Therapy Goals          Problem: Physical Therapy    Goal Priority Disciplines Outcome Goal Variances Interventions   Physical Therapy Goal     PT, PT/OT Ongoing, Progressing     Description: Goals to be met by: 23     Patient will increase functional independence with mobility by performin. Supine to sit with [place]  2. Sit to supine with [place]  3. Sit to stand transfer with Stand-by Assistance  4. Gait  x 150 feet with Stand-by Assistance using Rolling Walker.                          Time Tracking:     PT Received On: 23  PT Start Time: 1248     PT Stop Time: 1314  PT Total Time (min): 26 min     Billable Minutes: Gait Training 13 and Therapeutic Activity 13    Treatment Type: Treatment  PT/PTA: PTA     Number of PTA visits since last PT visit: 3     2023

## 2023-06-18 NOTE — PLAN OF CARE
Problem: Adult Inpatient Plan of Care  Goal: Plan of Care Review  Outcome: Ongoing, Progressing  Goal: Patient-Specific Goal (Individualized)  Outcome: Ongoing, Progressing  Goal: Absence of Hospital-Acquired Illness or Injury  Outcome: Ongoing, Progressing  Goal: Optimal Comfort and Wellbeing  Outcome: Ongoing, Progressing  Goal: Readiness for Transition of Care  Outcome: Ongoing, Progressing     Problem: Infection  Goal: Absence of Infection Signs and Symptoms  Outcome: Ongoing, Progressing     Problem: Adjustment to Illness (Stroke, Ischemic/Transient Ischemic Attack)  Goal: Optimal Coping  Outcome: Ongoing, Progressing     Problem: Bowel Elimination Impaired (Stroke, Ischemic/Transient Ischemic Attack)  Goal: Effective Bowel Elimination  Outcome: Ongoing, Progressing     Problem: Cerebral Tissue Perfusion (Stroke, Ischemic/Transient Ischemic Attack)  Goal: Optimal Cerebral Tissue Perfusion  Outcome: Ongoing, Progressing     Problem: Cognitive Impairment (Stroke, Ischemic/Transient Ischemic Attack)  Goal: Optimal Cognitive Function  Outcome: Ongoing, Progressing     Problem: Communication Impairment (Stroke, Ischemic/Transient Ischemic Attack)  Goal: Improved Communication Skills  Outcome: Ongoing, Progressing     Problem: Functional Ability Impaired (Stroke, Ischemic/Transient Ischemic Attack)  Goal: Optimal Functional Ability  Outcome: Ongoing, Progressing     Problem: Respiratory Compromise (Stroke, Ischemic/Transient Ischemic Attack)  Goal: Effective Oxygenation and Ventilation  Outcome: Ongoing, Progressing     Problem: Sensorimotor Impairment (Stroke, Ischemic/Transient Ischemic Attack)  Goal: Improved Sensorimotor Function  Outcome: Ongoing, Progressing     Problem: Swallowing Impairment (Stroke, Ischemic/Transient Ischemic Attack)  Goal: Optimal Eating and Swallowing without Aspiration  Outcome: Ongoing, Progressing     Problem: Urinary Elimination Impaired (Stroke, Ischemic/Transient Ischemic  Attack)  Goal: Effective Urinary Elimination  Outcome: Ongoing, Progressing     Problem: Diabetes Comorbidity  Goal: Blood Glucose Level Within Targeted Range  Outcome: Ongoing, Progressing     Problem: Fall Injury Risk  Goal: Absence of Fall and Fall-Related Injury  Outcome: Ongoing, Progressing     Problem: Skin Injury Risk Increased  Goal: Skin Health and Integrity  Outcome: Ongoing, Progressing     Problem: Adult Inpatient Plan of Care  Goal: Plan of Care Review  Outcome: Ongoing, Progressing

## 2023-06-18 NOTE — PROGRESS NOTES
[facility] Hospital Medicine Progress Note        Chief Complaint: Inpatient Follow-up for CVA      HPI:   Thom Krishna is a 67 y.o. female who past medical history includes anxiety, depression, arthritis, chronic neck and back pain status post stimulator placement, degenerative disc disease, DM type 2, hepatitis-C, cirrhosis of the liver, emphysema, CKD stage 3; presents to the ED via EMS air med with reports of left-sided facial droop with associated slurred speech.  It was reported that patient was last seen normal at around 930 this morning.  Patient reports she woke up with lower extremity weakness which she had a difficult time ambulating which she reported slurred speech with left facial droop and difficulty swallowing which started this morning.  She also reported left arm numbness and weakness and decreased  to her left hand.  She denies any injury, traumas, falls, or any syncopal events.  Patient denies any chest pain, shortness a breath, fever, chills, cough, congestion, nausea, vomiting, diarrhea, or any sick contacts.  CT of the head without contrast demonstrated no acute intracranial findings identified, right parietal lobe small new encephalomalacia mild chronic micro ischemia and moderate atrophy. CTA of head and neck without contrast demonstrated no short interval change of CT head appearance, no evidence of flow-limiting stenosis or other acute focal intracranial vascular abnormality, atherosclerotic changes of the bilateral extracranial carotid vasculature with severe short segment stenosis of the right proximal ICA.  Lab work done reviewed demonstrated creatinine 1.04, glucose 78; other indicis unremarkable.      Initial vital signs reviewed /58 pulse 65 respirations 19 temperature 98.1° F O2 saturation 99% on room air.  Patient received IV hydration and aspirin rectally in the ED. Patient is admitted to hospital medicine services for further  management.    Workup revealed rt ICA stenosis for which CV surgery was consulted and patient underwent CTA.      Had episode of acute onset left-sided weakness and speech difficulties postoperatively.  Symptoms have resolved without any intervention.  Stat CT head showed no acute interval changes.  CT head revealed calcified plaques in left ICA with no significant hemodynamic stenosis, bilateral plaques in the cavernous portions.    Case management working on the placement    Interval Hx:   Repeat scan was ordered as a follow-up when patient complained of headache    Patient was seen and examined , sitting comfortably on the chair, reports to me that the pain around the operative site is getting better. Waiting for placement    Chart was reviewed, hemodynamically stable    Objective/physical exam:  Vitals:    06/18/23 0919 06/18/23 1200 06/18/23 1221 06/18/23 1315   BP:   124/79    Pulse:  62 78    Resp: 19 18 18   Temp:   98.5 °F (36.9 °C)    TempSrc:   Oral    SpO2:  97% 95%    Weight:       Height:           Physical Exam:  General: In no acute distress, afebrile  Respiratory: Clear to auscultation bilaterally  Cardiovascular: S1, S2, no appreciable murmur  Abdomen: Soft, nontender, BS +  MSK: Warm, no lower extremity edema, no clubbing or cyanosis  Neurologic: Alert and oriented x4, Left sided 4/5, CN intact      Lab Results   Component Value Date     06/14/2023    K 4.3 06/14/2023     (H) 10/12/2022    CO2 25 06/14/2023    BUN 13.2 06/14/2023    CREATININE 0.87 06/14/2023    CALCIUM 8.9 06/14/2023    ANIONGAP 13 10/12/2022    ESTGFRAFRICA >60 07/23/2021    EGFRNONAA 58 (A) 07/23/2021      Lab Results   Component Value Date    ALT 10 06/14/2023    AST 13 06/14/2023     (H) 05/06/2013    ALKPHOS 74 06/14/2023    BILITOT 0.4 06/14/2023      Lab Results   Component Value Date    WBC 9.37 06/14/2023    HGB 12.3 06/14/2023    HCT 37.8 06/14/2023    MCV 99.0 (H) 06/14/2023     06/14/2023            Medications:   aspirin  325 mg Oral Daily    atorvastatin  40 mg Oral Daily    docusate sodium  100 mg Oral BID    enoxparin  40 mg Subcutaneous Daily    levothyroxine  75 mcg Oral Before breakfast    loperamide  4 mg Oral Once    mupirocin   Nasal BID    nicotine  1 patch Transdermal Daily    pantoprazole  40 mg Oral Daily    senna-docusate 8.6-50 mg  3 tablet Oral Daily    vitamin D  5,000 Units Oral Daily with breakfast      acetaminophen, ALPRAZolam, amitriptyline, hydrALAZINE, HYDROcodone-acetaminophen, melatonin, morphine, nitroGLYCERIN, ondansetron, prochlorperazine, sodium chloride 0.9%, sodium chloride 0.9%, traMADoL     Assessment/Plan:    TIA with associated left sided deficits, facial droop and slurred speech-improving  77% stenosis of right ICA s/p CEA 6/13/23  MICHOACANO on CKD- improved     HX: T2DM, HTN, CKD, hepatitis C with chronic cirrhosis, Chronic back pain - with chronic pain management and prior back stimulator implant    Plan:  -Continue aspirin and statin.   -CV surgery signed off.  Continue surgical site wound care.  Obtained a follow up imaging for headache neck yesterday, report reviewed, patient feels much better today and does not complain of pain   -Analgesics p.r.n. for pain Monitor bowel movement.   -PT Following , needs placement  -Speech therapy following  -Case management assisting with placement  -Continuing appropriate home medications

## 2023-06-19 VITALS
WEIGHT: 152 LBS | TEMPERATURE: 99 F | HEART RATE: 78 BPM | RESPIRATION RATE: 18 BRPM | SYSTOLIC BLOOD PRESSURE: 111 MMHG | HEIGHT: 66 IN | BODY MASS INDEX: 24.43 KG/M2 | OXYGEN SATURATION: 97 % | DIASTOLIC BLOOD PRESSURE: 73 MMHG

## 2023-06-19 PROCEDURE — 25000003 PHARM REV CODE 250: Performed by: PHYSICIAN ASSISTANT

## 2023-06-19 PROCEDURE — 25000003 PHARM REV CODE 250: Performed by: NURSE PRACTITIONER

## 2023-06-19 PROCEDURE — 25000003 PHARM REV CODE 250: Performed by: STUDENT IN AN ORGANIZED HEALTH CARE EDUCATION/TRAINING PROGRAM

## 2023-06-19 PROCEDURE — 97535 SELF CARE MNGMENT TRAINING: CPT | Mod: CO

## 2023-06-19 PROCEDURE — 25000003 PHARM REV CODE 250: Performed by: INTERNAL MEDICINE

## 2023-06-19 RX ORDER — POLYETHYLENE GLYCOL 3350 17 G/17G
17 POWDER, FOR SOLUTION ORAL DAILY PRN
Qty: 20 PACKET | Refills: 0 | Status: SHIPPED | OUTPATIENT
Start: 2023-06-19

## 2023-06-19 RX ORDER — DOCUSATE SODIUM 100 MG/1
100 CAPSULE, LIQUID FILLED ORAL DAILY PRN
Qty: 60 CAPSULE | Refills: 0 | Status: SHIPPED | OUTPATIENT
Start: 2023-06-19

## 2023-06-19 RX ORDER — ACETAMINOPHEN 325 MG/1
650 TABLET ORAL EVERY 6 HOURS PRN
Qty: 60 TABLET | Refills: 0 | Status: SHIPPED | OUTPATIENT
Start: 2023-06-19

## 2023-06-19 RX ORDER — ASPIRIN 325 MG
325 TABLET, DELAYED RELEASE (ENTERIC COATED) ORAL DAILY
Qty: 30 TABLET | Refills: 0 | Status: SHIPPED | OUTPATIENT
Start: 2023-06-20

## 2023-06-19 RX ORDER — ATORVASTATIN CALCIUM 40 MG/1
40 TABLET, FILM COATED ORAL DAILY
Qty: 30 TABLET | Refills: 0 | Status: SHIPPED | OUTPATIENT
Start: 2023-06-20 | End: 2024-01-10 | Stop reason: SDUPTHER

## 2023-06-19 RX ORDER — TRAMADOL HYDROCHLORIDE 50 MG/1
50 TABLET ORAL EVERY 12 HOURS PRN
Qty: 14 TABLET | Refills: 0 | Status: SHIPPED | OUTPATIENT
Start: 2023-06-19 | End: 2023-10-17 | Stop reason: ALTCHOICE

## 2023-06-19 RX ADMIN — SENNOSIDES AND DOCUSATE SODIUM 3 TABLET: 50; 8.6 TABLET ORAL at 09:06

## 2023-06-19 RX ADMIN — ATORVASTATIN CALCIUM 40 MG: 40 TABLET, FILM COATED ORAL at 09:06

## 2023-06-19 RX ADMIN — PANTOPRAZOLE SODIUM 40 MG: 40 TABLET, DELAYED RELEASE ORAL at 09:06

## 2023-06-19 RX ADMIN — HYDROCODONE BITARTRATE AND ACETAMINOPHEN 1 TABLET: 10; 325 TABLET ORAL at 04:06

## 2023-06-19 RX ADMIN — ASPIRIN 325 MG: 325 TABLET, COATED ORAL at 09:06

## 2023-06-19 RX ADMIN — ALPRAZOLAM 1 MG: 0.5 TABLET ORAL at 09:06

## 2023-06-19 RX ADMIN — HYDROCODONE BITARTRATE AND ACETAMINOPHEN 1 TABLET: 10; 325 TABLET ORAL at 01:06

## 2023-06-19 RX ADMIN — DOCUSATE SODIUM 100 MG: 100 CAPSULE, LIQUID FILLED ORAL at 09:06

## 2023-06-19 RX ADMIN — LEVOTHYROXINE SODIUM 75 MCG: 25 TABLET ORAL at 04:06

## 2023-06-19 RX ADMIN — Medication 5000 UNITS: at 09:06

## 2023-06-19 NOTE — PT/OT/SLP PROGRESS
Occupational Therapy   Treatment    Name: Thom Krishna  MRN: [id number]  Admitting Diagnosis:  Carotid artery stenosis, symptomatic, right  6 Days Post-Op    Recommendations:     Discharge Recommendations: home with home health  Discharge Equipment Recommendations:  walker, rolling, shower chair, grab bar  Barriers to discharge:       Assessment:     Thom Krishna is a 67 y.o. female with a medical diagnosis of Carotid artery stenosis, symptomatic, right.  She presents with HA and decreased safety awareness. Performance deficits affecting function are impaired endurance, impaired self care skills, impaired functional mobility, impaired balance.     Rehab Prognosis:  Good; patient would benefit from acute skilled OT services to address these deficits and reach maximum level of function.       Plan:     Patient to be seen 3 x/week to address the above listed problems via self-care/home management, therapeutic activities, therapeutic exercises  Plan of Care Expires: 06/28/23  Plan of Care Reviewed with: patient    Subjective     Pain/Comfort:  Location 1:  (HA)    Objective:     Communicated with: RN prior to session.  Patient found HOB elevated with PureWick, telemetry upon OT entry to room.    General Precautions: Standard, fall    Orthopedic Precautions:N/A  Braces: N/A  Respiratory Status: Room air  Vital Signs: Blood Pressure: (Before FM: 115/68) (After FM: 132/81)  HR: (Before FM: 79) (After FM: 87)     Occupational Performance:     Bed Mobility:    Patient completed Supine to Sit with modified independence     Functional Mobility/Transfers:  Patient completed Sit <> Stand Transfer with contact guard assistance  with  rolling walker   Patient completed Toilet Transfer Step Transfer technique with contact guard assistance with  rolling walker and grab bars  Functional Mobility: Pt. Ambulated from EOB to BR requiring CGA for safety using RW. Verbal cueing for proper RW safety given while completing oral care at sink, no  LOB noted.    Activities of Daily Living:  Grooming: modified independence Pt. Completed oral care task standing at sink using grab bar for support with balance. No LOB noted.  Lower Body Dressing: stand by assistance Pt. Donned/doffed socks while seated in BS chair. Increased time and frustration noted.  Toileting: stand by assistance Pt. Completed toileting task requiring SBA for safety. Pericare was completed independently while seated.       Therapeutic Positioning    OT interventions performed during the course of today's session in an effort to prevent and/or reduce acquired pressure injuries:   Education on Pressure Ulcer Prevention provided    [facility] 6 Click ADL:      Patient Education:  Patient provided with verbal education regarding OT role/goals/POC, fall prevention, safety awareness, and Discharge/DME recommendations.  Understanding was verbalized, however additional teaching warranted.      Patient left up in chair with all lines intact and call button in reach    GOALS:   Multidisciplinary Problems       Occupational Therapy Goals          Problem: Occupational Therapy    Goal Priority Disciplines Outcome Interventions   Occupational Therapy Goal     OT, PT/OT Ongoing, Progressing    Description: Goals to be met by: 6/28/23     Patient will increase functional independence with ADLs by performing:    UE Dressing with Modified [place].  LE Dressing with Modified [place].  Grooming while standing at sink with Modified [place].  Toileting from bedside commode with Modified [place] for hygiene and clothing management.   Step transfer with Modified [place]                         Time Tracking:     OT Date of Treatment: 06/19/23  OT Start Time: 0935  OT Stop Time: 0958  OT Total Time (min): 23 min    Billable Minutes:Self Care/Home Management 2    OT/ANA MARIA: ANA MARIA     Number of ANA MARIA visits since last OT visit: 3    6/19/2023

## 2023-06-19 NOTE — PHYSICIAN QUERY
PT Name: Thom Krishna  MR #: [id number]     DOCUMENTATION CLARIFICATION     CDS/:  Anmol Tai RN, CDS                   Contact Information:  [email or web address]    This form is a permanent document in the medical record.    Query Date: June 19, 2023    By submitting this query, we are merely seeking further clarification of documentation.  Please utilize your independent clinical judgment when addressing the question(s) below.    The Medical Record contains the following:   Indicator Supporting Clinical Findings Location in Medical Record    Kidney (Renal) Insufficiency     X Kidney (Renal) Failure/Injury MICHOACANO on CKD- improved   HM PN 6/18    Nephrotoxic Agents     X BUN/Creatinine           GFR BUN 15.1  Creatinine 1.04  GFR 58    BUN 14.1  Creatinine 0.89  GFR >60    BUN 13.2  Creatinine 0.87  GFR >60 Labs 6/11        Labs 6/12        Labs 6/14          Urine: Casts         Eosinophils      Urine Output      Dehydration      Nausea/Vomiting      Dialysis/CRRT      Treatment     X Other CKD stage 3  PN 6/18         [facility] approved diagnostic criteria for acute kidney injury is based on KDIGO criteria:    An increase in serum creatinine > 0.3mg/dl within 48 hours  OR  Increase in serum creatinine to > 1.5x baseline, which is known or presumed to have occurred within the prior 7 days  OR  Urine volume <0.5 ml/kg/hr for 6 hours       The clinical guidelines noted above are only a system guideline. It does not replace the providers clinical judgment.     Due to the conflicting clinical picture, please clinically validate the diagnosis of Acute Kidney Injury.      If validated, please provide additional clinical support for the diagnosis.      [  x  ] Above stated diagnosis is not confirmed and/or it has been ruled out     [    ] MICHOACANO Ruled out, Acute Renal Insufficiency Ruled In  - Consider if SCr rise is transient and normalizes quickly with no efforts at real resuscitation of vital signs  and perfusion     [    ] Above stated diagnosis is confirmed. Please specify clinical support (signs, symptoms, and treatment) for  the confirmed diagnosis: ____________________     [    ] Other (please specify): _______________________________         Please document in your progress notes daily for the duration of treatment until resolved and include in your discharge summary.    References:   [employer]O Clinical Practice Guideline for Acute Kidney Injury. (2012, March). Retrieved October 21, 2020, from https://kdigo.org/wp-content/uploads/2016/10/GJTUF-2228-GKX-Guideline-English.pdf    KAYLAH Ramos MD, CARLTON Parsons MD, & KUNAL Mcneill MD. (1960). Renal medullary necrosis [Abstract]. The American Journal of Medicine, 29(1), 132-156. Doi:https://www.sciencedirect.com/science/article/abs/pii/3794741902192561    KUNAL Shipman MD, & YVETTE Oquendo MD, MS. (2020, June 18). Definition and staging of chronic kidney disease in adults ([id number] CARLTON Barboza MD, ScD & [id number] CELINE Parker MD, MSc, Eds.). Retrieved October 21, 2020, from https://www.[employer]/contents/definition-and-staging-of-chronic-kidney-disease-in-adults?search=ckd%20staging&source=search_result&selectedTitle=1~150&usage_type=default&display_rank=1     RUTH Perez MD, FACP. (2015, Deepika 15). Acute kidney injury revisited. Retrieved October 21, 2020, from https://acphospitalist.org/archives/2015/06/coding-acute-kidney-injury.htm    ALEXANDRA Joseph MD. (2019, July). Renal Cortical Necrosis. Retrieved October 21, 2020, from https://www.O-CODES/professional/genitourinary-disorders/renovascular-disorders/renal-cortical-necrosis    Form No. 84406

## 2023-06-19 NOTE — PLAN OF CARE
[facility] fax avs, mar, dc summary & passR/142 via e-fax to [facility] @[phone]     Nurse can Call report to Shannon @[phone] Room # B3 pt will admit to     [facility] will provided nurse with discharge packet    [facility] provided pt with IMM form

## 2023-06-19 NOTE — CARE UPDATE
[id number] Spoke with Kd with [facility] and informed her pt is ready for dc. She will meet with her team to determine if they can accommodate transferring pt.

## 2023-06-19 NOTE — CARE UPDATE
[id number] spoke with Rachael with [facility] who reported the  will be here within 1 1/2-2hours. Informed pt's nurse

## 2023-06-19 NOTE — CARE UPDATE
[id number] Spoke with Kd with [facility] reporting they can transfer pt to their facility today. Informed Maryellen [facility] to fax [phone] and raymond clinicals to [facility] at [phone]. Informed pt's nurse that pt will be going today to [facility]

## 2023-06-26 NOTE — DISCHARGE SUMMARY
[facility] Medicine Discharge Summary    Admit Date: 6/11/2023  Discharge Date and Time: 6/19/23  Admitting Physician:  Team  Discharging Physician: Bernadette Chamberlain MD.  Primary Care Physician: Brandy Dejesus MD  Consults: Neurology,CT surgery    Discharge Diagnoses:  TIA with associated left sided deficits, facial droop and slurred speech-improving  77% stenosis of right ICA s/p CEA 6/13/23       HX: T2DM, HTN, CKD, hepatitis C with chronic cirrhosis, Chronic back pain - with chronic pain management and prior back stimulator implant    Hospital Course:   Thom Krishna is a 67 y.o. female who past medical history includes anxiety, depression, arthritis, chronic neck and back pain status post stimulator placement, degenerative disc disease, DM type 2, hepatitis-C, cirrhosis of the liver, emphysema, CKD stage 3; presents to the ED via EMS air med with reports of left-sided facial droop with associated slurred speech.  It was reported that patient was last seen normal at around 930 this morning.  Patient reports she woke up with lower extremity weakness which she had a difficult time ambulating which she reported slurred speech with left facial droop and difficulty swallowing which started this morning.  She also reported left arm numbness and weakness and decreased  to her left hand.  She denies any injury, traumas, falls, or any syncopal events.  Patient denies any chest pain, shortness a breath, fever, chills, cough, congestion, nausea, vomiting, diarrhea, or any sick contacts.  CT of the head without contrast demonstrated no acute intracranial findings identified, right parietal lobe small new encephalomalacia mild chronic micro ischemia and moderate atrophy. CTA of head and neck without contrast demonstrated no short interval change of CT head appearance, no evidence of flow-limiting stenosis or other acute focal intracranial vascular abnormality, atherosclerotic  changes of the bilateral extracranial carotid vasculature with severe short segment stenosis of the right proximal ICA.  Lab work done reviewed demonstrated creatinine 1.04, glucose 78; other indicis unremarkable.      Initial vital signs reviewed /58 pulse 65 respirations 19 temperature 98.1° F O2 saturation 99% on room air.  Patient received IV hydration and aspirin rectally in the ED. Patient is admitted to hospital medicine services for further management.     Workup revealed rt ICA stenosis for which CV surgery was consulted and patient underwent Carotid Endarterectomy,Right.        Had episode of acute onset left-sided weakness and speech difficulties postoperatively.  Symptoms have resolved without any intervention.  Stat CT head showed no acute interval changes.  CT head revealed calcified plaques in left ICA with no significant hemodynamic stenosis, bilateral plaques in the cavernous portions.      Repeat scan was ordered as a follow-up when patient complained of headache,report reviewed,discussed with patient. Not concerning.Patient's pain eventually improved.      Pt was seen and examined on the day of discharge.Patient was stable on discharge,all questions were answered and emphasized the importance of follow ups.       Vitals:  VITAL SIGNS: 24 HRS MIN & MAX LAST   No data recorded 98.7 °F (37.1 °C)   No data recorded 111/73   No data recorded  78   No data recorded 18   No data recorded 97 %       Physical Exam:  General: In no acute distress, afebrile  Respiratory: Clear to auscultation bilaterally  Cardiovascular: S1, S2, no appreciable murmur  Abdomen: Soft, nontender, BS +  MSK: Warm, no lower extremity edema, no clubbing or cyanosis  Neurologic: Alert and oriented x4, Left sided 4/5, CN intact    Procedures Performed: see full chart,Carotid Endarterectomy,Right.    Significant Diagnostic Studies: See Full reports for all details    No results for input(s): WBC, RBC, HGB, HCT, MCV, MCH, MCHC,  RDW, PLT, MPV, GRAN, LYMPH, MONO, BASO, NRBC in the last 168 hours.    No results for input(s): NA, K, CL, CO2, ANIONGAP, BUN, CREATININE, GLU, CALCIUM, PH, MG, ALBUMIN, PROT, ALKPHOS, ALT, AST, BILITOT in the last 168 hours.     Microbiology Results (last 7 days)       ** No results found for the last 168 hours. **             CTA Head and Neck (xpd)  Narrative: EXAMINATION:  CTA HEAD AND NECK (XPD)    CLINICAL HISTORY:  Transient ischemic attack (TIA);    TECHNIQUE:  Noncontrast CT head with axial, sagittal and coronal reformats.    CT angiogram was performed from the level of the jeremiah to the vertex prior to and following the IV administration of intravenous contrast.  Axial, sagittal and coronal reconstructions and maximum intensity projection reconstructions were performed. Arterial stenosis percentages are based on NASCET measurement criteria.    Automated tube current modulation, weight-based exposure dosing, and/or iterative reconstruction technique utilized to reach lowest reasonably achievable exposure rate.    DLP: 2339 mGycm    COMPARISON:  CTA 06/13/2023    FINDINGS:  CT HEAD: Unchanged area of encephalomalacia in the right parietooccipital lobe.  Periventricular and subcortical white matter changes most compatible with chronic small vessel ischemic disease.  No acute loss of gray-white differentiation appreciable.  No midline shift or mass effect.  No extra-axial collections.  No ventriculomegaly.  No abnormal enhancement.    CTA NECK:    AORTIC ARCH AND GREAT VESSELS: Normal 3 vessel arch.    RIGHT ICA: Postsurgical change of right carotid endarterectomy.  No occlusion, pseudoaneurysm, dissection or significant stenosis.  Small amount of fluid and gas at the right carotid space.  Fluid component increased compared to previous exam.  Gas component decreased.  No extravasation.    LEFT ICA: Calcifications at the left carotid bulb without hemodynamically significant stenosis.  Question artifact versus  non propagating flap at the base of the left carotid bulb (26, 421).    VERTEBRAL ARTERIES: Patent extracranial segments. No hemodynamically significant stenosis, aneurysmal dilatation, or dissection.    CTA HEAD:    ANTERIOR CIRCULATION: No hemodynamically significant stenosis, aneurysmal dilatation, or dissection.  Calcifications at the cavernous carotid arteries without significant stenosis.    POSTERIOR CIRCULATION: No hemodynamically significant stenosis, aneurysmal dilatation, or dissection.    NON-VASCULAR STRUCTURES (LIMITED EVALUATION): Unchanged distension of the esophagus with retained fluid at the thoracic esophagus.  Grade 1 anterolisthesis of C4 on C5 related to facet arthropathy.  Impression: Postsurgical changes of right carotid endarterectomy with ill-defined gas and fluid at the right carotid space.  No extravasation.  Gas component is improved compared to previous exam.  Fluid component is slightly increased.  Otherwise no change from previous.    Electronically signed by: Florencia Padilla  Date:    06/18/2023  Time:    11:36         Medication List        START taking these medications      acetaminophen 325 MG tablet  Commonly known as: TYLENOL  Take 2 tablets (650 mg total) by mouth every 6 (six) hours as needed for Pain.     aspirin 325 MG EC tablet  Commonly known as: ECOTRIN  Take 1 tablet (325 mg total) by mouth once daily.     atorvastatin 40 MG tablet  Commonly known as: LIPITOR  Take 1 tablet (40 mg total) by mouth once daily.     docusate sodium 100 MG capsule  Commonly known as: COLACE  Take 1 capsule (100 mg total) by mouth daily as needed for Constipation.     polyethylene glycol 17 gram Pwpk  Commonly known as: MIRALAX  Take 17 g by mouth daily as needed (constipation).     traMADoL 50 mg tablet  Commonly known as: ULTRAM  Take 1 tablet (50 mg total) by mouth every 12 (twelve) hours as needed for Pain.            CONTINUE taking these medications      ALPRAZolam 1 MG  tablet  Commonly known as: XANAX  TAKE ONE (1) TABLET BY MOUTH TWICE A DAY AS NEEDED     amitriptyline 25 MG tablet  Commonly known as: ELAVIL  TAKE 1-2 TABS NIGHTLY AS NEEDED FOR INSOMNIA RELATED TO CHRONIC PAIN     blood-glucose meter kit     cholecalciferol (vitamin D3) 125 mcg (5,000 unit) capsule  Take 1 tablet  by mouth daily with breakfast.     diclofenac sodium 1 % Gel  Commonly known as: VOLTAREN  Apply 2 g topically 4 (four) times daily.     ergocalciferol 50,000 unit Cap  Commonly known as: ERGOCALCIFEROL  Take 1 capsule (50,000 Units total) by mouth every 7 days.     HYDROcodone-acetaminophen  mg per tablet  Commonly known as: NORCO  TAKE ONE (1) TABLET BY MOUTH THREE (3) TIMES DAILY AS NEEDED FOR PAIN     levothyroxine 75 MCG tablet  Commonly known as: SYNTHROID  TAKE 1 TABLET EVERY DAY ON AN EMPTY STOMACH     naloxone 4 mg/actuation Spry  Commonly known as: NARCAN  4mg by nasal route as needed for opioid overdose; may repeat every 2-3 minutes in alternating nostrils until medical help arrives. Call 911     ondansetron 8 MG Tbdl  Commonly known as: ZOFRAN-ODT  Dissolve 1 tablet (8 mg total) by mouth under the tongue 3 (three) times daily as needed (nausea).     pantoprazole 40 MG tablet  Commonly known as: PROTONIX  TAKE 1 TABLET EVERY DAY     senna-docusate 8.6-50 mg 8.6-50 mg per tablet  Commonly known as: SENNA WITH DOCUSATE SODIUM  Take 3 tablets by mouth once daily. Take 1-2 tabs daily-- take any time a pain pill( norco is taken) to help reduce opiod induced constipation               Where to Get Your Medications        These medications were sent to [facility] [address] [facility] [address]  [address] [facility] [facility] [address], [facility] [address]      Phone: [phone]   acetaminophen 325 MG tablet  aspirin 325 MG EC tablet  atorvastatin 40 MG tablet  docusate sodium 100 MG capsule  polyethylene glycol 17 gram Pwpk  traMADoL 50 mg tablet          Explained in detail to the patient about the  discharge plan, medications, and follow-up visits. Pt understands and agrees with the treatment plan  Discharge Disposition: Rehab Facility   Discharged Condition: stable  Diet-    Medications Per DC med rec  Activities as tolerated   Follow-up Information       Randal Joy MD. Schedule an appointment as soon as possible for a visit.    Specialties: Neurology, Interventional Neurology  Why: Please schedule in the Stroke Clinic in 2-4 weeks.  Date: July 10th, 2023   Time: 3:15 PM  Contact information:  [address]  [address]  [place] [address]  [phone]               Juan James MD Follow up in 3 week(s).    Specialties: Cardiothoracic Surgery, Cardiology  Why: Date: July 17th, 2023  Time: 8:20 AM  Contact information:  [address]  [address]  [place] [address]  [phone]                           For further questions contact hospitalist office    Discharge time 33 minutes    For worsening symptoms, chest pain, shortness of breath, increased abdominal pain, high grade fever, stroke or stroke like symptoms, immediately go to the nearest Emergency Room or call 911 as soon as possible.      Bernadette Guevara M.D, on 6/26/2023. at 11:10 AM.

## 2023-07-06 DIAGNOSIS — F41.9 ANXIETY: ICD-10-CM

## 2023-07-06 RX ORDER — ALPRAZOLAM 1 MG/1
TABLET ORAL
Qty: 45 TABLET | Refills: 5 | OUTPATIENT
Start: 2023-07-06

## 2023-07-06 NOTE — TELEPHONE ENCOUNTER
No care due was identified.  [facility] Embedded Care Due Messages. Reference number: [id number].   7/06/2023 1:15:59 PM CDT

## 2023-07-06 NOTE — TELEPHONE ENCOUNTER
----- Message from Evelyne Cutler sent at 7/6/2023  1:19 PM CDT -----  Type:  RX Refill Request    Who Called:       ALPRAZolam (XANAX) 1 MG tablet 45 tablet 5 1/10/2023  No  Sig: TAKE ONE (1) TABLET BY MOUTH TWICE A DAY AS NEEDED  Sent to pharmacy as: ALPRAZolam (XANAX) 1 MG tablet  Class: Normal  Order: [id number]  Date/Time Signed: 1/10/2023 15:07      E-Prescribing Status: Receipt confirmed by pharmacy (1/10/2023  3:08 PM CST)    Pharmacy  [facility] - [address]   Associated Diagnoses      Anxiety       Would the patient rather a call back or a response via [facility]?   Best Call Back Number:  Additional Information: pt had a minor stroke and is in rehab but needs refills. She will be out of rehab on Sunday. The doctor there is not refilling this prescription .

## 2023-07-07 RX ORDER — ALPRAZOLAM 1 MG/1
TABLET ORAL
Qty: 45 TABLET | Refills: 0 | Status: SHIPPED | OUTPATIENT
Start: 2023-07-07 | End: 2023-08-07

## 2023-07-07 NOTE — TELEPHONE ENCOUNTER
----- Message from Kamlesh Sahu sent at 7/7/2023  8:55 AM CDT -----  Type:  RX Refill Request    Who Called: pt  Refill or New Rx:refill  RX Name and Strength:ALPRAZolam (XANAX) 1 MG tablet  Preferred Pharmacy with phone number:[facility] [address] [facility] [address]  [address] [facility] [address]  Phone: [phone] Fax: [phone]  Hours: Not open 24 hours  Local or Mail Order:local  Ordering Provider:natalie  Would the patient rather a call back or a response via [facility]? call  Best Call Back Number:[phone]  Additional Information:   Please be advise pharm close at noon today  Please be advised pt called on yesterday regarding this and no one has respond

## 2023-07-18 DIAGNOSIS — M54.9 CHRONIC NECK AND BACK PAIN: ICD-10-CM

## 2023-07-18 DIAGNOSIS — G89.29 CHRONIC NECK AND BACK PAIN: ICD-10-CM

## 2023-07-18 DIAGNOSIS — M54.2 CHRONIC NECK AND BACK PAIN: ICD-10-CM

## 2023-07-18 DIAGNOSIS — S12.9XXS CLOSED FRACTURE OF CERVICAL VERTEBRA, UNSPECIFIED CERVICAL VERTEBRAL LEVEL, SEQUELA: ICD-10-CM
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
## 2023-07-18 RX ORDER — HYDROCODONE BITARTRATE AND ACETAMINOPHEN 10; 325 MG/1; MG/1
TABLET ORAL
Qty: 90 TABLET | Refills: 0 | Status: SHIPPED | OUTPATIENT
Start: 2023-07-18 | End: 2023-08-18 | Stop reason: SDUPTHER

## 2023-07-18 NOTE — TELEPHONE ENCOUNTER
Care Due:                  Date            Visit Type   Department     Provider  --------------------------------------------------------------------------------                                EP -                              PRIMARY      [facility] FAMILY  Last Visit: 10-      CARE ([facility])   MEDICINE       Brandy Dejesus  Next Visit: None Scheduled  None         None Found                                                            Last  Test          Frequency    Reason                     Performed    Due Date  --------------------------------------------------------------------------------    Office Visit  12 months..  amitriptyline............  10-   10-    [facility] Embedded Care Due Messages. Reference number: [id number].   7/18/2023 12:40:57 PM CDT

## 2023-07-18 NOTE — TELEPHONE ENCOUNTER
----- Message from Tatyana Rankin sent at 7/18/2023 11:01 AM CDT -----  Regarding: refill  Contact: [phone]  Type:  RX Refill Request    Who Called:  pt   Refill or New Rx: refill   RX Name and Strength: HYDROcodone-acetaminophen (NORCO)  mg per tablet  How is the patient currently taking it? (ex. 1XDay): TAKE ONE (1) TABLET BY MOUTH THREE (3) TIMES DAILY AS NEEDED FOR PAIN  Is this a 30 day or 90 day RX: 90 tablets   Preferred Pharmacy with phone number: [address]  Local or Mail Order: local   Ordering Provider:   Would the patient rather a call back or a response via [facility]?  Call   Best Call Back Number: [phone]  Additional Information:

## 2023-08-07 DIAGNOSIS — F41.9 ANXIETY: ICD-10-CM

## 2023-08-07 RX ORDER — ALPRAZOLAM 1 MG/1
TABLET ORAL
Qty: 45 TABLET | Refills: 0 | Status: SHIPPED | OUTPATIENT
Start: 2023-08-07 | End: 2023-09-05 | Stop reason: SDUPTHER

## 2023-08-07 NOTE — TELEPHONE ENCOUNTER
----- Message from Billie Hanks sent at 8/7/2023  2:47 PM CDT -----  Contact: pt  Type:  RX Refill Request    Who Called: pt  Refill or New Rx:refill  RX Name and Strength:ALPRAZolam (XANAX) 1 MG tablet  How is the patient currently taking it? (ex. 1XDay):TAKE ONE (1) TABLET BY MOUTH TWICE A DAY AS NEEDED  Is this a 30 day or 90 day RX:45  Preferred Pharmacy with phone number:[address]   Phone: [phone]  Fax:  [phone]  Local or Mail Order:local  Ordering Provider:Nicanor  Would the patient rather a call back or a response via [facility]? Call   Best Call Back Number:[phone]  Additional Information:

## 2023-08-07 NOTE — TELEPHONE ENCOUNTER
No care due was identified.  [facility] Embedded Care Due Messages. Reference number: [id number].   8/07/2023 9:47:49 AM CDT

## 2023-08-07 NOTE — TELEPHONE ENCOUNTER
----- Message from Massiel Blackwell sent at 8/7/2023 10:49 AM CDT -----  Type:  Needs Medical Advice    Who Called: pt  Would the patient rather a call back or a response via [facility]? call  Best Call Back Number:[phone]   Additional Information: pt would like discuss why her nerve medication ALPRAZolam (XANAX) 1 MG tablet was denied

## 2023-08-10 DIAGNOSIS — E03.9 ACQUIRED HYPOTHYROIDISM: ICD-10-CM

## 2023-08-10 RX ORDER — LEVOTHYROXINE SODIUM 75 UG/1
TABLET ORAL
Qty: 90 TABLET | Refills: 4 | Status: SHIPPED | OUTPATIENT
Start: 2023-08-10

## 2023-08-10 NOTE — TELEPHONE ENCOUNTER
No care due was identified.  [facility] Embedded Care Due Messages. Reference number: [id number].   8/10/2023 1:41:32 AM CDT
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
## 2023-08-18 DIAGNOSIS — G89.29 CHRONIC NECK AND BACK PAIN: ICD-10-CM

## 2023-08-18 DIAGNOSIS — M54.9 CHRONIC NECK AND BACK PAIN: ICD-10-CM

## 2023-08-18 DIAGNOSIS — M54.2 CHRONIC NECK AND BACK PAIN: ICD-10-CM

## 2023-08-18 DIAGNOSIS — S12.9XXS CLOSED FRACTURE OF CERVICAL VERTEBRA, UNSPECIFIED CERVICAL VERTEBRAL LEVEL, SEQUELA: ICD-10-CM

## 2023-08-18 RX ORDER — HYDROCODONE BITARTRATE AND ACETAMINOPHEN 10; 325 MG/1; MG/1
TABLET ORAL
Qty: 90 TABLET | Refills: 0 | Status: SHIPPED | OUTPATIENT
Start: 2023-08-18 | End: 2023-09-15 | Stop reason: SDUPTHER

## 2023-08-18 NOTE — TELEPHONE ENCOUNTER
No care due was identified.  [facility] Embedded Care Due Messages. Reference number: [id number].   8/18/2023 1:29:41 PM CDT

## 2023-08-24 ENCOUNTER — TELEPHONE (OUTPATIENT)
Dept: FAMILY MEDICINE | Facility: CLINIC | Age: 68
End: 2023-08-24
Payer: MEDICARE

## 2023-08-24 NOTE — TELEPHONE ENCOUNTER
----- Message from Massiel Blackwell sent at 8/24/2023  9:20 AM CDT -----  Type: Appointment Request      Name of Caller:pt  When is the first available appointment?n/a  Best Call Back Number:[phone]  Additional Information: pt would like to schedule annual for some time in October

## 2023-08-24 NOTE — TELEPHONE ENCOUNTER
Spoke to pt and stated that she needed her Annual. Pt was scheduled for Dr. Dejesus's first available.

## 2023-08-29 ENCOUNTER — PATIENT MESSAGE (OUTPATIENT)
Dept: FAMILY MEDICINE | Facility: CLINIC | Age: 68
End: 2023-08-29
Payer: MEDICARE

## 2023-09-05 DIAGNOSIS — F41.9 ANXIETY: ICD-10-CM

## 2023-09-05 RX ORDER — ALPRAZOLAM 1 MG/1
TABLET ORAL
Qty: 45 TABLET | Refills: 5 | Status: SHIPPED | OUTPATIENT
Start: 2023-09-05 | End: 2024-02-26 | Stop reason: SDUPTHER

## 2023-09-05 NOTE — TELEPHONE ENCOUNTER
No care due was identified.  [facility] Embedded Care Due Messages. Reference number: [id number].   9/05/2023 1:42:07 PM CDT

## 2023-09-15 ENCOUNTER — TELEPHONE (OUTPATIENT)
Dept: FAMILY MEDICINE | Facility: CLINIC | Age: 68
End: 2023-09-15
Payer: MEDICARE

## 2023-09-15 DIAGNOSIS — G89.29 CHRONIC NECK AND BACK PAIN: ICD-10-CM

## 2023-09-15 DIAGNOSIS — M54.2 CHRONIC NECK AND BACK PAIN: ICD-10-CM
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
## 2023-09-15 DIAGNOSIS — M54.9 CHRONIC NECK AND BACK PAIN: ICD-10-CM

## 2023-09-15 DIAGNOSIS — S12.9XXS CLOSED FRACTURE OF CERVICAL VERTEBRA, UNSPECIFIED CERVICAL VERTEBRAL LEVEL, SEQUELA: ICD-10-CM

## 2023-09-15 RX ORDER — HYDROCODONE BITARTRATE AND ACETAMINOPHEN 10; 325 MG/1; MG/1
TABLET ORAL
Qty: 90 TABLET | Refills: 0 | Status: SHIPPED | OUTPATIENT
Start: 2023-09-15 | End: 2023-10-16 | Stop reason: SDUPTHER

## 2023-09-15 NOTE — TELEPHONE ENCOUNTER
----- Message from Jesus Medley sent at 9/15/2023  8:15 AM CDT -----  Type:  RX Refill Request    Who Called:  pt  Refill or New Rx: refill  RX Name and Strength: HYDROcodone-acetaminophen (NORCO)  mg per tablet 90 tablet 0     Preferred Pharmacy: pt said if her rx can't be put into [facility] today then to send it to [facility] in  [place], [place]    Ordering Provider:  Reach pt via:  Best Call Back Number:  [phone]  Additional Information: pt said her pharmacy([facility]) closes early today (12pm) and for the weekend; pt requests a call back to let her know rx has been put in

## 2023-09-15 NOTE — TELEPHONE ENCOUNTER
----- Message from Jesus Medley sent at 9/15/2023  8:15 AM CDT -----  Type:  RX Refill Request    Who Called:  pt  Refill or New Rx: refill  RX Name and Strength: HYDROcodone-acetaminophen (NORCO)  mg per tablet 90 tablet 0     Preferred Pharmacy: pt said if her rx can't be put into [facility] today then to send it to [facility] in  [place], [place]    Ordering Provider:  Reach pt via:  Best Call Back Number:  [phone]  Additional Information: pt said her pharmacy([facility]) closes early today (12pm) and for the weekend; pt requests a call back to let her know rx has been put in

## 2023-09-18 PROBLEM — I63.9 STROKE: Status: RESOLVED | Noted: 2023-06-11 | Resolved: 2023-09-18

## 2023-10-16 DIAGNOSIS — G89.29 CHRONIC NECK AND BACK PAIN: ICD-10-CM

## 2023-10-16 DIAGNOSIS — S12.9XXS CLOSED FRACTURE OF CERVICAL VERTEBRA, UNSPECIFIED CERVICAL VERTEBRAL LEVEL, SEQUELA: ICD-10-CM

## 2023-10-16 DIAGNOSIS — M54.2 CHRONIC NECK AND BACK PAIN: ICD-10-CM
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
## 2023-10-16 DIAGNOSIS — M54.9 CHRONIC NECK AND BACK PAIN: ICD-10-CM

## 2023-10-16 NOTE — TELEPHONE ENCOUNTER
----- Message from Kamlesh Sahu sent at 10/16/2023 10:53 AM CDT -----  Type:  RX Refill Request    Who Called: pt   Refill or New Rx:refill  RX Name and Strength: HYDROcodone-acetaminophen (NORCO)  mg per tablet  Preferred Pharmacy with phone number:[facility] - [address]  Local or Mail Order:local   Ordering Provider:natalie  Would the patient rather a call back or a response via [facility]? Call   Best Call Back Number: [phone]  Additional Information: please be advised pt stated she called this morning and no one has called her to let her know her medication has been sent    Pt stated she's having a lot of pain and she ran out of meds

## 2023-10-16 NOTE — TELEPHONE ENCOUNTER
No care due was identified.  [facility] Embedded Care Due Messages. Reference number: [id number].   10/16/2023 4:54:15 PM CDT

## 2023-10-17 RX ORDER — HYDROCODONE BITARTRATE AND ACETAMINOPHEN 10; 325 MG/1; MG/1
TABLET ORAL
Qty: 90 TABLET | Refills: 0 | Status: SHIPPED | OUTPATIENT
Start: 2023-10-17 | End: 2023-11-14 | Stop reason: SDUPTHER

## 2023-10-17 RX ORDER — HYDROCODONE BITARTRATE AND ACETAMINOPHEN 10; 325 MG/1; MG/1
TABLET ORAL
Qty: 90 TABLET | Refills: 0 | OUTPATIENT
Start: 2023-10-17

## 2023-10-17 NOTE — TELEPHONE ENCOUNTER
No care due was identified.  [facility] Embedded Care Due Messages. Reference number: [id number].   10/16/2023 8:26:10 PM CDT

## 2023-11-14 ENCOUNTER — TELEPHONE (OUTPATIENT)
Dept: FAMILY MEDICINE | Facility: CLINIC | Age: 68
End: 2023-11-14
Payer: MEDICARE

## 2023-11-14 DIAGNOSIS — M54.2 CHRONIC NECK AND BACK PAIN: ICD-10-CM

## 2023-11-14 DIAGNOSIS — G89.29 CHRONIC NECK AND BACK PAIN: ICD-10-CM

## 2023-11-14 DIAGNOSIS — S12.9XXS CLOSED FRACTURE OF CERVICAL VERTEBRA, UNSPECIFIED CERVICAL VERTEBRAL LEVEL, SEQUELA: ICD-10-CM
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
## 2023-11-14 DIAGNOSIS — M54.9 CHRONIC NECK AND BACK PAIN: ICD-10-CM

## 2023-11-14 RX ORDER — HYDROCODONE BITARTRATE AND ACETAMINOPHEN 10; 325 MG/1; MG/1
TABLET ORAL
Qty: 90 TABLET | Refills: 0 | Status: SHIPPED | OUTPATIENT
Start: 2023-11-16 | End: 2023-12-12 | Stop reason: SDUPTHER

## 2023-11-14 NOTE — TELEPHONE ENCOUNTER
The earliest I could send the prescription for her norco was for 11/16/23 (30 days from the prior prescription) I have sent it to her pharmacy with a fill date of 11/16/23. If she is in a lot of pain then I would advise she go to urgent care or make an appointment with me to discuss. There is also an option to have her see pain management for their opinion/ recommendation regarding her pain meds, and I can place a referral if she is interested, thanks.

## 2023-11-14 NOTE — TELEPHONE ENCOUNTER
----- Message from Kassidy Seaman sent at 11/14/2023 10:42 AM CST -----  Type:  Needs Medical Advice    Who Called:  Pt    Pharmacy name and phone #:   [facility] - [address]   Phone: [phone]  Fax: [phone]  Would the patient rather a call back or a response via [facility]?  call  Best Call Back Number:  [phone]  Additional Information:  Pt is requesting if Dr can prescribe her medication earlier than usual.   HYDROcodone-acetaminophen (NORCO)  mg per tablet [70717  Pt states she is in a lot of pain.

## 2023-12-11 DIAGNOSIS — R11.0 NAUSEA: ICD-10-CM

## 2023-12-11 RX ORDER — ONDANSETRON 8 MG/1
TABLET, ORALLY DISINTEGRATING ORAL
Qty: 30 TABLET | Refills: 11 | OUTPATIENT
Start: 2023-12-11

## 2023-12-11 NOTE — TELEPHONE ENCOUNTER
No care due was identified.  [facility] Embedded Care Due Messages. Reference number: [id number].   12/11/2023 9:19:45 AM CST

## 2023-12-12 ENCOUNTER — TELEPHONE (OUTPATIENT)
Dept: FAMILY MEDICINE | Facility: CLINIC | Age: 68
End: 2023-12-12
Payer: MEDICARE

## 2023-12-12 DIAGNOSIS — M54.2 CHRONIC NECK AND BACK PAIN: ICD-10-CM

## 2023-12-12 DIAGNOSIS — S12.9XXS CLOSED FRACTURE OF CERVICAL VERTEBRA, UNSPECIFIED CERVICAL VERTEBRAL LEVEL, SEQUELA: ICD-10-CM

## 2023-12-12 DIAGNOSIS — G89.29 CHRONIC NECK AND BACK PAIN: ICD-10-CM

## 2023-12-12 DIAGNOSIS — M54.9 CHRONIC NECK AND BACK PAIN: ICD-10-CM
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
## 2023-12-12 RX ORDER — ONDANSETRON 8 MG/1
8 TABLET, ORALLY DISINTEGRATING ORAL 3 TIMES DAILY PRN
Qty: 30 TABLET | Refills: 11 | Status: SHIPPED | OUTPATIENT
Start: 2023-12-12

## 2023-12-12 RX ORDER — HYDROCODONE BITARTRATE AND ACETAMINOPHEN 10; 325 MG/1; MG/1
TABLET ORAL
Qty: 90 TABLET | Refills: 0 | Status: SHIPPED | OUTPATIENT
Start: 2023-12-12 | End: 2024-01-10 | Stop reason: SDUPTHER

## 2023-12-12 NOTE — TELEPHONE ENCOUNTER
----- Message from Leif Rutledge sent at 12/11/2023  1:59 PM CST -----  Type:  Patient Returning Call    Who Called:pt  Who Left Message for Patient:  Does the patient know what this is regarding?:pt rodrigo  Would the patient rather a call back or a response via [facility]? call  Best Call Back Number: [phone]  Additional Information: pt has questions in regards to getting her ondansetron (ZOFRAN-ODT) 8 MG TbDL, refilled pt state that the pharmacy informed her that  denied refilling the prescription.pt is having an procedure on her back on 12/12 in [place].  Please give the pt an call

## 2023-12-12 NOTE — TELEPHONE ENCOUNTER
----- Message from Jesus Medley sent at 12/12/2023 10:21 AM CST -----  Pt Requesting Call Back    Who called: pt  Who called for pt:  Best call back #:  [phone]  Add notes: pt said she would like to know would it hurt her if she get her pain pills (norco 5mg) filled from Dr. Schaefer and take them; pt said she just had surgery done on today

## 2023-12-12 NOTE — TELEPHONE ENCOUNTER
Spoke to pt and stated that she had she had the stimulator removed today and was prescribed Norco 5/325 #20, but she did not fill the prescription. Pt stated that she did not want it to interfere with her pain from Dr. Dejesus. Pls advise.

## 2023-12-13 NOTE — TELEPHONE ENCOUNTER
I am glad she didn't fill that prescription since she gets monthly Norco from me-- I went ahead and sent in her next 30 day RX of her usual Norco prescription a couple of days early (sent for fill starting 12/13/23) since she just had her surgery. This prescription still needs to last a month and was sent to [facility] pharmacy.

## 2023-12-14 ENCOUNTER — TELEPHONE (OUTPATIENT)
Dept: FAMILY MEDICINE | Facility: CLINIC | Age: 68
End: 2023-12-14
Payer: MEDICARE

## 2023-12-14 NOTE — TELEPHONE ENCOUNTER
----- Message from Devin Mills sent at 12/14/2023  2:18 PM CST -----  Contact: pt  Type:  Needs Medical Advice    Who Called: pt   Would the patient rather a call back or a response via [facility]? call  Best Call Back Number: [phone]  Additional Information:   Pt requesting a call from Keyanna WOODWARD regarding care. (Pt did not go into details)

## 2023-12-14 NOTE — TELEPHONE ENCOUNTER
Informed pt that Dr. Dejesus was glad that she didn't fill that prescription since she gets monthly [place] from me. Dr. Dejesus went ahead and sent in her next 30 day RX of her usual Norco prescription a couple of days early (sent for fill starting 12/13/23) since she just had her surgery. This prescription still needs to last a month and was sent to [facility] pharmacy

## 2024-01-03 DIAGNOSIS — Z51.81 ENCOUNTER FOR MONITORING LONG-TERM PROTON PUMP INHIBITOR THERAPY: ICD-10-CM

## 2024-01-03 DIAGNOSIS — Z79.899 ENCOUNTER FOR MONITORING LONG-TERM PROTON PUMP INHIBITOR THERAPY: ICD-10-CM

## 2024-01-03 DIAGNOSIS — K21.9 GASTROESOPHAGEAL REFLUX DISEASE, UNSPECIFIED WHETHER ESOPHAGITIS PRESENT: ICD-10-CM

## 2024-01-03 RX ORDER — PANTOPRAZOLE SODIUM 40 MG/1
TABLET, DELAYED RELEASE ORAL
Qty: 90 TABLET | Refills: 3 | Status: SHIPPED | OUTPATIENT
Start: 2024-01-03

## 2024-01-03 NOTE — TELEPHONE ENCOUNTER
No care due was identified.  [facility] Embedded Care Due Messages. Reference number: [id number].   1/03/2024 2:28:58 AM CST

## 2024-01-10 ENCOUNTER — TELEPHONE (OUTPATIENT)
Dept: FAMILY MEDICINE | Facility: CLINIC | Age: 69
End: 2024-01-10

## 2024-01-10 ENCOUNTER — OFFICE VISIT (OUTPATIENT)
Dept: FAMILY MEDICINE | Facility: CLINIC | Age: 69
End: 2024-01-10
Payer: MEDICARE

## 2024-01-10 ENCOUNTER — LAB VISIT (OUTPATIENT)
Dept: LAB | Facility: HOSPITAL | Age: 69
End: 2024-01-10
Attending: FAMILY MEDICINE
Payer: MEDICARE

## 2024-01-10 VITALS
DIASTOLIC BLOOD PRESSURE: 80 MMHG | BODY MASS INDEX: 24.94 KG/M2 | HEIGHT: 66 IN | HEART RATE: 84 BPM | TEMPERATURE: 98 F | WEIGHT: 155.19 LBS | OXYGEN SATURATION: 98 % | SYSTOLIC BLOOD PRESSURE: 132 MMHG

## 2024-01-10 DIAGNOSIS — Z79.899 ENCOUNTER FOR MONITORING LONG-TERM PROTON PUMP INHIBITOR THERAPY: ICD-10-CM

## 2024-01-10 DIAGNOSIS — Z86.19 HISTORY OF HEPATITIS C: ICD-10-CM

## 2024-01-10 DIAGNOSIS — K75.4 AUTOIMMUNE HEPATITIS: ICD-10-CM

## 2024-01-10 DIAGNOSIS — I70.0 ATHEROSCLEROSIS OF AORTA: ICD-10-CM

## 2024-01-10 DIAGNOSIS — E63.9 POOR NUTRITION: ICD-10-CM

## 2024-01-10 DIAGNOSIS — Z12.31 SCREENING MAMMOGRAM FOR BREAST CANCER: ICD-10-CM

## 2024-01-10 DIAGNOSIS — E11.49 DIABETES MELLITUS TYPE 2 WITH NEUROLOGICAL MANIFESTATIONS: ICD-10-CM

## 2024-01-10 DIAGNOSIS — Z79.82 LONG-TERM USE OF ASPIRIN THERAPY: ICD-10-CM

## 2024-01-10 DIAGNOSIS — E03.9 ACQUIRED HYPOTHYROIDISM: ICD-10-CM

## 2024-01-10 DIAGNOSIS — F10.11 HISTORY OF ALCOHOL ABUSE: ICD-10-CM

## 2024-01-10 DIAGNOSIS — D12.3 ADENOMATOUS POLYP OF TRANSVERSE COLON: ICD-10-CM

## 2024-01-10 DIAGNOSIS — F32.A ANXIETY AND DEPRESSION: ICD-10-CM

## 2024-01-10 DIAGNOSIS — E55.9 VITAMIN D DEFICIENCY: ICD-10-CM

## 2024-01-10 DIAGNOSIS — Z87.891 PERSONAL HISTORY OF NICOTINE DEPENDENCE: ICD-10-CM

## 2024-01-10 DIAGNOSIS — M54.2 CHRONIC NECK AND BACK PAIN: ICD-10-CM

## 2024-01-10 DIAGNOSIS — Z79.899 MEDICATION MANAGEMENT: ICD-10-CM

## 2024-01-10 DIAGNOSIS — Z00.00 ROUTINE GENERAL MEDICAL EXAMINATION AT A HEALTH CARE FACILITY: Primary | ICD-10-CM

## 2024-01-10 DIAGNOSIS — Z86.73 HISTORY OF RECENT STROKE: ICD-10-CM

## 2024-01-10 DIAGNOSIS — M54.9 CHRONIC NECK AND BACK PAIN: ICD-10-CM

## 2024-01-10 DIAGNOSIS — G89.29 CHRONIC NECK AND BACK PAIN: ICD-10-CM

## 2024-01-10 DIAGNOSIS — M81.0 POSTMENOPAUSAL OSTEOPOROSIS: ICD-10-CM

## 2024-01-10 DIAGNOSIS — K59.04 CHRONIC IDIOPATHIC CONSTIPATION: ICD-10-CM

## 2024-01-10 DIAGNOSIS — S22.080S COMPRESSION FRACTURE OF T11 VERTEBRA, SEQUELA: ICD-10-CM

## 2024-01-10 DIAGNOSIS — K74.60 HEPATIC CIRRHOSIS, UNSPECIFIED HEPATIC CIRRHOSIS TYPE, UNSPECIFIED WHETHER ASCITES PRESENT: ICD-10-CM

## 2024-01-10 DIAGNOSIS — F11.20 OPIOID DEPENDENCE, DAILY USE: ICD-10-CM

## 2024-01-10 DIAGNOSIS — E53.8 VITAMIN B12 DEFICIENCY: ICD-10-CM

## 2024-01-10 DIAGNOSIS — G89.4 CHRONIC PAIN SYNDROME: ICD-10-CM

## 2024-01-10 DIAGNOSIS — J43.9 PULMONARY EMPHYSEMA, UNSPECIFIED EMPHYSEMA TYPE: ICD-10-CM

## 2024-01-10 DIAGNOSIS — Z51.81 ENCOUNTER FOR MONITORING LONG-TERM PROTON PUMP INHIBITOR THERAPY: ICD-10-CM

## 2024-01-10 DIAGNOSIS — G47.00 INSOMNIA, UNSPECIFIED TYPE: ICD-10-CM

## 2024-01-10 DIAGNOSIS — Z98.890 HISTORY OF RIGHT-SIDED CAROTID ENDARTERECTOMY: ICD-10-CM

## 2024-01-10 DIAGNOSIS — F41.9 ANXIETY AND DEPRESSION: ICD-10-CM

## 2024-01-10 DIAGNOSIS — Z78.0 POSTMENOPAUSAL: ICD-10-CM

## 2024-01-10 PROBLEM — N18.31 CHRONIC KIDNEY DISEASE, STAGE 3A: Status: RESOLVED | Noted: 2022-05-10 | Resolved: 2024-01-10

## 2024-01-10 LAB
25(OH)D3+25(OH)D2 SERPL-MCNC: 28 NG/ML (ref 30–96)
ALBUMIN SERPL BCP-MCNC: 4 G/DL (ref 3.5–5.2)
ALP SERPL-CCNC: 130 U/L (ref 55–135)
ALT SERPL W/O P-5'-P-CCNC: 15 U/L (ref 10–44)
ANION GAP SERPL CALC-SCNC: 13 MMOL/L (ref 8–16)
AST SERPL-CCNC: 20 U/L (ref 10–40)
BASOPHILS # BLD AUTO: 0.04 K/UL (ref 0–0.2)
BASOPHILS NFR BLD: 0.6 % (ref 0–1.9)
BILIRUB SERPL-MCNC: 0.5 MG/DL (ref 0.1–1)
BUN SERPL-MCNC: 14 MG/DL (ref 8–23)
CALCIUM SERPL-MCNC: 9.5 MG/DL (ref 8.7–10.5)
CHLORIDE SERPL-SCNC: 105 MMOL/L (ref 95–110)
CHOLEST SERPL-MCNC: 198 MG/DL (ref 120–199)
CHOLEST/HDLC SERPL: 3.9 {RATIO} (ref 2–5)
CO2 SERPL-SCNC: 21 MMOL/L (ref 23–29)
CREAT SERPL-MCNC: 1.1 MG/DL (ref 0.5–1.4)
DIFFERENTIAL METHOD BLD: ABNORMAL
EOSINOPHIL # BLD AUTO: 0.3 K/UL (ref 0–0.5)
EOSINOPHIL NFR BLD: 4 % (ref 0–8)
ERYTHROCYTE [DISTWIDTH] IN BLOOD BY AUTOMATED COUNT: 14.8 % (ref 11.5–14.5)
EST. GFR  (NO RACE VARIABLE): 54 ML/MIN/1.73 M^2
ESTIMATED AVG GLUCOSE: 105 MG/DL (ref 68–131)
FERRITIN SERPL-MCNC: 34 NG/ML (ref 20–300)
GLUCOSE SERPL-MCNC: 86 MG/DL (ref 70–110)
HBA1C MFR BLD: 5.3 % (ref 4–5.6)
HCT VFR BLD AUTO: 41.7 % (ref 37–48.5)
HDLC SERPL-MCNC: 51 MG/DL (ref 40–75)
HDLC SERPL: 25.8 % (ref 20–50)
HGB BLD-MCNC: 13.7 G/DL (ref 12–16)
IMM GRANULOCYTES # BLD AUTO: 0.01 K/UL (ref 0–0.04)
IMM GRANULOCYTES NFR BLD AUTO: 0.2 % (ref 0–0.5)
LDLC SERPL CALC-MCNC: 122.8 MG/DL (ref 63–159)
LYMPHOCYTES # BLD AUTO: 2.3 K/UL (ref 1–4.8)
LYMPHOCYTES NFR BLD: 36.6 % (ref 18–48)
MAGNESIUM SERPL-MCNC: 2 MG/DL (ref 1.6–2.6)
MCH RBC QN AUTO: 31.1 PG (ref 27–31)
MCHC RBC AUTO-ENTMCNC: 32.9 G/DL (ref 32–36)
MCV RBC AUTO: 95 FL (ref 82–98)
MONOCYTES # BLD AUTO: 0.4 K/UL (ref 0.3–1)
MONOCYTES NFR BLD: 6.8 % (ref 4–15)
NEUTROPHILS # BLD AUTO: 3.3 K/UL (ref 1.8–7.7)
NEUTROPHILS NFR BLD: 51.8 % (ref 38–73)
NONHDLC SERPL-MCNC: 147 MG/DL
NRBC BLD-RTO: 0 /100 WBC
PLATELET # BLD AUTO: 188 K/UL (ref 150–450)
PMV BLD AUTO: 11.9 FL (ref 9.2–12.9)
POTASSIUM SERPL-SCNC: 4 MMOL/L (ref 3.5–5.1)
PREALB SERPL-MCNC: 17 MG/DL (ref 20–43)
PROT SERPL-MCNC: 7.6 G/DL (ref 6–8.4)
RBC # BLD AUTO: 4.4 M/UL (ref 4–5.4)
SODIUM SERPL-SCNC: 139 MMOL/L (ref 136–145)
TRIGL SERPL-MCNC: 121 MG/DL (ref 30–150)
TSH SERPL DL<=0.005 MIU/L-ACNC: 1.32 UIU/ML (ref 0.4–4)
VIT B12 SERPL-MCNC: 322 PG/ML (ref 210–950)
WBC # BLD AUTO: 6.29 K/UL (ref 3.9–12.7)

## 2024-01-10 PROCEDURE — 82306 VITAMIN D 25 HYDROXY: CPT | Performed by: FAMILY MEDICINE

## 2024-01-10 PROCEDURE — 84443 ASSAY THYROID STIM HORMONE: CPT | Performed by: FAMILY MEDICINE

## 2024-01-10 PROCEDURE — 84134 ASSAY OF PREALBUMIN: CPT | Performed by: FAMILY MEDICINE

## 2024-01-10 PROCEDURE — 83735 ASSAY OF MAGNESIUM: CPT | Performed by: FAMILY MEDICINE

## 2024-01-10 PROCEDURE — 99214 OFFICE O/P EST MOD 30 MIN: CPT | Mod: PBBFAC,PO | Performed by: FAMILY MEDICINE

## 2024-01-10 PROCEDURE — 80061 LIPID PANEL: CPT | Performed by: FAMILY MEDICINE

## 2024-01-10 PROCEDURE — 80053 COMPREHEN METABOLIC PANEL: CPT | Performed by: FAMILY MEDICINE

## 2024-01-10 PROCEDURE — 82607 VITAMIN B-12: CPT | Performed by: FAMILY MEDICINE

## 2024-01-10 PROCEDURE — 82728 ASSAY OF FERRITIN: CPT | Performed by: FAMILY MEDICINE

## 2024-01-10 PROCEDURE — 85025 COMPLETE CBC W/AUTO DIFF WBC: CPT | Performed by: FAMILY MEDICINE

## 2024-01-10 PROCEDURE — 36415 COLL VENOUS BLD VENIPUNCTURE: CPT | Performed by: FAMILY MEDICINE

## 2024-01-10 PROCEDURE — 99999 PR PBB SHADOW E&M-EST. PATIENT-LVL IV: CPT | Mod: PBBFAC,,, | Performed by: FAMILY MEDICINE

## 2024-01-10 PROCEDURE — 83036 HEMOGLOBIN GLYCOSYLATED A1C: CPT | Performed by: FAMILY MEDICINE

## 2024-01-10 RX ORDER — ATORVASTATIN CALCIUM 40 MG/1
40 TABLET, FILM COATED ORAL DAILY
Qty: 90 TABLET | Refills: 4 | Status: SHIPPED | OUTPATIENT
Start: 2024-01-10

## 2024-01-10 RX ORDER — AMITRIPTYLINE HYDROCHLORIDE 25 MG/1
TABLET, FILM COATED ORAL
Qty: 60 TABLET | Refills: 11 | Status: SHIPPED | OUTPATIENT
Start: 2024-01-10

## 2024-01-10 RX ORDER — CHOLECALCIFEROL (VITAMIN D3) 125 MCG
CAPSULE ORAL
Qty: 100 CAPSULE | Refills: 4 | Status: SHIPPED | OUTPATIENT
Start: 2024-01-10

## 2024-01-10 RX ORDER — HYDROCODONE BITARTRATE AND ACETAMINOPHEN 10; 325 MG/1; MG/1
TABLET ORAL
Qty: 90 TABLET | Refills: 0 | Status: SHIPPED | OUTPATIENT
Start: 2024-01-10 | End: 2024-02-12 | Stop reason: SDUPTHER

## 2024-01-10 RX ORDER — ERGOCALCIFEROL 1.25 MG/1
50000 CAPSULE ORAL
Qty: 15 CAPSULE | Refills: 4 | Status: SHIPPED | OUTPATIENT
Start: 2024-01-10

## 2024-01-10 NOTE — TELEPHONE ENCOUNTER
----- Message from Elvie Dent sent at 1/10/2024 10:54 AM CST -----  Type:  Pharmacy Calling to Clarify an RX    Name of Caller:cruz   Pharmacy Name:[facility] [address] - [address]  [address] [place] [address]  Phone: [phone] Fax: [phone]  prescription Name:vitamin D   What do they need to clarify?:2 scripts were sent in so they need clarification on which one to fill   Best Call Back Number:reply to pharmacy   Additional Information:
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
## 2024-01-10 NOTE — TELEPHONE ENCOUNTER
Called pharmacy and was able to inform them that dr. Dejesus wants pt to take both vit d rx that was sent in. 1 is for every day the other is once a week

## 2024-01-11 NOTE — TELEPHONE ENCOUNTER
"  Subjective     Oli Guadalupe is a 60 y.o. male who presents for Annual Exam.    HPI     Pt is here today for annual well exam.     Pt was recently hospitalized at [facility] for acute diverticulitis - treated with antibiotics, symptoms resolved     He has chronic foot pain and occasionally requires tramadol.  He is requesting a refill.      Review of Systems   Respiratory:  Negative for shortness of breath.    Cardiovascular:  Negative for chest pain.   Neurological:  Negative for headaches.       Objective     Vitals:    01/11/24 1332   BP: 124/84   BP Location: Right arm   Patient Position: Sitting   Pulse: 78   Resp: 18   Temp: 36.7 °C (98.1 °F)   TempSrc: Temporal   SpO2: 98%   Weight: 88.9 kg (196 lb)   Height: 1.778 m (5' 10\")        Current Outpatient Medications   Medication Instructions    atorvastatin (LIPITOR) 40 mg, oral, Daily    multivitamin tablet 1 tablet, oral, Daily    traMADol (ULTRAM) 50 mg, oral, 2 times daily PRN, For left foot        Past Surgical History:   Procedure Laterality Date    OTHER SURGICAL HISTORY  12/12/2019    Lithotripsy    OTHER SURGICAL HISTORY  12/12/2019    Toe amputation    OTHER SURGICAL HISTORY  06/15/2020    Ankle surgery    OTHER SURGICAL HISTORY  06/10/2022    Colonoscopy        Social History     Tobacco Use    Smoking status: Former     Types: Cigarettes    Smokeless tobacco: Never        Family History   Problem Relation Name Age of Onset    Breast cancer Mother      Diabetes Father      Pancreatic cancer Father      Other (acute myocardial infarction) Father's Brother          Immunization History   Administered Date(s) Administered    Flu vaccine (IIV4), preservative free *Check age/dose* 12/12/2019, 01/11/2024    Influenza, seasonal, injectable 12/12/2019    Pfizer Purple Cap SARS-CoV-2 01/21/2021, 02/11/2021, 02/09/2022    Tdap vaccine, age 7 year and older (BOOSTRIX) 01/01/2015, 06/22/2021    Zoster vaccine, recombinant, adult (SHINGRIX) 06/22/2021, " Pt has referral in for pain management from November. She would now like to schedule, please advise.    03/01/2022        Physical Exam  Vitals reviewed.   Constitutional:       General: He is not in acute distress.     Appearance: Normal appearance.   HENT:      Head: Normocephalic and atraumatic.      Right Ear: Tympanic membrane, ear canal and external ear normal.      Left Ear: Tympanic membrane, ear canal and external ear normal.      Nose: Nose normal.      Mouth/Throat:      Mouth: Mucous membranes are moist.      Pharynx: Oropharynx is clear. No oropharyngeal exudate or posterior oropharyngeal erythema.   Eyes:      Extraocular Movements: Extraocular movements intact.      Pupils: Pupils are equal, round, and reactive to light.   Neck:      Thyroid: No thyroid mass or thyromegaly.   Cardiovascular:      Rate and Rhythm: Normal rate and regular rhythm.      Heart sounds: No murmur heard.  Pulmonary:      Effort: Pulmonary effort is normal. No respiratory distress.      Breath sounds: Normal breath sounds. No wheezing, rhonchi or rales.   Abdominal:      General: Abdomen is flat. There is no distension.      Palpations: Abdomen is soft.      Tenderness: There is no abdominal tenderness.   Genitourinary:     Testes: Normal.   Musculoskeletal:         General: Normal range of motion.   Lymphadenopathy:      Cervical: No cervical adenopathy.   Skin:     General: Skin is warm and dry.      Findings: No rash.   Neurological:      General: No focal deficit present.      Mental Status: He is alert and oriented to person, place, and time. Mental status is at baseline.   Psychiatric:         Mood and Affect: Mood normal.         Behavior: Behavior normal.         Problem List Items Addressed This Visit       Elevated coronary artery calcium score    Pulmonary emphysema (CMS/HCC)    Relevant Orders    CT lung screening low dose    Chronic pain in left foot    Relevant Medications    traMADol (Ultram) 50 mg tablet    Hyperlipidemia    Relevant Medications    atorvastatin (Lipitor) 40 mg tablet    Other Relevant Orders     Comprehensive Metabolic Panel    Lipid Panel    History of diverticulitis of colon    Relevant Orders    Referral to Gastroenterology     Other Visit Diagnoses       Health care maintenance    -  Primary    Relevant Orders    POCT UA (nonautomated) manually resulted (Completed)    ECG 12 lead (Clinic Performed) (Completed)    Flu vaccine (IIV4) age 6 months and greater, preservative free (Completed)    Comprehensive Metabolic Panel    Lipid Panel    CBC and Auto Differential    Former cigarette smoker        Relevant Orders    CT lung screening low dose    Hyperglycemia        Relevant Orders    Hemoglobin A1C    Screening PSA (prostate specific antigen)        Relevant Orders    Prostate Specific Antigen            Assessment/Plan     Well Exam     Colon screening - Colonoscopy is up to date (4/12/18 -repeat 10 years , [facility])     Vaccines - utd with shingrix and tdap    Flu vaccine given     I recommend yearly flu vaccine and routine COVID vaccinations as indicated     Complete labs    Healthy diet, routine exercise was discussed with patient      Hx of chronic left foot pain - pt takes tramadol sparingly as needed at bedtime to help with the pain, last refill was in 2/2023.  I have personally reviewed the OARRS report today for this patient and it is consistent with the prescribed therapy.  We weighed the risks and benefits of this therapy.  I have considered the risk of abuse, dependence, addiction, and diversion.      Tramadol refilled today - pt aware that it should be used sparingly    Hx of recent bout of acute diverticulitis in 12/2023, pt will see GI for colonoscopy    Follow up in 1 year or sooner if needed

## 2024-01-14 PROBLEM — E44.0 PROTEIN-CALORIE MALNUTRITION, MODERATE: Status: ACTIVE | Noted: 2024-01-14

## 2024-01-31 PROBLEM — F33.0 MILD EPISODE OF RECURRENT MAJOR DEPRESSIVE DISORDER: Status: ACTIVE | Noted: 2024-01-31

## 2024-01-31 PROBLEM — M46.1 SACROILIITIS: Status: ACTIVE | Noted: 2024-01-31

## 2024-01-31 PROBLEM — I77.9 CAROTID ARTERIAL DISEASE: Status: ACTIVE | Noted: 2024-01-31

## 2024-01-31 PROBLEM — F13.10 SEDATIVE, HYPNOTIC, OR ANXIOLYTIC ABUSE, DAILY USE: Status: ACTIVE | Noted: 2024-01-31

## 2024-02-12 ENCOUNTER — TELEPHONE (OUTPATIENT)
Dept: FAMILY MEDICINE | Facility: CLINIC | Age: 69
End: 2024-02-12
Payer: MEDICARE

## 2024-02-12 DIAGNOSIS — G89.29 CHRONIC NECK AND BACK PAIN: ICD-10-CM

## 2024-02-12 DIAGNOSIS — M54.2 CHRONIC NECK AND BACK PAIN: ICD-10-CM

## 2024-02-12 DIAGNOSIS — M54.9 CHRONIC NECK AND BACK PAIN: ICD-10-CM

## 2024-02-12 RX ORDER — HYDROCODONE BITARTRATE AND ACETAMINOPHEN 10; 325 MG/1; MG/1
TABLET ORAL
Qty: 90 TABLET | Refills: 0 | Status: SHIPPED | OUTPATIENT
Start: 2024-02-12 | End: 2024-03-11 | Stop reason: SDUPTHER

## 2024-02-12 NOTE — TELEPHONE ENCOUNTER
----- Message from Elvie Dent sent at 2/12/2024 11:20 AM CST -----  Type:  RX Refill Request    Who Called: pt  Refill or New Rx:new RX   RX Name and Strength:HYDROcodone-acetaminophen (NORCO)  mg per tablet  How is the patient currently taking it? (ex. 1XDay):TAKE ONE (1) TABLET BY MOUTH THREE (3) TIMES DAILY AS NEEDED FOR PAIN  Is this a 30 day or 90 day RX:90  Preferred Pharmacy with phone number:[facility] - [facility] - [address] [place] [address]  Phone: [phone] Fax: [phone]  Would the patient rather a call back or a response via [facility]? Call back   Best Call Back Number: [phone]  Additional Information: pt is also requesting  to get a return call

## 2024-02-22 ENCOUNTER — TELEPHONE (OUTPATIENT)
Dept: FAMILY MEDICINE | Facility: CLINIC | Age: 69
End: 2024-02-22
Payer: MEDICARE

## 2024-02-22 NOTE — TELEPHONE ENCOUNTER
Called pt and she stated she went on Tuesday and got her mammo and dexa done at another hospital and was wondering if we got the results. I explained to the pt that we have not received the results but it can take a while before we get them. To call the hospital she got the scans done and to ask them how long it will take to have the test read and faxed over to us.

## 2024-02-22 NOTE — TELEPHONE ENCOUNTER
----- Message from Evelyne Cutler sent at 2/22/2024 10:25 AM CST -----  .Type:  Test Results    Who Called:  PT  Name of Test (Lab/Mammo/Etc): MAMMO AND DEXA  Date of Test: 2/20/24  Ordering Provider:   Where the test was performed: [facility] -   Would the patient rather a call back or a response via [facility]? CALL  Best Call Back Number:  [phone]  Additional Information:

## 2024-02-26 DIAGNOSIS — F41.9 ANXIETY: ICD-10-CM

## 2024-02-26 RX ORDER — ALPRAZOLAM 1 MG/1
TABLET ORAL
Qty: 45 TABLET | Refills: 5 | OUTPATIENT
Start: 2024-02-26

## 2024-02-26 NOTE — TELEPHONE ENCOUNTER
----- Message from Jesus Medley sent at 2/26/2024  1:10 PM CST -----  Pt Requesting Call Back    Who called: pt   Who called for pt:  Best call back #:[phone]  Add notes: pt said she wants to know if her xanax  med was called in yet

## 2024-02-26 NOTE — TELEPHONE ENCOUNTER
No care due was identified.  [facility] Embedded Care Due Messages. Reference number: [id number].   2/26/2024 9:04:15 AM CST

## 2024-02-26 NOTE — TELEPHONE ENCOUNTER
No care due was identified.  [facility] Embedded Care Due Messages. Reference number: [id number].   2/26/2024 4:12:17 PM CST

## 2024-02-27 DIAGNOSIS — F41.9 ANXIETY: ICD-10-CM

## 2024-02-27 RX ORDER — ALPRAZOLAM 1 MG/1
TABLET ORAL
Qty: 45 TABLET | Refills: 5 | Status: SHIPPED | OUTPATIENT
Start: 2024-02-27

## 2024-02-27 RX ORDER — ALPRAZOLAM 1 MG/1
TABLET ORAL
Qty: 45 TABLET | Refills: 5 | OUTPATIENT
Start: 2024-02-27

## 2024-02-27 NOTE — TELEPHONE ENCOUNTER
No care due was identified.  [facility] Embedded Care Due Messages. Reference number: [id number].   2/27/2024 11:24:09 AM CST

## 2024-02-27 NOTE — TELEPHONE ENCOUNTER
----- Message from Yodit Fox sent at 2/27/2024  9:22 AM CST -----  Type:  Needs Medical Advice    Who Called: pt  Pharmacy name and phone #:  [facility] - [address]  Would the patient rather a call back or a response via [facility]? call  Best Call Back Number:  [phone]  Additional Information: Pt would like to know why the ALPRAZolam (XANAX) 1 MG tablet is being denied by the doctor.

## 2024-03-11 DIAGNOSIS — M54.9 CHRONIC NECK AND BACK PAIN: ICD-10-CM

## 2024-03-11 DIAGNOSIS — G89.29 CHRONIC NECK AND BACK PAIN: ICD-10-CM

## 2024-03-11 DIAGNOSIS — M54.2 CHRONIC NECK AND BACK PAIN: ICD-10-CM

## 2024-03-11 RX ORDER — HYDROCODONE BITARTRATE AND ACETAMINOPHEN 10; 325 MG/1; MG/1
TABLET ORAL
Qty: 90 TABLET | Refills: 0 | Status: SHIPPED | OUTPATIENT
Start: 2024-03-11 | End: 2024-04-08 | Stop reason: SDUPTHER

## 2024-03-11 NOTE — TELEPHONE ENCOUNTER
No care due was identified.  [facility] Embedded Care Due Messages. Reference number: [id number].   3/11/2024 2:45:32 PM CDT

## 2024-03-11 NOTE — TELEPHONE ENCOUNTER
----- Message from Anmol Hancock sent at 3/11/2024  1:59 PM CDT -----  Contact: pt  .Type:  Needs Medical Advice    Who Called: pt    Pharmacy name and phone #:  [facility] - [address]   Phone: [phone]  Fax: [phone]    Would the patient rather a call back or a response via [facility]?  Call back  Best Call Back Number:  [phone]  Additional Information: pt. Is requesting a refill on her HYDROcodone-acetaminophen (NORCO)  mg per tablet

## 2024-03-26 ENCOUNTER — TELEPHONE (OUTPATIENT)
Dept: FAMILY MEDICINE | Facility: CLINIC | Age: 69
End: 2024-03-26
Payer: MEDICARE

## 2024-03-26 NOTE — TELEPHONE ENCOUNTER
----- Message from Greyson Worley sent at 3/26/2024 12:57 PM CDT -----  .Type:  Needs Medical Advice    Who Called: Mima with [facility]    Would the patient rather a call back or a response via [facility]? Call back  Best Call Back Number:  [phone]  Additional Information:     Mima would like to get an updates medication list to know what exactly pt is being treated for and when was her last visit and if pt have any recent labs also     Fax number

## 2024-04-08 ENCOUNTER — TELEPHONE (OUTPATIENT)
Dept: FAMILY MEDICINE | Facility: CLINIC | Age: 69
End: 2024-04-08
Payer: MEDICARE

## 2024-04-08 DIAGNOSIS — M54.9 CHRONIC NECK AND BACK PAIN: ICD-10-CM

## 2024-04-08 DIAGNOSIS — G89.29 CHRONIC NECK AND BACK PAIN: ICD-10-CM

## 2024-04-08 DIAGNOSIS — M54.2 CHRONIC NECK AND BACK PAIN: ICD-10-CM

## 2024-04-08 RX ORDER — HYDROCODONE BITARTRATE AND ACETAMINOPHEN 10; 325 MG/1; MG/1
TABLET ORAL
Qty: 90 TABLET | Refills: 0 | Status: SHIPPED | OUTPATIENT
Start: 2024-04-08 | End: 2024-04-10 | Stop reason: SDUPTHER

## 2024-04-08 NOTE — TELEPHONE ENCOUNTER
----- Message from Mateo Briseno sent at 4/8/2024 11:53 AM CDT -----  Contact: pt  Type: Requesting refill         Who Called: PT  Regarding:  HYDROcodone-acetaminophen (NORCO)  mg per tablet 90 tablet Sig: TAKE ONE (1) TABLET BY MOUTH THREE (3) TIMES DAILY AS NEEDED FOR PAIN      Would the patient rather a call back or a response via [facility]? Call back to verify   Best Call Back Number:  [phone]  [facility] [address]   Phone: [phone]  Fax: [phone]

## 2024-04-09 ENCOUNTER — TELEPHONE (OUTPATIENT)
Dept: FAMILY MEDICINE | Facility: CLINIC | Age: 69
End: 2024-04-09
Payer: MEDICARE

## 2024-04-09 ENCOUNTER — PATIENT MESSAGE (OUTPATIENT)
Dept: FAMILY MEDICINE | Facility: CLINIC | Age: 69
End: 2024-04-09
Payer: MEDICARE

## 2024-04-09 NOTE — TELEPHONE ENCOUNTER
----- Message from Greyson Worley sent at 4/9/2024  4:24 PM CDT -----  .Type:  Needs Medical Advice    Who Called: pt    Would the patient rather a call back or a response via [facility]? Call back   Best Call Back Number: [phone]  Additional Information:     Pt stated her pharmacy doesn't have her medication for HYDROcodone-acetaminophen (NORCO)  mg per tablet and would like a call back

## 2024-04-09 NOTE — TELEPHONE ENCOUNTER
----- Message from Aniyah Dumont sent at 3/27/2017  8:12 AM CDT -----  Contact: self/[phone]  Patient says she is bringing her nephew in for 2:00 PM to see Dr. Dejesus and she wants to know if she can be seen with her nephew today.  Please advise   none

## 2024-04-09 NOTE — TELEPHONE ENCOUNTER
Called pt and told her I have already sent the message to dr. Dejesus and she has already left for today. When she sees the message tomorrow we will call her back with an update

## 2024-04-09 NOTE — TELEPHONE ENCOUNTER
----- Message from China Carlos sent at 4/9/2024 11:31 AM CDT -----  Type:  Needs Medical Advice/medication     Who Called: pt    Would the patient rather a call back or a response via [facility]? Call   Best Call Back Number: [phone]  Additional Information: pt requesting to discuss where she can get her prescription any suggestions everyone is out.

## 2024-04-10 ENCOUNTER — TELEPHONE (OUTPATIENT)
Dept: FAMILY MEDICINE | Facility: CLINIC | Age: 69
End: 2024-04-10
Payer: MEDICARE

## 2024-04-10 ENCOUNTER — PATIENT MESSAGE (OUTPATIENT)
Dept: FAMILY MEDICINE | Facility: CLINIC | Age: 69
End: 2024-04-10
Payer: MEDICARE

## 2024-04-10 DIAGNOSIS — G89.29 CHRONIC NECK AND BACK PAIN: ICD-10-CM

## 2024-04-10 DIAGNOSIS — E55.9 VITAMIN D DEFICIENCY: ICD-10-CM

## 2024-04-10 DIAGNOSIS — M54.9 CHRONIC NECK AND BACK PAIN: ICD-10-CM

## 2024-04-10 DIAGNOSIS — E03.9 ACQUIRED HYPOTHYROIDISM: ICD-10-CM

## 2024-04-10 DIAGNOSIS — G47.00 INSOMNIA, UNSPECIFIED TYPE: ICD-10-CM

## 2024-04-10 DIAGNOSIS — F41.9 ANXIETY AND DEPRESSION: ICD-10-CM

## 2024-04-10 DIAGNOSIS — Z51.81 ENCOUNTER FOR MONITORING LONG-TERM PROTON PUMP INHIBITOR THERAPY: ICD-10-CM

## 2024-04-10 DIAGNOSIS — Z79.899 ENCOUNTER FOR MONITORING LONG-TERM PROTON PUMP INHIBITOR THERAPY: ICD-10-CM

## 2024-04-10 DIAGNOSIS — F32.A ANXIETY AND DEPRESSION: ICD-10-CM

## 2024-04-10 DIAGNOSIS — Z78.0 MENOPAUSE: ICD-10-CM

## 2024-04-10 DIAGNOSIS — M54.2 CHRONIC NECK AND BACK PAIN: ICD-10-CM

## 2024-04-10 DIAGNOSIS — K21.9 GASTROESOPHAGEAL REFLUX DISEASE, UNSPECIFIED WHETHER ESOPHAGITIS PRESENT: ICD-10-CM

## 2024-04-10 DIAGNOSIS — K59.04 CHRONIC IDIOPATHIC CONSTIPATION: ICD-10-CM

## 2024-04-10 DIAGNOSIS — M81.0 POSTMENOPAUSAL OSTEOPOROSIS: ICD-10-CM

## 2024-04-10 RX ORDER — LEVOTHYROXINE SODIUM 75 UG/1
TABLET ORAL
Qty: 90 TABLET | Refills: 4 | Status: SHIPPED | OUTPATIENT
Start: 2024-04-10 | End: 2024-04-10 | Stop reason: SDUPTHER

## 2024-04-10 RX ORDER — PANTOPRAZOLE SODIUM 40 MG/1
40 TABLET, DELAYED RELEASE ORAL DAILY
Qty: 90 TABLET | Refills: 3 | Status: SHIPPED | OUTPATIENT
Start: 2024-04-10 | End: 2024-04-10 | Stop reason: SDUPTHER

## 2024-04-10 RX ORDER — AMITRIPTYLINE HYDROCHLORIDE 25 MG/1
TABLET, FILM COATED ORAL
Qty: 60 TABLET | Refills: 11 | Status: CANCELLED | OUTPATIENT
Start: 2024-04-10

## 2024-04-10 RX ORDER — PANTOPRAZOLE SODIUM 40 MG/1
40 TABLET, DELAYED RELEASE ORAL DAILY
Qty: 90 TABLET | Refills: 0 | Status: SHIPPED | OUTPATIENT
Start: 2024-04-10

## 2024-04-10 RX ORDER — ERGOCALCIFEROL 1.25 MG/1
50000 CAPSULE ORAL
Qty: 15 CAPSULE | Refills: 4 | Status: CANCELLED | OUTPATIENT
Start: 2024-04-10

## 2024-04-10 RX ORDER — HYDROCODONE BITARTRATE AND ACETAMINOPHEN 10; 325 MG/1; MG/1
TABLET ORAL
Qty: 90 TABLET | Refills: 0 | Status: SHIPPED | OUTPATIENT
Start: 2024-04-10 | End: 2024-04-10 | Stop reason: SDUPTHER

## 2024-04-10 RX ORDER — LEVOTHYROXINE SODIUM 75 UG/1
TABLET ORAL
Qty: 90 TABLET | Refills: 0 | Status: SHIPPED | OUTPATIENT
Start: 2024-04-10

## 2024-04-10 RX ORDER — HYDROCODONE BITARTRATE AND ACETAMINOPHEN 10; 325 MG/1; MG/1
TABLET ORAL
Qty: 90 TABLET | Refills: 0 | Status: SHIPPED | OUTPATIENT
Start: 2024-04-10 | End: 2024-05-03 | Stop reason: SDUPTHER

## 2024-04-10 NOTE — TELEPHONE ENCOUNTER
No care due was identified.  [facility] Embedded Care Due Messages. Reference number: [id number].   4/10/2024 1:52:53 PM CDT

## 2024-04-10 NOTE — TELEPHONE ENCOUNTER
Spoke to pt and stated that [facility]'s pharmacy does not have the pain medication. Pt would like for the medication to stay at the [facility].

## 2024-04-10 NOTE — TELEPHONE ENCOUNTER
----- Message from Florencia Buchanan sent at 4/10/2024  9:43 AM CDT -----  Type:  Needs Medical Advice    Who Called: Pt    Would the patient rather a call back or a response via [facility]? Call back  Best Call Back Number: [phone]  Additional Information: pharmacy is out of her [place]. She's wanting a call back for a possible solution for her pain.

## 2024-04-10 NOTE — TELEPHONE ENCOUNTER
----- Message from Mateo Briseno sent at 4/10/2024  1:47 PM CDT -----  Contact: pt  Type: Requesting to speak with nurse        Who Called: PT  Regarding: please send medication to [facility] [address]   Phone: [phone]  Fax: [phone]    Would the patient rather a call back or a response via [facility]? Call back  Best Call Back Number:  [phone]  Additional Information:   HYDROcodone-acetaminophen (NORCO)  mg per tablet

## 2024-04-10 NOTE — TELEPHONE ENCOUNTER
----- Message from Sia Sow sent at 4/10/2024  3:07 PM CDT -----  Contact: [phone] Patient  PHARMACY CHANGE    PLEASE SEND to [facility] NOT [facility]      Requesting an RX refill or new RX.  Is this a refill or new RX: new  RX name and strength (copy/paste from chart):  HYDROcodone-acetaminophen (NORCO)  mg per tablet  Is this a 30 day or 90 day RX: 90  Pharmacy name and phone # (copy/paste from chart):    [facility]  [address] [facility] [address]  Phone: [phone] Fax: [phone]      Requesting an RX refill or new RX.  Is this a refill or new RX: new  RX name and strength (copy/paste from chart):  levothyroxine (SYNTHROID) 75 MCG tablet  Is this a 30 day or 90 day RX: 90  Pharmacy name and phone # (copy/paste from chart):    [facility]  [address] [facility] [address]  Phone: [phone] Fax: [phone]      Requesting an RX refill or new RX.  Is this a refill or new RX: new  RX name and strength (copy/paste from chart):    Is this a 30 day or 90 day RX: 90  Pharmacy name and phone # (copy/paste from chart):  pantoprazole (PROTONIX) 40 MG tablet  [facility]  [address] [facility] [address]  Phone: [phone] Fax: [phone]

## 2024-04-10 NOTE — TELEPHONE ENCOUNTER
Spoke to pt and stated that [facility] has the medication and would like her prescription sent there.

## 2024-04-10 NOTE — TELEPHONE ENCOUNTER
----- Message from Mateo Briseno sent at 4/10/2024  1:47 PM CDT -----  Contact: pt  Type: Requesting to speak with nurse        Who Called: PT  Regarding: please send medication to [facility] - [address]   Phone: [phone]  Fax: [phone]    Would the patient rather a call back or a response via [facility]? Call back  Best Call Back Number:  [phone]  Additional Information:   HYDROcodone-acetaminophen (NORCO)  mg per tablet

## 2024-04-10 NOTE — TELEPHONE ENCOUNTER
I believe the [facility] pharmacy has [place] and have sent the prescription downstairs, however, I am worried about her making the drive there from [place]  where she lives given the severity of the weather.  If she finds a pharmacy that is closer to her home that has a [place] I can redirect the prescription there before Friday.

## 2024-04-10 NOTE — TELEPHONE ENCOUNTER
----- Message from Flor Romero sent at 4/10/2024  2:07 PM CDT -----  Contact: [phone]@patient  Patient is returning a phone call.yes   Who left a message for the patient: Keyanna Kelley MA  Does patient know what this is regarding:    Would you like a call back, or a response through your [facility] portal?:   call back   Comments:

## 2024-04-10 NOTE — TELEPHONE ENCOUNTER
----- Message from Ena Mckenzie sent at 4/10/2024  2:21 PM CDT -----  Contact: [phone]  1MEDICALADVICE     Patient is calling for Medical Advice regarding:speak to the nurse     How long has patient had these symptoms:    Pharmacy name and phone#:         Would like response via [facility]:  no     Comments:  [facility] does not have the medicine at all please give return call she needs to know where to go for the prescription

## 2024-04-10 NOTE — TELEPHONE ENCOUNTER
No care due was identified.  [facility] Embedded Care Due Messages. Reference number: [id number].   4/10/2024 1:53:49 PM CDT

## 2024-04-10 NOTE — TELEPHONE ENCOUNTER
No care due was identified.  [facility] Embedded Care Due Messages. Reference number: [id number].   4/10/2024 3:31:05 PM CDT

## 2024-04-10 NOTE — TELEPHONE ENCOUNTER
No care due was identified.  [facility] Embedded Care Due Messages. Reference number: [id number].   4/10/2024 2:18:19 PM CDT

## 2024-04-16 ENCOUNTER — TELEPHONE (OUTPATIENT)
Dept: FAMILY MEDICINE | Facility: CLINIC | Age: 69
End: 2024-04-16
Payer: MEDICARE

## 2024-04-16 DIAGNOSIS — E11.69 TYPE 2 DIABETES MELLITUS WITH HYPERTRIGLYCERIDEMIA: Primary | ICD-10-CM

## 2024-04-16 DIAGNOSIS — E78.1 TYPE 2 DIABETES MELLITUS WITH HYPERTRIGLYCERIDEMIA: Primary | ICD-10-CM

## 2024-04-16 RX ORDER — LANCETS
EACH MISCELLANEOUS
Qty: 100 EACH | Refills: 3 | Status: SHIPPED | OUTPATIENT
Start: 2024-04-16

## 2024-04-16 RX ORDER — INSULIN PUMP SYRINGE, 3 ML
EACH MISCELLANEOUS
Qty: 1 EACH | Refills: 1 | Status: SHIPPED | OUTPATIENT
Start: 2024-04-16 | End: 2025-04-16

## 2024-04-16 NOTE — TELEPHONE ENCOUNTER
Pt stated that after she eats she has to get to bed and she sleeps, after she wakes up she is shaking so bad she doesn't know what's going on. She has been getting bad headaches and her stomach has been hurting.  Pt stated she has to be by us on the 11th of next month to get her norco. She stated she can come see us on the same day but she doesn't know if she can wait that long or should she try and see if someone can take her.

## 2024-04-16 NOTE — TELEPHONE ENCOUNTER
I would advise that she try to check her blood sugar and blood pressure after she eats when she is having the symptoms of the fatigue and shaking. I am sending orders for blood sugar testing supplies to [facility] and she can  an automatic BP cuff there too. If she is feeling really weak or lightheaded then she should go to the ER. She should also be sure she is drinking plenty of water-- 8 glasses daily.

## 2024-04-16 NOTE — TELEPHONE ENCOUNTER
----- Message from Billie Hanks sent at 4/16/2024 12:53 PM CDT -----  Contact: pt  Type:  Needs Medical Advice    Who Called: pt   Symptoms (please be specific): shaking after eating    How long has patient had these symptoms:  weeks     Would the patient rather a call back or a response via [facility]? Call   Best Call Back Number: [phone]  Additional Information:      Pt stated after she eats when she wakes up she wakes up shaking

## 2024-04-17 NOTE — TELEPHONE ENCOUNTER
Called pt and told her that Dr. Dejesus said:    I would advise that she try to check her blood sugar and blood pressure after she eats when she is having the symptoms of the fatigue and shaking. I am sending orders for blood sugar testing supplies to [facility]'s pharmacy and she can  an automatic BP cuff there too. If she is feeling really weak or lightheaded then she should go to the ER. She should also be sure she is drinking plenty of water-- 8 glasses daily.

## 2024-05-03 DIAGNOSIS — G89.29 CHRONIC NECK AND BACK PAIN: ICD-10-CM

## 2024-05-03 DIAGNOSIS — M54.9 CHRONIC NECK AND BACK PAIN: ICD-10-CM

## 2024-05-03 DIAGNOSIS — M54.2 CHRONIC NECK AND BACK PAIN: ICD-10-CM

## 2024-05-03 NOTE — TELEPHONE ENCOUNTER
Called pt and left message stating that she already has refills of the xanax at the pharmacy she just needs to contact the pharmacy for the refill and I will put the refill request for the norco but it is still a few days early.

## 2024-05-03 NOTE — TELEPHONE ENCOUNTER
No care due was identified.  [facility] Embedded Care Due Messages. Reference number: [id number].   5/03/2024 4:29:41 PM CDT

## 2024-05-03 NOTE — TELEPHONE ENCOUNTER
----- Message from China Carlos sent at 5/3/2024  2:30 PM CDT -----  Type:  Needs Medical Advice    Who Called: pt    Pharmacy name and phone #:  [facility]  [address] [place] [address] [place] [address]  Phone: [phone] Fax: [phone]  Hours: Mon-Fri, 8a-5:30p      Would the patient rather a call back or a response via [facility]? Call   Best Call Back Number: [phone]  Additional Information: pt requesting to have xanax and hydrocodone refilled. Call patient once sent over.

## 2024-05-05 RX ORDER — HYDROCODONE BITARTRATE AND ACETAMINOPHEN 10; 325 MG/1; MG/1
TABLET ORAL
Qty: 90 TABLET | Refills: 0 | Status: SHIPPED | OUTPATIENT
Start: 2024-05-05 | End: 2024-06-03 | Stop reason: SDUPTHER

## 2024-05-08 DIAGNOSIS — E11.9 TYPE 2 DIABETES MELLITUS WITHOUT COMPLICATION: ICD-10-CM

## 2024-05-08 DIAGNOSIS — E11.9 TYPE 2 DIABETES MELLITUS WITHOUT COMPLICATION, UNSPECIFIED WHETHER LONG TERM INSULIN USE: ICD-10-CM

## 2024-06-03 DIAGNOSIS — G89.29 CHRONIC NECK AND BACK PAIN: ICD-10-CM

## 2024-06-03 DIAGNOSIS — M54.2 CHRONIC NECK AND BACK PAIN: ICD-10-CM

## 2024-06-03 DIAGNOSIS — M54.9 CHRONIC NECK AND BACK PAIN: ICD-10-CM

## 2024-06-03 NOTE — TELEPHONE ENCOUNTER
----- Message from Charisse Chiu sent at 6/3/2024  8:10 AM CDT -----  Type:  RX Refill Request    Who Called: pt  Refill or New Rx:refill  RX Name and Strength:HYDROcodone-acetaminophen (NORCO)  mg per tablet  How is the patient currently taking it? (ex. 1XDay): TAKE ONE (1) TABLET BY MOUTH THREE (3) TIMES DAILY AS NEEDED FOR PAIN  Is this a 30 day or 90 day RX:90  [facility] [place] [facility] [address]  Phone: [phone]  Fax: [phone]  Local or Mail Order:local  Ordering Provider:Brandy Dejesus  Would the patient rather a call back or a response via [facility]? call  Best Call Back Number: [phone]  Additional Information:   Please call when medication is filled

## 2024-06-03 NOTE — TELEPHONE ENCOUNTER
----- Message from Massiel Blackwell sent at 6/3/2024  3:12 PM CDT -----  Type:  RX Refill Request    Who Called: pt  Refill or New Rx:refill  RX Name and Strength:HYDROcodone-acetaminophen (NORCO)  mg per tablet  [facility] with phone number[facility] [address]  Local or Mail Order:local  Ordering Provider:natalie  Would the patient rather a call back or a response via [facility]? call  Best Call Back Number:[phone]  Additional Information: states she is really in pain needing medication, second time calling says pharmacy closes at 4

## 2024-06-03 NOTE — TELEPHONE ENCOUNTER
Called pt and she stated that she is in a lot of pain. She has a cracked tooth and is in a lot of pain. Pt stated she will be needing an appointment soon to check out her back. Pt declined scheduling an appointment when I asked if she needed help scheduling

## 2024-06-03 NOTE — TELEPHONE ENCOUNTER
No care due was identified.  [facility] Embedded Care Due Messages. Reference number: [id number].   6/03/2024 8:51:00 AM CDT

## 2024-06-04 ENCOUNTER — TELEPHONE (OUTPATIENT)
Dept: FAMILY MEDICINE | Facility: CLINIC | Age: 69
End: 2024-06-04
Payer: MEDICARE

## 2024-06-04 ENCOUNTER — PATIENT MESSAGE (OUTPATIENT)
Dept: FAMILY MEDICINE | Facility: CLINIC | Age: 69
End: 2024-06-04
Payer: MEDICARE

## 2024-06-04 DIAGNOSIS — M54.9 CHRONIC NECK AND BACK PAIN: ICD-10-CM

## 2024-06-04 DIAGNOSIS — M54.2 CHRONIC NECK AND BACK PAIN: ICD-10-CM

## 2024-06-04 DIAGNOSIS — G89.29 CHRONIC NECK AND BACK PAIN: ICD-10-CM

## 2024-06-04 RX ORDER — HYDROCODONE BITARTRATE AND ACETAMINOPHEN 10; 325 MG/1; MG/1
TABLET ORAL
Qty: 90 TABLET | Refills: 0 | OUTPATIENT
Start: 2024-06-04

## 2024-06-04 RX ORDER — HYDROCODONE BITARTRATE AND ACETAMINOPHEN 10; 325 MG/1; MG/1
TABLET ORAL
Qty: 90 TABLET | Refills: 0 | Status: SHIPPED | OUTPATIENT
Start: 2024-06-04 | End: 2024-06-04 | Stop reason: SDUPTHER

## 2024-06-04 RX ORDER — HYDROCODONE BITARTRATE AND ACETAMINOPHEN 10; 325 MG/1; MG/1
TABLET ORAL
Qty: 90 TABLET | Refills: 0 | Status: SHIPPED | OUTPATIENT
Start: 2024-06-04

## 2024-06-04 NOTE — TELEPHONE ENCOUNTER
----- Message from Kamlesh Sahu sent at 6/4/2024 11:32 AM CDT -----  Type:  RX Refill Request    Who Called: pt   Refill or New Rx:refill   RX Name and Strength:HYDROcodone-acetaminophen (NORCO)  mg per tablet  [facility] [place] [facility] [address]  Local or Mail Order:local  Ordering Provider:natalie  Would the patient rather a call back or a response via [facility]? Call   Best Call Back Number:[phone]  Additional Information:     Caller stated she called regarding this yesterday and the correct pharmacy is [facility] [place] - [address]     Caller stated she's in a lot of pain and she really needs this today

## 2024-06-04 NOTE — TELEPHONE ENCOUNTER
No care due was identified.  [facility] Embedded Care Due Messages. Reference number: [id number].   6/04/2024 10:05:59 AM CDT

## 2024-06-04 NOTE — TELEPHONE ENCOUNTER
Spoke to pt and informed her that Dr. Dejesus has received her request and it is pending approval.

## 2024-06-04 NOTE — TELEPHONE ENCOUNTER
----- Message from Karen Colorado sent at 6/4/2024  9:27 AM CDT -----  Type:  RX Refill Request    Who Called: Pt   Refill or New Rx: Refill   RX Name and Strength: HYDROcodone-acetaminophen (NORCO)  mg per tablet  Preferred Pharmacy with phone number:  [facility] [address]  [address] [facility] [address]  Phone: [phone] Fax: [phone]  Local or Mail Order: Local   Ordering Provider: Oleg   Would the patient rather a call back or a response via [facility]? Call back   Best Call Back Number: [phone]  Additional Information: Please be advised, pt states that she had a refill request sent in yesterday and wanted to let dr know that pharmacy now has prescription in stock, pt states that she is trying to get refill picked up today due to pain and would also like a call back when prescription is put in

## 2024-06-04 NOTE — TELEPHONE ENCOUNTER
----- Message from Kamlesh Sahu sent at 6/4/2024 11:32 AM CDT -----  Type:  RX Refill Request    Who Called: pt   Refill or New Rx:refill   RX Name and Strength:HYDROcodone-acetaminophen (NORCO)  mg per tablet  [facility] [place] [facility] [address]  Local or Mail Order:local  Ordering Provider:natalie  Would the patient rather a call back or a response via [facility]? Call   Best Call Back Number:[phone]  Additional Information:     Caller stated she called regarding this yesterday and the correct pharmacy is [facility] [place] - [address]     Caller stated she's in a lot of pain and she really needs this today

## 2024-06-04 NOTE — TELEPHONE ENCOUNTER
----- Message from Elenita Dajuan sent at 6/4/2024 11:04 AM CDT -----  Type:  RX Refill Request    Who Called:  Pt  Refill or New Rx: Refill   RX Name and Strength:HYDROcodone-acetaminophen (NORCO)  mg per tablet   Preferred Pharmacy with phone number:[facility] - [address]  Local or Mail Order: Local  Ordering Provider:Oleg   Would the patient rather a call back or a response via [facility]? Call   Best Call Back Number:[phone]  Additional Information:  Pt states pharmacy didn't have the rx in stock at first, now they do & would like the rx sent to this pharmacy instead closer to her home. Pt is in severe tooth pain. Pt is requesting a call back

## 2024-07-01 DIAGNOSIS — G89.29 CHRONIC NECK AND BACK PAIN: ICD-10-CM

## 2024-07-01 DIAGNOSIS — M54.9 CHRONIC NECK AND BACK PAIN: ICD-10-CM

## 2024-07-01 DIAGNOSIS — M54.2 CHRONIC NECK AND BACK PAIN: ICD-10-CM

## 2024-07-01 NOTE — TELEPHONE ENCOUNTER
No care due was identified.  [facility] Embedded Care Due Messages. Reference number: [id number].   7/01/2024 2:13:26 PM CDT

## 2024-07-01 NOTE — TELEPHONE ENCOUNTER
----- Message from Charisse Chiu sent at 7/1/2024 12:32 PM CDT -----  Type:  RX Refill Request    Who Called: pt  Refill or New Rx:refill  RX Name and Strength:HYDROcodone-acetaminophen (NORCO)  mg per tablet  How is the patient currently taking it? (ex. 1XDay):TAKE ONE (1) TABLET BY MOUTH THREE (3) TIMES DAILY AS NEEDED FOR PAIN  Is this a 30 day or 90 day RX:90  [facility] [address]  Phone: [phone]  Fax: [phone]  Local or Mail Order:local  Ordering Provider:EMMANUEL HERNÁNDEZ  Would the patient rather a call back or a response via [facility]?   Best Call Back Number:  Additional Information:

## 2024-07-02 DIAGNOSIS — M54.2 CHRONIC NECK AND BACK PAIN: ICD-10-CM

## 2024-07-02 DIAGNOSIS — G89.29 CHRONIC NECK AND BACK PAIN: ICD-10-CM

## 2024-07-02 DIAGNOSIS — M54.9 CHRONIC NECK AND BACK PAIN: ICD-10-CM

## 2024-07-02 RX ORDER — HYDROCODONE BITARTRATE AND ACETAMINOPHEN 10; 325 MG/1; MG/1
TABLET ORAL
Qty: 90 TABLET | Refills: 0 | OUTPATIENT
Start: 2024-07-02

## 2024-07-02 RX ORDER — HYDROCODONE BITARTRATE AND ACETAMINOPHEN 10; 325 MG/1; MG/1
TABLET ORAL
Qty: 90 TABLET | Refills: 0 | Status: SHIPPED | OUTPATIENT
Start: 2024-07-02

## 2024-07-02 NOTE — TELEPHONE ENCOUNTER
No care due was identified.  Health [facility] Embedded Care Due Messages. Reference number: [id number].   7/02/2024 2:47:12 PM CDT

## 2024-07-02 NOTE — TELEPHONE ENCOUNTER
----- Message from Florencia Buchanan sent at 7/2/2024  2:09 PM CDT -----  Type:  Needs Medical Advice    Who Called: Patient  Best Call Back Number: [phone]  Additional Information: Patient is requesting a call back, thank you!    Pt states she has called a few times requesting the medication HYDROcodone-acetaminophen (NORCO)  mg per tablet and would like to know if it is being sent. I did inform her that we ask 24-48 hours for a response.

## 2024-07-15 ENCOUNTER — PATIENT OUTREACH (OUTPATIENT)
Dept: ADMINISTRATIVE | Facility: HOSPITAL | Age: 69
End: 2024-07-15
Payer: MEDICARE

## 2024-07-15 DIAGNOSIS — Z87.891 PERSONAL HISTORY OF TOBACCO USE, PRESENTING HAZARDS TO HEALTH: ICD-10-CM

## 2024-07-15 DIAGNOSIS — Z12.31 ENCOUNTER FOR SCREENING MAMMOGRAM FOR BREAST CANCER: Primary | ICD-10-CM

## 2024-07-15 NOTE — PROGRESS NOTES
Population Health Chart Review & Patient Outreach Details      Additional [facility] Health Notes:    Called patient to assist with setting up mammogram, DEXA and CT lung screen. No answer, LVM.        Updates Requested / Reviewed:      Updated Care Coordination Note, Care Everywhere, Care Team Updated, and Immunizations Reconciliation Completed or Queried: [facility] Topics Overdue:      [facility] Score: 7     Osteoporosis Screening  Urine Screening  Eye Exam  Hemoglobin A1c  Mammogram  Foot Exam  LDCT Lung Screen    Pneumonia Vaccine  Tetanus Vaccine  Shingles/Zoster Vaccine  RSV Vaccine                  Health Maintenance Topic(s) Outreach Outcomes & Actions Taken:    Breast Cancer Screening - Outreach Outcomes & Actions Taken  : Mammogram Order Placed    Osteoporosis Screening - Outreach Outcomes & Actions Taken  : Dexa Order Placed    Low Dose CT Screening - Outreach Outcomes & Actions Taken  : Low Dose CT Order Placed

## 2024-07-17 DIAGNOSIS — E11.9 TYPE 2 DIABETES MELLITUS WITHOUT COMPLICATION: ICD-10-CM

## 2024-07-18 DIAGNOSIS — Z51.81 ENCOUNTER FOR MONITORING LONG-TERM PROTON PUMP INHIBITOR THERAPY: ICD-10-CM

## 2024-07-18 DIAGNOSIS — Z79.899 ENCOUNTER FOR MONITORING LONG-TERM PROTON PUMP INHIBITOR THERAPY: ICD-10-CM

## 2024-07-18 DIAGNOSIS — K21.9 GASTROESOPHAGEAL REFLUX DISEASE, UNSPECIFIED WHETHER ESOPHAGITIS PRESENT: ICD-10-CM

## 2024-07-18 RX ORDER — PANTOPRAZOLE SODIUM 40 MG/1
TABLET, DELAYED RELEASE ORAL
Qty: 90 TABLET | Refills: 0 | OUTPATIENT
Start: 2024-07-18

## 2024-07-18 NOTE — TELEPHONE ENCOUNTER
Refill Decision Note   Thom Krishna  is requesting a refill authorization.  Brief Assessment and Rationale for Refill:  Quick Discontinue     Medication Therapy Plan:    Pharmacy is requesting new scripts for the following medications without required information, (sig/ frequency/qty/etc)      Medication Reconciliation Completed: No     Comments: Pharmacies have been requesting medications for patients without required information, (sig, frequency, qty, etc.). In addition, requests are sent for medication(s) pt. are currently not taking, and medications patients have never taken.    We have spoken to the pharmacies about these request types and advised their teams previously that we are unable to assess these New Script requests and require all details for these requests. This is a known issue and has been reported.     Note composed:11:59 AM 07/18/2024

## 2024-07-29 DIAGNOSIS — M54.9 CHRONIC NECK AND BACK PAIN: ICD-10-CM

## 2024-07-29 DIAGNOSIS — K21.9 GASTROESOPHAGEAL REFLUX DISEASE, UNSPECIFIED WHETHER ESOPHAGITIS PRESENT: ICD-10-CM

## 2024-07-29 DIAGNOSIS — Z79.899 ENCOUNTER FOR MONITORING LONG-TERM PROTON PUMP INHIBITOR THERAPY: ICD-10-CM

## 2024-07-29 DIAGNOSIS — M54.2 CHRONIC NECK AND BACK PAIN: ICD-10-CM

## 2024-07-29 DIAGNOSIS — G89.29 CHRONIC NECK AND BACK PAIN: ICD-10-CM

## 2024-07-29 DIAGNOSIS — Z51.81 ENCOUNTER FOR MONITORING LONG-TERM PROTON PUMP INHIBITOR THERAPY: ICD-10-CM

## 2024-07-29 DIAGNOSIS — E03.9 ACQUIRED HYPOTHYROIDISM: ICD-10-CM

## 2024-07-29 RX ORDER — LEVOTHYROXINE SODIUM 75 UG/1
TABLET ORAL
Qty: 90 TABLET | Refills: 3 | Status: SHIPPED | OUTPATIENT
Start: 2024-07-29

## 2024-07-29 RX ORDER — HYDROCODONE BITARTRATE AND ACETAMINOPHEN 10; 325 MG/1; MG/1
TABLET ORAL
Qty: 90 TABLET | Refills: 0 | Status: SHIPPED | OUTPATIENT
Start: 2024-07-29

## 2024-07-29 RX ORDER — PANTOPRAZOLE SODIUM 40 MG/1
40 TABLET, DELAYED RELEASE ORAL DAILY
Qty: 90 TABLET | Refills: 3 | Status: SHIPPED | OUTPATIENT
Start: 2024-07-29

## 2024-07-29 NOTE — TELEPHONE ENCOUNTER
----- Message from Charisse Chiu sent at 7/29/2024  9:58 AM CDT -----  Type:  RX Refill Request    Who Called: pt  Refill or New Rx:refill  RX Name and Strength:pantoprazole (PROTONIX) 40 MG tablet  How is the patient currently taking it? (ex. 1XDay):Route: TAKE 1 TABLET EVERY DAY - Oral  Is this a 30 day or 90 day RX:90  Preferred Pharmacy with phone number:[address] [address]  Phone: [phone]  Fax: [phone]  Local or Mail Order:local  Ordering Provider:EMMANUEL HERNÁNDEZ   Would the patient rather a call back or a response via [facility]?   Best Call Back Number:  Additional Information:     Refill or New Rx:refill  RX Name and Strength:HYDROcodone-acetaminophen (NORCO)  mg per tablet  How is the patient currently taking it? (ex. 1XDay): TAKE ONE (1) TABLET BY MOUTH THREE (3) TIMES DAILY AS NEEDED FOR PAIN  Is this a 30 day or 90 day RX:90    Refill or New Rx:refill  RX Name and Strength:levothyroxine (SYNTHROID) 75 MCG tablet  How is the patient currently taking it? (ex. 1XDay):TAKE 1 TABLET EVERY DAY ON AN EMPTY STOMACH  Is this a 30 day or 90 day RX:90

## 2024-07-29 NOTE — TELEPHONE ENCOUNTER
No care due was identified.  [facility] Embedded Care Due Messages. Reference number: [id number].   7/29/2024 10:14:51 AM CDT

## 2024-08-22 ENCOUNTER — TELEPHONE (OUTPATIENT)
Dept: FAMILY MEDICINE | Facility: CLINIC | Age: 69
End: 2024-08-22
Payer: MEDICARE

## 2024-08-22 NOTE — TELEPHONE ENCOUNTER
----- Message from Massiel Blackwell sent at 8/22/2024 12:59 PM CDT -----  Type:  Needs Medical Advice    Who Called: pt  Would the patient rather a call back or a response via [facility]? call  Best Call Back Number: [phone]  Additional Information: pt has questions regarding her her lung scan done at outside location would like call from office

## 2024-08-22 NOTE — TELEPHONE ENCOUNTER
Called patient, had to leave message on voice mail for pt to call back      Need to know who ordered the lung scan and where was it done.

## 2024-09-05 DIAGNOSIS — E78.1 TYPE 2 DIABETES MELLITUS WITH HYPERTRIGLYCERIDEMIA: ICD-10-CM

## 2024-09-05 DIAGNOSIS — Z79.899 ENCOUNTER FOR MONITORING LONG-TERM PROTON PUMP INHIBITOR THERAPY: ICD-10-CM

## 2024-09-05 DIAGNOSIS — E11.69 TYPE 2 DIABETES MELLITUS WITH HYPERTRIGLYCERIDEMIA: ICD-10-CM

## 2024-09-05 DIAGNOSIS — K21.9 GASTROESOPHAGEAL REFLUX DISEASE, UNSPECIFIED WHETHER ESOPHAGITIS PRESENT: ICD-10-CM

## 2024-09-05 DIAGNOSIS — E11.49 DIABETES MELLITUS TYPE 2 WITH NEUROLOGICAL MANIFESTATIONS: ICD-10-CM

## 2024-09-05 DIAGNOSIS — Z86.73 HISTORY OF RECENT STROKE: ICD-10-CM

## 2024-09-05 DIAGNOSIS — I70.0 ATHEROSCLEROSIS OF AORTA: ICD-10-CM

## 2024-09-05 DIAGNOSIS — Z51.81 ENCOUNTER FOR MONITORING LONG-TERM PROTON PUMP INHIBITOR THERAPY: ICD-10-CM

## 2024-09-05 RX ORDER — BLOOD-GLUCOSE METER
EACH MISCELLANEOUS
Qty: 1 EACH | Refills: 0 | OUTPATIENT
Start: 2024-09-05

## 2024-09-05 RX ORDER — PANTOPRAZOLE SODIUM 40 MG/1
TABLET, DELAYED RELEASE ORAL
Qty: 90 TABLET | Refills: 0 | OUTPATIENT
Start: 2024-09-05

## 2024-09-05 RX ORDER — ISOPROPYL ALCOHOL 70 ML/100ML
SWAB TOPICAL
Refills: 0 | OUTPATIENT
Start: 2024-09-05

## 2024-09-05 RX ORDER — LANCETS 33 GAUGE
EACH MISCELLANEOUS
Qty: 200 EACH | Refills: 0 | OUTPATIENT
Start: 2024-09-05

## 2024-09-05 RX ORDER — ATORVASTATIN CALCIUM 40 MG/1
TABLET, FILM COATED ORAL
Qty: 90 TABLET | Refills: 0 | OUTPATIENT
Start: 2024-09-05

## 2024-09-05 NOTE — TELEPHONE ENCOUNTER
Refill Decision Note   Thom Krishna  is requesting a refill authorization.  Brief Assessment and Rationale for Refill:  Quick Discontinue     Medication Therapy Plan:    Pharmacy is requesting new scripts for the following medications without required information, (sig/ frequency/qty/etc)      Medication Reconciliation Completed: No     Comments: Pharmacies have been requesting medications for patients without required information, (sig, frequency, qty, etc.). In addition, requests are sent for medication(s) pt. are currently not taking, and medications patients have never taken.    We have spoken to the pharmacies about these request types and advised their teams previously that we are unable to assess these New Script requests and require all details for these requests. This is a known issue and has been reported.     Note composed:3:14 PM 09/05/2024

## 2024-09-05 NOTE — TELEPHONE ENCOUNTER
No care due was identified.  [facility] Embedded Care Due Messages. Reference number: [id number].   9/05/2024 12:32:53 PM CDT

## 2024-09-06 DIAGNOSIS — E03.9 ACQUIRED HYPOTHYROIDISM: ICD-10-CM

## 2024-09-06 RX ORDER — LEVOTHYROXINE SODIUM 75 UG/1
TABLET ORAL
Qty: 90 TABLET | Refills: 1 | Status: SHIPPED | OUTPATIENT
Start: 2024-09-06

## 2024-09-06 NOTE — TELEPHONE ENCOUNTER
Refill Authorization Note     Refill Decision Note   Thom Krishna  is requesting a refill authorization.  Brief Assessment and Rationale for Refill:  Approve     Medication Therapy Plan:       Medication Reconciliation Completed: No   Comments:     No Care Gaps recommended.     Note composed:3:51 PM 09/06/2024

## 2024-09-06 NOTE — TELEPHONE ENCOUNTER
No care due was identified.  Health [facility] Embedded Care Due Messages. Reference number: [id number].   9/06/2024 10:36:32 AM CDT

## 2024-09-10 DIAGNOSIS — F41.9 ANXIETY: ICD-10-CM

## 2024-09-10 NOTE — TELEPHONE ENCOUNTER
No care due was identified.  [facility] Embedded Care Due Messages. Reference number: [id number].   9/10/2024 4:03:54 PM CDT

## 2024-09-13 ENCOUNTER — PATIENT OUTREACH (OUTPATIENT)
Dept: ADMINISTRATIVE | Facility: HOSPITAL | Age: 69
End: 2024-09-13
Payer: MEDICARE

## 2024-09-13 ENCOUNTER — TELEPHONE (OUTPATIENT)
Dept: FAMILY MEDICINE | Facility: CLINIC | Age: 69
End: 2024-09-13
Payer: MEDICARE

## 2024-09-13 DIAGNOSIS — F41.9 ANXIETY: ICD-10-CM

## 2024-09-13 RX ORDER — ALPRAZOLAM 1 MG/1
TABLET ORAL
Qty: 45 TABLET | Refills: 5 | OUTPATIENT
Start: 2024-09-13

## 2024-09-13 RX ORDER — ALPRAZOLAM 1 MG/1
TABLET ORAL
Qty: 45 TABLET | Refills: 5 | Status: SHIPPED | OUTPATIENT
Start: 2024-09-13

## 2024-09-13 NOTE — TELEPHONE ENCOUNTER
No care due was identified.  Health [facility] Embedded Care Due Messages. Reference number: [id number].   9/13/2024 9:30:19 AM CDT

## 2024-09-13 NOTE — TELEPHONE ENCOUNTER
----- Message from Elenita Dajuan sent at 9/13/2024 12:00 PM CDT -----  Type:  RX Refill Request    Who Called: Pt   Refill or New Rx: Refill   RX Name and Strength:ALPRAZolam (XANAX) 1 MG tablet  Preferred Pharmacy with phone number: [id number] [address] [facility] [address]  Local or Mail Order: Local   Ordering Provider: Oleg   Would the patient rather a call back or a response via [facility]? Call   Best Call Back Number: [phone]   Additional Information: pt states [facility] pharmacy denied rx - code AG? (Pt can't remember correct code) Pt is requesting a call back

## 2024-09-13 NOTE — PROGRESS NOTES
Health Maintenance Topic(s) Outreach Outcomes & Actions Taken:    Low Dose CT Screening - Outreach Outcomes & Actions Taken  :      Breast Cancer Screening - Outreach Outcomes & Actions Taken  : Left message    Osteoporosis Screening - Outreach Outcomes & Actions Taken  : Left message

## 2024-09-13 NOTE — TELEPHONE ENCOUNTER
----- Message from Karen Colorado sent at 9/13/2024 11:45 AM CDT -----  Type:  RX Refill Request    Who Called: Pt   Refill or New Rx: Refill   RX Name and Strength:   Preferred Pharmacy with phone number:   [facility] [place] - [address]  [address] [place] [address]  Phone: [phone] Fax: [phone]  Local or Mail Order: Local   Ordering Provider: Oleg   Would the patient rather a call back or a response via [facility]? Call back   Best Call Back Number: [phone]  Additional Information: Please be advised, pt states that she needs refill before her pharmacy closes before noon today, pt states that she already had sent in a refill request, but pharmacy told her that request was denied, pt states to gice her a call once refill has been sent over

## 2024-09-23 DIAGNOSIS — G89.29 CHRONIC NECK AND BACK PAIN: ICD-10-CM

## 2024-09-23 DIAGNOSIS — M54.9 CHRONIC NECK AND BACK PAIN: ICD-10-CM

## 2024-09-23 DIAGNOSIS — M54.2 CHRONIC NECK AND BACK PAIN: ICD-10-CM

## 2024-09-23 NOTE — TELEPHONE ENCOUNTER
----- Message from Sofie Goncalves sent at 9/23/2024  9:43 AM CDT -----  [facility][id number][facility]        Please refill the medication(s) listed below. Please call the patient when the prescription(s) is ready for  at this phone number   Telephone Information:  Mobile          [phone]            Medication #1HYDROcodone-acetaminophen (NORCO)  mg per tablet     Medication #2pantoprazole (PROTONIX) 40 MG tablet        Preferred Pharmacy:    [facility] - [address]  [address] [facility] [address]  [facility] [address]  Phone: [phone] Fax: [phone]

## 2024-09-24 RX ORDER — HYDROCODONE BITARTRATE AND ACETAMINOPHEN 10; 325 MG/1; MG/1
TABLET ORAL
Qty: 90 TABLET | Refills: 0 | Status: SHIPPED | OUTPATIENT
Start: 2024-09-29

## 2024-09-24 NOTE — TELEPHONE ENCOUNTER
No care due was identified.  [facility] Embedded Care Due Messages. Reference number: [id number].   9/24/2024 12:43:46 PM CDT

## 2024-09-24 NOTE — TELEPHONE ENCOUNTER
----- Message from Elenita Graff sent at 9/24/2024 11:42 AM CDT -----  Type:  Patient Returning Call    Who Called: Pt   Who Left Message for Patient:Prateek  Does the patient know what this is regarding?: returning missed call   Would the patient rather a call back or a response via [email or web address]? Call   Best Call Back Number:[phone]   Additional Information:

## 2024-10-08 ENCOUNTER — PATIENT MESSAGE (OUTPATIENT)
Dept: ADMINISTRATIVE | Facility: HOSPITAL | Age: 69
End: 2024-10-08
Payer: MEDICARE

## 2024-10-15 ENCOUNTER — TELEPHONE (OUTPATIENT)
Dept: FAMILY MEDICINE | Facility: CLINIC | Age: 69
End: 2024-10-15
Payer: MEDICARE

## 2024-10-15 NOTE — TELEPHONE ENCOUNTER
Dr Swift in [place] sent her ppr work telling you she has colon cancer.  She has to go to [facility] for another appointment  Then to [place] to see an Oncology    Pt wanted Dr Dejesus to know and pt is very anxious (FYI

## 2024-10-15 NOTE — TELEPHONE ENCOUNTER
----- Message from Massiel sent at 10/15/2024  1:41 PM CDT -----  Type:  Needs Medical Advice    Who Called: pt  Would the patient rather a call back or a response via [facility]? call  Best Call Back Number:  [phone]  Additional Information: pt wanting to speak with office regarding recent diagnosis of colon cancer wanted to speak with office of what's going on

## 2024-10-16 NOTE — TELEPHONE ENCOUNTER
I am truly sorry to hear about her new cancer diagnosis.  I think it would be good for her to have a virtual visit scheduled with me so she can talk to me about some of her anxieties and ways I might be able to support her, and also so we can update her chart with what is going on.      Please see if she can be scheduled, thank you

## 2024-10-18 NOTE — TELEPHONE ENCOUNTER
Sspoke with pt   Wants me to call her niece to set up as her niece goes with her to all the appointments and wants her to be with her for the VV    Niece's number is [phone] to schedule and so that niece can be with her

## 2024-10-18 NOTE — TELEPHONE ENCOUNTER
Denia Laboy    Spoke with her aunt and they will see Dr Dejesus on Friday 10/25 at 130 pm virtual

## 2024-10-24 PROBLEM — Z98.890 HISTORY OF RIGHT-SIDED CAROTID ENDARTERECTOMY: Status: ACTIVE | Noted: 2024-10-24

## 2024-10-24 PROBLEM — Z86.73 HISTORY OF STROKE: Status: ACTIVE | Noted: 2024-10-24

## 2024-10-24 PROBLEM — M46.1 SACROILIITIS: Status: RESOLVED | Noted: 2024-01-31 | Resolved: 2024-10-24
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
## 2024-10-25 ENCOUNTER — OFFICE VISIT (OUTPATIENT)
Dept: FAMILY MEDICINE | Facility: CLINIC | Age: 69
End: 2024-10-25
Payer: MEDICARE

## 2024-10-25 DIAGNOSIS — F11.20 OPIOID DEPENDENCE, DAILY USE: ICD-10-CM

## 2024-10-25 DIAGNOSIS — T40.2X5A OPIOID-INDUCED CONSTIPATION: ICD-10-CM

## 2024-10-25 DIAGNOSIS — K59.04 CHRONIC IDIOPATHIC CONSTIPATION: ICD-10-CM

## 2024-10-25 DIAGNOSIS — Z87.891 PERSONAL HISTORY OF NICOTINE DEPENDENCE: ICD-10-CM

## 2024-10-25 DIAGNOSIS — Z98.890 HISTORY OF RIGHT-SIDED CAROTID ENDARTERECTOMY: ICD-10-CM

## 2024-10-25 DIAGNOSIS — Z79.899 MEDICATION MANAGEMENT: ICD-10-CM

## 2024-10-25 DIAGNOSIS — Z86.19 HISTORY OF HEPATITIS C: ICD-10-CM

## 2024-10-25 DIAGNOSIS — E11.49 DIABETES MELLITUS TYPE 2 WITH NEUROLOGICAL MANIFESTATIONS: ICD-10-CM

## 2024-10-25 DIAGNOSIS — E44.0 PROTEIN-CALORIE MALNUTRITION, MODERATE: ICD-10-CM

## 2024-10-25 DIAGNOSIS — Z86.73 HISTORY OF STROKE: ICD-10-CM

## 2024-10-25 DIAGNOSIS — M54.9 CHRONIC NECK AND BACK PAIN: ICD-10-CM

## 2024-10-25 DIAGNOSIS — F10.11 HISTORY OF ALCOHOL ABUSE: ICD-10-CM

## 2024-10-25 DIAGNOSIS — F32.A ANXIETY AND DEPRESSION: ICD-10-CM

## 2024-10-25 DIAGNOSIS — F41.9 ANXIETY AND DEPRESSION: ICD-10-CM

## 2024-10-25 DIAGNOSIS — C18.9 MALIGNANT NEOPLASM OF COLON, UNSPECIFIED PART OF COLON: Primary | ICD-10-CM

## 2024-10-25 DIAGNOSIS — E03.9 ACQUIRED HYPOTHYROIDISM: ICD-10-CM

## 2024-10-25 DIAGNOSIS — K75.4 AUTOIMMUNE HEPATITIS: ICD-10-CM

## 2024-10-25 DIAGNOSIS — G89.29 CHRONIC NECK AND BACK PAIN: ICD-10-CM

## 2024-10-25 DIAGNOSIS — F13.10: ICD-10-CM

## 2024-10-25 DIAGNOSIS — F33.0 MILD EPISODE OF RECURRENT MAJOR DEPRESSIVE DISORDER: ICD-10-CM

## 2024-10-25 DIAGNOSIS — K21.9 GASTROESOPHAGEAL REFLUX DISEASE, UNSPECIFIED WHETHER ESOPHAGITIS PRESENT: ICD-10-CM

## 2024-10-25 DIAGNOSIS — K59.03 OPIOID-INDUCED CONSTIPATION: ICD-10-CM

## 2024-10-25 DIAGNOSIS — I70.0 ATHEROSCLEROSIS OF AORTA: ICD-10-CM

## 2024-10-25 DIAGNOSIS — M54.2 CHRONIC NECK AND BACK PAIN: ICD-10-CM

## 2024-10-25 DIAGNOSIS — K74.60 HEPATIC CIRRHOSIS, UNSPECIFIED HEPATIC CIRRHOSIS TYPE, UNSPECIFIED WHETHER ASCITES PRESENT: ICD-10-CM

## 2024-10-25 RX ORDER — IBUPROFEN 200 MG
1 TABLET ORAL DAILY
Qty: 30 PATCH | Refills: 11 | Status: SHIPPED | OUTPATIENT
Start: 2024-10-25

## 2024-10-25 RX ORDER — HYDROCODONE BITARTRATE AND ACETAMINOPHEN 10; 325 MG/1; MG/1
TABLET ORAL
Qty: 90 TABLET | Refills: 0 | Status: SHIPPED | OUTPATIENT
Start: 2024-10-25

## 2024-10-25 RX ORDER — ALPRAZOLAM 1 MG/1
TABLET ORAL
Qty: 60 TABLET | Refills: 5 | Status: SHIPPED | OUTPATIENT
Start: 2024-10-25

## 2024-11-04 NOTE — TELEPHONE ENCOUNTER
----- Message from Ann-Marie Caldwell sent at 9/15/2017  1:24 PM CDT -----  Contact: [phone]  She is returning the nurse call.   This document is complete and the patient is ready for discharge.

## 2024-11-27 ENCOUNTER — TELEPHONE (OUTPATIENT)
Dept: FAMILY MEDICINE | Facility: CLINIC | Age: 69
End: 2024-11-27
Payer: MEDICARE

## 2024-11-27 NOTE — TELEPHONE ENCOUNTER
----- Message from Mateo sent at 11/26/2024 10:58 AM CST -----  Contact: pt niece  Type: Requesting to speak with nurse        Who Called: PT's niece Marta ()  Regarding: PT was admitted into [facility] a week ago for wound infection. They had to open it back up to put a wound barrier. Pt is progressing but has been through a lot   Would the patient rather a call back or a response via [facility]? Call back  Best Call Back Number: [phone] or call Marta ()  Additional Information:

## 2024-12-30 ENCOUNTER — PATIENT MESSAGE (OUTPATIENT)
Dept: FAMILY MEDICINE | Facility: CLINIC | Age: 69
End: 2024-12-30
Payer: MEDICARE

## 2024-12-30 DIAGNOSIS — C18.9 MALIGNANT NEOPLASM OF COLON, UNSPECIFIED PART OF COLON: Primary | ICD-10-CM

## 2024-12-30 NOTE — TELEPHONE ENCOUNTER
Pt had colon cancer removed on 11/1 they removed the spot of cancer and she got an infection. She then had to have surgery to get the infection out. She has another infection now and has an infections disease team currently treating her and she is on a regimen of antibiotics. They are trying to get her in with skilled nursing because she needs more help then they can give.   Daughter stated that they are giving her the norco as well as delalutin but they are trying to wean her off of the delalutin  because it makes her delusional.     They need a new recommendation for a colon cancer doctor as they do not want her to go back the doctor who did the surgery bc they have had way to many complications.

## 2025-01-02 NOTE — TELEPHONE ENCOUNTER
Please call family to let them know I am so sorry to hear about these recent complications. I can help arrange follow up with another Colorectal physician-- please ask them where they anticipate Thom living after she is discharged form the hospital in [place]-- this will help me determine who she should follow up with but I have already entered a Colorectal referral for colon cancer follow up.

## 2025-01-03 ENCOUNTER — TELEPHONE (OUTPATIENT)
Dept: INTERNAL MEDICINE | Facility: CLINIC | Age: 70
End: 2025-01-03
Payer: MEDICARE

## 2025-01-03 NOTE — TELEPHONE ENCOUNTER
Got transferred to SNF in [facility] she is currently in transit once pt gets out f the SNF they will be in contact with us to get a referral because they do not want her to go see someone out in [place].
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
## 2025-01-07 ENCOUNTER — PATIENT MESSAGE (OUTPATIENT)
Dept: ADMINISTRATIVE | Facility: HOSPITAL | Age: 70
End: 2025-01-07
Payer: MEDICARE

## 2025-01-07 ENCOUNTER — PATIENT MESSAGE (OUTPATIENT)
Dept: INTERNAL MEDICINE | Facility: CLINIC | Age: 70
End: 2025-01-07
Payer: MEDICARE

## 2025-01-07 NOTE — TELEPHONE ENCOUNTER
Sent patient / her family a detailed message in My Chart.     I consulted with other physicians and confirmed that I am not able to arrange her facility transfer.    It sounds like she is going to a new SNF so I let them know that I hope that goes well, but if not I gave them the option to call 911 and arrange ambulance transfer to the ER.     I also let them know I can help with a colorectal referral once she is discharged. Thanks for your help Star

## 2025-01-14 ENCOUNTER — TELEPHONE (OUTPATIENT)
Dept: INTERNAL MEDICINE | Facility: CLINIC | Age: 70
End: 2025-01-14
Payer: MEDICARE

## 2025-01-14 ENCOUNTER — HOSPITAL ENCOUNTER (INPATIENT)
Facility: HOSPITAL | Age: 70
LOS: 14 days | Discharge: NURSING, REHAB OR LONG-TERM CARE | DRG: 862 | End: 2025-01-28
Attending: EMERGENCY MEDICINE | Admitting: HOSPITALIST
Payer: MEDICARE

## 2025-01-14 ENCOUNTER — PATIENT MESSAGE (OUTPATIENT)
Dept: INTERNAL MEDICINE | Facility: CLINIC | Age: 70
End: 2025-01-14
Payer: MEDICARE

## 2025-01-14 DIAGNOSIS — R41.0 CONFUSION: ICD-10-CM

## 2025-01-14 DIAGNOSIS — R18.8 INTRA-ABDOMINAL FLUID COLLECTION: ICD-10-CM

## 2025-01-14 DIAGNOSIS — R00.0 TACHYCARDIA: ICD-10-CM

## 2025-01-14 DIAGNOSIS — K56.609 SMALL BOWEL OBSTRUCTION: Primary | ICD-10-CM

## 2025-01-14 DIAGNOSIS — R06.02 SHORTNESS OF BREATH: ICD-10-CM

## 2025-01-14 DIAGNOSIS — Z90.49 HISTORY OF COLON RESECTION: ICD-10-CM

## 2025-01-14 DIAGNOSIS — R10.13 EPIGASTRIC PAIN: ICD-10-CM

## 2025-01-14 DIAGNOSIS — F41.9 ANXIETY AND DEPRESSION: ICD-10-CM

## 2025-01-14 DIAGNOSIS — F32.A ANXIETY AND DEPRESSION: ICD-10-CM

## 2025-01-14 DIAGNOSIS — I82.412 ACUTE DEEP VEIN THROMBOSIS (DVT) OF FEMORAL VEIN OF LEFT LOWER EXTREMITY: ICD-10-CM

## 2025-01-14 DIAGNOSIS — R62.7 FAILURE TO THRIVE IN ADULT: ICD-10-CM

## 2025-01-14 DIAGNOSIS — E88.09 HYPOALBUMINEMIA: ICD-10-CM

## 2025-01-14 DIAGNOSIS — N39.0 URINARY TRACT INFECTION WITHOUT HEMATURIA, SITE UNSPECIFIED: ICD-10-CM

## 2025-01-14 PROBLEM — F17.200 TOBACCO DEPENDENCY: Status: ACTIVE | Noted: 2025-01-14

## 2025-01-14 LAB
ALBUMIN SERPL BCP-MCNC: 2.6 G/DL (ref 3.5–5.2)
ALP SERPL-CCNC: 76 U/L (ref 40–150)
ALT SERPL W/O P-5'-P-CCNC: 32 U/L (ref 10–44)
ANION GAP SERPL CALC-SCNC: 13 MMOL/L (ref 8–16)
APTT PPP: 31.8 SEC (ref 21–32)
AST SERPL-CCNC: 22 U/L (ref 10–40)
BACTERIA #/AREA URNS HPF: ABNORMAL /HPF
BASOPHILS # BLD AUTO: 0.02 K/UL (ref 0–0.2)
BASOPHILS # BLD AUTO: 0.03 K/UL (ref 0–0.2)
BASOPHILS NFR BLD: 0.2 % (ref 0–1.9)
BASOPHILS NFR BLD: 0.3 % (ref 0–1.9)
BILIRUB SERPL-MCNC: 0.5 MG/DL (ref 0.1–1)
BILIRUB UR QL STRIP: NEGATIVE
BNP SERPL-MCNC: 157 PG/ML (ref 0–99)
BUN SERPL-MCNC: 33 MG/DL (ref 8–23)
CALCIUM SERPL-MCNC: 8.9 MG/DL (ref 8.7–10.5)
CHLORIDE SERPL-SCNC: 103 MMOL/L (ref 95–110)
CLARITY UR: ABNORMAL
CO2 SERPL-SCNC: 21 MMOL/L (ref 23–29)
COLOR UR: YELLOW
CREAT SERPL-MCNC: 0.8 MG/DL (ref 0.5–1.4)
DIFFERENTIAL METHOD BLD: ABNORMAL
DIFFERENTIAL METHOD BLD: ABNORMAL
EOSINOPHIL # BLD AUTO: 0 K/UL (ref 0–0.5)
EOSINOPHIL # BLD AUTO: 0 K/UL (ref 0–0.5)
EOSINOPHIL NFR BLD: 0.2 % (ref 0–8)
EOSINOPHIL NFR BLD: 0.3 % (ref 0–8)
ERYTHROCYTE [DISTWIDTH] IN BLOOD BY AUTOMATED COUNT: 18 % (ref 11.5–14.5)
ERYTHROCYTE [DISTWIDTH] IN BLOOD BY AUTOMATED COUNT: 18.1 % (ref 11.5–14.5)
EST. GFR  (NO RACE VARIABLE): >60 ML/MIN/1.73 M^2
GLUCOSE SERPL-MCNC: 89 MG/DL (ref 70–110)
GLUCOSE UR QL STRIP: NEGATIVE
HCT VFR BLD AUTO: 32.3 % (ref 37–48.5)
HCT VFR BLD AUTO: 36.3 % (ref 37–48.5)
HGB BLD-MCNC: 10.7 G/DL (ref 12–16)
HGB BLD-MCNC: 11.8 G/DL (ref 12–16)
HGB UR QL STRIP: ABNORMAL
HYALINE CASTS #/AREA URNS LPF: 0 /LPF
IMM GRANULOCYTES # BLD AUTO: 0.08 K/UL (ref 0–0.04)
IMM GRANULOCYTES # BLD AUTO: 0.09 K/UL (ref 0–0.04)
IMM GRANULOCYTES NFR BLD AUTO: 0.9 % (ref 0–0.5)
IMM GRANULOCYTES NFR BLD AUTO: 0.9 % (ref 0–0.5)
INR PPP: 1.2 (ref 0.8–1.2)
KETONES UR QL STRIP: NEGATIVE
LACTATE SERPL-SCNC: 1.6 MMOL/L (ref 0.5–2.2)
LEUKOCYTE ESTERASE UR QL STRIP: ABNORMAL
LYMPHOCYTES # BLD AUTO: 1.8 K/UL (ref 1–4.8)
LYMPHOCYTES # BLD AUTO: 2.1 K/UL (ref 1–4.8)
LYMPHOCYTES NFR BLD: 21.3 % (ref 18–48)
LYMPHOCYTES NFR BLD: 21.5 % (ref 18–48)
MAGNESIUM SERPL-MCNC: 2.1 MG/DL (ref 1.6–2.6)
MCH RBC QN AUTO: 32.3 PG (ref 27–31)
MCH RBC QN AUTO: 32.6 PG (ref 27–31)
MCHC RBC AUTO-ENTMCNC: 32.5 G/DL (ref 32–36)
MCHC RBC AUTO-ENTMCNC: 33.1 G/DL (ref 32–36)
MCV RBC AUTO: 100 FL (ref 82–98)
MCV RBC AUTO: 99 FL (ref 82–98)
MICROSCOPIC COMMENT: ABNORMAL
MONOCYTES # BLD AUTO: 0.8 K/UL (ref 0.3–1)
MONOCYTES # BLD AUTO: 0.8 K/UL (ref 0.3–1)
MONOCYTES NFR BLD: 7.6 % (ref 4–15)
MONOCYTES NFR BLD: 8.8 % (ref 4–15)
NEUTROPHILS # BLD AUTO: 5.8 K/UL (ref 1.8–7.7)
NEUTROPHILS # BLD AUTO: 7 K/UL (ref 1.8–7.7)
NEUTROPHILS NFR BLD: 68.4 % (ref 38–73)
NEUTROPHILS NFR BLD: 69.6 % (ref 38–73)
NITRITE UR QL STRIP: NEGATIVE
NON-SQ EPI CELLS #/AREA URNS HPF: 3 /HPF
NRBC BLD-RTO: 0 /100 WBC
NRBC BLD-RTO: 0 /100 WBC
PH UR STRIP: 7 [PH] (ref 5–8)
PLATELET # BLD AUTO: 316 K/UL (ref 150–450)
PLATELET # BLD AUTO: 367 K/UL (ref 150–450)
PMV BLD AUTO: 10.2 FL (ref 9.2–12.9)
PMV BLD AUTO: 10.3 FL (ref 9.2–12.9)
POTASSIUM SERPL-SCNC: 4.6 MMOL/L (ref 3.5–5.1)
PROT SERPL-MCNC: 7 G/DL (ref 6–8.4)
PROT UR QL STRIP: ABNORMAL
PROTHROMBIN TIME: 13.6 SEC (ref 9–12.5)
RBC # BLD AUTO: 3.28 M/UL (ref 4–5.4)
RBC # BLD AUTO: 3.65 M/UL (ref 4–5.4)
RBC #/AREA URNS HPF: >100 /HPF (ref 0–4)
SODIUM SERPL-SCNC: 137 MMOL/L (ref 136–145)
SP GR UR STRIP: >1.03 (ref 1–1.03)
URN SPEC COLLECT METH UR: ABNORMAL
UROBILINOGEN UR STRIP-ACNC: NEGATIVE EU/DL
WBC # BLD AUTO: 8.55 K/UL (ref 3.9–12.7)
WBC # BLD AUTO: 9.99 K/UL (ref 3.9–12.7)
WBC #/AREA URNS HPF: >100 /HPF (ref 0–5)
WBC CLUMPS URNS QL MICRO: ABNORMAL

## 2025-01-14 PROCEDURE — 81000 URINALYSIS NONAUTO W/SCOPE: CPT | Performed by: NURSE PRACTITIONER

## 2025-01-14 PROCEDURE — 96366 THER/PROPH/DIAG IV INF ADDON: CPT

## 2025-01-14 PROCEDURE — 25500020 PHARM REV CODE 255: Performed by: EMERGENCY MEDICINE

## 2025-01-14 PROCEDURE — 85730 THROMBOPLASTIN TIME PARTIAL: CPT | Performed by: EMERGENCY MEDICINE

## 2025-01-14 PROCEDURE — 83735 ASSAY OF MAGNESIUM: CPT | Performed by: EMERGENCY MEDICINE

## 2025-01-14 PROCEDURE — 87040 BLOOD CULTURE FOR BACTERIA: CPT | Mod: 59 | Performed by: EMERGENCY MEDICINE

## 2025-01-14 PROCEDURE — 83880 ASSAY OF NATRIURETIC PEPTIDE: CPT | Performed by: EMERGENCY MEDICINE

## 2025-01-14 PROCEDURE — 87077 CULTURE AEROBIC IDENTIFY: CPT | Performed by: NURSE PRACTITIONER

## 2025-01-14 PROCEDURE — 80053 COMPREHEN METABOLIC PANEL: CPT | Performed by: NURSE PRACTITIONER

## 2025-01-14 PROCEDURE — 85025 COMPLETE CBC W/AUTO DIFF WBC: CPT | Performed by: NURSE PRACTITIONER

## 2025-01-14 PROCEDURE — 83605 ASSAY OF LACTIC ACID: CPT | Performed by: EMERGENCY MEDICINE

## 2025-01-14 PROCEDURE — 99285 EMERGENCY DEPT VISIT HI MDM: CPT | Mod: 25

## 2025-01-14 PROCEDURE — 25500020 PHARM REV CODE 255

## 2025-01-14 PROCEDURE — 93005 ELECTROCARDIOGRAM TRACING: CPT

## 2025-01-14 PROCEDURE — 87086 URINE CULTURE/COLONY COUNT: CPT | Performed by: NURSE PRACTITIONER

## 2025-01-14 PROCEDURE — 87186 SC STD MICRODIL/AGAR DIL: CPT | Performed by: NURSE PRACTITIONER

## 2025-01-14 PROCEDURE — 96365 THER/PROPH/DIAG IV INF INIT: CPT

## 2025-01-14 PROCEDURE — 93010 ELECTROCARDIOGRAM REPORT: CPT | Mod: ,,, | Performed by: INTERNAL MEDICINE

## 2025-01-14 PROCEDURE — C1751 CATH, INF, PER/CENT/MIDLINE: HCPCS

## 2025-01-14 PROCEDURE — 63600175 PHARM REV CODE 636 W HCPCS: Performed by: EMERGENCY MEDICINE

## 2025-01-14 PROCEDURE — 85610 PROTHROMBIN TIME: CPT | Performed by: EMERGENCY MEDICINE

## 2025-01-14 PROCEDURE — 85025 COMPLETE CBC W/AUTO DIFF WBC: CPT | Mod: 91 | Performed by: EMERGENCY MEDICINE

## 2025-01-14 PROCEDURE — 12000002 HC ACUTE/MED SURGE SEMI-PRIVATE ROOM

## 2025-01-14 PROCEDURE — 87088 URINE BACTERIA CULTURE: CPT | Performed by: NURSE PRACTITIONER

## 2025-01-14 RX ORDER — ATORVASTATIN CALCIUM 40 MG/1
40 TABLET, FILM COATED ORAL DAILY
Status: DISCONTINUED | OUTPATIENT
Start: 2025-01-15 | End: 2025-01-28 | Stop reason: HOSPADM

## 2025-01-14 RX ORDER — DIATRIZOATE MEGLUMINE AND DIATRIZOATE SODIUM 660; 100 MG/ML; MG/ML
100 SOLUTION ORAL; RECTAL
Status: COMPLETED | OUTPATIENT
Start: 2025-01-14 | End: 2025-01-14

## 2025-01-14 RX ORDER — HEPARIN SODIUM,PORCINE/D5W 25000/250
0-40 INTRAVENOUS SOLUTION INTRAVENOUS CONTINUOUS
Status: DISCONTINUED | OUTPATIENT
Start: 2025-01-14 | End: 2025-01-27

## 2025-01-14 RX ORDER — MUPIROCIN 20 MG/G
OINTMENT TOPICAL 2 TIMES DAILY
Status: DISPENSED | OUTPATIENT
Start: 2025-01-15 | End: 2025-01-19

## 2025-01-14 RX ADMIN — DIATRIZOATE MEGLUMINE AND DIATRIZOATE SODIUM 100 ML: 660; 100 LIQUID ORAL; RECTAL at 08:01

## 2025-01-14 RX ADMIN — IOHEXOL 75 ML: 350 INJECTION, SOLUTION INTRAVENOUS at 03:01

## 2025-01-14 RX ADMIN — HEPARIN SODIUM 18 UNITS/KG/HR: 10000 INJECTION, SOLUTION INTRAVENOUS at 07:01

## 2025-01-14 NOTE — ED PROVIDER NOTES
Encounter Date: 1/14/2025       History     Chief Complaint   Patient presents with    Abdominal Pain     Abdominal pain since November- family states had a procedure done for colon cx treatment. Family states steady decline in patient's health, increased confusion. Recently finished abx this past Sunday. Was at skilled facility, removed today due to care. Denies nausea, emesis. Lower left extremity swelling noted in triage, family states that they were told Thursday night about it. Picc line noted to left upper arm.     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  Per caregiver at bedside, patient was diagnosed with colon cancer in November 2024.  She reportedly underwent initially successful resection but subsequently developed a intra-abdominal infection requiring repeat operative intervention.  Patient subsequently developed a subsequent surgical infection at which point she was started on IV antibiotics via PICC line placement to the left upper extremity.  The patient states that she finished her 14 day course of antibiotics yesterday and was discharged from the skilled nursing home facility ([facility]).  She presents with continued abdominal pain nausea without vomiting increased confusion per caregiver.  The caregiver reports approximately 2 weeks of loose stools daily.  She denies any history of C diff colitis.  Patient also reports swelling to the left lower extremity.       Review of patient's allergies indicates:   Allergen Reactions    Cefaclor Hives    Gabapentin Swelling    Penicillins      Other reaction(s): Hives    Sulfa (sulfonamide antibiotics) Other (See Comments)     Other reaction(s): Hives     Past Medical History:   Diagnosis Date    Anxiety and  depression 2015    Arthritis     Cervical radiculopathy 2016    Cirrhosis of liver     Colon polyps 2015    Diabetes mellitus type 2 with neurological manifestations 2015    no current medications, only one episode of elevated sugars with acute illness, will monitor     Encounter for blood transfusion     Hepatitis C     s/p treatment per patient report; managed by Dr. Perez    Hip fracture     Macrocytosis 2015    Thyroid disease     Transfusion reaction     hep c     Past Surgical History:   Procedure Laterality Date    APPENDECTOMY      BACK SURGERY      CAROTID ENDARTERECTOMY Right 2023    Procedure: ENDARTERECTOMY, CAROTID;  Surgeon: Juan James MD;  Location: [facility] OR;  Service: Cardiology;  Laterality: Right;  RIGHT    CAUDAL EPIDURAL STEROID INJECTION N/A 2018    Procedure: Injection-steroid-epidural-caudal;  Surgeon: Roxana Gu Jr., MD;  Location: [facility] OR;  Service: Pain Management;  Laterality: N/A;  with cath target L4-5    CAUDAL EPIDURAL STEROID INJECTION N/A 2019    Procedure: Injection-steroid-epidural-caudal;  Surgeon: Roxana Gu Jr., MD;  Location: [facility] PAIN MGT;  Service: Pain Management;  Laterality: N/A;     SECTION      CHOLECYSTECTOMY      COLONOSCOPY  3/31/10    2 polyps, tubular adenoma    COLONOSCOPY  2015    Dr. Washington    COLONOSCOPY N/A 10/10/2018    Procedure: COLONOSCOPY;  Surgeon: Reuben Miller Jr., MD;  Location: [facility];  Service: Endoscopy;  Laterality: N/A;    ESOPHAGOGASTRODUODENOSCOPY N/A 10/10/2018    Procedure: EGD (ESOPHAGOGASTRODUODENOSCOPY);  Surgeon: Reuben Miller Jr., MD;  Location: [facility];  Service: Endoscopy;  Laterality: N/A;    ESOPHAGOGASTRODUODENOSCOPY N/A 12/10/2018    Procedure: EGD (ESOPHAGOGASTRODUODENOSCOPY)/poss emr;  Surgeon: Belle Kuo MD;  Location: [facility] ([address] [place] [address]);  Service: Endoscopy;  Laterality: N/A;    ESOPHAGOGASTRODUODENOSCOPY N/A 3/12/2019    Procedure: EGD  "(ESOPHAGOGASTRODUODENOSCOPY);  Surgeon: Polo Keyes MD;  Location: [facility] ENDO;  Service: Endoscopy;  Laterality: N/A;    ESOPHAGOGASTRODUODENOSCOPY N/A 11/20/2020    Procedure: ESOPHAGOGASTRODUODENOSCOPY (EGD);  Surgeon: Belle Kuo MD;  Location: [facility] ENDO;  Service: Endoscopy;  Laterality: N/A;    HERNIA REPAIR      HYSTERECTOMY  1989    Uterus and both ovaries    INCISIONAL HERNIA REPAIR      x 2    JOINT REPLACEMENT      LIVER BIOPSY  12/2003    autoimmune hepatitis    ROTATOR CUFF REPAIR      right    STOMACH SURGERY  1980's    "took lymph node off of liver"    TOTAL HIP ARTHROPLASTY      left hip    UPPER GASTROINTESTINAL ENDOSCOPY  3/24/10    normal    UPPER GASTROINTESTINAL ENDOSCOPY  04/27/2015    Dr. Washington     Family History   Problem Relation Name Age of Onset    Diabetes Mellitus Mother      Diabetes Mother      Liver cancer Sister      Breast cancer Sister      Cataracts Sister      Pancreatic cancer Brother      Diabetes Brother      Lung cancer Brother      Colon cancer Brother      Brain cancer Brother      Stomach cancer Other nephew     Diabetes Other nephew     Throat cancer Brother      Cataracts Sister      Diabetes Son      Liver disease Neg Hx       Social History     Tobacco Use    Smoking status: Every Day     Current packs/day: 2.00     Average packs/day: 2.0 packs/day for 45.0 years (90.0 ttl pk-yrs)     Types: Cigarettes    Smokeless tobacco: Never   Substance Use Topics    Alcohol use: No     Comment: used to drink heavily, stopped in 1990's    Drug use: No     Review of Systems    Physical Exam     Initial Vitals   BP Pulse Resp Temp SpO2   01/14/25 1234 01/14/25 1234 01/14/25 1234 01/14/25 1236 01/14/25 1234   102/65 106 18 97.8 °F (36.6 °C) 98 %      MAP       --                Physical Exam    Constitutional: She appears well-developed and well-nourished.   HENT:   Head: Normocephalic and atraumatic.   Right Ear: External ear normal.   Left Ear: External ear normal.   Dry " oral mucosa   Eyes: EOM are normal. Pupils are equal, round, and reactive to light.   Neck: Neck supple.   Normal range of motion.  Cardiovascular:  Normal rate, regular rhythm, normal heart sounds and intact distal pulses.           Abdominal: She exhibits distension. There is abdominal tenderness.   There are 2 well healing incisions to the mid abdomen; the dressing is clean dry and intact and appears to be no.  The abdomen is obese.  There is mild tenderness to diffuse palpation; no rebound no guarding; there is an area of ecchymosis to left lower quadrant presumptively secondary to Lovenox injection   Musculoskeletal:         General: Edema present.      Cervical back: Normal range of motion and neck supple.      Comments: There is 1+ pitting edema to the left lower extremity from the thigh distally; there is no overlying erythema or tenderness    PICC line to proximal LUE      Neurological: She is alert. She has normal strength. GCS score is 15. GCS eye subscore is 4. GCS verbal subscore is 5. GCS motor subscore is 6.   Pt is alert oriented to person and place only.    Skin: Skin is warm.         ED Course   Critical Care    Date/Time: 1/14/2025 7:38 PM    Performed by: Nelson Cruz MD  Authorized by: Nelson Cruz MD  Direct patient critical care time: 10 minutes  Additional history critical care time: 5 minutes  Ordering / reviewing critical care time: 10 minutes  Documentation critical care time: 5 minutes  Consulting other physicians critical care time: 15 minutes  Consult with family critical care time: 5 minutes  Other critical care time: 5 minutes  Total critical care time (exclusive of procedural time) : 55 minutes        Labs Reviewed   CBC W/ AUTO DIFFERENTIAL - Abnormal       Result Value    WBC 9.99      RBC 3.65 (*)     Hemoglobin 11.8 (*)     Hematocrit 36.3 (*)      (*)     MCH 32.3 (*)     MCHC 32.5      RDW 18.1 (*)     Platelets 367      MPV 10.3      Immature  Granulocytes 0.9 (*)     Gran # (ANC) 7.0      Immature Grans (Abs) 0.09 (*)     Lymph # 2.1      Mono # 0.8      Eos # 0.0      Baso # 0.03      nRBC 0      Gran % 69.6      Lymph % 21.3      Mono % 7.6      Eosinophil % 0.3      Basophil % 0.3      Differential Method Automated     COMPREHENSIVE METABOLIC PANEL - Abnormal    Sodium 137      Potassium 4.6      Chloride 103      CO2 21 (*)     Glucose 89      BUN 33 (*)     Creatinine 0.8      Calcium 8.9      Total Protein 7.0      Albumin 2.6 (*)     Total Bilirubin 0.5      Alkaline Phosphatase 76      AST 22      ALT 32      eGFR >60      Anion Gap 13     URINALYSIS, REFLEX TO URINE CULTURE - Abnormal    Specimen UA Urine, Catheterized      Color, UA Yellow      Appearance, UA Cloudy (*)     pH, UA 7.0      Specific Gravity, UA >1.030 (*)     Protein, UA 2+ (*)     Glucose, UA Negative      Ketones, UA Negative      Bilirubin (UA) Negative      Occult Blood UA 1+ (*)     Nitrite, UA Negative      Urobilinogen, UA Negative      Leukocytes, UA 3+ (*)     Narrative:     Specimen Source->Urine   B-TYPE NATRIURETIC PEPTIDE - Abnormal     (*)    URINALYSIS MICROSCOPIC - Abnormal    RBC, UA >100 (*)     WBC, UA >100 (*)     WBC Clumps, UA Many (*)     Bacteria Many (*)     Non-Squam Epith 3 (*)     Hyaline Casts, UA 0      Microscopic Comment SEE COMMENT      Narrative:     Specimen Source->Urine   CULTURE, URINE   CULTURE, BLOOD   CULTURE, BLOOD   MAGNESIUM    Magnesium 2.1     LACTIC ACID, PLASMA    Lactate (Lactic Acid) 1.6     APTT   PROTIME-INR   CBC W/ AUTO DIFFERENTIAL          Imaging Results               CT Abdomen Pelvis With IV Contrast NO Oral Contrast (Final result)  Result time 01/14/25 17:45:58      Final result by Gabriele Epps MD (01/14/25 17:45:58)                   Impression:      1. Operative change of partial colon resection and ileal colic anastomosis with suspected developing small bowel obstruction and transition point seen at the  ileocolic anastomosis.  Surgical consultation advised.  2. Moderate to large colonic and rectal stool burden suggesting constipation, noting rectal impaction not exclude.  3. Distension of the distal esophagus containing air and fluid related to reflux, which increases aspiration risk.  4. Rim enhancing gas and fluid collection at the lateral right abdominal wall concerning for abscess.  This is likely larger when compared to outside facility CT abdomen and pelvis report dated 12/19/2024.  No other drainable collection seen.  No definite fistulous communication with the peritoneal cavity/bowel.  5. Cholecystectomy.  Grossly stable moderate intrahepatic and extrahepatic biliary ductal dilatation.  6. Trace layering right pleural effusion with associated overlying mild passive atelectasis.  7. Small volume likely reactive free fluid tracking along the right pericolic gutter to the mesenteric root.  8. Other incidental/nonemergent findings in the body of the report.  This report was flagged in Epic as abnormal.      Electronically signed by: Gabriele Epps MD  Date:    01/14/2025  Time:    17:45               Narrative:    EXAMINATION:  CT ABDOMEN PELVIS WITH IV CONTRAST    CLINICAL HISTORY:  Abdominal abscess/infection suspected;    TECHNIQUE:  Low dose axial images, sagittal and coronal reformations were obtained from the lung bases to the pubic symphysis following the IV administration of 75 mL of Omnipaque 350 .  Oral contrast was not given.    COMPARISON:  Chest radiograph same day, thoracic and lumbar spine series 12/20/2021, abdominal ultrasound 07/30/2021, chest CT 12/17/2020, CT abdomen and pelvis 02/11/2020; report only from outside facility CT abdomen and pelvis 12/19/2024    FINDINGS:  New trace layering right pleural effusion with associated overlying mild passive atelectasis of the right lower lobe.  Redemonstrated partially imaged 5 mm nodule within the right middle lobe and grossly stable 5-6 mm solid  nodule within the right lower lobe.  Minimal dependent atelectasis at the left lung base.  No definite new nodule or consolidative process.  No sizable pleural effusion on the left.    Base of the heart is normal in size noting small volume nonspecific pericardial fluid slightly increased from prior.    Small hiatal hernia.  There is moderate distension of the imaged distal esophagus mostly filled with liquid contents and minimal non-dependent gas.  Hyperdense material seen within the dependent aspect of the distal esophagus.  No significant wall thickening or adjacent inflammatory change.    Stomach and duodenum are within normal limits.    Remote cholecystectomy.  Similar moderate intrahepatic and extrahepatic biliary ductal dilatation.    Portal vasculature is patent.  Liver and spleen are both normal in size noting few scattered small parenchymal calcified granulomas similar to prior.  Pancreas is somewhat atrophic without discrete mass or adjacent inflammatory change.  Bilateral adrenal glands are within normal limits.    Bilateral kidneys are normal in overall size, shape and location with symmetric normal enhancement.  No hydronephrosis.  There is mild nonspecific perinephric stranding.  Few scattered tiny hypoattenuating parenchymal foci at each kidney which are too small to characterize.  Ureters are normal in course and caliber.  Urinary bladder is well distended without wall thickening.  Uterus not identified and may be surgically absent or atrophic.  Limited evaluation of the pelvic structures secondary to prominent beam hardening with streak artifact from bilateral hip hardware.  Pelvic phleboliths noted.  No adnexal mass seen.    Postoperative changes of prior partial colon resection and ileocolic anastomosis is identified at the hepatic flexure.  The distal ileum demonstrates mild circumferential wall thickening with mild fat stranding seen within the right the abdomen about the ileocolic anastomosis.   Few distended and borderline dilated small bowel loops with air-fluid levels within the right mid abdomen with transition point seen at the ileo colic anastomosis concerning for developing small bowel obstruction.  More proximal small bowel loops within the mid to upper abdomen in the midline and left aspect are not significantly dilated at this time.  Large amount of stool noted throughout the and rectum.  The rectum is moderately distended without definite wall thickening or adjacent inflammatory change.  No bowel pneumatosis or portal venous gas.    There is trace volume likely reactive free fluid tracking along the right pericolic gutter to the mesenteric root.  No free air.  No lymphadenopathy by CT criteria.    Scattered moderate to advanced calcific atherosclerosis of the abdominal aorta extending into its visceral and iliac branches.  No aortic aneurysm or dissection.    Postoperative changes of prior midline laparotomy.  There is mild rectus diastasis of the infraumbilical ventral abdominal wall similar to previous study in 2020.  Within the lateral right abdominal wall there is a rim enhancing gas and fluid collection within the deep subcutaneous soft tissues measuring approximately 9.4 x 2.1 x 4.8 cm in maximum SAT dimensions, concerning for abscess.  Of note, outside facility CT abdomen and pelvis study 12/19/2024 reports a 7.8 x 3.5 x 1 cm rim enhancing gas and fluid collection along the right lateral abdominal wall overlying site of previous reported surgery.  Mild adjacent inflammatory change.  Few scattered soft tissue density nodules with mild adjacent inflammatory change at the left more than right ventral abdominal wall likely injection sites.  Bilateral gluteal subcutaneous calcifications grossly similar to previous study in 2020.    Redemonstrated remote operative change including discectomy and fusion at L4-5 with posterior spinal fixation at L4 through L5 levels and chronic appearing mild  fracture deformity with evidence of kyphoplasty at L3 and moderate anterior wedge deformity with prior kyphoplasty at T11.  Bilateral hip total arthroplasties.  No convincing evidence of acute hardware malalignment or loosening.  Generalized osteopenia.  Age-related degenerative changes.  No acute or destructive osseous process seen.                                        US Lower Extremity Veins Bilateral (Final result)  Result time 01/14/25 15:48:23      Final result by Gabriele Epps MD (01/14/25 15:48:23)                   Impression:      Left lower extremity DVT, as above.    This report was flagged in Epic as abnormal.      Electronically signed by: Gabriele Epps MD  Date:    01/14/2025  Time:    15:48               Narrative:    EXAMINATION:  US LOWER EXTREMITY VEINS BILATERAL    CLINICAL HISTORY:  Bilateral lower extremity swelling;    TECHNIQUE:  Duplex and color flow Doppler and dynamic compression was performed of the bilateral lower extremity veins was performed.    COMPARISON:  None    FINDINGS:  Right thigh veins: The common femoral, femoral, popliteal, upper greater saphenous, and deep femoral veins are patent and free of thrombus. The veins are normally compressible and have normal phasic flow and augmentation response.    Right calf veins: The visualized calf veins are patent.    Left thigh veins: Occlusive thrombus in the common femoral, femoral and greater saphenous veins popliteal vein is patent.    Left calf veins: The visualized calf veins are patent.    Miscellaneous: None                                       X-Ray Chest 1 View (Final result)  Result time 01/14/25 14:40:48   Procedure changed from X-Ray Chest PA And Lateral     Final result by Cory Braun MD (01/14/25 14:40:48)                   Impression:      1. Chronic appearing interstitial findings, no large focal consolidation.      Electronically signed by: Cory Braun MD  Date:    01/14/2025  Time:    14:40                Narrative:    EXAMINATION:  XR CHEST 1 VIEW    CLINICAL HISTORY:  Shortness of breath;Shortness of breath; Shortness of breath    TECHNIQUE:  Single frontal view of the chest was performed.    COMPARISON:  06/13/2023    FINDINGS:  The cardiomediastinal silhouette is not enlarged noting calcification of the aorta.  Left PICC catheter tip projects over the mid to distal SVC..  There is no pleural effusion.  The trachea is midline.  The lungs are symmetrically expanded bilaterally with mildly coarse interstitial attenuation, accentuated by habitus..  No large focal consolidation seen.  There is no pneumothorax.  The osseous structures are remarkable for degenerative change..                                       Medications   diatrizoate meglumineand-diatrizoate sodium (GASTROVIEW) solution 100 mL (has no administration in time range)   heparin 25,000 units in dextrose 5% (100 units/ml) IV bolus from bag HIGH INTENSITY nomogram - [facility] (has no administration in time range)   heparin 25,000 units in dextrose 5% 250 mL (100 units/mL) infusion HIGH INTENSITY nomogram - [facility] (has no administration in time range)   heparin 25,000 units in dextrose 5% (100 units/ml) IV bolus from bag HIGH INTENSITY nomogram - [facility] (has no administration in time range)   heparin 25,000 units in dextrose 5% (100 units/ml) IV bolus from bag HIGH INTENSITY nomogram - [facility] (has no administration in time range)   iohexoL (OMNIPAQUE 350) injection 75 mL (75 mLs Intravenous Given 1/14/25 1555)     Medical Decision Making  Amount and/or Complexity of Data Reviewed  Labs: ordered. Decision-making details documented in ED Course.  Radiology:  Decision-making details documented in ED Course.    Risk  Prescription drug management.  Decision regarding hospitalization.               ED Course as of 01/14/25 1938 Tue Jan 14, 2025   1540 RBC(!): 3.65 [LC]   1540 Immature Grans (Abs)(!): 0.09 [LC]   1540 Albumin(!): 2.6 [LC]   1540 BUN(!): 33 [LC]   1555  Sodium: 137 [LC]   1555 Potassium: 4.6 [LC]   1555 Albumin(!): 2.6 [LC]   1555 Hemoglobin(!): 11.8 [LC]   1555 Hematocrit(!): 36.3 [LC]   1555 WBC: 9.99 [LC]   1555 US Lower Extremity Veins Bilateral(!)  Occlusive thrombus in the common femoral, femoral and greater saphenous veins per final radiology read.  [LC]   1606 X-Ray Chest 1 View  No acute cardiopulmonary process per my independent interpretation; chronic changes; stable.  [LC]   1750 BNP(!): 157 [LC]   1751 Operative change of partial colon resection and ileal colic anastomosis with suspected developing small bowel obstruction and transition point seen at the ileocolic anastomosis.  Surgical consultation advised.  2. Moderate to large colonic and rectal stool burden suggesting constipation, noting rectal impaction not exclude.  3. Distension of the distal esophagus containing air and fluid related to reflux, which increases aspiration risk.  4. Rim enhancing gas and fluid collection at the lateral right abdominal wall concerning for abscess.  This is likely larger when compared to outside facility CT abdomen and pelvis report dated 12/19/2024.  No other drainable collection seen.  No definite fistulous communication with the peritoneal cavity/bowel.   [LC]   1843 I discussed the case with the on-call general surgeon, Dr. Workman, who discussed it with her attending physician.  No indication for transferred to colorectal surgery at this time.  Additionally, no indication for NG tube at this time per General surgery as the patient is not actively vomiting. [LC]   1844 I discussed the case with the [facility] hospitalist who will admit the patient for multiple DVTs of the left lower extremity, altered mental status, failure to thrive, partial small bowel obstruction, urinary tract infection, hypoalbuminemia. [LC]   1844 Per [facility] hospitalist request I did discuss the DVT findings with the [facility] cardiologist, Dr. Fairchild, who suggested starting the patient on  heparin as she may possibly need surgical intervention for the questionable partial small bowel obstruction. [LC]      ED Course User Index  [LC] Nelson Cruz MD               Medical Decision Making:   Initial Assessment:   See HPI   Clinical Tests:   Lab Tests: Reviewed and Ordered  Radiological Study: Ordered and Reviewed  ED Management:  - ED workup notable for questionable partial small bowel obstruction, rim enhancing fluid collection in the right lower abdomen on CT scan with IV contrast; urinalysis consistent with a urinary tract infection; patient with deep vein thromboses of the common femoral femoral and saphenous on the left; on the patient's CBC, there was no significant leukocytosis; H&H is stable; she does have hypo albuminemia but otherwise her CMP is largely unremarkable for acute electrolyte abnormality; I discussed the case with the on-call general surgeon, Dr. Workman, who requested we admit the patient to the medicine service with general surgeon as a consult; no indication for NG tube at this time as the patient is not actively vomiting; she requests that we administer Gastrografin contrast later on this evening for presumptive repeat imaging such as KUB in the morning; I will order blood cultures, start patient on Zosyn for intra-abdominal coverage as well as coverage for presumptive urinary tract infection; I discussed the case with the [facility] cardiologist regarding the multiple DVTs the left lower extremities; no indication for clot retrieval but he recommends starting patient on heparin; I did discuss the case with the [facility] hospitalist, Talat Boyd NP, who will admit to his service.  Other:   I have discussed this case with another health care provider.       <> Summary of the Discussion: Case discussed with the on-call general surgeon, Dr. Workman,  who recommends admission to medicine; general surgical follow along as a consult for presumed partial small-bowel obstruction;  case discussed with the on-call cardiologist, Dr. Stallworth, who recommends starting pt on heprain              Clinical Impression:  Final diagnoses:  [R06.02] Shortness of breath  [K56.609] Small bowel obstruction (Primary)  [Z90.49] History of colon resection  [N39.0] Urinary tract infection without hematuria, site unspecified  [R41.0] Confusion  [R62.7] Failure to thrive in adult  [E88.09] Hypoalbuminemia  [R18.8] Intra-abdominal fluid collection          ED Disposition Condition    Observation Stable                Nelson Cruz MD  01/14/25 1938       Nelson Cruz MD  01/14/25 1947       Nelson Cruz MD  01/14/25 1950

## 2025-01-14 NOTE — Clinical Note
Diagnosis: Small bowel obstruction [354686]   Reason for IP Medical Treatment  (Clinical interventions that can only be accomplished in the IP setting? ) :: Possible bowl obstruction, fluid collection in abdomin. DVT left lower ext

## 2025-01-14 NOTE — TELEPHONE ENCOUNTER
----- Message from Karen sent at 1/14/2025 10:59 AM CST -----  Type:  Needs Medical Advice    Who Called: Pt's daughter-in-law, Palmira   Symptoms (please be specific):  caller stated that she wanted to let dr know that pt is on the way to ED   Would the patient rather a call back or a response via [employer]? Call back   Best Call Back Number: [phone]

## 2025-01-14 NOTE — FIRST PROVIDER EVALUATION
Emergency Department TeleTriage Encounter Note      CHIEF COMPLAINT    Chief Complaint   Patient presents with    Abdominal Pain     Abdominal pain since November- family states had a procedure done for colon cx treatment. Family states steady decline in patient's health, increased confusion. Recently finished abx this past Sunday. Was at skilled facility, removed today due to care. Denies nausea, emesis. Lower left extremity swelling noted in triage, family states that they were told Thursday night about it. Picc line noted to left upper arm.       VITAL SIGNS   Initial Vitals   BP Pulse Resp Temp SpO2   01/14/25 1234 01/14/25 1234 01/14/25 1234 01/14/25 1236 01/14/25 1234   102/65 106 18 97.8 °F (36.6 °C) 98 %      MAP       --                   ALLERGIES    Review of patient's allergies indicates:   Allergen Reactions    Cefaclor Hives    Gabapentin Swelling    Penicillins      Other reaction(s): Hives    Sulfa (sulfonamide antibiotics) Other (See Comments)     Other reaction(s): Hives       PROVIDER TRIAGE NOTE  Per family, patient was in a skilled nursing facility and began having swelling in her legs last week. Both are swollen but left is worse than right. Was given Lasix but then instructed to come to ED. Has recently completed antibiotics for abdominal infection. Family reports confusion that is also getting worse. She also reports abdominal pain/distension and shortness of breath.    Limited physical exam via telehealth: The patient is awake, alert, answering questions appropriately and is not in respiratory distress.  As the Telet[facility] provider, I performed an initial assessment and ordered appropriate labs and imaging studies, if any, to facilitate the patient's care once placed in the ED. Once a room is available, care and a full evaluation will be completed by an alternate ED provider.  Any additional orders and the final disposition will be determined by that provider.  All imaging and labs will not  be followed-up by the Teletriage Team, including myself.            ORDERS  Labs Reviewed - No data to display    ED Orders (720h ago, onward)      Start Ordered     Status Ordering Provider    01/14/25 1323 01/14/25 1322  CT Abdomen Pelvis With IV Contrast NO Oral Contrast  1 time imaging         Ordered ARLENE RAJAN    01/14/25 1321 01/14/25 1320  US Lower Extremity Veins Bilateral  1 time imaging         Ordered ARLENE RAJAN    01/14/25 1320 01/14/25 1319  Comprehensive Metabolic Panel  STAT         Ordered ALIZEDAARLENE WATTS    01/14/25 1320 01/14/25 1319  Urinalysis, Reflex to Urine Culture Urine, Catheterized  STAT         Ordered ARLENE RAJAN    01/14/25 1320 01/14/25 1320  Troponin I  Once         Ordered ARLENE RAJAN    01/14/25 1320 01/14/25 1320  Brain natriuretic peptide  STAT         Ordered ARLENE RAJAN    01/14/25 1320 01/14/25 1320  EKG 12-lead  Once         Ordered ARLENE RAJAN    01/14/25 1320 01/14/25 1320  X-Ray Chest PA And Lateral  1 time imaging         Ordered ARLENE RAJAN    01/14/25 1319 01/14/25 1319  Saline lock IV  Once         Ordered ARLENE RAJAN    01/14/25 1319 01/14/25 1319  CBC auto differential  Once         Ordered ARLENE RAJAN              Virtual Visit Note: The provider triage portion of this emergency department evaluation and documentation was performed via [employer], a HIPAA-compliant telemedicine application, in concert with a tele-presenter in the room. A face to face patient evaluation with one of my colleagues will occur once the patient is placed in an emergency department room.      DISCLAIMER: This note was prepared with [employer] voice recognition transcription software. Garbled syntax, mangled pronouns, and other bizarre constructions may be attributed to that software system.

## 2025-01-14 NOTE — TELEPHONE ENCOUNTER
Called pts JN Chavis and spoke with her. She stated that pt is in the ER in [place] now in room 20

## 2025-01-15 DIAGNOSIS — E11.9 TYPE 2 DIABETES MELLITUS WITHOUT COMPLICATION: ICD-10-CM

## 2025-01-15 PROBLEM — I82.409 ACUTE DVT (DEEP VENOUS THROMBOSIS): Status: ACTIVE | Noted: 2025-01-15

## 2025-01-15 LAB
ANION GAP SERPL CALC-SCNC: 13 MMOL/L (ref 8–16)
APTT PPP: 140.5 SEC (ref 21–32)
APTT PPP: 36.9 SEC (ref 21–32)
APTT PPP: 82 SEC (ref 21–32)
BASOPHILS # BLD AUTO: 0.03 K/UL (ref 0–0.2)
BASOPHILS NFR BLD: 0.3 % (ref 0–1.9)
BUN SERPL-MCNC: 21 MG/DL (ref 8–23)
CALCIUM SERPL-MCNC: 8.5 MG/DL (ref 8.7–10.5)
CHLORIDE SERPL-SCNC: 103 MMOL/L (ref 95–110)
CO2 SERPL-SCNC: 22 MMOL/L (ref 23–29)
CREAT SERPL-MCNC: 0.8 MG/DL (ref 0.5–1.4)
DIFFERENTIAL METHOD BLD: ABNORMAL
EOSINOPHIL # BLD AUTO: 0 K/UL (ref 0–0.5)
EOSINOPHIL NFR BLD: 0.2 % (ref 0–8)
ERYTHROCYTE [DISTWIDTH] IN BLOOD BY AUTOMATED COUNT: 18.1 % (ref 11.5–14.5)
EST. GFR  (NO RACE VARIABLE): >60 ML/MIN/1.73 M^2
GLUCOSE SERPL-MCNC: 94 MG/DL (ref 70–110)
GRAM STN SPEC: NORMAL
GRAM STN SPEC: NORMAL
HCT VFR BLD AUTO: 35.5 % (ref 37–48.5)
HGB BLD-MCNC: 11.5 G/DL (ref 12–16)
IMM GRANULOCYTES # BLD AUTO: 0.08 K/UL (ref 0–0.04)
IMM GRANULOCYTES NFR BLD AUTO: 0.9 % (ref 0–0.5)
LYMPHOCYTES # BLD AUTO: 2.3 K/UL (ref 1–4.8)
LYMPHOCYTES NFR BLD: 24.6 % (ref 18–48)
MCH RBC QN AUTO: 31.9 PG (ref 27–31)
MCHC RBC AUTO-ENTMCNC: 32.4 G/DL (ref 32–36)
MCV RBC AUTO: 99 FL (ref 82–98)
MONOCYTES # BLD AUTO: 0.7 K/UL (ref 0.3–1)
MONOCYTES NFR BLD: 7.9 % (ref 4–15)
NEUTROPHILS # BLD AUTO: 6.1 K/UL (ref 1.8–7.7)
NEUTROPHILS NFR BLD: 66.1 % (ref 38–73)
NRBC BLD-RTO: 0 /100 WBC
PLATELET # BLD AUTO: 339 K/UL (ref 150–450)
PMV BLD AUTO: 9.8 FL (ref 9.2–12.9)
POCT GLUCOSE: 37 MG/DL (ref 70–110)
POCT GLUCOSE: 44 MG/DL (ref 70–110)
POCT GLUCOSE: 45 MG/DL (ref 70–110)
POCT GLUCOSE: 51 MG/DL (ref 70–110)
POCT GLUCOSE: 54 MG/DL (ref 70–110)
POCT GLUCOSE: 55 MG/DL (ref 70–110)
POCT GLUCOSE: 79 MG/DL (ref 70–110)
POCT GLUCOSE: 96 MG/DL (ref 70–110)
POTASSIUM SERPL-SCNC: 3.8 MMOL/L (ref 3.5–5.1)
RBC # BLD AUTO: 3.6 M/UL (ref 4–5.4)
SODIUM SERPL-SCNC: 138 MMOL/L (ref 136–145)
WBC # BLD AUTO: 9.2 K/UL (ref 3.9–12.7)

## 2025-01-15 PROCEDURE — 96365 THER/PROPH/DIAG IV INF INIT: CPT | Mod: 59

## 2025-01-15 PROCEDURE — 51798 US URINE CAPACITY MEASURE: CPT

## 2025-01-15 PROCEDURE — S4991 NICOTINE PATCH NONLEGEND: HCPCS | Performed by: NURSE PRACTITIONER

## 2025-01-15 PROCEDURE — 25000003 PHARM REV CODE 250: Performed by: NURSE PRACTITIONER

## 2025-01-15 PROCEDURE — 85730 THROMBOPLASTIN TIME PARTIAL: CPT | Mod: 91 | Performed by: HOSPITALIST

## 2025-01-15 PROCEDURE — 85730 THROMBOPLASTIN TIME PARTIAL: CPT | Performed by: EMERGENCY MEDICINE

## 2025-01-15 PROCEDURE — 99223 1ST HOSP IP/OBS HIGH 75: CPT | Mod: ,,, | Performed by: STUDENT IN AN ORGANIZED HEALTH CARE EDUCATION/TRAINING PROGRAM

## 2025-01-15 PROCEDURE — 25000003 PHARM REV CODE 250: Performed by: HOSPITALIST

## 2025-01-15 PROCEDURE — 87075 CULTR BACTERIA EXCEPT BLOOD: CPT | Performed by: HOSPITALIST

## 2025-01-15 PROCEDURE — 87102 FUNGUS ISOLATION CULTURE: CPT | Performed by: HOSPITALIST

## 2025-01-15 PROCEDURE — 99291 CRITICAL CARE FIRST HOUR: CPT | Mod: ,,, | Performed by: STUDENT IN AN ORGANIZED HEALTH CARE EDUCATION/TRAINING PROGRAM

## 2025-01-15 PROCEDURE — 63600175 PHARM REV CODE 636 W HCPCS: Performed by: HOSPITALIST

## 2025-01-15 PROCEDURE — 97165 OT EVAL LOW COMPLEX 30 MIN: CPT

## 2025-01-15 PROCEDURE — 85025 COMPLETE CBC W/AUTO DIFF WBC: CPT | Performed by: EMERGENCY MEDICINE

## 2025-01-15 PROCEDURE — 87070 CULTURE OTHR SPECIMN AEROBIC: CPT | Performed by: HOSPITALIST

## 2025-01-15 PROCEDURE — 0W9F30Z DRAINAGE OF ABDOMINAL WALL WITH DRAINAGE DEVICE, PERCUTANEOUS APPROACH: ICD-10-PCS | Performed by: STUDENT IN AN ORGANIZED HEALTH CARE EDUCATION/TRAINING PROGRAM

## 2025-01-15 PROCEDURE — 63600175 PHARM REV CODE 636 W HCPCS: Performed by: FAMILY MEDICINE

## 2025-01-15 PROCEDURE — 11000001 HC ACUTE MED/SURG PRIVATE ROOM

## 2025-01-15 PROCEDURE — 87106 FUNGI IDENTIFICATION YEAST: CPT | Performed by: HOSPITALIST

## 2025-01-15 PROCEDURE — 96366 THER/PROPH/DIAG IV INF ADDON: CPT

## 2025-01-15 PROCEDURE — 87205 SMEAR GRAM STAIN: CPT | Performed by: HOSPITALIST

## 2025-01-15 PROCEDURE — 80048 BASIC METABOLIC PNL TOTAL CA: CPT | Performed by: HOSPITALIST

## 2025-01-15 PROCEDURE — 87186 SC STD MICRODIL/AGAR DIL: CPT | Performed by: HOSPITALIST

## 2025-01-15 PROCEDURE — 63600175 PHARM REV CODE 636 W HCPCS: Performed by: EMERGENCY MEDICINE

## 2025-01-15 PROCEDURE — 36415 COLL VENOUS BLD VENIPUNCTURE: CPT | Performed by: HOSPITALIST

## 2025-01-15 PROCEDURE — 97162 PT EVAL MOD COMPLEX 30 MIN: CPT

## 2025-01-15 PROCEDURE — 63600175 PHARM REV CODE 636 W HCPCS: Performed by: STUDENT IN AN ORGANIZED HEALTH CARE EDUCATION/TRAINING PROGRAM

## 2025-01-15 PROCEDURE — 25000003 PHARM REV CODE 250: Performed by: EMERGENCY MEDICINE

## 2025-01-15 RX ORDER — IBUPROFEN 200 MG
1 TABLET ORAL DAILY
Status: DISCONTINUED | OUTPATIENT
Start: 2025-01-15 | End: 2025-01-28 | Stop reason: HOSPADM

## 2025-01-15 RX ORDER — IBUPROFEN 200 MG
16 TABLET ORAL
Status: DISCONTINUED | OUTPATIENT
Start: 2025-01-15 | End: 2025-01-28 | Stop reason: HOSPADM

## 2025-01-15 RX ORDER — IBUPROFEN 200 MG
24 TABLET ORAL
Status: DISCONTINUED | OUTPATIENT
Start: 2025-01-15 | End: 2025-01-28 | Stop reason: HOSPADM

## 2025-01-15 RX ORDER — HYDROCODONE BITARTRATE AND ACETAMINOPHEN 10; 325 MG/1; MG/1
1 TABLET ORAL 3 TIMES DAILY PRN
Status: DISCONTINUED | OUTPATIENT
Start: 2025-01-15 | End: 2025-01-23

## 2025-01-15 RX ORDER — DEXTROSE MONOHYDRATE 100 MG/ML
INJECTION, SOLUTION INTRAVENOUS CONTINUOUS
Status: DISCONTINUED | OUTPATIENT
Start: 2025-01-15 | End: 2025-01-15

## 2025-01-15 RX ORDER — ONDANSETRON 8 MG/1
8 TABLET, ORALLY DISINTEGRATING ORAL 3 TIMES DAILY PRN
Status: DISCONTINUED | OUTPATIENT
Start: 2025-01-15 | End: 2025-01-28 | Stop reason: HOSPADM

## 2025-01-15 RX ORDER — LIDOCAINE HYDROCHLORIDE 10 MG/ML
INJECTION, SOLUTION INFILTRATION; PERINEURAL
Status: COMPLETED | OUTPATIENT
Start: 2025-01-15 | End: 2025-01-15

## 2025-01-15 RX ORDER — DEXTROSE AND SODIUM CHLORIDE 10; .45 G/100ML; G/100ML
INJECTION, SOLUTION INTRAVENOUS CONTINUOUS
Status: DISCONTINUED | OUTPATIENT
Start: 2025-01-15 | End: 2025-01-15

## 2025-01-15 RX ORDER — MORPHINE SULFATE 2 MG/ML
1 INJECTION, SOLUTION INTRAMUSCULAR; INTRAVENOUS EVERY 4 HOURS PRN
Status: DISCONTINUED | OUTPATIENT
Start: 2025-01-15 | End: 2025-01-21

## 2025-01-15 RX ORDER — GLUCAGON 1 MG
1 KIT INJECTION
Status: DISCONTINUED | OUTPATIENT
Start: 2025-01-15 | End: 2025-01-28 | Stop reason: HOSPADM

## 2025-01-15 RX ORDER — INSULIN ASPART 100 [IU]/ML
0-5 INJECTION, SOLUTION INTRAVENOUS; SUBCUTANEOUS EVERY 6 HOURS PRN
Status: DISCONTINUED | OUTPATIENT
Start: 2025-01-15 | End: 2025-01-28 | Stop reason: HOSPADM

## 2025-01-15 RX ORDER — GLUCAGON 1 MG
1 KIT INJECTION
Status: DISCONTINUED | OUTPATIENT
Start: 2025-01-15 | End: 2025-01-16

## 2025-01-15 RX ORDER — FENTANYL CITRATE 50 UG/ML
INJECTION, SOLUTION INTRAMUSCULAR; INTRAVENOUS
Status: COMPLETED | OUTPATIENT
Start: 2025-01-15 | End: 2025-01-15

## 2025-01-15 RX ORDER — CIPROFLOXACIN 2 MG/ML
400 INJECTION, SOLUTION INTRAVENOUS
Status: DISCONTINUED | OUTPATIENT
Start: 2025-01-15 | End: 2025-01-16

## 2025-01-15 RX ORDER — PANTOPRAZOLE SODIUM 40 MG/1
40 TABLET, DELAYED RELEASE ORAL DAILY
Status: DISCONTINUED | OUTPATIENT
Start: 2025-01-15 | End: 2025-01-28 | Stop reason: HOSPADM

## 2025-01-15 RX ADMIN — HYDROCODONE BITARTRATE AND ACETAMINOPHEN 1 TABLET: 10; 325 TABLET ORAL at 08:01

## 2025-01-15 RX ADMIN — HEPARIN SODIUM 16 UNITS/KG/HR: 10000 INJECTION, SOLUTION INTRAVENOUS at 11:01

## 2025-01-15 RX ADMIN — NICOTINE 1 PATCH: 21 PATCH, EXTENDED RELEASE TRANSDERMAL at 08:01

## 2025-01-15 RX ADMIN — CIPROFLOXACIN 400 MG: 2 INJECTION, SOLUTION INTRAVENOUS at 05:01

## 2025-01-15 RX ADMIN — DEXTROSE MONOHYDRATE 25 G: 25 INJECTION, SOLUTION INTRAVENOUS at 02:01

## 2025-01-15 RX ADMIN — DEXTROSE MONOHYDRATE 12.5 G: 25 INJECTION, SOLUTION INTRAVENOUS at 10:01

## 2025-01-15 RX ADMIN — DEXTROSE MONOHYDRATE: 100 INJECTION, SOLUTION INTRAVENOUS at 10:01

## 2025-01-15 RX ADMIN — DEXTROSE MONOHYDRATE 12.5 G: 25 INJECTION, SOLUTION INTRAVENOUS at 03:01

## 2025-01-15 RX ADMIN — HEPARIN SODIUM 16 UNITS/KG/HR: 10000 INJECTION, SOLUTION INTRAVENOUS at 09:01

## 2025-01-15 RX ADMIN — HYDROCODONE BITARTRATE AND ACETAMINOPHEN 1 TABLET: 10; 325 TABLET ORAL at 04:01

## 2025-01-15 RX ADMIN — VASOPRESSIN: 20 INJECTION, SOLUTION INTRAVENOUS at 05:01

## 2025-01-15 RX ADMIN — MORPHINE SULFATE 1 MG: 2 INJECTION, SOLUTION INTRAMUSCULAR; INTRAVENOUS at 05:01

## 2025-01-15 RX ADMIN — MUPIROCIN: 20 OINTMENT TOPICAL at 08:01

## 2025-01-15 RX ADMIN — LIDOCAINE HYDROCHLORIDE 4 ML: 10 INJECTION, SOLUTION INFILTRATION; PERINEURAL at 04:01

## 2025-01-15 RX ADMIN — CIPROFLOXACIN 400 MG: 2 INJECTION, SOLUTION INTRAVENOUS at 04:01

## 2025-01-15 RX ADMIN — DEXTROSE MONOHYDRATE 12.5 G: 25 INJECTION, SOLUTION INTRAVENOUS at 11:01

## 2025-01-15 RX ADMIN — PANTOPRAZOLE SODIUM 40 MG: 40 TABLET, DELAYED RELEASE ORAL at 08:01

## 2025-01-15 RX ADMIN — MORPHINE SULFATE 1 MG: 2 INJECTION, SOLUTION INTRAMUSCULAR; INTRAVENOUS at 11:01

## 2025-01-15 RX ADMIN — FENTANYL CITRATE 25 MCG: 50 INJECTION INTRAMUSCULAR; INTRAVENOUS at 04:01

## 2025-01-15 RX ADMIN — ATORVASTATIN CALCIUM 40 MG: 40 TABLET, FILM COATED ORAL at 08:01

## 2025-01-15 RX ADMIN — MORPHINE SULFATE 1 MG: 2 INJECTION, SOLUTION INTRAMUSCULAR; INTRAVENOUS at 07:01

## 2025-01-15 RX ADMIN — HYDROCODONE BITARTRATE AND ACETAMINOPHEN 1 TABLET: 10; 325 TABLET ORAL at 12:01

## 2025-01-15 NOTE — PLAN OF CARE
Problem: Physical Therapy  Goal: Physical Therapy Goal  Description: Goals to be met by:  2/15/25    Patient will increase functional independence with mobility by performin. Supine to sit with Modified [place]  2. Sit to supine with Modified [place]  3. Sit to stand transfer with Stand-by Assistance  4. Bed to chair transfer with Stand-by Assistance using Rolling Walker  5. Gait  x 50 feet with Stand-by Assistance using Rolling Walker.     Outcome: Progressing     Pt reporting 10/10 abdominal pain and functional mobility limited this date. Pt required Mod-MaxA x 2 for bed mobility. ModA x 1 for sit <>stand with HHA. Pt unable to advance LLE to step laterally toward HOB. Recommending moderate intensity therapy due to change in functional mobility from baseline and increased risk for falls.

## 2025-01-15 NOTE — HPI
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 She  presented to the ED with complaint of abdominal pain.  Diagnosis with colon cancer in Nov 2024 and underwent colon resection, however developed intra abdominal infection requiring operative intervention. She was admitted to SNF facility and underwent treatment with IV antibiotic. She continue to experiencing symptoms of abdominal pain, increase confusion. CT of abdominal showed  Operative change of partial colon resection and ileal colic anastomosis with suspected developing small bowel obstruction and transition point seen at the ileocolic anastomosis. Rim enhancing gas and fluid collection at the lateral right abdominal wall concerning for abscess. General Surgery consulted, with plan IR consultation for consideration of aspiration vs drain placement into her anterior abdominal wall collection. Lt leg verous ultrasound showed,.left lower extremity DVT in the common femoral vein. Patient being admitted under the care of Hospital Medicine.

## 2025-01-15 NOTE — SEDATION DOCUMENTATION
IR drain placement completed, removed 20mL thick tan/pink fluid from RLQ abd, placed 10Fr drain, sutured, covered with gauze and tegaderm, sent specimen to lab for cultures, VS stable, room air, called report to ANA Toribio, Dr Leos ordered to hold heparin gtt for 1 hour, which will complete at 1730, transported pt back to floor via bed with transport, IR care complete

## 2025-01-15 NOTE — ASSESSMENT & PLAN NOTE
CT of abdomin positive for presumed partial small-bowel obstruction   General Surgery consult   Plan IR consultation for consideration of aspiration vs drain placement into her anterior abdominal wall collection  NPO for now

## 2025-01-15 NOTE — SUBJECTIVE & OBJECTIVE
Past Medical History:   Diagnosis Date    Anxiety and depression 2015    Arthritis     Cervical radiculopathy 2016    Cirrhosis of liver     Colon cancer 2024    Colon polyps 2015    Diabetes mellitus type 2 with neurological manifestations 2015    no current medications, only one episode of elevated sugars with acute illness, will monitor     Encounter for blood transfusion     Hepatitis C     s/p treatment per patient report; managed by Dr. Perez    Hip fracture     Macrocytosis 2015    Thyroid disease     Transfusion reaction     hep c       Past Surgical History:   Procedure Laterality Date    APPENDECTOMY      BACK SURGERY      CAROTID ENDARTERECTOMY Right 2023    Procedure: ENDARTERECTOMY, CAROTID;  Surgeon: Juan James MD;  Location: [facility] OR;  Service: Cardiology;  Laterality: Right;  RIGHT    CAUDAL EPIDURAL STEROID INJECTION N/A 2018    Procedure: Injection-steroid-epidural-caudal;  Surgeon: Roxana Gu Jr., MD;  Location: [facility] OR;  Service: Pain Management;  Laterality: N/A;  with cath target L4-5    CAUDAL EPIDURAL STEROID INJECTION N/A 2019    Procedure: Injection-steroid-epidural-caudal;  Surgeon: Roxana Gu Jr., MD;  Location: [facility];  Service: Pain Management;  Laterality: N/A;     SECTION      CHOLECYSTECTOMY      COLONOSCOPY  3/31/10    2 polyps, tubular adenoma    COLONOSCOPY  2015    Dr. Washington    COLONOSCOPY N/A 10/10/2018    Procedure: COLONOSCOPY;  Surgeon: Reuben Miller Jr., MD;  Location: [facility];  Service: Endoscopy;  Laterality: N/A;    ESOPHAGOGASTRODUODENOSCOPY N/A 10/10/2018    Procedure: EGD (ESOPHAGOGASTRODUODENOSCOPY);  Surgeon: Reuben Miller Jr., MD;  Location: [facility];  Service: Endoscopy;  Laterality: N/A;    ESOPHAGOGASTRODUODENOSCOPY N/A 12/10/2018    Procedure: EGD (ESOPHAGOGASTRODUODENOSCOPY)/poss emr;  Surgeon: Belle Kuo MD;  Location: [facility] ([address] [place] [address]);  Service:  "Endoscopy;  Laterality: N/A;    ESOPHAGOGASTRODUODENOSCOPY N/A 3/12/2019    Procedure: EGD (ESOPHAGOGASTRODUODENOSCOPY);  Surgeon: Polo Keyes MD;  Location: [facility];  Service: Endoscopy;  Laterality: N/A;    ESOPHAGOGASTRODUODENOSCOPY N/A 11/20/2020    Procedure: ESOPHAGOGASTRODUODENOSCOPY (EGD);  Surgeon: Belle Kuo MD;  Location: [facility];  Service: Endoscopy;  Laterality: N/A;    HERNIA REPAIR      HYSTERECTOMY  1989    Uterus and both ovaries    INCISIONAL HERNIA REPAIR      x 2    JOINT REPLACEMENT      LIVER BIOPSY  12/2003    autoimmune hepatitis    ROTATOR CUFF REPAIR      right    STOMACH SURGERY  1980's    "took lymph node off of liver"    TOTAL HIP ARTHROPLASTY      left hip    UPPER GASTROINTESTINAL ENDOSCOPY  3/24/10    normal    UPPER GASTROINTESTINAL ENDOSCOPY  04/27/2015    Dr. Washington       Review of patient's allergies indicates:   Allergen Reactions    Cefaclor Hives    Gabapentin Swelling    Penicillins      Other reaction(s): Hives    Sulfa (sulfonamide antibiotics) Other (See Comments)     Other reaction(s): Hives       No current facility-administered medications on file prior to encounter.     Current Outpatient Medications on File Prior to Encounter   Medication Sig    ALPRAZolam (XANAX) 1 MG tablet TAKE ONE (1) TABLET BY MOUTH TWICE A DAY AS NEEDED FR ANXIETY (Patient taking differently: Take by mouth 2 (two) times a day. Patient take 1/2 tablet BID and 1 tablet HS)    atorvastatin (LIPITOR) 40 MG tablet Take 1 tablet (40 mg total) by mouth once daily.    cholecalciferol, vitamin D3, 125 mcg (5,000 unit) capsule Take 1 tablet  by mouth daily with breakfast. (Patient taking differently: Take 5,000 Units by mouth daily with breakfast.)    HYDROcodone-acetaminophen (NORCO)  mg per tablet TAKE ONE (1) TABLET BY MOUTH THREE (3) TIMES DAILY AS NEEDED FOR PAIN (Patient taking differently: Take 1 tablet by mouth 3 (three) times daily as needed for Pain.)    levothyroxine " (SYNTHROID) 75 MCG tablet TAKE 1 TABLET EVERY DAY ON AN EMPTY STOMACH (Patient taking differently: Take 75 mcg by mouth before breakfast.)    nicotine (NICODERM CQ) 21 mg/24 hr Place 1 patch onto the skin once daily.    ondansetron (ZOFRAN-ODT) 8 MG TbDL Dissolve 1 tablet (8 mg total) by mouth under the tongue 3 (three) times daily as needed (nausea).    pantoprazole (PROTONIX) 40 MG tablet TAKE 1 TABLET EVERY DAY    amitriptyline (ELAVIL) 25 MG tablet TAKE 1-2 TABS NIGHTLY AS NEEDED FOR INSOMNIA RELATED TO CHRONIC PAIN (Patient not taking: Reported on 1/14/2025)    blood sugar diagnostic Strp To check BG 1 times daily, to use with insurance preferred meter    blood-glucose meter kit Test tid please dispense test strips and lancets    blood-glucose meter kit To check BG 1 times daily, to use with insurance preferred meter    ergocalciferol (ERGOCALCIFEROL) 50,000 unit Cap Take 1 capsule (50,000 Units total) by mouth every 7 days.    lancets Misc To check BG 1 times daily, to use with insurance preferred meter    linaCLOtide (LINZESS) 145 mcg Cap capsule Take 1 capsule (145 mcg total) by mouth before breakfast. Take with a large glass of water just before 1st meal of the day (Patient not taking: Reported on 1/14/2025)    naloxone (NARCAN) 4 mg/actuation Spry 4mg by nasal route as needed for opioid overdose; may repeat every 2-3 minutes in alternating nostrils until medical help arrives. Call 911 (Patient not taking: Reported on 1/10/2024)     Family History       Problem Relation (Age of Onset)    Brain cancer Brother    Breast cancer Sister    Cataracts Sister, Sister    Colon cancer Brother    Diabetes Mother, Brother, Other, Son    Diabetes Mellitus Mother    Liver cancer Sister    Lung cancer Brother    Pancreatic cancer Brother    Stomach cancer Other    Throat cancer Brother          Tobacco Use    Smoking status: Every Day     Current packs/day: 2.00     Average packs/day: 2.0 packs/day for 45.0 years (90.0  ttl pk-yrs)     Types: Cigarettes    Smokeless tobacco: Never   Substance and Sexual Activity    Alcohol use: No     Comment: used to drink heavily, stopped in 1990's    Drug use: No    Sexual activity: Not Currently     Partners: Male     Review of Systems   Cardiovascular:  Positive for leg swelling.   Gastrointestinal:  Positive for abdominal pain and diarrhea.     Objective:     Vital Signs (Most Recent):  Temp: 98.2 °F (36.8 °C) (01/15/25 0509)  Pulse: 81 (01/15/25 0724)  Resp: 18 (01/15/25 0741)  BP: 138/76 (01/15/25 0718)  SpO2: 96 % (01/15/25 0724) Vital Signs (24h Range):  Temp:  [97.8 °F (36.6 °C)-98.4 °F (36.9 °C)] 98.2 °F (36.8 °C)  Pulse:  [] 81  Resp:  [12-20] 18  SpO2:  [76 %-99 %] 96 %  BP: ()/(56-76) 138/76     Weight: 65.8 kg (145 lb)  Body mass index is 26.52 kg/m².    SpO2: 96 %         Intake/Output Summary (Last 24 hours) at 1/15/2025 0821  Last data filed at 1/15/2025 0611  Gross per 24 hour   Intake 352.44 ml   Output --   Net 352.44 ml       Lines/Drains/Airways       Central Venous Catheter Line  Duration             Percutaneous Central Line - Triple Lumen  01/14/25 1410 Basilic Left <1 day              Drain  Duration             Female External Urinary Catheter w/ Suction 01/14/25 2207 <1 day              Peripheral Intravenous Line  Duration                  Peripheral IV - Single Lumen 01/15/25 0421 20 G 1 3/4 in Anterior;Distal;Right Upper Arm <1 day                     Physical Exam  Constitutional:       General: She is not in acute distress.     Appearance: She is well-developed.   Cardiovascular:      Rate and Rhythm: Normal rate and regular rhythm.      Heart sounds: No murmur heard.     No gallop.   Pulmonary:      Effort: Pulmonary effort is normal. No respiratory distress.      Breath sounds: Normal breath sounds. No wheezing.   Abdominal:      General: Bowel sounds are normal. There is no distension.      Palpations: Abdomen is soft.      Tenderness: There is no  abdominal tenderness.   Musculoskeletal:         General: Injury: L>Rd.      Right lower leg: Edema present.      Left lower leg: Edema present.   Skin:     General: Skin is warm and dry.      Comments: Discoloration noted to LLE    Neurological:      Mental Status: She is alert and oriented to person, place, and time.          Significant Labs: BMP:   Recent Labs   Lab 01/14/25  1504 01/14/25  1612   GLU 89  --      --    K 4.6  --      --    CO2 21*  --    BUN 33*  --    CREATININE 0.8  --    CALCIUM 8.9  --    MG  --  2.1   , CMP   Recent Labs   Lab 01/14/25  1504      K 4.6      CO2 21*   GLU 89   BUN 33*   CREATININE 0.8   CALCIUM 8.9   PROT 7.0   ALBUMIN 2.6*   BILITOT 0.5   ALKPHOS 76   AST 22   ALT 32   ANIONGAP 13   , and CBC   Recent Labs   Lab 01/14/25  1504 01/14/25  1956 01/15/25  0412   WBC 9.99 8.55 9.20   HGB 11.8* 10.7* 11.5*   HCT 36.3* 32.3* 35.5*    316 339       Significant Imaging: Echocardiogram: Transthoracic echo (TTE) complete ([facility] Only):   Results for orders placed or performed during the hospital encounter of 06/11/23   Echo   Result Value Ref Range    BSA 1.79 m2    TDI SEPTAL 0.12 m/s    LV LATERAL E/E' RATIO 5.36 m/s    LV SEPTAL E/E' RATIO 6.25 m/s    EF 60 %    Left Ventricular Outflow Tract Mean Velocity 0.55 cm/s    Left Ventricular Outflow Tract Mean Gradient 1.00 mmHg    TDI LATERAL 0.14 m/s    PV PEAK VELOCITY 0.83 cm/s    LVIDd 3.81 3.5 - 6.0 cm    IVS 0.99 0.6 - 1.1 cm    PW 0.87 0.6 - 1.1 cm    LVIDs 2.58 2.1 - 4.0 cm    FS 32 28 - 44 %    LV mass 106.25 g    LA size 3.10 cm    RVDD 3.66 cm    TAPSE 2.11 cm    Left Ventricle Relative Wall Thickness 0.46 cm    AV mean gradient 2 mmHg    AV Velocity Ratio 0.89     AV index (prosthetic) 0.87     E/A ratio 1.15     Mean e' 0.13 m/s    E wave deceleration time 222.00 msec    LVOT peak juana 0.86 m/s    LVOT peak VTI 20.70 cm    Ao peak juana 0.97 m/s    Ao VTI 23.9 cm    AV peak gradient 4 mmHg    E/E'  ratio 5.77 m/s    MV Peak E Dawit 0.75 m/s    MV Peak A Dawit 0.65 m/s    LV Systolic Volume 24.10 mL    LV Systolic Volume Index 13.5 mL/m2    LV Diastolic Volume 62.30 mL    LV Diastolic Volume Index 35.00 mL/m2    LV Mass Index 60 g/m2    NAIN (MOD) 24.6 mL/m2    LA Vol (MOD) 43.70 cm3    Lott's Biplane MOD Ejection Fraction 6 %    Narrative    · There is no evidence of intracardiac shunting.  · Concentric remodeling and normal systolic function.  · The estimated ejection fraction is 60%.  · Normal left ventricular diastolic function.  · Normal right ventricular size with normal right ventricular systolic   function.  · Mild tricuspid regurgitation.

## 2025-01-15 NOTE — PROGRESS NOTES
01/15/25 1552   Admission   Initial VN Admission Questions Complete   Communication Issues? None   Shift   Virtual Nurse - Rounding Complete   Virtual Nurse - Patient Verbalized Approval Of Camera Use   Type of Frequent Check   Type Patient Rounds   Safety/Activity   Patient Rounds bed in low position;visualized patient;call light in patient/parent reach   Safety Promotion/Fall Prevention instructed to call staff for mobility;family expresses understanding of fall risk and prevention;family to remain at bedside   Positioning   Head of Bed (HOB) Positioning HOB at 30-45 degrees   Positioning/Transfer Devices pillows;in use     VN cued into room and permission is given to adjust the camera towards patient who is resting in bed.  Daughter-in-law, Palmira, is at bedside and assisted in completing the admission assessment questions.  Safety is maintained with bed low, side rails up and call light within reach.  Patient is instructed to use the call light to call for any assistance and she verbalized understanding.  Care plan reviewed.  Will be available to intervene if needed.

## 2025-01-15 NOTE — PT/OT/SLP EVAL
Occupational Therapy   Evaluation    Name: Thom Krishna  MRN: [id number]  Admitting Diagnosis: Epigastric pain  Recent Surgery: * No surgery found *      Recommendations:     Discharge Recommendations: Moderate Intensity Therapy  Discharge Equipment Recommendations:  to be determined by next level of care  Barriers to discharge:  Other (Comment) (Pt requires increased level of assist)    Assessment:     Thom Krishna is a 69 y.o. female with a medical diagnosis of Epigastric pain.  She presents with The primary encounter diagnosis was Small bowel obstruction. Diagnoses of Shortness of breath, History of colon resection, Urinary tract infection without hematuria, site unspecified, Confusion, Failure to thrive in adult, Hypoalbuminemia, Intra-abdominal fluid collection, and Epigastric pain were also pertinent to this visit. Performance deficits affecting function: weakness, impaired functional mobility, gait instability, impaired endurance, pain, decreased upper extremity function, decreased lower extremity function, impaired self care skills, impaired balance, decreased safety awareness, decreased coordination, impaired joint extensibility, decreased ROM, impaired coordination.      Rehab Prognosis: Fair; patient would benefit from acute skilled OT services to address these deficits and reach maximum level of function.       Plan:     Patient to be seen 3 x/week to address the above listed problems via self-care/home management, therapeutic activities, therapeutic exercises  Plan of Care Expires: 02/15/25  Plan of Care Reviewed with: patient    Subjective     Chief Complaint: abdominal pain  Patient/Family Comments/goals: return to supine    Occupational Profile:  Pt lives alone in a senior citizen apt on the 1st floor; pt was recently at SNF  Prior to admission, patients level of function was limited ambulation with RW, pt reports was able to perform ADL's prior to SNF but has been debilitated and requiring assist  since.  Equipment used at home: shower chair, walker, rolling.  DME owned (not currently used): none.  Upon discharge, patient will have assistance from staff at SNF.    Pain/Comfort:  Pain Rating 1: 10/10  Location 1: abdomen  Pain Addressed 1: Reposition, Distraction, Cessation of Activity, Nurse notified  Pain Rating Post-Intervention 1: 10/10  Pain Rating 2: other (see comments) (not rated; pain with movement)  Location 2:  (buttocks)  Pain Addressed 2: Reposition, Distraction, Cessation of Activity, Nurse notified    Patients cultural, spiritual, Yazidi conflicts given the current situation: no    Objective:     Communicated with: nsg prior to session.  Patient found HOB elevated with telemetry, blood pressure cuff, pulse ox (continuous) upon OT entry to room.    General Precautions: Standard, fall  Orthopedic Precautions: N/A  Braces: N/A  Respiratory Status: Room air    Occupational Performance:    Bed Mobility:    Patient completed Scooting/Bridging with moderate assistance  Patient completed Supine to Sit with moderate assistance and 2 persons  Patient completed Sit to Supine with maximal assistance and 2 persons    Functional Mobility/Transfers:  Patient completed Sit <> Stand Transfer with moderate assistance  with  hand-held assist and second person for safety   Functional Mobility: unable to take side steps at this time    Cognitive/Visual Perceptual:  Cognitive/Psychosocial Skills:     -       Oriented to: Person, Place, Time, and Situation   -       Safety awareness/insight to disability: impaired   -       Mood/Affect/Coping skills/emotional control: Tearful and Cooperative    Physical Exam:  Upper Extremity Range of Motion:  shoulder flex grossly 110 degrees bilaterally  Upper Extremity Strength: 3+/5 distally   Strength:    -       Right Upper Extremity:  Fair  -       Left Upper Extremity:  Fair    AMPAC 6 Click ADL:  AMPAC Total Score: 15    Treatment & Education:  Pt would benefit from  cont OT services in order to maximize functional independence.   Pt seen with PT this date in ED.   Pt with c/o pain to abdomen & buttocks.   Pt performing bed mobility with Mod/MaxA x2.   Will progress as able.     Patient left HOB elevated with all lines intact, call button in reach, and nsg present    GOALS:   Multidisciplinary Problems       Occupational Therapy Goals          Problem: Occupational Therapy    Goal Priority Disciplines Outcome Interventions   Occupational Therapy Goal     OT, PT/OT Progressing    Description: Goals to be met by: 02/15/2025     Patient will increase functional independence with ADLs by performing:    Toileting from bedside commode with Minimal Assistance for hygiene and clothing management.   Sitting at edge of bed x10 minutes with Supervision.  Supine to sit with Supervision.  Step transfer with Stand-by Assistance  Toilet transfer to bedside commode with Stand-by Assistance.                         History:     Past Medical History:   Diagnosis Date    Anxiety and depression 07/21/2015    Arthritis     Cervical radiculopathy 04/08/2016    Cirrhosis of liver     Colon cancer 11/2024    Colon polyps 04/27/2015    Diabetes mellitus type 2 with neurological manifestations 11/06/2015    no current medications, only one episode of elevated sugars with acute illness, will monitor     Encounter for blood transfusion     Hepatitis C     s/p treatment per patient report; managed by Dr. Perez    Hip fracture     Macrocytosis 07/21/2015    Thyroid disease     Transfusion reaction     hep c         Past Surgical History:   Procedure Laterality Date    APPENDECTOMY      BACK SURGERY      CAROTID ENDARTERECTOMY Right 6/13/2023    Procedure: ENDARTERECTOMY, CAROTID;  Surgeon: Juan James MD;  Location: [facility];  Service: Cardiology;  Laterality: Right;  RIGHT    CAUDAL EPIDURAL STEROID INJECTION N/A 8/7/2018    Procedure: Injection-steroid-epidural-caudal;  Surgeon: Roxana Gu Jr.,  "MD;  Location: [facility] OR;  Service: Pain Management;  Laterality: N/A;  with cath target L4-5    CAUDAL EPIDURAL STEROID INJECTION N/A 2019    Procedure: Injection-steroid-epidural-caudal;  Surgeon: Roxana Gu Jr., MD;  Location: [facility];  Service: Pain Management;  Laterality: N/A;     SECTION      CHOLECYSTECTOMY      COLONOSCOPY  3/31/10    2 polyps, tubular adenoma    COLONOSCOPY  2015    Dr. Washington    COLONOSCOPY N/A 10/10/2018    Procedure: COLONOSCOPY;  Surgeon: Reuben Miller Jr., MD;  Location: [facility];  Service: Endoscopy;  Laterality: N/A;    ESOPHAGOGASTRODUODENOSCOPY N/A 10/10/2018    Procedure: EGD (ESOPHAGOGASTRODUODENOSCOPY);  Surgeon: Reuben Miller Jr., MD;  Location: [facility];  Service: Endoscopy;  Laterality: N/A;    ESOPHAGOGASTRODUODENOSCOPY N/A 12/10/2018    Procedure: EGD (ESOPHAGOGASTRODUODENOSCOPY)/poss emr;  Surgeon: Belle Kuo MD;  [facility]  Service: Endoscopy;  Laterality: N/A;    ESOPHAGOGASTRODUODENOSCOPY N/A 3/12/2019    Procedure: EGD (ESOPHAGOGASTRODUODENOSCOPY);  Surgeon: Polo Keyes MD;  Location: [facility];  Service: Endoscopy;  Laterality: N/A;    ESOPHAGOGASTRODUODENOSCOPY N/A 2020    Procedure: ESOPHAGOGASTRODUODENOSCOPY (EGD);  Surgeon: Belle Kuo MD;  Location: [facility];  Service: Endoscopy;  Laterality: N/A;    HERNIA REPAIR      HYSTERECTOMY      Uterus and both ovaries    INCISIONAL HERNIA REPAIR      x 2    JOINT REPLACEMENT      LIVER BIOPSY  2003    autoimmune hepatitis    ROTATOR CUFF REPAIR      right    STOMACH SURGERY      "took lymph node off of liver"    TOTAL HIP ARTHROPLASTY      left hip    UPPER GASTROINTESTINAL ENDOSCOPY  3/24/10    normal    UPPER GASTROINTESTINAL ENDOSCOPY  2015    Dr. Washington       Time Tracking:     OT Date of Treatment: 01/15/25  OT Start Time: 1049  OT Stop Time: 1103  OT Total Time (min): 14 min    Billable Minutes:Evaluation 14 " w/PT    1/15/2025

## 2025-01-15 NOTE — SUBJECTIVE & OBJECTIVE
No current facility-administered medications on file prior to encounter.     Current Outpatient Medications on File Prior to Encounter   Medication Sig    ALPRAZolam (XANAX) 1 MG tablet TAKE ONE (1) TABLET BY MOUTH TWICE A DAY AS NEEDED FR ANXIETY (Patient taking differently: Take by mouth 2 (two) times a day. Patient take 1/2 tablet BID and 1 tablet HS)    atorvastatin (LIPITOR) 40 MG tablet Take 1 tablet (40 mg total) by mouth once daily.    cholecalciferol, vitamin D3, 125 mcg (5,000 unit) capsule Take 1 tablet  by mouth daily with breakfast. (Patient taking differently: Take 5,000 Units by mouth daily with breakfast.)    HYDROcodone-acetaminophen (NORCO)  mg per tablet TAKE ONE (1) TABLET BY MOUTH THREE (3) TIMES DAILY AS NEEDED FOR PAIN (Patient taking differently: Take 1 tablet by mouth 3 (three) times daily as needed for Pain.)    levothyroxine (SYNTHROID) 75 MCG tablet TAKE 1 TABLET EVERY DAY ON AN EMPTY STOMACH (Patient taking differently: Take 75 mcg by mouth before breakfast.)    nicotine (NICODERM CQ) 21 mg/24 hr Place 1 patch onto the skin once daily.    ondansetron (ZOFRAN-ODT) 8 MG TbDL Dissolve 1 tablet (8 mg total) by mouth under the tongue 3 (three) times daily as needed (nausea).    pantoprazole (PROTONIX) 40 MG tablet TAKE 1 TABLET EVERY DAY    amitriptyline (ELAVIL) 25 MG tablet TAKE 1-2 TABS NIGHTLY AS NEEDED FOR INSOMNIA RELATED TO CHRONIC PAIN (Patient not taking: Reported on 1/14/2025)    blood sugar diagnostic Strp To check BG 1 times daily, to use with insurance preferred meter    blood-glucose meter kit Test tid please dispense test strips and lancets    blood-glucose meter kit To check BG 1 times daily, to use with insurance preferred meter    ergocalciferol (ERGOCALCIFEROL) 50,000 unit Cap Take 1 capsule (50,000 Units total) by mouth every 7 days.    lancets Misc To check BG 1 times daily, to use with insurance preferred meter    linaCLOtide (LINZESS) 145 mcg Cap capsule Take 1  capsule (145 mcg total) by mouth before breakfast. Take with a large glass of water just before 1st meal of the day (Patient not taking: Reported on 2025)    naloxone (NARCAN) 4 mg/actuation Spry 4mg by nasal route as needed for opioid overdose; may repeat every 2-3 minutes in alternating nostrils until medical help arrives. Call 911 (Patient not taking: Reported on 1/10/2024)       Review of patient's allergies indicates:   Allergen Reactions    Cefaclor Hives    Gabapentin Swelling    Penicillins      Other reaction(s): Hives    Sulfa (sulfonamide antibiotics) Other (See Comments)     Other reaction(s): Hives       Past Medical History:   Diagnosis Date    Anxiety and depression 2015    Arthritis     Cervical radiculopathy 2016    Cirrhosis of liver     Colon cancer 2024    Colon polyps 2015    Diabetes mellitus type 2 with neurological manifestations 2015    no current medications, only one episode of elevated sugars with acute illness, will monitor     Encounter for blood transfusion     Hepatitis C     s/p treatment per patient report; managed by Dr. Perez    Hip fracture     Macrocytosis 2015    Thyroid disease     Transfusion reaction     hep c     Past Surgical History:   Procedure Laterality Date    APPENDECTOMY      BACK SURGERY      CAROTID ENDARTERECTOMY Right 2023    Procedure: ENDARTERECTOMY, CAROTID;  Surgeon: Juan James MD;  Location: [facility] OR;  Service: Cardiology;  Laterality: Right;  RIGHT    CAUDAL EPIDURAL STEROID INJECTION N/A 2018    Procedure: Injection-steroid-epidural-caudal;  Surgeon: Roxana Gu Jr., MD;  Location: [facility] OR;  Service: Pain Management;  Laterality: N/A;  with cath target L4-5    CAUDAL EPIDURAL STEROID INJECTION N/A 2019    Procedure: Injection-steroid-epidural-caudal;  Surgeon: Roxana Gu Jr., MD;  Location: [facility]IN [facility]GT;  Service: Pain Management;  Laterality: N/A;     SECTION       "CHOLECYSTECTOMY      COLONOSCOPY  3/31/10    2 polyps, tubular adenoma    COLONOSCOPY  04/27/2015    Dr. Washington    COLONOSCOPY N/A 10/10/2018    Procedure: COLONOSCOPY;  Surgeon: Reuben Miller Jr., MD;  Location: [facility];  Service: Endoscopy;  Laterality: N/A;    ESOPHAGOGASTRODUODENOSCOPY N/A 10/10/2018    Procedure: EGD (ESOPHAGOGASTRODUODENOSCOPY);  Surgeon: Reuben Miller Jr., MD;  Location: [facility];  Service: Endoscopy;  Laterality: N/A;    ESOPHAGOGASTRODUODENOSCOPY N/A 12/10/2018    Procedure: EGD (ESOPHAGOGASTRODUODENOSCOPY)/poss emr;  Surgeon: Belle Kuo MD;  Location: [address]);  Service: Endoscopy;  Laterality: N/A;    ESOPHAGOGASTRODUODENOSCOPY N/A 3/12/2019    Procedure: EGD (ESOPHAGOGASTRODUODENOSCOPY);  Surgeon: Polo Keyes MD;  Location: [facility];  Service: Endoscopy;  Laterality: N/A;    ESOPHAGOGASTRODUODENOSCOPY N/A 11/20/2020    Procedure: ESOPHAGOGASTRODUODENOSCOPY (EGD);  Surgeon: Belle Kuo MD;  Location: [facility];  Service: Endoscopy;  Laterality: N/A;    HERNIA REPAIR      HYSTERECTOMY  1989    Uterus and both ovaries    INCISIONAL HERNIA REPAIR      x 2    JOINT REPLACEMENT      LIVER BIOPSY  12/2003    autoimmune hepatitis    ROTATOR CUFF REPAIR      right    STOMACH SURGERY  1980's    "took lymph node off of liver"    TOTAL HIP ARTHROPLASTY      left hip    UPPER GASTROINTESTINAL ENDOSCOPY  3/24/10    normal    UPPER GASTROINTESTINAL ENDOSCOPY  04/27/2015    Dr. Washington     Family History       Problem Relation (Age of Onset)    Brain cancer Brother    Breast cancer Sister    Cataracts Sister, Sister    Colon cancer Brother    Diabetes Mother, Brother, Other, Son    Diabetes Mellitus Mother    Liver cancer Sister    Lung cancer Brother    Pancreatic cancer Brother    Stomach cancer Other    Throat cancer Brother          Tobacco Use    Smoking status: Every Day     Current packs/day: 2.00     Average packs/day: 2.0 packs/day for 45.0 years (90.0 " ttl pk-yrs)     Types: Cigarettes    Smokeless tobacco: Never   Substance and Sexual Activity    Alcohol use: No     Comment: used to drink heavily, stopped in 1990's    Drug use: No    Sexual activity: Not Currently     Partners: Male     Review of Systems   Constitutional:  Negative for chills and fever.   HENT:  Negative for trouble swallowing and voice change.    Eyes: Negative.    Respiratory:  Negative for chest tightness and shortness of breath.    Cardiovascular:  Negative for chest pain and palpitations.   Gastrointestinal:  Positive for abdominal pain, diarrhea and nausea. Negative for abdominal distention and vomiting.   Endocrine: Negative.    Genitourinary: Negative.    Musculoskeletal: Negative.    Neurological: Negative.    Hematological: Negative.    Psychiatric/Behavioral: Negative.       Objective:     Vital Signs (Most Recent):  Temp: 98.2 °F (36.8 °C) (01/15/25 0509)  Pulse: 83 (01/15/25 0440)  Resp: 16 (01/15/25 0440)  BP: 111/72 (01/15/25 0440)  SpO2: 98 % (01/15/25 0440) Vital Signs (24h Range):  Temp:  [97.8 °F (36.6 °C)-98.4 °F (36.9 °C)] 98.2 °F (36.8 °C)  Pulse:  [] 83  Resp:  [12-18] 16  SpO2:  [96 %-99 %] 98 %  BP: ()/(56-72) 111/72     Weight: 65.8 kg (145 lb)  Body mass index is 26.52 kg/m².     Physical Exam  Vitals reviewed.   Constitutional:       General: She is not in acute distress.     Appearance: Normal appearance. She is not toxic-appearing.   HENT:      Head: Normocephalic and atraumatic.      Nose: Nose normal.   Cardiovascular:      Rate and Rhythm: Normal rate.      Pulses: Normal pulses.   Pulmonary:      Effort: Pulmonary effort is normal. No respiratory distress.   Abdominal:      General: Abdomen is flat. There is no distension.      Palpations: Abdomen is soft.      Tenderness: There is no guarding or rebound.      Comments: Mildly tender to palpation in the right abdomen; no guarding, no rebound, non peritonitic   Skin:     General: Skin is warm.    Neurological:      General: No focal deficit present.      Mental Status: She is alert and oriented to person, place, and time.            I have reviewed all pertinent lab results within the past 24 hours.  CBC:   Recent Labs   Lab 01/15/25  0412   WBC 9.20   RBC 3.60*   HGB 11.5*   HCT 35.5*      MCV 99*   MCH 31.9*   MCHC 32.4     CMP:   Recent Labs   Lab 01/14/25  1504   GLU 89   CALCIUM 8.9   ALBUMIN 2.6*   PROT 7.0      K 4.6   CO2 21*      BUN 33*   CREATININE 0.8   ALKPHOS 76   ALT 32   AST 22   BILITOT 0.5       Significant Diagnostics:  I have reviewed all pertinent imaging results/findings within the past 24 hours.    X-Ray Abdomen AP 1 View  1/15/2025    Patent ileocolic anastomosis noting Gastrografin oral contrast throughout the colon to the level of the rectum 4 hours post ingestion.  No convincing bowel distension as imaged.     CT Abdomen Pelvis With IV Contrast NO Oral Contrast  1/14/2025    1. Operative change of partial colon resection and ileal colic anastomosis with suspected developing small bowel obstruction and transition point seen at the ileocolic anastomosis.  Surgical consultation advised. 2. Moderate to large colonic and rectal stool burden suggesting constipation, noting rectal impaction not exclude. 3. Distension of the distal esophagus containing air and fluid related to reflux, which increases aspiration risk. 4. Rim enhancing gas and fluid collection at the lateral right abdominal wall concerning for abscess.  This is likely larger when compared to outside facility CT abdomen and pelvis report dated 12/19/2024.  No other drainable collection seen.  No definite fistulous communication with the peritoneal cavity/bowel. 5. Cholecystectomy.  Grossly stable moderate intrahepatic and extrahepatic biliary ductal dilatation. 6. Trace layering right pleural effusion with associated overlying mild passive atelectasis. 7. Small volume likely reactive free fluid tracking  along the right pericolic gutter to the mesenteric root. 8. Other incidental/nonemergent findings in the body of the report.       CT Abdomen Pelvis With IV Contrast  12/19/2024     1.  Distention of the distal esophagus containing air and fluid.  The stomach is also distended with a recent meal. 2.  A site of partial colectomy and ileocolic anastomosis is visible at the hepatic flexure which appears to be recent.  A small collection of fluid is visible in the area and an enhancing fluid collection consistent with an abscess is visible in the overlying right lateral abdominal wall measuring 7.8 x 3.5 x 1.0 cm. 3.  Status post cholecystectomy, hysterectomy, lumbar spine surgery and bilateral hip arthroplasties

## 2025-01-15 NOTE — PT/OT/SLP EVAL
Physical Therapy Evaluation    Patient Name:  Thom Krishna   MRN:  [id number]    Recommendations:     Discharge Recommendations: Moderate Intensity Therapy   Discharge Equipment Recommendations: to be determined by next level of care   Barriers to discharge: Decreased caregiver support and fall risk    Assessment:     Thom Krishna is a 69 y.o. female admitted with a medical diagnosis of Epigastric pain.  She presents with the following impairments/functional limitations: weakness, impaired functional mobility, impaired endurance, gait instability, impaired self care skills, impaired balance, decreased lower extremity function, decreased upper extremity function, decreased ROM, decreased coordination, decreased safety awareness, pain, edema .Pt reporting 10/10 abdominal pain and functional mobility limited this date. Pt required Mod-MaxA x 2 for bed mobility. ModA x 1 for sit <>stand with HHA. Pt unable to advance LLE to step laterally toward HOB. Recommending moderate intensity therapy due to change in functional mobility from baseline and increased risk for falls.    Rehab Prognosis: Fair; patient would benefit from acute skilled PT services to address these deficits and reach maximum level of function.    Recent Surgery: * No surgery found *      Plan:     During this hospitalization, patient to be seen 5 x/week to address the identified rehab impairments via gait training, therapeutic activities, therapeutic exercises, neuromuscular re-education and progress toward the following goals:    Plan of Care Expires:  02/15/25    Subjective     Chief Complaint: abdominal pain  Patient/Family Comments/goals: reduce pain  Pain/Comfort:  Pain Rating 1: 10/10  Location 1: abdomen  Pain Addressed 1: Reposition, Distraction, Cessation of Activity, Nurse notified  Pain Rating Post-Intervention 1: 10/10    Patients cultural, spiritual, Uatsdin conflicts given the current situation: no    Living Environment:  Pt lives alone in a  "senior citizen apt on the 1st floor; pt was recently at SNF  Prior to admission, patients level of function was limited ambulation with RW, pt reports was able to perform ADL's prior to SNF but has been debilitated and requiring assist since.  Equipment used at home: shower chair, walker, rolling.  DME owned (not currently used): none.  Upon discharge, patient will have assistance from staff at SNF.    Objective:     Communicated with nsg prior to session.  Patient found HOB elevated with telemetry, blood pressure cuff, pulse ox (continuous), peripheral IV  upon PT entry to room.    General Precautions: Standard, fall  Orthopedic Precautions:N/A   Braces: N/A      Exams:  Cognitive Exam:  Patient is oriented to Person, Place, Time, and Situation  Skin Integrity/Edema:      -       Edema: Moderate LLE  BLE ROM: limited AROM, pt stating "I can't"; poor tolerance to PROM (R>L)  BLE Strength: grossly 2 to 2+/5      Functional Mobility:  Bed Mobility:     Scooting: moderate assistance  Supine to Sit: moderate assistance and of 2 persons  Sit to Supine: maximal assistance and of 2 persons  Transfers:     Sit to Stand:  moderate assistance with hand-held assist  Gait: Pt unable to side step upon attempts, moves RLE but unable to advance LLE, pt stating "I can't" and needing to lay back down      AM-PAC 6 CLICK MOBILITY  Total Score:10       Treatment & Education:  Pt educated on role of PT/POC and pt agreeable to participate in therapy session  Pt with limited activity tolerance and reporting pain in BLE (L>R) with mobility  Pt agreeable to sit EOB  CGA for sitting balance   Pt unable to take side steps and returned supine  Discussed d/c recs    Patient left HOB elevated with all lines intact, call button in reach, nsg notified, and nsg present.    GOALS:   Multidisciplinary Problems       Physical Therapy Goals          Problem: Physical Therapy    Goal Priority Disciplines Outcome Interventions   Physical Therapy Goal "     PT, PT/OT Progressing    Description: Goals to be met by:  2/15/25    Patient will increase functional independence with mobility by performin. Supine to sit with Modified [place]  2. Sit to supine with Modified [place]  3. Sit to stand transfer with Stand-by Assistance  4. Bed to chair transfer with Stand-by Assistance using Rolling Walker  5. Gait  x 50 feet with Stand-by Assistance using Rolling Walker.                          History:     Past Medical History:   Diagnosis Date    Anxiety and depression 2015    Arthritis     Cervical radiculopathy 2016    Cirrhosis of liver     Colon cancer 2024    Colon polyps 2015    Diabetes mellitus type 2 with neurological manifestations 2015    no current medications, only one episode of elevated sugars with acute illness, will monitor     Encounter for blood transfusion     Hepatitis C     s/p treatment per patient report; managed by Dr. Perez    Hip fracture     Macrocytosis 2015    Thyroid disease     Transfusion reaction     hep c       Past Surgical History:   Procedure Laterality Date    APPENDECTOMY      BACK SURGERY      CAROTID ENDARTERECTOMY Right 2023    Procedure: ENDARTERECTOMY, CAROTID;  Surgeon: Juan James MD;  Location: [facility] OR;  Service: Cardiology;  Laterality: Right;  RIGHT    CAUDAL EPIDURAL STEROID INJECTION N/A 2018    Procedure: Injection-steroid-epidural-caudal;  Surgeon: Roxana Gu Jr., MD;  Location: [facility] OR;  Service: Pain Management;  Laterality: N/A;  with cath target L4-5    CAUDAL EPIDURAL STEROID INJECTION N/A 2019    Procedure: Injection-steroid-epidural-caudal;  Surgeon: Roxana Gu Jr., MD;  Location: [facility] PAIN MGT;  Service: Pain Management;  Laterality: N/A;     SECTION      CHOLECYSTECTOMY      COLONOSCOPY  3/31/10    2 polyps, tubular adenoma    COLONOSCOPY  2015    Dr. Washington    COLONOSCOPY N/A 10/10/2018    Procedure: COLONOSCOPY;   "Surgeon: Reuben Miller Jr., MD;  Location: [facility];  Service: Endoscopy;  Laterality: N/A;    ESOPHAGOGASTRODUODENOSCOPY N/A 10/10/2018    Procedure: EGD (ESOPHAGOGASTRODUODENOSCOPY);  Surgeon: Reuben Miller Jr., MD;  Location: [facility];  Service: Endoscopy;  Laterality: N/A;    ESOPHAGOGASTRODUODENOSCOPY N/A 12/10/2018    Procedure: EGD (ESOPHAGOGASTRODUODENOSCOPY)/poss emr;  Surgeon: Belle Kuo MD;  Location: [facility] ([facility]);  Service: Endoscopy;  Laterality: N/A;    ESOPHAGOGASTRODUODENOSCOPY N/A 3/12/2019    Procedure: EGD (ESOPHAGOGASTRODUODENOSCOPY);  Surgeon: Polo Keyes MD;  Location: [facility];  Service: Endoscopy;  Laterality: N/A;    ESOPHAGOGASTRODUODENOSCOPY N/A 11/20/2020    Procedure: ESOPHAGOGASTRODUODENOSCOPY (EGD);  Surgeon: Belle Kuo MD;  Location: [facility];  Service: Endoscopy;  Laterality: N/A;    HERNIA REPAIR      HYSTERECTOMY  1989    Uterus and both ovaries    INCISIONAL HERNIA REPAIR      x 2    JOINT REPLACEMENT      LIVER BIOPSY  12/2003    autoimmune hepatitis    ROTATOR CUFF REPAIR      right    STOMACH SURGERY  1980's    "took lymph node off of liver"    TOTAL HIP ARTHROPLASTY      left hip    UPPER GASTROINTESTINAL ENDOSCOPY  3/24/10    normal    UPPER GASTROINTESTINAL ENDOSCOPY  04/27/2015    Dr. Washington       Time Tracking:     PT Received On: 01/15/25  PT Start Time: 1049     PT Stop Time: 1103  PT Total Time (min): 14 min Overlap with OT for portions of session due to complex nature of patient and for safety with mobility to decrease fall risk for patient and caregiver injury requiring two skilled therapists to provide different interventions.      Billable Minutes: Evaluation 14      01/15/2025  "

## 2025-01-15 NOTE — PROCEDURES
"  Pre Op Diagnosis: Right abdominal wall abscess  Post Op Diagnosis: Same    Procedure: drain placement    Procedure performed by: Deric    Written Informed Consent Obtained: Yes  Specimen Removed: YES 20cc of bloody purulent fluid  Estimated Blood Loss: Minimal    Findings:   Successful placement of 10 Fr drain in right abdominal wall collection. Given history, leave drain in lace until less than 10cc of fluid is aspirated daily for 2 days and CT imaging demonstrates no further evidence of collection.     Patient tolerated procedure well.    Mike Leos MD ([place])  Interventional Radiology  [phone]      "

## 2025-01-15 NOTE — ED NOTES
Son Called and primary rn updated him with current plan of care, son said she is confused always, sometimes she is not what she is saying, if you have any questions call him or this number [phone] JONATHAN to get any answers.

## 2025-01-15 NOTE — ED NOTES
Patient laying down in bed comfortably, states she is not having any pain at this time, her blood glucose 45, dextrose 10 % infusing at 25 ml/hr, patient is not currently in any distress denies any needs at this time.

## 2025-01-15 NOTE — NURSING
Bladder scan showed 501. Pt urinates with encouragement and direction to do so. PW in place. PVR showed 402 ml.

## 2025-01-15 NOTE — HPI
Thom Krishna is a 69 y.o. female with a history of Hep C (treated per family), DM2, hypothyroidism, GERD, autoimmune hepatitis (?), tobacco abuse, cirrhosis (?), EtOH abuse, CVA, carotid stenosis s/p R CEA, and recently diagnosed colon cancer s/p partial colectomy with ileocolonic anastomosis on 11/1 at an outside facility. History is unclear as we do not have access to the majority of her care, but, per family, she required take back for an intra-abdominal infection. Following this, she developed an anterior abdominal wall abscess requiring IR aspiration (12/27) and a 14 day course of IV antibiotics, which she recently finished. She was discharged from SNF yesterday. Per her care giver, she has had loose stools for about 2 weeks. Also with bilateral lower extremity swelling during this time. She presents to the ED today with complaints of abdominal pain associated with nausea. She has not had any emesis during this time to suggest clinical obstruction.

## 2025-01-15 NOTE — ASSESSMENT & PLAN NOTE
Thom Krishna is a 69 y.o. female with a history of Hep C (treated per family), DM2, hypothyroidism, GERD, autoimmune hepatitis (?), tobacco abuse, cirrhosis (?), EtOH abuse, CVA, carotid stenosis s/p R CEA, and recently diagnosed colon cancer s/p partial colectomy with ileocolonic anastomosis on 11/1 at an outside facility. History is unclear as we do not have access to the majority of her care, but, per family, she required take back for an intra-abdominal infection. Following this, she developed an anterior abdominal wall abscess requiring IR aspiration (12/27) and a 14 day course of IV antibiotics, which she recently finished. She was discharged from SNF yesterday. Per her care giver, she has had loose stools for about 2 weeks. Also with bilateral lower extremity swelling during this time. She presents to the ED today with complaints of abdominal pain associated with nausea. She has not had any emesis during this time to suggest clinical obstruction.      In the ED, the patient is afebrile and hemodynamically stable. Her lab work is largely unremarkable outside a mildly elevated BNP (157). She is without leukocytosis. Her lactate is normal. CT A/P is most significant for new right pulmonary effusion, small amount of pericardial fluid, post op changes of her partial colectomy with ileo-colonic anastomosis with distended small bowel proximal to the anastomosis concerning for partial or developing obstruction, and re-demonstration of anterolateral right abdominal wall abscess without obvious fistulous communication with the peritoneal cavity or bowel.      The patient has had a complicated post surgical course. She ultimately presents with continued issues and overall failure to thrive. Gastrograffin readily passed into her colon, past the anastomosis on 4 hour KUB. May benefit from non-emergent colonoscopy to assess the anastomosis if there is concern for stricture.      -- admit to medicine   -- no acute surgical  intervention indicated at this time; not obstructed  -- IR consultation for consideration of aspiration vs drain placement into her anterior abdominal wall collection  -- may require colonoscopy to assess the anastomosis  -- PT/OT   -- obtain outside chart if possible  -- remainder of care per primary team   -- general surgery will follow; please call with any questions or changes in her clinical status

## 2025-01-15 NOTE — ASSESSMENT & PLAN NOTE
Left thigh veins: Occlusive thrombus in the common femoral  Heparin drip   Consult cardiovascular

## 2025-01-15 NOTE — PHARMACY MED REC
"  [facility]           Pharmacy  Admission Medication History     The home medication history was taken by Haley Arnold.      Medication history obtained from Medications listed below were obtained from: Patient/family    Based on information gathered for medication list, you may go to "Admission" then "Reconcile Home Medications" tabs to review and/or act upon those items.     The home medication list has been updated by the Pharmacy department.   Please read ALL comments highlighted in yellow.   Please address this information as you see fit.    Feel free to contact us if you have any questions or require assistance.    The medications listed below were removed from the home medication list.  Please reorder if appropriate:    Patient reports NOT TAKING the following medication(s):  Aspirin 325mg  Tylenol 325mg  Voltaren gel  Colace 100mg  Miralax 17g      No current facility-administered medications on file prior to encounter.     Current Outpatient Medications on File Prior to Encounter   Medication Sig Dispense Refill    ALPRAZolam (XANAX) 1 MG tablet TAKE ONE (1) TABLET BY MOUTH TWICE A DAY AS NEEDED FR ANXIETY (Patient taking differently: Take by mouth 2 (two) times a day. Patient take 1/2 tablet BID and 1 tablet HS) 60 tablet 5    atorvastatin (LIPITOR) 40 MG tablet Take 1 tablet (40 mg total) by mouth once daily. 90 tablet 4    cholecalciferol, vitamin D3, 125 mcg (5,000 unit) capsule Take 1 tablet  by mouth daily with breakfast. (Patient taking differently: Take 5,000 Units by mouth daily with breakfast.) 100 capsule 4    HYDROcodone-acetaminophen (NORCO)  mg per tablet TAKE ONE (1) TABLET BY MOUTH THREE (3) TIMES DAILY AS NEEDED FOR PAIN (Patient taking differently: Take 1 tablet by mouth 3 (three) times daily as needed for Pain.) 90 tablet 0    levothyroxine (SYNTHROID) 75 MCG tablet TAKE 1 TABLET EVERY DAY ON AN EMPTY STOMACH (Patient taking differently: Take 75 mcg by mouth " before breakfast.) 90 tablet 1    nicotine (NICODERM CQ) 21 mg/24 hr Place 1 patch onto the skin once daily. 30 patch 11    ondansetron (ZOFRAN-ODT) 8 MG TbDL Dissolve 1 tablet (8 mg total) by mouth under the tongue 3 (three) times daily as needed (nausea). 30 tablet 11    pantoprazole (PROTONIX) 40 MG tablet TAKE 1 TABLET EVERY DAY 90 tablet 3    blood sugar diagnostic Strp To check BG 1 times daily, to use with insurance preferred meter 100 strip 3    blood-glucose meter kit Test tid please dispense test strips and lancets      blood-glucose meter kit To check BG 1 times daily, to use with insurance preferred meter 1 each 1    ergocalciferol (ERGOCALCIFEROL) 50,000 unit Cap Take 1 capsule (50,000 Units total) by mouth every 7 days. 15 capsule 4    lancets Misc To check BG 1 times daily, to use with insurance preferred meter 100 each 3       Please address this information as you see fit.  Feel free to contact us if you have any questions or require assistance.    Haley Arnold  [phone]                .

## 2025-01-15 NOTE — ED NOTES
Patient laying down in bed complaining of back pain and abdominal pain, pain medicine administered, restarted heparin, currently no signs of bleeding, patient is alert, oriented*4, denies other distress at this time.

## 2025-01-15 NOTE — H&P
[facility] Medicine  History & Physical    Patient Name: Thom Krishna  MRN: [id number]  Patient Class: IP- Inpatient  Admission Date: 1/14/2025  Attending Physician: Nilton Dooley,*   Primary Care Provider: Brandy Dejesus MD         Patient information was obtained from patient, caregiver / friend, and ER records.     Subjective:     Principal Problem:Epigastric pain    Chief Complaint:   Chief Complaint   Patient presents with    Abdominal Pain     Abdominal pain since November- family states had a procedure done for colon cx treatment. Family states steady decline in patient's health, increased confusion. Recently finished abx this past Sunday. Was at skilled facility, removed today due to care. Denies nausea, emesis. Lower left extremity swelling noted in triage, family states that they were told Thursday night about it. Picc line noted to left upper arm.        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 She  presented to the ED with complaint of abdominal pain.  Diagnosis with colon cancer in Nov 2024 and underwent colon resection, however developed intra abdominal infection requiring operative intervention. She was admitted to SNF facility and underwent treatment with IV antibiotic. She continue to experiencing symptoms of abdominal pain, increase confusion. CT of abdominal showed  Operative change of partial colon resection and ileal colic anastomosis with suspected developing small bowel obstruction and transition point seen at the ileocolic anastomosis. Rim enhancing gas and fluid collection at the lateral right abdominal wall concerning for abscess. General Surgery consulted, with plan IR consultation for consideration of aspiration vs drain placement into her anterior abdominal wall collection. Lt leg verous ultrasound showed,.left lower  extremity DVT in the common femoral vein. Patient being admitted under the care of [facility] Medicine.              Past Medical History:   Diagnosis Date    Anxiety and depression 2015    Arthritis     Cervical radiculopathy 2016    Cirrhosis of liver     Colon polyps 2015    Diabetes mellitus type 2 with neurological manifestations 2015    no current medications, only one episode of elevated sugars with acute illness, will monitor     Encounter for blood transfusion     Hepatitis C     s/p treatment per patient report; managed by Dr. Perez    Hip fracture     Macrocytosis 2015    Thyroid disease     Transfusion reaction     hep c       Past Surgical History:   Procedure Laterality Date    APPENDECTOMY      BACK SURGERY      CAROTID ENDARTERECTOMY Right 2023    Procedure: ENDARTERECTOMY, CAROTID;  Surgeon: Juan James MD;  Location: [facility] OR;  Service: Cardiology;  Laterality: Right;  RIGHT    CAUDAL EPIDURAL STEROID INJECTION N/A 2018    Procedure: Injection-steroid-epidural-caudal;  Surgeon: Roxana Gu Jr., MD;  Location: [facility] OR;  Service: Pain Management;  Laterality: N/A;  with cath target L4-5    CAUDAL EPIDURAL STEROID INJECTION N/A 2019    Procedure: Injection-steroid-epidural-caudal;  Surgeon: Roxana Gu Jr., MD;  Location: [facility] PAIN T;  Service: Pain Management;  Laterality: N/A;     SECTION      CHOLECYSTECTOMY      COLONOSCOPY  3/31/10    2 polyps, tubular adenoma    COLONOSCOPY  2015    Dr. Washington    COLONOSCOPY N/A 10/10/2018    Procedure: COLONOSCOPY;  Surgeon: Reuben Miller Jr., MD;  Location: [facility] ENDO;  Service: Endoscopy;  Laterality: N/A;    ESOPHAGOGASTRODUODENOSCOPY N/A 10/10/2018    Procedure: EGD (ESOPHAGOGASTRODUODENOSCOPY);  Surgeon: Reuben Miller Jr., MD;  Location: [facility] ENDO;  Service: Endoscopy;  Laterality: N/A;    ESOPHAGOGASTRODUODENOSCOPY N/A 12/10/2018    Procedure: EGD (ESOPHAGOGASTRODUODENOSCOPY)/poss  "emr;  Surgeon: Belle Kuo MD;  Location: [facility] ([address] [place] [address]);  Service: Endoscopy;  Laterality: N/A;    ESOPHAGOGASTRODUODENOSCOPY N/A 3/12/2019    Procedure: EGD (ESOPHAGOGASTRODUODENOSCOPY);  Surgeon: Polo Keyes MD;  Location: [facility];  Service: Endoscopy;  Laterality: N/A;    ESOPHAGOGASTRODUODENOSCOPY N/A 11/20/2020    Procedure: ESOPHAGOGASTRODUODENOSCOPY (EGD);  Surgeon: Belle Kuo MD;  Location: [facility];  Service: Endoscopy;  Laterality: N/A;    HERNIA REPAIR      HYSTERECTOMY  1989    Uterus and both ovaries    INCISIONAL HERNIA REPAIR      x 2    JOINT REPLACEMENT      LIVER BIOPSY  12/2003    autoimmune hepatitis    ROTATOR CUFF REPAIR      right    STOMACH SURGERY  1980's    "took lymph node off of liver"    TOTAL HIP ARTHROPLASTY      left hip    UPPER GASTROINTESTINAL ENDOSCOPY  3/24/10    normal    UPPER GASTROINTESTINAL ENDOSCOPY  04/27/2015    Dr. Washington       Review of patient's allergies indicates:   Allergen Reactions    Cefaclor Hives    Gabapentin Swelling    Penicillins      Other reaction(s): Hives    Sulfa (sulfonamide antibiotics) Other (See Comments)     Other reaction(s): Hives       No current facility-administered medications on file prior to encounter.     Current Outpatient Medications on File Prior to Encounter   Medication Sig    ALPRAZolam (XANAX) 1 MG tablet TAKE ONE (1) TABLET BY MOUTH TWICE A DAY AS NEEDED FR ANXIETY (Patient taking differently: Take by mouth 2 (two) times a day. Patient take 1/2 tablet BID and 1 tablet HS)    atorvastatin (LIPITOR) 40 MG tablet Take 1 tablet (40 mg total) by mouth once daily.    cholecalciferol, vitamin D3, 125 mcg (5,000 unit) capsule Take 1 tablet  by mouth daily with breakfast. (Patient taking differently: Take 5,000 Units by mouth daily with breakfast.)    HYDROcodone-acetaminophen (NORCO)  mg per tablet TAKE ONE (1) TABLET BY MOUTH THREE (3) TIMES DAILY AS NEEDED FOR PAIN (Patient taking differently: Take 1 " tablet by mouth 3 (three) times daily as needed for Pain.)    levothyroxine (SYNTHROID) 75 MCG tablet TAKE 1 TABLET EVERY DAY ON AN EMPTY STOMACH (Patient taking differently: Take 75 mcg by mouth before breakfast.)    nicotine (NICODERM CQ) 21 mg/24 hr Place 1 patch onto the skin once daily.    ondansetron (ZOFRAN-ODT) 8 MG TbDL Dissolve 1 tablet (8 mg total) by mouth under the tongue 3 (three) times daily as needed (nausea).    pantoprazole (PROTONIX) 40 MG tablet TAKE 1 TABLET EVERY DAY    amitriptyline (ELAVIL) 25 MG tablet TAKE 1-2 TABS NIGHTLY AS NEEDED FOR INSOMNIA RELATED TO CHRONIC PAIN (Patient not taking: Reported on 1/14/2025)    blood sugar diagnostic Strp To check BG 1 times daily, to use with insurance preferred meter    blood-glucose meter kit Test tid please dispense test strips and lancets    blood-glucose meter kit To check BG 1 times daily, to use with insurance preferred meter    ergocalciferol (ERGOCALCIFEROL) 50,000 unit Cap Take 1 capsule (50,000 Units total) by mouth every 7 days.    lancets Misc To check BG 1 times daily, to use with insurance preferred meter    linaCLOtide (LINZESS) 145 mcg Cap capsule Take 1 capsule (145 mcg total) by mouth before breakfast. Take with a large glass of water just before 1st meal of the day (Patient not taking: Reported on 1/14/2025)    naloxone (NARCAN) 4 mg/actuation Spry 4mg by nasal route as needed for opioid overdose; may repeat every 2-3 minutes in alternating nostrils until medical help arrives. Call 911 (Patient not taking: Reported on 1/10/2024)    [DISCONTINUED] acetaminophen (TYLENOL) 325 MG tablet Take 2 tablets (650 mg total) by mouth every 6 (six) hours as needed for Pain. (Patient not taking: Reported on 1/10/2024)    [DISCONTINUED] aspirin (ECOTRIN) 325 MG EC tablet Take 1 tablet (325 mg total) by mouth once daily.    [DISCONTINUED] diclofenac sodium (VOLTAREN) 1 % Gel Apply 2 g topically 4 (four) times daily.    [DISCONTINUED] docusate  sodium (COLACE) 100 MG capsule Take 1 capsule (100 mg total) by mouth daily as needed for Constipation.    [DISCONTINUED] polyethylene glycol (MIRALAX) 17 gram PwPk Take 17 g by mouth daily as needed (constipation). (Patient not taking: Reported on 1/10/2024)     Family History       Problem Relation (Age of Onset)    Brain cancer Brother    Breast cancer Sister    Cataracts Sister, Sister    Colon cancer Brother    Diabetes Mother, Brother, Other, Son    Diabetes Mellitus Mother    Liver cancer Sister    Lung cancer Brother    Pancreatic cancer Brother    Stomach cancer Other    Throat cancer Brother          Tobacco Use    Smoking status: Every Day     Current packs/day: 2.00     Average packs/day: 2.0 packs/day for 45.0 years (90.0 ttl pk-yrs)     Types: Cigarettes    Smokeless tobacco: Never   Substance and Sexual Activity    Alcohol use: No     Comment: used to drink heavily, stopped in 1990's    Drug use: No    Sexual activity: Not Currently     Partners: Male     Review of Systems   Cardiovascular:  Positive for leg swelling.   Gastrointestinal:  Positive for abdominal pain.   Psychiatric/Behavioral:  Positive for confusion.      Objective:     Vital Signs (Most Recent):  Temp: 98.4 °F (36.9 °C) (01/14/25 2205)  Pulse: 92 (01/14/25 2302)  Resp: 15 (01/14/25 2302)  BP: 95/63 (01/14/25 2302)  SpO2: 98 % (01/14/25 2302) Vital Signs (24h Range):  Temp:  [97.8 °F (36.6 °C)-98.4 °F (36.9 °C)] 98.4 °F (36.9 °C)  Pulse:  [] 92  Resp:  [14-18] 15  SpO2:  [96 %-99 %] 98 %  BP: ()/(56-65) 95/63     Weight: 65.8 kg (145 lb)  Body mass index is 26.52 kg/m².     Physical Exam  Vitals and nursing note reviewed.   Constitutional:       General: She is not in acute distress.     Appearance: She is ill-appearing.   HENT:      Head: Normocephalic and atraumatic.   Cardiovascular:      Heart sounds: No murmur heard.     No friction rub. No gallop.   Pulmonary:      Breath sounds: No stridor. No wheezing, rhonchi or  rales.   Chest:      Chest wall: No tenderness.   Abdominal:      General: There is no distension.      Palpations: There is no mass.      Tenderness: There is abdominal tenderness. There is no right CVA tenderness, left CVA tenderness, guarding or rebound.      Hernia: No hernia is present.   Musculoskeletal:         General: No swelling or deformity.      Right lower leg: No edema.   Skin:     Coloration: Skin is not jaundiced or pale.      Findings: No bruising, erythema, lesion or rash.   Neurological:      Cranial Nerves: No cranial nerve deficit.      Sensory: No sensory deficit.      Motor: No weakness.      Coordination: Coordination normal.      Gait: Gait normal.      Deep Tendon Reflexes: Reflexes normal.                Significant Labs: All pertinent labs within the past 24 hours have been reviewed.  CBC:   Recent Labs   Lab 01/14/25  1504 01/14/25  1956   WBC 9.99 8.55   HGB 11.8* 10.7*   HCT 36.3* 32.3*    316     CMP:   Recent Labs   Lab 01/14/25  1504      K 4.6      CO2 21*   GLU 89   BUN 33*   CREATININE 0.8   CALCIUM 8.9   PROT 7.0   ALBUMIN 2.6*   BILITOT 0.5   ALKPHOS 76   AST 22   ALT 32   ANIONGAP 13     Urine Studies:   Recent Labs   Lab 01/14/25  1716   COLORU Yellow   APPEARANCEUA Cloudy*   PHUR 7.0   SPECGRAV >1.030*   PROTEINUA 2+*   GLUCUA Negative   KETONESU Negative   BILIRUBINUA Negative   OCCULTUA 1+*   NITRITE Negative   UROBILINOGEN Negative   LEUKOCYTESUR 3+*   RBCUA >100*   WBCUA >100*   BACTERIA Many*   HYALINECASTS 0       Significant Imaging:     Imaging Results               CT Abdomen Pelvis With IV Contrast NO Oral Contrast (Final result)  Result time 01/14/25 17:45:58      Final result by Gabriele Epps MD (01/14/25 17:45:58)                   Impression:      1. Operative change of partial colon resection and ileal colic anastomosis with suspected developing small bowel obstruction and transition point seen at the ileocolic anastomosis.  Surgical  consultation advised.  2. Moderate to large colonic and rectal stool burden suggesting constipation, noting rectal impaction not exclude.  3. Distension of the distal esophagus containing air and fluid related to reflux, which increases aspiration risk.  4. Rim enhancing gas and fluid collection at the lateral right abdominal wall concerning for abscess.  This is likely larger when compared to outside facility CT abdomen and pelvis report dated 12/19/2024.  No other drainable collection seen.  No definite fistulous communication with the peritoneal cavity/bowel.  5. Cholecystectomy.  Grossly stable moderate intrahepatic and extrahepatic biliary ductal dilatation.  6. Trace layering right pleural effusion with associated overlying mild passive atelectasis.  7. Small volume likely reactive free fluid tracking along the right pericolic gutter to the mesenteric root.  8. Other incidental/nonemergent findings in the body of the report.  This report was flagged in Epic as abnormal.      Electronically signed by: Gabriele Epps MD  Date:    01/14/2025  Time:    17:45               Narrative:    EXAMINATION:  CT ABDOMEN PELVIS WITH IV CONTRAST    CLINICAL HISTORY:  Abdominal abscess/infection suspected;    TECHNIQUE:  Low dose axial images, sagittal and coronal reformations were obtained from the lung bases to the pubic symphysis following the IV administration of 75 mL of Omnipaque 350 .  Oral contrast was not given.    COMPARISON:  Chest radiograph same day, thoracic and lumbar spine series 12/20/2021, abdominal ultrasound 07/30/2021, chest CT 12/17/2020, CT abdomen and pelvis 02/11/2020; report only from outside facility CT abdomen and pelvis 12/19/2024    FINDINGS:  New trace layering right pleural effusion with associated overlying mild passive atelectasis of the right lower lobe.  Redemonstrated partially imaged 5 mm nodule within the right middle lobe and grossly stable 5-6 mm solid nodule within the right lower lobe.   Minimal dependent atelectasis at the left lung base.  No definite new nodule or consolidative process.  No sizable pleural effusion on the left.    Base of the heart is normal in size noting small volume nonspecific pericardial fluid slightly increased from prior.    Small hiatal hernia.  There is moderate distension of the imaged distal esophagus mostly filled with liquid contents and minimal non-dependent gas.  Hyperdense material seen within the dependent aspect of the distal esophagus.  No significant wall thickening or adjacent inflammatory change.    Stomach and duodenum are within normal limits.    Remote cholecystectomy.  Similar moderate intrahepatic and extrahepatic biliary ductal dilatation.    Portal vasculature is patent.  Liver and spleen are both normal in size noting few scattered small parenchymal calcified granulomas similar to prior.  Pancreas is somewhat atrophic without discrete mass or adjacent inflammatory change.  Bilateral adrenal glands are within normal limits.    Bilateral kidneys are normal in overall size, shape and location with symmetric normal enhancement.  No hydronephrosis.  There is mild nonspecific perinephric stranding.  Few scattered tiny hypoattenuating parenchymal foci at each kidney which are too small to characterize.  Ureters are normal in course and caliber.  Urinary bladder is well distended without wall thickening.  Uterus not identified and may be surgically absent or atrophic.  Limited evaluation of the pelvic structures secondary to prominent beam hardening with streak artifact from bilateral hip hardware.  Pelvic phleboliths noted.  No adnexal mass seen.    Postoperative changes of prior partial colon resection and ileocolic anastomosis is identified at the hepatic flexure.  The distal ileum demonstrates mild circumferential wall thickening with mild fat stranding seen within the right the abdomen about the ileocolic anastomosis.  Few distended and borderline  dilated small bowel loops with air-fluid levels within the right mid abdomen with transition point seen at the ileo colic anastomosis concerning for developing small bowel obstruction.  More proximal small bowel loops within the mid to upper abdomen in the midline and left aspect are not significantly dilated at this time.  Large amount of stool noted throughout the and rectum.  The rectum is moderately distended without definite wall thickening or adjacent inflammatory change.  No bowel pneumatosis or portal venous gas.    There is trace volume likely reactive free fluid tracking along the right pericolic gutter to the mesenteric root.  No free air.  No lymphadenopathy by CT criteria.    Scattered moderate to advanced calcific atherosclerosis of the abdominal aorta extending into its visceral and iliac branches.  No aortic aneurysm or dissection.    Postoperative changes of prior midline laparotomy.  There is mild rectus diastasis of the infraumbilical ventral abdominal wall similar to previous study in 2020.  Within the lateral right abdominal wall there is a rim enhancing gas and fluid collection within the deep subcutaneous soft tissues measuring approximately 9.4 x 2.1 x 4.8 cm in maximum SAT dimensions, concerning for abscess.  Of note, outside facility CT abdomen and pelvis study 12/19/2024 reports a 7.8 x 3.5 x 1 cm rim enhancing gas and fluid collection along the right lateral abdominal wall overlying site of previous reported surgery.  Mild adjacent inflammatory change.  Few scattered soft tissue density nodules with mild adjacent inflammatory change at the left more than right ventral abdominal wall likely injection sites.  Bilateral gluteal subcutaneous calcifications grossly similar to previous study in 2020.    Redemonstrated remote operative change including discectomy and fusion at L4-5 with posterior spinal fixation at L4 through L5 levels and chronic appearing mild fracture deformity with evidence  of kyphoplasty at L3 and moderate anterior wedge deformity with prior kyphoplasty at T11.  Bilateral hip total arthroplasties.  No convincing evidence of acute hardware malalignment or loosening.  Generalized osteopenia.  Age-related degenerative changes.  No acute or destructive osseous process seen.                                        US Lower Extremity Veins Bilateral (Final result)  Result time 01/14/25 15:48:23      Final result by Gabriele Epps MD (01/14/25 15:48:23)                   Impression:      Left lower extremity DVT, as above.    This report was flagged in Epic as abnormal.      Electronically signed by: Gabriele Epps MD  Date:    01/14/2025  Time:    15:48               Narrative:    EXAMINATION:  US LOWER EXTREMITY VEINS BILATERAL    CLINICAL HISTORY:  Bilateral lower extremity swelling;    TECHNIQUE:  Duplex and color flow Doppler and dynamic compression was performed of the bilateral lower extremity veins was performed.    COMPARISON:  None    FINDINGS:  Right thigh veins: The common femoral, femoral, popliteal, upper greater saphenous, and deep femoral veins are patent and free of thrombus. The veins are normally compressible and have normal phasic flow and augmentation response.    Right calf veins: The visualized calf veins are patent.    Left thigh veins: Occlusive thrombus in the common femoral, femoral and greater saphenous veins popliteal vein is patent.    Left calf veins: The visualized calf veins are patent.    Miscellaneous: None                                       X-Ray Chest 1 View (Final result)  Result time 01/14/25 14:40:48   Procedure changed from X-Ray Chest PA And Lateral     Final result by Cory Braun MD (01/14/25 14:40:48)                   Impression:      1. Chronic appearing interstitial findings, no large focal consolidation.      Electronically signed by: Cory Braun MD  Date:    01/14/2025  Time:    14:40               Narrative:    EXAMINATION:  XR  CHEST 1 VIEW    CLINICAL HISTORY:  Shortness of breath;Shortness of breath; Shortness of breath    TECHNIQUE:  Single frontal view of the chest was performed.    COMPARISON:  06/13/2023    FINDINGS:  The cardiomediastinal silhouette is not enlarged noting calcification of the aorta.  Left PICC catheter tip projects over the mid to distal SVC..  There is no pleural effusion.  The trachea is midline.  The lungs are symmetrically expanded bilaterally with mildly coarse interstitial attenuation, accentuated by habitus..  No large focal consolidation seen.  There is no pneumothorax.  The osseous structures are remarkable for degenerative change..                                     Assessment/Plan:     * Epigastric pain  CT of abdomin positive for presumed partial small-bowel obstruction   General Surgery consult   Plan IR consultation for consideration of aspiration vs drain placement into her anterior abdominal wall collection  NPO for now       Acute DVT (deep venous thrombosis)  Left thigh veins: Occlusive thrombus in the common femoral  Heparin drip   Consult cardiovascular       Tobacco dependency  Dangers of cigarette smoking were reviewed with patient in detail. Patient was Counseled for 3-10 minutes. Nicotine replacement options were discussed. Nicotine replacement was discussed-       Atherosclerosis of aorta  Continue Statin       Type 2 diabetes mellitus with hypertriglyceridemia  Accu checks every 6 hours with SSI       GERD (gastroesophageal reflux disease)  Continue PPI         VTE Risk Mitigation (From admission, onward)           Ordered     heparin 25,000 units in dextrose 5% (100 units/ml) IV bolus from bag HIGH INTENSITY nomogram - [facility]  As needed (PRN)        Question:  Heparin Infusion Adjustment (DO NOT MODIFY ANSWER)  Answer:  [email or web address]\Images\Pharmacy\HeparinInfusions\heparin HIGH INTENSITY nomogram for [facility] [id number]    01/14/25 1917     heparin 25,000 units in dextrose 5% (100 units/ml)  IV bolus from bag HIGH INTENSITY nomogram - [facility]  As needed (PRN)        Question:  Heparin Infusion Adjustment (DO NOT MODIFY ANSWER)  Answer:  [email or web address]\[email or web address]\Images\Pharmacy\HeparinInfusions\heparin HIGH INTENSITY nomogram for [facility] [id number]    01/14/25 1917     heparin 25,000 units in dextrose 5% 250 mL (100 units/mL) infusion HIGH INTENSITY nomogram - [facility]  Continuous        Question:  Begin at (units/kg/hr)  Answer:  18    01/14/25 1917                                    Talat Boyd NP  Department of Hospital Medicine  [place] - Emergency Dept

## 2025-01-15 NOTE — PLAN OF CARE
Pt would benefit from cont OT services in order to maximize functional independence. Recommending moderate intensity therapy upon d/c. Pt seen with PT this date in ED. Pt with c/o pain to abdomen & buttocks. Pt performing bed mobility with Mod/MaxA x2. Will progress as able.     Problem: Occupational Therapy  Goal: Occupational Therapy Goal  Description: Goals to be met by: 02/15/2025     Patient will increase functional independence with ADLs by performing:    Toileting from bedside commode with Minimal Assistance for hygiene and clothing management.   Sitting at edge of bed x10 minutes with Supervision.  Supine to sit with Supervision.  Step transfer with Stand-by Assistance  Toilet transfer to bedside commode with Stand-by Assistance.    Outcome: Progressing

## 2025-01-15 NOTE — HPI
68yo female with colon cancer s/p resection 11/2024, GERD, DMII, acute DVT, carotid disease s/p right CEA, atherosclerosis of aorta, hypothyroidism, DDD, CVA, Hep C- autoimmune hepatitis who presents to the ER with complaints of abdominal pain. She was diagnosed with colon cancer in November and underwent resection. Post operative, she developed an anterior abdominal wall abscess requiring IR aspiration (12/27) and a 14 day course of IV antibiotics and was discharged to SNF. She completed her abx and was discharged from SNF.Her care giver reported  loose stools for about 2 weeks as well as BLE edema therefore she presented to the ER for evaluation. She was seen on AM rounds with Dr. Fairchild while in the ER. She reports being weaker and primarily bed bound for the past few months. She denies any chest pain, SOB or palpitations.  Labs CBC and BMP WNL urine/blood culture pending. CT A/P partial colon resection with possible SBO but xray with gastrograffin with no evidence of SBO. BLE venous ultrasound with occlusive DVT to LLE. Started on IV Heparin and admitted to Hospital Medicine. Cardiology consulted for evaluation of DVT.

## 2025-01-15 NOTE — SUBJECTIVE & OBJECTIVE
Past Medical History:   Diagnosis Date    Anxiety and depression 2015    Arthritis     Cervical radiculopathy 2016    Cirrhosis of liver     Colon polyps 2015    Diabetes mellitus type 2 with neurological manifestations 2015    no current medications, only one episode of elevated sugars with acute illness, will monitor     Encounter for blood transfusion     Hepatitis C     s/p treatment per patient report; managed by Dr. Perez    Hip fracture     Macrocytosis 2015    Thyroid disease     Transfusion reaction     hep c       Past Surgical History:   Procedure Laterality Date    APPENDECTOMY      BACK SURGERY      CAROTID ENDARTERECTOMY Right 2023    Procedure: ENDARTERECTOMY, CAROTID;  Surgeon: Juan James MD;  Location: [facility];  Service: Cardiology;  Laterality: Right;  RIGHT    CAUDAL EPIDURAL STEROID INJECTION N/A 2018    Procedure: Injection-steroid-epidural-caudal;  Surgeon: Roxana Gu Jr., MD;  Location: [facility];  Service: Pain Management;  Laterality: N/A;  with cath target L4-5    CAUDAL EPIDURAL STEROID INJECTION N/A 2019    Procedure: Injection-steroid-epidural-caudal;  Surgeon: Roxana Gu Jr., MD;  Location: [facility];  Service: Pain Management;  Laterality: N/A;     SECTION      CHOLECYSTECTOMY      COLONOSCOPY  3/31/10    2 polyps, tubular adenoma    COLONOSCOPY  2015    Dr. Washington    COLONOSCOPY N/A 10/10/2018    Procedure: COLONOSCOPY;  Surgeon: Reuben Miller Jr., MD;  Location: [facility];  Service: Endoscopy;  Laterality: N/A;    ESOPHAGOGASTRODUODENOSCOPY N/A 10/10/2018    Procedure: EGD (ESOPHAGOGASTRODUODENOSCOPY);  Surgeon: Reuben Miller Jr., MD;  Location: [facility];  Service: Endoscopy;  Laterality: N/A;    ESOPHAGOGASTRODUODENOSCOPY N/A 12/10/2018    Procedure: EGD (ESOPHAGOGASTRODUODENOSCOPY)/poss emr;  Surgeon: Belle Kuo MD;  Location: [address]);  Service: Endoscopy;  Laterality: N/A;     "ESOPHAGOGASTRODUODENOSCOPY N/A 3/12/2019    Procedure: EGD (ESOPHAGOGASTRODUODENOSCOPY);  Surgeon: Polo Keyes MD;  Location: [facility];  Service: Endoscopy;  Laterality: N/A;    ESOPHAGOGASTRODUODENOSCOPY N/A 11/20/2020    Procedure: ESOPHAGOGASTRODUODENOSCOPY (EGD);  Surgeon: Belle Kuo MD;  Location: [facility];  Service: Endoscopy;  Laterality: N/A;    HERNIA REPAIR      HYSTERECTOMY  1989    Uterus and both ovaries    INCISIONAL HERNIA REPAIR      x 2    JOINT REPLACEMENT      LIVER BIOPSY  12/2003    autoimmune hepatitis    ROTATOR CUFF REPAIR      right    STOMACH SURGERY  1980's    "took lymph node off of liver"    TOTAL HIP ARTHROPLASTY      left hip    UPPER GASTROINTESTINAL ENDOSCOPY  3/24/10    normal    UPPER GASTROINTESTINAL ENDOSCOPY  04/27/2015    Dr. Washignton       Review of patient's allergies indicates:   Allergen Reactions    Cefaclor Hives    Gabapentin Swelling    Penicillins      Other reaction(s): Hives    Sulfa (sulfonamide antibiotics) Other (See Comments)     Other reaction(s): Hives       No current facility-administered medications on file prior to encounter.     Current Outpatient Medications on File Prior to Encounter   Medication Sig    ALPRAZolam (XANAX) 1 MG tablet TAKE ONE (1) TABLET BY MOUTH TWICE A DAY AS NEEDED FR ANXIETY (Patient taking differently: Take by mouth 2 (two) times a day. Patient take 1/2 tablet BID and 1 tablet HS)    atorvastatin (LIPITOR) 40 MG tablet Take 1 tablet (40 mg total) by mouth once daily.    cholecalciferol, vitamin D3, 125 mcg (5,000 unit) capsule Take 1 tablet  by mouth daily with breakfast. (Patient taking differently: Take 5,000 Units by mouth daily with breakfast.)    HYDROcodone-acetaminophen (NORCO)  mg per tablet TAKE ONE (1) TABLET BY MOUTH THREE (3) TIMES DAILY AS NEEDED FOR PAIN (Patient taking differently: Take 1 tablet by mouth 3 (three) times daily as needed for Pain.)    levothyroxine (SYNTHROID) 75 MCG tablet TAKE 1 " TABLET EVERY DAY ON AN EMPTY STOMACH (Patient taking differently: Take 75 mcg by mouth before breakfast.)    nicotine (NICODERM CQ) 21 mg/24 hr Place 1 patch onto the skin once daily.    ondansetron (ZOFRAN-ODT) 8 MG TbDL Dissolve 1 tablet (8 mg total) by mouth under the tongue 3 (three) times daily as needed (nausea).    pantoprazole (PROTONIX) 40 MG tablet TAKE 1 TABLET EVERY DAY    amitriptyline (ELAVIL) 25 MG tablet TAKE 1-2 TABS NIGHTLY AS NEEDED FOR INSOMNIA RELATED TO CHRONIC PAIN (Patient not taking: Reported on 1/14/2025)    blood sugar diagnostic Strp To check BG 1 times daily, to use with insurance preferred meter    blood-glucose meter kit Test tid please dispense test strips and lancets    blood-glucose meter kit To check BG 1 times daily, to use with insurance preferred meter    ergocalciferol (ERGOCALCIFEROL) 50,000 unit Cap Take 1 capsule (50,000 Units total) by mouth every 7 days.    lancets Misc To check BG 1 times daily, to use with insurance preferred meter    linaCLOtide (LINZESS) 145 mcg Cap capsule Take 1 capsule (145 mcg total) by mouth before breakfast. Take with a large glass of water just before 1st meal of the day (Patient not taking: Reported on 1/14/2025)    naloxone (NARCAN) 4 mg/actuation Spry 4mg by nasal route as needed for opioid overdose; may repeat every 2-3 minutes in alternating nostrils until medical help arrives. Call 911 (Patient not taking: Reported on 1/10/2024)    [DISCONTINUED] acetaminophen (TYLENOL) 325 MG tablet Take 2 tablets (650 mg total) by mouth every 6 (six) hours as needed for Pain. (Patient not taking: Reported on 1/10/2024)    [DISCONTINUED] aspirin (ECOTRIN) 325 MG EC tablet Take 1 tablet (325 mg total) by mouth once daily.    [DISCONTINUED] diclofenac sodium (VOLTAREN) 1 % Gel Apply 2 g topically 4 (four) times daily.    [DISCONTINUED] docusate sodium (COLACE) 100 MG capsule Take 1 capsule (100 mg total) by mouth daily as needed for Constipation.     [DISCONTINUED] polyethylene glycol (MIRALAX) 17 gram PwPk Take 17 g by mouth daily as needed (constipation). (Patient not taking: Reported on 1/10/2024)     Family History       Problem Relation (Age of Onset)    Brain cancer Brother    Breast cancer Sister    Cataracts Sister, Sister    Colon cancer Brother    Diabetes Mother, Brother, Other, Son    Diabetes Mellitus Mother    Liver cancer Sister    Lung cancer Brother    Pancreatic cancer Brother    Stomach cancer Other    Throat cancer Brother          Tobacco Use    Smoking status: Every Day     Current packs/day: 2.00     Average packs/day: 2.0 packs/day for 45.0 years (90.0 ttl pk-yrs)     Types: Cigarettes    Smokeless tobacco: Never   Substance and Sexual Activity    Alcohol use: No     Comment: used to drink heavily, stopped in 1990's    Drug use: No    Sexual activity: Not Currently     Partners: Male     Review of Systems   Cardiovascular:  Positive for leg swelling.   Gastrointestinal:  Positive for abdominal pain.   Psychiatric/Behavioral:  Positive for confusion.      Objective:     Vital Signs (Most Recent):  Temp: 98.4 °F (36.9 °C) (01/14/25 2205)  Pulse: 92 (01/14/25 2302)  Resp: 15 (01/14/25 2302)  BP: 95/63 (01/14/25 2302)  SpO2: 98 % (01/14/25 2302) Vital Signs (24h Range):  Temp:  [97.8 °F (36.6 °C)-98.4 °F (36.9 °C)] 98.4 °F (36.9 °C)  Pulse:  [] 92  Resp:  [14-18] 15  SpO2:  [96 %-99 %] 98 %  BP: ()/(56-65) 95/63     Weight: 65.8 kg (145 lb)  Body mass index is 26.52 kg/m².     Physical Exam  Vitals and nursing note reviewed.   Constitutional:       General: She is not in acute distress.     Appearance: She is ill-appearing.   HENT:      Head: Normocephalic and atraumatic.   Cardiovascular:      Heart sounds: No murmur heard.     No friction rub. No gallop.   Pulmonary:      Breath sounds: No stridor. No wheezing, rhonchi or rales.   Chest:      Chest wall: No tenderness.   Abdominal:      General: There is no distension.       Palpations: There is no mass.      Tenderness: There is abdominal tenderness. There is no right CVA tenderness, left CVA tenderness, guarding or rebound.      Hernia: No hernia is present.   Musculoskeletal:         General: No swelling or deformity.      Right lower leg: No edema.   Skin:     Coloration: Skin is not jaundiced or pale.      Findings: No bruising, erythema, lesion or rash.   Neurological:      Cranial Nerves: No cranial nerve deficit.      Sensory: No sensory deficit.      Motor: No weakness.      Coordination: Coordination normal.      Gait: Gait normal.      Deep Tendon Reflexes: Reflexes normal.                Significant Labs: All pertinent labs within the past 24 hours have been reviewed.  CBC:   Recent Labs   Lab 01/14/25  1504 01/14/25  1956   WBC 9.99 8.55   HGB 11.8* 10.7*   HCT 36.3* 32.3*    316     CMP:   Recent Labs   Lab 01/14/25  1504      K 4.6      CO2 21*   GLU 89   BUN 33*   CREATININE 0.8   CALCIUM 8.9   PROT 7.0   ALBUMIN 2.6*   BILITOT 0.5   ALKPHOS 76   AST 22   ALT 32   ANIONGAP 13     Urine Studies:   Recent Labs   Lab 01/14/25  1716   COLORU Yellow   APPEARANCEUA Cloudy*   PHUR 7.0   SPECGRAV >1.030*   PROTEINUA 2+*   GLUCUA Negative   KETONESU Negative   BILIRUBINUA Negative   OCCULTUA 1+*   NITRITE Negative   UROBILINOGEN Negative   LEUKOCYTESUR 3+*   RBCUA >100*   WBCUA >100*   BACTERIA Many*   HYALINECASTS 0       Significant Imaging:     Imaging Results               CT Abdomen Pelvis With IV Contrast NO Oral Contrast (Final result)  Result time 01/14/25 17:45:58      Final result by Gabriele Epps MD (01/14/25 17:45:58)                   Impression:      1. Operative change of partial colon resection and ileal colic anastomosis with suspected developing small bowel obstruction and transition point seen at the ileocolic anastomosis.  Surgical consultation advised.  2. Moderate to large colonic and rectal stool burden suggesting constipation, noting  rectal impaction not exclude.  3. Distension of the distal esophagus containing air and fluid related to reflux, which increases aspiration risk.  4. Rim enhancing gas and fluid collection at the lateral right abdominal wall concerning for abscess.  This is likely larger when compared to outside facility CT abdomen and pelvis report dated 12/19/2024.  No other drainable collection seen.  No definite fistulous communication with the peritoneal cavity/bowel.  5. Cholecystectomy.  Grossly stable moderate intrahepatic and extrahepatic biliary ductal dilatation.  6. Trace layering right pleural effusion with associated overlying mild passive atelectasis.  7. Small volume likely reactive free fluid tracking along the right pericolic gutter to the mesenteric root.  8. Other incidental/nonemergent findings in the body of the report.  This report was flagged in Epic as abnormal.      Electronically signed by: Gabriele Epps MD  Date:    01/14/2025  Time:    17:45               Narrative:    EXAMINATION:  CT ABDOMEN PELVIS WITH IV CONTRAST    CLINICAL HISTORY:  Abdominal abscess/infection suspected;    TECHNIQUE:  Low dose axial images, sagittal and coronal reformations were obtained from the lung bases to the pubic symphysis following the IV administration of 75 mL of Omnipaque 350 .  Oral contrast was not given.    COMPARISON:  Chest radiograph same day, thoracic and lumbar spine series 12/20/2021, abdominal ultrasound 07/30/2021, chest CT 12/17/2020, CT abdomen and pelvis 02/11/2020; report only from outside facility CT abdomen and pelvis 12/19/2024    FINDINGS:  New trace layering right pleural effusion with associated overlying mild passive atelectasis of the right lower lobe.  Redemonstrated partially imaged 5 mm nodule within the right middle lobe and grossly stable 5-6 mm solid nodule within the right lower lobe.  Minimal dependent atelectasis at the left lung base.  No definite new nodule or consolidative process.  No  sizable pleural effusion on the left.    Base of the heart is normal in size noting small volume nonspecific pericardial fluid slightly increased from prior.    Small hiatal hernia.  There is moderate distension of the imaged distal esophagus mostly filled with liquid contents and minimal non-dependent gas.  Hyperdense material seen within the dependent aspect of the distal esophagus.  No significant wall thickening or adjacent inflammatory change.    Stomach and duodenum are within normal limits.    Remote cholecystectomy.  Similar moderate intrahepatic and extrahepatic biliary ductal dilatation.    Portal vasculature is patent.  Liver and spleen are both normal in size noting few scattered small parenchymal calcified granulomas similar to prior.  Pancreas is somewhat atrophic without discrete mass or adjacent inflammatory change.  Bilateral adrenal glands are within normal limits.    Bilateral kidneys are normal in overall size, shape and location with symmetric normal enhancement.  No hydronephrosis.  There is mild nonspecific perinephric stranding.  Few scattered tiny hypoattenuating parenchymal foci at each kidney which are too small to characterize.  Ureters are normal in course and caliber.  Urinary bladder is well distended without wall thickening.  Uterus not identified and may be surgically absent or atrophic.  Limited evaluation of the pelvic structures secondary to prominent beam hardening with streak artifact from bilateral hip hardware.  Pelvic phleboliths noted.  No adnexal mass seen.    Postoperative changes of prior partial colon resection and ileocolic anastomosis is identified at the hepatic flexure.  The distal ileum demonstrates mild circumferential wall thickening with mild fat stranding seen within the right the abdomen about the ileocolic anastomosis.  Few distended and borderline dilated small bowel loops with air-fluid levels within the right mid abdomen with transition point seen at the  ileo colic anastomosis concerning for developing small bowel obstruction.  More proximal small bowel loops within the mid to upper abdomen in the midline and left aspect are not significantly dilated at this time.  Large amount of stool noted throughout the and rectum.  The rectum is moderately distended without definite wall thickening or adjacent inflammatory change.  No bowel pneumatosis or portal venous gas.    There is trace volume likely reactive free fluid tracking along the right pericolic gutter to the mesenteric root.  No free air.  No lymphadenopathy by CT criteria.    Scattered moderate to advanced calcific atherosclerosis of the abdominal aorta extending into its visceral and iliac branches.  No aortic aneurysm or dissection.    Postoperative changes of prior midline laparotomy.  There is mild rectus diastasis of the infraumbilical ventral abdominal wall similar to previous study in 2020.  Within the lateral right abdominal wall there is a rim enhancing gas and fluid collection within the deep subcutaneous soft tissues measuring approximately 9.4 x 2.1 x 4.8 cm in maximum SAT dimensions, concerning for abscess.  Of note, outside facility CT abdomen and pelvis study 12/19/2024 reports a 7.8 x 3.5 x 1 cm rim enhancing gas and fluid collection along the right lateral abdominal wall overlying site of previous reported surgery.  Mild adjacent inflammatory change.  Few scattered soft tissue density nodules with mild adjacent inflammatory change at the left more than right ventral abdominal wall likely injection sites.  Bilateral gluteal subcutaneous calcifications grossly similar to previous study in 2020.    Redemonstrated remote operative change including discectomy and fusion at L4-5 with posterior spinal fixation at L4 through L5 levels and chronic appearing mild fracture deformity with evidence of kyphoplasty at L3 and moderate anterior wedge deformity with prior kyphoplasty at T11.  Bilateral hip total  arthroplasties.  No convincing evidence of acute hardware malalignment or loosening.  Generalized osteopenia.  Age-related degenerative changes.  No acute or destructive osseous process seen.                                        US Lower Extremity Veins Bilateral (Final result)  Result time 01/14/25 15:48:23      Final result by Gabriele Epps MD (01/14/25 15:48:23)                   Impression:      Left lower extremity DVT, as above.    This report was flagged in Epic as abnormal.      Electronically signed by: Gabriele Epps MD  Date:    01/14/2025  Time:    15:48               Narrative:    EXAMINATION:  US LOWER EXTREMITY VEINS BILATERAL    CLINICAL HISTORY:  Bilateral lower extremity swelling;    TECHNIQUE:  Duplex and color flow Doppler and dynamic compression was performed of the bilateral lower extremity veins was performed.    COMPARISON:  None    FINDINGS:  Right thigh veins: The common femoral, femoral, popliteal, upper greater saphenous, and deep femoral veins are patent and free of thrombus. The veins are normally compressible and have normal phasic flow and augmentation response.    Right calf veins: The visualized calf veins are patent.    Left thigh veins: Occlusive thrombus in the common femoral, femoral and greater saphenous veins popliteal vein is patent.    Left calf veins: The visualized calf veins are patent.    Miscellaneous: None                                       X-Ray Chest 1 View (Final result)  Result time 01/14/25 14:40:48   Procedure changed from X-Ray Chest PA And Lateral     Final result by Cory Braun MD (01/14/25 14:40:48)                   Impression:      1. Chronic appearing interstitial findings, no large focal consolidation.      Electronically signed by: Cory Braun MD  Date:    01/14/2025  Time:    14:40               Narrative:    EXAMINATION:  XR CHEST 1 VIEW    CLINICAL HISTORY:  Shortness of breath;Shortness of breath; Shortness of  breath    TECHNIQUE:  Single frontal view of the chest was performed.    COMPARISON:  06/13/2023    FINDINGS:  The cardiomediastinal silhouette is not enlarged noting calcification of the aorta.  Left PICC catheter tip projects over the mid to distal SVC..  There is no pleural effusion.  The trachea is midline.  The lungs are symmetrically expanded bilaterally with mildly coarse interstitial attenuation, accentuated by habitus..  No large focal consolidation seen.  There is no pneumothorax.  The osseous structures are remarkable for degenerative change..

## 2025-01-15 NOTE — ASSESSMENT & PLAN NOTE
- presented with BLE edema; recent surgery and immobilization  - BLE venous ultrasound with occlusive DVT to left CFV, FV and GSV and no evidence of DVT to RLE; swelling noted L>R with discoloration noted   - on IV Heparin currently; DVT likely related to immobility with recent surgery as well as cancer; no plans for invasive and will monitor closely; does not appear to be the ideal candidate for thrombectomy given immobility, liver issues etc.

## 2025-01-15 NOTE — CONSULTS
General Surgery  Consult    Formal consult note to follow.   Thom Krishna is a 69 y.o. female with a history of Hep C (treated per family), DM2, hypothyroidism, GERD, autoimmune hepatitis (?), tobacco abuse, cirrhosis (?), EtOH abuse, CVA, carotid stenosis s/p R CEA, and recently diagnosed colon cancer s/p partial colectomy with ileocolonic anastomosis on 11/1 at an outside facility. History is unclear as we do not have access to the majority of her care, but, per family, she required take back for an intra-abdominal infection. Following this, she developed an anterior abdominal wall abscess requiring IR aspiration (12/27) and a 14 day course of IV antibiotics, which she recently finished. She was discharged from SNF yesterday. Per her care giver, she has had loose stools for about 2 weeks. Also with bilateral lower extremity swelling during this time. She presents to the ED today with complaints of abdominal pain associated with nausea. She has not had any emesis during this time to suggest clinical obstruction.     In the ED, the patient is afebrile and hemodynamically stable. Her lab work is largely unremarkable outside a mildly elevated BNP (157). She is without leukocytosis. Her lactate is normal. CT A/P is most significant for new right pulmonary effusion, small amount of pericardial fluid, post op changes of her partial colectomy with ileo-colonic anastomosis with distended small bowel proximal to the anastomosis concerning for partial or developing obstruction, and re-demonstration of anterolateral right abdominal wall abscess without obvious fistulous communication with the peritoneal cavity or bowel.     The patient has had a complicated post surgical course. She ultimately presents with continued issues and overall failure to thrive. Per chart review, she was in the process of being referred to a new colon and rectal surgeon, but has not established care. Patient admitted to hospital medicine. Recommend  IR consultation for possible aspiration vs drain placement into her anterior abdominal wall collection. Appears she may have had ID consultation at her last facility, which can also be considered. Will plan to administer gastrograffin tonight to rule out obstruction at her anastomosis.     -- admit to medicine   -- no acute surgical intervention indicated at this time  -- IR consultation for consideration of aspiration vs drain placement into her anterior abdominal wall collection  -- gastrograffin tonight, will obtain serial KUB to assess passage of contrast through the anastomosis  -- can hold off on NGT placement; if she were to begin vomiting, consider placement of NGT as she is at a high risk for aspiration  -- may require colonoscopy to assess the anastomosis  -- PT/OT   -- obtain outside chart if possible  -- remainder of care per primary team   -- general surgery will follow; please call with any questions or changes in her clinical status    Ting Workman MD  Pager: [phone]  General Surgery PGY-IV  [facility]

## 2025-01-15 NOTE — ASSESSMENT & PLAN NOTE
Dangers of cigarette smoking were reviewed with patient in detail. Patient was Counseled for 3-10 minutes. Nicotine replacement options were discussed. Nicotine replacement was discussed-

## 2025-01-15 NOTE — CONSULTS
[facility] - Emergency Dept  Cardiology  Consult Note    Patient Name: Thom Krishna  MRN: [id number]  Admission Date: 1/14/2025  Hospital Length of Stay: 1 days  Code Status: Prior   Attending Provider: Mike Sanchez MD   Consulting Provider: NALINI Mcdonnell ANP  Primary Care Physician: Brandy Dejesus MD  Principal Problem:Epigastric pain    Patient information was obtained from patient, past medical records, and ER records.     Inpatient consult to [facility]  Consult performed by: Margarita Mustafa APRN, RAVINDER  Consult ordered by: Taalt Boyd NP  Reason for consult: LLE DVT        Subjective:     Chief Complaint:  abdominal pain/vomting and BLE edema      HPI:   68yo female with colon cancer s/p resection 11/2024, GERD, DMII, acute DVT, carotid disease s/p right CEA, atherosclerosis of aorta, hypothyroidism, DDD, CVA, Hep C- autoimmune hepatitis who presents to the ER with complaints of abdominal pain. She was diagnosed with colon cancer in November and underwent resection. Post operative, she developed an anterior abdominal wall abscess requiring IR aspiration (12/27) and a 14 day course of IV antibiotics and was discharged to SNF. She completed her abx and was discharged from SNF.Her care giver reported  loose stools for about 2 weeks as well as BLE edema therefore she presented to the ER for evaluation. She was seen on AM rounds with Dr. Fairchild while in the ER. She reports being weaker and primarily bed bound for the past few months. She denies any chest pain, SOB or palpitations.  Labs CBC and BMP WNL urine/blood culture pending. CT A/P partial colon resection with possible SBO but xray with gastrograffin with no evidence of SBO. BLE venous ultrasound with occlusive DVT to LLE. Started on IV Heparin and admitted to Hospital Medicine. Cardiology consulted for evaluation of DVT.     Past Medical History:   Diagnosis Date    Anxiety and depression 07/21/2015    Arthritis     Cervical  radiculopathy 2016    Cirrhosis of liver     Colon cancer 2024    Colon polyps 2015    Diabetes mellitus type 2 with neurological manifestations 2015    no current medications, only one episode of elevated sugars with acute illness, will monitor     Encounter for blood transfusion     Hepatitis C     s/p treatment per patient report; managed by Dr. Perez    Hip fracture     Macrocytosis 2015    Thyroid disease     Transfusion reaction     hep c       Past Surgical History:   Procedure Laterality Date    APPENDECTOMY      BACK SURGERY      CAROTID ENDARTERECTOMY Right 2023    Procedure: ENDARTERECTOMY, CAROTID;  Surgeon: Juan James MD;  Location: [facility] OR;  Service: Cardiology;  Laterality: Right;  RIGHT    CAUDAL EPIDURAL STEROID INJECTION N/A 2018    Procedure: Injection-steroid-epidural-caudal;  Surgeon: Roxana Gu Jr., MD;  Location: [facility] OR;  Service: Pain Management;  Laterality: N/A;  with cath target L4-5    CAUDAL EPIDURAL STEROID INJECTION N/A 2019    Procedure: Injection-steroid-epidural-caudal;  Surgeon: Roxana Gu Jr., MD;  Location: [facility];  Service: Pain Management;  Laterality: N/A;     SECTION      CHOLECYSTECTOMY      COLONOSCOPY  3/31/10    2 polyps, tubular adenoma    COLONOSCOPY  2015    Dr. Washington    COLONOSCOPY N/A 10/10/2018    Procedure: COLONOSCOPY;  Surgeon: Reuben Miller Jr., MD;  Location: [facility];  Service: Endoscopy;  Laterality: N/A;    ESOPHAGOGASTRODUODENOSCOPY N/A 10/10/2018    Procedure: EGD (ESOPHAGOGASTRODUODENOSCOPY);  Surgeon: Reuben Miller Jr., MD;  Location: [facility];  Service: Endoscopy;  Laterality: N/A;    ESOPHAGOGASTRODUODENOSCOPY N/A 12/10/2018    Procedure: EGD (ESOPHAGOGASTRODUODENOSCOPY)/poss emr;  Surgeon: Belle uKo MD;  Location: [facility] ([facility]);  Service: Endoscopy;  Laterality: N/A;    ESOPHAGOGASTRODUODENOSCOPY N/A 3/12/2019    Procedure: EGD  "(ESOPHAGOGASTRODUODENOSCOPY);  Surgeon: Polo Keyes MD;  Location: [facility];  Service: Endoscopy;  Laterality: N/A;    ESOPHAGOGASTRODUODENOSCOPY N/A 11/20/2020    Procedure: ESOPHAGOGASTRODUODENOSCOPY (EGD);  Surgeon: Belle Kuo MD;  Location: [facility] ENDO;  Service: Endoscopy;  Laterality: N/A;    HERNIA REPAIR      HYSTERECTOMY  1989    Uterus and both ovaries    INCISIONAL HERNIA REPAIR      x 2    JOINT REPLACEMENT      LIVER BIOPSY  12/2003    autoimmune hepatitis    ROTATOR CUFF REPAIR      right    STOMACH SURGERY  1980's    "took lymph node off of liver"    TOTAL HIP ARTHROPLASTY      left hip    UPPER GASTROINTESTINAL ENDOSCOPY  3/24/10    normal    UPPER GASTROINTESTINAL ENDOSCOPY  04/27/2015    Dr. Washington       Review of patient's allergies indicates:   Allergen Reactions    Cefaclor Hives    Gabapentin Swelling    Penicillins      Other reaction(s): Hives    Sulfa (sulfonamide antibiotics) Other (See Comments)     Other reaction(s): Hives       No current facility-administered medications on file prior to encounter.     Current Outpatient Medications on File Prior to Encounter   Medication Sig    ALPRAZolam (XANAX) 1 MG tablet TAKE ONE (1) TABLET BY MOUTH TWICE A DAY AS NEEDED FR ANXIETY (Patient taking differently: Take by mouth 2 (two) times a day. Patient take 1/2 tablet BID and 1 tablet HS)    atorvastatin (LIPITOR) 40 MG tablet Take 1 tablet (40 mg total) by mouth once daily.    cholecalciferol, vitamin D3, 125 mcg (5,000 unit) capsule Take 1 tablet  by mouth daily with breakfast. (Patient taking differently: Take 5,000 Units by mouth daily with breakfast.)    HYDROcodone-acetaminophen (NORCO)  mg per tablet TAKE ONE (1) TABLET BY MOUTH THREE (3) TIMES DAILY AS NEEDED FOR PAIN (Patient taking differently: Take 1 tablet by mouth 3 (three) times daily as needed for Pain.)    levothyroxine (SYNTHROID) 75 MCG tablet TAKE 1 TABLET EVERY DAY ON AN EMPTY STOMACH (Patient taking " differently: Take 75 mcg by mouth before breakfast.)    nicotine (NICODERM CQ) 21 mg/24 hr Place 1 patch onto the skin once daily.    ondansetron (ZOFRAN-ODT) 8 MG TbDL Dissolve 1 tablet (8 mg total) by mouth under the tongue 3 (three) times daily as needed (nausea).    pantoprazole (PROTONIX) 40 MG tablet TAKE 1 TABLET EVERY DAY    amitriptyline (ELAVIL) 25 MG tablet TAKE 1-2 TABS NIGHTLY AS NEEDED FOR INSOMNIA RELATED TO CHRONIC PAIN (Patient not taking: Reported on 1/14/2025)    blood sugar diagnostic Strp To check BG 1 times daily, to use with insurance preferred meter    blood-glucose meter kit Test tid please dispense test strips and lancets    blood-glucose meter kit To check BG 1 times daily, to use with insurance preferred meter    ergocalciferol (ERGOCALCIFEROL) 50,000 unit Cap Take 1 capsule (50,000 Units total) by mouth every 7 days.    lancets Misc To check BG 1 times daily, to use with insurance preferred meter    linaCLOtide (LINZESS) 145 mcg Cap capsule Take 1 capsule (145 mcg total) by mouth before breakfast. Take with a large glass of water just before 1st meal of the day (Patient not taking: Reported on 1/14/2025)    naloxone (NARCAN) 4 mg/actuation Spry 4mg by nasal route as needed for opioid overdose; may repeat every 2-3 minutes in alternating nostrils until medical help arrives. Call 911 (Patient not taking: Reported on 1/10/2024)     Family History       Problem Relation (Age of Onset)    Brain cancer Brother    Breast cancer Sister    Cataracts Sister, Sister    Colon cancer Brother    Diabetes Mother, Brother, Other, Son    Diabetes Mellitus Mother    Liver cancer Sister    Lung cancer Brother    Pancreatic cancer Brother    Stomach cancer Other    Throat cancer Brother          Tobacco Use    Smoking status: Every Day     Current packs/day: 2.00     Average packs/day: 2.0 packs/day for 45.0 years (90.0 ttl pk-yrs)     Types: Cigarettes    Smokeless tobacco: Never   Substance and Sexual  Activity    Alcohol use: No     Comment: used to drink heavily, stopped in 1990's    Drug use: No    Sexual activity: Not Currently     Partners: Male     Review of Systems   Cardiovascular:  Positive for leg swelling.   Gastrointestinal:  Positive for abdominal pain and diarrhea.     Objective:     Vital Signs (Most Recent):  Temp: 98.2 °F (36.8 °C) (01/15/25 0509)  Pulse: 81 (01/15/25 0724)  Resp: 18 (01/15/25 0741)  BP: 138/76 (01/15/25 0718)  SpO2: 96 % (01/15/25 0724) Vital Signs (24h Range):  Temp:  [97.8 °F (36.6 °C)-98.4 °F (36.9 °C)] 98.2 °F (36.8 °C)  Pulse:  [] 81  Resp:  [12-20] 18  SpO2:  [76 %-99 %] 96 %  BP: ()/(56-76) 138/76     Weight: 65.8 kg (145 lb)  Body mass index is 26.52 kg/m².    SpO2: 96 %         Intake/Output Summary (Last 24 hours) at 1/15/2025 0821  Last data filed at 1/15/2025 0611  Gross per 24 hour   Intake 352.44 ml   Output --   Net 352.44 ml       Lines/Drains/Airways       Central Venous Catheter Line  Duration             Percutaneous Central Line - Triple Lumen  01/14/25 1410 Basilic Left <1 day              Drain  Duration             Female External Urinary Catheter w/ Suction 01/14/25 2207 <1 day              Peripheral Intravenous Line  Duration                  Peripheral IV - Single Lumen 01/15/25 0421 20 G 1 3/4 in Anterior;Distal;Right Upper Arm <1 day                     Physical Exam  Constitutional:       General: She is not in acute distress.     Appearance: She is well-developed.   Cardiovascular:      Rate and Rhythm: Normal rate and regular rhythm.      Heart sounds: No murmur heard.     No gallop.   Pulmonary:      Effort: Pulmonary effort is normal. No respiratory distress.      Breath sounds: Normal breath sounds. No wheezing.   Abdominal:      General: Bowel sounds are normal. There is no distension.      Palpations: Abdomen is soft.      Tenderness: There is no abdominal tenderness.   Musculoskeletal:         General: Injury: L>Rd.      Right  lower leg: Edema present.      Left lower leg: Edema present.   Skin:     General: Skin is warm and dry.      Comments: Discoloration noted to LLE    Neurological:      Mental Status: She is alert and oriented to person, place, and time.          Significant Labs: BMP:   Recent Labs   Lab 01/14/25  1504 01/14/25  1612   GLU 89  --      --    K 4.6  --      --    CO2 21*  --    BUN 33*  --    CREATININE 0.8  --    CALCIUM 8.9  --    MG  --  2.1   , CMP   Recent Labs   Lab 01/14/25  1504      K 4.6      CO2 21*   GLU 89   BUN 33*   CREATININE 0.8   CALCIUM 8.9   PROT 7.0   ALBUMIN 2.6*   BILITOT 0.5   ALKPHOS 76   AST 22   ALT 32   ANIONGAP 13   , and CBC   Recent Labs   Lab 01/14/25  1504 01/14/25  1956 01/15/25  0412   WBC 9.99 8.55 9.20   HGB 11.8* 10.7* 11.5*   HCT 36.3* 32.3* 35.5*    316 339       Significant Imaging: Echocardiogram: Transthoracic echo (TTE) complete ([facility] Only):   Results for orders placed or performed during the hospital encounter of 06/11/23   Echo   Result Value Ref Range    BSA 1.79 m2    TDI SEPTAL 0.12 m/s    LV LATERAL E/E' RATIO 5.36 m/s    LV SEPTAL E/E' RATIO 6.25 m/s    EF 60 %    Left Ventricular Outflow Tract Mean Velocity 0.55 cm/s    Left Ventricular Outflow Tract Mean Gradient 1.00 mmHg    TDI LATERAL 0.14 m/s    PV PEAK VELOCITY 0.83 cm/s    LVIDd 3.81 3.5 - 6.0 cm    IVS 0.99 0.6 - 1.1 cm    PW 0.87 0.6 - 1.1 cm    LVIDs 2.58 2.1 - 4.0 cm    FS 32 28 - 44 %    LV mass 106.25 g    LA size 3.10 cm    RVDD 3.66 cm    TAPSE 2.11 cm    Left Ventricle Relative Wall Thickness 0.46 cm    AV mean gradient 2 mmHg    AV Velocity Ratio 0.89     AV index (prosthetic) 0.87     E/A ratio 1.15     Mean e' 0.13 m/s    E wave deceleration time 222.00 msec    LVOT peak dawit 0.86 m/s    LVOT peak VTI 20.70 cm    Ao peak dawit 0.97 m/s    Ao VTI 23.9 cm    AV peak gradient 4 mmHg    E/E' ratio 5.77 m/s    MV Peak E Dawit 0.75 m/s    MV Peak A Dawit 0.65 m/s    LV Systolic  Volume 24.10 mL    LV Systolic Volume Index 13.5 mL/m2    LV Diastolic Volume 62.30 mL    LV Diastolic Volume Index 35.00 mL/m2    LV Mass Index 60 g/m2    NAIN (MOD) 24.6 mL/m2    LA Vol (MOD) 43.70 cm3    Lott's Biplane MOD Ejection Fraction 6 %    Narrative    · There is no evidence of intracardiac shunting.  · Concentric remodeling and normal systolic function.  · The estimated ejection fraction is 60%.  · Normal left ventricular diastolic function.  · Normal right ventricular size with normal right ventricular systolic   function.  · Mild tricuspid regurgitation.        Assessment and Plan:     Acute DVT (deep venous thrombosis)  - presented with BLE edema; recent surgery and immobilization  - BLE venous ultrasound with occlusive DVT to left CFV, FV and GSV and no evidence of DVT to RLE; swelling noted L>R with discoloration noted   - on IV Heparin currently; DVT likely related to immobility with recent surgery as well as cancer; no plans for invasive and will monitor closely; does not appear to be the ideal candidate for thrombectomy given immobility, liver issues etc.         VTE Risk Mitigation (From admission, onward)           Ordered     heparin 25,000 units in dextrose 5% (100 units/ml) IV bolus from bag HIGH INTENSITY nomogram - [facility]  As needed (PRN)        Question:  Heparin Infusion Adjustment (DO NOT MODIFY ANSWER)  Answer:  \\[facility]\Images\Pharmacy\HeparinInfusions\heparin HIGH INTENSITY nomogram for [facility] [id number]    01/14/25 1917     heparin 25,000 units in dextrose 5% (100 units/ml) IV bolus from bag HIGH INTENSITY nomogram - [facility]  As needed (PRN)        Question:  Heparin Infusion Adjustment (DO NOT MODIFY ANSWER)  Answer:  \[employer]\Images\Pharmacy\HeparinInfusions\heparin HIGH INTENSITY nomogram for [facility] [id number]    01/14/25 1917     heparin 25,000 units in dextrose 5% 250 mL (100 units/mL) infusion HIGH INTENSITY nomogram - [facility]  Continuous        Question:  Begin at  (units/kg/hr)  Answer:  18 01/14/25 1917                    Thank you for your consult. I will follow-up with patient. Please contact us if you have any additional questions.    NALINI Mcdonnell, ANP  Cardiology   [facility] - Emergency Dept

## 2025-01-15 NOTE — CONSULTS
[place] - Emergency Dept  General Surgery  Consult Note    Patient Name: Thom Krishna  MRN: [id number]  Code Status: Prior  Admission Date: 1/14/2025  Hospital Length of Stay: 1 days  Attending Physician: Nilton Dooley,*  Primary Care Provider: Brandy Dejesus MD    Patient information was obtained from patient.     Inpatient consult to General surgery  Consult performed by: Ting Workman MD  Consult ordered by: Nelson Cruz MD        Subjective:     Principal Problem: Epigastric pain    History of Present Illness: Thom Krishna is a 69 y.o. female with a history of Hep C (treated per family), DM2, hypothyroidism, GERD, autoimmune hepatitis (?), tobacco abuse, cirrhosis (?), EtOH abuse, CVA, carotid stenosis s/p R CEA, and recently diagnosed colon cancer s/p partial colectomy with ileocolonic anastomosis on 11/1 at an outside facility. History is unclear as we do not have access to the majority of her care, but, per family, she required take back for an intra-abdominal infection. Following this, she developed an anterior abdominal wall abscess requiring IR aspiration (12/27) and a 14 day course of IV antibiotics, which she recently finished. She was discharged from SNF yesterday. Per her care giver, she has had loose stools for about 2 weeks. Also with bilateral lower extremity swelling during this time. She presents to the ED today with complaints of abdominal pain associated with nausea. She has not had any emesis during this time to suggest clinical obstruction.     No current facility-administered medications on file prior to encounter.     Current Outpatient Medications on File Prior to Encounter   Medication Sig    ALPRAZolam (XANAX) 1 MG tablet TAKE ONE (1) TABLET BY MOUTH TWICE A DAY AS NEEDED FR ANXIETY (Patient taking differently: Take by mouth 2 (two) times a day. Patient take 1/2 tablet BID and 1 tablet HS)    atorvastatin (LIPITOR) 40 MG tablet Take 1 tablet (40 mg total) by mouth once  daily.    cholecalciferol, vitamin D3, 125 mcg (5,000 unit) capsule Take 1 tablet  by mouth daily with breakfast. (Patient taking differently: Take 5,000 Units by mouth daily with breakfast.)    HYDROcodone-acetaminophen (NORCO)  mg per tablet TAKE ONE (1) TABLET BY MOUTH THREE (3) TIMES DAILY AS NEEDED FOR PAIN (Patient taking differently: Take 1 tablet by mouth 3 (three) times daily as needed for Pain.)    levothyroxine (SYNTHROID) 75 MCG tablet TAKE 1 TABLET EVERY DAY ON AN EMPTY STOMACH (Patient taking differently: Take 75 mcg by mouth before breakfast.)    nicotine (NICODERM CQ) 21 mg/24 hr Place 1 patch onto the skin once daily.    ondansetron (ZOFRAN-ODT) 8 MG TbDL Dissolve 1 tablet (8 mg total) by mouth under the tongue 3 (three) times daily as needed (nausea).    pantoprazole (PROTONIX) 40 MG tablet TAKE 1 TABLET EVERY DAY    amitriptyline (ELAVIL) 25 MG tablet TAKE 1-2 TABS NIGHTLY AS NEEDED FOR INSOMNIA RELATED TO CHRONIC PAIN (Patient not taking: Reported on 1/14/2025)    blood sugar diagnostic Strp To check BG 1 times daily, to use with insurance preferred meter    blood-glucose meter kit Test tid please dispense test strips and lancets    blood-glucose meter kit To check BG 1 times daily, to use with insurance preferred meter    ergocalciferol (ERGOCALCIFEROL) 50,000 unit Cap Take 1 capsule (50,000 Units total) by mouth every 7 days.    lancets Misc To check BG 1 times daily, to use with insurance preferred meter    linaCLOtide (LINZESS) 145 mcg Cap capsule Take 1 capsule (145 mcg total) by mouth before breakfast. Take with a large glass of water just before 1st meal of the day (Patient not taking: Reported on 1/14/2025)    naloxone (NARCAN) 4 mg/actuation Spry 4mg by nasal route as needed for opioid overdose; may repeat every 2-3 minutes in alternating nostrils until medical help arrives. Call 911 (Patient not taking: Reported on 1/10/2024)       Review of patient's allergies indicates:    Allergen Reactions    Cefaclor Hives    Gabapentin Swelling    Penicillins      Other reaction(s): Hives    Sulfa (sulfonamide antibiotics) Other (See Comments)     Other reaction(s): Hives       Past Medical History:   Diagnosis Date    Anxiety and depression 2015    Arthritis     Cervical radiculopathy 2016    Cirrhosis of liver     Colon cancer 2024    Colon polyps 2015    Diabetes mellitus type 2 with neurological manifestations 2015    no current medications, only one episode of elevated sugars with acute illness, will monitor     Encounter for blood transfusion     Hepatitis C     s/p treatment per patient report; managed by Dr. Perez    Hip fracture     Macrocytosis 2015    Thyroid disease     Transfusion reaction     hep c     Past Surgical History:   Procedure Laterality Date    APPENDECTOMY      BACK SURGERY      CAROTID ENDARTERECTOMY Right 2023    Procedure: ENDARTERECTOMY, CAROTID;  Surgeon: Juan James MD;  Location: [facility];  Service: Cardiology;  Laterality: Right;  RIGHT    CAUDAL EPIDURAL STEROID INJECTION N/A 2018    Procedure: Injection-steroid-epidural-caudal;  Surgeon: Roxana Gu Jr., MD;  Location: [facility] OR;  Service: Pain Management;  Laterality: N/A;  with cath target L4-5    CAUDAL EPIDURAL STEROID INJECTION N/A 2019    Procedure: Injection-steroid-epidural-caudal;  Surgeon: Roxana Gu Jr., MD;  Location: [facility] PAIN MGT;  Service: Pain Management;  Laterality: N/A;     SECTION      CHOLECYSTECTOMY      COLONOSCOPY  3/31/10    2 polyps, tubular adenoma    COLONOSCOPY  2015    Dr. Washington    COLONOSCOPY N/A 10/10/2018    Procedure: COLONOSCOPY;  Surgeon: Reuben Miller Jr., MD;  Location: [facility] ENDO;  Service: Endoscopy;  Laterality: N/A;    ESOPHAGOGASTRODUODENOSCOPY N/A 10/10/2018    Procedure: EGD (ESOPHAGOGASTRODUODENOSCOPY);  Surgeon: Reuben Miller Jr., MD;  Location: [facility];  Service: Endoscopy;   "Laterality: N/A;    ESOPHAGOGASTRODUODENOSCOPY N/A 12/10/2018    Procedure: EGD (ESOPHAGOGASTRODUODENOSCOPY)/poss emr;  Surgeon: Belle Kuo MD;  Location: [address]);  Service: Endoscopy;  Laterality: N/A;    ESOPHAGOGASTRODUODENOSCOPY N/A 3/12/2019    Procedure: EGD (ESOPHAGOGASTRODUODENOSCOPY);  Surgeon: Polo Keyes MD;  Location: [facility];  Service: Endoscopy;  Laterality: N/A;    ESOPHAGOGASTRODUODENOSCOPY N/A 11/20/2020    Procedure: ESOPHAGOGASTRODUODENOSCOPY (EGD);  Surgeon: Belle Kuo MD;  Location: [facility];  Service: Endoscopy;  Laterality: N/A;    HERNIA REPAIR      HYSTERECTOMY  1989    Uterus and both ovaries    INCISIONAL HERNIA REPAIR      x 2    JOINT REPLACEMENT      LIVER BIOPSY  12/2003    autoimmune hepatitis    ROTATOR CUFF REPAIR      right    STOMACH SURGERY  1980's    "took lymph node off of liver"    TOTAL HIP ARTHROPLASTY      left hip    UPPER GASTROINTESTINAL ENDOSCOPY  3/24/10    normal    UPPER GASTROINTESTINAL ENDOSCOPY  04/27/2015    Dr. Washington     Family History       Problem Relation (Age of Onset)    Brain cancer Brother    Breast cancer Sister    Cataracts Sister, Sister    Colon cancer Brother    Diabetes Mother, Brother, Other, Son    Diabetes Mellitus Mother    Liver cancer Sister    Lung cancer Brother    Pancreatic cancer Brother    Stomach cancer Other    Throat cancer Brother          Tobacco Use    Smoking status: Every Day     Current packs/day: 2.00     Average packs/day: 2.0 packs/day for 45.0 years (90.0 ttl pk-yrs)     Types: Cigarettes    Smokeless tobacco: Never   Substance and Sexual Activity    Alcohol use: No     Comment: used to drink heavily, stopped in 1990's    Drug use: No    Sexual activity: Not Currently     Partners: Male     Review of Systems   Constitutional:  Negative for chills and fever.   HENT:  Negative for trouble swallowing and voice change.    Eyes: Negative.    Respiratory:  Negative for chest tightness and " shortness of breath.    Cardiovascular:  Negative for chest pain and palpitations.   Gastrointestinal:  Positive for abdominal pain, diarrhea and nausea. Negative for abdominal distention and vomiting.   Endocrine: Negative.    Genitourinary: Negative.    Musculoskeletal: Negative.    Neurological: Negative.    Hematological: Negative.    Psychiatric/Behavioral: Negative.       Objective:     Vital Signs (Most Recent):  Temp: 98.2 °F (36.8 °C) (01/15/25 0509)  Pulse: 83 (01/15/25 0440)  Resp: 16 (01/15/25 0440)  BP: 111/72 (01/15/25 0440)  SpO2: 98 % (01/15/25 0440) Vital Signs (24h Range):  Temp:  [97.8 °F (36.6 °C)-98.4 °F (36.9 °C)] 98.2 °F (36.8 °C)  Pulse:  [] 83  Resp:  [12-18] 16  SpO2:  [96 %-99 %] 98 %  BP: ()/(56-72) 111/72     Weight: 65.8 kg (145 lb)  Body mass index is 26.52 kg/m².     Physical Exam  Vitals reviewed.   Constitutional:       General: She is not in acute distress.     Appearance: Normal appearance. She is not toxic-appearing.   HENT:      Head: Normocephalic and atraumatic.      Nose: Nose normal.   Cardiovascular:      Rate and Rhythm: Normal rate.      Pulses: Normal pulses.   Pulmonary:      Effort: Pulmonary effort is normal. No respiratory distress.   Abdominal:      General: Abdomen is flat. There is no distension.      Palpations: Abdomen is soft.      Tenderness: There is no guarding or rebound.      Comments: Mildly tender to palpation in the right abdomen; no guarding, no rebound, non peritonitic   Skin:     General: Skin is warm.   Neurological:      General: No focal deficit present.      Mental Status: She is alert and oriented to person, place, and time.            I have reviewed all pertinent lab results within the past 24 hours.  CBC:   Recent Labs   Lab 01/15/25  0412   WBC 9.20   RBC 3.60*   HGB 11.5*   HCT 35.5*      MCV 99*   MCH 31.9*   MCHC 32.4     CMP:   Recent Labs   Lab 01/14/25  1504   GLU 89   CALCIUM 8.9   ALBUMIN 2.6*   PROT 7.0       K 4.6   CO2 21*      BUN 33*   CREATININE 0.8   ALKPHOS 76   ALT 32   AST 22   BILITOT 0.5       Significant Diagnostics:  I have reviewed all pertinent imaging results/findings within the past 24 hours.    X-Ray Abdomen AP 1 View  1/15/2025    Patent ileocolic anastomosis noting Gastrografin oral contrast throughout the colon to the level of the rectum 4 hours post ingestion.  No convincing bowel distension as imaged.     CT Abdomen Pelvis With IV Contrast NO Oral Contrast  1/14/2025    1. Operative change of partial colon resection and ileal colic anastomosis with suspected developing small bowel obstruction and transition point seen at the ileocolic anastomosis.  Surgical consultation advised. 2. Moderate to large colonic and rectal stool burden suggesting constipation, noting rectal impaction not exclude. 3. Distension of the distal esophagus containing air and fluid related to reflux, which increases aspiration risk. 4. Rim enhancing gas and fluid collection at the lateral right abdominal wall concerning for abscess.  This is likely larger when compared to outside facility CT abdomen and pelvis report dated 12/19/2024.  No other drainable collection seen.  No definite fistulous communication with the peritoneal cavity/bowel. 5. Cholecystectomy.  Grossly stable moderate intrahepatic and extrahepatic biliary ductal dilatation. 6. Trace layering right pleural effusion with associated overlying mild passive atelectasis. 7. Small volume likely reactive free fluid tracking along the right pericolic gutter to the mesenteric root. 8. Other incidental/nonemergent findings in the body of the report.       CT Abdomen Pelvis With IV Contrast  12/19/2024     1.  Distention of the distal esophagus containing air and fluid.  The stomach is also distended with a recent meal. 2.  A site of partial colectomy and ileocolic anastomosis is visible at the hepatic flexure which appears to be recent.  A small collection of  fluid is visible in the area and an enhancing fluid collection consistent with an abscess is visible in the overlying right lateral abdominal wall measuring 7.8 x 3.5 x 1.0 cm. 3.  Status post cholecystectomy, hysterectomy, lumbar spine surgery and bilateral hip arthroplasties    Assessment/Plan:     * Epigastric pain  Thom Krishna is a 69 y.o. female with a history of Hep C (treated per family), DM2, hypothyroidism, GERD, autoimmune hepatitis (?), tobacco abuse, cirrhosis (?), EtOH abuse, CVA, carotid stenosis s/p R CEA, and recently diagnosed colon cancer s/p partial colectomy with ileocolonic anastomosis on 11/1 at an outside facility. History is unclear as we do not have access to the majority of her care, but, per family, she required take back for an intra-abdominal infection. Following this, she developed an anterior abdominal wall abscess requiring IR aspiration (12/27) and a 14 day course of IV antibiotics, which she recently finished. She was discharged from SNF yesterday. Per her care giver, she has had loose stools for about 2 weeks. Also with bilateral lower extremity swelling during this time. She presents to the ED today with complaints of abdominal pain associated with nausea. She has not had any emesis during this time to suggest clinical obstruction.      In the ED, the patient is afebrile and hemodynamically stable. Her lab work is largely unremarkable outside a mildly elevated BNP (157). She is without leukocytosis. Her lactate is normal. CT A/P is most significant for new right pulmonary effusion, small amount of pericardial fluid, post op changes of her partial colectomy with ileo-colonic anastomosis with distended small bowel proximal to the anastomosis concerning for partial or developing obstruction, and re-demonstration of anterolateral right abdominal wall abscess without obvious fistulous communication with the peritoneal cavity or bowel.      The patient has had a complicated post  surgical course. She ultimately presents with continued issues and overall failure to thrive. Gastrograffin readily passed into her colon, past the anastomosis on 4 hour KUB. May benefit from non-emergent colonoscopy to assess the anastomosis if there is concern for stricture.      -- admit to medicine   -- no acute surgical intervention indicated at this time; not obstructed  -- IR consultation for consideration of aspiration vs drain placement into her anterior abdominal wall collection  -- may require colonoscopy to assess the anastomosis  -- PT/OT   -- okay for heparin gtt from a surgical perspective  -- obtain outside chart if possible  -- remainder of care per primary team   -- general surgery will follow; please call with any questions or changes in her clinical status        Ting Workman MD  General Surgery  [place] - Emergency Dept

## 2025-01-15 NOTE — CONSULTS
Inpatient Radiology Pre-procedure Note    History of Present Illness:  Thom Krishna is a 69 y.o. female who presents for right abdominal wall fluid collection.  Admission H&P reviewed.  Past Medical History:   Diagnosis Date    Anxiety and depression 2015    Arthritis     Cervical radiculopathy 2016    Cirrhosis of liver     Colon cancer 2024    Colon polyps 2015    Diabetes mellitus type 2 with neurological manifestations 2015    no current medications, only one episode of elevated sugars with acute illness, will monitor     Encounter for blood transfusion     Hepatitis C     s/p treatment per patient report; managed by Dr. Perez    Hip fracture     Macrocytosis 2015    Thyroid disease     Transfusion reaction     hep c     Past Surgical History:   Procedure Laterality Date    APPENDECTOMY      BACK SURGERY      CAROTID ENDARTERECTOMY Right 2023    Procedure: ENDARTERECTOMY, CAROTID;  Surgeon: Juan James MD;  Location: [facility];  Service: Cardiology;  Laterality: Right;  RIGHT    CAUDAL EPIDURAL STEROID INJECTION N/A 2018    Procedure: Injection-steroid-epidural-caudal;  Surgeon: Roxana Gu Jr., MD;  Location: [facility] OR;  Service: Pain Management;  Laterality: N/A;  with cath target L4-5    CAUDAL EPIDURAL STEROID INJECTION N/A 2019    Procedure: Injection-steroid-epidural-caudal;  Surgeon: Roxana Gu Jr., MD;  Location: [facility] PAIN MGT;  Service: Pain Management;  Laterality: N/A;     SECTION      CHOLECYSTECTOMY      COLONOSCOPY  3/31/10    2 polyps, tubular adenoma    COLONOSCOPY  2015    Dr. Washington    COLONOSCOPY N/A 10/10/2018    Procedure: COLONOSCOPY;  Surgeon: Reuben Miller Jr., MD;  Location: [facility] ENDO;  Service: Endoscopy;  Laterality: N/A;    ESOPHAGOGASTRODUODENOSCOPY N/A 10/10/2018    Procedure: EGD (ESOPHAGOGASTRODUODENOSCOPY);  Surgeon: Reuben Miller Jr., MD;  Location: [facility];  Service: Endoscopy;  Laterality: N/A;  "   ESOPHAGOGASTRODUODENOSCOPY N/A 12/10/2018    Procedure: EGD (ESOPHAGOGASTRODUODENOSCOPY)/poss emr;  Surgeon: Belle Kuo MD;  Location: [facility] ([address] [place] [address]);  Service: Endoscopy;  Laterality: N/A;    ESOPHAGOGASTRODUODENOSCOPY N/A 3/12/2019    Procedure: EGD (ESOPHAGOGASTRODUODENOSCOPY);  Surgeon: Polo Keyes MD;  Location: [facility];  Service: Endoscopy;  Laterality: N/A;    ESOPHAGOGASTRODUODENOSCOPY N/A 11/20/2020    Procedure: ESOPHAGOGASTRODUODENOSCOPY (EGD);  Surgeon: Belle Kuo MD;  Location: [facility];  Service: Endoscopy;  Laterality: N/A;    HERNIA REPAIR      HYSTERECTOMY  1989    Uterus and both ovaries    INCISIONAL HERNIA REPAIR      x 2    JOINT REPLACEMENT      LIVER BIOPSY  12/2003    autoimmune hepatitis    ROTATOR CUFF REPAIR      right    STOMACH SURGERY  1980's    "took lymph node off of liver"    TOTAL HIP ARTHROPLASTY      left hip    UPPER GASTROINTESTINAL ENDOSCOPY  3/24/10    normal    UPPER GASTROINTESTINAL ENDOSCOPY  04/27/2015    Dr. Washington       Review of Systems:   As documented in primary team H&P    Home Meds:   Prior to Admission medications    Medication Sig Start Date End Date Taking? Authorizing Provider   ALPRAZolam (XANAX) 1 MG tablet TAKE ONE (1) TABLET BY MOUTH TWICE A DAY AS NEEDED FR ANXIETY  Patient taking differently: Take by mouth 2 (two) times a day. Patient take 1/2 tablet BID and 1 tablet HS 10/25/24  Yes Brandy Dejesus MD   atorvastatin (LIPITOR) 40 MG tablet Take 1 tablet (40 mg total) by mouth once daily. 1/10/24  Yes Brandy Dejesus MD   cholecalciferol, vitamin D3, 125 mcg (5,000 unit) capsule Take 1 tablet  by mouth daily with breakfast.  Patient taking differently: Take 5,000 Units by mouth daily with breakfast. 1/10/24  Yes Brandy Dejesus MD   HYDROcodone-acetaminophen (NORCO)  mg per tablet TAKE ONE (1) TABLET BY MOUTH THREE (3) TIMES DAILY AS NEEDED FOR PAIN  Patient taking differently: Take 1 tablet by mouth 3 " (three) times daily as needed for Pain. 10/25/24  Yes Brandy Dejesus MD   levothyroxine (SYNTHROID) 75 MCG tablet TAKE 1 TABLET EVERY DAY ON AN EMPTY STOMACH  Patient taking differently: Take 75 mcg by mouth before breakfast. 9/6/24  Yes Brandy Dejesus MD   nicotine (NICODERM CQ) 21 mg/24 hr Place 1 patch onto the skin once daily. 10/25/24  Yes Brandy Dejesus MD   ondansetron (ZOFRAN-ODT) 8 MG TbDL Dissolve 1 tablet (8 mg total) by mouth under the tongue 3 (three) times daily as needed (nausea). 12/12/23  Yes Brandy Dejesus MD   pantoprazole (PROTONIX) 40 MG tablet TAKE 1 TABLET EVERY DAY 7/29/24  Yes Brandy Dejesus MD   amitriptyline (ELAVIL) 25 MG tablet TAKE 1-2 TABS NIGHTLY AS NEEDED FOR INSOMNIA RELATED TO CHRONIC PAIN  Patient not taking: Reported on 1/14/2025 1/10/24   Brandy Dejesus MD   blood sugar diagnostic Strp To check BG 1 times daily, to use with insurance preferred meter 4/16/24   Brandy Dejesus MD   blood-glucose meter kit Test tid please dispense test strips and lancets 7/31/15   Provider, Historical   blood-glucose meter kit To check BG 1 times daily, to use with insurance preferred meter 4/16/24 4/16/25  Brandy Dejesus MD   ergocalciferol (ERGOCALCIFEROL) 50,000 unit Cap Take 1 capsule (50,000 Units total) by mouth every 7 days. 1/10/24   Brandy Dejesus MD   lancets [facility] To check BG 1 times daily, to use with insurance preferred meter 4/16/24   Brandy Dejesus MD   linaCLOtide (LINZESS) 145 mcg Cap capsule Take 1 capsule (145 mcg total) by mouth before breakfast. Take with a large glass of water just before 1st meal of the day  Patient not taking: Reported on 1/14/2025 10/25/24   Brandy Dejesus MD   naloxone (NARCAN) 4 mg/actuation Spry 4mg by nasal route as needed for opioid overdose; may repeat every 2-3 minutes in alternating nostrils until medical help arrives. Call 911  Patient not taking: Reported on 1/10/2024 12/19/22   Shirlene  Jr GARCIA MD     Scheduled Meds:    atorvastatin  40 mg Oral Daily    ciprofloxacin  400 mg Intravenous Q12H    levothyroxine  75 mcg Oral Before breakfast    mupirocin   Nasal BID    nicotine  1 patch Transdermal Daily    pantoprazole  40 mg Oral Daily     Continuous Infusions:    D10W 10 % 981 mL with sodium chloride (23.4%) HYPERTONIC 4 mEq/mL 77 mEq infusion   Intravenous Continuous        heparin (porcine) in D5W  0-40 Units/kg/hr Intravenous Continuous   Stopped at 01/15/25 1554     PRN Meds:  Current Facility-Administered Medications:     dextrose 50%, 12.5 g, Intravenous, PRN    dextrose 50%, 12.5 g, Intravenous, PRN    dextrose 50%, 25 g, Intravenous, PRN    glucagon (human recombinant), 1 mg, Intramuscular, PRN    glucagon (human recombinant), 1 mg, Intramuscular, PRN    glucose, 16 g, Oral, PRN    glucose, 24 g, Oral, PRN    heparin (PORCINE), 60 Units/kg, Intravenous, PRN    heparin (PORCINE), 30 Units/kg, Intravenous, PRN    HYDROcodone-acetaminophen, 1 tablet, Oral, TID PRN    insulin aspart U-100, 0-5 Units, Subcutaneous, Q6H PRN    morphine, 1 mg, Intravenous, Q4H PRN    ondansetron, 8 mg, Oral, TID PRN  Anticoagulants/Antiplatelets: Heparin    Allergies:   Review of patient's allergies indicates:   Allergen Reactions    Cefaclor Hives    Gabapentin Swelling    Penicillins      Other reaction(s): Hives    Sulfa (sulfonamide antibiotics) Other (See Comments)     Other reaction(s): Hives     Sedation Hx: have not been any systemic reactions    Labs:  Recent Labs   Lab 01/14/25  1956   INR 1.2       Recent Labs   Lab 01/15/25  0412   WBC 9.20   HGB 11.5*   HCT 35.5*   MCV 99*         Recent Labs   Lab 01/14/25  1504 01/14/25  1612   GLU 89  --      --    K 4.6  --      --    CO2 21*  --    BUN 33*  --    CREATININE 0.8  --    CALCIUM 8.9  --    MG  --  2.1   ALT 32  --    AST 22  --    ALBUMIN 2.6*  --    BILITOT 0.5  --          Vitals:  Temp: 97.3 °F (36.3 °C) (01/15/25  "2967)  Pulse: 79 (01/15/25 1528)  Resp: (!) 28 (01/15/25 1330)  BP: 102/68 (01/15/25 1457)  SpO2: 100 % (01/15/25 1330)     Physical Exam:  ASA: 2  Mallampati: 2    General: no acute distress  Mental Status: alert and oriented to person, place and time  HEENT: normocephalic, atraumatic  Chest: unlabored breathing  Heart: regular heart rate  Abdomen: nondistended  Extremity: moves all extremities    Plan: right abdominal wall abscess  Sedation Plan: up to moderate    Mike Leos MD ([place])  Interventional Radiology          "

## 2025-01-16 ENCOUNTER — CLINICAL SUPPORT (OUTPATIENT)
Dept: SMOKING CESSATION | Facility: CLINIC | Age: 70
End: 2025-01-16
Payer: COMMERCIAL

## 2025-01-16 DIAGNOSIS — F17.210 CIGARETTE SMOKER: Primary | ICD-10-CM

## 2025-01-16 PROBLEM — N39.0 ACUTE UTI: Status: ACTIVE | Noted: 2025-01-16

## 2025-01-16 PROBLEM — E16.2 HYPOGLYCEMIA: Status: ACTIVE | Noted: 2025-01-16

## 2025-01-16 PROBLEM — Z45.2 PICC (PERIPHERALLY INSERTED CENTRAL CATHETER) IN PLACE: Status: ACTIVE | Noted: 2025-01-16

## 2025-01-16 LAB
ANION GAP SERPL CALC-SCNC: 9 MMOL/L (ref 8–16)
APTT PPP: 118 SEC (ref 21–32)
APTT PPP: 64 SEC (ref 21–32)
APTT PPP: 65.4 SEC (ref 21–32)
BASOPHILS # BLD AUTO: 0.03 K/UL (ref 0–0.2)
BASOPHILS NFR BLD: 0.4 % (ref 0–1.9)
BUN SERPL-MCNC: 15 MG/DL (ref 8–23)
CALCIUM SERPL-MCNC: 8.3 MG/DL (ref 8.7–10.5)
CHLORIDE SERPL-SCNC: 106 MMOL/L (ref 95–110)
CO2 SERPL-SCNC: 22 MMOL/L (ref 23–29)
CREAT SERPL-MCNC: 0.7 MG/DL (ref 0.5–1.4)
DIFFERENTIAL METHOD BLD: ABNORMAL
EOSINOPHIL # BLD AUTO: 0 K/UL (ref 0–0.5)
EOSINOPHIL NFR BLD: 0.4 % (ref 0–8)
ERYTHROCYTE [DISTWIDTH] IN BLOOD BY AUTOMATED COUNT: 18 % (ref 11.5–14.5)
EST. GFR  (NO RACE VARIABLE): >60 ML/MIN/1.73 M^2
GLUCOSE SERPL-MCNC: 152 MG/DL (ref 70–110)
HCT VFR BLD AUTO: 30.8 % (ref 37–48.5)
HGB BLD-MCNC: 10.2 G/DL (ref 12–16)
IMM GRANULOCYTES # BLD AUTO: 0.05 K/UL (ref 0–0.04)
IMM GRANULOCYTES NFR BLD AUTO: 0.7 % (ref 0–0.5)
LYMPHOCYTES # BLD AUTO: 1.9 K/UL (ref 1–4.8)
LYMPHOCYTES NFR BLD: 28.2 % (ref 18–48)
MCH RBC QN AUTO: 32.3 PG (ref 27–31)
MCHC RBC AUTO-ENTMCNC: 33.1 G/DL (ref 32–36)
MCV RBC AUTO: 98 FL (ref 82–98)
MONOCYTES # BLD AUTO: 0.7 K/UL (ref 0.3–1)
MONOCYTES NFR BLD: 10.1 % (ref 4–15)
NEUTROPHILS # BLD AUTO: 4.1 K/UL (ref 1.8–7.7)
NEUTROPHILS NFR BLD: 60.2 % (ref 38–73)
NRBC BLD-RTO: 0 /100 WBC
OHS QRS DURATION: 64 MS
OHS QTC CALCULATION: 457 MS
PLATELET # BLD AUTO: 293 K/UL (ref 150–450)
PMV BLD AUTO: 9.7 FL (ref 9.2–12.9)
POCT GLUCOSE: 158 MG/DL (ref 70–110)
POCT GLUCOSE: 175 MG/DL (ref 70–110)
POCT GLUCOSE: 74 MG/DL (ref 70–110)
POCT GLUCOSE: 75 MG/DL (ref 70–110)
POTASSIUM SERPL-SCNC: 3.6 MMOL/L (ref 3.5–5.1)
RBC # BLD AUTO: 3.16 M/UL (ref 4–5.4)
SODIUM SERPL-SCNC: 137 MMOL/L (ref 136–145)
WBC # BLD AUTO: 6.81 K/UL (ref 3.9–12.7)

## 2025-01-16 PROCEDURE — 25000003 PHARM REV CODE 250: Performed by: HOSPITALIST

## 2025-01-16 PROCEDURE — 99407 BEHAV CHNG SMOKING > 10 MIN: CPT | Mod: S$GLB,,,

## 2025-01-16 PROCEDURE — 63600175 PHARM REV CODE 636 W HCPCS: Performed by: HOSPITALIST

## 2025-01-16 PROCEDURE — 25000003 PHARM REV CODE 250: Performed by: EMERGENCY MEDICINE

## 2025-01-16 PROCEDURE — 85730 THROMBOPLASTIN TIME PARTIAL: CPT | Mod: 91 | Performed by: HOSPITALIST

## 2025-01-16 PROCEDURE — 97530 THERAPEUTIC ACTIVITIES: CPT

## 2025-01-16 PROCEDURE — S4991 NICOTINE PATCH NONLEGEND: HCPCS | Performed by: NURSE PRACTITIONER

## 2025-01-16 PROCEDURE — 80048 BASIC METABOLIC PNL TOTAL CA: CPT | Performed by: HOSPITALIST

## 2025-01-16 PROCEDURE — 99999 PR PBB SHADOW E&M-EST. PATIENT-LVL I: CPT | Mod: PBBFAC,,,

## 2025-01-16 PROCEDURE — 25000003 PHARM REV CODE 250: Performed by: INTERNAL MEDICINE

## 2025-01-16 PROCEDURE — 99223 1ST HOSP IP/OBS HIGH 75: CPT | Mod: ,,, | Performed by: INTERNAL MEDICINE

## 2025-01-16 PROCEDURE — 25000003 PHARM REV CODE 250: Performed by: NURSE PRACTITIONER

## 2025-01-16 PROCEDURE — 63600175 PHARM REV CODE 636 W HCPCS: Performed by: FAMILY MEDICINE

## 2025-01-16 PROCEDURE — 85025 COMPLETE CBC W/AUTO DIFF WBC: CPT | Performed by: EMERGENCY MEDICINE

## 2025-01-16 PROCEDURE — 21400001 HC TELEMETRY ROOM

## 2025-01-16 RX ORDER — NITROFURANTOIN 25; 75 MG/1; MG/1
100 CAPSULE ORAL EVERY 12 HOURS
Status: COMPLETED | OUTPATIENT
Start: 2025-01-16 | End: 2025-01-21

## 2025-01-16 RX ORDER — POLYETHYLENE GLYCOL 3350, SODIUM SULFATE ANHYDROUS, SODIUM BICARBONATE, SODIUM CHLORIDE, POTASSIUM CHLORIDE 236; 22.74; 6.74; 5.86; 2.97 G/4L; G/4L; G/4L; G/4L; G/4L
4000 POWDER, FOR SOLUTION ORAL ONCE
Status: COMPLETED | OUTPATIENT
Start: 2025-01-16 | End: 2025-01-16

## 2025-01-16 RX ADMIN — NICOTINE 1 PATCH: 21 PATCH, EXTENDED RELEASE TRANSDERMAL at 09:01

## 2025-01-16 RX ADMIN — CIPROFLOXACIN 400 MG: 2 INJECTION, SOLUTION INTRAVENOUS at 04:01

## 2025-01-16 RX ADMIN — POLYETHYLENE GLYCOL 3350, SODIUM SULFATE ANHYDROUS, SODIUM BICARBONATE, SODIUM CHLORIDE, POTASSIUM CHLORIDE 4000 ML: 236; 22.74; 6.74; 5.86; 2.97 POWDER, FOR SOLUTION ORAL at 06:01

## 2025-01-16 RX ADMIN — MORPHINE SULFATE 1 MG: 2 INJECTION, SOLUTION INTRAMUSCULAR; INTRAVENOUS at 03:01

## 2025-01-16 RX ADMIN — MORPHINE SULFATE 1 MG: 2 INJECTION, SOLUTION INTRAMUSCULAR; INTRAVENOUS at 08:01

## 2025-01-16 RX ADMIN — MORPHINE SULFATE 1 MG: 2 INJECTION, SOLUTION INTRAMUSCULAR; INTRAVENOUS at 09:01

## 2025-01-16 RX ADMIN — MUPIROCIN: 20 OINTMENT TOPICAL at 09:01

## 2025-01-16 RX ADMIN — NITROFURANTOIN MONOHYDRATE/MACROCRYSTALS 100 MG: 25; 75 CAPSULE ORAL at 08:01

## 2025-01-16 RX ADMIN — VASOPRESSIN: 20 INJECTION, SOLUTION INTRAVENOUS at 04:01

## 2025-01-16 RX ADMIN — MUPIROCIN: 20 OINTMENT TOPICAL at 08:01

## 2025-01-16 RX ADMIN — HYDROCODONE BITARTRATE AND ACETAMINOPHEN 1 TABLET: 10; 325 TABLET ORAL at 01:01

## 2025-01-16 RX ADMIN — VASOPRESSIN: 20 INJECTION, SOLUTION INTRAVENOUS at 02:01

## 2025-01-16 NOTE — ASSESSMENT & PLAN NOTE
DDx:  Bowel obstruction ruled out: CT shows distended small bowel proximal to anastomosis, though Gastrografin has passed  Anastomotic stricture (possible): History of colectomy  Infectious process: Due to anterior abdominal wall abscess. Afebrile, no leukocytosis, recent IV antibiotics completed.    Dx:  IR drain placed 1/15. 20cc of bloody purulent fluid.   Follow up cultures  Monitor output from IR drain  Consider abdominal ultrasound to evaluate fluid collections.    Tx:  Gi consulted, possible colonoscopy may help assess stricture.  Continue abx  Consult general surgery  Obtaining prior medical records from [facility]

## 2025-01-16 NOTE — ASSESSMENT & PLAN NOTE
Thom Krishna is a 69 y.o. female with a history of Hep C (treated per family), DM2, hypothyroidism, GERD, autoimmune hepatitis (?), tobacco abuse, cirrhosis (?), EtOH abuse, CVA, carotid stenosis s/p R CEA, and recently diagnosed colon cancer s/p partial colectomy with ileocolonic anastomosis on 11/1 at an outside facility. History is unclear as we do not have access to the majority of her care, but, per family, she required take back for an intra-abdominal infection. Following this, she developed an anterior abdominal wall abscess requiring IR aspiration (12/27) and a 14 day course of IV antibiotics, which she recently finished. She was discharged from SNF yesterday. Per her care giver, she has had loose stools for about 2 weeks. Also with bilateral lower extremity swelling during this time. She presents to the ED today with complaints of abdominal pain associated with nausea. She has not had any emesis during this time to suggest clinical obstruction.      In the ED, the patient is afebrile and hemodynamically stable. Her lab work is largely unremarkable outside a mildly elevated BNP (157). She is without leukocytosis. Her lactate is normal. CT A/P is most significant for new right pulmonary effusion, small amount of pericardial fluid, post op changes of her partial colectomy with ileo-colonic anastomosis with distended small bowel proximal to the anastomosis concerning for partial or developing obstruction, and re-demonstration of anterolateral right abdominal wall abscess without obvious fistulous communication with the peritoneal cavity or bowel.      The patient has had a complicated post surgical course. She ultimately presents with continued issues and overall failure to thrive. Gastrograffin readily passed into her colon, past the anastomosis on 4 hour KUB. May benefit from non-emergent colonoscopy to assess the anastomosis if there is concern for stricture.      -- admitted to medicine   -- no acute  surgical intervention indicated at this time; not obstructed, passed GGC  -- IR drain placed 1/15  -- may require colonoscopy to assess the anastomosis  -- PT/OT   -- obtain outside chart if possible  -- remainder of care per primary team   -- general surgery will follow; please call with any questions or changes in her clinical status

## 2025-01-16 NOTE — PLAN OF CARE
SW met with pt at bedside to complete DCA this AM. Pt reported that she lives at home alone and will use a rollator, sc, and bsc HME at home to complete her ADLS. Pt stated that she is not current with any HH or HD services. Pt reported that her son Osbaldo [phone] will help transport her to and from app. SW spoke with pt's son per pt's request to discuss PT/OT recommendations. Pt's family would like the pt to dc to SNF at time of dc. SW sent SNF referrals via EPIC. White board updated with CM name and contact information.  Discharge brochure provided.  Pt encouraged to call with any questions or concerns.  Cm will continue to follow pt through transitions of care and assist with any discharge needs.    Hai Peterson, MSJULIETA  [phone]    No future appointments.     01/16/25 1235   Discharge Assessment   Assessment Type Discharge Planning Assessment   Confirmed/corrected address, phone number and insurance Yes   Confirmed Demographics Correct on Facesheet   Source of Information family;patient   When was your last doctors appointment?   (pt 1 year)   Communicated MAHIN with patient/caregiver Yes   Reason For Admission epigastric pain   Facility Arrived From: home   Do you expect to return to your current living situation? No   Do you have help at home or someone to help you manage your care at home? Yes   Who are your caregiver(s) and their phone number(s)? nancy Roblero   Prior to hospitilization cognitive status: Alert/Oriented   Current cognitive status: Alert/Oriented   Walking or Climbing Stairs Difficulty no   Dressing/Bathing Difficulty no   Do you have any problems with: Errands/Grocery   Home Accessibility wheelchair accessible   Home Layout Able to live on 1st floor   Equipment Currently Used at Home rollator;shower chair;bedside commode   Readmission within 30 days? No   Patient currently being followed by outpatient case management? No   Do you currently have service(s) that help you manage your care at  home? Yes   Is the pt/caregiver preference to resume services with current agency Yes   Do you take prescription medications? Yes   Do you have prescription coverage? Yes   Coverage [employer]   Do you have any problems affording any of your prescribed medications? No   Is the patient taking medications as prescribed? yes   Who is going to help you get home at discharge? nancy Roblero [phone]   How do you get to doctors appointments? family or friend will provide   Are you on dialysis? No   Do you take coumadin? No   Discharge Plan A Skilled Nursing Facility   DME Needed Upon Discharge  none   Discharge Plan discussed with: Adult children;Patient   Transition of Care Barriers None   Health Literacy   How often do you need to have someone help you when you read instructions, pamphlets, or other written material from your doctor or pharmacy? Often

## 2025-01-16 NOTE — NURSING
Patient lying quietly in bed, AAOX4, able to make all needs known. IV noted to upper right arm and PICC noted to left upper arm, flushes without difficultly. DEBBIE drain noted to RLQ, old surgical scar noted with 2 mexiplex dressing intact. D 10W Hypertonic infusing continuously with no problems. Stage 1 noted to sacrum area, patient c/o pain 3/10 at 20:33 and received Hydrocodone 10/325mg, she was reassessed and is resting in bed with eyes closed, Patient c/o pain at 23:04 and received Morphine 1mg, she tolerated with no problems, staff reassessed patient, she was resting quietly in bed with eyes closed, no distress noted, safety maintained, will continue with plan of care.

## 2025-01-16 NOTE — ASSESSMENT & PLAN NOTE
Likely related to h/o R yanique. Abscess noted on CT from 1/14, IR drain placed yesterday with ongoing pain  SBO less likely as pt having bowel movements  Colonoscopy discussed. No contraindication. Will plan for tomorrow  CLD today

## 2025-01-16 NOTE — PLAN OF CARE
Nutrition Plan of Care:    Recommendations     1. Continue to advance oral diet as tolerated    2. Recommend commercial beverage medical food supplement therapy: Boost   3. Recommend Sidney BID to promote wound healing   4. Monitor labs and weights     Goals: Patient to consume >75% of EEN prior to RD follow up  Nutrition Goal Status: new  Communication of RD Recs: other (comment) (POC)     Assessment and Plan     Endocrine  Moderate malnutrition  Malnutrition Type:  Context: chronic illness  Level: moderate     Related to (etiology):   Altered GI function     Signs and Symptoms (as evidenced by):   Wound healing needs  Estimated intake of food less than estimated needs     Malnutrition Characteristic Summary:  Energy Intake (Malnutrition): less than or equal to 75% for greater than or equal to 1 month        Interventions(treatment strategy):  1. General Healthful diet  2. Commercial beverage medical food supplement therapy     Nutrition Diagnosis Status:   Jose Bhatt MS, RDN, LDN

## 2025-01-16 NOTE — PT/OT/SLP PROGRESS
"Occupational Therapy   Treatment    Name: Thom Krishna  MRN: [id number]  Admitting Diagnosis:  Epigastric pain       Recommendations:     Discharge Recommendations: Moderate Intensity Therapy  Discharge Equipment Recommendations:  to be determined by next level of care  Barriers to discharge:  Other (Comment) (Pt requires increased level of assist)    Assessment:     Thom Krsihna is a 69 y.o. female with a medical diagnosis of Epigastric pain.  She presents with The primary encounter diagnosis was Small bowel obstruction. Diagnoses of Shortness of breath, History of colon resection, Urinary tract infection without hematuria, site unspecified, Confusion, Failure to thrive in adult, Hypoalbuminemia, Intra-abdominal fluid collection, Epigastric pain, and Acute deep vein thrombosis (DVT) of femoral vein of left lower extremity were also pertinent to this visit. Performance deficits affecting function are weakness, impaired functional mobility, decreased safety awareness, decreased coordination, gait instability, impaired endurance, decreased upper extremity function, decreased lower extremity function, decreased ROM, impaired self care skills, impaired balance, pain, impaired skin.     Rehab Prognosis:  Good and Fair; patient would benefit from acute skilled OT services to address these deficits and reach maximum level of function.       Plan:     Patient to be seen 3 x/week to address the above listed problems via self-care/home management, therapeutic activities, therapeutic exercises  Plan of Care Expires: 02/15/25  Plan of Care Reviewed with: patient    Subjective     Chief Complaint: "Yall just don't understand."  Patient/Family Comments/goals: when asked if pt would like to participate in therapy session, pt states, "I'll try"  Pain/Comfort:  Pain Rating 1: 9/10  Location 1:  ("belly & booty")  Pain Addressed 1: Reposition, Distraction, Pre-medicate for activity, Nurse notified, Cessation of Activity    Objective: " "    Communicated with: nsg prior to session.  Patient found HOB elevated with bed alarm, peripheral IV, PureWick upon OT entry to room.    General Precautions: Standard, fall    Orthopedic Precautions:N/A  Braces: N/A  Respiratory Status: Room air     Occupational Performance:     Bed Mobility:    Patient completed Rolling/Turning to Left with  minimum assistance  Patient completed Supine to Sit with moderate assistance  Patient completed Sit to Supine with moderate assistance and 2 persons     Functional Mobility/Transfers:  Patient completed Sit <> Stand Transfer with contact guard assistance  with  rolling walker   Functional Mobility: Pt taking side steps to HOB with CGA & use of RW; increased time/effort    [facility] 6 Click ADL: 15    Treatment & Education:  Pt agreeable to therapy this date, educated on role of OT & POC.  Pt states "I'll try" when asked if will participate.  Pt stating "I can't do it", after standing & taking side steps to HOB with CGA & RW.  Attempting to position pt to opposite side for pressure relief end of session; pt declining, educated pt on importance of weight shift/turning to prevent skin breakdown.  Will progress as able.      Patient left HOB elevated with all lines intact, call button in reach, bed alarm on, and nsg notified    GOALS:   Multidisciplinary Problems       Occupational Therapy Goals          Problem: Occupational Therapy    Goal Priority Disciplines Outcome Interventions   Occupational Therapy Goal     OT, PT/OT Progressing    Description: Goals to be met by: 02/15/2025     Patient will increase functional independence with ADLs by performing:    Toileting from bedside commode with Minimal Assistance for hygiene and clothing management.   Sitting at edge of bed x10 minutes with Supervision.  Supine to sit with Supervision.  Step transfer with Stand-by Assistance  Toilet transfer to bedside commode with Stand-by Assistance.                         Time Tracking:     OT " Date of Treatment: 01/16/25  OT Start Time: 0929  OT Stop Time: 0948  OT Total Time (min): 19 min    Billable Minutes:Therapeutic Activity 19 cotx with PT    OT/ANA MARIA: OT     Number of ANA MARIA visits since last OT visit: 0    1/16/2025

## 2025-01-16 NOTE — PLAN OF CARE
Problem: Adult Inpatient Plan of Care  Goal: Patient-Specific Goal (Individualized)  Outcome: Progressing     Problem: Skin Injury Risk Increased  Goal: Skin Health and Integrity  Outcome: Progressing     Problem: Diabetes Comorbidity  Goal: Blood Glucose Level Within Targeted Range  Outcome: Progressing     AAOx4. Medications administered per MAR. Glucose monitored. Tele monitored. NPO.

## 2025-01-16 NOTE — NURSING
Bedside jamar spoke with Pt. Daughter-in-law, Palmira. Pt. Family member voiced concerns pertaining to Pt. Infection. Pt. Family member requested an MD call when available to explain updated plan of care for Pt. Since Pt. Has difficulty relaying information to family pertaining to her care. Dr. Sanchez notified. Plan of care continued.    oral

## 2025-01-16 NOTE — HPI
69 y.o. female with a history of Hep C (treated per family), DM2, hypothyroidism, GERD, autoimmune hepatitis (?), tobacco abuse, cirrhosis (?), EtOH abuse, CVA, carotid stenosis s/p R CEA, and recently diagnosed colon cancer s/p partial colectomy with ileocolonic anastomosis on 11/1 at an outside facility. History is unclear as we do not have access to the majority of her care, but, per family, she required take back for an intra-abdominal infection. Following this, she developed an anterior abdominal wall abscess requiring IR aspiration (12/27) and a 14 day course of IV antibiotics, which she recently finished. She was discharged from SNF yesterday. Per her care giver, she has had loose stools for about 2 weeks. Also with bilateral lower extremity swelling during this time. She presents to the ED today with complaints of abdominal pain associated with nausea. She has not had any emesis during this time to suggest clinical obstruction

## 2025-01-16 NOTE — SUBJECTIVE & OBJECTIVE
Interval History:   ARCENIO  Patient reports persistent abdominal pain  Reports passing gas and having Bms  Reports having an appetite and wants to eat  No f/c/n/v  No other complaints    Review of Systems  Objective:     Vital Signs (Most Recent):  Temp: 97.4 °F (36.3 °C) (01/16/25 1230)  Pulse: 62 (01/16/25 1230)  Resp: 18 (01/16/25 1328)  BP: (!) 104/51 (01/16/25 1230)  SpO2: (!) 94 % (01/16/25 1230) Vital Signs (24h Range):  Temp:  [97.3 °F (36.3 °C)-98.6 °F (37 °C)] 97.4 °F (36.3 °C)  Pulse:  [62-79] 62  Resp:  [15-19] 18  SpO2:  [94 %-100 %] 94 %  BP: ()/(46-72) 104/51     Weight: 68.5 kg (151 lb 0.2 oz)  Body mass index is 27.62 kg/m².    Intake/Output Summary (Last 24 hours) at 1/16/2025 1448  Last data filed at 1/16/2025 0654  Gross per 24 hour   Intake --   Output 1548 ml   Net -1548 ml         Physical Exam  Constitutional:       General: She is not in acute distress.     Appearance: She is ill-appearing. She is not toxic-appearing.   HENT:      Mouth/Throat:      Mouth: Mucous membranes are moist.      Pharynx: Oropharynx is clear.   Eyes:      Extraocular Movements: Extraocular movements intact.      Conjunctiva/sclera: Conjunctivae normal.   Cardiovascular:      Rate and Rhythm: Normal rate and regular rhythm.   Pulmonary:      Effort: Pulmonary effort is normal. No respiratory distress.      Breath sounds: Normal breath sounds.   Abdominal:      General: Bowel sounds are normal. There is distension.      Palpations: Abdomen is soft.      Tenderness: There is abdominal tenderness. There is guarding.      Comments: R side drain in place, serosanguinous output   Musculoskeletal:         General: Normal range of motion.      Right lower leg: No edema.      Left lower leg: No edema.   Skin:     General: Skin is warm and dry.   Neurological:      General: No focal deficit present.      Mental Status: She is alert and oriented to person, place, and time.   Psychiatric:         Mood and Affect: Mood  normal.         Behavior: Behavior normal.             Significant Labs: All pertinent labs within the past 24 hours have been reviewed.    Significant Imaging: I have reviewed all pertinent imaging results/findings within the past 24 hours.

## 2025-01-16 NOTE — PT/OT/SLP PROGRESS
"Physical Therapy Treatment    Patient Name:  Thom Krishna   MRN:  [id number]    Recommendations:     Discharge Recommendations: Moderate Intensity Therapy  Discharge Equipment Recommendations: to be determined by next level of care  Barriers to discharge:  increased assist required    Assessment:     Thom Krishna is a 69 y.o. female admitted with a medical diagnosis of Epigastric pain.  She presents with the following impairments/functional limitations: weakness, impaired functional mobility, impaired endurance, gait instability, impaired self care skills, impaired balance, decreased lower extremity function, decreased upper extremity function, decreased coordination, pain, impaired skin, decreased ROM .  Pt was able to stand and take side steps with RW and CGA for safety and reassurance to pt. Pt states "I can't do it" referring to standing and ambulating and presents with self-limiting behavior.     Rehab Prognosis: Good and Fair; patient would benefit from acute skilled PT services to address these deficits and reach maximum level of function.    Recent Surgery: * No surgery found *      Plan:     During this hospitalization, patient to be seen 5 x/week to address the identified rehab impairments via gait training, therapeutic activities, therapeutic exercises, neuromuscular re-education and progress toward the following goals:    Plan of Care Expires:  02/15/25    Subjective     Chief Complaint: "Yall just don't understand."   Patient/Family Comments/goals: when asked if pt would like to participate in therapy session, pt states, "I'll try" and then states "I can't do it" post standing and taking side steps  Pain/Comfort:  Pain Rating 1: 9/10  Location - Orientation 1: generalized  Location 1: abdomen (buttocks)  Pain Addressed 1: Reposition, Distraction, Cessation of Activity, Nurse notified, Pre-medicate for activity  Pain Rating Post-Intervention 1: 10/10      Objective:     Communicated with nsg prior to " session.  Patient found HOB elevated with bed alarm, peripheral IV, PureWick upon PT entry to room.     General Precautions: Standard, fall  Orthopedic Precautions: N/A  Braces: N/A  Respiratory Status: Room air     Functional Mobility:  Bed Mobility:     Rolling Left:  minimum assistance  Supine to Sit: moderate assistance  Sit to Supine: moderate assistance and of 2 persons  Transfers:     Sit to Stand:  contact guard assistance with rolling walker  Gait: Pt able to take ~3-4 side steps to HOB with RW and CGA; increased time/effort      AM-PAC 6 CLICK MOBILITY  Turning over in bed (including adjusting bedclothes, sheets and blankets)?: 2  Sitting down on and standing up from a chair with arms (e.g., wheelchair, bedside commode, etc.): 3  Moving from lying on back to sitting on the side of the bed?: 2  Moving to and from a bed to a chair (including a wheelchair)?: 2  Need to walk in hospital room?: 2  Climbing 3-5 steps with a railing?: 1  Basic Mobility Total Score: 12       Treatment & Education:  Pt agreeable to attempt mobility but declined ambulating away from bed or attempting chair transfer despite max education and encouragement  Pt returned to bed and   BLE elevated on pillow with heels floated to reduce edema and prevent pressure injuries.   Pt educated on log rolling, bracing with pillow, and and importance of LE therex such as ankle pumps for edema and pain reduction      Patient left HOB elevated with all lines intact, call button in reach, bed alarm on, and nsg notified..    GOALS:   Multidisciplinary Problems       Physical Therapy Goals          Problem: Physical Therapy    Goal Priority Disciplines Outcome Interventions   Physical Therapy Goal     PT, PT/OT Progressing    Description: Goals to be met by:  2/15/25    Patient will increase functional independence with mobility by performin. Supine to sit with Modified [place]  2. Sit to supine with Modified [place]  3. Sit to stand  transfer with Stand-by Assistance  4. Bed to chair transfer with Stand-by Assistance using Rolling Walker  5. Gait  x 50 feet with Stand-by Assistance using Rolling Walker.                          Time Tracking:     PT Received On: 01/16/25  PT Start Time: 0929     PT Stop Time: 0948  PT Total Time (min): 19 min with OT    Billable Minutes: Therapeutic Activity 19    Treatment Type: Treatment  PT/PTA: PT     Number of PTA visits since last PT visit: 0     01/16/2025

## 2025-01-16 NOTE — PROGRESS NOTES
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 She  presented to the ED with complaint of abdominal pain.  Diagnosis with colon cancer in Nov 2024 and underwent colon resection, however developed intra abdominal infection requiring operative intervention. She was admitted to SNF facility and underwent treatment with IV antibiotic. She continue to experiencing symptoms of abdominal pain, increase confusion. CT of abdominal showed  Operative change of partial colon resection and ileal colic anastomosis with suspected developing small bowel obstruction and transition point seen at the ileocolic anastomosis. Rim enhancing gas and fluid collection at the lateral right abdominal wall concerning for abscess. General Surgery consulted, with plan IR consultation for consideration of aspiration vs drain placement into her anterior abdominal wall collection. Lt leg verous ultrasound showed,.left lower extremity DVT in the common femoral vein. Patient being admitted under the care of Hospital Medicine.              Overview/Hospital Course:  No notes on file    Interval History:   NAEON  Patient reports persistent abdominal pain  Reports passing gas and having Bms  Reports having an appetite and wants to eat  No f/c/n/v  No other complaints    Review of Systems  Objective:     Vital Signs (Most Recent):  Temp: 97.4 °F (36.3 °C) (01/16/25 1230)  Pulse: 62 (01/16/25 1230)  Resp: 18 (01/16/25 1328)  BP: (!) 104/51 (01/16/25 1230)  SpO2: (!) 94 % (01/16/25 1230) Vital Signs (24h  Range):  Temp:  [97.3 °F (36.3 °C)-98.6 °F (37 °C)] 97.4 °F (36.3 °C)  Pulse:  [62-79] 62  Resp:  [15-19] 18  SpO2:  [94 %-100 %] 94 %  BP: ()/(46-72) 104/51     Weight: 68.5 kg (151 lb 0.2 oz)  Body mass index is 27.62 kg/m².    Intake/Output Summary (Last 24 hours) at 1/16/2025 1448  Last data filed at 1/16/2025 0654  Gross per 24 hour   Intake --   Output 1548 ml   Net -1548 ml         Physical Exam  Constitutional:       General: She is not in acute distress.     Appearance: She is ill-appearing. She is not toxic-appearing.   HENT:      Mouth/Throat:      Mouth: Mucous membranes are moist.      Pharynx: Oropharynx is clear.   Eyes:      Extraocular Movements: Extraocular movements intact.      Conjunctiva/sclera: Conjunctivae normal.   Cardiovascular:      Rate and Rhythm: Normal rate and regular rhythm.   Pulmonary:      Effort: Pulmonary effort is normal. No respiratory distress.      Breath sounds: Normal breath sounds.   Abdominal:      General: Bowel sounds are normal. There is distension.      Palpations: Abdomen is soft.      Tenderness: There is abdominal tenderness. There is guarding.      Comments: R side drain in place, serosanguinous output   Musculoskeletal:         General: Normal range of motion.      Right lower leg: No edema.      Left lower leg: No edema.   Skin:     General: Skin is warm and dry.   Neurological:      General: No focal deficit present.      Mental Status: She is alert and oriented to person, place, and time.   Psychiatric:         Mood and Affect: Mood normal.         Behavior: Behavior normal.             Significant Labs: All pertinent labs within the past 24 hours have been reviewed.    Significant Imaging: I have reviewed all pertinent imaging results/findings within the past 24 hours.    Assessment and Plan     * Epigastric pain  DDx:  Bowel obstruction ruled out: CT shows distended small bowel proximal to anastomosis, though Gastrografin has passed  Anastomotic  stricture (possible): History of colectomy  Infectious process: Due to anterior abdominal wall abscess. Afebrile, no leukocytosis, recent IV antibiotics completed.    Dx:  IR drain placed 1/15. 20cc of bloody purulent fluid.   Follow up cultures  Monitor output from IR drain  Consider abdominal ultrasound to evaluate fluid collections.    Tx:  Gi consulted, possible colonoscopy may help assess stricture.  Continue abx  Consult general surgery  Obtaining prior medical records from [facility]      Hypoglycemia  Resolved    Tx:  Continue D10 1/2NS while NPO      PICC (peripherally inserted central catheter) in place  PICC in place, recently placed at [facility] per family, due to difficulty with access    Will obtain records      Acute UTI  Dx:  Urine culture ENTEROCOCCUS    Tx:  Switch from cipro to Macrobid. Cultures to guide therapy    Acute DVT (deep venous thrombosis)  Left thigh veins: Occlusive thrombus in the common femoral  Heparin drip   Consult cardiovascular       Tobacco dependency  Dangers of cigarette smoking were reviewed with patient in detail. Patient was Counseled for 3-10 minutes. Nicotine replacement options were discussed. Nicotine replacement was discussed-       Carotid arterial disease        Atherosclerosis of aorta  Continue Statin       Type 2 diabetes mellitus with hypertriglyceridemia  Accu checks every 6 hours with SSI       GERD (gastroesophageal reflux disease)  Continue PPI       Hypothyroidism    Continue Synthroid       VTE Risk Mitigation (From admission, onward)           Ordered     heparin 25,000 units in dextrose 5% (100 units/ml) IV bolus from bag HIGH INTENSITY nomogram - [facility]  As needed (PRN)        Question:  Heparin Infusion Adjustment (DO NOT MODIFY ANSWER)  Answer:  [email or web address]\[email or web address]\Images\Pharmacy\HeparinInfusions\heparin HIGH INTENSITY nomogram for [facility] [id number].pdf    01/14/25 1917     heparin 25,000 units in dextrose 5% (100 units/ml) IV bolus from bag HIGH INTENSITY  nomogram - [facility]  As needed (PRN)        Question:  Heparin Infusion Adjustment (DO NOT MODIFY ANSWER)  Answer:  [email or web address]\epic\Images\Pharmacy\HeparinInfusions\heparin HIGH INTENSITY nomogram for [facility] [id number].pdf    01/14/25 1917     heparin 25,000 units in dextrose 5% 250 mL (100 units/mL) infusion HIGH INTENSITY nomogram - [facility]  Continuous        Question:  Begin at (units/kg/hr)  Answer:  18    01/14/25 1917                    Discharge Planning   MAHIN: 1/17/2025     Code Status: Full Code   Medical Readiness for Discharge Date:   Discharge Plan A: Skilled Nursing Facility                Please place Justification for DME        Mike Sanchez MD  Department of Hospital Medicine   [facility]

## 2025-01-16 NOTE — PLAN OF CARE
Problem: Adult Inpatient Plan of Care  Goal: Plan of Care Review  Outcome: Progressing     Problem: Skin Injury Risk Increased  Goal: Skin Health and Integrity  Outcome: Progressing     Problem: Diabetes Comorbidity  Goal: Blood Glucose Level Within Targeted Range  Outcome: Progressing     Problem: Infection  Goal: Absence of Infection Signs and Symptoms  Outcome: Progressing     Problem: Wound  Goal: Optimal Coping  Outcome: Progressing  AAOX4. C/o abdominal & back pain. Pt on continuous heparin drip & D10 hypertonic. BG was below 50. MD notified & orders were modified. Pw in place. Pt has stage 1 pressure injury & skin barrier applied. Meds given per MAR. Bed in lowest positon & call light within reach.

## 2025-01-16 NOTE — NURSING
Patient seen today by wound care for Adebayo score of 15, noted blanchable redness to left heel, moisture associated redness of the buttocks.     Recommendations discussed with patient, nurse, Dr Sanchez.   Pressure injury prevention interventions:   - Triad BID  - Waffle overlay  - Bilateral heel boots

## 2025-01-16 NOTE — PLAN OF CARE
Problem: Adult Inpatient Plan of Care  Goal: Plan of Care Review  Outcome: Progressing  Goal: Patient-Specific Goal (Individualized)  Outcome: Progressing  Goal: Absence of Hospital-Acquired Illness or Injury  Outcome: Progressing  Goal: Optimal Comfort and Wellbeing  Outcome: Progressing  Goal: Readiness for Transition of Care  Outcome: Progressing     Problem: Skin Injury Risk Increased  Goal: Skin Health and Integrity  Outcome: Progressing     Problem: Diabetes Comorbidity  Goal: Blood Glucose Level Within Targeted Range  Outcome: Progressing     Problem: Infection  Goal: Absence of Infection Signs and Symptoms  Outcome: Progressing     Problem: Wound  Goal: Optimal Coping  Outcome: Progressing  Goal: Optimal Functional Ability  Outcome: Progressing  Goal: Absence of Infection Signs and Symptoms  Outcome: Progressing  Goal: Improved Oral Intake  Outcome: Progressing  Goal: Optimal Pain Control and Function  Outcome: Progressing  Goal: Skin Health and Integrity  Outcome: Progressing  Goal: Optimal Wound Healing  Outcome: Progressing

## 2025-01-16 NOTE — ASSESSMENT & PLAN NOTE
PICC in place, recently placed at SNF per family, due to difficulty with access    Will obtain records

## 2025-01-16 NOTE — PROGRESS NOTES
Individual Follow-Up Form    1/16/2025    Quit Date: To be determined    Clinical Status of Patient: Inpatient    Length of Service: 30 minutes    Comments: Smoking cessation education note: Pt is a 2 pk/day cigarette smoker x 57 yrs. She denies nicotine withdrawal symptoms with 21 mg nicotine patch in use, stating that she has not been talia to smoke in approximately 3 months due to hospital admissions. Pt states ready to quit smoking for good. Ambulatory referral to Smoking Cessation clinic following hospital discharge.     Diagnosis: F17.210

## 2025-01-16 NOTE — PROGRESS NOTES
[facility] Surg  General Surgery  Progress Note    Subjective:     History of Present Illness:  Thom Krishna is a 69 y.o. female with a history of Hep C (treated per family), DM2, hypothyroidism, GERD, autoimmune hepatitis (?), tobacco abuse, cirrhosis (?), EtOH abuse, CVA, carotid stenosis s/p R CEA, and recently diagnosed colon cancer s/p partial colectomy with ileocolonic anastomosis on 11/1 at an outside facility. History is unclear as we do not have access to the majority of her care, but, per family, she required take back for an intra-abdominal infection. Following this, she developed an anterior abdominal wall abscess requiring IR aspiration (12/27) and a 14 day course of IV antibiotics, which she recently finished. She was discharged from SNF yesterday. Per her care giver, she has had loose stools for about 2 weeks. Also with bilateral lower extremity swelling during this time. She presents to the ED today with complaints of abdominal pain associated with nausea. She has not had any emesis during this time to suggest clinical obstruction.     Post-Op Info:  * No surgery found *         Interval History: IR drain placed yesterday into abdominal wall collection. SS output this morning. Pain somewhat improved, but  over the right side. GGC yesterday with contrast throughout the colon.    Medications:  Continuous Infusions:   D10W 10 % 981 mL with sodium chloride (23.4%) HYPERTONIC 4 mEq/mL 77 mEq infusion   Intravenous Continuous 100 mL/hr at 01/16/25 0418 New Bag at 01/16/25 0418    heparin (porcine) in D5W  0-40 Units/kg/hr Intravenous Continuous 7.9 mL/hr at 01/16/25 0910 12 Units/kg/hr at 01/16/25 0910     Scheduled Meds:   atorvastatin  40 mg Oral Daily    ciprofloxacin  400 mg Intravenous Q12H    levothyroxine  75 mcg Oral Before breakfast    mupirocin   Nasal BID    nicotine  1 patch Transdermal Daily    pantoprazole  40 mg Oral Daily     PRN Meds:  Current Facility-Administered  Medications:     dextrose 50%, 12.5 g, Intravenous, PRN    dextrose 50%, 25 g, Intravenous, PRN    glucagon (human recombinant), 1 mg, Intramuscular, PRN    glucose, 16 g, Oral, PRN    glucose, 24 g, Oral, PRN    heparin (PORCINE), 60 Units/kg, Intravenous, PRN    heparin (PORCINE), 30 Units/kg, Intravenous, PRN    HYDROcodone-acetaminophen, 1 tablet, Oral, TID PRN    insulin aspart U-100, 0-5 Units, Subcutaneous, Q6H PRN    morphine, 1 mg, Intravenous, Q4H PRN    ondansetron, 8 mg, Oral, TID PRN     Review of patient's allergies indicates:   Allergen Reactions    Cefaclor Hives    Gabapentin Swelling    Penicillins      Other reaction(s): Hives    Sulfa (sulfonamide antibiotics) Other (See Comments)     Other reaction(s): Hives     Objective:     Vital Signs (Most Recent):  Temp: 97.6 °F (36.4 °C) (01/16/25 0830)  Pulse: 76 (01/16/25 0830)  Resp: 19 (01/16/25 0905)  BP: (!) 115/55 (01/16/25 0928)  SpO2: 100 % (01/16/25 0830) Vital Signs (24h Range):  Temp:  [97.3 °F (36.3 °C)-98.6 °F (37 °C)] 97.6 °F (36.4 °C)  Pulse:  [65-82] 76  Resp:  [15-28] 19  SpO2:  [96 %-100 %] 100 %  BP: ()/(46-72) 115/55     Weight: 68.5 kg (151 lb 0.2 oz)  Body mass index is 27.62 kg/m².    Intake/Output - Last 3 Shifts         01/14 0700  01/15 0659 01/15 0700 01/16 0659 01/16 0700 01/17 0659    I.V. (mL/kg) 152.4 (2.3)      IV Piggyback 200      Total Intake(mL/kg) 352.4 (5.4)      Urine (mL/kg/hr)  1677 (1)     Drains  25     Other  20     Stool  1     Total Output  1723     Net +352.4 -1723            Stool Occurrence 1 x               Physical Exam  Vitals reviewed.   Constitutional:       General: She is not in acute distress.     Appearance: Normal appearance. She is not toxic-appearing.   HENT:      Head: Normocephalic and atraumatic.      Nose: Nose normal.   Cardiovascular:      Rate and Rhythm: Normal rate.      Pulses: Normal pulses.   Pulmonary:      Effort: Pulmonary effort is normal. No respiratory distress.    Abdominal:      General: Abdomen is flat. There is no distension.      Palpations: Abdomen is soft.      Tenderness: There is no guarding or rebound.      Comments: Mildly tender to palpation in the right abdomen; no guarding, no rebound, non peritonitic  IR drain in place over RLQ, SS output   Skin:     General: Skin is warm.   Neurological:      General: No focal deficit present.      Mental Status: She is alert and oriented to person, place, and time.          Significant Labs:  I have reviewed all pertinent lab results within the past 24 hours.    Significant Diagnostics:  I have reviewed all pertinent imaging results/findings within the past 24 hours.  Assessment/Plan:     * Epigastric pain  Thom Krishna is a 69 y.o. female with a history of Hep C (treated per family), DM2, hypothyroidism, GERD, autoimmune hepatitis (?), tobacco abuse, cirrhosis (?), EtOH abuse, CVA, carotid stenosis s/p R CEA, and recently diagnosed colon cancer s/p partial colectomy with ileocolonic anastomosis on 11/1 at an outside facility. History is unclear as we do not have access to the majority of her care, but, per family, she required take back for an intra-abdominal infection. Following this, she developed an anterior abdominal wall abscess requiring IR aspiration (12/27) and a 14 day course of IV antibiotics, which she recently finished. She was discharged from SNF yesterday. Per her care giver, she has had loose stools for about 2 weeks. Also with bilateral lower extremity swelling during this time. She presents to the ED today with complaints of abdominal pain associated with nausea. She has not had any emesis during this time to suggest clinical obstruction.      In the ED, the patient is afebrile and hemodynamically stable. Her lab work is largely unremarkable outside a mildly elevated BNP (157). She is without leukocytosis. Her lactate is normal. CT A/P is most significant for new right pulmonary effusion, small amount of  pericardial fluid, post op changes of her partial colectomy with ileo-colonic anastomosis with distended small bowel proximal to the anastomosis concerning for partial or developing obstruction, and re-demonstration of anterolateral right abdominal wall abscess without obvious fistulous communication with the peritoneal cavity or bowel.      The patient has had a complicated post surgical course. She ultimately presents with continued issues and overall failure to thrive. Gastrograffin readily passed into her colon, past the anastomosis on 4 hour KUB. May benefit from non-emergent colonoscopy to assess the anastomosis if there is concern for stricture.      -- admitted to medicine   -- no acute surgical intervention indicated at this time; not obstructed, passed GGC  -- IR drain placed 1/15  -- may require colonoscopy to assess the anastomosis  -- PT/OT   -- obtain outside chart if possible  -- remainder of care per primary team   -- general surgery will follow; please call with any questions or changes in her clinical status        Mindi Smith MD  General Surgery  [facility]

## 2025-01-16 NOTE — NURSING
Assumed care from ANA Lovelace. Bedside report given. Heparin handoff completed. Pt resting comfortably in bed at this time.

## 2025-01-16 NOTE — SUBJECTIVE & OBJECTIVE
Interval History: IR drain placed yesterday into abdominal wall collection. SS output this morning. Pain somewhat improved, but  over the right side. GGC yesterday with contrast throughout the colon.    Medications:  Continuous Infusions:   D10W 10 % 981 mL with sodium chloride (23.4%) HYPERTONIC 4 mEq/mL 77 mEq infusion   Intravenous Continuous 100 mL/hr at 01/16/25 0418 New Bag at 01/16/25 0418    heparin (porcine) in D5W  0-40 Units/kg/hr Intravenous Continuous 7.9 mL/hr at 01/16/25 0910 12 Units/kg/hr at 01/16/25 0910     Scheduled Meds:   atorvastatin  40 mg Oral Daily    ciprofloxacin  400 mg Intravenous Q12H    levothyroxine  75 mcg Oral Before breakfast    mupirocin   Nasal BID    nicotine  1 patch Transdermal Daily    pantoprazole  40 mg Oral Daily     PRN Meds:  Current Facility-Administered Medications:     dextrose 50%, 12.5 g, Intravenous, PRN    dextrose 50%, 25 g, Intravenous, PRN    glucagon (human recombinant), 1 mg, Intramuscular, PRN    glucose, 16 g, Oral, PRN    glucose, 24 g, Oral, PRN    heparin (PORCINE), 60 Units/kg, Intravenous, PRN    heparin (PORCINE), 30 Units/kg, Intravenous, PRN    HYDROcodone-acetaminophen, 1 tablet, Oral, TID PRN    insulin aspart U-100, 0-5 Units, Subcutaneous, Q6H PRN    morphine, 1 mg, Intravenous, Q4H PRN    ondansetron, 8 mg, Oral, TID PRN     Review of patient's allergies indicates:   Allergen Reactions    Cefaclor Hives    Gabapentin Swelling    Penicillins      Other reaction(s): Hives    Sulfa (sulfonamide antibiotics) Other (See Comments)     Other reaction(s): Hives     Objective:     Vital Signs (Most Recent):  Temp: 97.6 °F (36.4 °C) (01/16/25 0830)  Pulse: 76 (01/16/25 0830)  Resp: 19 (01/16/25 0905)  BP: (!) 115/55 (01/16/25 0928)  SpO2: 100 % (01/16/25 0830) Vital Signs (24h Range):  Temp:  [97.3 °F (36.3 °C)-98.6 °F (37 °C)] 97.6 °F (36.4 °C)  Pulse:  [65-82] 76  Resp:  [15-28] 19  SpO2:  [96 %-100 %] 100 %  BP: ()/(46-72) 115/55      Weight: 68.5 kg (151 lb 0.2 oz)  Body mass index is 27.62 kg/m².    Intake/Output - Last 3 Shifts         01/14 0700  01/15 0659 01/15 0700  01/16 0659 01/16 0700  01/17 0659    I.V. (mL/kg) 152.4 (2.3)      IV Piggyback 200      Total Intake(mL/kg) 352.4 (5.4)      Urine (mL/kg/hr)  1677 (1)     Drains  25     Other  20     Stool  1     Total Output  1723     Net +352.4 -1723            Stool Occurrence 1 x               Physical Exam  Vitals reviewed.   Constitutional:       General: She is not in acute distress.     Appearance: Normal appearance. She is not toxic-appearing.   HENT:      Head: Normocephalic and atraumatic.      Nose: Nose normal.   Cardiovascular:      Rate and Rhythm: Normal rate.      Pulses: Normal pulses.   Pulmonary:      Effort: Pulmonary effort is normal. No respiratory distress.   Abdominal:      General: Abdomen is flat. There is no distension.      Palpations: Abdomen is soft.      Tenderness: There is no guarding or rebound.      Comments: Mildly tender to palpation in the right abdomen; no guarding, no rebound, non peritonitic  IR drain in place over RLQ, SS output   Skin:     General: Skin is warm.   Neurological:      General: No focal deficit present.      Mental Status: She is alert and oriented to person, place, and time.          Significant Labs:  I have reviewed all pertinent lab results within the past 24 hours.    Significant Diagnostics:  I have reviewed all pertinent imaging results/findings within the past 24 hours.

## 2025-01-16 NOTE — PLAN OF CARE
Medical recorders requested from [facility] medical records (p) [phone] ([phone].

## 2025-01-16 NOTE — NURSING
Pt situated to room, vs taken & stable. C/o abdominal & back pain. Pt on heparin drip & D10 continuous. Stage 1 pressure injury noted on sacrum. Bruise skin. Call light within reach.

## 2025-01-16 NOTE — CONSULTS
[facility] Surg  Gastroenterology  Consult Note    Patient Name: Thom Krishna  MRN: [id number]  Admission Date: 1/14/2025  Hospital Length of Stay: 2 days  Code Status: Full Code   Attending Provider: Mike Sanchez MD   Consulting Provider: Ruslan Alvarez MD  Primary Care Physician: Brandy Dejesus MD  Principal Problem:Epigastric pain    Inpatient consult to Gastroenterology-[facility]  Consult performed by: Ruslan Alvarez MD  Consult ordered by: Mike Sanchez MD        Subjective:     HPI:  69 y.o. female with a history of Hep C (treated per family), DM2, hypothyroidism, GERD, autoimmune hepatitis (?), tobacco abuse, cirrhosis (?), EtOH abuse, CVA, carotid stenosis s/p R CEA, and recently diagnosed colon cancer s/p partial colectomy with ileocolonic anastomosis on 11/1 at an outside facility. History is unclear as we do not have access to the majority of her care, but, per family, she required take back for an intra-abdominal infection. Following this, she developed an anterior abdominal wall abscess requiring IR aspiration (12/27) and a 14 day course of IV antibiotics, which she recently finished. She was discharged from SNF yesterday. Per her care giver, she has had loose stools for about 2 weeks. Also with bilateral lower extremity swelling during this time. She presents to the ED today with complaints of abdominal pain associated with nausea. She has not had any emesis during this time to suggest clinical obstruction     No new subjective & objective note has been filed under this hospital service since the last note was generated.    Assessment/Plan:     GI  * Epigastric pain  Likely related to h/o R yanique. Abscess noted on CT from 1/14, IR drain placed yesterday with ongoing pain  SBO less likely as pt having bowel movements  Colonoscopy discussed. No contraindication. Will plan for tomorrow  CLD today        Thank you for your consult. I will follow-up with patient. Please contact  us if you have any additional questions.    Ruslan Alvarez MD  Gastroenterology  [facility] Surg

## 2025-01-16 NOTE — PROGRESS NOTES
[facility] - Med Surg  Adult Nutrition  Progress Note    SUMMARY       Recommendations    1. Continue to advance oral diet as tolerated    2. Recommend commercial beverage medical food supplement therapy: Boost   3. Recommend Sidney BID to promote wound healing   4. Monitor labs and weights    Goals: Patient to consume >75% of EEN prior to RD follow up  Nutrition Goal Status: new  Communication of RD Recs: other (comment) (POC)    Assessment and Plan    Endocrine  Moderate malnutrition  Malnutrition Type:  Context: chronic illness  Level: moderate    Related to (etiology):   Altered GI function    Signs and Symptoms (as evidenced by):   Wound healing needs  Estimated intake of food less than estimated needs    Malnutrition Characteristic Summary:  Energy Intake (Malnutrition): less than or equal to 75% for greater than or equal to 1 month      Interventions(treatment strategy):  1. General Healthful diet  2. Commercial beverage medical food supplement therapy    Nutrition Diagnosis Status:   New         Malnutrition Assessment  Malnutrition Context: chronic illness  Malnutrition Level: moderate  Skin (Micronutrient): wounds unhealed (Pressure injury to buttocks)  Musculoskeletal/Lower Extremities: edema   Micronutrient Evaluation Summary: suspected deficiency   Energy Intake (Malnutrition): less than or equal to 75% for greater than or equal to 1 month                         Reason for Assessment    Reason For Assessment: identified at risk by screening criteria    Diagnosis: gastrointestinal disease  Patient Active Problem List   Diagnosis    Hypothyroidism    Vitamin B12 deficiency    GERD (gastroesophageal reflux disease)    Autoimmune hepatitis    History of hepatitis C    Chronic pain    Spondylolisthesis    DDD (degenerative disc disease), lumbar    DDD (degenerative disc disease), cervical    Coccydynia    Colon polyps    Anxiety and depression    Pulmonary nodule    Hx Orthostatic hypotension    Macrocytosis     Diabetes mellitus type 2 with neurological manifestations    Tobacco abuse    Type 2 diabetes mellitus with hypertriglyceridemia    Cervical radiculopathy    Foraminal stenosis of cervical region    S/P ANASTASIA-BSO (total abdominal hysterectomy and bilateral salpingo-oophorectomy)    Elevated liver enzymes    Chronic neck and back pain    Difficulty walking    Weakness    Vitamin D deficiency    Epigastric pain    Other constipation    Failed back surgical syndrome    Lumbar radiculopathy    Postmenopausal osteoporosis    Adenomatous duodenal polyp    Opioid dependence, daily use- Norco    Hx Compression fracture of T11 vertebra    Personal history of nicotine dependence     History of alcohol abuse    Closed compression fracture of L3 lumbar vertebra, sequela    Hepatic cirrhosis    Dilated bile duct    Atherosclerosis of aorta    Pulmonary emphysema, unspecified emphysema type    Hx COVID    Carotid artery stenosis, symptomatic, right    Moderate malnutrition    Mild episode of recurrent major depressive disorder    Carotid arterial disease    Sedative, hypnotic, or anxiolytic abuse, daily use- Xanax    History of stroke    History of right-sided carotid endarterectomy    Tobacco dependency    Acute DVT (deep venous thrombosis)    Acute UTI    PICC (peripherally inserted central catheter) in place    Hypoglycemia     Past Medical History:   Diagnosis Date    Anxiety and depression 07/21/2015    Arthritis     Cervical radiculopathy 04/08/2016    Cirrhosis of liver     Colon cancer 11/2024    Colon polyps 04/27/2015    Diabetes mellitus type 2 with neurological manifestations 11/06/2015    no current medications, only one episode of elevated sugars with acute illness, will monitor     Encounter for blood transfusion     Hepatitis C     s/p treatment per patient report; managed by Dr. Perez    Hip fracture     Macrocytosis 07/21/2015    Thyroid disease     Transfusion reaction     hep c       General Information  "Comments:   1/16/25: Patient is on clear liquids at this time.  Patient admitted with epigastric pain, diarrhea x 2 weeks and BLE swelling with recent diagnosis of colon cancer.  Patient with prior recently hospitalization for GI related issues with  SNF admission. PMH of partial colectomy, Hep C and CAD.  Pressure injury noted to buttocks region. Labs reviewed.  NKFA.  LBM:1/15/25.  RD to recommend commercial beverages and Sidney BID to promote wound healing.  RD to continue to monitor diet advancement and tolerance.    Nutrition Discharge Planning: General healthful diet    Nutrition/Diet History  Nutrition Related Social Determinants of Health: SDOH: Adequate food in home environment and None Identified    Spiritual, Cultural Beliefs, Faith Practices, Values that Affect Care: no  Food Allergies: NKFA  Factors Affecting Nutritional Intake: altered gastrointestinal function, clear liquid diet    Anthropometrics    Height: 5' 2" (157.5 cm)  Height (inches): 62 in  Height Method: Stated  Weight: 68.5 kg (151 lb 0.2 oz)  Weight (lb): 151.02 lb  Weight Method: Bed Scale  Ideal Body Weight (IBW), Female: 110 lb  % Ideal Body Weight, Female (lb): 137.29 %  BMI (Calculated): 27.6  BMI Grade: 25 - 29.9 - overweight       Lab/Procedures/Meds    Pertinent Labs Reviewed: reviewed  BMP  Lab Results   Component Value Date     01/16/2025    K 3.6 01/16/2025     01/16/2025    CO2 22 (L) 01/16/2025    BUN 15 01/16/2025    CREATININE 0.7 01/16/2025    CALCIUM 8.3 (L) 01/16/2025    ANIONGAP 9 01/16/2025    EGFRNORACEVR >60 01/16/2025     Lab Results   Component Value Date    HGBA1C 6 11/20/2024     Lab Results   Component Value Date    CALCIUM 8.3 (L) 01/16/2025    PHOS 4.0 06/12/2023       Pertinent Medications Reviewed: reviewed  Scheduled Meds:   atorvastatin  40 mg Oral Daily    levothyroxine  75 mcg Oral Before breakfast    mupirocin   Nasal BID    nicotine  1 patch Transdermal Daily    nitrofurantoin " (macrocrystal-monohydrate)  100 mg Oral Q12H    pantoprazole  40 mg Oral Daily     Continuous Infusions:   dextrose 10% and 0.45% NaCl infusion   Intravenous Continuous 100 mL/hr at 01/16/25 1445 New Bag at 01/16/25 1445    heparin (porcine) in D5W  0-40 Units/kg/hr Intravenous Continuous 7.9 mL/hr at 01/16/25 0910 12 Units/kg/hr at 01/16/25 0910     PRN Meds:.  Current Facility-Administered Medications:     dextrose 50%, 12.5 g, Intravenous, PRN    dextrose 50%, 25 g, Intravenous, PRN    glucagon (human recombinant), 1 mg, Intramuscular, PRN    glucose, 16 g, Oral, PRN    glucose, 24 g, Oral, PRN    heparin (PORCINE), 60 Units/kg, Intravenous, PRN    heparin (PORCINE), 30 Units/kg, Intravenous, PRN    HYDROcodone-acetaminophen, 1 tablet, Oral, TID PRN    insulin aspart U-100, 0-5 Units, Subcutaneous, Q6H PRN    morphine, 1 mg, Intravenous, Q4H PRN    ondansetron, 8 mg, Oral, TID PRN    Estimated/Assessed Needs    Weight Used For Calorie Calculations: 68.5 kg (151 lb 0.2 oz)  Energy Calorie Requirements (kcal): 25-35kcals/kg (1712-2398kcals/day)  Energy Need Method: Kcal/kg  Protein Requirements: 1.2g/kg (82g/day)  Weight Used For Protein Calculations: 68.5 kg (151 lb 0.2 oz)  Fluid Requirements (mL): 1ml/kcal  Estimated Fluid Requirement Method: RDA Method  RDA Method (mL): 25  CHO Requirement: 250      Nutrition Prescription Ordered    Current Diet Order: Clear liquids  Oral Nutrition Supplement: Boost and doe    Evaluation of Received Nutrient/Fluid Intake    % Kcal Needs: <50%  % Protein Needs: <50%  I/O: 1/16/25: -1371ml since admit  Energy Calories Required: not meeting needs  Protein Required: not meeting needs  Fluid Required: not meeting needs  Comments: LBM: 1/15/2025  Tolerance: other (see comments) (clear liquids, monitoring tolerance)  % Intake of Estimated Energy Needs: 25 - 50 %  % Meal Intake: 25 - 50 %    Nutrition Risk    Level of Risk/Frequency of Follow-up: moderate (follow up: 1-2x per week)      Monitor and Evaluation    Food and Nutrient Intake: energy intake, food and beverage intake  Food and Nutrient Adminstration: diet order  Anthropometric Measurements: height/length, weight, weight change, body mass index  Biochemical Data, Medical Tests and Procedures: electrolyte and renal panel, gastrointestinal profile, glucose/endocrine profile, inflammatory profile, lipid profile  Nutrition-Focused Physical Findings: skin     Nutrition Follow-Up    RD Follow-up?: Yes  Annemarie Bhatt, MS, RDN, LDN

## 2025-01-16 NOTE — ASSESSMENT & PLAN NOTE
Malnutrition Type:  Context: chronic illness  Level: moderate    Related to (etiology):   Altered GI function    Signs and Symptoms (as evidenced by):   Wound healing needs  Estimated intake of food less than estimated needs    Malnutrition Characteristic Summary:  Energy Intake (Malnutrition): less than or equal to 75% for greater than or equal to 1 month      Interventions(treatment strategy):  1. General Healthful diet  2. Commercial beverage medical food supplement therapy    Nutrition Diagnosis Status:   New

## 2025-01-17 ENCOUNTER — PATIENT OUTREACH (OUTPATIENT)
Dept: ADMINISTRATIVE | Facility: HOSPITAL | Age: 70
End: 2025-01-17
Payer: MEDICARE

## 2025-01-17 ENCOUNTER — ANESTHESIA (OUTPATIENT)
Dept: ENDOSCOPY | Facility: HOSPITAL | Age: 70
DRG: 862 | End: 2025-01-17
Payer: MEDICARE

## 2025-01-17 ENCOUNTER — CLINICAL SUPPORT (OUTPATIENT)
Dept: SMOKING CESSATION | Facility: CLINIC | Age: 70
End: 2025-01-17
Payer: COMMERCIAL

## 2025-01-17 ENCOUNTER — ANESTHESIA EVENT (OUTPATIENT)
Dept: ENDOSCOPY | Facility: HOSPITAL | Age: 70
DRG: 862 | End: 2025-01-17
Payer: MEDICARE

## 2025-01-17 DIAGNOSIS — F17.210 CIGARETTE SMOKER: Primary | ICD-10-CM

## 2025-01-17 LAB
ANION GAP SERPL CALC-SCNC: 11 MMOL/L (ref 8–16)
APTT PPP: 61.1 SEC (ref 21–32)
BACTERIA UR CULT: ABNORMAL
BASOPHILS # BLD AUTO: 0.02 K/UL (ref 0–0.2)
BASOPHILS NFR BLD: 0.2 % (ref 0–1.9)
BUN SERPL-MCNC: 6 MG/DL (ref 8–23)
CALCIUM SERPL-MCNC: 8.3 MG/DL (ref 8.7–10.5)
CHLORIDE SERPL-SCNC: 107 MMOL/L (ref 95–110)
CO2 SERPL-SCNC: 20 MMOL/L (ref 23–29)
CREAT SERPL-MCNC: 0.6 MG/DL (ref 0.5–1.4)
DIFFERENTIAL METHOD BLD: ABNORMAL
EOSINOPHIL # BLD AUTO: 0 K/UL (ref 0–0.5)
EOSINOPHIL NFR BLD: 0.4 % (ref 0–8)
ERYTHROCYTE [DISTWIDTH] IN BLOOD BY AUTOMATED COUNT: 18 % (ref 11.5–14.5)
EST. GFR  (NO RACE VARIABLE): >60 ML/MIN/1.73 M^2
GLUCOSE SERPL-MCNC: 163 MG/DL (ref 70–110)
HCT VFR BLD AUTO: 32.7 % (ref 37–48.5)
HGB BLD-MCNC: 10.9 G/DL (ref 12–16)
IMM GRANULOCYTES # BLD AUTO: 0.09 K/UL (ref 0–0.04)
IMM GRANULOCYTES NFR BLD AUTO: 0.8 % (ref 0–0.5)
LYMPHOCYTES # BLD AUTO: 1.8 K/UL (ref 1–4.8)
LYMPHOCYTES NFR BLD: 15.6 % (ref 18–48)
MCH RBC QN AUTO: 32.3 PG (ref 27–31)
MCHC RBC AUTO-ENTMCNC: 33.3 G/DL (ref 32–36)
MCV RBC AUTO: 97 FL (ref 82–98)
MONOCYTES # BLD AUTO: 0.8 K/UL (ref 0.3–1)
MONOCYTES NFR BLD: 6.7 % (ref 4–15)
NEUTROPHILS # BLD AUTO: 8.6 K/UL (ref 1.8–7.7)
NEUTROPHILS NFR BLD: 76.3 % (ref 38–73)
NRBC BLD-RTO: 0 /100 WBC
PLATELET # BLD AUTO: 331 K/UL (ref 150–450)
PMV BLD AUTO: 10.7 FL (ref 9.2–12.9)
POCT GLUCOSE: 110 MG/DL (ref 70–110)
POCT GLUCOSE: 147 MG/DL (ref 70–110)
POCT GLUCOSE: 158 MG/DL (ref 70–110)
POCT GLUCOSE: 42 MG/DL (ref 70–110)
POCT GLUCOSE: 91 MG/DL (ref 70–110)
POTASSIUM SERPL-SCNC: 3.4 MMOL/L (ref 3.5–5.1)
RBC # BLD AUTO: 3.37 M/UL (ref 4–5.4)
SODIUM SERPL-SCNC: 138 MMOL/L (ref 136–145)
WBC # BLD AUTO: 11.3 K/UL (ref 3.9–12.7)

## 2025-01-17 PROCEDURE — 0DJD8ZZ INSPECTION OF LOWER INTESTINAL TRACT, VIA NATURAL OR ARTIFICIAL OPENING ENDOSCOPIC: ICD-10-PCS | Performed by: INTERNAL MEDICINE

## 2025-01-17 PROCEDURE — 25000003 PHARM REV CODE 250: Performed by: INTERNAL MEDICINE

## 2025-01-17 PROCEDURE — 99406 BEHAV CHNG SMOKING 3-10 MIN: CPT | Mod: S$GLB,,,

## 2025-01-17 PROCEDURE — 02HV33Z INSERTION OF INFUSION DEVICE INTO SUPERIOR VENA CAVA, PERCUTANEOUS APPROACH: ICD-10-PCS | Performed by: HOSPITALIST

## 2025-01-17 PROCEDURE — 63600175 PHARM REV CODE 636 W HCPCS: Performed by: EMERGENCY MEDICINE

## 2025-01-17 PROCEDURE — 37000008 HC ANESTHESIA 1ST 15 MINUTES: Performed by: INTERNAL MEDICINE

## 2025-01-17 PROCEDURE — 80048 BASIC METABOLIC PNL TOTAL CA: CPT | Performed by: HOSPITALIST

## 2025-01-17 PROCEDURE — 37000009 HC ANESTHESIA EA ADD 15 MINS: Performed by: INTERNAL MEDICINE

## 2025-01-17 PROCEDURE — S4991 NICOTINE PATCH NONLEGEND: HCPCS | Performed by: NURSE PRACTITIONER

## 2025-01-17 PROCEDURE — 85730 THROMBOPLASTIN TIME PARTIAL: CPT | Performed by: HOSPITALIST

## 2025-01-17 PROCEDURE — 63600175 PHARM REV CODE 636 W HCPCS: Performed by: NURSE ANESTHETIST, CERTIFIED REGISTERED

## 2025-01-17 PROCEDURE — 25000003 PHARM REV CODE 250: Performed by: HOSPITALIST

## 2025-01-17 PROCEDURE — 36556 INSERT NON-TUNNEL CV CATH: CPT

## 2025-01-17 PROCEDURE — 25000003 PHARM REV CODE 250: Performed by: NURSE PRACTITIONER

## 2025-01-17 PROCEDURE — 63600175 PHARM REV CODE 636 W HCPCS: Performed by: HOSPITALIST

## 2025-01-17 PROCEDURE — 45378 DIAGNOSTIC COLONOSCOPY: CPT | Performed by: INTERNAL MEDICINE

## 2025-01-17 PROCEDURE — 63600175 PHARM REV CODE 636 W HCPCS: Performed by: INTERNAL MEDICINE

## 2025-01-17 PROCEDURE — 21400001 HC TELEMETRY ROOM

## 2025-01-17 PROCEDURE — 25500020 PHARM REV CODE 255: Performed by: HOSPITALIST

## 2025-01-17 PROCEDURE — 25000003 PHARM REV CODE 250: Performed by: NURSE ANESTHETIST, CERTIFIED REGISTERED

## 2025-01-17 PROCEDURE — 85025 COMPLETE CBC W/AUTO DIFF WBC: CPT | Performed by: EMERGENCY MEDICINE

## 2025-01-17 PROCEDURE — 27000207 HC ISOLATION

## 2025-01-17 PROCEDURE — 45378 DIAGNOSTIC COLONOSCOPY: CPT | Mod: ,,, | Performed by: INTERNAL MEDICINE

## 2025-01-17 RX ORDER — LIDOCAINE HYDROCHLORIDE 20 MG/ML
INJECTION INTRAVENOUS
Status: DISCONTINUED | OUTPATIENT
Start: 2025-01-17 | End: 2025-01-17

## 2025-01-17 RX ORDER — PROPOFOL 10 MG/ML
VIAL (ML) INTRAVENOUS
Status: DISCONTINUED | OUTPATIENT
Start: 2025-01-17 | End: 2025-01-17

## 2025-01-17 RX ORDER — CIPROFLOXACIN 2 MG/ML
400 INJECTION, SOLUTION INTRAVENOUS
Status: DISCONTINUED | OUTPATIENT
Start: 2025-01-17 | End: 2025-01-17

## 2025-01-17 RX ORDER — SODIUM CHLORIDE 0.9 % (FLUSH) 0.9 %
10 SYRINGE (ML) INJECTION EVERY 12 HOURS PRN
Status: DISCONTINUED | OUTPATIENT
Start: 2025-01-17 | End: 2025-01-28 | Stop reason: HOSPADM

## 2025-01-17 RX ORDER — POTASSIUM CHLORIDE 20 MEQ/1
40 TABLET, EXTENDED RELEASE ORAL ONCE
Status: COMPLETED | OUTPATIENT
Start: 2025-01-17 | End: 2025-01-17

## 2025-01-17 RX ORDER — ACETAMINOPHEN 325 MG/1
650 TABLET ORAL EVERY 6 HOURS PRN
Status: DISCONTINUED | OUTPATIENT
Start: 2025-01-17 | End: 2025-01-28 | Stop reason: HOSPADM

## 2025-01-17 RX ORDER — PROPOFOL 10 MG/ML
VIAL (ML) INTRAVENOUS CONTINUOUS PRN
Status: DISCONTINUED | OUTPATIENT
Start: 2025-01-17 | End: 2025-01-17

## 2025-01-17 RX ADMIN — LEVOTHYROXINE SODIUM 75 MCG: 25 TABLET ORAL at 05:01

## 2025-01-17 RX ADMIN — HYDROCODONE BITARTRATE AND ACETAMINOPHEN 1 TABLET: 10; 325 TABLET ORAL at 09:01

## 2025-01-17 RX ADMIN — MORPHINE SULFATE 1 MG: 2 INJECTION, SOLUTION INTRAMUSCULAR; INTRAVENOUS at 01:01

## 2025-01-17 RX ADMIN — MORPHINE SULFATE 1 MG: 2 INJECTION, SOLUTION INTRAMUSCULAR; INTRAVENOUS at 06:01

## 2025-01-17 RX ADMIN — MUPIROCIN: 20 OINTMENT TOPICAL at 09:01

## 2025-01-17 RX ADMIN — PROPOFOL 40 MG: 10 INJECTION, EMULSION INTRAVENOUS at 01:01

## 2025-01-17 RX ADMIN — ACETAMINOPHEN 650 MG: 325 TABLET ORAL at 08:01

## 2025-01-17 RX ADMIN — DEXTROSE MONOHYDRATE 25 G: 25 INJECTION, SOLUTION INTRAVENOUS at 05:01

## 2025-01-17 RX ADMIN — VASOPRESSIN: 20 INJECTION, SOLUTION INTRAVENOUS at 01:01

## 2025-01-17 RX ADMIN — CIPROFLOXACIN 400 MG: 2 INJECTION, SOLUTION INTRAVENOUS at 10:01

## 2025-01-17 RX ADMIN — ATORVASTATIN CALCIUM 40 MG: 40 TABLET, FILM COATED ORAL at 09:01

## 2025-01-17 RX ADMIN — LIDOCAINE HYDROCHLORIDE 40 MG: 20 INJECTION, SOLUTION INTRAVENOUS at 01:01

## 2025-01-17 RX ADMIN — PROPOFOL 125 MCG/KG/MIN: 10 INJECTION, EMULSION INTRAVENOUS at 01:01

## 2025-01-17 RX ADMIN — POTASSIUM CHLORIDE 40 MEQ: 1500 TABLET, EXTENDED RELEASE ORAL at 09:01

## 2025-01-17 RX ADMIN — VASOPRESSIN: 20 INJECTION, SOLUTION INTRAVENOUS at 11:01

## 2025-01-17 RX ADMIN — MORPHINE SULFATE 1 MG: 2 INJECTION, SOLUTION INTRAMUSCULAR; INTRAVENOUS at 08:01

## 2025-01-17 RX ADMIN — PIPERACILLIN SODIUM AND TAZOBACTAM SODIUM 4.5 G: 4; .5 INJECTION, POWDER, LYOPHILIZED, FOR SOLUTION INTRAVENOUS at 08:01

## 2025-01-17 RX ADMIN — SODIUM CHLORIDE: 0.9 INJECTION, SOLUTION INTRAVENOUS at 01:01

## 2025-01-17 RX ADMIN — NITROFURANTOIN MONOHYDRATE/MACROCRYSTALS 100 MG: 25; 75 CAPSULE ORAL at 09:01

## 2025-01-17 RX ADMIN — NITROFURANTOIN MONOHYDRATE/MACROCRYSTALS 100 MG: 25; 75 CAPSULE ORAL at 08:01

## 2025-01-17 RX ADMIN — IOHEXOL 30 ML: 350 INJECTION, SOLUTION INTRAVENOUS at 09:01

## 2025-01-17 RX ADMIN — PANTOPRAZOLE SODIUM 40 MG: 40 TABLET, DELAYED RELEASE ORAL at 09:01

## 2025-01-17 RX ADMIN — HEPARIN SODIUM 12 UNITS/KG/HR: 10000 INJECTION, SOLUTION INTRAVENOUS at 07:01

## 2025-01-17 RX ADMIN — IOHEXOL 75 ML: 350 INJECTION, SOLUTION INTRAVENOUS at 11:01

## 2025-01-17 RX ADMIN — NICOTINE 1 PATCH: 21 PATCH, EXTENDED RELEASE TRANSDERMAL at 09:01

## 2025-01-17 NOTE — PT/OT/SLP PROGRESS
Physical Therapy      Patient Name:  Thom Krishna   MRN:  [id number]    Patient not seen today secondary to Other (Comment) (pt in procedure(2764)). Will follow-up over wknd.

## 2025-01-17 NOTE — NURSING
Patient lying in bed awake, no signs of distress noted, She c/o pain rated 9/10 and received PRN Morphine at 2012. Patient tolerated scheduled night medications with no difficulties.  DW10 Hypertonic solution infusing at 100ml/hr and  Heparin infusing at 7.9, patient also tolerating with no problems. She requires moderate assistance with adl's, purewick in place draining yellow urine, call light within reach and encouraged to use, safety precautions in place, will continue with plan of care.

## 2025-01-17 NOTE — PLAN OF CARE
Pt. AAOx4. Family at  bedside. C/O abdominal pain, PRN administered. Diet advanced but appetite decreased. Heparin infusing per nomogram and D10 infusion continued. Waffle mattress in place. GI consult completed, PT. To get colonoscopy tomorrow. BS monitored. Pt. Worked with therapy during shift. DEBBIE drain emptied, see intake and output. Bed alarm on and call light in reach. Pt. Instructed to call for assistance. Plan of care continued.   Problem: Adult Inpatient Plan of Care  Goal: Plan of Care Review  Outcome: Met     Problem: Adult Inpatient Plan of Care  Goal: Patient-Specific Goal (Individualized)  Outcome: Met     Problem: Adult Inpatient Plan of Care  Goal: Absence of Hospital-Acquired Illness or Injury  Outcome: Met     Problem: Adult Inpatient Plan of Care  Goal: Optimal Comfort and Wellbeing  Outcome: Met

## 2025-01-17 NOTE — NURSING
Patient c/o pain 9/10, she received PRN Morphine, safety maintained, will continue with plan of care.

## 2025-01-17 NOTE — PROGRESS NOTES
Patient calls with complaints of pain while trying to take a deep breath. She states this has been going on since she woke up. She reports her blood pressure is 134/86 and pulse 68. She took tylenol this morning with no relief. Patient reports more shortness of breath when moving. She denies severe difficulty breathing, chest pain, cough, history of blood clots, major surgery in the past month, illness, long-distance travel, cancer treatment, heart palpitations, or fever. Per protocol, patient to be seen for immediate office evaluation. Patient verbalizes understanding. Patient states that she will go to the ER if need be to be evaluated.    Reason for Disposition  • [1] MILD difficulty breathing (e.g., minimal/no SOB at rest, SOB with walking, pulse <100) AND [2] NEW-onset or WORSE than normal    Protocols used: BREATHING DIFFICULTY-A-AH       [facility] Medicine  Progress Note    Patient Name: Thom Krishna  MRN: [id number]  Patient Class: IP- Inpatient   Admission Date: 1/14/2025  Length of Stay: 3 days  Attending Physician: Mike Sanchez MD  Primary Care Provider: Brandy Dejesus MD        Subjective     Principal Problem:Epigastric pain        HPI:  Ms Krishna is a 69 year old female with PMHx of hepatitis-C, thyroid disease, cervical radiculopathy, CVA approximate 1 year ago with residual left hemiplegia and DM2. She  presented to the ED with complaint of abdominal pain.  Diagnosis with colon cancer in Nov 2024 and underwent colon resection, however developed intra abdominal infection requiring operative intervention. She was admitted to SNF facility and underwent treatment with IV antibiotic. She continue to experiencing symptoms of abdominal pain, increase confusion. CT of abdominal showed  Operative change of partial colon resection and ileal colic anastomosis with suspected developing small bowel obstruction and transition point seen at the ileocolic anastomosis. Rim enhancing gas and fluid collection at the lateral right abdominal wall concerning for abscess. General Surgery consulted, with plan IR consultation for consideration of aspiration vs drain placement into her anterior abdominal wall collection. Lt leg verous ultrasound showed,.left lower extremity DVT in the common femoral vein. Patient being admitted under the care of Hospital Medicine.              Overview/Hospital Course:  No notes on file    Interval History:   NAEON  Family at bedside  Patient reports improved abdominal pain  Reports passing gas and having Bms  No f/c/n/v  No other complaints    Review of Systems  Objective:     Vital Signs (Most Recent):  Temp: 97.7 °F (36.5 °C) (01/17/25 0800)  Pulse: 80 (01/17/25 1118)  Resp: 14 (01/17/25 0805)  BP: (!) 112/57 (01/17/25 0800)  SpO2: 99 % (01/17/25 0800) Vital Signs (24h Range):  Temp:  [97.4 °F (36.3  °C)-98.7 °F (37.1 °C)] 97.7 °F (36.5 °C)  Pulse:  [] 80  Resp:  [14-20] 14  SpO2:  [94 %-99 %] 99 %  BP: (104-150)/(51-80) 112/57     Weight: 68.5 kg (151 lb 0.2 oz)  Body mass index is 27.62 kg/m².    Intake/Output Summary (Last 24 hours) at 1/17/2025 1123  Last data filed at 1/17/2025 0800  Gross per 24 hour   Intake 652.36 ml   Output 455 ml   Net 197.36 ml         Physical Exam  Constitutional:       General: She is not in acute distress.     Appearance: She is ill-appearing. She is not toxic-appearing.   HENT:      Mouth/Throat:      Mouth: Mucous membranes are moist.      Pharynx: Oropharynx is clear.   Eyes:      Extraocular Movements: Extraocular movements intact.      Conjunctiva/sclera: Conjunctivae normal.   Cardiovascular:      Rate and Rhythm: Normal rate and regular rhythm.   Pulmonary:      Effort: Pulmonary effort is normal. No respiratory distress.      Breath sounds: Normal breath sounds.   Abdominal:      General: Bowel sounds are normal. There is distension.      Palpations: Abdomen is soft.      Tenderness: There is abdominal tenderness. There is guarding.      Comments: R side drain in place, dark brown output   Musculoskeletal:         General: Normal range of motion.      Right lower leg: No edema.      Left lower leg: No edema.   Skin:     General: Skin is warm and dry.   Neurological:      General: No focal deficit present.      Mental Status: She is alert and oriented to person, place, and time.   Psychiatric:         Mood and Affect: Mood normal.         Behavior: Behavior normal.             Significant Labs: All pertinent labs within the past 24 hours have been reviewed.    Significant Imaging: I have reviewed all pertinent imaging results/findings within the past 24 hours.    Assessment and Plan     * Epigastric pain  DDx:  Bowel obstruction ruled out: CT shows distended small bowel proximal to anastomosis, though Gastrografin has passed  Anastomotic stricture (possible): History  of colectomy  Infectious process: Due to anterior abdominal wall abscess vs acute UTI. Afebrile, no leukocytosis, recent IV antibiotics completed.    Dx:  IR drain placed 1/15. 20cc of bloody purulent fluid.   Follow up drain cultures - gram negative rods  Monitor output from IR drain - will discontinue once output <10cc    Tx:  Gi consulted, possible colonoscopy may help assess stricture.  Continue IV cipro. Cultures to guide therapy  Consult general surgery  Obtaining [facility] medical records from [facility]  ID consult      Hypoglycemia  Resolved    Tx:  Continue D10 1/2NS while NPO      PICC (peripherally inserted central catheter) in place  PICC placed at OSH on 11/3 due to difficulty with access    Tx:  PICC to be replaced this admission due to functional issues      Acute UTI  Dx:  Urine culture ENTEROCOCCUS    Tx:  Start Macrobid. Cultures to guide therapy    Acute DVT (deep venous thrombosis)  Left thigh veins: Occlusive thrombus in the common femoral  Heparin drip   Consult cardiovascular       Tobacco dependency  Dangers of cigarette smoking were reviewed with patient in detail. Patient was Counseled for 3-10 minutes. Nicotine replacement options were discussed. Nicotine replacement was discussed-       Carotid arterial disease        Moderate malnutrition  Nutrition consulted. Most recent weight and BMI monitored-     Measurements:  Wt Readings from Last 1 Encounters:   01/16/25 68.5 kg (151 lb 0.2 oz)   Body mass index is 27.62 kg/m².    Patient has been screened and assessed by RD.    Malnutrition Type:  Context: chronic illness  Level: moderate    Malnutrition Characteristic Summary:  Energy Intake (Malnutrition): less than or equal to 75% for greater than or equal to 1 month    Interventions/Recommendations (treatment strategy):  1. General Healthful diet  2. Commercial beverage medical food supplement therapy      Atherosclerosis of aorta  Continue Statin       Type 2 diabetes mellitus with  hypertriglyceridemia  Accu checks every 6 hours with SSI       GERD (gastroesophageal reflux disease)  Continue PPI       Hypothyroidism    Continue Synthroid       VTE Risk Mitigation (From admission, onward)           Ordered     heparin 25,000 units in dextrose 5% (100 units/ml) IV bolus from bag HIGH INTENSITY nomogram - [facility]  As needed (PRN)        Question:  Heparin Infusion Adjustment (DO NOT MODIFY ANSWER)  Answer:  [email or web address]\epic\Images\Pharmacy\HeparinInfusions\heparin HIGH INTENSITY nomogram for [facility] [id number]    01/14/25 1917     heparin 25,000 units in dextrose 5% (100 units/ml) IV bolus from bag HIGH INTENSITY nomogram - [facility]  As needed (PRN)        Question:  Heparin Infusion Adjustment (DO NOT MODIFY ANSWER)  Answer:  \\[email or web address]\epic\Images\Pharmacy\HeparinInfusions\heparin HIGH INTENSITY nomogram for [facility] [id number]    01/14/25 1917     heparin 25,000 units in dextrose 5% 250 mL (100 units/mL) infusion HIGH INTENSITY nomogram - [facility]  Continuous        Question:  Begin at (units/kg/hr)  Answer:  18    01/14/25 1917                    Discharge Planning   MAHIN: 1/17/2025     Code Status: Full Code   Medical Readiness for Discharge Date:   Discharge Plan A: Skilled Nursing Facility                Please place Justification for DME        Mike Sanchez MD  Department of Hospital Medicine   [facility]

## 2025-01-17 NOTE — SUBJECTIVE & OBJECTIVE
Interval History:   ARCENIO  Family at bedside  Patient reports improved abdominal pain  Reports passing gas and having Bms  No f/c/n/v  No other complaints    Review of Systems  Objective:     Vital Signs (Most Recent):  Temp: 97.7 °F (36.5 °C) (01/17/25 0800)  Pulse: 80 (01/17/25 1118)  Resp: 14 (01/17/25 0805)  BP: (!) 112/57 (01/17/25 0800)  SpO2: 99 % (01/17/25 0800) Vital Signs (24h Range):  Temp:  [97.4 °F (36.3 °C)-98.7 °F (37.1 °C)] 97.7 °F (36.5 °C)  Pulse:  [] 80  Resp:  [14-20] 14  SpO2:  [94 %-99 %] 99 %  BP: (104-150)/(51-80) 112/57     Weight: 68.5 kg (151 lb 0.2 oz)  Body mass index is 27.62 kg/m².    Intake/Output Summary (Last 24 hours) at 1/17/2025 1123  Last data filed at 1/17/2025 0800  Gross per 24 hour   Intake 652.36 ml   Output 455 ml   Net 197.36 ml         Physical Exam  Constitutional:       General: She is not in acute distress.     Appearance: She is ill-appearing. She is not toxic-appearing.   HENT:      Mouth/Throat:      Mouth: Mucous membranes are moist.      Pharynx: Oropharynx is clear.   Eyes:      Extraocular Movements: Extraocular movements intact.      Conjunctiva/sclera: Conjunctivae normal.   Cardiovascular:      Rate and Rhythm: Normal rate and regular rhythm.   Pulmonary:      Effort: Pulmonary effort is normal. No respiratory distress.      Breath sounds: Normal breath sounds.   Abdominal:      General: Bowel sounds are normal. There is distension.      Palpations: Abdomen is soft.      Tenderness: There is abdominal tenderness. There is guarding.      Comments: R side drain in place, dark brown output   Musculoskeletal:         General: Normal range of motion.      Right lower leg: No edema.      Left lower leg: No edema.   Skin:     General: Skin is warm and dry.   Neurological:      General: No focal deficit present.      Mental Status: She is alert and oriented to person, place, and time.   Psychiatric:         Mood and Affect: Mood normal.         Behavior:  Behavior normal.             Significant Labs: All pertinent labs within the past 24 hours have been reviewed.    Significant Imaging: I have reviewed all pertinent imaging results/findings within the past 24 hours.

## 2025-01-17 NOTE — ASSESSMENT & PLAN NOTE
Nutrition consulted. Most recent weight and BMI monitored-     Measurements:  Wt Readings from Last 1 Encounters:   01/16/25 68.5 kg (151 lb 0.2 oz)   Body mass index is 27.62 kg/m².    Patient has been screened and assessed by RD.    Malnutrition Type:  Context: chronic illness  Level: moderate    Malnutrition Characteristic Summary:  Energy Intake (Malnutrition): less than or equal to 75% for greater than or equal to 1 month    Interventions/Recommendations (treatment strategy):  1. General Healthful diet  2. Commercial beverage medical food supplement therapy

## 2025-01-17 NOTE — TRANSFER OF CARE
"Anesthesia Transfer of Care Note    Patient: Thom Krishna    Procedure(s) Performed: Procedure(s) (LRB):  COLONOSCOPY (N/A)    Patient location: GI    Anesthesia Type: general    Transport from OR: Transported from OR on room air with adequate spontaneous ventilation    Post pain: adequate analgesia    Post assessment: no apparent anesthetic complications and tolerated procedure well    Post vital signs: stable    Level of consciousness: awake and alert    Nausea/Vomiting: no nausea/vomiting    Complications: none    Transfer of care protocol was followed      Last vitals: Visit Vitals  BP (!) 122/58 (BP Location: Right leg, Patient Position: Lying)   Pulse 80   Temp 36.8 °C (98.2 °F) (Temporal)   Resp 17   Ht 5' 2" (1.575 m)   Wt 68.5 kg (151 lb 0.2 oz)   SpO2 97%   BMI 27.62 kg/m²     "

## 2025-01-17 NOTE — NURSING
Patient resting in bed with eyes closed, easily aroused, no c/o pain at present time, PRN medication was effective, safety maintained, will continue with plan of care.

## 2025-01-17 NOTE — PT/OT/SLP PROGRESS
Occupational Therapy      Patient Name:  Thom Krishna   MRN:  [id number]    Patient not seen today secondary to Bowel/bladder accident. OT attempts at 11:20 AM. Patient reporting pain and diarrhea this AM. Will follow-up as able.    1/17/2025

## 2025-01-17 NOTE — PLAN OF CARE
Medicare Message     Important Message from Medicare regarding Discharge Appeal Rights Explained to patient/caregiver; Other (comments)Important Message from Medicare regarding Discharge Appeal Rights. Explained to patient/caregiver; Other (comments). The comment is Patient gave verbal consent. Taken on 1/17/25 1548   Date IMM was signed 1/17/2025   Time IMM was signed 1035

## 2025-01-17 NOTE — ANESTHESIA PREPROCEDURE EVALUATION
01/17/2025  Thom Krishna is a 69 y.o., female.      Pre-op Assessment    I have reviewed the Patient Summary Reports.     I have reviewed the Nursing Notes. I have reviewed the NPO Status.      Review of Systems  Anesthesia Hx:               Denies Personal Hx of Anesthesia complications.                    Cardiovascular:  Exercise tolerance: good                                             Pulmonary:   COPD         Chronic Obstructive Pulmonary Disease (COPD):                      Hepatic/GI:     GERD Liver Disease, Hepatitis       Gerd       Liver Disease, Hepatitis        Musculoskeletal:  Arthritis        Arthritis          Neurological:    Neuromuscular Disease,           Arthritis                         Neuromuscular Disease   Endocrine:  Diabetes Hypothyroidism   Diabetes                   Hypothyroidism          Psych:  Psychiatric History                Physical Exam  General: Well nourished    Airway:  Mallampati: II   Mouth Opening: Normal    Anesthesia Plan  Type of Anesthesia, risks & benefits discussed:    Anesthesia Type: Gen Natural Airway  Informed Consent: Informed consent signed with the Patient and all parties understand the risks and agree with anesthesia plan.  All questions answered.   ASA Score: 3    Ready For Surgery From Anesthesia Perspective.   .

## 2025-01-17 NOTE — PLAN OF CARE
SW communicated with pt and pts son to discuss dc planning. Pts son aware of accepting SNF facilities. Pts son reports that he will go see [facility]. SW received return call from pts son expressing that he is agreeable for his mom to dc to [facility] for snf placement. SW expressed understanding. Pt and son aware of no dc today.     SW communicated with Maryana ball/cristian. Per Maryana she will submit for auth today.     SW will continue to follow pt throughout her transitions of care and assist with any dc needs.     Future Appointments   Date Time Provider Department Center   1/29/2025 11:00 AM Dorene Combs [facility]        01/17/25 5599   Post-Acute Status   Post-Acute Authorization Placement   Post-Acute Placement Status Referrals Sent   Discharge Plan   Discharge Plan A Skilled Nursing Facility

## 2025-01-17 NOTE — PROVATION PATIENT INSTRUCTIONS
Discharge Summary/Instructions after an Endoscopic Procedure  Patient Name: Thom Krishna  Patient MRN: [id number]  Patient YOB: 1955 Friday, January 17, 2025  Ruslan Alvarez MD  Dear patient,  As a result of recent federal legislation (The Federal Cures Act), you may   receive lab or pathology results from your procedure in your [facility]   account before your physician is able to contact you. Your physician or   their representative will relay the results to you with their   recommendations at their soonest availability.  Thank you,  Your health is very important to us during the Covid Crisis. Following your   procedure today, you will receive a daily text for 2 weeks asking about   signs or symptoms of Covid 19.  Please respond to this text when you   receive it so we can follow up and keep you as safe as possible.   RESTRICTIONS:  During your procedure today, you received medications for sedation.  These   medications may affect your judgment, balance and coordination.  Therefore,   for 24 hours, you have the following restrictions:   - DO NOT drive a car, operate machinery, make legal/financial decisions,   sign important papers or drink alcohol.    ACTIVITY:  Today: no heavy lifting, straining or running due to procedural   sedation/anesthesia.  The following day: return to full activity including work.  DIET:  Eat and drink normally unless instructed otherwise.     TREATMENT FOR COMMON SIDE EFFECTS:  - Mild abdominal pain, nausea, belching, bloating or excessive gas:  rest,   eat lightly and use a heating pad.  - Sore Throat: treat with throat lozenges and/or gargle with warm salt   water.  - Because air was used during the procedure, expelling large amounts of air   from your rectum or belching is normal.  - If a bowel prep was taken, you may not have a bowel movement for 1-3 days.    This is normal.  SYMPTOMS TO WATCH FOR AND REPORT TO YOUR PHYSICIAN:  1. Abdominal pain or bloating, other  than gas cramps.  2. Chest pain.  3. Back pain.  4. Signs of infection such as: chills or fever occurring within 24 hours   after the procedure.  5. Rectal bleeding, which would show as bright red, maroon, or black stools.   (A tablespoon of blood from the rectum is not serious, especially if   hemorrhoids are present.)  6. Vomiting.  7. Weakness or dizziness.  GO DIRECTLY TO THE NEAREST EMERGENCY ROOM IF YOU HAVE ANY OF THE FOLLOWING:      Difficulty breathing              Chills and/or fever over 101 F   Persistent vomiting and/or vomiting blood   Severe abdominal pain   Severe chest pain   Black, tarry stools   Bleeding- more than one tablespoon   Any other symptom or condition that you feel may need urgent attention  Your doctor recommends these additional instructions:  If any biopsies were taken, your doctors clinic will contact you in 1 to 2   weeks with any results.  - Return patient to hospital hand for ongoing care.   - Resume previous diet.   - Continue present medications.   - Repeat colonoscopy PRN for screening purposes.   - Perform a CT scan (computed tomography) of chest with contrast and abdomen   with contrast to assess reponse to drainage.   - Depending of finding of CT will defer to surgery/IR for definitive   management.  For questions, problems or results please call your physician - Ruslan Alvarez MD.  EMERGENCY PHONE NUMBER: [phone],  LAB RESULTS: [phone]  IF A COMPLICATION OR EMERGENCY SITUATION ARISES AND YOU ARE UNABLE TO REACH   YOUR PHYSICIAN - GO DIRECTLY TO THE EMERGENCY ROOM.  Ruslan Alvarez MD  1/17/2025 2:05:37 PM  This report has been verified and signed electronically.  Dear patient,  As a result of recent federal legislation (The Federal Cures Act), you may   receive lab or pathology results from your procedure in your [facility]   account before your physician is able to contact you. Your physician or   their representative will relay the  results to you with their   recommendations at their soonest availability.  Thank you,  PROVATION

## 2025-01-17 NOTE — PROGRESS NOTES
[facility] S[facility]  General Surgery  Progress Note    Subjective:     History of Present Illness:  Thom Krishna is a 69 y.o. female with a history of Hep C (treated per family), DM2, hypothyroidism, GERD, autoimmune hepatitis (?), tobacco abuse, cirrhosis (?), EtOH abuse, CVA, carotid stenosis s/p R CEA, and recently diagnosed colon cancer s/p partial colectomy with ileocolonic anastomosis on 11/1 at an outside facility. History is unclear as we do not have access to the majority of her care, but, per family, she required take back for an intra-abdominal infection. Following this, she developed an anterior abdominal wall abscess requiring IR aspiration (12/27) and a 14 day course of IV antibiotics, which she recently finished. She was discharged from SNF yesterday. Per her care giver, she has had loose stools for about 2 weeks. Also with bilateral lower extremity swelling during this time. She presents to the ED today with complaints of abdominal pain associated with nausea. She has not had any emesis during this time to suggest clinical obstruction.     Post-Op Info:  Procedure(s) (LRB):  COLONOSCOPY (N/A)         Interval History: NAEON. IR drain with minimal SS output. Prepped yesterday for c scope today.    Medications:  Continuous Infusions:   dextrose 10% and 0.45% NaCl infusion   Intravenous Continuous 100 mL/hr at 01/17/25 0128 New Bag at 01/17/25 0128    heparin (porcine) in D5W  0-40 Units/kg/hr Intravenous Continuous 7.9 mL/hr at 01/17/25 0742 12 Units/kg/hr at 01/17/25 0742     Scheduled Meds:   atorvastatin  40 mg Oral Daily    ciprofloxacin  400 mg Intravenous Q12H    levothyroxine  75 mcg Oral Before breakfast    mupirocin   Nasal BID    nicotine  1 patch Transdermal Daily    nitrofurantoin (macrocrystal-monohydrate)  100 mg Oral Q12H    pantoprazole  40 mg Oral Daily    potassium chloride  40 mEq Oral Once     PRN Meds:  Current Facility-Administered Medications:     dextrose 50%, 12.5 g, Intravenous,  PRN    dextrose 50%, 25 g, Intravenous, PRN    glucagon (human recombinant), 1 mg, Intramuscular, PRN    glucose, 16 g, Oral, PRN    glucose, 24 g, Oral, PRN    heparin (PORCINE), 60 Units/kg, Intravenous, PRN    heparin (PORCINE), 30 Units/kg, Intravenous, PRN    HYDROcodone-acetaminophen, 1 tablet, Oral, TID PRN    insulin aspart U-100, 0-5 Units, Subcutaneous, Q6H PRN    morphine, 1 mg, Intravenous, Q4H PRN    ondansetron, 8 mg, Oral, TID PRN     Review of patient's allergies indicates:   Allergen Reactions    Cefaclor Hives    Gabapentin Swelling    Penicillins      Other reaction(s): Hives    Sulfa (sulfonamide antibiotics) Other (See Comments)     Other reaction(s): Hives     Objective:     Vital Signs (Most Recent):  Temp: 97.7 °F (36.5 °C) (01/17/25 0800)  Pulse: 78 (01/17/25 0800)  Resp: 14 (01/17/25 0805)  BP: (!) 112/57 (01/17/25 0800)  SpO2: 99 % (01/17/25 0800) Vital Signs (24h Range):  Temp:  [97.4 °F (36.3 °C)-98.7 °F (37.1 °C)] 97.7 °F (36.5 °C)  Pulse:  [] 78  Resp:  [14-20] 14  SpO2:  [94 %-100 %] 99 %  BP: (100-150)/(46-80) 112/57     Weight: 68.5 kg (151 lb 0.2 oz)  Body mass index is 27.62 kg/m².    Intake/Output - Last 3 Shifts         01/15 0700  01/16 0659 01/16 0700  01/17 0659 01/17 0700  01/18 0659    I.V. (mL/kg)  255.9 (3.7)     IV Piggyback  396.5     Total Intake(mL/kg)  652.4 (9.5)     Urine (mL/kg/hr) 1677 (1) 450 (0.3)     Drains 25 5     Other 20      Stool 1      Total Output 1723 455     Net -1723 +197.4                     Physical Exam  Vitals reviewed.   Constitutional:       General: She is not in acute distress.     Appearance: Normal appearance. She is not toxic-appearing.   HENT:      Head: Normocephalic and atraumatic.      Nose: Nose normal.   Cardiovascular:      Rate and Rhythm: Normal rate.      Pulses: Normal pulses.   Pulmonary:      Effort: Pulmonary effort is normal. No respiratory distress.   Abdominal:      General: Abdomen is flat. There is no distension.       Palpations: Abdomen is soft.      Tenderness: There is no guarding or rebound.      Comments: Mildly tender to palpation in the right abdomen; no guarding, no rebound, non peritonitic  IR drain in place over RLQ, SS output   Skin:     General: Skin is warm.   Neurological:      General: No focal deficit present.      Mental Status: She is alert and oriented to person, place, and time.          Significant Labs:  I have reviewed all pertinent lab results within the past 24 hours.    Significant Diagnostics:  I have reviewed all pertinent imaging results/findings within the past 24 hours.  Assessment/Plan:     * Epigastric pain  Thom Krishna is a 69 y.o. female with a history of Hep C (treated per family), DM2, hypothyroidism, GERD, autoimmune hepatitis (?), tobacco abuse, cirrhosis (?), EtOH abuse, CVA, carotid stenosis s/p R CEA, and recently diagnosed colon cancer s/p partial colectomy with ileocolonic anastomosis on 11/1 at an outside facility. History is unclear as we do not have access to the majority of her care, but, per family, she required take back for an intra-abdominal infection. Following this, she developed an anterior abdominal wall abscess requiring IR aspiration (12/27) and a 14 day course of IV antibiotics, which she recently finished. She was discharged from SNF yesterday. Per her care giver, she has had loose stools for about 2 weeks. Also with bilateral lower extremity swelling during this time. She presents to the ED today with complaints of abdominal pain associated with nausea. She has not had any emesis during this time to suggest clinical obstruction.      In the ED, the patient is afebrile and hemodynamically stable. Her lab work is largely unremarkable outside a mildly elevated BNP (157). She is without leukocytosis. Her lactate is normal. CT A/P is most significant for new right pulmonary effusion, small amount of pericardial fluid, post op changes of her partial colectomy with  ileo-colonic anastomosis with distended small bowel proximal to the anastomosis concerning for partial or developing obstruction, and re-demonstration of anterolateral right abdominal wall abscess without obvious fistulous communication with the peritoneal cavity or bowel.      The patient has had a complicated post surgical course. She ultimately presents with continued issues and overall failure to thrive. Gastrograffin readily passed into her colon, past the anastomosis on 4 hour KUB. May benefit from non-emergent colonoscopy to assess the anastomosis if there is concern for stricture.      -- admitted to medicine   -- no acute surgical intervention indicated at this time; not obstructed, passed GGC  -- IR drain placed 1/15  -- C scope planned for today, will follow up  -- PT/OT   -- remainder of care per primary team   -- general surgery will follow; please call with any questions or changes in her clinical status        Mindi Smith MD  General Surgery  [facility]

## 2025-01-17 NOTE — NURSING
Patient resting in bed with eyes closed, no signs of distress noted, PRN medication was effective, safety maintained, will continue with plan of care.

## 2025-01-17 NOTE — CONSULTS
[facility] Consult Note    Primary Attending Physician: Dr. Sanchez  Consultant Attending: Dr. Rg   Consultant Resident: DO Abimael    Reason for Consult:     Enterococcus UTI and abdominal wall abscess     Assessment and Recommendations:      Thom Krishna is a 69 y.o. female with:    Right abdominal wall abscess, recurrence? s/p IR drain 1/17  s/p partial colectomy w/ileocolonic anastomosis 11/1 c/b abdominal wall abscess s/p IR aspiration 12/27; unclear about micro or abx patient was previously on. Abd pain c/f SBO on presentation.   C/f developing SBO and abd wall abscess of CTAP, rpt XR with contrast showing no obstruction; GI for c-scope to assess anastomosis   BCX NGTD   Abscess: Gram stain: GNR; aerobic GNR; Anaerobic and fungal pending   Started on ciprofloxacin 1/17-  Enterococcus UTI  Complaining of dysuria, cloudy urine for several days. Afebrile, no leukocytosis.   1/14 UA cloudy, 3+ leukocytes, 1+ blood, >100 WBCs/RBCs, bacteria; UCX Enterococcus  Started on Macrobid 1/16-  Left CFV DVT  Per chart review from facility documentation patient on Eliquis BID for DVT ppx, but unknown if received medications; significant LLE > RLE swelling; difficulty with ambulation at facility plus malignancy puts at risk of VTE  DVT US showing L CFV DVT   Cardiology consulted, no intervention, recommending medical mgmt   Pt started on heparin       Recommendations:  Continue ciprofloxacin, nitrofurantoin   Follow cultures/ID  Monitor drain output   Appreciate surgery comment on concern if possible enterocutaneous fistula       Thank you for allowing us to participate in the care of this patient. Please contact me if you have any questions regarding this consult.    Trish Varghese DO  [facility] PGY 2  [facility]        Subjective:      History of Present Illness:  Thom Krishna is a 69 y.o. female with past medical history of hepatitis C (s/p tx per family), degenerative disc disease, DM  type 2, cirrhosis of the liver, emphysema, CKD3, hypothyroid and newly diagnosed colon CA in 2024 s/p partial colectomy w/ileocolonic anastomosis 11/1 c/b abdominal wall abscess s/p IR aspiration 12/27 and subsequently discharged to SNF with IV abx for 14 days. Unable to find details of which Abx patient was on, microbiology of abscess or additional information as it was at another not associated facility and patient was in the process of re-establishing care with different colorectal surgeon.     Patient was discharged from facility and the following day presented to ED for ongoing diffuse abdominal pain and nausea for a few days and worsening of LLE swelling most notable at the thigh that had been ongoing for a few days including at SNF. Patient did also endorse significant dysuria, as well as ongoing large amounts of bowel incontinence with frequent softer stools daily. No CP, SOB, fever, chills, vomiting.     Past Medical History:  Past Medical History:   Diagnosis Date    Anxiety and depression 07/21/2015    Arthritis     Cervical radiculopathy 04/08/2016    Cirrhosis of liver     Colon cancer 11/2024    Colon polyps 04/27/2015    Diabetes mellitus type 2 with neurological manifestations 11/06/2015    no current medications, only one episode of elevated sugars with acute illness, will monitor     Encounter for blood transfusion     Hepatitis C     s/p treatment per patient report; managed by Dr. Perez    Hip fracture     Macrocytosis 07/21/2015    Thyroid disease     Transfusion reaction     hep c       Past Surgical History:  Past Surgical History:   Procedure Laterality Date    APPENDECTOMY      BACK SURGERY      CAROTID ENDARTERECTOMY Right 6/13/2023    Procedure: ENDARTERECTOMY, CAROTID;  Surgeon: Juan James MD;  Location: [facility];  Service: Cardiology;  Laterality: Right;  RIGHT    CAUDAL EPIDURAL STEROID INJECTION N/A 8/7/2018    Procedure: Injection-steroid-epidural-caudal;  Surgeon: Roxana FABIAN  "Riccardo Ac MD;  Location: [facility] OR;  Service: Pain Management;  Laterality: N/A;  with cath target L4-5    CAUDAL EPIDURAL STEROID INJECTION N/A 2019    Procedure: Injection-steroid-epidural-caudal;  Surgeon: Roxana Gu Jr., MD;  Location: [facility];  Service: Pain Management;  Laterality: N/A;     SECTION      CHOLECYSTECTOMY      COLONOSCOPY  3/31/10    2 polyps, tubular adenoma    COLONOSCOPY  2015    Dr. Washington    COLONOSCOPY N/A 10/10/2018    Procedure: COLONOSCOPY;  Surgeon: Reuben Miller Jr., MD;  Location: [facility];  Service: Endoscopy;  Laterality: N/A;    ESOPHAGOGASTRODUODENOSCOPY N/A 10/10/2018    Procedure: EGD (ESOPHAGOGASTRODUODENOSCOPY);  Surgeon: Reuben Miller Jr., MD;  Location: [facility];  Service: Endoscopy;  Laterality: N/A;    ESOPHAGOGASTRODUODENOSCOPY N/A 12/10/2018    Procedure: EGD (ESOPHAGOGASTRODUODENOSCOPY)/poss emr;  Surgeon: Belle Kuo MD;  Location: [address]);  Service: Endoscopy;  Laterality: N/A;    ESOPHAGOGASTRODUODENOSCOPY N/A 3/12/2019    Procedure: EGD (ESOPHAGOGASTRODUODENOSCOPY);  Surgeon: Polo Keyes MD;  Location: [facility];  Service: Endoscopy;  Laterality: N/A;    ESOPHAGOGASTRODUODENOSCOPY N/A 2020    Procedure: ESOPHAGOGASTRODUODENOSCOPY (EGD);  Surgeon: Belle Kuo MD;  Location: [facility];  Service: Endoscopy;  Laterality: N/A;    HERNIA REPAIR      HYSTERECTOMY      Uterus and both ovaries    INCISIONAL HERNIA REPAIR      x 2    JOINT REPLACEMENT      LIVER BIOPSY  2003    autoimmune hepatitis    ROTATOR CUFF REPAIR      right    STOMACH SURGERY      "took lymph node off of liver"    TOTAL HIP ARTHROPLASTY      left hip    UPPER GASTROINTESTINAL ENDOSCOPY  3/24/10    normal    UPPER GASTROINTESTINAL ENDOSCOPY  2015    Dr. Washington       Allergies:  Review of patient's allergies indicates:   Allergen Reactions    Cefaclor Hives    Gabapentin Swelling    Penicillins      Other " reaction(s): Hives    Sulfa (sulfonamide antibiotics) Other (See Comments)     Other reaction(s): Hives       Medications:   In-Hospital Scheduled Medications:   atorvastatin  40 mg Oral Daily    ciprofloxacin  400 mg Intravenous Q12H    levothyroxine  75 mcg Oral Before breakfast    mupirocin   Nasal BID    nicotine  1 patch Transdermal Daily    nitrofurantoin (macrocrystal-monohydrate)  100 mg Oral Q12H    pantoprazole  40 mg Oral Daily      In-Hospital PRN Medications:    Current Facility-Administered Medications:     dextrose 50%, 12.5 g, Intravenous, PRN    dextrose 50%, 25 g, Intravenous, PRN    glucagon (human recombinant), 1 mg, Intramuscular, PRN    glucose, 16 g, Oral, PRN    glucose, 24 g, Oral, PRN    heparin (PORCINE), 60 Units/kg, Intravenous, PRN    heparin (PORCINE), 30 Units/kg, Intravenous, PRN    HYDROcodone-acetaminophen, 1 tablet, Oral, TID PRN    insulin aspart U-100, 0-5 Units, Subcutaneous, Q6H PRN    morphine, 1 mg, Intravenous, Q4H PRN    ondansetron, 8 mg, Oral, TID PRN    Flushing PICC/Midline Protocol, , , Until Discontinued **AND** sodium chloride 0.9%, 10 mL, Intravenous, Q12H PRN   In-Hospital IV Infusion Medications:   dextrose 10% and 0.45% NaCl infusion   Intravenous Continuous 100 mL/hr at 01/17/25 1117 New Bag at 01/17/25 1117    heparin (porcine) in D5W  0-40 Units/kg/hr Intravenous Continuous 7.9 mL/hr at 01/17/25 0742 12 Units/kg/hr at 01/17/25 0742      Home Medications:  Prior to Admission medications    Medication Sig Start Date End Date Taking? Authorizing Provider   ALPRAZolam (XANAX) 1 MG tablet TAKE ONE (1) TABLET BY MOUTH TWICE A DAY AS NEEDED FR ANXIETY  Patient taking differently: Take by mouth 2 (two) times a day. Patient take 1/2 tablet BID and 1 tablet HS 10/25/24  Yes Brandy Dejesus MD   atorvastatin (LIPITOR) 40 MG tablet Take 1 tablet (40 mg total) by mouth once daily. 1/10/24  Yes Brandy Dejesus MD   cholecalciferol, vitamin D3, 125 mcg (5,000  unit) capsule Take 1 tablet  by mouth daily with breakfast.  Patient taking differently: Take 5,000 Units by mouth daily with breakfast. 1/10/24  Yes Brandy Dejesus MD   HYDROcodone-acetaminophen (NORCO)  mg per tablet TAKE ONE (1) TABLET BY MOUTH THREE (3) TIMES DAILY AS NEEDED FOR PAIN  Patient taking differently: Take 1 tablet by mouth 3 (three) times daily as needed for Pain. 10/25/24  Yes Brandy Dejesus MD   levothyroxine (SYNTHROID) 75 MCG tablet TAKE 1 TABLET EVERY DAY ON AN EMPTY STOMACH  Patient taking differently: Take 75 mcg by mouth before breakfast. 9/6/24  Yes Brandy Dejesus MD   nicotine (NICODERM CQ) 21 mg/24 hr Place 1 patch onto the skin once daily. 10/25/24  Yes Brandy Dejesus MD   ondansetron (ZOFRAN-ODT) 8 MG TbDL Dissolve 1 tablet (8 mg total) by mouth under the tongue 3 (three) times daily as needed (nausea). 12/12/23  Yes Brandy Dejesus MD   pantoprazole (PROTONIX) 40 MG tablet TAKE 1 TABLET EVERY DAY 7/29/24  Yes Brandy Dejesus MD   amitriptyline (ELAVIL) 25 MG tablet TAKE 1-2 TABS NIGHTLY AS NEEDED FOR INSOMNIA RELATED TO CHRONIC PAIN  Patient not taking: Reported on 1/14/2025 1/10/24   Brandy Dejesus MD   blood sugar diagnostic Strp To check BG 1 times daily, to use with insurance preferred meter 4/16/24   Brandy Dejesus MD   blood-glucose meter kit Test tid please dispense test strips and lancets 7/31/15   Provider, Historical   blood-glucose meter kit To check BG 1 times daily, to use with insurance preferred meter 4/16/24 4/16/25  Brandy Dejesus MD   ergocalciferol (ERGOCALCIFEROL) 50,000 unit Cap Take 1 capsule (50,000 Units total) by mouth every 7 days. 1/10/24   Brandy Dejesus MD   lancets [facility] To check BG 1 times daily, to use with insurance preferred meter 4/16/24   Brandy Dejesus MD   linaCLOtide (LINZESS) 145 mcg Cap capsule Take 1 capsule (145 mcg total) by mouth before breakfast. Take with a large glass of  "water just before 1st meal of the day  Patient not taking: Reported on 2025 10/25/24   Brandy Dejesus MD   naloxone (NARCAN) 4 mg/actuation Spry 4mg by nasal route as needed for opioid overdose; may repeat every 2-3 minutes in alternating nostrils until medical help arrives. Call 911  Patient not taking: Reported on 1/10/2024 12/19/22   Jr Garber MD       Family History:  Family History   Problem Relation Name Age of Onset    Diabetes Mellitus Mother      Diabetes Mother      Liver cancer Sister      Breast cancer Sister      Cataracts Sister      Pancreatic cancer Brother      Diabetes Brother      Lung cancer Brother      Colon cancer Brother      Brain cancer Brother      Stomach cancer Other nephew     Diabetes Other nephew     Throat cancer Brother      Cataracts Sister      Diabetes Son      Liver disease Neg Hx         Social History:  Social History     Tobacco Use    Smoking status: Every Day     Current packs/day: 2.00     Average packs/day: 2.0 packs/day for 57.0 years (114.1 ttl pk-yrs)     Types: Cigarettes     Start date:     Smokeless tobacco: Never    Tobacco comments:     Enrolled in [employer] on 3/14/17 ([employer] Member ID # [id number]) Pt is a 2 pk/day cigarette smoker x 57 yrs.  Ambulatory referral to Smoking Cessation clinic following hospital discharge.    Substance Use Topics    Alcohol use: No     Comment: used to drink heavily, stopped in     Drug use: No       ROS       Objective:   Last 24 Hour Vital Signs:  BP  Min: 112/57  Max: 150/73  Temp  Av.2 °F (36.8 °C)  Min: 97.7 °F (36.5 °C)  Max: 98.9 °F (37.2 °C)  Pulse  Av  Min: 65  Max: 117  Resp  Av.6  Min: 14  Max: 20  SpO2  Av %  Min: 95 %  Max: 99 %  Height  Av' 2" (157.5 cm)  Min: 5' 2" (157.5 cm)  Max: 5' 2" (157.5 cm)  Weight  Av.5 kg (151 lb 0.2 oz)  Min: 68.5 kg (151 lb 0.2 oz)  Max: 68.5 kg (151 lb 0.2 oz)  I/O last 3 completed shifts:  In: 652.4 [I.V.:255.9; IV " Piggyback:396.5]  Out: 1280 [Urine:1250; Drains:30]    Physical Exam  Constitutional:       General: She is not in acute distress.  HENT:      Head: Normocephalic and atraumatic.      Nose: Nose normal.      Mouth/Throat:      Pharynx: Oropharynx is clear.   Eyes:      Extraocular Movements: Extraocular movements intact.   Cardiovascular:      Rate and Rhythm: Normal rate and regular rhythm.      Pulses: Normal pulses.      Heart sounds: Normal heart sounds.   Pulmonary:      Effort: No respiratory distress.      Breath sounds: Normal breath sounds.      Comments: Expiratory wheezing most notable at mid/upper lung fields.  Abdominal:      General: Bowel sounds are normal.      Palpations: Abdomen is soft.      Tenderness: There is abdominal tenderness.      Comments: Diffusely tender   Musculoskeletal:      Cervical back: Normal range of motion.      Comments: LLE swelling, improved in comparison to picture from a few days ago (disproportionate thigh swelling). L ankle swelling. No notable discoloration. Cap refill <2s. Gross ROM intact.    Skin:     Capillary Refill: Capillary refill takes less than 2 seconds.      Comments: Mild jaundice of skin.   Neurological:      General: No focal deficit present.      Mental Status: She is alert.   Psychiatric:         Mood and Affect: Mood normal.         Laboratory Results:  Most Recent Data:  CBC:   Lab Results   Component Value Date    WBC 11.30 01/17/2025    HGB 10.9 (L) 01/17/2025    HCT 32.7 (L) 01/17/2025     01/17/2025    MCV 97 01/17/2025    RDW 18.0 (H) 01/17/2025     BMP:   Lab Results   Component Value Date     01/17/2025    K 3.4 (L) 01/17/2025     01/17/2025    CO2 20 (L) 01/17/2025    BUN 6 (L) 01/17/2025     (H) 01/17/2025    CALCIUM 8.3 (L) 01/17/2025    MG 2.1 01/14/2025    PHOS 4.0 06/12/2023     LFTs:   Lab Results   Component Value Date    PROT 7.0 01/14/2025    ALBUMIN 2.6 (L) 01/14/2025    BILITOT 0.5 01/14/2025    AST 22  01/14/2025    ALKPHOS 76 01/14/2025    ALT 32 01/14/2025     (H) 05/06/2013     Coags:   Lab Results   Component Value Date    INR 1.2 01/14/2025     FLP:   Lab Results   Component Value Date    CHOL 198 01/10/2024    HDL 51 01/10/2024    LDLCALC 122.8 01/10/2024    TRIG 121 01/10/2024    CHOLHDL 25.8 01/10/2024     DM:   Lab Results   Component Value Date    HGBA1C 6 11/20/2024    HGBA1C 5.3 01/10/2024    HGBA1C 5.1 06/12/2023    LDLCALC 122.8 01/10/2024    CREATININE 0.6 01/17/2025     Thyroid:   Lab Results   Component Value Date    TSH 1.317 01/10/2024    FREET4 1.48 03/27/2017     Anemia:   Lab Results   Component Value Date    IRON 56 11/08/2019    TIBC 460 (H) 11/08/2019    FERRITIN 34 01/10/2024    LHRCAKJV93 322 01/10/2024    FOLATE 14.3 07/21/2015     Cardiac:   Lab Results   Component Value Date    TROPONINI <0.010 06/12/2023     (H) 01/14/2025     Urinalysis:   Lab Results   Component Value Date    LABURIN (A) 01/14/2025     ENTEROCOCCUS SPECIES  >100,000 cfu/ml  Identification and susceptibility pending      COLORU Yellow 01/14/2025    SPECGRAV >1.030 (A) 01/14/2025    NITRITE Negative 01/14/2025    KETONESU Negative 01/14/2025    UROBILINOGEN Negative 01/14/2025       Trended Lab Data:  Recent Labs   Lab 01/14/25  1504 01/14/25  1956 01/15/25  0412 01/15/25  1912 01/16/25  0348 01/17/25  0622   WBC 9.99   < > 9.20  --  6.81 11.30   HGB 11.8*   < > 11.5*  --  10.2* 10.9*   HCT 36.3*   < > 35.5*  --  30.8* 32.7*      < > 339  --  293 331   *   < > 99*  --  98 97   RDW 18.1*   < > 18.1*  --  18.0* 18.0*     --   --  138 137 138   K 4.6  --   --  3.8 3.6 3.4*     --   --  103 106 107   CO2 21*  --   --  22* 22* 20*   BUN 33*  --   --  21 15 6*   GLU 89  --   --  94 152* 163*   PROT 7.0  --   --   --   --   --    ALBUMIN 2.6*  --   --   --   --   --    BILITOT 0.5  --   --   --   --   --    AST 22  --   --   --   --   --    ALKPHOS 76  --   --   --   --   --    ALT 32   --   --   --   --   --     < > = values in this interval not displayed.         Microbiology Data:  1/14 UCX Enterococcus   1/14 BCX NGTD  Abscess:   Gram stain: GNR , many WBCs   Anaerobic pending    Aerobic GNR   Fungal pending     Antimicrobials:  Cipro 1/15-  Macrobid 1/16-    Other Results:  Radiology:  X-Ray Chest 1 View    Result Date: 1/17/2025  EXAMINATION: XR CHEST 1 VIEW CLINICAL HISTORY: PICC line placement; TECHNIQUE: Single frontal view of the chest was performed. COMPARISON: 01/14/2025 FINDINGS: Right PICC catheter tip projects over the distal SVC.  Left PICC catheter tip projects at the brachiocephalic/SVC junction.  The cardiomediastinal silhouette is not enlarged noting calcification of the aorta..  There is no pleural effusion.  The trachea is midline.  The lungs are symmetrically expanded bilaterally with mildly coarse interstitial attenuation, stable, accentuated by shallow inspiratory effort..  No large focal consolidation seen.  There is no pneumothorax.  The osseous structures are unchanged..     As above Electronically signed by: Cory Braun MD Date:    01/17/2025 Time:    13:12    IR Abscess Drainage Retroperiotoneal    Result Date: 1/17/2025  EXAMINATION: Drainage catheter placement Procedural Personnel Attending physician(s): Deric Fellow physician(s): None Resident physician(s): None Advanced practice provider(s): None Pre-procedure diagnosis: Right abdominal wall fluid collection Post-procedure diagnosis: Same Indication: Leukocytosis with fluid collection Additional clinical history: None Complications: No immediate complications. TECHNIQUE: - Soft tissue drainage catheter placement under ultrasound guidance FINDINGS: Pre-procedure Consent: Informed consent for the procedure was obtained and time-out was performed prior to the procedure. Preparation: The site was prepared and draped using maximal sterile barrier technique including cutaneous antisepsis. Antibiotic administered:  Prophylactic dose within 1 hour of procedure start time or 2 hours for vancomycin or fluoroquinolones Anesthesia/sedation The IR procedural team has confirmed the patient ID and re-evaluated the patient and sedation plan confirming it is suitable for the patient's condition and procedure. Level of anesthesia/sedation: Moderate sedation (conscious sedation) Anesthesia/sedation administered by: Independent trained observer under attending supervision with continuous monitoring of the patient's level of consciousness and physiologic status Total intra-service sedation time (minutes): 15 Drainage catheter placement The patient was positioned supine. Initial imaging was performed. Local anesthesia was administered. The fluid collection was accessed using an access needle followed by wire insertion and serial dilation and a drainage catheter was placed. Position of the drainage catheter within the fluid collection was confirmed. - Initial imaging findings: Small fluid collections in the abdominal wall - Drainage catheter placed: All-purpose drainage catheter - External catheter securement: Non-absorbable suture - Post-drainage imaging findings: Partial drainage of the fluid collection Additional Details Additional description of procedure: None Equipment details: None Specimens removed: Aspirated fluid was sent for analysis. Estimated blood loss (mL): Less than 10 Standardized report: [email or web address] Attestation Signer name: Mike Leos I attest that I was present for the entire procedure. I reviewed the stored images and agree with the report as written.     Percutaneous placement of a 10 [place] drainage catheter into right abdominal wall fluid collection, yielding 15 mL of purulent fluid. Plan: Drain can be removed when less than 10 cc of fluid is aspirated daily for 2 days. ______________________________________________________________________ Electronically signed by: Mike Leos Date:    01/17/2025  Time:    08:14    X-Ray Abdomen AP 1 View    Result Date: 1/15/2025  EXAMINATION: XR ABDOMEN AP 1 VIEW CLINICAL HISTORY: GGC assess patency of ileocolonic anastomosis 8 hour; TECHNIQUE: AP View(s) of the abdomen was performed. COMPARISON: 01/15/2025 at 01:13 hours FINDINGS: Residual oral contrast noted in the colon, similar to prior.  There is progression of a small amount of oral contrast through the small bowel in the right abdomen.  Persistent contrast in the bladder.  Prior L4-5 posterior instrumented lumbar fusion noted.  No gross evidence of contrast extravasation.  Lung bases are clear.  Bilateral total hip arthroplasties noted.  Osteopenia noted.     No acute findings. Electronically signed by: Geovanny Garner MD Date:    01/15/2025 Time:    07:30    X-Ray Abdomen AP 1 View    Result Date: 1/15/2025  EXAMINATION: XR ABDOMEN AP 1 VIEW CLINICAL HISTORY: GGC assess patency of ileocolonic anastomosis 4 hour; TECHNIQUE: Two AP View(s) of the abdomen was performed 4 hours post ingestion of oral Gastrografin contrast. COMPARISON: CT abdomen pelvis with IV contrast obtained earlier, 01/14/2025 FINDINGS: There is contrast within the urinary tract particularly in the bladder post IV contrast. Contrast is noted to be present within distal loops of small bowel as well as throughout the colon to the level of the rectum compatible with patency of the ileocolic anastomosis.  No convincing bowel distension is seen noting dilute oral contrast and overlapping loops of bowel limit evaluation of the distal small bowel loops.  No significant residual contrast is noted in the stomach.  Lung bases appear clear.  Postoperative changes with hardware fusion noted involving the lumbosacral spine.  Bilateral hip arthroplasty hardware noted.     Patent ileocolic anastomosis noting Gastrografin oral contrast throughout the colon to the level of the rectum 4 hours post ingestion.  No convincing bowel distension as imaged. Electronically  signed by: Frida Rico Date:    01/15/2025 Time:    02:26    CT Abdomen Pelvis With IV Contrast NO Oral Contrast    Result Date: 1/14/2025  EXAMINATION: CT ABDOMEN PELVIS WITH IV CONTRAST CLINICAL HISTORY: Abdominal abscess/infection suspected; TECHNIQUE: Low dose axial images, sagittal and coronal reformations were obtained from the lung bases to the pubic symphysis following the IV administration of 75 mL of Omnipaque 350 .  Oral contrast was not given. COMPARISON: Chest radiograph same day, thoracic and lumbar spine series 12/20/2021, abdominal ultrasound 07/30/2021, chest CT 12/17/2020, CT abdomen and pelvis 02/11/2020; report only from outside facility CT abdomen and pelvis 12/19/2024 FINDINGS: New trace layering right pleural effusion with associated overlying mild passive atelectasis of the right lower lobe.  Redemonstrated partially imaged 5 mm nodule within the right middle lobe and grossly stable 5-6 mm solid nodule within the right lower lobe.  Minimal dependent atelectasis at the left lung base.  No definite new nodule or consolidative process.  No sizable pleural effusion on the left. Base of the heart is normal in size noting small volume nonspecific pericardial fluid slightly increased from prior. Small hiatal hernia.  There is moderate distension of the imaged distal esophagus mostly filled with liquid contents and minimal non-dependent gas.  Hyperdense material seen within the dependent aspect of the distal esophagus.  No significant wall thickening or adjacent inflammatory change. Stomach and duodenum are within normal limits. Remote cholecystectomy.  Similar moderate intrahepatic and extrahepatic biliary ductal dilatation. Portal vasculature is patent.  Liver and spleen are both normal in size noting few scattered small parenchymal calcified granulomas similar to prior.  Pancreas is somewhat atrophic without discrete mass or adjacent inflammatory change.  Bilateral adrenal glands are within  normal limits. Bilateral kidneys are normal in overall size, shape and location with symmetric normal enhancement.  No hydronephrosis.  There is mild nonspecific perinephric stranding.  Few scattered tiny hypoattenuating parenchymal foci at each kidney which are too small to characterize.  Ureters are normal in course and caliber.  Urinary bladder is well distended without wall thickening.  Uterus not identified and may be surgically absent or atrophic.  Limited evaluation of the pelvic structures secondary to prominent beam hardening with streak artifact from bilateral hip hardware.  Pelvic phleboliths noted.  No adnexal mass seen. Postoperative changes of prior partial colon resection and ileocolic anastomosis is identified at the hepatic flexure.  The distal ileum demonstrates mild circumferential wall thickening with mild fat stranding seen within the right the abdomen about the ileocolic anastomosis.  Few distended and borderline dilated small bowel loops with air-fluid levels within the right mid abdomen with transition point seen at the ileo colic anastomosis concerning for developing small bowel obstruction.  More proximal small bowel loops within the mid to upper abdomen in the midline and left aspect are not significantly dilated at this time.  Large amount of stool noted throughout the and rectum.  The rectum is moderately distended without definite wall thickening or adjacent inflammatory change.  No bowel pneumatosis or portal venous gas. There is trace volume likely reactive free fluid tracking along the right pericolic gutter to the mesenteric root.  No free air.  No lymphadenopathy by CT criteria. Scattered moderate to advanced calcific atherosclerosis of the abdominal aorta extending into its visceral and iliac branches.  No aortic aneurysm or dissection. Postoperative changes of prior midline laparotomy.  There is mild rectus diastasis of the infraumbilical ventral abdominal wall similar to  previous study in 2020.  Within the lateral right abdominal wall there is a rim enhancing gas and fluid collection within the deep subcutaneous soft tissues measuring approximately 9.4 x 2.1 x 4.8 cm in maximum SAT dimensions, concerning for abscess.  Of note, outside facility CT abdomen and pelvis study 12/19/2024 reports a 7.8 x 3.5 x 1 cm rim enhancing gas and fluid collection along the right lateral abdominal wall overlying site of previous reported surgery.  Mild adjacent inflammatory change.  Few scattered soft tissue density nodules with mild adjacent inflammatory change at the left more than right ventral abdominal wall likely injection sites.  Bilateral gluteal subcutaneous calcifications grossly similar to previous study in 2020. Redemonstrated remote operative change including discectomy and fusion at L4-5 with posterior spinal fixation at L4 through L5 levels and chronic appearing mild fracture deformity with evidence of kyphoplasty at L3 and moderate anterior wedge deformity with prior kyphoplasty at T11.  Bilateral hip total arthroplasties.  No convincing evidence of acute hardware malalignment or loosening.  Generalized osteopenia.  Age-related degenerative changes.  No acute or destructive osseous process seen.     1. Operative change of partial colon resection and ileal colic anastomosis with suspected developing small bowel obstruction and transition point seen at the ileocolic anastomosis.  Surgical consultation advised. 2. Moderate to large colonic and rectal stool burden suggesting constipation, noting rectal impaction not exclude. 3. Distension of the distal esophagus containing air and fluid related to reflux, which increases aspiration risk. 4. Rim enhancing gas and fluid collection at the lateral right abdominal wall concerning for abscess.  This is likely larger when compared to outside facility CT abdomen and pelvis report dated 12/19/2024.  No other drainable collection seen.  No definite  fistulous communication with the peritoneal cavity/bowel. 5. Cholecystectomy.  Grossly stable moderate intrahepatic and extrahepatic biliary ductal dilatation. 6. Trace layering right pleural effusion with associated overlying mild passive atelectasis. 7. Small volume likely reactive free fluid tracking along the right pericolic gutter to the mesenteric root. 8. Other incidental/nonemergent findings in the body of the report. This report was flagged in Epic as abnormal. Electronically signed by: Gabriele Epps MD Date:    01/14/2025 Time:    17:45    US Lower Extremity Veins Bilateral    Result Date: 1/14/2025  EXAMINATION: US LOWER EXTREMITY VEINS BILATERAL CLINICAL HISTORY: Bilateral lower extremity swelling; TECHNIQUE: Duplex and color flow Doppler and dynamic compression was performed of the bilateral lower extremity veins was performed. COMPARISON: None FINDINGS: Right thigh veins: The common femoral, femoral, popliteal, upper greater saphenous, and deep femoral veins are patent and free of thrombus. The veins are normally compressible and have normal phasic flow and augmentation response. Right calf veins: The visualized calf veins are patent. Left thigh veins: Occlusive thrombus in the common femoral, femoral and greater saphenous veins popliteal vein is patent. Left calf veins: The visualized calf veins are patent. Miscellaneous: None     Left lower extremity DVT, as above. This report was flagged in Epic as abnormal. Electronically signed by: Gabriele Epps MD Date:    01/14/2025 Time:    15:48    X-Ray Chest 1 View    Result Date: 1/14/2025  EXAMINATION: XR CHEST 1 VIEW CLINICAL HISTORY: Shortness of breath;Shortness of breath; Shortness of breath TECHNIQUE: Single frontal view of the chest was performed. COMPARISON: 06/13/2023 FINDINGS: The cardiomediastinal silhouette is not enlarged noting calcification of the aorta.  Left PICC catheter tip projects over the mid to distal SVC..  There is no pleural  effusion.  The trachea is midline.  The lungs are symmetrically expanded bilaterally with mildly coarse interstitial attenuation, accentuated by habitus..  No large focal consolidation seen.  There is no pneumothorax.  The osseous structures are remarkable for degenerative change..     1. Chronic appearing interstitial findings, no large focal consolidation. Electronically signed by: Cory Braun MD Date:    01/14/2025 Time:    14:40    X-Ray Chest AP Portable    Result Date: 1/2/2025  REASON FOR EXAM: [R06.02]-Shortness of breath TECHNICAL FACTORS: Single view(s) COMPARISON: None CHEST FINDINGS: LUE-approach PICC with its terminus projecting over the expected location of the proximal SVC. Cardiomediastinal silhouette is within normal limits. No focal consolidation, overt interstitial edema, sizable pleural effusion or pneumothorax. Multilevel degenerative changes of the imaged spine. Vertebroplasty material in the upper lumbar spine.   IMPRESSION:  1.  No acute cardiopulmonary findings appreciated.  Electronically signed by Greg Perrin MD on 1/2/2025 7:31 PM    US Aspiration Abscess Hematoma Bul Cyst    Result Date: 12/27/2024  HISTORY/REASON FOR EXAM:  69-year-old female with right anterolateral abdominal wall fluid collection (S): MD Anselmo Shell,  PROCEDURE(S) PERFORMED: 1. Ultrasound guided percutaneous needle aspiration without drainage catheter placement ANESTHESIA/SEDATION: Local anesthesia only MEDICATION(S): Lidocaine 1% 5-10 mL SQ and ID RADIATION DOSIMETRY: Not applicable DESCRIPTION OF PROCEDURE: Informed and written consent was obtained from the patient. All risks, benefits, and alternatives were discussed and explained. The patient was brought to the interventional suite and placed supine on the table. Ultrasound was used to select an optimal entry site on the skin. This area was marked. A timeout was performed. The area was prepped and draped in standard sterile  fashion. Lidocaine 1% was administered. Under continuous real-time ultrasound guidance, a 10 cm 18-gauge Chiba needle was advanced into the target collection. Images were permanently documented. The internal stylet was removed and approximately 25 mL thick purulent material was aspirated and sent for analysis. The area was cleaned and sterile dressing was applied. FINDINGS:  1.  Successful ultrasound guided percutaneous aspiration  SPECIMEN(S):  25 mL purulent fluid ESTIMATED BLOOD LOSS < 10 mL COMPLICATION(S): No major complications. IMPRESSION: Successful percutaneous ultrasound-guided right anterolateral abdominal wall aspiration Electronically signed by Matthew Crowe MD on 12/27/2024 5:53 PM    CT Abdomen Pelvis With IV Contrast    Result Date: 12/19/2024  EXAM:  CT ABDOMEN PELVIS W CONTRAST CLINICAL HISTORY:  Abdominal pain COMPARISON: None TECHNIQUE: Standard thin-section axial images, with reformatted sagittal and coronal images. FINDINGS: The lung bases are clear.   Heart size is normal.  There is marked distention of the distal esophagus with fluid and air.  The stomach is also distended with a recent meal. The liver, spleen and adrenals appear normal.  The pancreas is atrophic but otherwise unremarkable.  The gallbladder is surgically absent. The kidneys opacify symmetrically and intravenous contrast demonstrating no evidence of urinary obstruction or focal parenchymal abnormality. A site of prior partial colon resection and ileocolic anastomosis is identified at the hepatic flexure.  A small collection of free fluid is also visible in the region of the surgery.  An enhancing fluid collection consistent with an abscess is also identified in the right lateral abdominal wall overlying the site of the surgery and measuring 7.8 x 3.5 x 1.0 cm.  Question recent laparoscopic surgery and correlation with patient history would be helpful.  No lymph node enlargement is visible.  Additional postsurgical changes are  visible including discectomy and fusion at L4-5 and a mild compression fracture deformity with evidence of kyphoplasty at L3.  Images obtained through the pelvis are limited by artifact associated with the hardware bilateral hip arthroplasties.  A Aden catheter is visible in a collapsed urinary bladder.  No free pelvic fluid is visible. The uterus and ovaries are surgically absent. IMPRESSION: 1.  Distention of the distal esophagus containing air and fluid.  The stomach is also distended with a recent meal. 2.  A site of partial colectomy and ileocolic anastomosis is visible at the hepatic flexure which appears to be recent.  A small collection of fluid is visible in the area and an enhancing fluid collection consistent with an abscess is visible in the overlying right lateral abdominal wall measuring 7.8 x 3.5 x 1.0 cm. 3.  Status post cholecystectomy, hysterectomy, lumbar spine surgery and bilateral hip arthroplasties All CT scans at this location are performed using dose modulation techniques as appropriate to a performed exam including the following: Automated exposure control; adjustment of the mA and/or kV according to patient size (this includes techniques or standardized protocols for targeted exams where dose is matched to indication / reason for exam; i.e. extremities or head); use of iterative reconstruction technique. Finalized on: 12/19/2024 4:15 PM By:  Mike MCCRAY# [id number]      2024-12-19 16:17:[id number]    MAHENDRA

## 2025-01-17 NOTE — ASSESSMENT & PLAN NOTE
DDx:  Bowel obstruction ruled out: CT shows distended small bowel proximal to anastomosis, though Gastrografin has passed  Anastomotic stricture (possible): History of colectomy  Infectious process: Due to anterior abdominal wall abscess vs acute UTI. Afebrile, no leukocytosis, recent IV antibiotics completed.    Dx:  IR drain placed 1/15. 20cc of bloody purulent fluid.   Follow up drain cultures - gram negative rods  Monitor output from IR drain - will discontinue once output <10cc    Tx:  Gi consulted, possible colonoscopy may help assess stricture.  Continue IV cipro. Cultures to guide therapy  Consult general surgery  Obtaining prior medical records from [facility]  ID consult

## 2025-01-17 NOTE — ASSESSMENT & PLAN NOTE
Thom Krishna is a 69 y.o. female with a history of Hep C (treated per family), DM2, hypothyroidism, GERD, autoimmune hepatitis (?), tobacco abuse, cirrhosis (?), EtOH abuse, CVA, carotid stenosis s/p R CEA, and recently diagnosed colon cancer s/p partial colectomy with ileocolonic anastomosis on 11/1 at an outside facility. History is unclear as we do not have access to the majority of her care, but, per family, she required take back for an intra-abdominal infection. Following this, she developed an anterior abdominal wall abscess requiring IR aspiration (12/27) and a 14 day course of IV antibiotics, which she recently finished. She was discharged from SNF yesterday. Per her care giver, she has had loose stools for about 2 weeks. Also with bilateral lower extremity swelling during this time. She presents to the ED today with complaints of abdominal pain associated with nausea. She has not had any emesis during this time to suggest clinical obstruction.      In the ED, the patient is afebrile and hemodynamically stable. Her lab work is largely unremarkable outside a mildly elevated BNP (157). She is without leukocytosis. Her lactate is normal. CT A/P is most significant for new right pulmonary effusion, small amount of pericardial fluid, post op changes of her partial colectomy with ileo-colonic anastomosis with distended small bowel proximal to the anastomosis concerning for partial or developing obstruction, and re-demonstration of anterolateral right abdominal wall abscess without obvious fistulous communication with the peritoneal cavity or bowel.      The patient has had a complicated post surgical course. She ultimately presents with continued issues and overall failure to thrive. Gastrograffin readily passed into her colon, past the anastomosis on 4 hour KUB. May benefit from non-emergent colonoscopy to assess the anastomosis if there is concern for stricture.      -- admitted to medicine   -- no acute  surgical intervention indicated at this time; not obstructed, passed GGC  -- IR drain placed 1/15  -- C scope planned for today, will follow up  -- PT/OT   -- remainder of care per primary team   -- general surgery will follow; please call with any questions or changes in her clinical status

## 2025-01-17 NOTE — SUBJECTIVE & OBJECTIVE
Interval History: NAEON. IR drain with minimal SS output. Prepped yesterday for c scope today.    Medications:  Continuous Infusions:   dextrose 10% and 0.45% NaCl infusion   Intravenous Continuous 100 mL/hr at 01/17/25 0128 New Bag at 01/17/25 0128    heparin (porcine) in D5W  0-40 Units/kg/hr Intravenous Continuous 7.9 mL/hr at 01/17/25 0742 12 Units/kg/hr at 01/17/25 0742     Scheduled Meds:   atorvastatin  40 mg Oral Daily    ciprofloxacin  400 mg Intravenous Q12H    levothyroxine  75 mcg Oral Before breakfast    mupirocin   Nasal BID    nicotine  1 patch Transdermal Daily    nitrofurantoin (macrocrystal-monohydrate)  100 mg Oral Q12H    pantoprazole  40 mg Oral Daily    potassium chloride  40 mEq Oral Once     PRN Meds:  Current Facility-Administered Medications:     dextrose 50%, 12.5 g, Intravenous, PRN    dextrose 50%, 25 g, Intravenous, PRN    glucagon (human recombinant), 1 mg, Intramuscular, PRN    glucose, 16 g, Oral, PRN    glucose, 24 g, Oral, PRN    heparin (PORCINE), 60 Units/kg, Intravenous, PRN    heparin (PORCINE), 30 Units/kg, Intravenous, PRN    HYDROcodone-acetaminophen, 1 tablet, Oral, TID PRN    insulin aspart U-100, 0-5 Units, Subcutaneous, Q6H PRN    morphine, 1 mg, Intravenous, Q4H PRN    ondansetron, 8 mg, Oral, TID PRN     Review of patient's allergies indicates:   Allergen Reactions    Cefaclor Hives    Gabapentin Swelling    Penicillins      Other reaction(s): Hives    Sulfa (sulfonamide antibiotics) Other (See Comments)     Other reaction(s): Hives     Objective:     Vital Signs (Most Recent):  Temp: 97.7 °F (36.5 °C) (01/17/25 0800)  Pulse: 78 (01/17/25 0800)  Resp: 14 (01/17/25 0805)  BP: (!) 112/57 (01/17/25 0800)  SpO2: 99 % (01/17/25 0800) Vital Signs (24h Range):  Temp:  [97.4 °F (36.3 °C)-98.7 °F (37.1 °C)] 97.7 °F (36.5 °C)  Pulse:  [] 78  Resp:  [14-20] 14  SpO2:  [94 %-100 %] 99 %  BP: (100-150)/(46-80) 112/57     Weight: 68.5 kg (151 lb 0.2 oz)  Body mass index is  27.62 kg/m².    Intake/Output - Last 3 Shifts         01/15 0700  01/16 0659 01/16 0700  01/17 0659 01/17 0700  01/18 0659    I.V. (mL/kg)  255.9 (3.7)     IV Piggyback  396.5     Total Intake(mL/kg)  652.4 (9.5)     Urine (mL/kg/hr) 1677 (1) 450 (0.3)     Drains 25 5     Other 20      Stool 1      Total Output 1723 455     Net -1723 +197.4                     Physical Exam  Vitals reviewed.   Constitutional:       General: She is not in acute distress.     Appearance: Normal appearance. She is not toxic-appearing.   HENT:      Head: Normocephalic and atraumatic.      Nose: Nose normal.   Cardiovascular:      Rate and Rhythm: Normal rate.      Pulses: Normal pulses.   Pulmonary:      Effort: Pulmonary effort is normal. No respiratory distress.   Abdominal:      General: Abdomen is flat. There is no distension.      Palpations: Abdomen is soft.      Tenderness: There is no guarding or rebound.      Comments: Mildly tender to palpation in the right abdomen; no guarding, no rebound, non peritonitic  IR drain in place over RLQ, SS output   Skin:     General: Skin is warm.   Neurological:      General: No focal deficit present.      Mental Status: She is alert and oriented to person, place, and time.          Significant Labs:  I have reviewed all pertinent lab results within the past 24 hours.    Significant Diagnostics:  I have reviewed all pertinent imaging results/findings within the past 24 hours.

## 2025-01-17 NOTE — PLAN OF CARE
Patients BP dropped to 79/53, informed Dr. Dsouza, intervention was given. Bp became 118/60. Patient awake and stable. Ready to transfer back to hand.

## 2025-01-17 NOTE — PROCEDURES
"Thom Krishna is a 69 y.o. female patient.    Temp: 97.7 °F (36.5 °C) (01/17/25 0800)  Pulse: 80 (01/17/25 1118)  Resp: 14 (01/17/25 0805)  BP: (!) 112/57 (01/17/25 0800)  SpO2: 99 % (01/17/25 0800)  Weight: 68.5 kg (151 lb 0.2 oz) (01/16/25 1458)  Height: 5' 2" (157.5 cm) (01/16/25 1458)    PICC  Date/Time: 1/17/2025 12:14 PM  Performed by: Devin Thurman RN  Consent Done: Yes  Time out: Immediately prior to procedure a time out was called to verify the correct patient, procedure, equipment, support staff and site/side marked as required  Indications: med administration and vascular access  Anesthesia: local infiltration  Local anesthetic: lidocaine 1% without epinephrine  Anesthetic Total (mL): 2  Preparation: skin prepped with ChloraPrep  Skin prep agent dried: skin prep agent completely dried prior to procedure  Sterile barriers: all five maximum sterile barriers used - cap, mask, sterile gown, sterile gloves, and large sterile sheet  Hand hygiene: hand hygiene performed prior to central venous catheter insertion  Location details: right basilic  Catheter type: double lumen  Catheter size: 5 Fr  Catheter Length: 32cm    Ultrasound guidance: yes  Vessel Caliber: medium and patent, compressibility normal  Vascular Doppler: not done  Needle advanced into vessel with real time Ultrasound guidance.  Guidewire confirmed in vessel.  Sterile sheath used.  no esophageal manometryNumber of attempts: 1  Post-procedure: blood return through all ports, chlorhexidine patch and sterile dressing applied    Assessment: placement verified by x-ray          Name MOSHE RN  1/17/2025    "

## 2025-01-17 NOTE — PROGRESS NOTES
Individual Follow-Up Form    1/17/2025    Quit Date: To be determined    Clinical Status of Patient: Inpatient    Length of Service: 30 minutes    Comments: Smoking cessation education follow-up: Pt denies nicotine withdrawal symptoms with patch in use. Order requested upon discharge for 21 mg niccotine patch Q day. Ambualtory referral to Smoking Cessation cinic following hospital discharge.     Diagnosis: F17.210

## 2025-01-17 NOTE — ASSESSMENT & PLAN NOTE
PICC placed at OSH on 11/3 due to difficulty with access    Tx:  PICC to be replaced this admission due to functional issues

## 2025-01-17 NOTE — ANESTHESIA POSTPROCEDURE EVALUATION
Anesthesia Post Evaluation    Patient: Thom Krishna    Procedure(s) Performed: Procedure(s) (LRB):  COLONOSCOPY (N/A)    Final Anesthesia Type: general      Patient location during evaluation: PACU  Patient participation: Yes- Able to Participate  Level of consciousness: awake and alert  Post-procedure vital signs: reviewed and stable  Pain management: adequate  Airway patency: patent    PONV status at discharge: No PONV  Anesthetic complications: no      Cardiovascular status: blood pressure returned to baseline  Respiratory status: unassisted  Hydration status: euvolemic            Vitals Value Taken Time   BP 93/51 01/17/25 1420   Temp 36.5 °C (97.7 °F) 01/17/25 1405   Pulse 71 01/17/25 1420   Resp 12 01/17/25 1420   SpO2 96 % 01/17/25 1420         No case tracking events are documented in the log.      Pain/More Score: Pain Rating Prior to Med Admin: 9 (1/17/2025  8:05 AM)  Pain Rating Post Med Admin: 9 (1/17/2025  8:35 AM)  More Score: 10 (1/17/2025  2:20 PM)

## 2025-01-17 NOTE — PLAN OF CARE
Report received from nurseAlfred. RAFITAOx4. NPO since MN. NSR on tele. DEBBIE drain to RLQ. 2 IV sites, patent. Currently on heparin gtt, MD aware. No heart devices. Son at bedside.

## 2025-01-17 NOTE — PROGRESS NOTES
Population Health Chart Review & Patient Outreach Details      Additional [facility] Health Notes:               Updates Requested / Reviewed:      Updated Care Coordination Note, Care Everywhere, and Immunizations Reconciliation Completed or Queried: [facility] Topics Overdue:      [facility] Score: 7     Osteoporosis Screening  Urine Screening  Eye Exam  Lipid Panel  Mammogram  Foot Exam  LDCT Lung Screen    Influenza Vaccine  Pneumonia Vaccine  Tetanus Vaccine  Shingles/Zoster Vaccine  RSV Vaccine                  Health Maintenance Topic(s) Outreach Outcomes & Actions Taken:    Breast Cancer Screening - Outreach Outcomes & Actions Taken  : Telephone outreach, carinm

## 2025-01-18 LAB
ANION GAP SERPL CALC-SCNC: 9 MMOL/L (ref 8–16)
APTT PPP: 113.3 SEC (ref 21–32)
APTT PPP: 45.8 SEC (ref 21–32)
APTT PPP: 92.1 SEC (ref 21–32)
BACTERIA SPEC AEROBE CULT: ABNORMAL
BASOPHILS # BLD AUTO: 0.02 K/UL (ref 0–0.2)
BASOPHILS NFR BLD: 0.2 % (ref 0–1.9)
BUN SERPL-MCNC: 4 MG/DL (ref 8–23)
CALCIUM SERPL-MCNC: 8.3 MG/DL (ref 8.7–10.5)
CHLORIDE SERPL-SCNC: 107 MMOL/L (ref 95–110)
CO2 SERPL-SCNC: 20 MMOL/L (ref 23–29)
CREAT SERPL-MCNC: 0.7 MG/DL (ref 0.5–1.4)
DIFFERENTIAL METHOD BLD: ABNORMAL
EOSINOPHIL # BLD AUTO: 0 K/UL (ref 0–0.5)
EOSINOPHIL NFR BLD: 0.1 % (ref 0–8)
ERYTHROCYTE [DISTWIDTH] IN BLOOD BY AUTOMATED COUNT: 17.8 % (ref 11.5–14.5)
EST. GFR  (NO RACE VARIABLE): >60 ML/MIN/1.73 M^2
GLUCOSE SERPL-MCNC: 141 MG/DL (ref 70–110)
HCT VFR BLD AUTO: 30 % (ref 37–48.5)
HGB BLD-MCNC: 10 G/DL (ref 12–16)
IMM GRANULOCYTES # BLD AUTO: 0.07 K/UL (ref 0–0.04)
IMM GRANULOCYTES NFR BLD AUTO: 0.6 % (ref 0–0.5)
LYMPHOCYTES # BLD AUTO: 1.8 K/UL (ref 1–4.8)
LYMPHOCYTES NFR BLD: 16.6 % (ref 18–48)
MCH RBC QN AUTO: 32.5 PG (ref 27–31)
MCHC RBC AUTO-ENTMCNC: 33.3 G/DL (ref 32–36)
MCV RBC AUTO: 97 FL (ref 82–98)
MONOCYTES # BLD AUTO: 0.6 K/UL (ref 0.3–1)
MONOCYTES NFR BLD: 5.7 % (ref 4–15)
NEUTROPHILS # BLD AUTO: 8.3 K/UL (ref 1.8–7.7)
NEUTROPHILS NFR BLD: 76.8 % (ref 38–73)
NRBC BLD-RTO: 0 /100 WBC
PLATELET # BLD AUTO: 266 K/UL (ref 150–450)
PMV BLD AUTO: 9.8 FL (ref 9.2–12.9)
POCT GLUCOSE: 113 MG/DL (ref 70–110)
POCT GLUCOSE: 118 MG/DL (ref 70–110)
POCT GLUCOSE: 129 MG/DL (ref 70–110)
POCT GLUCOSE: 151 MG/DL (ref 70–110)
POTASSIUM SERPL-SCNC: 3.6 MMOL/L (ref 3.5–5.1)
RBC # BLD AUTO: 3.08 M/UL (ref 4–5.4)
SODIUM SERPL-SCNC: 136 MMOL/L (ref 136–145)
WBC # BLD AUTO: 10.8 K/UL (ref 3.9–12.7)

## 2025-01-18 PROCEDURE — 80048 BASIC METABOLIC PNL TOTAL CA: CPT | Performed by: HOSPITALIST

## 2025-01-18 PROCEDURE — 99233 SBSQ HOSP IP/OBS HIGH 50: CPT | Mod: ,,, | Performed by: INTERNAL MEDICINE

## 2025-01-18 PROCEDURE — 85730 THROMBOPLASTIN TIME PARTIAL: CPT | Performed by: HOSPITALIST

## 2025-01-18 PROCEDURE — 25000003 PHARM REV CODE 250: Performed by: NURSE PRACTITIONER

## 2025-01-18 PROCEDURE — S4991 NICOTINE PATCH NONLEGEND: HCPCS | Performed by: NURSE PRACTITIONER

## 2025-01-18 PROCEDURE — 85025 COMPLETE CBC W/AUTO DIFF WBC: CPT | Performed by: EMERGENCY MEDICINE

## 2025-01-18 PROCEDURE — 21400001 HC TELEMETRY ROOM

## 2025-01-18 PROCEDURE — 93010 ELECTROCARDIOGRAM REPORT: CPT | Mod: ,,, | Performed by: STUDENT IN AN ORGANIZED HEALTH CARE EDUCATION/TRAINING PROGRAM

## 2025-01-18 PROCEDURE — 25000003 PHARM REV CODE 250: Performed by: INTERNAL MEDICINE

## 2025-01-18 PROCEDURE — 85730 THROMBOPLASTIN TIME PARTIAL: CPT | Mod: 91 | Performed by: HOSPITALIST

## 2025-01-18 PROCEDURE — 25500020 PHARM REV CODE 255: Performed by: HOSPITALIST

## 2025-01-18 PROCEDURE — 63600175 PHARM REV CODE 636 W HCPCS: Performed by: INTERNAL MEDICINE

## 2025-01-18 PROCEDURE — 93005 ELECTROCARDIOGRAM TRACING: CPT

## 2025-01-18 PROCEDURE — 63600175 PHARM REV CODE 636 W HCPCS: Performed by: HOSPITALIST

## 2025-01-18 PROCEDURE — 63600175 PHARM REV CODE 636 W HCPCS: Performed by: EMERGENCY MEDICINE

## 2025-01-18 PROCEDURE — 27000207 HC ISOLATION

## 2025-01-18 PROCEDURE — 25000003 PHARM REV CODE 250: Performed by: HOSPITALIST

## 2025-01-18 RX ADMIN — MORPHINE SULFATE 1 MG: 2 INJECTION, SOLUTION INTRAMUSCULAR; INTRAVENOUS at 02:01

## 2025-01-18 RX ADMIN — MUPIROCIN: 20 OINTMENT TOPICAL at 09:01

## 2025-01-18 RX ADMIN — MORPHINE SULFATE 1 MG: 2 INJECTION, SOLUTION INTRAMUSCULAR; INTRAVENOUS at 11:01

## 2025-01-18 RX ADMIN — MORPHINE SULFATE 1 MG: 2 INJECTION, SOLUTION INTRAMUSCULAR; INTRAVENOUS at 12:01

## 2025-01-18 RX ADMIN — VASOPRESSIN: 20 INJECTION, SOLUTION INTRAVENOUS at 11:01

## 2025-01-18 RX ADMIN — LEVOTHYROXINE SODIUM 75 MCG: 25 TABLET ORAL at 05:01

## 2025-01-18 RX ADMIN — MORPHINE SULFATE 1 MG: 2 INJECTION, SOLUTION INTRAMUSCULAR; INTRAVENOUS at 10:01

## 2025-01-18 RX ADMIN — HEPARIN SODIUM 12 UNITS/KG/HR: 10000 INJECTION, SOLUTION INTRAVENOUS at 05:01

## 2025-01-18 RX ADMIN — ATORVASTATIN CALCIUM 40 MG: 40 TABLET, FILM COATED ORAL at 09:01

## 2025-01-18 RX ADMIN — NITROFURANTOIN MONOHYDRATE/MACROCRYSTALS 100 MG: 25; 75 CAPSULE ORAL at 09:01

## 2025-01-18 RX ADMIN — VASOPRESSIN: 20 INJECTION, SOLUTION INTRAVENOUS at 12:01

## 2025-01-18 RX ADMIN — PIPERACILLIN SODIUM AND TAZOBACTAM SODIUM 4.5 G: 4; .5 INJECTION, POWDER, LYOPHILIZED, FOR SOLUTION INTRAVENOUS at 12:01

## 2025-01-18 RX ADMIN — PIPERACILLIN SODIUM AND TAZOBACTAM SODIUM 4.5 G: 4; .5 INJECTION, POWDER, LYOPHILIZED, FOR SOLUTION INTRAVENOUS at 09:01

## 2025-01-18 RX ADMIN — MORPHINE SULFATE 1 MG: 2 INJECTION, SOLUTION INTRAMUSCULAR; INTRAVENOUS at 07:01

## 2025-01-18 RX ADMIN — PANTOPRAZOLE SODIUM 40 MG: 40 TABLET, DELAYED RELEASE ORAL at 09:01

## 2025-01-18 RX ADMIN — MORPHINE SULFATE 1 MG: 2 INJECTION, SOLUTION INTRAMUSCULAR; INTRAVENOUS at 05:01

## 2025-01-18 RX ADMIN — PIPERACILLIN SODIUM AND TAZOBACTAM SODIUM 4.5 G: 4; .5 INJECTION, POWDER, LYOPHILIZED, FOR SOLUTION INTRAVENOUS at 05:01

## 2025-01-18 RX ADMIN — NICOTINE 1 PATCH: 21 PATCH, EXTENDED RELEASE TRANSDERMAL at 09:01

## 2025-01-18 RX ADMIN — HYDROCODONE BITARTRATE AND ACETAMINOPHEN 1 TABLET: 10; 325 TABLET ORAL at 09:01

## 2025-01-18 NOTE — ASSESSMENT & PLAN NOTE
DDx:  Bowel obstruction ruled out: CT shows distended small bowel proximal to anastomosis, though Gastrografin has passed  Anastomotic stricture (ruled out)  Infectious process: Due to anterior abdominal wall abscess vs acute UTI. Possible pneumonia based on CT chest.     Dx:  IR drain placed 1/15. 20cc of bloody purulent fluid.   Abscess cultures - KLEBSIELLA OXYTOCA   Colonoscopy on 1/17: Patent end-to-side ileocolonic anastomosis with purulent drainage likely from abscess; anastomosis intact; ileum normal; no specimens collected.  CT chest abdomen pelvis: Patchy GGOs in lungs c/f asp or PNA; biliary duct dilation post-chad, no mass, patulous esoph w/air-fluid level c/f dysmotility/reflux/achalasia; 10.5 cm R abd wall collection, decreased  Follow up MRCP    Tx:  GI, ID, and Gen Surg consult  IV cipro escalated to Zosyn due to low grade fever, tachycardia  Cultures to guide therapy  Monitor output from IR drain - leave drain in lace until less than 10cc of fluid is aspirated daily for 2 days and CT imaging demonstrates no further evidence of collection.

## 2025-01-18 NOTE — NURSING
Pt back in room from CT. Heparin gtt infusing, IVF infusing, IV antibiotic infusing. Call light w/in pt's reach.

## 2025-01-18 NOTE — AI DETERIORATION ALERT
Artificial Intelligence Notification  Julieth      Admit Date: 2025  LOS: 3  Code Status: Full Code   Date of Consult: 2025  : 1955  Age: 69 y.o.  Weight:   Wt Readings from Last 1 Encounters:   25 68.5 kg (151 lb 0.2 oz)     Sex: female  Bed: [facility] A:   MRN: [id number]  Attending Physician: Mike Sanchez MD  Primary Service: Networked reference to record PCT   Time AI Alert Received: ***  Time at Bedside: ***           ***      Vital Signs (Most Recent):  Temp: (!) 100.4 °F (38 °C) (25)  Pulse: (!) 142 (25)  Resp: (!) 22 (25)  BP: (!) 157/71 (25)  SpO2: (!) 93 % (25) Vital Signs (24h Range):  Temp:  [97.7 °F (36.5 °C)-100.4 °F (38 °C)] 100.4 °F (38 °C)  Pulse:  [] 142  Resp:  [12-22] 22  SpO2:  [93 %-99 %] 93 %  BP: ()/(51-80) 157/71         This encounter was triggered by an Artificial Intelligence Notification.     Artificial Intelligence alert discussed with Primary team:  Name ***      Evaluation: ***    Disposition: ***

## 2025-01-18 NOTE — PLAN OF CARE
Problem: Adult Inpatient Plan of Care  Goal: Plan of Care Review  Outcome: Progressing  Goal: Absence of Hospital-Acquired Illness or Injury  Outcome: Progressing  Goal: Optimal Comfort and Wellbeing  Outcome: Progressing  Goal: Readiness for Transition of Care  Outcome: Progressing     Problem: Fall Injury Risk  Goal: Absence of Fall and Fall-Related Injury  Outcome: Progressing     Problem: Infection  Goal: Absence of Infection Signs and Symptoms  Outcome: Progressing     Problem: Skin Injury Risk Increased  Goal: Skin Health and Integrity  Outcome: Progressing     Problem: Diabetes Comorbidity  Goal: Blood Glucose Level Within Targeted Range  Outcome: Progressing

## 2025-01-18 NOTE — SUBJECTIVE & OBJECTIVE
Interval History:   NAEON  Low grade fever 100.4  Family at bedside  Patient reports persistent abdominal pain, relieved by pain meds  Reports passing gas and having Bms  No f/c/n/v  No other complaints    Review of Systems  Objective:     Vital Signs (Most Recent):  Temp: 97.3 °F (36.3 °C) (01/18/25 1150)  Pulse: 61 (01/18/25 1150)  Resp: 18 (01/18/25 1150)  BP: (!) 105/48 (01/18/25 1150)  SpO2: 97 % (01/18/25 0736) Vital Signs (24h Range):  Temp:  [97.3 °F (36.3 °C)-100.4 °F (38 °C)] 97.3 °F (36.3 °C)  Pulse:  [] 61  Resp:  [12-22] 18  SpO2:  [93 %-99 %] 97 %  BP: ()/(48-71) 105/48     Weight: 68.5 kg (151 lb 0.2 oz)  Body mass index is 27.62 kg/m².    Intake/Output Summary (Last 24 hours) at 1/18/2025 1359  Last data filed at 1/18/2025 0817  Gross per 24 hour   Intake 398.91 ml   Output 2400 ml   Net -2001.09 ml         Physical Exam  Constitutional:       General: She is not in acute distress.     Appearance: She is ill-appearing. She is not toxic-appearing.   HENT:      Mouth/Throat:      Mouth: Mucous membranes are moist.      Pharynx: Oropharynx is clear.   Eyes:      Extraocular Movements: Extraocular movements intact.      Conjunctiva/sclera: Conjunctivae normal.   Cardiovascular:      Rate and Rhythm: Normal rate and regular rhythm.   Pulmonary:      Effort: Pulmonary effort is normal. No respiratory distress.      Breath sounds: Normal breath sounds.   Abdominal:      General: Bowel sounds are normal. There is no distension.      Palpations: Abdomen is soft.      Tenderness: There is abdominal tenderness. There is no guarding.      Comments: R side drain in place, dark brown output   Musculoskeletal:         General: Normal range of motion.      Right lower leg: No edema.      Left lower leg: No edema.   Skin:     General: Skin is warm and dry.   Neurological:      General: No focal deficit present.      Mental Status: She is alert and oriented to person, place, and time.   Psychiatric:          Mood and Affect: Mood normal.         Behavior: Behavior normal.             Significant Labs: All pertinent labs within the past 24 hours have been reviewed.    Significant Imaging: I have reviewed all pertinent imaging results/findings within the past 24 hours.

## 2025-01-18 NOTE — SUBJECTIVE & OBJECTIVE
Subjective:     Interval History:  Status post colonoscopy yesterday with ?purulence draining from anastomosis.  Repeat CT shows persistent fluid collection.  Incidentally dilated CBD noted on CT scan.  No recent CMP    Review of Systems   Gastrointestinal:  Positive for abdominal distention and abdominal pain.     Objective:     Vital Signs (Most Recent):  Temp: 97.3 °F (36.3 °C) (01/18/25 1150)  Pulse: 61 (01/18/25 1150)  Resp: 18 (01/18/25 1443)  BP: (!) 105/48 (01/18/25 1150)  SpO2: 97 % (01/18/25 0736) Vital Signs (24h Range):  Temp:  [97.3 °F (36.3 °C)-100.4 °F (38 °C)] 97.3 °F (36.3 °C)  Pulse:  [] 61  Resp:  [17-22] 18  SpO2:  [97 %-99 %] 97 %  BP: (100-157)/(48-71) 105/48     Weight: 68.5 kg (151 lb 0.2 oz) (01/17/25 1500)  Body mass index is 27.62 kg/m².      Intake/Output Summary (Last 24 hours) at 1/18/2025 1522  Last data filed at 1/18/2025 0817  Gross per 24 hour   Intake 198.91 ml   Output 2400 ml   Net -2201.09 ml       Lines/Drains/Airways       Peripherally Inserted Central Catheter Line  Duration             PICC Double Lumen 01/17/25 1214 right basilic 1 day              Drain  Duration             Female External Urinary Catheter w/ Suction 01/14/25 2207 3 days         Closed/Suction Drain 01/15/25 1630 Tube - 1 Lateral;Right RLQ Bulb 10 Fr. 2 days              Peripheral Intravenous Line  Duration                  Peripheral IV - Single Lumen 01/15/25 0421 20 G 1 3/4 in Anterior;Distal;Right Upper Arm 3 days                     Physical Exam  Constitutional:       Appearance: She is well-developed.   HENT:      Head: Normocephalic and atraumatic.   Eyes:      Conjunctiva/sclera: Conjunctivae normal.   Pulmonary:      Effort: Pulmonary effort is normal. No respiratory distress.   Abdominal:      Comments: + drain with purulent fluid   Musculoskeletal:      Cervical back: Normal range of motion.   Neurological:      Mental Status: She is alert and oriented to person, place, and time.    Psychiatric:         Behavior: Behavior normal.         Thought Content: Thought content normal.         Judgment: Judgment normal.          Significant Labs:  Recent Lab Results  (Last 5 results in the past 24 hours)        01/18/25  1150   01/18/25  0835   01/18/25  0554   01/18/25  0534   01/18/25  0043        Anion Gap     9           PTT   113.3  Comment: Refer to local heparin nomogram for intensity/dose specific   therapeutic   range.  LOT^050^APTT FSL^[id number]     92.1  Comment: Refer to local heparin nomogram for intensity/dose specific   therapeutic   range.  LOT^050^APTT FSL^[id number]             Baso #     0.02           Basophil %     0.2           BUN     4           Calcium     8.3           Chloride     107           CO2     20           Creatinine     0.7           Differential Method     Automated           eGFR     >60           Eos #     0.0           Eos %     0.1           Glucose     141           Gran # (ANC)     8.3           Gran %     76.8           Hematocrit     30.0           Hemoglobin     10.0           Immature Grans (Abs)     0.07  Comment: Mild elevation in immature granulocytes is non specific and   can be seen in a variety of conditions including stress response,   acute inflammation, trauma and pregnancy. Correlation with other   laboratory and clinical findings is essential.             Immature Granulocytes     0.6           Lymph #     1.8           Lymph %     16.6           MCH     32.5           MCHC     33.3           MCV     97           Mono #     0.6           Mono %     5.7           MPV     9.8           nRBC     0           Platelet Count     266           POCT Glucose 113       151   129       Potassium     3.6           RBC     3.08           RDW     17.8           Sodium     136           WBC     10.80                                    Significant Imaging:  Imaging results within the past 24 hours have been reviewed.

## 2025-01-18 NOTE — NURSING
01/17/25 1952   Nurse Notification   Charge Nurse Notified? Yes   Name of Charge Nurse ANA Pagan   Bedside Nurse Notified? Yes   Name of Bedside Nurse Lorena, RN and David RN (proactive ICU nurse)   Nurse Notfication Method Secure Chat   Nurse Notified Of Other;Lab Values  (VRE + urine cultures)   Provider Notification   Name of Provider Dr. Barb Aponte   Provider Notification Method Secure Chat   Provider Notified Of Lab Values  (VRE + urine cultures)     Proactive round requested for MEWS 5.     01/17/25 1942   Vital Signs   Temp (!) 100.4 °F (38 °C)   Temp Source Oral   Pulse (!) 141   Heart Rate Source Monitor   Resp (!) 22   BP (!) 157/71   MAP (mmHg) 98   Patient Position Lying

## 2025-01-18 NOTE — PROGRESS NOTES
[facility]  Gastroenterology  Progress Note    Patient Name: Thom Krishna  MRN: [id number]  Admission Date: 1/14/2025  Hospital Length of Stay: 4 days  Code Status: Full Code   Attending Provider: Mike Sanchez MD  Consulting Provider: Ruslan Alvarez MD  Primary Care Physician: Brandy Dejesus MD  Principal Problem: Epigastric pain        Subjective:     Interval History:  Status post colonoscopy yesterday with ?purulence draining from anastomosis.  Repeat CT shows persistent fluid collection.  Incidentally dilated CBD noted on CT scan.  No recent CMP    Review of Systems   Gastrointestinal:  Positive for abdominal distention and abdominal pain.     Objective:     Vital Signs (Most Recent):  Temp: 97.3 °F (36.3 °C) (01/18/25 1150)  Pulse: 61 (01/18/25 1150)  Resp: 18 (01/18/25 1443)  BP: (!) 105/48 (01/18/25 1150)  SpO2: 97 % (01/18/25 0736) Vital Signs (24h Range):  Temp:  [97.3 °F (36.3 °C)-100.4 °F (38 °C)] 97.3 °F (36.3 °C)  Pulse:  [] 61  Resp:  [17-22] 18  SpO2:  [97 %-99 %] 97 %  BP: (100-157)/(48-71) 105/48     Weight: 68.5 kg (151 lb 0.2 oz) (01/17/25 1500)  Body mass index is 27.62 kg/m².      Intake/Output Summary (Last 24 hours) at 1/18/2025 1522  Last data filed at 1/18/2025 0817  Gross per 24 hour   Intake 198.91 ml   Output 2400 ml   Net -2201.09 ml       Lines/Drains/Airways       Peripherally Inserted Central Catheter Line  Duration             PICC Double Lumen 01/17/25 1214 right basilic 1 day              Drain  Duration             Female External Urinary Catheter w/ Suction 01/14/25 2207 3 days         Closed/Suction Drain 01/15/25 1630 Tube - 1 Lateral;Right RLQ Bulb 10 Fr. 2 days              Peripheral Intravenous Line  Duration                  Peripheral IV - Single Lumen 01/15/25 0421 20 G 1 3/4 in Anterior;Distal;Right Upper Arm 3 days                     Physical Exam  Constitutional:       Appearance: She is well-developed.   HENT:      Head: Normocephalic  and atraumatic.   Eyes:      Conjunctiva/sclera: Conjunctivae normal.   Pulmonary:      Effort: Pulmonary effort is normal. No respiratory distress.   Abdominal:      Comments: + drain with purulent fluid   Musculoskeletal:      Cervical back: Normal range of motion.   Neurological:      Mental Status: She is alert and oriented to person, place, and time.   Psychiatric:         Behavior: Behavior normal.         Thought Content: Thought content normal.         Judgment: Judgment normal.          Significant Labs:  Recent Lab Results  (Last 5 results in the past 24 hours)        01/18/25  1150   01/18/25  0835   01/18/25  0554   01/18/25  0534   01/18/25  0043        Anion Gap     9           PTT   113.3  Comment: Refer to local heparin nomogram for intensity/dose specific   therapeutic   range.  LOT^050^APTT FSL^[id number]     92.1  Comment: Refer to local heparin nomogram for intensity/dose specific   therapeutic   range.  LOT^050^APTT FSL^[id number]             Baso #     0.02           Basophil %     0.2           BUN     4           Calcium     8.3           Chloride     107           CO2     20           Creatinine     0.7           Differential Method     Automated           eGFR     >60           Eos #     0.0           Eos %     0.1           Glucose     141           Gran # (ANC)     8.3           Gran %     76.8           Hematocrit     30.0           Hemoglobin     10.0           Immature Grans (Abs)     0.07  Comment: Mild elevation in immature granulocytes is non specific and   can be seen in a variety of conditions including stress response,   acute inflammation, trauma and pregnancy. Correlation with other   laboratory and clinical findings is essential.             Immature Granulocytes     0.6           Lymph #     1.8           Lymph %     16.6           MCH     32.5           MCHC     33.3           MCV     97           Mono #     0.6           Mono %     5.7           MPV     9.8           nRBC     0            Platelet Count     266           POCT Glucose 113       151   129       Potassium     3.6           RBC     3.08           RDW     17.8           Sodium     136           WBC     10.80                                    Significant Imaging:  Imaging results within the past 24 hours have been reviewed.  Assessment/Plan:     GI  * Epigastric pain  Likely related to h/o R yanique. Abscess noted on CT from 1/14, IR drain placed earlier in hospitalization however pain persisted.  Colonoscopy repeated on 01/17.  Anastomosis seems intact however what appeared to be purulent drainage was noted coming from area of anastomosis.  A repeat CT scan shows persistent fluid collection.  Also CBD was noted to be dilated.  This was also seen previously, although degree was not commented on on read.  Liver testing earlier in hospitalization was not suggestive of biliary obstruction.  No CMP drawn today.  Discuss definitive management of abscess with IR/surgery as GI intervention has been exhausted   Consider MRCP if concerned for biliary obstruction  Continue antibiotics per primary team   If no intervention planned for today okay to eat from GI perspective            Thank you for your consult. I will follow-up with patient. Please contact us if you have any additional questions.    Ruslan Alvarez MD  Gastroenterology  [facility]

## 2025-01-18 NOTE — ASSESSMENT & PLAN NOTE
Nutrition consulted. Most recent weight and BMI monitored-     Measurements:  Wt Readings from Last 1 Encounters:   01/17/25 68.5 kg (151 lb 0.2 oz)   Body mass index is 27.62 kg/m².    Patient has been screened and assessed by RD.    Malnutrition Type:  Context: chronic illness  Level: moderate    Malnutrition Characteristic Summary:  Energy Intake (Malnutrition): less than or equal to 75% for greater than or equal to 1 month    Interventions/Recommendations (treatment strategy):  1. General Healthful diet  2. Commercial beverage medical food supplement therapy

## 2025-01-18 NOTE — PLAN OF CARE
Problem: Adult Inpatient Plan of Care  Goal: Plan of Care Review  Outcome: Progressing     Problem: Fall Injury Risk  Goal: Absence of Fall and Fall-Related Injury  Outcome: Progressing     Problem: VTE (Venous Thromboembolism)  Goal: Tissue Perfusion  Outcome: Progressing     Problem: Comorbidity Management  Goal: Maintenance of Behavioral Health Symptom Control  Outcome: Progressing     Problem: Infection  Goal: Absence of Infection Signs and Symptoms  Outcome: Progressing     Problem: Skin Injury Risk Increased  Goal: Skin Health and Integrity  Outcome: Progressing     Problem: Pain Acute  Goal: Optimal Pain Control and Function  Outcome: Progressing     Problem: Activity Intolerance  Goal: Enhanced Capacity and Energy  Outcome: Progressing     Problem: Mobility Impairment  Goal: Optimal Mobility  Outcome: Progressing     Problem: Confusion Acute  Goal: Optimal Cognitive Function  Outcome: Progressing     Problem: Sepsis/Septic Shock  Goal: Optimal Coping  Outcome: Progressing     Problem: UTI (Urinary Tract Infection)  Goal: Improved Infection Symptoms  Outcome: Progressing     Problem: Constipation  Goal: Effective Bowel Elimination  Outcome: Progressing     Problem: Wound  Goal: Optimal Coping  Outcome: Progressing     Problem: Diabetes Comorbidity  Goal: Blood Glucose Level Within Targeted Range  Outcome: Progressing   AAOX4. C/o epigastric pain radiating to buttocks. Meds given per MAR. On continuous fluids & heparing. On contact precautions for VRE/urine. Pending MRCP to be done today. Bed in lowest position. Call light within reach.

## 2025-01-18 NOTE — PROGRESS NOTES
01/17/25 1947   Patient Request   Patient Requested AI Deterioration Alert   Nurse Notification   Charge Nurse Notified? Yes   Name of Charge Nurse Latasha   Bedside Nurse Notified? Yes   Name of Bedside Nurse Lorena   Nurse Notfication Method Secure Chat   Nurse Notified Of Other  (AI Deterioration Alert)   Provider Notification   Provider Notified? Yes   Name of Provider Barb Aponte   Provider Notification Method Secure Chat   Provider Notified Of AI Deterioration Alert;Other (See Comment)  (Latest VS)      Vital Signs (Most Recent):  Temp: (!) 100.4 °F (38 °C) (01/17/25 1942)  Pulse: (!) 142 (01/17/25 1945)  Resp: (!) 22 (01/17/25 1942)  BP: (!) 157/71 (01/17/25 1942)  SpO2: (!) 93 % (01/17/25 1445)

## 2025-01-18 NOTE — ASSESSMENT & PLAN NOTE
DDx:  Bowel obstruction ruled out: CT shows distended small bowel proximal to anastomosis, though Gastrografin has passed  Anastomotic stricture (ruled out)  Infectious process: Due to anterior abdominal wall abscess vs acute UTI. Possible pneumonia based on CT chest.     Dx:  IR drain placed 1/15. 20cc of bloody purulent fluid.   Abscess cultures - KLEBSIELLA OXYTOCA   Colonoscopy on 1/17: Patent end-to-side ileocolonic anastomosis with purulent drainage likely from abscess; anastomosis intact; ileum normal; no specimens collected.  CT chest abdomen pelvis: Patchy GGOs in lungs c/f asp or PNA; biliary duct dilation post-chad, no mass, patulous esoph w/air-fluid level c/f dysmotility/reflux/achalasia; 10.5 cm R abd wall collection, decreased  Consider ERCP / MRCP    Tx:  GI, ID, and Gen Surg consult  Continue IV cipro. Cultures to guide therapy  Monitor output from IR drain - will discontinue once output <10cc

## 2025-01-18 NOTE — ASSESSMENT & PLAN NOTE
PICC placed at OSH on 11/3 due to difficulty with access    Tx:  PICC replaced on 1/18 this admission due to functional issues

## 2025-01-18 NOTE — ASSESSMENT & PLAN NOTE
Continue Synthroid    Dupixent Counseling: I discussed with the patient the risks of dupilumab including but not limited to eye infection and irritation, cold sores, injection site reactions, worsening of asthma, allergic reactions and increased risk of parasitic infection.  Live vaccines should be avoided while taking dupilumab. Dupilumab will also interact with certain medications such as warfarin and cyclosporine. The patient understands that monitoring is required and they must alert us or the primary physician if symptoms of infection or other concerning signs are noted.

## 2025-01-18 NOTE — NURSING
Pt's HR sustaining 140 on cardiac monitoring.  Pt's temp 100.4.  notified. Order received for PO tylenol and EKG.

## 2025-01-18 NOTE — PROGRESS NOTES
[facility]ious Disease Progress Note     Primary Team: Dr. Sanchez  Consultant Attending: Dr. Rg   Date of Admit: 1/14/2025    Reason for Consult     Enterococcus UTI and abdominal wall abscess      Assessment/Plan     Thom Krishna is a 69 y.o. female with:     Right abdominal wall abscess, recurrence? s/p IR drain 1/17  s/p partial colectomy w/ileocolonic anastomosis 11/1 c/b abdominal wall abscess s/p IR aspiration 12/27; unclear about micro or abx patient was previously on. Abd pain c/f SBO on presentation.   C/f developing SBO and abd wall abscess of CTAP, rpt XR with contrast showing no obstruction  GI for c-scope to assess anastomosis: Anastomosis intact but what appeared to be purulent drainage was noted coming from area of anastomosis per GI   Rpt CT 1/18 biliary duct dilatation, abscess decreased in size since 1/14; no note of fistula but high concern for one   BCX NGTD   Abscess: Gram stain: GNR; aerobic Klebsiella oxytoca; Anaerobic and fungal pending   Started on ciprofloxacin 1/15-1/17, now on zosyn 1/17-  VRE (Enterococcus Faecium) UTI  Complaining of dysuria, cloudy urine for several days. Afebrile, no leukocytosis.   1/14 UA cloudy, 3+ leukocytes, 1+ blood, >100 WBCs/RBCs, bacteria; UCx + VRE faceium  Started on Macrobid 1/16-  Left CFV DVT  Per chart review from facility documentation patient on Eliquis BID for DVT ppx, but unknown if received medications; significant LLE > RLE swelling; difficulty with ambulation at facility plus malignancy puts at risk of VTE  DVT US showing L CFV DVT   Cardiology consulted, no intervention, recommending medical mgmt   Pt started on heparin       Recommendations   Continue zosyn, nitrofurantoin   Follow cultures  Monitor drain output   Appreciate surgery comment on concern if possible enterocutaneous fistula       Thank you for your consult. I will follow-up with patient. Please contact us if you have any additional questions.     Trish Varghese DO  [facility]  IM/EM PGY 2  [facility]  [facility]    History of Present Illness     Thom Krishna is a 69 y.o. female with past medical history of hepatitis C (s/p tx per family), degenerative disc disease, DM type 2, cirrhosis of the liver, emphysema, CKD3, hypothyroid and newly diagnosed colon CA in  s/p partial colectomy w/ileocolonic anastomosis  c/b abdominal wall abscess s/p IR aspiration  and subsequently discharged to SNF with IV abx for 14 days. Unable to find details of which Abx patient was on, microbiology of abscess or additional information as it was at another not associated facility and patient was in the process of re-establishing care with different colorectal surgeon.      Patient was discharged from facility and the following day presented to ED for ongoing diffuse abdominal pain and nausea for a few days and worsening of LLE swelling most notable at the thigh that had been ongoing for a few days including at SNF. Patient did also endorse significant dysuria, as well as ongoing large amounts of bowel incontinence with frequent softer stools daily. No CP, SOB, fever, chills, vomiting.     Interval History     Overnight, low grade fever 100.4F with associated tachycardia around 8p yesterday but otherwise no more fevers. C-scope yesterday showed anastomosis intact but what appeared to be purulent drainage was noted coming from area of anastomosis per GI.     Objective:   BP  Min: 100/54  Max: 157/71  Temp  Av.6 °F (37 °C)  Min: 97.3 °F (36.3 °C)  Max: 100.4 °F (38 °C)  Pulse  Av.9  Min: 61  Max: 144  Resp  Av.2  Min: 17  Max: 22  SpO2  Av.3 %  Min: 97 %  Max: 99 %      Physical Examination   Constitutional:       General: She is not in acute distress.  HENT:      Head: Normocephalic and atraumatic.      Nose: Nose normal.      Mouth/Throat:      Pharynx: Oropharynx is clear.   Eyes:      Extraocular Movements: Extraocular movements intact.    Cardiovascular:      Rate and Rhythm: Normal rate and regular rhythm.      Pulses: Normal pulses.      Heart sounds: Normal heart sounds.   Pulmonary:      Effort: No respiratory distress.      Breath sounds: Normal breath sounds.      Comments: Expiratory wheezing most notable at mid/upper lung fields.  Abdominal:      General: Bowel sounds are normal.      Palpations: Abdomen is soft.      Tenderness: There is abdominal tenderness.      Comments: Diffusely tender   Musculoskeletal:      Cervical back: Normal range of motion.      Comments: LLE swelling, improved in comparison to picture from a few days ago (disproportionate thigh swelling). L ankle swelling. No notable discoloration. Cap refill <2s. Gross ROM intact.    Skin:     Capillary Refill: Capillary refill takes less than 2 seconds.      Comments: Mild jaundice of skin.   Neurological:      General: No focal deficit present.      Mental Status: She is alert.   Psychiatric:         Mood and Affect: Mood normal.       Laboratory Data Reviewed:  Recent Labs   Lab 01/14/25  1504 01/14/25  1956 01/16/25  0348 01/17/25  0622 01/18/25  0554   WBC 9.99   < > 6.81 11.30 10.80   HGB 11.8*   < > 10.2* 10.9* 10.0*   HCT 36.3*   < > 30.8* 32.7* 30.0*      < > 293 331 266      < > 137 138 136   K 4.6   < > 3.6 3.4* 3.6      < > 106 107 107   CREATININE 0.8   < > 0.7 0.6 0.7   BUN 33*   < > 15 6* 4*   CO2 21*   < > 22* 20* 20*   ALT 32  --   --   --   --    AST 22  --   --   --   --     < > = values in this interval not displayed.         Microbiology Data Reviewed:  Microbiology Results (last 7 days)       Procedure Component Value Units Date/Time    Aerobic culture [[id number]]  (Abnormal)  (Susceptibility) Collected: 01/15/25 1648    Order Status: Completed Specimen: Abscess from Abdomen Updated: 01/18/25 0710     Aerobic Bacterial Culture KLEBSIELLA OXYTOCA  Moderate  Skin rivera also present      Blood culture [[id number]] Collected: 01/14/25  1853    Order Status: Completed Specimen: Blood from Peripheral, Forearm, Left Updated: 01/18/25 0612     Blood Culture, Routine No growth to date      No Growth to date      No Growth to date    Blood culture [[id number]] Collected: 01/14/25 1818    Order Status: Completed Specimen: Blood from Peripheral, Antecubital, Right Updated: 01/18/25 0612     Blood Culture, Routine No growth to date      No Growth to date      No Growth to date    Urine culture [[id number]]  (Abnormal)  (Susceptibility) Collected: 01/14/25 1716    Order Status: Completed Specimen: Urine Updated: 01/17/25 1847     Urine Culture, Routine ENTEROCOCCUS FAECIUM VRE  >100,000 cfu/ml      Narrative:      Specimen Source->Urine    Culture, Anaerobic [[id number]] Collected: 01/15/25 1648    Order Status: Completed Specimen: Abscess from Abdomen Updated: 01/17/25 1014     Anaerobic Culture Culture in progress    Gram stain [[id number]] Collected: 01/15/25 1648    Order Status: Completed Specimen: Abscess from Abdomen Updated: 01/15/25 2229     Gram Stain Result Many WBC's      Few Gram negative rods    Fungus culture [[id number]] Collected: 01/15/25 1648    Order Status: Sent Specimen: Abscess from Abdomen Updated: 01/15/25 1943              Current Medications   atorvastatin  40 mg Oral Daily    levothyroxine  75 mcg Oral Before breakfast    mupirocin   Nasal BID    nicotine  1 patch Transdermal Daily    nitrofurantoin (macrocrystal-monohydrate)  100 mg Oral Q12H    pantoprazole  40 mg Oral Daily    piperacillin-tazobactam (Zosyn) IV (PEDS and ADULTS) (extended infusion is not appropriate)  4.5 g Intravenous Q8H

## 2025-01-18 NOTE — ASSESSMENT & PLAN NOTE
Likely related to h/o R yanique. Abscess noted on CT from 1/14, IR drain placed earlier in hospitalization however pain persisted.  Colonoscopy repeated on 01/17.  Anastomosis seems intact however what appeared to be purulent drainage was noted coming from area of anastomosis.  A repeat CT scan shows persistent fluid collection.  Also CBD was noted to be dilated.  This was also seen previously, although degree was not commented on on read.  Liver testing earlier in hospitalization was not suggestive of biliary obstruction.  No CMP drawn today.  Discuss definitive management of abscess with IR/surgery as GI intervention has been exhausted   Consider MRCP if concerned for biliary obstruction  Continue antibiotics per primary team   If no intervention planned for today okay to eat from GI perspective

## 2025-01-18 NOTE — PROGRESS NOTES
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 She  presented to the ED with complaint of abdominal pain.  Diagnosis with colon cancer in Nov 2024 and underwent colon resection, however developed intra abdominal infection requiring operative intervention. She was admitted to SNF facility and underwent treatment with IV antibiotic. She continue to experiencing symptoms of abdominal pain, increase confusion. CT of abdominal showed  Operative change of partial colon resection and ileal colic anastomosis with suspected developing small bowel obstruction and transition point seen at the ileocolic anastomosis. Rim enhancing gas and fluid collection at the lateral right abdominal wall concerning for abscess. General Surgery consulted, with plan IR consultation for consideration of aspiration vs drain placement into her anterior abdominal wall collection. Lt leg verous ultrasound showed,.left lower extremity DVT in the common femoral vein. Patient being admitted under the care of Hospital Medicine.              Overview/Hospital Course:  No notes on file    Interval History:   NAEON  Low grade fever 100.4  Family at bedside  Patient reports persistent abdominal pain, relieved by pain meds  Reports passing gas and having Bms  No f/c/n/v  No other complaints    Review of Systems  Objective:     Vital Signs (Most Recent):  Temp: 97.3 °F (36.3 °C) (01/18/25 1150)  Pulse: 61 (01/18/25 1150)  Resp: 18 (01/18/25 1150)  BP: (!) 105/48 (01/18/25 1150)  SpO2: 97 % (01/18/25 0736) Vital  Signs (24h Range):  Temp:  [97.3 °F (36.3 °C)-100.4 °F (38 °C)] 97.3 °F (36.3 °C)  Pulse:  [] 61  Resp:  [12-22] 18  SpO2:  [93 %-99 %] 97 %  BP: ()/(48-71) 105/48     Weight: 68.5 kg (151 lb 0.2 oz)  Body mass index is 27.62 kg/m².    Intake/Output Summary (Last 24 hours) at 1/18/2025 1359  Last data filed at 1/18/2025 0817  Gross per 24 hour   Intake 398.91 ml   Output 2400 ml   Net -2001.09 ml         Physical Exam  Constitutional:       General: She is not in acute distress.     Appearance: She is ill-appearing. She is not toxic-appearing.   HENT:      Mouth/Throat:      Mouth: Mucous membranes are moist.      Pharynx: Oropharynx is clear.   Eyes:      Extraocular Movements: Extraocular movements intact.      Conjunctiva/sclera: Conjunctivae normal.   Cardiovascular:      Rate and Rhythm: Normal rate and regular rhythm.   Pulmonary:      Effort: Pulmonary effort is normal. No respiratory distress.      Breath sounds: Normal breath sounds.   Abdominal:      General: Bowel sounds are normal. There is no distension.      Palpations: Abdomen is soft.      Tenderness: There is abdominal tenderness. There is no guarding.      Comments: R side drain in place, dark brown output   Musculoskeletal:         General: Normal range of motion.      Right lower leg: No edema.      Left lower leg: No edema.   Skin:     General: Skin is warm and dry.   Neurological:      General: No focal deficit present.      Mental Status: She is alert and oriented to person, place, and time.   Psychiatric:         Mood and Affect: Mood normal.         Behavior: Behavior normal.             Significant Labs: All pertinent labs within the past 24 hours have been reviewed.    Significant Imaging: I have reviewed all pertinent imaging results/findings within the past 24 hours.    Assessment and Plan     * Epigastric pain  DDx:  Bowel obstruction ruled out: CT shows distended small bowel proximal to anastomosis, though Gastrografin has  passed  Anastomotic stricture (ruled out)  Infectious process: Due to anterior abdominal wall abscess vs acute UTI. Possible pneumonia based on CT chest.     Dx:  IR drain placed 1/15. 20cc of bloody purulent fluid.   Abscess cultures - KLEBSIELLA OXYTOCA   Colonoscopy on 1/17: Patent end-to-side ileocolonic anastomosis with purulent drainage likely from abscess; anastomosis intact; ileum normal; no specimens collected.  CT chest abdomen pelvis: Patchy GGOs in lungs c/f asp or PNA; biliary duct dilation post-chad, no mass, patulous esoph w/air-fluid level c/f dysmotility/reflux/achalasia; 10.5 cm R abd wall collection, decreased  Consider ERCP / MRCP    Tx:  GI, ID, and Gen Surg consult  Continue IV cipro. Cultures to guide therapy  Monitor output from IR drain - will discontinue once output <10cc      Hypoglycemia  Resolved    Tx:  Continue D10 1/2NS while NPO      PICC (peripherally inserted central catheter) in place  PICC placed at OSH on 11/3 due to difficulty with access    Tx:  PICC replaced on 1/18 this admission due to functional issues      Acute UTI  Dx:  Urine culture ENTEROCOCCUS FAECIUM     Tx:  Start Macrobid. Cultures to guide therapy    Acute DVT (deep venous thrombosis)  Left thigh veins: Occlusive thrombus in the common femoral  Heparin drip   Consult cardiovascular       Tobacco dependency  Dangers of cigarette smoking were reviewed with patient in detail. Patient was Counseled for 3-10 minutes. Nicotine replacement options were discussed. Nicotine replacement was discussed-       Carotid arterial disease        Moderate malnutrition  Nutrition consulted. Most recent weight and BMI monitored-     Measurements:  Wt Readings from Last 1 Encounters:   01/17/25 68.5 kg (151 lb 0.2 oz)   Body mass index is 27.62 kg/m².    Patient has been screened and assessed by RD.    Malnutrition Type:  Context: chronic illness  Level: moderate    Malnutrition Characteristic Summary:  Energy Intake (Malnutrition):  less than or equal to 75% for greater than or equal to 1 month    Interventions/Recommendations (treatment strategy):  1. General Healthful diet  2. Commercial beverage medical food supplement therapy      Atherosclerosis of aorta  Continue Statin       Type 2 diabetes mellitus with hypertriglyceridemia  Accu checks every 6 hours with SSI       GERD (gastroesophageal reflux disease)  Continue PPI       Hypothyroidism    Continue Synthroid       VTE Risk Mitigation (From admission, onward)           Ordered     heparin 25,000 units in dextrose 5% (100 units/ml) IV bolus from bag HIGH INTENSITY nomogram - [facility]  As needed (PRN)        Question:  Heparin Infusion Adjustment (DO NOT MODIFY ANSWER)  Answer:  \\[email or web address]\epic\Images\Pharmacy\HeparinInfusions\heparin HIGH INTENSITY nomogram for [facility] [id number]    01/14/25 1917     heparin 25,000 units in dextrose 5% (100 units/ml) IV bolus from bag HIGH INTENSITY nomogram - [facility]  As needed (PRN)        Question:  Heparin Infusion Adjustment (DO NOT MODIFY ANSWER)  Answer:  \[email or web address]\epic\Images\Pharmacy\HeparinInfusions\heparin HIGH INTENSITY nomogram for [facility] [id number]    01/14/25 1917     heparin 25,000 units in dextrose 5% 250 mL (100 units/mL) infusion HIGH INTENSITY nomogram - [facility]  Continuous        Question:  Begin at (units/kg/hr)  Answer:  18 01/14/25 1917                    Discharge Planning   MAHIN: 1/17/2025     Code Status: Full Code   Medical Readiness for Discharge Date:   Discharge Plan A: Skilled Nursing Facility                Please place Justification for DME        Mike Sanchez MD  Department of Hospital Medicine   [facility]

## 2025-01-19 LAB
ANION GAP SERPL CALC-SCNC: 8 MMOL/L (ref 8–16)
APTT PPP: 51.7 SEC (ref 21–32)
APTT PPP: 65.7 SEC (ref 21–32)
BASOPHILS # BLD AUTO: 0.02 K/UL (ref 0–0.2)
BASOPHILS NFR BLD: 0.3 % (ref 0–1.9)
BUN SERPL-MCNC: 5 MG/DL (ref 8–23)
CALCIUM SERPL-MCNC: 8.2 MG/DL (ref 8.7–10.5)
CHLORIDE SERPL-SCNC: 111 MMOL/L (ref 95–110)
CO2 SERPL-SCNC: 19 MMOL/L (ref 23–29)
CREAT SERPL-MCNC: 0.7 MG/DL (ref 0.5–1.4)
DIFFERENTIAL METHOD BLD: ABNORMAL
EOSINOPHIL # BLD AUTO: 0.1 K/UL (ref 0–0.5)
EOSINOPHIL NFR BLD: 2 % (ref 0–8)
ERYTHROCYTE [DISTWIDTH] IN BLOOD BY AUTOMATED COUNT: 17.5 % (ref 11.5–14.5)
EST. GFR  (NO RACE VARIABLE): >60 ML/MIN/1.73 M^2
GLUCOSE SERPL-MCNC: 124 MG/DL (ref 70–110)
HCT VFR BLD AUTO: 28.6 % (ref 37–48.5)
HGB BLD-MCNC: 9.4 G/DL (ref 12–16)
IMM GRANULOCYTES # BLD AUTO: 0.05 K/UL (ref 0–0.04)
IMM GRANULOCYTES NFR BLD AUTO: 0.7 % (ref 0–0.5)
LYMPHOCYTES # BLD AUTO: 1.5 K/UL (ref 1–4.8)
LYMPHOCYTES NFR BLD: 20.9 % (ref 18–48)
MCH RBC QN AUTO: 32.4 PG (ref 27–31)
MCHC RBC AUTO-ENTMCNC: 32.9 G/DL (ref 32–36)
MCV RBC AUTO: 99 FL (ref 82–98)
MONOCYTES # BLD AUTO: 0.5 K/UL (ref 0.3–1)
MONOCYTES NFR BLD: 6.9 % (ref 4–15)
NEUTROPHILS # BLD AUTO: 4.8 K/UL (ref 1.8–7.7)
NEUTROPHILS NFR BLD: 69.2 % (ref 38–73)
NRBC BLD-RTO: 0 /100 WBC
PLATELET # BLD AUTO: 253 K/UL (ref 150–450)
PMV BLD AUTO: 10.1 FL (ref 9.2–12.9)
POCT GLUCOSE: 101 MG/DL (ref 70–110)
POCT GLUCOSE: 111 MG/DL (ref 70–110)
POCT GLUCOSE: 124 MG/DL (ref 70–110)
POCT GLUCOSE: 132 MG/DL (ref 70–110)
POCT GLUCOSE: 98 MG/DL (ref 70–110)
POTASSIUM SERPL-SCNC: 3.4 MMOL/L (ref 3.5–5.1)
RBC # BLD AUTO: 2.9 M/UL (ref 4–5.4)
SODIUM SERPL-SCNC: 138 MMOL/L (ref 136–145)
WBC # BLD AUTO: 6.95 K/UL (ref 3.9–12.7)

## 2025-01-19 PROCEDURE — 25000003 PHARM REV CODE 250: Performed by: NURSE PRACTITIONER

## 2025-01-19 PROCEDURE — 85730 THROMBOPLASTIN TIME PARTIAL: CPT | Performed by: HOSPITALIST

## 2025-01-19 PROCEDURE — 97535 SELF CARE MNGMENT TRAINING: CPT

## 2025-01-19 PROCEDURE — S4991 NICOTINE PATCH NONLEGEND: HCPCS | Performed by: NURSE PRACTITIONER

## 2025-01-19 PROCEDURE — 21400001 HC TELEMETRY ROOM

## 2025-01-19 PROCEDURE — 80048 BASIC METABOLIC PNL TOTAL CA: CPT | Performed by: HOSPITALIST

## 2025-01-19 PROCEDURE — 27000207 HC ISOLATION

## 2025-01-19 PROCEDURE — 25000003 PHARM REV CODE 250: Performed by: INTERNAL MEDICINE

## 2025-01-19 PROCEDURE — 25500020 PHARM REV CODE 255: Performed by: HOSPITALIST

## 2025-01-19 PROCEDURE — 63600175 PHARM REV CODE 636 W HCPCS: Performed by: INTERNAL MEDICINE

## 2025-01-19 PROCEDURE — 63600175 PHARM REV CODE 636 W HCPCS: Performed by: HOSPITALIST

## 2025-01-19 PROCEDURE — 85025 COMPLETE CBC W/AUTO DIFF WBC: CPT | Performed by: EMERGENCY MEDICINE

## 2025-01-19 PROCEDURE — 25000003 PHARM REV CODE 250: Performed by: HOSPITALIST

## 2025-01-19 PROCEDURE — 97530 THERAPEUTIC ACTIVITIES: CPT

## 2025-01-19 PROCEDURE — A9585 GADOBUTROL INJECTION: HCPCS | Performed by: HOSPITALIST

## 2025-01-19 RX ORDER — GADOBUTROL 604.72 MG/ML
7 INJECTION INTRAVENOUS
Status: COMPLETED | OUTPATIENT
Start: 2025-01-19 | End: 2025-01-19

## 2025-01-19 RX ADMIN — MORPHINE SULFATE 1 MG: 2 INJECTION, SOLUTION INTRAMUSCULAR; INTRAVENOUS at 08:01

## 2025-01-19 RX ADMIN — VASOPRESSIN: 20 INJECTION, SOLUTION INTRAVENOUS at 11:01

## 2025-01-19 RX ADMIN — MORPHINE SULFATE 1 MG: 2 INJECTION, SOLUTION INTRAMUSCULAR; INTRAVENOUS at 03:01

## 2025-01-19 RX ADMIN — NITROFURANTOIN MONOHYDRATE/MACROCRYSTALS 100 MG: 25; 75 CAPSULE ORAL at 08:01

## 2025-01-19 RX ADMIN — GADOBUTROL 7 ML: 604.72 INJECTION INTRAVENOUS at 03:01

## 2025-01-19 RX ADMIN — MORPHINE SULFATE 1 MG: 2 INJECTION, SOLUTION INTRAMUSCULAR; INTRAVENOUS at 12:01

## 2025-01-19 RX ADMIN — LEVOTHYROXINE SODIUM 75 MCG: 25 TABLET ORAL at 05:01

## 2025-01-19 RX ADMIN — PIPERACILLIN SODIUM AND TAZOBACTAM SODIUM 4.5 G: 4; .5 INJECTION, POWDER, LYOPHILIZED, FOR SOLUTION INTRAVENOUS at 05:01

## 2025-01-19 RX ADMIN — ATORVASTATIN CALCIUM 40 MG: 40 TABLET, FILM COATED ORAL at 08:01

## 2025-01-19 RX ADMIN — MORPHINE SULFATE 1 MG: 2 INJECTION, SOLUTION INTRAMUSCULAR; INTRAVENOUS at 04:01

## 2025-01-19 RX ADMIN — NICOTINE 1 PATCH: 21 PATCH, EXTENDED RELEASE TRANSDERMAL at 08:01

## 2025-01-19 RX ADMIN — PANTOPRAZOLE SODIUM 40 MG: 40 TABLET, DELAYED RELEASE ORAL at 08:01

## 2025-01-19 RX ADMIN — PIPERACILLIN SODIUM AND TAZOBACTAM SODIUM 4.5 G: 4; .5 INJECTION, POWDER, LYOPHILIZED, FOR SOLUTION INTRAVENOUS at 12:01

## 2025-01-19 RX ADMIN — PIPERACILLIN SODIUM AND TAZOBACTAM SODIUM 4.5 G: 4; .5 INJECTION, POWDER, LYOPHILIZED, FOR SOLUTION INTRAVENOUS at 08:01

## 2025-01-19 RX ADMIN — MUPIROCIN: 20 OINTMENT TOPICAL at 08:01

## 2025-01-19 NOTE — PT/OT/SLP PROGRESS
Physical Therapy      Patient Name:  Thom Krishna   MRN:  [id number]    Patient not seen today secondary to Testing/imaging (xray/CT/MRI). Will follow-up in AM..

## 2025-01-19 NOTE — PLAN OF CARE
Problem: Adult Inpatient Plan of Care  Goal: Plan of Care Review  Outcome: Progressing  Goal: Absence of Hospital-Acquired Illness or Injury  Outcome: Progressing  Goal: Optimal Comfort and Wellbeing  Outcome: Progressing     Problem: Fall Injury Risk  Goal: Absence of Fall and Fall-Related Injury  Outcome: Progressing     Problem: VTE (Venous Thromboembolism)  Goal: Tissue Perfusion  Outcome: Progressing     Problem: Comorbidity Management  Goal: Maintenance of Behavioral Health Symptom Control  Outcome: Progressing     Problem: Infection  Goal: Absence of Infection Signs and Symptoms  Outcome: Progressing     Problem: Skin Injury Risk Increased  Goal: Skin Health and Integrity  Outcome: Progressing     Problem: Pain Acute  Goal: Optimal Pain Control and Function  Outcome: Progressing     Problem: Confusion Acute  Goal: Optimal Cognitive Function  Outcome: Progressing     Problem: UTI (Urinary Tract Infection)  Goal: Improved Infection Symptoms  Outcome: Progressing     Problem: Constipation  Goal: Effective Bowel Elimination  Outcome: Progressing     Problem: Diabetes Comorbidity  Goal: Blood Glucose Level Within Targeted Range  Outcome: Progressing     Pt AAOx4. PRN morphine given for c/o pain. No c/o N/V. Medications administered per MAR. AMBER PICC CDI. D10 and 0.45%NS infusing @ 100cc/hr. Heparin drip infusing @ 8 units/kg. aPTT monitored, therapeutic x2 this shift. Cardiac monitoring maintained. NPO since MN. Blood glucose monitored. RLQ DEBBIE drain to bulb suction with scant amount of brown drainage. Turned q2 with pillow support. Bed alarm set, side rails raised, and call light in reach.

## 2025-01-19 NOTE — PROGRESS NOTES
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 She  presented to the ED with complaint of abdominal pain.  Diagnosis with colon cancer in Nov 2024 and underwent colon resection, however developed intra abdominal infection requiring operative intervention. She was admitted to SNF facility and underwent treatment with IV antibiotic. She continue to experiencing symptoms of abdominal pain, increase confusion. CT of abdominal showed  Operative change of partial colon resection and ileal colic anastomosis with suspected developing small bowel obstruction and transition point seen at the ileocolic anastomosis. Rim enhancing gas and fluid collection at the lateral right abdominal wall concerning for abscess. General Surgery consulted, with plan IR consultation for consideration of aspiration vs drain placement into her anterior abdominal wall collection. Lt leg verous ultrasound showed,.left lower extremity DVT in the common femoral vein. Patient being admitted under the care of Hospital Medicine.              Overview/Hospital Course:  No notes on file    Interval History:   NAEON  Patient reports persistent abdominal pain, relieved by pain meds  Reports passing gas and having Bms  No f/c/n/v  No other complaints    Review of Systems  Objective:     Vital Signs (Most Recent):  Temp: 98.8 °F (37.1 °C) (01/19/25 1123)  Pulse: 68 (01/19/25 1123)  Resp: 18 (01/19/25 1123)  BP: (!) 124/59 (01/19/25 1123)  SpO2: 99 % (01/19/25 1123) Vital Signs (24h Range):  Temp:  [97.3 °F (36.3  °C)-98.8 °F (37.1 °C)] 98.8 °F (37.1 °C)  Pulse:  [53-77] 68  Resp:  [16-20] 18  SpO2:  [92 %-99 %] 99 %  BP: (116-143)/(54-60) 124/59     Weight: 68.5 kg (151 lb 0.2 oz)  Body mass index is 27.62 kg/m².    Intake/Output Summary (Last 24 hours) at 1/19/2025 1153  Last data filed at 1/19/2025 0553  Gross per 24 hour   Intake 139.26 ml   Output 2327.5 ml   Net -2188.24 ml         Physical Exam  Constitutional:       General: She is not in acute distress.     Appearance: She is ill-appearing. She is not toxic-appearing.   HENT:      Mouth/Throat:      Mouth: Mucous membranes are moist.      Pharynx: Oropharynx is clear.   Eyes:      Extraocular Movements: Extraocular movements intact.      Conjunctiva/sclera: Conjunctivae normal.   Cardiovascular:      Rate and Rhythm: Normal rate and regular rhythm.   Pulmonary:      Effort: Pulmonary effort is normal. No respiratory distress.      Breath sounds: Normal breath sounds.   Abdominal:      General: Bowel sounds are normal. There is no distension.      Palpations: Abdomen is soft.      Tenderness: There is abdominal tenderness. There is no guarding.      Comments: R side drain in place, dark brown output   Musculoskeletal:         General: Normal range of motion.      Right lower leg: No edema.      Left lower leg: No edema.   Skin:     General: Skin is warm and dry.   Neurological:      General: No focal deficit present.      Mental Status: She is alert and oriented to person, place, and time.   Psychiatric:         Mood and Affect: Mood normal.         Behavior: Behavior normal.             Significant Labs: All pertinent labs within the past 24 hours have been reviewed.    Significant Imaging: I have reviewed all pertinent imaging results/findings within the past 24 hours.    Assessment and Plan     * Epigastric pain  DDx:  Bowel obstruction ruled out: CT shows distended small bowel proximal to anastomosis, though Gastrografin has passed  Anastomotic stricture (ruled  out)  Infectious process: Due to anterior abdominal wall abscess vs acute UTI. Possible pneumonia based on CT chest.     Dx:  IR drain placed 1/15. 20cc of bloody purulent fluid.   Abscess cultures - KLEBSIELLA OXYTOCA   Colonoscopy on 1/17: Patent end-to-side ileocolonic anastomosis with purulent drainage likely from abscess; anastomosis intact; ileum normal; no specimens collected.  CT chest abdomen pelvis: Patchy GGOs in lungs c/f asp or PNA; biliary duct dilation post-chad, no mass, patulous esoph w/air-fluid level c/f dysmotility/reflux/achalasia; 10.5 cm R abd wall collection, decreased  Follow up MRCP    Tx:  GI, ID, and Gen Surg consult  IV cipro escalated to Zosyn due to low grade fever, tachycardia  Cultures to guide therapy  Monitor output from IR drain - leave drain in lace until less than 10cc of fluid is aspirated daily for 2 days and CT imaging demonstrates no further evidence of collection.       Hypoglycemia  Resolved    Tx:  Continue D10 1/2NS while NPO      PICC (peripherally inserted central catheter) in place  PICC placed at OSH on 11/3 due to difficulty with access    Tx:  PICC replaced on 1/18 this admission due to functional issues      Acute UTI  Dx:  Urine culture ENTEROCOCCUS FAECIUM     Tx:  Start Macrobid    Acute DVT (deep venous thrombosis)  Likely due to recent hospitalization and sedentary risk factor    Left thigh veins: Occlusive thrombus in the common femoral    Tx:  Heparin drip   Consult cardiovascular   Transition to [facility] at discharge        Tobacco dependency  Dangers of cigarette smoking were reviewed with patient in detail. Patient was Counseled for 3-10 minutes. Nicotine replacement options were discussed. Nicotine replacement was discussed-       Carotid arterial disease        Moderate malnutrition  Nutrition consulted. Most recent weight and BMI monitored-     Measurements:  Wt Readings from Last 1 Encounters:   01/17/25 68.5 kg (151 lb 0.2 oz)   Body mass index is 27.62  kg/m².    Patient has been screened and assessed by RD.    Malnutrition Type:  Context: chronic illness  Level: moderate    Malnutrition Characteristic Summary:  Energy Intake (Malnutrition): less than or equal to 75% for greater than or equal to 1 month    Interventions/Recommendations (treatment strategy):  1. General Healthful diet  2. Commercial beverage medical food supplement therapy      Atherosclerosis of aorta  Continue Statin       Type 2 diabetes mellitus with hypertriglyceridemia  Accu checks every 6 hours with SSI       GERD (gastroesophageal reflux disease)  Continue PPI       Hypothyroidism    Continue Synthroid       VTE Risk Mitigation (From admission, onward)           Ordered     heparin 25,000 units in dextrose 5% (100 units/ml) IV bolus from bag HIGH INTENSITY nomogram - [facility]  As needed (PRN)        Question:  Heparin Infusion Adjustment (DO NOT MODIFY ANSWER)  Answer:  \\[email or web address]\epic\Images\Pharmacy\HeparinInfusions\heparin HIGH INTENSITY nomogram for [facility] [id number]    01/14/25 1917     heparin 25,000 units in dextrose 5% (100 units/ml) IV bolus from bag HIGH INTENSITY nomogram - [facility]  As needed (PRN)        Question:  Heparin Infusion Adjustment (DO NOT MODIFY ANSWER)  Answer:  [email or web address]\Images\Pharmacy\HeparinInfusions\heparin HIGH INTENSITY nomogram for [facility] [id number]    01/14/25 1917     heparin 25,000 units in dextrose 5% 250 mL (100 units/mL) infusion HIGH INTENSITY nomogram - [facility]  Continuous        Question:  Begin at (units/kg/hr)  Answer:  18 01/14/25 1917                    Discharge Planning   MAHIN: 1/17/2025     Code Status: Full Code   Medical Readiness for Discharge Date:   Discharge Plan A: Skilled Nursing Facility                Please place Justification for DME        Mike Sanchez MD  Department of Hospital Medicine   [place] [facility]

## 2025-01-19 NOTE — ASSESSMENT & PLAN NOTE
Likely due to recent hospitalization and sedentary risk factor    Left thigh veins: Occlusive thrombus in the common femoral    Tx:  Heparin drip   Consult cardiovascular   Transition to [facility] at discharge

## 2025-01-19 NOTE — PROGRESS NOTES
[facility] Progress Note     Primary Team: Dr. Sanchez  Consultant Attending: Dr. Rg   Date of Admit: 1/14/2025    Reason for Consult     Enterococcus UTI and abdominal wall abscess      Assessment/Plan     Thom Krishna is a 69 y.o. female with:     Right abdominal wall abscess s/p IR drain 1/17  - s/p partial colectomy w/ileocolonic anastomosis 11/1 c/b abdominal wall abscess s/p IR aspiration 12/27; unclear about micro or abx patient was previously on.   - Colonoscopy showed purulent drainage from the anastomotic site   - Rpt CT 1/18 biliary duct dilatation, abscess decreased in size since 1/14; no note of fistula but high concern for one   - Abscess: Gram stain: GNR; aerobic Klebsiella oxytoca; Anaerobic and fungal pending   - Started on ciprofloxacin 1/15-1/17, now on zosyn 1/17-present   - Continue zosyn for now   - High concern for fistula; appreciate surgical input regarding further diagnostic studies    VRE (Enterococcus Faecium) UTI  - Complaining of dysuria, cloudy urine for several days. Afebrile, no leukocytosis.   - 1/14 UA cloudy, 3+ leukocytes, 1+ blood, >100 WBCs/RBCs, bacteria; UCx + VRE faceium  - Continue macrobid for total of 5 days of therapy      Thank you for this consult. Case to be discussed with the attending physician - attestation to follow.     Cipriano Yates MD   [facility], PGY-2  [facility]     History of Present Illness     Thom Krishna is a 69 y.o. female with past medical history of hepatitis C (s/p tx per family), degenerative disc disease, DM type 2, cirrhosis of the liver, emphysema, CKD3, hypothyroid and newly diagnosed colon CA in 2024 s/p partial colectomy w/ileocolonic anastomosis 11/1 c/b abdominal wall abscess s/p IR aspiration 12/27 and subsequently discharged to SNF with IV abx for 14 days. Unable to find details of which Abx patient was on, microbiology of abscess or additional information as it was at another not associated facility  and patient was in the process of re-establishing care with different colorectal surgeon.      Patient was discharged from facility and the following day presented to ED for ongoing diffuse abdominal pain and nausea for a few days and worsening of LLE swelling most notable at the thigh that had been ongoing for a few days including at SNF. Patient did also endorse significant dysuria, as well as ongoing large amounts of bowel incontinence with frequent softer stools daily. No CP, SOB, fever, chills, vomiting.     Interval History     NAEON, afebrile. Patient reports ongoing abdominal pain, dysuria, and anal chafing from multiple bowel movements. Able to eat a small amount.     Objective:   BP  Min: 105/48  Max: 143/60  Temp  Av.7 °F (36.5 °C)  Min: 97.3 °F (36.3 °C)  Max: 98.2 °F (36.8 °C)  Pulse  Av.5  Min: 53  Max: 71  Resp  Av.8  Min: 16  Max: 20  SpO2  Av.3 %  Min: 92 %  Max: 94 %      Physical Examination   Constitutional:       General: She is not in acute distress.  HENT:      Head: Normocephalic and atraumatic.      Nose: Nose normal.      Mouth/Throat:      Pharynx: Oropharynx is clear.   Eyes:      Extraocular Movements: Extraocular movements intact.   Cardiovascular:      Rate and Rhythm: Normal rate and regular rhythm.      Pulses: Normal pulses.      Heart sounds: Normal heart sounds.   Pulmonary:      Effort: No respiratory distress.      Breath sounds: Normal breath sounds.   Abdominal:      General: Bowel sounds are normal.      Palpations: Abdomen is soft.      Tenderness: There is abdominal tenderness.      Comments: Diffusely tender, right>left side   Musculoskeletal:      Cervical back: Normal range of motion.      Comments: LLE swelling, L ankle swelling. No notable discoloration. Gross ROM intact.    Skin:     Capillary Refill: Capillary refill takes less than 2 seconds.   Neurological:      General: No focal deficit present.      Mental Status: She is alert.   Psychiatric:          Mood and Affect: Mood normal.       Laboratory Data Reviewed:  Recent Labs   Lab 01/14/25  1504 01/14/25  1956 01/17/25  0622 01/18/25  0554 01/19/25  0529   WBC 9.99   < > 11.30 10.80 6.95   HGB 11.8*   < > 10.9* 10.0* 9.4*   HCT 36.3*   < > 32.7* 30.0* 28.6*      < > 331 266 253      < > 138 136 138   K 4.6   < > 3.4* 3.6 3.4*      < > 107 107 111*   CREATININE 0.8   < > 0.6 0.7 0.7   BUN 33*   < > 6* 4* 5*   CO2 21*   < > 20* 20* 19*   ALT 32  --   --   --   --    AST 22  --   --   --   --     < > = values in this interval not displayed.         Microbiology Data Reviewed:  Microbiology Results (last 7 days)       Procedure Component Value Units Date/Time    Blood culture [[id number]] Collected: 01/14/25 1853    Order Status: Completed Specimen: Blood from Peripheral, Forearm, Left Updated: 01/19/25 0612     Blood Culture, Routine No growth to date      No Growth to date      No Growth to date      No Growth to date    Blood culture [[id number]] Collected: 01/14/25 1818    Order Status: Completed Specimen: Blood from Peripheral, Antecubital, Right Updated: 01/19/25 0612     Blood Culture, Routine No growth to date      No Growth to date      No Growth to date      No Growth to date    Aerobic culture [[id number]]  (Abnormal)  (Susceptibility) Collected: 01/15/25 1648    Order Status: Completed Specimen: Abscess from Abdomen Updated: 01/18/25 0710     Aerobic Bacterial Culture KLEBSIELLA OXYTOCA  Moderate  Skin rivera also present      Urine culture [[id number]]  (Abnormal)  (Susceptibility) Collected: 01/14/25 1716    Order Status: Completed Specimen: Urine Updated: 01/17/25 1847     Urine Culture, Routine ENTEROCOCCUS FAECIUM VRE  >100,000 cfu/ml      Narrative:      Specimen Source->Urine    Culture, Anaerobic [[id number]] Collected: 01/15/25 1648    Order Status: Completed Specimen: Abscess from Abdomen Updated: 01/17/25 1014     Anaerobic Culture Culture in progress    Gram stain  [[id number]] Collected: 01/15/25 1648    Order Status: Completed Specimen: Abscess from Abdomen Updated: 01/15/25 2229     Gram Stain Result Many WBC's      Few Gram negative rods    Fungus culture [[id number]] Collected: 01/15/25 1648    Order Status: Sent Specimen: Abscess from Abdomen Updated: 01/15/25 1943              Current Medications   atorvastatin  40 mg Oral Daily    levothyroxine  75 mcg Oral Before breakfast    mupirocin   Nasal BID    nicotine  1 patch Transdermal Daily    nitrofurantoin (macrocrystal-monohydrate)  100 mg Oral Q12H    pantoprazole  40 mg Oral Daily    piperacillin-tazobactam (Zosyn) IV (PEDS and ADULTS) (extended infusion is not appropriate)  4.5 g Intravenous Q8H

## 2025-01-19 NOTE — PLAN OF CARE
Assumed care of patient, patient in no apparent distress on room air. Patient had c/o pain throughout shift, PRN medication given. Medication given via MAR. Telemetry monitoring continued. Bed alarm set, bed in lowest position, call light within reach, safety precautions maintained.    Problem: Adult Inpatient Plan of Care  Goal: Plan of Care Review  Outcome: Progressing  Goal: Patient-Specific Goal (Individualized)  Outcome: Progressing  Goal: Absence of Hospital-Acquired Illness or Injury  Outcome: Progressing  Goal: Optimal Comfort and Wellbeing  Outcome: Progressing  Goal: Readiness for Transition of Care  Outcome: Progressing     Problem: Fall Injury Risk  Goal: Absence of Fall and Fall-Related Injury  Outcome: Progressing     Problem: VTE (Venous Thromboembolism)  Goal: Tissue Perfusion  Outcome: Progressing  Goal: Right Ventricular Function  Outcome: Progressing     Problem: Comorbidity Management  Goal: Maintenance of Behavioral Health Symptom Control  Outcome: Progressing     Problem: Infection  Goal: Absence of Infection Signs and Symptoms  Outcome: Progressing     Problem: Skin Injury Risk Increased  Goal: Skin Health and Integrity  Outcome: Progressing     Problem: Pain Acute  Goal: Optimal Pain Control and Function  Outcome: Progressing     Problem: Activity Intolerance  Goal: Enhanced Capacity and Energy  Outcome: Progressing     Problem: Mobility Impairment  Goal: Optimal Mobility  Outcome: Progressing     Problem: Confusion Acute  Goal: Optimal Cognitive Function  Outcome: Progressing     Problem: Sepsis/Septic Shock  Goal: Optimal Coping  Outcome: Progressing  Goal: Absence of Bleeding  Outcome: Progressing  Goal: Blood Glucose Level Within Targeted Range  Outcome: Progressing  Goal: Absence of Infection Signs and Symptoms  Outcome: Progressing  Goal: Optimal Nutrition Intake  Outcome: Progressing     Problem: UTI (Urinary Tract Infection)  Goal: Improved Infection Symptoms  Outcome: Progressing      Problem: Constipation  Goal: Effective Bowel Elimination  Outcome: Progressing     Problem: Wound  Goal: Optimal Coping  Outcome: Progressing  Goal: Optimal Functional Ability  Outcome: Progressing  Goal: Absence of Infection Signs and Symptoms  Outcome: Progressing  Goal: Improved Oral Intake  Outcome: Progressing  Goal: Optimal Pain Control and Function  Outcome: Progressing  Goal: Skin Health and Integrity  Outcome: Progressing  Goal: Optimal Wound Healing  Outcome: Progressing     Problem: Diabetes Comorbidity  Goal: Blood Glucose Level Within Targeted Range  Outcome: Progressing     Problem: Pneumonia  Goal: Fluid Balance  Outcome: Progressing  Goal: Resolution of Infection Signs and Symptoms  Outcome: Progressing  Goal: Effective Oxygenation and Ventilation  Outcome: Progressing

## 2025-01-19 NOTE — SUBJECTIVE & OBJECTIVE
Interval History:   ARCENIO  Patient reports persistent abdominal pain, relieved by pain meds  Reports passing gas and having Bms  No f/c/n/v  No other complaints    Review of Systems  Objective:     Vital Signs (Most Recent):  Temp: 98.8 °F (37.1 °C) (01/19/25 1123)  Pulse: 68 (01/19/25 1123)  Resp: 18 (01/19/25 1123)  BP: (!) 124/59 (01/19/25 1123)  SpO2: 99 % (01/19/25 1123) Vital Signs (24h Range):  Temp:  [97.3 °F (36.3 °C)-98.8 °F (37.1 °C)] 98.8 °F (37.1 °C)  Pulse:  [53-77] 68  Resp:  [16-20] 18  SpO2:  [92 %-99 %] 99 %  BP: (116-143)/(54-60) 124/59     Weight: 68.5 kg (151 lb 0.2 oz)  Body mass index is 27.62 kg/m².    Intake/Output Summary (Last 24 hours) at 1/19/2025 1153  Last data filed at 1/19/2025 0553  Gross per 24 hour   Intake 139.26 ml   Output 2327.5 ml   Net -2188.24 ml         Physical Exam  Constitutional:       General: She is not in acute distress.     Appearance: She is ill-appearing. She is not toxic-appearing.   HENT:      Mouth/Throat:      Mouth: Mucous membranes are moist.      Pharynx: Oropharynx is clear.   Eyes:      Extraocular Movements: Extraocular movements intact.      Conjunctiva/sclera: Conjunctivae normal.   Cardiovascular:      Rate and Rhythm: Normal rate and regular rhythm.   Pulmonary:      Effort: Pulmonary effort is normal. No respiratory distress.      Breath sounds: Normal breath sounds.   Abdominal:      General: Bowel sounds are normal. There is no distension.      Palpations: Abdomen is soft.      Tenderness: There is abdominal tenderness. There is no guarding.      Comments: R side drain in place, dark brown output   Musculoskeletal:         General: Normal range of motion.      Right lower leg: No edema.      Left lower leg: No edema.   Skin:     General: Skin is warm and dry.   Neurological:      General: No focal deficit present.      Mental Status: She is alert and oriented to person, place, and time.   Psychiatric:         Mood and Affect: Mood normal.          Behavior: Behavior normal.             Significant Labs: All pertinent labs within the past 24 hours have been reviewed.    Significant Imaging: I have reviewed all pertinent imaging results/findings within the past 24 hours.

## 2025-01-20 LAB
ANION GAP SERPL CALC-SCNC: 11 MMOL/L (ref 8–16)
APTT PPP: 41 SEC (ref 21–32)
BACTERIA BLD CULT: NORMAL
BACTERIA BLD CULT: NORMAL
BACTERIA SPEC ANAEROBE CULT: NORMAL
BASOPHILS # BLD AUTO: 0.02 K/UL (ref 0–0.2)
BASOPHILS NFR BLD: 0.3 % (ref 0–1.9)
BUN SERPL-MCNC: 4 MG/DL (ref 8–23)
CALCIUM SERPL-MCNC: 8.5 MG/DL (ref 8.7–10.5)
CHLORIDE SERPL-SCNC: 107 MMOL/L (ref 95–110)
CO2 SERPL-SCNC: 18 MMOL/L (ref 23–29)
CREAT SERPL-MCNC: 0.7 MG/DL (ref 0.5–1.4)
DIFFERENTIAL METHOD BLD: ABNORMAL
EOSINOPHIL # BLD AUTO: 0.1 K/UL (ref 0–0.5)
EOSINOPHIL NFR BLD: 1.2 % (ref 0–8)
ERYTHROCYTE [DISTWIDTH] IN BLOOD BY AUTOMATED COUNT: 17.2 % (ref 11.5–14.5)
EST. GFR  (NO RACE VARIABLE): >60 ML/MIN/1.73 M^2
GLUCOSE SERPL-MCNC: 130 MG/DL (ref 70–110)
HCT VFR BLD AUTO: 30.6 % (ref 37–48.5)
HGB BLD-MCNC: 10.3 G/DL (ref 12–16)
IMM GRANULOCYTES # BLD AUTO: 0.05 K/UL (ref 0–0.04)
IMM GRANULOCYTES NFR BLD AUTO: 0.6 % (ref 0–0.5)
LYMPHOCYTES # BLD AUTO: 1.7 K/UL (ref 1–4.8)
LYMPHOCYTES NFR BLD: 22.6 % (ref 18–48)
MCH RBC QN AUTO: 32.4 PG (ref 27–31)
MCHC RBC AUTO-ENTMCNC: 33.7 G/DL (ref 32–36)
MCV RBC AUTO: 96 FL (ref 82–98)
MONOCYTES # BLD AUTO: 0.5 K/UL (ref 0.3–1)
MONOCYTES NFR BLD: 7 % (ref 4–15)
NEUTROPHILS # BLD AUTO: 5.3 K/UL (ref 1.8–7.7)
NEUTROPHILS NFR BLD: 68.3 % (ref 38–73)
NRBC BLD-RTO: 0 /100 WBC
PLATELET # BLD AUTO: 267 K/UL (ref 150–450)
PMV BLD AUTO: 9.9 FL (ref 9.2–12.9)
POCT GLUCOSE: 133 MG/DL (ref 70–110)
POCT GLUCOSE: 81 MG/DL (ref 70–110)
POTASSIUM SERPL-SCNC: 3.1 MMOL/L (ref 3.5–5.1)
RBC # BLD AUTO: 3.18 M/UL (ref 4–5.4)
SODIUM SERPL-SCNC: 136 MMOL/L (ref 136–145)
WBC # BLD AUTO: 7.71 K/UL (ref 3.9–12.7)

## 2025-01-20 PROCEDURE — 25000003 PHARM REV CODE 250: Performed by: NURSE PRACTITIONER

## 2025-01-20 PROCEDURE — 63600175 PHARM REV CODE 636 W HCPCS: Performed by: HOSPITALIST

## 2025-01-20 PROCEDURE — 99232 SBSQ HOSP IP/OBS MODERATE 35: CPT | Mod: ,,, | Performed by: INTERNAL MEDICINE

## 2025-01-20 PROCEDURE — 21400001 HC TELEMETRY ROOM

## 2025-01-20 PROCEDURE — 97530 THERAPEUTIC ACTIVITIES: CPT | Mod: CQ

## 2025-01-20 PROCEDURE — 85025 COMPLETE CBC W/AUTO DIFF WBC: CPT | Performed by: EMERGENCY MEDICINE

## 2025-01-20 PROCEDURE — 25000003 PHARM REV CODE 250: Performed by: INTERNAL MEDICINE

## 2025-01-20 PROCEDURE — 85730 THROMBOPLASTIN TIME PARTIAL: CPT | Performed by: HOSPITALIST

## 2025-01-20 PROCEDURE — S4991 NICOTINE PATCH NONLEGEND: HCPCS | Performed by: NURSE PRACTITIONER

## 2025-01-20 PROCEDURE — 97535 SELF CARE MNGMENT TRAINING: CPT | Mod: CO

## 2025-01-20 PROCEDURE — 27000207 HC ISOLATION

## 2025-01-20 PROCEDURE — 63600175 PHARM REV CODE 636 W HCPCS: Performed by: INTERNAL MEDICINE

## 2025-01-20 PROCEDURE — 63600175 PHARM REV CODE 636 W HCPCS: Performed by: EMERGENCY MEDICINE

## 2025-01-20 PROCEDURE — 25000003 PHARM REV CODE 250: Performed by: HOSPITALIST

## 2025-01-20 PROCEDURE — 80048 BASIC METABOLIC PNL TOTAL CA: CPT | Performed by: HOSPITALIST

## 2025-01-20 PROCEDURE — 97530 THERAPEUTIC ACTIVITIES: CPT | Mod: CO

## 2025-01-20 RX ORDER — POTASSIUM CHLORIDE 20 MEQ/1
40 TABLET, EXTENDED RELEASE ORAL ONCE
Status: COMPLETED | OUTPATIENT
Start: 2025-01-20 | End: 2025-01-20

## 2025-01-20 RX ADMIN — NITROFURANTOIN MONOHYDRATE/MACROCRYSTALS 100 MG: 25; 75 CAPSULE ORAL at 09:01

## 2025-01-20 RX ADMIN — PIPERACILLIN SODIUM AND TAZOBACTAM SODIUM 4.5 G: 4; .5 INJECTION, POWDER, LYOPHILIZED, FOR SOLUTION INTRAVENOUS at 09:01

## 2025-01-20 RX ADMIN — MORPHINE SULFATE 1 MG: 2 INJECTION, SOLUTION INTRAMUSCULAR; INTRAVENOUS at 06:01

## 2025-01-20 RX ADMIN — MORPHINE SULFATE 1 MG: 2 INJECTION, SOLUTION INTRAMUSCULAR; INTRAVENOUS at 09:01

## 2025-01-20 RX ADMIN — MORPHINE SULFATE 1 MG: 2 INJECTION, SOLUTION INTRAMUSCULAR; INTRAVENOUS at 10:01

## 2025-01-20 RX ADMIN — LEVOTHYROXINE SODIUM 75 MCG: 25 TABLET ORAL at 05:01

## 2025-01-20 RX ADMIN — ATORVASTATIN CALCIUM 40 MG: 40 TABLET, FILM COATED ORAL at 09:01

## 2025-01-20 RX ADMIN — VASOPRESSIN: 20 INJECTION, SOLUTION INTRAVENOUS at 02:01

## 2025-01-20 RX ADMIN — PIPERACILLIN SODIUM AND TAZOBACTAM SODIUM 4.5 G: 4; .5 INJECTION, POWDER, LYOPHILIZED, FOR SOLUTION INTRAVENOUS at 04:01

## 2025-01-20 RX ADMIN — MORPHINE SULFATE 1 MG: 2 INJECTION, SOLUTION INTRAMUSCULAR; INTRAVENOUS at 01:01

## 2025-01-20 RX ADMIN — HYDROCODONE BITARTRATE AND ACETAMINOPHEN 1 TABLET: 10; 325 TABLET ORAL at 04:01

## 2025-01-20 RX ADMIN — PIPERACILLIN SODIUM AND TAZOBACTAM SODIUM 4.5 G: 4; .5 INJECTION, POWDER, LYOPHILIZED, FOR SOLUTION INTRAVENOUS at 12:01

## 2025-01-20 RX ADMIN — PANTOPRAZOLE SODIUM 40 MG: 40 TABLET, DELAYED RELEASE ORAL at 09:01

## 2025-01-20 RX ADMIN — MORPHINE SULFATE 1 MG: 2 INJECTION, SOLUTION INTRAMUSCULAR; INTRAVENOUS at 02:01

## 2025-01-20 RX ADMIN — VASOPRESSIN: 20 INJECTION, SOLUTION INTRAVENOUS at 01:01

## 2025-01-20 RX ADMIN — NICOTINE 1 PATCH: 21 PATCH, EXTENDED RELEASE TRANSDERMAL at 09:01

## 2025-01-20 RX ADMIN — POTASSIUM CHLORIDE 40 MEQ: 1500 TABLET, EXTENDED RELEASE ORAL at 09:01

## 2025-01-20 RX ADMIN — HEPARIN SODIUM 8 UNITS/KG/HR: 10000 INJECTION, SOLUTION INTRAVENOUS at 09:01

## 2025-01-20 RX ADMIN — MORPHINE SULFATE 1 MG: 2 INJECTION, SOLUTION INTRAMUSCULAR; INTRAVENOUS at 05:01

## 2025-01-20 NOTE — ASSESSMENT & PLAN NOTE
DDx:  Bowel obstruction ruled out: CT shows distended small bowel proximal to anastomosis, though Gastrografin has passed  Anastomotic stricture (ruled out)  Infectious process: Due to anterior abdominal wall abscess vs acute UTI. Possible pneumonia based on CT chest.     Dx:  IR drain placed 1/15. 20cc of bloody purulent fluid.   Abscess cultures - KLEBSIELLA OXYTOCA   Colonoscopy on 1/17: Patent end-to-side ileocolonic anastomosis with purulent drainage likely from abscess; anastomosis intact; ileum normal; no specimens collected.  CT chest abdomen pelvis: Patchy GGOs in lungs c/f asp or PNA; biliary duct dilation post-chad, no mass, patulous esoph w/air-fluid level c/f dysmotility/reflux/achalasia; 10.5 cm R abd wall collection, decreased  Follow up MRCP    Tx:  GI, ID, and Gen Surg consult  IV cipro escalated to Zosyn due to low grade fever, tachycardia which is now resolved  Cultures to guide therapy  Monitor output from IR drain - scant output for the past 2 days. Follow up imaging and consider drain removal if improvement in collection

## 2025-01-20 NOTE — PT/OT/SLP PROGRESS
Occupational Therapy   Treatment    Name: Thom Krishna  MRN: [id number]  Admitting Diagnosis:  Epigastric pain  3 Days Post-Op    Recommendations:     Discharge Recommendations: Moderate Intensity Therapy  Discharge Equipment Recommendations:  to be determined by next level of care  Barriers to discharge:   (Pt requires increased level of assist)    Assessment:     Thom Krishna is a 69 y.o. female with a medical diagnosis of Epigastric pain.  Performance deficits affecting function are weakness, impaired endurance, impaired self care skills, impaired functional mobility, gait instability, impaired balance, decreased coordination, decreased upper extremity function, decreased lower extremity function, decreased ROM, impaired coordination, pain.     Rehab Prognosis:  Good; patient would benefit from acute skilled OT services to address these deficits and reach maximum level of function.       Plan:     Patient to be seen 3 x/week to address the above listed problems via self-care/home management, therapeutic activities, therapeutic exercises  Plan of Care Expires: 02/15/25  Plan of Care Reviewed with: patient    Subjective     Chief Complaint: abd pain; purewick not working  Patient/Family Comments/goals:  return to PLOF  Pain/Comfort:  Pain Rating 1: 9/10  Location 1: abdomen  Pain Addressed 1: Pre-medicate for activity, Reposition, Distraction, Cessation of Activity  Pain Rating Post-Intervention 1:  (no rating)    Objective:     Communicated with: nurseJeanine prior to session.  Patient found HOB elevated with bed alarm, telemetry, peripheral IV, PureWick, DEBBIE drain upon OT entry to room.    General Precautions: Standard, fall, diabetic, NPO    Orthopedic Precautions:N/A  Braces: N/A  Respiratory Status: Room air    Bed Mobility:    Patient completed Scooting/Bridging with minimum assistance  Patient completed Supine to Sit with minimum assistance  Patient completed Sit to Supine with minimum assistance     Functional  Mobility/Transfers:  Patient completed Sit <> Stand Transfer with minimum assistance  with  rolling walker   Able to SS to HOB /c CGA and VCs for RW mgmt/proximity    Activities of Daily Living:  Grooming: set-up; completed seated EOB (facial hygiene, oral care, hair grooming)      [facility] 6 Click ADL: 16    Treatment & Education:  Educated on purpose/role of OT  Reports NPO status, but isn't sure of reason  Bed mob as noted above  Returned to bed level and B heels floated    Patient left HOB elevated with all lines intact, call button in reach, bed alarm on, and nsg notified    GOALS:   Multidisciplinary Problems       Occupational Therapy Goals          Problem: Occupational Therapy    Goal Priority Disciplines Outcome Interventions   Occupational Therapy Goal     OT, PT/OT Progressing    Description: Goals to be met by: 02/15/2025     Patient will increase functional independence with ADLs by performing:    Toileting from bedside commode with Minimal Assistance for hygiene and clothing management.   Sitting at edge of bed x10 minutes with Supervision. --GOAL MET 1/20  Supine to sit with Supervision.  Step transfer with Stand-by Assistance  Toilet transfer to bedside commode with Stand-by Assistance.                         DME Justifications:  No DME recommended requiring DME justifications    Time Tracking:     OT Date of Treatment: 01/20/25  OT Start Time: 1426  OT Stop Time: 1446  OT Total Time (min): 20 min    Billable Minutes:Self Care/Home Management 12  Therapeutic Activity 8          Number of ANA MARIA visits since last OT visit: 0    1/20/2025

## 2025-01-20 NOTE — SUBJECTIVE & OBJECTIVE
Interval History:   No acute events. Remains afebrile and hemodynamically stable. WBC remains normal. IR drain in place with minimal output. Having bowel function. Tolerating a diet.     Medications:  Continuous Infusions:   dextrose 10% and 0.45% NaCl infusion   Intravenous Continuous 100 mL/hr at 01/20/25 0104 New Bag at 01/20/25 0104    heparin (porcine) in D5W  0-40 Units/kg/hr Intravenous Continuous 5.3 mL/hr at 01/20/25 0900 8 Units/kg/hr at 01/20/25 0900     Scheduled Meds:   atorvastatin  40 mg Oral Daily    levothyroxine  75 mcg Oral Before breakfast    nicotine  1 patch Transdermal Daily    nitrofurantoin (macrocrystal-monohydrate)  100 mg Oral Q12H    pantoprazole  40 mg Oral Daily    piperacillin-tazobactam (Zosyn) IV (PEDS and ADULTS) (extended infusion is not appropriate)  4.5 g Intravenous Q8H     PRN Meds:  Current Facility-Administered Medications:     acetaminophen, 650 mg, Oral, Q6H PRN    dextrose 50%, 12.5 g, Intravenous, PRN    dextrose 50%, 25 g, Intravenous, PRN    glucagon (human recombinant), 1 mg, Intramuscular, PRN    glucose, 16 g, Oral, PRN    glucose, 24 g, Oral, PRN    heparin (PORCINE), 60 Units/kg, Intravenous, PRN    heparin (PORCINE), 30 Units/kg, Intravenous, PRN    HYDROcodone-acetaminophen, 1 tablet, Oral, TID PRN    insulin aspart U-100, 0-5 Units, Subcutaneous, Q6H PRN    morphine, 1 mg, Intravenous, Q4H PRN    ondansetron, 8 mg, Oral, TID PRN    Flushing PICC/Midline Protocol, , , Until Discontinued **AND** sodium chloride 0.9%, 10 mL, Intravenous, Q12H PRN     Review of patient's allergies indicates:   Allergen Reactions    Cefaclor Hives    Gabapentin Swelling    Penicillins      Other reaction(s): Hives    Sulfa (sulfonamide antibiotics) Other (See Comments)     Other reaction(s): Hives     Objective:     Vital Signs (Most Recent):  Temp: 98.3 °F (36.8 °C) (01/20/25 0816)  Pulse: 76 (01/20/25 0816)  Resp: 18 (01/20/25 0948)  BP: 127/62 (01/20/25 0816)  SpO2: 100 %  (01/20/25 0816) Vital Signs (24h Range):  Temp:  [98 °F (36.7 °C)-98.6 °F (37 °C)] 98.3 °F (36.8 °C)  Pulse:  [61-94] 76  Resp:  [16-19] 18  SpO2:  [99 %-100 %] 100 %  BP: (121-164)/(61-99) 127/62     Weight: 68.5 kg (151 lb 0.2 oz)  Body mass index is 27.62 kg/m².    Intake/Output - Last 3 Shifts         01/18 0700 01/19 0659 01/19 0700 01/20 0659 01/20 0700 01/21 0659    P.O.   0    I.V. (mL/kg) 30.9 (0.5) 120.5 (1.8)     IV Piggyback 108.4 274.5     Total Intake(mL/kg) 139.3 (2) 395 (5.8) 0 (0)    Urine (mL/kg/hr) 3225 (2) 2350 (1.4) 650 (1.8)    Drains 2.5 3.5     Other   0    Stool 0 0 0    Total Output 3227.5 2353.5 650    Net -3088.2 -1958.5 -650           Urine Occurrence  1 x 1 x    Stool Occurrence  1 x 0 x             Physical Exam  Vitals reviewed.   Constitutional:       General: She is not in acute distress.     Appearance: Normal appearance. She is not toxic-appearing.   HENT:      Head: Normocephalic and atraumatic.      Nose: Nose normal.   Cardiovascular:      Rate and Rhythm: Normal rate.      Pulses: Normal pulses.   Pulmonary:      Effort: Pulmonary effort is normal. No respiratory distress.   Abdominal:      General: Abdomen is flat. There is no distension.      Palpations: Abdomen is soft.      Tenderness: There is no guarding or rebound.      Comments: Mildly tender to palpation in the right abdomen; no guarding, no rebound, non peritonitic  IR drain in place over RLQ   Skin:     General: Skin is warm.   Neurological:      General: No focal deficit present.      Mental Status: She is alert and oriented to person, place, and time.          Significant Labs:  I have reviewed all pertinent lab results within the past 24 hours.  CBC:   Recent Labs   Lab 01/20/25  0546   WBC 7.71   RBC 3.18*   HGB 10.3*   HCT 30.6*      MCV 96   MCH 32.4*   MCHC 33.7     CMP:   Recent Labs   Lab 01/14/25  1504 01/15/25  1912 01/20/25  0546   GLU 89   < > 130*   CALCIUM 8.9   < > 8.5*   ALBUMIN 2.6*  --    --    PROT 7.0  --   --       < > 136   K 4.6   < > 3.1*   CO2 21*   < > 18*      < > 107   BUN 33*   < > 4*   CREATININE 0.8   < > 0.7   ALKPHOS 76  --   --    ALT 32  --   --    AST 22  --   --    BILITOT 0.5  --   --     < > = values in this interval not displayed.       Significant Diagnostics:  I have reviewed all pertinent imaging results/findings within the past 24 hours.

## 2025-01-20 NOTE — ASSESSMENT & PLAN NOTE
Likely related to h/o R yanique. Abscess noted on CT from 1/14, IR drain placed earlier in hospitalization however pain persisted.  Colonoscopy repeated on 01/17.  Anastomosis seems intact however what appeared to be purulent drainage was noted coming from area of anastomosis.  A repeat CT scan shows persistent fluid collection.  Also CBD was noted to be dilated.  This was also seen previously, although degree was not commented on on read.  Liver testing earlier in hospitalization was not suggestive of biliary obstruction.  No CMP drawn today.  Discuss definitive management of abscess with IR/surgery as GI intervention has been exhausted   MRCP negative for choledocholithiasis  Continue antibiotics per primary team   If no intervention planned for today okay to eat from GI perspective

## 2025-01-20 NOTE — PT/OT/SLP PROGRESS
Physical Therapy Treatment    Patient Name:  Thom Krishna   MRN:  [id number]    Recommendations:     Discharge Recommendations: Moderate Intensity Therapy  Discharge Equipment Recommendations: to be determined by next level of care  Barriers to discharge: Decreased caregiver support    Assessment:     Thom Krishna is a 69 y.o. female admitted with a medical diagnosis of Epigastric pain.  She presents with the following impairments/functional limitations: weakness, impaired endurance, impaired self care skills, impaired functional mobility, gait instability, impaired balance, decreased lower extremity function, decreased coordination, decreased safety awareness, pain, impaired skin, edema ;pt with good mobility today, despite being NPO today for procedure. Pt stood at beside w/ RW and Reshma, amb'd 8 steps fw/bk w/ RW and CGA/Reshma, pt fatigues quickly.    Rehab Prognosis: Good; patient would benefit from acute skilled PT services to address these deficits and reach maximum level of function.    Recent Surgery: Procedure(s) (LRB):  COLONOSCOPY (N/A) 3 Days Post-Op    Plan:     During this hospitalization, patient to be seen 5 x/week to address the identified rehab impairments via gait training, therapeutic activities, therapeutic exercises, neuromuscular re-education and progress toward the following goals:    Plan of Care Expires:  02/15/25    Subjective     Chief Complaint: abd pain  Patient/Family Comments/goals: pt agreeable to session, just concerned about procedure/test today, doesn't know what it is, or when she's leaving.  Pain/Comfort:  Pain Rating 1:  (did not rate pain in abdomen)  Location - Orientation 1: generalized  Location 1: abdomen  Pain Addressed 1: Reposition, Distraction  Pain Rating Post-Intervention 1:  (pt appeared comfortable at rest)      Objective:     Communicated with nurse prior to session.  Patient found HOB elevated with bed alarm, telemetry, peripheral IV, PureWick upon PT entry to room.  "    General Precautions: Standard, fall, diabetic  Orthopedic Precautions: N/A  Braces: N/A  Respiratory Status: Room air     Functional Mobility:  Bed Mobility:     Rolling Right: moderate assistance  Scooting: minimum assistance and in supine, pt using LE's to assist  Supine to Sit: moderate assistance  Sit to Supine: maximal assistance and at LE's  Transfers:     Sit to Stand:  minimum assistance with rolling walker  Gait: amb'd 8 steps fw/bk w/ RW and CGA/min.A x 2 trials, seated rest b/w.      AM-PAC 6 CLICK MOBILITY  Turning over in bed (including adjusting bedclothes, sheets and blankets)?: 2  Sitting down on and standing up from a chair with arms (e.g., wheelchair, bedside commode, etc.): 3  Moving from lying on back to sitting on the side of the bed?: 2  Moving to and from a bed to a chair (including a wheelchair)?: 3  Need to walk in hospital room?: 3  Climbing 3-5 steps with a railing?: 1  Basic Mobility Total Score: 14       Treatment & Education:  Pt perf'd seated LE ex's of AP's, LAQ"s x 20 ea.     Patient left HOB elevated with all lines intact, call button in reach, bed alarm on, nurse notified, and heels elevated off bed w/ pillow ..    GOALS:   Multidisciplinary Problems       Physical Therapy Goals          Problem: Physical Therapy    Goal Priority Disciplines Outcome Interventions   Physical Therapy Goal     PT, PT/OT Progressing    Description: Goals to be met by:  2/15/25    Patient will increase functional independence with mobility by performin. Supine to sit with Modified [place]  2. Sit to supine with Modified [place]  3. Sit to stand transfer with Stand-by Assistance  4. Bed to chair transfer with Stand-by Assistance using Rolling Walker  5. Gait  x 50 feet with Stand-by Assistance using Rolling Walker.                          DME Justifications:  No DME recommended requiring DME justifications    Time Tracking:     PT Received On: 25  PT Start Time: 1110     PT " Stop Time: 1135  PT Total Time (min): 25 min     Billable Minutes: Therapeutic Activity 25    Treatment Type: Treatment  PT/PTA: PTA     Number of PTA visits since last PT visit: 1 01/20/2025

## 2025-01-20 NOTE — PROGRESS NOTES
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 She  presented to the ED with complaint of abdominal pain.  Diagnosis with colon cancer in Nov 2024 and underwent colon resection, however developed intra abdominal infection requiring operative intervention. She was admitted to SNF facility and underwent treatment with IV antibiotic. She continue to experiencing symptoms of abdominal pain, increase confusion. CT of abdominal showed  Operative change of partial colon resection and ileal colic anastomosis with suspected developing small bowel obstruction and transition point seen at the ileocolic anastomosis. Rim enhancing gas and fluid collection at the lateral right abdominal wall concerning for abscess. General Surgery consulted, with plan IR consultation for consideration of aspiration vs drain placement into her anterior abdominal wall collection. Lt leg verous ultrasound showed,.left lower extremity DVT in the common femoral vein. Patient being admitted under the care of Hospital Medicine.              Overview/Hospital Course:  No notes on file    Interval History:   NAEON  Patient reports she had an episode of diarrhea yesterday evening. No diarrhea this AM  Patient reports persistent abdominal pain, relieved by pain meds  No f/c/n/v  No other complaints    Review of Systems  Objective:     Vital Signs (Most Recent):  Temp: 98.3 °F (36.8 °C) (01/20/25 0816)  Pulse: 76 (01/20/25 0816)  Resp: 18 (01/20/25 0948)  BP: 127/62 (01/20/25 0816)  SpO2: 100 % (01/20/25 0816)  Vital Signs (24h Range):  Temp:  [98 °F (36.7 °C)-98.8 °F (37.1 °C)] 98.3 °F (36.8 °C)  Pulse:  [61-94] 76  Resp:  [16-19] 18  SpO2:  [99 %-100 %] 100 %  BP: (121-164)/(59-99) 127/62     Weight: 68.5 kg (151 lb 0.2 oz)  Body mass index is 27.62 kg/m².    Intake/Output Summary (Last 24 hours) at 1/20/2025 1032  Last data filed at 1/20/2025 0552  Gross per 24 hour   Intake 395.03 ml   Output 2353.5 ml   Net -1958.47 ml         Physical Exam  Constitutional:       General: She is not in acute distress.     Appearance: She is ill-appearing. She is not toxic-appearing.   HENT:      Mouth/Throat:      Mouth: Mucous membranes are moist.      Pharynx: Oropharynx is clear.   Eyes:      Extraocular Movements: Extraocular movements intact.      Conjunctiva/sclera: Conjunctivae normal.   Cardiovascular:      Rate and Rhythm: Normal rate and regular rhythm.   Pulmonary:      Effort: Pulmonary effort is normal. No respiratory distress.      Breath sounds: Normal breath sounds.   Abdominal:      General: Bowel sounds are normal. There is no distension.      Palpations: Abdomen is soft.      Tenderness: There is abdominal tenderness. There is no guarding.      Comments: R side drain in place, dark brown output, scant   Musculoskeletal:         General: Normal range of motion.      Right lower leg: No edema.      Left lower leg: No edema.   Skin:     General: Skin is warm and dry.   Neurological:      General: No focal deficit present.      Mental Status: She is alert and oriented to person, place, and time.   Psychiatric:         Mood and Affect: Mood normal.         Behavior: Behavior normal.             Significant Labs: All pertinent labs within the past 24 hours have been reviewed.    Significant Imaging: I have reviewed all pertinent imaging results/findings within the past 24 hours.    Assessment and Plan     * Epigastric pain  DDx:  Bowel obstruction ruled out: CT shows distended small bowel proximal to anastomosis, though  Gastrografin has passed  Anastomotic stricture (ruled out)  Infectious process: Due to anterior abdominal wall abscess vs acute UTI. Possible pneumonia based on CT chest.     Dx:  IR drain placed 1/15. 20cc of bloody purulent fluid.   Abscess cultures - KLEBSIELLA OXYTOCA   Colonoscopy on 1/17: Patent end-to-side ileocolonic anastomosis with purulent drainage likely from abscess; anastomosis intact; ileum normal; no specimens collected.  CT chest abdomen pelvis: Patchy GGOs in lungs c/f asp or PNA; biliary duct dilation post-chad, no mass, patulous esoph w/air-fluid level c/f dysmotility/reflux/achalasia; 10.5 cm R abd wall collection, decreased  Follow up MRCP    Tx:  GI, ID, and Gen Surg consult  IV cipro escalated to Zosyn due to low grade fever, tachycardia which is now resolved  Cultures to guide therapy  Monitor output from IR drain - scant output for the past 2 days. Follow up imaging and consider drain removal if improvement in collection       Hypoglycemia  Resolved    Tx:  Continue D10 1/2NS while NPO      PICC (peripherally inserted central catheter) in place  PICC placed at OSH on 11/3 due to difficulty with access    Tx:  PICC replaced on 1/18 this admission due to functional issues      Acute UTI  Dx:  Urine culture ENTEROCOCCUS FAECIUM     Tx:  Macrobid x5 day course    Acute DVT (deep venous thrombosis)  Likely due to recent hospitalization and sedentary risk factor    Left thigh veins: Occlusive thrombus in the common femoral    Tx:  Heparin drip   Consult cardiovascular   Transition to OAC at discharge        Tobacco dependency  Dangers of cigarette smoking were reviewed with patient in detail. Patient was Counseled for 3-10 minutes. Nicotine replacement options were discussed. Nicotine replacement was discussed-       Carotid arterial disease        Moderate malnutrition  Nutrition consulted. Most recent weight and BMI monitored-     Measurements:  Wt Readings from Last 1 Encounters:   01/17/25  68.5 kg (151 lb 0.2 oz)   Body mass index is 27.62 kg/m².    Patient has been screened and assessed by RD.    Malnutrition Type:  Context: chronic illness  Level: moderate    Malnutrition Characteristic Summary:  Energy Intake (Malnutrition): less than or equal to 75% for greater than or equal to 1 month    Interventions/Recommendations (treatment strategy):  1. General Healthful diet  2. Commercial beverage medical food supplement therapy      Atherosclerosis of aorta  Continue Statin       Type 2 diabetes mellitus with hypertriglyceridemia  Accu checks every 6 hours with SSI       GERD (gastroesophageal reflux disease)  Continue PPI       Hypothyroidism    Continue Synthroid       VTE Risk Mitigation (From admission, onward)           Ordered     heparin 25,000 units in dextrose 5% (100 units/ml) IV bolus from bag HIGH INTENSITY nomogram - [facility]  As needed (PRN)        Question:  Heparin Infusion Adjustment (DO NOT MODIFY ANSWER)  Answer:  [email or web address]\epic\Images\Pharmacy\HeparinInfusions\heparin HIGH INTENSITY nomogram for [facility] [id number]    01/14/25 1917     heparin 25,000 units in dextrose 5% (100 units/ml) IV bolus from bag HIGH INTENSITY nomogram - [facility]  As needed (PRN)        Question:  Heparin Infusion Adjustment (DO NOT MODIFY ANSWER)  Answer:  [email or web address]\epic\Images\Pharmacy\HeparinInfusions\heparin HIGH INTENSITY nomogram for [facility] [id number]    01/14/25 1917     heparin 25,000 units in dextrose 5% 250 mL (100 units/mL) infusion HIGH INTENSITY nomogram - [facility]  Continuous        Question:  Begin at (units/kg/hr)  Answer:  18    01/14/25 1917                    Discharge Planning   MAHIN: 1/17/2025     Code Status: Full Code   Medical Readiness for Discharge Date:   Discharge Plan A: Skilled Nursing Facility                Please place Justification for DME        Mike Sanchez MD  Department of Hospital Medicine   [facility]

## 2025-01-20 NOTE — ASSESSMENT & PLAN NOTE
Thom Krishna is a 69 y.o. female with a history of Hep C (treated per family), DM2, hypothyroidism, GERD, autoimmune hepatitis (?), tobacco abuse, cirrhosis (?), EtOH abuse, CVA, carotid stenosis s/p R CEA, and recently diagnosed colon cancer s/p partial colectomy with ileocolonic anastomosis on 11/1 at an outside facility c/b intra-abdominal abscess requiring laparoscopy (?) and abdominal wall abscess who presents with continued abdominal discomfort and failure to thrive after discharge from SNF.     CT A/P on admission with abdominal wall abscess, now s/p IR drain placement. Colonoscopy performed showing patent anastomosis with purulence at least somewhat concerning for fistulous communication with the anterior abdominal wall abscess. In prior discussions, the family of Mrs. Krishna have expressed that if she were to require any abdominal surgery or anastomotic revision, they would like to do so closer to their home with a colon and rectal surgeon. I think this is reasonable.      -- keep IR drain in place, no acute surgical intervention   -- PT/OT   -- remainder of care per primary team   -- general surgery will continue to follow

## 2025-01-20 NOTE — PLAN OF CARE
Problem: Occupational Therapy  Goal: Occupational Therapy Goal  Description: Goals to be met by: 02/15/2025     Patient will increase functional independence with ADLs by performing:    Toileting from bedside commode with Minimal Assistance for hygiene and clothing management.   Sitting at edge of bed x10 minutes with Supervision. --GOAL MET 1/20  Supine to sit with Supervision.  Step transfer with Stand-by Assistance  Toilet transfer to bedside commode with Stand-by Assistance.    Outcome: Progressing

## 2025-01-20 NOTE — CONSULTS
[facility] S[facility]  General Surgery  Consult Note    Inpatient consult to General Surgery  Consult performed by: Ting Workman MD  Consult ordered by: Mike Sanchez MD      General surgery actively following Mrs. Krishna. Please see progress note from today, which will serve as a formal consult note.     Ting Workman MD  Pager: [phone]  General Surgery PGY-IV  [facility]

## 2025-01-20 NOTE — PT/OT/SLP PROGRESS
Occupational Therapy   Treatment    Name: Thom Krishna  MRN: [id number]  Admitting Diagnosis:  Epigastric pain  2 Days Post-Op    Recommendations:     Discharge Recommendations: Moderate Intensity Therapy  Discharge Equipment Recommendations:  to be determined by next level of care  Barriers to discharge:   (Pt requires increased level of assist)    Assessment:     Thom Krishna is a 69 y.o. female with a medical diagnosis of Epigastric pain.  She presents with decondtioning. Performance deficits affecting function are weakness, impaired endurance, impaired self care skills, impaired functional mobility, gait instability, impaired balance, decreased upper extremity function, decreased lower extremity function, pain, impaired skin.     Rehab Prognosis:  Good; patient would benefit from acute skilled OT services to address these deficits and reach maximum level of function.       Plan:     Patient to be seen 3 x/week to address the above listed problems via self-care/home management, therapeutic activities, therapeutic exercises  Plan of Care Expires: 02/15/25  Plan of Care Reviewed with: patient    Subjective     Chief Complaint: Pt reports severe pain in abdomen after MRI  Patient/Family Comments/goals: To return to PLOF, pain relief  Pain/Comfort:  Pain Rating 1:  (14/10)  Location - Orientation 1: generalized  Location 1: abdomen (at drain site)  Pain Addressed 1: Reposition, Distraction, Cessation of Activity, Nurse notified  Pain Rating Post-Intervention 1:  (did not rate)    Objective:     Communicated with: nsg prior to session.  Patient found HOB elevated with bed alarm, telemetry, peripheral IV, PureWick upon OT entry to room.    General Precautions: Standard, fall    Orthopedic Precautions:N/A  Braces: N/A  Respiratory Status: Room air     Occupational Performance:     Bed Mobility:    Patient completed Rolling/Turning to Right with minimum and moderate assistance  Patient completed Scooting/Bridging with  maximum assistance  Patient completed Supine to Sit with moderate assistance  Patient completed Sit to Supine with minimum assistance and moderate assistance     Functional Mobility/Transfers:  Patient completed Sit <> Stand Transfer with minimum assistance  with  rolling walker   Functional Mobility: Pt with fair dynamic seated and standing balance. Pt able to take 4-5 small side steps to HOB    Activities of Daily Living:  Upper Body Dressing: moderate assistance to don/doff gown supine in bed  Toileting: moderate assistance for pericare and clothing management for Purewick undergarment      [facility] 6 Click ADL: 15    Treatment & Education:  Pt educated on role of OT and POC.   Pt performing skills as listed above.     Patient left HOB elevated with all lines intact, call button in reach, bed alarm on, nsg notified, and nsg present    GOALS:   Multidisciplinary Problems       Occupational Therapy Goals          Problem: Occupational Therapy    Goal Priority Disciplines Outcome Interventions   Occupational Therapy Goal     OT, PT/OT Progressing    Description: Goals to be met by: 02/15/2025     Patient will increase functional independence with ADLs by performing:    Toileting from bedside commode with Minimal Assistance for hygiene and clothing management.   Sitting at edge of bed x10 minutes with Supervision.  Supine to sit with Supervision.  Step transfer with Stand-by Assistance  Toilet transfer to bedside commode with Stand-by Assistance.                             Time Tracking:     OT Date of Treatment: 01/19/25  OT Start Time: 1528  OT Stop Time: 1554  OT Total Time (min): 26 min    Billable Minutes:Self Care/Home Management 13  Therapeutic Activity 13    OT/ANA MARIA: OT     Number of ANA MARIA visits since last OT visit: 0    1/19/2025

## 2025-01-20 NOTE — SUBJECTIVE & OBJECTIVE
Interval History:   ERICON  Patient reports she had an episode of diarrhea yesterday evening. No diarrhea this AM  Patient reports persistent abdominal pain, relieved by pain meds  No f/c/n/v  No other complaints    Review of Systems  Objective:     Vital Signs (Most Recent):  Temp: 98.3 °F (36.8 °C) (01/20/25 0816)  Pulse: 76 (01/20/25 0816)  Resp: 18 (01/20/25 0948)  BP: 127/62 (01/20/25 0816)  SpO2: 100 % (01/20/25 0816) Vital Signs (24h Range):  Temp:  [98 °F (36.7 °C)-98.8 °F (37.1 °C)] 98.3 °F (36.8 °C)  Pulse:  [61-94] 76  Resp:  [16-19] 18  SpO2:  [99 %-100 %] 100 %  BP: (121-164)/(59-99) 127/62     Weight: 68.5 kg (151 lb 0.2 oz)  Body mass index is 27.62 kg/m².    Intake/Output Summary (Last 24 hours) at 1/20/2025 1032  Last data filed at 1/20/2025 0552  Gross per 24 hour   Intake 395.03 ml   Output 2353.5 ml   Net -1958.47 ml         Physical Exam  Constitutional:       General: She is not in acute distress.     Appearance: She is ill-appearing. She is not toxic-appearing.   HENT:      Mouth/Throat:      Mouth: Mucous membranes are moist.      Pharynx: Oropharynx is clear.   Eyes:      Extraocular Movements: Extraocular movements intact.      Conjunctiva/sclera: Conjunctivae normal.   Cardiovascular:      Rate and Rhythm: Normal rate and regular rhythm.   Pulmonary:      Effort: Pulmonary effort is normal. No respiratory distress.      Breath sounds: Normal breath sounds.   Abdominal:      General: Bowel sounds are normal. There is no distension.      Palpations: Abdomen is soft.      Tenderness: There is abdominal tenderness. There is no guarding.      Comments: R side drain in place, dark brown output, scant   Musculoskeletal:         General: Normal range of motion.      Right lower leg: No edema.      Left lower leg: No edema.   Skin:     General: Skin is warm and dry.   Neurological:      General: No focal deficit present.      Mental Status: She is alert and oriented to person, place, and time.    Psychiatric:         Mood and Affect: Mood normal.         Behavior: Behavior normal.             Significant Labs: All pertinent labs within the past 24 hours have been reviewed.    Significant Imaging: I have reviewed all pertinent imaging results/findings within the past 24 hours.

## 2025-01-20 NOTE — PROGRESS NOTES
[facility]ious Disease Progress Note     Primary Team: Dr. Sanchez  Consultant Attending: Dr. Rg   Date of Admit: 1/14/2025    Reason for Consult     Enterococcus UTI and abdominal wall abscess      Assessment/Plan     Thom Krishna is a 69 y.o. female with:     Right abdominal wall abscess s/p IR drain 1/17  - s/p partial colectomy w/ileocolonic anastomosis 11/1 c/b abdominal wall abscess s/p IR aspiration 12/27; unclear about micro or abx patient was previously on.   - Colonoscopy showed purulent drainage from the anastomotic site   - Rpt CT 1/18 biliary duct dilatation, abscess decreased in size since 1/14; no note of fistula but high concern for one   - Abscess: Gram stain: GNR; aerobic Klebsiella oxytoca; Anaerobic and fungal pending   - Started on ciprofloxacin 1/15-1/17, now on zosyn 1/17-present   - Continue zosyn for now   - High concern for fistula; appreciate surgical input     VRE (Enterococcus Faecium) UTI  - Complaining of dysuria, cloudy urine for several days. Afebrile, no leukocytosis.   - 1/14 UA cloudy, 3+ leukocytes, 1+ blood, >100 WBCs/RBCs, bacteria; UCx + VRE faceium  - Continue macrobid for total of 5 days of therapy      Thank you for this consult. Case to be discussed with the attending physician - attestation to follow.     Cipriano Yates MD   [facility], PGY-2  [facility] Consults     History of Present Illness     Thom Krishna is a 69 y.o. female with past medical history of hepatitis C (s/p tx per family), degenerative disc disease, DM type 2, cirrhosis of the liver, emphysema, CKD3, hypothyroid and newly diagnosed colon CA in 2024 s/p partial colectomy w/ileocolonic anastomosis 11/1 c/b abdominal wall abscess s/p IR aspiration 12/27 and subsequently discharged to SNF with IV abx for 14 days. Unable to find details of which Abx patient was on, microbiology of abscess or additional information as it was at another not associated facility and patient was in the process of  re-establishing care with different colorectal surgeon.      Patient was discharged from facility and the following day presented to ED for ongoing diffuse abdominal pain and nausea for a few days and worsening of LLE swelling most notable at the thigh that had been ongoing for a few days including at SNF. Patient did also endorse significant dysuria, as well as ongoing large amounts of bowel incontinence with frequent softer stools daily. No CP, SOB, fever, chills, vomiting.     Interval History     NAEON, afebrile. MRCP performed yesterday showed ductal dilation. Patient working with PT. Reports she has unchanged abdominal pain and has started having diarrhea again.     Objective:   BP  Min: 121/64  Max: 164/99  Temp  Av.5 °F (36.9 °C)  Min: 98 °F (36.7 °C)  Max: 98.8 °F (37.1 °C)  Pulse  Av.5  Min: 61  Max: 94  Resp  Av.6  Min: 16  Max: 19  SpO2  Av.7 %  Min: 99 %  Max: 100 %      Physical Examination   Constitutional:       General: She is not in acute distress.  HENT:      Head: Normocephalic and atraumatic.      Nose: Nose normal.      Mouth/Throat:      Pharynx: Oropharynx is clear.   Eyes:      Extraocular Movements: Extraocular movements intact.   Cardiovascular:      Rate and Rhythm: Normal rate and regular rhythm.      Pulses: Normal pulses.      Heart sounds: Normal heart sounds.   Pulmonary:      Effort: No respiratory distress.      Breath sounds: Normal breath sounds.   Abdominal:      General: Bowel sounds are normal.      Palpations: Abdomen is soft.      Tenderness: There is abdominal tenderness.      Comments: Diffusely tender, right>left side   Musculoskeletal:      Cervical back: Normal range of motion.      Comments: LLE swelling, L ankle swelling. No notable discoloration. Gross ROM intact.    Skin:     Capillary Refill: Capillary refill takes less than 2 seconds.   Neurological:      General: No focal deficit present.      Mental Status: She is alert.   Psychiatric:          Mood and Affect: Mood normal.       Laboratory Data Reviewed:  Recent Labs   Lab 01/14/25  1504 01/14/25  1956 01/18/25  0554 01/19/25  0529 01/20/25  0546   WBC 9.99   < > 10.80 6.95 7.71   HGB 11.8*   < > 10.0* 9.4* 10.3*   HCT 36.3*   < > 30.0* 28.6* 30.6*      < > 266 253 267      < > 136 138 136   K 4.6   < > 3.6 3.4* 3.1*      < > 107 111* 107   CREATININE 0.8   < > 0.7 0.7 0.7   BUN 33*   < > 4* 5* 4*   CO2 21*   < > 20* 19* 18*   ALT 32  --   --   --   --    AST 22  --   --   --   --     < > = values in this interval not displayed.         Microbiology Data Reviewed:  Microbiology Results (last 7 days)       Procedure Component Value Units Date/Time    Blood culture [[id number]] Collected: 01/14/25 1818    Order Status: Completed Specimen: Blood from Peripheral, Antecubital, Right Updated: 01/20/25 0612     Blood Culture, Routine No growth after 5 days.    Blood culture [[id number]] Collected: 01/14/25 1853    Order Status: Completed Specimen: Blood from Peripheral, Forearm, Left Updated: 01/20/25 0612     Blood Culture, Routine No growth after 5 days.    Aerobic culture [[id number]]  (Abnormal)  (Susceptibility) Collected: 01/15/25 1648    Order Status: Completed Specimen: Abscess from Abdomen Updated: 01/18/25 0710     Aerobic Bacterial Culture KLEBSIELLA OXYTOCA  Moderate  Skin rivera also present      Urine culture [[id number]]  (Abnormal)  (Susceptibility) Collected: 01/14/25 1716    Order Status: Completed Specimen: Urine Updated: 01/17/25 1847     Urine Culture, Routine ENTEROCOCCUS FAECIUM VRE  >100,000 cfu/ml      Narrative:      Specimen Source->Urine    Culture, Anaerobic [[id number]] Collected: 01/15/25 1648    Order Status: Completed Specimen: Abscess from Abdomen Updated: 01/17/25 1014     Anaerobic Culture Culture in progress    Gram stain [[id number]] Collected: 01/15/25 1648    Order Status: Completed Specimen: Abscess from Abdomen Updated: 01/15/25 2229     Gram Stain  Result Many WBC's      Few Gram negative rods    Fungus culture [[id number]] Collected: 01/15/25 1646    Order Status: Sent Specimen: Abscess from Abdomen Updated: 01/15/25 1943              Current Medications   atorvastatin  40 mg Oral Daily    levothyroxine  75 mcg Oral Before breakfast    nicotine  1 patch Transdermal Daily    nitrofurantoin (macrocrystal-monohydrate)  100 mg Oral Q12H    pantoprazole  40 mg Oral Daily    piperacillin-tazobactam (Zosyn) IV (PEDS and ADULTS) (extended infusion is not appropriate)  4.5 g Intravenous Q8H

## 2025-01-20 NOTE — PROGRESS NOTES
[facility]  Gastroenterology  Progress Note    Patient Name: Thom Krishna  MRN: [id number]  Admission Date: 1/14/2025  Hospital Length of Stay: 6 days  Code Status: Full Code   Attending Provider: Mike Sanchez MD  Consulting Provider: Ruslan Alvarez MD  Primary Care Physician: Brandy Dejesus MD  Principal Problem: Epigastric pain        Subjective:     Interval History:  No acute events overnight.  MRCP performed:  CBD dilation again noted however without filling defect.  Persistent abdominal pain     Review of Systems   Gastrointestinal:  Positive for abdominal distention and abdominal pain.     Objective:     Vital Signs (Most Recent):  Temp: 97.3 °F (36.3 °C) (01/20/25 1227)  Pulse: 79 (01/20/25 1526)  Resp: 17 (01/20/25 1408)  BP: 117/70 (01/20/25 1227)  SpO2: 99 % (01/20/25 1227) Vital Signs (24h Range):  Temp:  [97.3 °F (36.3 °C)-98.6 °F (37 °C)] 97.3 °F (36.3 °C)  Pulse:  [61-94] 79  Resp:  [16-19] 17  SpO2:  [99 %-100 %] 99 %  BP: (117-164)/(62-99) 117/70     Weight: 68.5 kg (151 lb 0.2 oz) (01/17/25 1500)  Body mass index is 27.62 kg/m².      Intake/Output Summary (Last 24 hours) at 1/20/2025 1640  Last data filed at 1/20/2025 1536  Gross per 24 hour   Intake 395.03 ml   Output 2503.5 ml   Net -2108.47 ml       Lines/Drains/Airways       Peripherally Inserted Central Catheter Line  Duration             PICC Double Lumen 01/17/25 1214 right basilic 3 days              Drain  Duration                  Closed/Suction Drain 01/15/25 1630 Tube - 1 Lateral;Right RLQ Bulb 10 Fr. 5 days    Female External Urinary Catheter w/ Suction 01/14/25 2207 5 days              Peripheral Intravenous Line  Duration                  Peripheral IV - Single Lumen 01/15/25 0421 20 G 1 3/4 in Anterior;Distal;Right Upper Arm 5 days                     Physical Exam  Constitutional:       Appearance: She is well-developed.   HENT:      Head: Normocephalic and atraumatic.   Eyes:      Conjunctiva/sclera:  Conjunctivae normal.   Pulmonary:      Effort: Pulmonary effort is normal. No respiratory distress.   Abdominal:      Comments: + drain with purulent fluid   Musculoskeletal:      Cervical back: Normal range of motion.   Neurological:      Mental Status: She is alert and oriented to person, place, and time.   Psychiatric:         Behavior: Behavior normal.         Thought Content: Thought content normal.         Judgment: Judgment normal.          Significant Labs:  Recent Lab Results         01/20/25  1227   01/20/25  0546   01/20/25  0541   01/19/25  2339        Anion Gap   11           PTT   41.0  Comment: Refer to local heparin nomogram for intensity/dose specific   therapeutic   range.  LOT^050^APTT FSL^[id number]             Baso #   0.02           Basophil %   0.3           BUN   4           Calcium   8.5           Chloride   107           CO2   18           Creatinine   0.7           Differential Method   Automated           eGFR   >60           Eos #   0.1           Eos %   1.2           Glucose   130           Gran # (ANC)   5.3           Gran %   68.3           Hematocrit   30.6           Hemoglobin   10.3           Immature Grans (Abs)   0.05  Comment: Mild elevation in immature granulocytes is non specific and   can be seen in a variety of conditions including stress response,   acute inflammation, trauma and pregnancy. Correlation with other   laboratory and clinical findings is essential.             Immature Granulocytes   0.6           Lymph #   1.7           Lymph %   22.6           MCH   32.4           MCHC   33.7           MCV   96           Mono #   0.5           Mono %   7.0           MPV   9.9           nRBC   0           Platelet Count   267           POCT Glucose 81     133   132       Potassium   3.1           RBC   3.18           RDW   17.2           Sodium   136           WBC   7.71                     Significant Imaging:  Imaging results within the past 24 hours have been  reviewed.  Assessment/Plan:     GI  * Epigastric pain  Likely related to h/o R yanique. Abscess noted on CT from 1/14, IR drain placed earlier in hospitalization however pain persisted.  Colonoscopy repeated on 01/17.  Anastomosis seems intact however what appeared to be purulent drainage was noted coming from area of anastomosis.  A repeat CT scan shows persistent fluid collection.  Also CBD was noted to be dilated.  This was also seen previously, although degree was not commented on on read.  Liver testing earlier in hospitalization was not suggestive of biliary obstruction.  No CMP drawn today.  Discuss definitive management of abscess with IR/surgery as GI intervention has been exhausted   MRCP negative for choledocholithiasis  Continue antibiotics per primary team   If no intervention planned for today okay to eat from GI perspective            Thank you for your consult. I will sign off. Please contact us if you have any additional questions.    Ruslan Alvarez MD  Gastroenterology  [facility]

## 2025-01-20 NOTE — PROGRESS NOTES
[facility] Surg  General Surgery  Progress Note    Subjective:     History of Present Illness:  Thom Krishna is a 69 y.o. female with a history of Hep C (treated per family), DM2, hypothyroidism, GERD, autoimmune hepatitis (?), tobacco abuse, cirrhosis (?), EtOH abuse, CVA, carotid stenosis s/p R CEA, and recently diagnosed colon cancer s/p partial colectomy with ileocolonic anastomosis on 11/1 at an outside facility. History is unclear as we do not have access to the majority of her care, but, per family, she required take back for an intra-abdominal infection. Following this, she developed an anterior abdominal wall abscess requiring IR aspiration (12/27) and a 14 day course of IV antibiotics, which she recently finished. She was discharged from SNF yesterday. Per her care giver, she has had loose stools for about 2 weeks. Also with bilateral lower extremity swelling during this time. She presents to the ED today with complaints of abdominal pain associated with nausea. She has not had any emesis during this time to suggest clinical obstruction.     Post-Op Info:  Procedure(s) (LRB):  COLONOSCOPY (N/A)   3 Days Post-Op     Interval History:   No acute events. Remains afebrile and hemodynamically stable. WBC remains normal. IR drain in place with minimal output. Having bowel function. Tolerating a diet.     Medications:  Continuous Infusions:   dextrose 10% and 0.45% NaCl infusion   Intravenous Continuous 100 mL/hr at 01/20/25 0104 New Bag at 01/20/25 0104    heparin (porcine) in D5W  0-40 Units/kg/hr Intravenous Continuous 5.3 mL/hr at 01/20/25 0900 8 Units/kg/hr at 01/20/25 0900     Scheduled Meds:   atorvastatin  40 mg Oral Daily    levothyroxine  75 mcg Oral Before breakfast    nicotine  1 patch Transdermal Daily    nitrofurantoin (macrocrystal-monohydrate)  100 mg Oral Q12H    pantoprazole  40 mg Oral Daily    piperacillin-tazobactam (Zosyn) IV (PEDS and ADULTS) (extended infusion is not appropriate)  4.5 g  Intravenous Q8H     PRN Meds:  Current Facility-Administered Medications:     acetaminophen, 650 mg, Oral, Q6H PRN    dextrose 50%, 12.5 g, Intravenous, PRN    dextrose 50%, 25 g, Intravenous, PRN    glucagon (human recombinant), 1 mg, Intramuscular, PRN    glucose, 16 g, Oral, PRN    glucose, 24 g, Oral, PRN    heparin (PORCINE), 60 Units/kg, Intravenous, PRN    heparin (PORCINE), 30 Units/kg, Intravenous, PRN    HYDROcodone-acetaminophen, 1 tablet, Oral, TID PRN    insulin aspart U-100, 0-5 Units, Subcutaneous, Q6H PRN    morphine, 1 mg, Intravenous, Q4H PRN    ondansetron, 8 mg, Oral, TID PRN    Flushing PICC/Midline Protocol, , , Until Discontinued **AND** sodium chloride 0.9%, 10 mL, Intravenous, Q12H PRN     Review of patient's allergies indicates:   Allergen Reactions    Cefaclor Hives    Gabapentin Swelling    Penicillins      Other reaction(s): Hives    Sulfa (sulfonamide antibiotics) Other (See Comments)     Other reaction(s): Hives     Objective:     Vital Signs (Most Recent):  Temp: 98.3 °F (36.8 °C) (01/20/25 0816)  Pulse: 76 (01/20/25 0816)  Resp: 18 (01/20/25 0948)  BP: 127/62 (01/20/25 0816)  SpO2: 100 % (01/20/25 0816) Vital Signs (24h Range):  Temp:  [98 °F (36.7 °C)-98.6 °F (37 °C)] 98.3 °F (36.8 °C)  Pulse:  [61-94] 76  Resp:  [16-19] 18  SpO2:  [99 %-100 %] 100 %  BP: (121-164)/(61-99) 127/62     Weight: 68.5 kg (151 lb 0.2 oz)  Body mass index is 27.62 kg/m².    Intake/Output - Last 3 Shifts         01/18 0700  01/19 0659 01/19 0700  01/20 0659 01/20 0700  01/21 0659    P.O.   0    I.V. (mL/kg) 30.9 (0.5) 120.5 (1.8)     IV Piggyback 108.4 274.5     Total Intake(mL/kg) 139.3 (2) 395 (5.8) 0 (0)    Urine (mL/kg/hr) 3225 (2) 2350 (1.4) 650 (1.8)    Drains 2.5 3.5     Other   0    Stool 0 0 0    Total Output 3227.5 2353.5 650    Net -3088.2 -1958.5 -650           Urine Occurrence  1 x 1 x    Stool Occurrence  1 x 0 x             Physical Exam  Vitals reviewed.   Constitutional:       General: She  is not in acute distress.     Appearance: Normal appearance. She is not toxic-appearing.   HENT:      Head: Normocephalic and atraumatic.      Nose: Nose normal.   Cardiovascular:      Rate and Rhythm: Normal rate.      Pulses: Normal pulses.   Pulmonary:      Effort: Pulmonary effort is normal. No respiratory distress.   Abdominal:      General: Abdomen is flat. There is no distension.      Palpations: Abdomen is soft.      Tenderness: There is no guarding or rebound.      Comments: Mildly tender to palpation in the right abdomen; no guarding, no rebound, non peritonitic  IR drain in place over RLQ   Skin:     General: Skin is warm.   Neurological:      General: No focal deficit present.      Mental Status: She is alert and oriented to person, place, and time.          Significant Labs:  I have reviewed all pertinent lab results within the past 24 hours.  CBC:   Recent Labs   Lab 01/20/25  0546   WBC 7.71   RBC 3.18*   HGB 10.3*   HCT 30.6*      MCV 96   MCH 32.4*   MCHC 33.7     CMP:   Recent Labs   Lab 01/14/25  1504 01/15/25  1912 01/20/25  0546   GLU 89   < > 130*   CALCIUM 8.9   < > 8.5*   ALBUMIN 2.6*  --   --    PROT 7.0  --   --       < > 136   K 4.6   < > 3.1*   CO2 21*   < > 18*      < > 107   BUN 33*   < > 4*   CREATININE 0.8   < > 0.7   ALKPHOS 76  --   --    ALT 32  --   --    AST 22  --   --    BILITOT 0.5  --   --     < > = values in this interval not displayed.       Significant Diagnostics:  I have reviewed all pertinent imaging results/findings within the past 24 hours.  Assessment/Plan:     * Epigastric pain  Thom Krishna is a 69 y.o. female with a history of Hep C (treated per family), DM2, hypothyroidism, GERD, autoimmune hepatitis (?), tobacco abuse, cirrhosis (?), EtOH abuse, CVA, carotid stenosis s/p R CEA, and recently diagnosed colon cancer s/p partial colectomy with ileocolonic anastomosis on 11/1 at an outside facility c/b intra-abdominal abscess requiring laparoscopy  (?) and abdominal wall abscess who presents with continued abdominal discomfort and failure to thrive after discharge from SNF.     CT A/P on admission with abdominal wall abscess, now s/p IR drain placement. Colonoscopy performed showing patent anastomosis with purulence at least somewhat concerning for fistulous communication with the anterior abdominal wall abscess. In prior discussions, the family of Mrs. Krishna have expressed that if she were to require any abdominal surgery or anastomotic revision, they would like to do so closer to their home with a colon and rectal surgeon. I think this is reasonable.      -- keep IR drain in place, no acute surgical intervention   -- PT/OT   -- remainder of care per primary team   -- general surgery will continue to follow        Ting Workman MD  General Surgery  [facility] Surg

## 2025-01-20 NOTE — PT/OT/SLP PROGRESS
Occupational Therapy      Patient Name:  Thom Krishna   MRN:  [id number]    Patient not seen today secondary to AM 1145: Patient unwilling to participate. Pt reports she just completed PT session and fatigued from walking.  Requested OT to return later.     1/20/2025

## 2025-01-20 NOTE — SUBJECTIVE & OBJECTIVE
Subjective:     Interval History:  No acute events overnight.  MRCP performed:  CBD dilation again noted however without filling defect.  Persistent abdominal pain     Review of Systems   Gastrointestinal:  Positive for abdominal distention and abdominal pain.     Objective:     Vital Signs (Most Recent):  Temp: 97.3 °F (36.3 °C) (01/20/25 1227)  Pulse: 79 (01/20/25 1526)  Resp: 17 (01/20/25 1408)  BP: 117/70 (01/20/25 1227)  SpO2: 99 % (01/20/25 1227) Vital Signs (24h Range):  Temp:  [97.3 °F (36.3 °C)-98.6 °F (37 °C)] 97.3 °F (36.3 °C)  Pulse:  [61-94] 79  Resp:  [16-19] 17  SpO2:  [99 %-100 %] 99 %  BP: (117-164)/(62-99) 117/70     Weight: 68.5 kg (151 lb 0.2 oz) (01/17/25 1500)  Body mass index is 27.62 kg/m².      Intake/Output Summary (Last 24 hours) at 1/20/2025 1640  Last data filed at 1/20/2025 1536  Gross per 24 hour   Intake 395.03 ml   Output 2503.5 ml   Net -2108.47 ml       Lines/Drains/Airways       Peripherally Inserted Central Catheter Line  Duration             PICC Double Lumen 01/17/25 1214 right basilic 3 days              Drain  Duration                  Closed/Suction Drain 01/15/25 1630 Tube - 1 Lateral;Right RLQ Bulb 10 Fr. 5 days    Female External Urinary Catheter w/ Suction 01/14/25 2207 5 days              Peripheral Intravenous Line  Duration                  Peripheral IV - Single Lumen 01/15/25 0421 20 G 1 3/4 in Anterior;Distal;Right Upper Arm 5 days                     Physical Exam  Constitutional:       Appearance: She is well-developed.   HENT:      Head: Normocephalic and atraumatic.   Eyes:      Conjunctiva/sclera: Conjunctivae normal.   Pulmonary:      Effort: Pulmonary effort is normal. No respiratory distress.   Abdominal:      Comments: + drain with purulent fluid   Musculoskeletal:      Cervical back: Normal range of motion.   Neurological:      Mental Status: She is alert and oriented to person, place, and time.   Psychiatric:         Behavior: Behavior normal.          Thought Content: Thought content normal.         Judgment: Judgment normal.          Significant Labs:  Recent Lab Results         01/20/25  1227   01/20/25  0546   01/20/25  0541   01/19/25  2339        Anion Gap   11           PTT   41.0  Comment: Refer to local heparin nomogram for intensity/dose specific   therapeutic   range.  LOT^050^APTT FSL^[id number]             Baso #   0.02           Basophil %   0.3           BUN   4           Calcium   8.5           Chloride   107           CO2   18           Creatinine   0.7           Differential Method   Automated           eGFR   >60           Eos #   0.1           Eos %   1.2           Glucose   130           Gran # (ANC)   5.3           Gran %   68.3           Hematocrit   30.6           Hemoglobin   10.3           Immature Grans (Abs)   0.05  Comment: Mild elevation in immature granulocytes is non specific and   can be seen in a variety of conditions including stress response,   acute inflammation, trauma and pregnancy. Correlation with other   laboratory and clinical findings is essential.             Immature Granulocytes   0.6           Lymph #   1.7           Lymph %   22.6           MCH   32.4           MCHC   33.7           MCV   96           Mono #   0.5           Mono %   7.0           MPV   9.9           nRBC   0           Platelet Count   267           POCT Glucose 81     133   132       Potassium   3.1           RBC   3.18           RDW   17.2           Sodium   136           WBC   7.71                     Significant Imaging:  Imaging results within the past 24 hours have been reviewed.

## 2025-01-21 LAB
ANION GAP SERPL CALC-SCNC: 8 MMOL/L (ref 8–16)
APTT PPP: 40.2 SEC (ref 21–32)
BASOPHILS # BLD AUTO: 0.02 K/UL (ref 0–0.2)
BASOPHILS NFR BLD: 0.3 % (ref 0–1.9)
BUN SERPL-MCNC: 5 MG/DL (ref 8–23)
CALCIUM SERPL-MCNC: 8.5 MG/DL (ref 8.7–10.5)
CHLORIDE SERPL-SCNC: 107 MMOL/L (ref 95–110)
CO2 SERPL-SCNC: 19 MMOL/L (ref 23–29)
CREAT SERPL-MCNC: 0.7 MG/DL (ref 0.5–1.4)
DIFFERENTIAL METHOD BLD: ABNORMAL
EOSINOPHIL # BLD AUTO: 0.1 K/UL (ref 0–0.5)
EOSINOPHIL NFR BLD: 1 % (ref 0–8)
ERYTHROCYTE [DISTWIDTH] IN BLOOD BY AUTOMATED COUNT: 17.2 % (ref 11.5–14.5)
EST. GFR  (NO RACE VARIABLE): >60 ML/MIN/1.73 M^2
GLUCOSE SERPL-MCNC: 164 MG/DL (ref 70–110)
HCT VFR BLD AUTO: 30.7 % (ref 37–48.5)
HGB BLD-MCNC: 10.1 G/DL (ref 12–16)
IMM GRANULOCYTES # BLD AUTO: 0.11 K/UL (ref 0–0.04)
IMM GRANULOCYTES NFR BLD AUTO: 1.4 % (ref 0–0.5)
LYMPHOCYTES # BLD AUTO: 1.7 K/UL (ref 1–4.8)
LYMPHOCYTES NFR BLD: 22.1 % (ref 18–48)
MCH RBC QN AUTO: 31.8 PG (ref 27–31)
MCHC RBC AUTO-ENTMCNC: 32.9 G/DL (ref 32–36)
MCV RBC AUTO: 97 FL (ref 82–98)
MONOCYTES # BLD AUTO: 0.6 K/UL (ref 0.3–1)
MONOCYTES NFR BLD: 8.1 % (ref 4–15)
NEUTROPHILS # BLD AUTO: 5.2 K/UL (ref 1.8–7.7)
NEUTROPHILS NFR BLD: 67.1 % (ref 38–73)
NRBC BLD-RTO: 0 /100 WBC
OHS QRS DURATION: 66 MS
OHS QTC CALCULATION: 447 MS
PLATELET # BLD AUTO: 260 K/UL (ref 150–450)
PMV BLD AUTO: 9.8 FL (ref 9.2–12.9)
POCT GLUCOSE: 124 MG/DL (ref 70–110)
POCT GLUCOSE: 129 MG/DL (ref 70–110)
POCT GLUCOSE: 129 MG/DL (ref 70–110)
POCT GLUCOSE: 162 MG/DL (ref 70–110)
POTASSIUM SERPL-SCNC: 3.5 MMOL/L (ref 3.5–5.1)
RBC # BLD AUTO: 3.18 M/UL (ref 4–5.4)
SODIUM SERPL-SCNC: 134 MMOL/L (ref 136–145)
WBC # BLD AUTO: 7.68 K/UL (ref 3.9–12.7)

## 2025-01-21 PROCEDURE — 63600175 PHARM REV CODE 636 W HCPCS: Performed by: HOSPITALIST

## 2025-01-21 PROCEDURE — 85730 THROMBOPLASTIN TIME PARTIAL: CPT | Performed by: HOSPITALIST

## 2025-01-21 PROCEDURE — 63600175 PHARM REV CODE 636 W HCPCS: Performed by: FAMILY MEDICINE

## 2025-01-21 PROCEDURE — 85025 COMPLETE CBC W/AUTO DIFF WBC: CPT | Performed by: EMERGENCY MEDICINE

## 2025-01-21 PROCEDURE — 25000003 PHARM REV CODE 250: Performed by: INTERNAL MEDICINE

## 2025-01-21 PROCEDURE — 25000003 PHARM REV CODE 250: Performed by: HOSPITALIST

## 2025-01-21 PROCEDURE — 80048 BASIC METABOLIC PNL TOTAL CA: CPT | Performed by: HOSPITALIST

## 2025-01-21 PROCEDURE — 21400001 HC TELEMETRY ROOM

## 2025-01-21 PROCEDURE — 25000003 PHARM REV CODE 250: Performed by: NURSE PRACTITIONER

## 2025-01-21 PROCEDURE — 63600175 PHARM REV CODE 636 W HCPCS: Performed by: INTERNAL MEDICINE

## 2025-01-21 PROCEDURE — S4991 NICOTINE PATCH NONLEGEND: HCPCS | Performed by: NURSE PRACTITIONER

## 2025-01-21 PROCEDURE — 27000207 HC ISOLATION

## 2025-01-21 RX ORDER — MORPHINE SULFATE 2 MG/ML
2 INJECTION, SOLUTION INTRAMUSCULAR; INTRAVENOUS EVERY 4 HOURS PRN
Status: DISCONTINUED | OUTPATIENT
Start: 2025-01-21 | End: 2025-01-26

## 2025-01-21 RX ADMIN — PIPERACILLIN SODIUM AND TAZOBACTAM SODIUM 4.5 G: 4; .5 INJECTION, POWDER, LYOPHILIZED, FOR SOLUTION INTRAVENOUS at 01:01

## 2025-01-21 RX ADMIN — MORPHINE SULFATE 1 MG: 2 INJECTION, SOLUTION INTRAMUSCULAR; INTRAVENOUS at 02:01

## 2025-01-21 RX ADMIN — PANTOPRAZOLE SODIUM 40 MG: 40 TABLET, DELAYED RELEASE ORAL at 09:01

## 2025-01-21 RX ADMIN — LEVOTHYROXINE SODIUM 75 MCG: 25 TABLET ORAL at 05:01

## 2025-01-21 RX ADMIN — ATORVASTATIN CALCIUM 40 MG: 40 TABLET, FILM COATED ORAL at 09:01

## 2025-01-21 RX ADMIN — MORPHINE SULFATE 2 MG: 2 INJECTION, SOLUTION INTRAMUSCULAR; INTRAVENOUS at 11:01

## 2025-01-21 RX ADMIN — NITROFURANTOIN MONOHYDRATE/MACROCRYSTALS 100 MG: 25; 75 CAPSULE ORAL at 09:01

## 2025-01-21 RX ADMIN — MORPHINE SULFATE 1 MG: 2 INJECTION, SOLUTION INTRAMUSCULAR; INTRAVENOUS at 03:01

## 2025-01-21 RX ADMIN — MORPHINE SULFATE 1 MG: 2 INJECTION, SOLUTION INTRAMUSCULAR; INTRAVENOUS at 11:01

## 2025-01-21 RX ADMIN — VASOPRESSIN: 20 INJECTION, SOLUTION INTRAVENOUS at 01:01

## 2025-01-21 RX ADMIN — PIPERACILLIN SODIUM AND TAZOBACTAM SODIUM 4.5 G: 4; .5 INJECTION, POWDER, LYOPHILIZED, FOR SOLUTION INTRAVENOUS at 09:01

## 2025-01-21 RX ADMIN — PIPERACILLIN SODIUM AND TAZOBACTAM SODIUM 4.5 G: 4; .5 INJECTION, POWDER, LYOPHILIZED, FOR SOLUTION INTRAVENOUS at 05:01

## 2025-01-21 RX ADMIN — HYDROCODONE BITARTRATE AND ACETAMINOPHEN 1 TABLET: 10; 325 TABLET ORAL at 09:01

## 2025-01-21 RX ADMIN — VASOPRESSIN: 20 INJECTION, SOLUTION INTRAVENOUS at 11:01

## 2025-01-21 RX ADMIN — VASOPRESSIN: 20 INJECTION, SOLUTION INTRAVENOUS at 09:01

## 2025-01-21 RX ADMIN — NICOTINE 1 PATCH: 21 PATCH, EXTENDED RELEASE TRANSDERMAL at 09:01

## 2025-01-21 RX ADMIN — MORPHINE SULFATE 1 MG: 2 INJECTION, SOLUTION INTRAMUSCULAR; INTRAVENOUS at 06:01

## 2025-01-21 RX ADMIN — MORPHINE SULFATE 2 MG: 2 INJECTION, SOLUTION INTRAMUSCULAR; INTRAVENOUS at 06:01

## 2025-01-21 NOTE — PLAN OF CARE
Problem: Adult Inpatient Plan of Care  Goal: Plan of Care Review  Outcome: Progressing  Goal: Patient-Specific Goal (Individualized)  Outcome: Progressing  Goal: Absence of Hospital-Acquired Illness or Injury  Outcome: Progressing  Goal: Optimal Comfort and Wellbeing  Outcome: Progressing  Goal: Readiness for Transition of Care  Outcome: Progressing     Problem: Skin Injury Risk Increased  Goal: Skin Health and Integrity  Outcome: Progressing     Problem: Diabetes Comorbidity  Goal: Blood Glucose Level Within Targeted Range  Outcome: Progressing     Problem: Wound  Goal: Optimal Coping  Outcome: Progressing  Goal: Optimal Functional Ability  Outcome: Progressing  Goal: Absence of Infection Signs and Symptoms  Outcome: Progressing  Goal: Improved Oral Intake  Outcome: Progressing  Goal: Optimal Pain Control and Function  Outcome: Progressing  Goal: Skin Health and Integrity  Outcome: Progressing  Goal: Optimal Wound Healing  Outcome: Progressing     Problem: Fall Injury Risk  Goal: Absence of Fall and Fall-Related Injury  Outcome: Progressing     Problem: VTE (Venous Thromboembolism)  Goal: Tissue Perfusion  Outcome: Progressing  Goal: Right Ventricular Function  Outcome: Progressing     Problem: Pain Acute  Goal: Optimal Pain Control and Function  Outcome: Progressing

## 2025-01-21 NOTE — PLAN OF CARE
Recommendation:  1. Continue to encourage intake at meals as tolerated.   2. Monitor weight/labs.   3. RD to continue to follow to monitor po intake    Goals:  Pt will tolerate diet with at least 50-75% intake at meals by RD follow up  Nutrition Goal Status: new

## 2025-01-21 NOTE — SUBJECTIVE & OBJECTIVE
Interval History:   ARCENIO  Patient reports she had an episode of diarrhea yesterday evening. NPatient reports persistent abdominal pain, relieved by pain meds  No f/c/n/v  No other complaints    No surgical interventions rec'd    Review of Systems  Objective:     Vital Signs (Most Recent):  Temp: 98.5 °F (36.9 °C) (01/21/25 1542)  Pulse: 76 (01/21/25 1542)  Resp: 18 (01/21/25 1542)  BP: (!) 101/50 (01/21/25 1542)  SpO2: 100 % (01/21/25 1542) Vital Signs (24h Range):  Temp:  [98.2 °F (36.8 °C)-99.4 °F (37.4 °C)] 98.5 °F (36.9 °C)  Pulse:  [55-82] 76  Resp:  [16-19] 18  SpO2:  [95 %-100 %] 100 %  BP: (101-141)/(50-85) 101/50     Weight: 68.5 kg (151 lb 0.2 oz)  Body mass index is 27.62 kg/m².    Intake/Output Summary (Last 24 hours) at 1/21/2025 1643  Last data filed at 1/21/2025 1250  Gross per 24 hour   Intake 469.27 ml   Output 2051.5 ml   Net -1582.23 ml         Physical Exam  Constitutional:       General: She is not in acute distress.     Appearance: She is ill-appearing. She is not toxic-appearing.   HENT:      Mouth/Throat:      Mouth: Mucous membranes are moist.      Pharynx: Oropharynx is clear.   Eyes:      Extraocular Movements: Extraocular movements intact.      Conjunctiva/sclera: Conjunctivae normal.   Cardiovascular:      Rate and Rhythm: Normal rate and regular rhythm.   Pulmonary:      Effort: Pulmonary effort is normal. No respiratory distress.      Breath sounds: Normal breath sounds.   Abdominal:      General: Bowel sounds are normal. There is no distension.      Palpations: Abdomen is soft.      Tenderness: There is abdominal tenderness. There is no guarding.      Comments: R side drain in place, dark brown output, scant   Musculoskeletal:         General: Normal range of motion.      Right lower leg: No edema.      Left lower leg: No edema.   Skin:     General: Skin is warm and dry.   Neurological:      General: No focal deficit present.      Mental Status: She is alert and oriented to person,  place, and time.   Psychiatric:         Mood and Affect: Mood normal.         Behavior: Behavior normal.             Significant Labs: All pertinent labs within the past 24 hours have been reviewed.    Significant Imaging: I have reviewed all pertinent imaging results/findings within the past 24 hours.

## 2025-01-21 NOTE — PROGRESS NOTES
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 She  presented to the ED with complaint of abdominal pain.  Diagnosis with colon cancer in Nov 2024 and underwent colon resection, however developed intra abdominal infection requiring operative intervention. She was admitted to SNF facility and underwent treatment with IV antibiotic. She continue to experiencing symptoms of abdominal pain, increase confusion. CT of abdominal showed  Operative change of partial colon resection and ileal colic anastomosis with suspected developing small bowel obstruction and transition point seen at the ileocolic anastomosis. Rim enhancing gas and fluid collection at the lateral right abdominal wall concerning for abscess. General Surgery consulted, with plan IR consultation for consideration of aspiration vs drain placement into her anterior abdominal wall collection. Lt leg verous ultrasound showed,.left lower extremity DVT in the common femoral vein. Patient being admitted under the care of Hospital Medicine.              Overview/Hospital Course:  No notes on file    Interval History:   NAEON  Patient reports she had an episode of diarrhea yesterday evening. NPatient reports persistent abdominal pain, relieved by pain meds  No f/c/n/v  No other complaints    No surgical interventions rec'd    Review of Systems  Objective:     Vital Signs (Most Recent):  Temp: 98.5 °F (36.9 °C) (01/21/25 1542)  Pulse: 76 (01/21/25 1542)  Resp: 18 (01/21/25 1542)  BP: (!) 101/50 (01/21/25 1542)  SpO2:  100 % (01/21/25 1542) Vital Signs (24h Range):  Temp:  [98.2 °F (36.8 °C)-99.4 °F (37.4 °C)] 98.5 °F (36.9 °C)  Pulse:  [55-82] 76  Resp:  [16-19] 18  SpO2:  [95 %-100 %] 100 %  BP: (101-141)/(50-85) 101/50     Weight: 68.5 kg (151 lb 0.2 oz)  Body mass index is 27.62 kg/m².    Intake/Output Summary (Last 24 hours) at 1/21/2025 1643  Last data filed at 1/21/2025 1250  Gross per 24 hour   Intake 469.27 ml   Output 2051.5 ml   Net -1582.23 ml         Physical Exam  Constitutional:       General: She is not in acute distress.     Appearance: She is ill-appearing. She is not toxic-appearing.   HENT:      Mouth/Throat:      Mouth: Mucous membranes are moist.      Pharynx: Oropharynx is clear.   Eyes:      Extraocular Movements: Extraocular movements intact.      Conjunctiva/sclera: Conjunctivae normal.   Cardiovascular:      Rate and Rhythm: Normal rate and regular rhythm.   Pulmonary:      Effort: Pulmonary effort is normal. No respiratory distress.      Breath sounds: Normal breath sounds.   Abdominal:      General: Bowel sounds are normal. There is no distension.      Palpations: Abdomen is soft.      Tenderness: There is abdominal tenderness. There is no guarding.      Comments: R side drain in place, dark brown output, scant   Musculoskeletal:         General: Normal range of motion.      Right lower leg: No edema.      Left lower leg: No edema.   Skin:     General: Skin is warm and dry.   Neurological:      General: No focal deficit present.      Mental Status: She is alert and oriented to person, place, and time.   Psychiatric:         Mood and Affect: Mood normal.         Behavior: Behavior normal.             Significant Labs: All pertinent labs within the past 24 hours have been reviewed.    Significant Imaging: I have reviewed all pertinent imaging results/findings within the past 24 hours.    Assessment and Plan     * Epigastric pain  DDx:  Bowel obstruction ruled out: CT shows distended small bowel proximal  to anastomosis, though Gastrografin has passed  Anastomotic stricture (ruled out)  Infectious process: Due to anterior abdominal wall abscess vs acute UTI. Possible pneumonia based on CT chest.     Dx:  IR drain placed 1/15. 20cc of bloody purulent fluid.   Abscess cultures - KLEBSIELLA OXYTOCA   Colonoscopy on 1/17: Patent end-to-side ileocolonic anastomosis with purulent drainage likely from abscess; anastomosis intact; ileum normal; no specimens collected.  CT chest abdomen pelvis: Patchy GGOs in lungs c/f asp or PNA; biliary duct dilation post-chad, no mass, patulous esoph w/air-fluid level c/f dysmotility/reflux/achalasia; 10.5 cm R abd wall collection, decreased  Follow up MRCP    Tx:  GI, ID, and Gen Surg consult  IV cipro escalated to Zosyn due to low grade fever, tachycardia which is now resolved  Cultures to guide therapy  Monitor output from IR drain - scant output for the past 2 days. Follow up imaging and consider drain removal if improvement in collection       Hypoglycemia  Resolved    Tx:  Continue D10 1/2NS while NPO      PICC (peripherally inserted central catheter) in place  PICC placed at OSH on 11/3 due to difficulty with access    Tx:  PICC replaced on 1/18 this admission due to functional issues      Acute UTI  Dx:  Urine culture ENTEROCOCCUS FAECIUM     Tx:  Macrobid x5 day course    Acute DVT (deep venous thrombosis)  Likely due to recent hospitalization and sedentary risk factor    Left thigh veins: Occlusive thrombus in the common femoral    Tx:  Heparin drip   Consult cardiovascular   Transition to [facility] at discharge        Tobacco dependency  Dangers of cigarette smoking were reviewed with patient in detail. Patient was Counseled for 3-10 minutes. Nicotine replacement options were discussed. Nicotine replacement was discussed-       Carotid arterial disease        Moderate malnutrition  Nutrition consulted. Most recent weight and BMI monitored-     Measurements:  Wt Readings from Last 1  Encounters:   01/17/25 68.5 kg (151 lb 0.2 oz)   Body mass index is 27.62 kg/m².    Patient has been screened and assessed by RD.    Malnutrition Type:  Context: chronic illness  Level: moderate    Malnutrition Characteristic Summary:  Energy Intake (Malnutrition): less than or equal to 75% for greater than or equal to 1 month    Interventions/Recommendations (treatment strategy):  1. General Healthful diet  2. Commercial beverage medical food supplement therapy      Atherosclerosis of aorta  Continue Statin       Type 2 diabetes mellitus with hypertriglyceridemia  Accu checks every 6 hours with SSI       GERD (gastroesophageal reflux disease)  Continue PPI       Hypothyroidism    Continue Synthroid       VTE Risk Mitigation (From admission, onward)           Ordered     heparin 25,000 units in dextrose 5% (100 units/ml) IV bolus from bag HIGH INTENSITY nomogram - [facility]  As needed (PRN)        Question:  Heparin Infusion Adjustment (DO NOT MODIFY ANSWER)  Answer:  [email or web address]\epic\Images\Pharmacy\HeparinInfusions\heparin HIGH INTENSITY nomogram for [facility] [id number]    01/14/25 1917     heparin 25,000 units in dextrose 5% (100 units/ml) IV bolus from bag HIGH INTENSITY nomogram - [facility]  As needed (PRN)        Question:  Heparin Infusion Adjustment (DO NOT MODIFY ANSWER)  Answer:  [email or web address]\epic\Images\Pharmacy\HeparinInfusions\heparin HIGH INTENSITY nomogram for [facility] [id number]    01/14/25 1917     heparin 25,000 units in dextrose 5% 250 mL (100 units/mL) infusion HIGH INTENSITY nomogram - [facility]  Continuous        Question:  Begin at (units/kg/hr)  Answer:  18    01/14/25 1917                    Discharge Planning   MAHIN: 1/17/2025     Code Status: Full Code   Medical Readiness for Discharge Date:   Discharge Plan A: Skilled Nursing Facility                Please place Justification for DME        Lori Teixeira MD  Department of Hospital Medicine   [facility] Surg

## 2025-01-21 NOTE — PROGRESS NOTES
No acute issues  Eating regular diet  Minimal drain output  Ok to DC once from my standpoint when safe for travel  Call if needed

## 2025-01-21 NOTE — PROGRESS NOTES
[facility] - Med Surg  Adult Nutrition  Progress Note    SUMMARY       Recommendations    Recommendation:  1. Continue to encourage intake at meals as tolerated.   2. Monitor weight/labs.   3. RD to continue to follow to monitor po intake    Goals:  Pt will tolerate diet with at least 50-75% intake at meals by RD follow up  Nutrition Goal Status: new  Communication of RD Recs: reviewed with RN    Assessment and Plan  Nutrition Problem  Inadequate energy intake    Related to (etiology):   Colon cancer    Signs and Symptoms (as evidenced by):   Decreased po intake, abd pain, nausea     Interventions:  Collaboration with other providers    Nutrition Diagnosis Status:   New      Malnutrition Assessment  Malnutrition Context: chronic illness  Malnutrition Level: moderate  Skin (Micronutrient): wounds unhealed (Pressure injury to buttocks)  Musculoskeletal/Lower Extremities: edema   Micronutrient Evaluation Summary: suspected deficiency   Weight Loss (Malnutrition):  (2% x 1 year)  Energy Intake (Malnutrition): less than or equal to 75% for greater than or equal to 1 month       Reason for Assessment  Reason For Assessment: RD follow-up  Diagnosis: gastrointestinal disease  General Information Comments: Pt admitted with nausea and abdominal pain. Dx colon cancer Nov 2024. PMH partial colectomy. Pt on 2000 calorie ADA diet with Sidney. Noted poor-fair intake at meals. Pt on contact isolation for VRE-unable to assess NFPE. PICC line. s/p drain placement 1/15 and colonoscopy 1/17. Noted 5lb weight loss x 1 year.  Nutrition Discharge Planning: pt to d/c on ADA diet    Past Medical History:   Diagnosis Date    Anxiety and depression 07/21/2015    Arthritis     Cervical radiculopathy 04/08/2016    Cirrhosis of liver     Colon cancer 11/2024    Colon polyps 04/27/2015    Diabetes mellitus type 2 with neurological manifestations 11/06/2015    no current medications, only one episode of elevated sugars with acute illness, will monitor   "   Encounter for blood transfusion     Hepatitis C     s/p treatment per patient report; managed by Dr. Perez    Hip fracture     Macrocytosis 2015    Thyroid disease     Transfusion reaction     hep c        Nutrition/Diet History  Food Preferences: no Protestant or cultural food prefs identified  Spiritual, Cultural Beliefs, Mandaen Practices, Values that Affect Care: no  Food Allergies: NKFA  Factors Affecting Nutritional Intake: altered gastrointestinal function    Anthropometrics  Height: 5' 2" (157.5 cm)  Height (inches): 62 in  Height Method: Stated  Weight: 68.5 kg (151 lb 0.2 oz)  Weight (lb): 151.02 lb  Weight Method: Bed Scale  Ideal Body Weight (IBW), Female: 110 lb  % Ideal Body Weight, Female (lb): 137.29 %  BMI (Calculated): 27.6  BMI Grade: 25 - 29.9 - overweight  Usual Body Weight (UBW), k.4 kg (1/10/24)  % Usual Body Weight: 97.5  % Weight Change From Usual Weight: -2.7 %     Lab/Procedures/Meds  Pertinent Labs Reviewed: reviewed  Pertinent Labs Comments: Na 134L, BUN 5L, Glu 164H, Ca 8.5L  Pertinent Medications Reviewed: reviewed  Pertinent Medications Comments: pantoprazole, zosyn, heparin    Estimated/Assessed Needs  Weight Used For Calorie Calculations: 68.5 kg (151 lb 0.2 oz)  Energy Calorie Requirements (kcal): 1918 (28 kcal/kg)  Energy Need Method: Kcal/kg  Protein Requirements: 68g (1.0g/kg)  Weight Used For Protein Calculations: 68.5 kg (151 lb 0.2 oz)  Fluid Requirements (mL): 1ml/kcal  Estimated Fluid Requirement Method: RDA Method  RDA Method (mL): 1918  CHO Requirement: 250    Nutrition Prescription Ordered  Current Diet Order: 2000 calorie ADA  Oral Nutrition Supplement: Sidney    Evaluation of Received Nutrient/Fluid Intake  I/O: 229/2301  Energy Calories Required: not meeting needs  Protein Required: not meeting needs  Fluid Required: not meeting needs  Comments: LBM   Tolerance: other (see comments) (clear liquids, monitoring tolerance)  % Intake of Estimated " Energy Needs: 25 - 50 %  % Meal Intake: 25 - 50 %    Nutrition Risk  Level of Risk/Frequency of Follow-up: moderate (follow up: 1-2x per week)     Monitor and Evaluation  Food and Nutrient Intake: energy intake, food and beverage intake  Food and Nutrient Adminstration: diet order  Anthropometric Measurements: height/length, weight, weight change, body mass index  Biochemical Data, Medical Tests and Procedures: electrolyte and renal panel, gastrointestinal profile, glucose/endocrine profile, inflammatory profile, lipid profile  Nutrition-Focused Physical Findings: skin     Nutrition Related Social Determinants of Health: SDOH: Adequate food in home environment     Nutrition Follow-Up  RD Follow-up?: Yes

## 2025-01-21 NOTE — PLAN OF CARE
Problem: Adult Inpatient Plan of Care  Goal: Plan of Care Review  Outcome: Progressing  Goal: Patient-Specific Goal (Individualized)  Outcome: Progressing  Goal: Optimal Comfort and Wellbeing  Outcome: Progressing     Problem: Fall Injury Risk  Goal: Absence of Fall and Fall-Related Injury  Outcome: Progressing     Problem: VTE (Venous Thromboembolism)  Goal: Tissue Perfusion  Outcome: Progressing     Problem: Skin Injury Risk Increased  Goal: Skin Health and Integrity  Outcome: Progressing     Problem: Pain Acute  Goal: Optimal Pain Control and Function  Outcome: Progressing     Problem: Activity Intolerance  Goal: Enhanced Capacity and Energy  Outcome: Progressing     Problem: Mobility Impairment  Goal: Optimal Mobility  Outcome: Progressing     Problem: Wound  Goal: Optimal Coping  Outcome: Progressing  Goal: Absence of Infection Signs and Symptoms  Outcome: Progressing     Problem: Diabetes Comorbidity  Goal: Blood Glucose Level Within Targeted Range  Outcome: Progressing

## 2025-01-22 LAB
ANION GAP SERPL CALC-SCNC: 9 MMOL/L (ref 8–16)
APTT PPP: 39.2 SEC (ref 21–32)
BASOPHILS # BLD AUTO: 0.03 K/UL (ref 0–0.2)
BASOPHILS NFR BLD: 0.4 % (ref 0–1.9)
BUN SERPL-MCNC: 5 MG/DL (ref 8–23)
CALCIUM SERPL-MCNC: 8.3 MG/DL (ref 8.7–10.5)
CHLORIDE SERPL-SCNC: 107 MMOL/L (ref 95–110)
CO2 SERPL-SCNC: 19 MMOL/L (ref 23–29)
CREAT SERPL-MCNC: 0.8 MG/DL (ref 0.5–1.4)
DIFFERENTIAL METHOD BLD: ABNORMAL
EOSINOPHIL # BLD AUTO: 0.1 K/UL (ref 0–0.5)
EOSINOPHIL NFR BLD: 1.2 % (ref 0–8)
ERYTHROCYTE [DISTWIDTH] IN BLOOD BY AUTOMATED COUNT: 17.2 % (ref 11.5–14.5)
EST. GFR  (NO RACE VARIABLE): >60 ML/MIN/1.73 M^2
GLUCOSE SERPL-MCNC: 132 MG/DL (ref 70–110)
HCT VFR BLD AUTO: 30.3 % (ref 37–48.5)
HGB BLD-MCNC: 10 G/DL (ref 12–16)
IMM GRANULOCYTES # BLD AUTO: 0.1 K/UL (ref 0–0.04)
IMM GRANULOCYTES NFR BLD AUTO: 1.2 % (ref 0–0.5)
LYMPHOCYTES # BLD AUTO: 1.8 K/UL (ref 1–4.8)
LYMPHOCYTES NFR BLD: 22.4 % (ref 18–48)
MCH RBC QN AUTO: 31.8 PG (ref 27–31)
MCHC RBC AUTO-ENTMCNC: 33 G/DL (ref 32–36)
MCV RBC AUTO: 97 FL (ref 82–98)
MONOCYTES # BLD AUTO: 0.7 K/UL (ref 0.3–1)
MONOCYTES NFR BLD: 9.1 % (ref 4–15)
NEUTROPHILS # BLD AUTO: 5.3 K/UL (ref 1.8–7.7)
NEUTROPHILS NFR BLD: 65.7 % (ref 38–73)
NRBC BLD-RTO: 0 /100 WBC
PLATELET # BLD AUTO: 262 K/UL (ref 150–450)
PMV BLD AUTO: 9.8 FL (ref 9.2–12.9)
POCT GLUCOSE: 122 MG/DL (ref 70–110)
POCT GLUCOSE: 127 MG/DL (ref 70–110)
POCT GLUCOSE: 129 MG/DL (ref 70–110)
POCT GLUCOSE: 132 MG/DL (ref 70–110)
POTASSIUM SERPL-SCNC: 3.4 MMOL/L (ref 3.5–5.1)
RBC # BLD AUTO: 3.14 M/UL (ref 4–5.4)
SODIUM SERPL-SCNC: 135 MMOL/L (ref 136–145)
WBC # BLD AUTO: 8.11 K/UL (ref 3.9–12.7)

## 2025-01-22 PROCEDURE — 85025 COMPLETE CBC W/AUTO DIFF WBC: CPT | Performed by: EMERGENCY MEDICINE

## 2025-01-22 PROCEDURE — S4991 NICOTINE PATCH NONLEGEND: HCPCS | Performed by: NURSE PRACTITIONER

## 2025-01-22 PROCEDURE — 27000207 HC ISOLATION

## 2025-01-22 PROCEDURE — 85730 THROMBOPLASTIN TIME PARTIAL: CPT | Performed by: HOSPITALIST

## 2025-01-22 PROCEDURE — 25000003 PHARM REV CODE 250: Performed by: HOSPITALIST

## 2025-01-22 PROCEDURE — 21400001 HC TELEMETRY ROOM

## 2025-01-22 PROCEDURE — 63600175 PHARM REV CODE 636 W HCPCS: Performed by: FAMILY MEDICINE

## 2025-01-22 PROCEDURE — 80048 BASIC METABOLIC PNL TOTAL CA: CPT | Performed by: HOSPITALIST

## 2025-01-22 PROCEDURE — 25000003 PHARM REV CODE 250: Performed by: NURSE PRACTITIONER

## 2025-01-22 PROCEDURE — 63600175 PHARM REV CODE 636 W HCPCS: Performed by: INTERNAL MEDICINE

## 2025-01-22 PROCEDURE — 63600175 PHARM REV CODE 636 W HCPCS: Performed by: EMERGENCY MEDICINE

## 2025-01-22 PROCEDURE — 63600175 PHARM REV CODE 636 W HCPCS: Performed by: HOSPITALIST

## 2025-01-22 PROCEDURE — 25000003 PHARM REV CODE 250: Performed by: INTERNAL MEDICINE

## 2025-01-22 RX ADMIN — HYDROCODONE BITARTRATE AND ACETAMINOPHEN 1 TABLET: 10; 325 TABLET ORAL at 01:01

## 2025-01-22 RX ADMIN — MORPHINE SULFATE 2 MG: 2 INJECTION, SOLUTION INTRAMUSCULAR; INTRAVENOUS at 03:01

## 2025-01-22 RX ADMIN — PIPERACILLIN SODIUM AND TAZOBACTAM SODIUM 4.5 G: 4; .5 INJECTION, POWDER, LYOPHILIZED, FOR SOLUTION INTRAVENOUS at 09:01

## 2025-01-22 RX ADMIN — ATORVASTATIN CALCIUM 40 MG: 40 TABLET, FILM COATED ORAL at 07:01

## 2025-01-22 RX ADMIN — PIPERACILLIN SODIUM AND TAZOBACTAM SODIUM 4.5 G: 4; .5 INJECTION, POWDER, LYOPHILIZED, FOR SOLUTION INTRAVENOUS at 05:01

## 2025-01-22 RX ADMIN — MORPHINE SULFATE 2 MG: 2 INJECTION, SOLUTION INTRAMUSCULAR; INTRAVENOUS at 11:01

## 2025-01-22 RX ADMIN — VASOPRESSIN: 20 INJECTION, SOLUTION INTRAVENOUS at 06:01

## 2025-01-22 RX ADMIN — NICOTINE 1 PATCH: 21 PATCH, EXTENDED RELEASE TRANSDERMAL at 07:01

## 2025-01-22 RX ADMIN — MORPHINE SULFATE 2 MG: 2 INJECTION, SOLUTION INTRAMUSCULAR; INTRAVENOUS at 08:01

## 2025-01-22 RX ADMIN — MORPHINE SULFATE 2 MG: 2 INJECTION, SOLUTION INTRAMUSCULAR; INTRAVENOUS at 07:01

## 2025-01-22 RX ADMIN — LEVOTHYROXINE SODIUM 75 MCG: 25 TABLET ORAL at 05:01

## 2025-01-22 RX ADMIN — PANTOPRAZOLE SODIUM 40 MG: 40 TABLET, DELAYED RELEASE ORAL at 07:01

## 2025-01-22 RX ADMIN — HEPARIN SODIUM 8 UNITS/KG/HR: 10000 INJECTION, SOLUTION INTRAVENOUS at 11:01

## 2025-01-22 RX ADMIN — PIPERACILLIN SODIUM AND TAZOBACTAM SODIUM 4.5 G: 4; .5 INJECTION, POWDER, LYOPHILIZED, FOR SOLUTION INTRAVENOUS at 12:01

## 2025-01-22 NOTE — PLAN OF CARE
Problem: Adult Inpatient Plan of Care  Goal: Plan of Care Review  Outcome: Progressing  Goal: Patient-Specific Goal (Individualized)  Outcome: Progressing  Goal: Optimal Comfort and Wellbeing  Outcome: Progressing     Problem: Fall Injury Risk  Goal: Absence of Fall and Fall-Related Injury  Outcome: Progressing     Problem: Skin Injury Risk Increased  Goal: Skin Health and Integrity  Outcome: Progressing     Problem: Pain Acute  Goal: Optimal Pain Control and Function  Outcome: Progressing     Problem: Sepsis/Septic Shock  Goal: Blood Glucose Level Within Targeted Range  Outcome: Progressing     Problem: UTI (Urinary Tract Infection)  Goal: Improved Infection Symptoms  Outcome: Progressing     Problem: Wound  Goal: Optimal Coping  Outcome: Progressing  Goal: Optimal Pain Control and Function  Outcome: Progressing     Problem: Diabetes Comorbidity  Goal: Blood Glucose Level Within Targeted Range  Outcome: Progressing     Problem: Pneumonia  Goal: Fluid Balance  Outcome: Progressing  Goal: Resolution of Infection Signs and Symptoms  Outcome: Progressing  Goal: Effective Oxygenation and Ventilation  Outcome: Progressing

## 2025-01-22 NOTE — SUBJECTIVE & OBJECTIVE
Interval History:   ARCENIO  Patient reports she has diarrhea.   Patient reports persistent abdominal pain  No f/c/n/v  No other complaints    No surgical interventions rec'd. SNF pending?    Review of Systems  Objective:     Vital Signs (Most Recent):  Temp: 98.1 °F (36.7 °C) (01/22/25 1101)  Pulse: 90 (01/22/25 1525)  Resp: 17 (01/22/25 1144)  BP: 117/71 (01/22/25 1101)  SpO2: 100 % (01/22/25 1101) Vital Signs (24h Range):  Temp:  [97.9 °F (36.6 °C)-98.7 °F (37.1 °C)] 98.1 °F (36.7 °C)  Pulse:  [59-90] 90  Resp:  [16-19] 17  SpO2:  [97 %-100 %] 100 %  BP: (101-123)/(50-80) 117/71     Weight: 68.5 kg (151 lb 0.2 oz)  Body mass index is 27.62 kg/m².    Intake/Output Summary (Last 24 hours) at 1/22/2025 1535  Last data filed at 1/22/2025 1254  Gross per 24 hour   Intake 360 ml   Output 2350 ml   Net -1990 ml         Physical Exam  Constitutional:       General: She is not in acute distress.     Appearance: She is ill-appearing. She is not toxic-appearing.   HENT:      Mouth/Throat:      Mouth: Mucous membranes are moist.      Pharynx: Oropharynx is clear.   Eyes:      Extraocular Movements: Extraocular movements intact.      Conjunctiva/sclera: Conjunctivae normal.   Cardiovascular:      Rate and Rhythm: Normal rate and regular rhythm.   Pulmonary:      Effort: Pulmonary effort is normal. No respiratory distress.      Breath sounds: Normal breath sounds.   Abdominal:      General: Bowel sounds are normal. There is no distension.      Palpations: Abdomen is soft.      Tenderness: There is abdominal tenderness. There is no guarding.      Comments: R side drain in place, dark brown output, scant   Musculoskeletal:         General: Normal range of motion.      Right lower leg: No edema.      Left lower leg: No edema.   Skin:     General: Skin is warm and dry.   Neurological:      General: No focal deficit present.      Mental Status: She is alert and oriented to person, place, and time.   Psychiatric:         Mood and  Affect: Mood normal.         Behavior: Behavior normal.             Significant Labs: All pertinent labs within the past 24 hours have been reviewed.    Significant Imaging: I have reviewed all pertinent imaging results/findings within the past 24 hours.

## 2025-01-22 NOTE — PROGRESS NOTES
[facility] ID note    Patient afebrile.  WBC 8.1.  On piperacillin tazobactam.  Status post nitrofurantoin.  Patient with diarrhea still persistent abdominal pain    MRCP with intrahepatic bile duct dilatation.  Abdominal MRI with distended esophagus with air-fluid level and abdominal wall fluid collection with questionable communication with the bowel.  T11  L3 and L4 compression fractures    Can continue antibiotics for the moment   Consider IR evaluation to replace drain into larger drain if possible   No further ID recommendations at this time    Please call if any questions   Trino Rg  [facility] ID  [phone]

## 2025-01-22 NOTE — PROGRESS NOTES
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 She  presented to the ED with complaint of abdominal pain.  Diagnosis with colon cancer in Nov 2024 and underwent colon resection, however developed intra abdominal infection requiring operative intervention. She was admitted to SNF facility and underwent treatment with IV antibiotic. She continue to experiencing symptoms of abdominal pain, increase confusion. CT of abdominal showed  Operative change of partial colon resection and ileal colic anastomosis with suspected developing small bowel obstruction and transition point seen at the ileocolic anastomosis. Rim enhancing gas and fluid collection at the lateral right abdominal wall concerning for abscess. General Surgery consulted, with plan IR consultation for consideration of aspiration vs drain placement into her anterior abdominal wall collection. Lt leg verous ultrasound showed,.left lower extremity DVT in the common femoral vein. Patient being admitted under the care of Hospital Medicine.              Overview/Hospital Course:  No notes on file    Interval History:   NAEON  Patient reports she has diarrhea.   Patient reports persistent abdominal pain  No f/c/n/v  No other complaints    No surgical interventions rec'd. SNF pending?    Review of Systems  Objective:     Vital Signs (Most Recent):  Temp: 98.1 °F (36.7 °C) (01/22/25 1101)  Pulse: 90 (01/22/25 1525)  Resp: 17 (01/22/25 1144)  BP: 117/71 (01/22/25 1101)  SpO2: 100 % (01/22/25 1101) Vital Signs (24h  Range):  Temp:  [97.9 °F (36.6 °C)-98.7 °F (37.1 °C)] 98.1 °F (36.7 °C)  Pulse:  [59-90] 90  Resp:  [16-19] 17  SpO2:  [97 %-100 %] 100 %  BP: (101-123)/(50-80) 117/71     Weight: 68.5 kg (151 lb 0.2 oz)  Body mass index is 27.62 kg/m².    Intake/Output Summary (Last 24 hours) at 1/22/2025 1535  Last data filed at 1/22/2025 1254  Gross per 24 hour   Intake 360 ml   Output 2350 ml   Net -1990 ml         Physical Exam  Constitutional:       General: She is not in acute distress.     Appearance: She is ill-appearing. She is not toxic-appearing.   HENT:      Mouth/Throat:      Mouth: Mucous membranes are moist.      Pharynx: Oropharynx is clear.   Eyes:      Extraocular Movements: Extraocular movements intact.      Conjunctiva/sclera: Conjunctivae normal.   Cardiovascular:      Rate and Rhythm: Normal rate and regular rhythm.   Pulmonary:      Effort: Pulmonary effort is normal. No respiratory distress.      Breath sounds: Normal breath sounds.   Abdominal:      General: Bowel sounds are normal. There is no distension.      Palpations: Abdomen is soft.      Tenderness: There is abdominal tenderness. There is no guarding.      Comments: R side drain in place, dark brown output, scant   Musculoskeletal:         General: Normal range of motion.      Right lower leg: No edema.      Left lower leg: No edema.   Skin:     General: Skin is warm and dry.   Neurological:      General: No focal deficit present.      Mental Status: She is alert and oriented to person, place, and time.   Psychiatric:         Mood and Affect: Mood normal.         Behavior: Behavior normal.             Significant Labs: All pertinent labs within the past 24 hours have been reviewed.    Significant Imaging: I have reviewed all pertinent imaging results/findings within the past 24 hours.    Assessment and Plan     * Epigastric pain  DDx:  Bowel obstruction ruled out: CT shows distended small bowel proximal to anastomosis, though Gastrografin has  passed  Anastomotic stricture (ruled out)  Infectious process: Due to anterior abdominal wall abscess vs acute UTI. Possible pneumonia based on CT chest.     Dx:  IR drain placed 1/15. 20cc of bloody purulent fluid.   Abscess cultures - KLEBSIELLA OXYTOCA   Colonoscopy on 1/17: Patent end-to-side ileocolonic anastomosis with purulent drainage likely from abscess; anastomosis intact; ileum normal; no specimens collected.  CT chest abdomen pelvis: Patchy GGOs in lungs c/f asp or PNA; biliary duct dilation post-chad, no mass, patulous esoph w/air-fluid level c/f dysmotility/reflux/achalasia; 10.5 cm R abd wall collection, decreased  Follow up MRCP    Tx:  GI, ID, and Gen Surg consult  IV cipro escalated to Zosyn due to low grade fever, tachycardia which is now resolved  Cultures to guide therapy  Monitor output from IR drain - scant output for the past 2 days. Follow up imaging and consider drain removal if improvement in collection       Hypoglycemia  Resolved    Tx:  Continue D10 1/2NS while NPO      PICC (peripherally inserted central catheter) in place  PICC placed at OSH on 11/3 due to difficulty with access    Tx:  PICC replaced on 1/18 this admission due to functional issues      Acute UTI  Dx:  Urine culture ENTEROCOCCUS FAECIUM     Tx:  Macrobid x5 day course    Acute DVT (deep venous thrombosis)  Likely due to recent hospitalization and sedentary risk factor    Left thigh veins: Occlusive thrombus in the common femoral    Tx:  Heparin drip   Consult cardiovascular   Transition to [facility] at discharge        Tobacco dependency  Dangers of cigarette smoking were reviewed with patient in detail. Patient was Counseled for 3-10 minutes. Nicotine replacement options were discussed. Nicotine replacement was discussed-       Carotid arterial disease        Moderate malnutrition  Nutrition consulted. Most recent weight and BMI monitored-     Measurements:  Wt Readings from Last 1 Encounters:   01/17/25 68.5 kg (151 lb  0.2 oz)   Body mass index is 27.62 kg/m².    Patient has been screened and assessed by RD.    Malnutrition Type:  Context: chronic illness  Level: moderate    Malnutrition Characteristic Summary:  Energy Intake (Malnutrition): less than or equal to 75% for greater than or equal to 1 month    Interventions/Recommendations (treatment strategy):  1. General Healthful diet  2. Commercial beverage medical food supplement therapy      Atherosclerosis of aorta  Continue Statin       Type 2 diabetes mellitus with hypertriglyceridemia  Accu checks every 6 hours with SSI       GERD (gastroesophageal reflux disease)  Continue PPI       Hypothyroidism    Continue Synthroid       VTE Risk Mitigation (From admission, onward)           Ordered     heparin 25,000 units in dextrose 5% (100 units/ml) IV bolus from bag HIGH INTENSITY nomogram - [facility]  As needed (PRN)        Question:  Heparin Infusion Adjustment (DO NOT MODIFY ANSWER)  Answer:  \\[email or web address]\[email or web address]\Images\Pharmacy\HeparinInfusions\heparin HIGH INTENSITY nomogram for [facility] [id number].pdf    01/14/25 1917     heparin 25,000 units in dextrose 5% (100 units/ml) IV bolus from bag HIGH INTENSITY nomogram - [facility]  As needed (PRN)        Question:  Heparin Infusion Adjustment (DO NOT MODIFY ANSWER)  Answer:  [email or web address]\epic\Images\Pharmacy\HeparinInfusions\heparin HIGH INTENSITY nomogram for [facility] [id number].pdf    01/14/25 1917     heparin 25,000 units in dextrose 5% 250 mL (100 units/mL) infusion HIGH INTENSITY nomogram - [facility]  Continuous        Question:  Begin at (units/kg/hr)  Answer:  18 01/14/25 1917                    Discharge Planning   MAHIN: 1/17/2025     Code Status: Full Code   Medical Readiness for Discharge Date:   Discharge Plan A: Skilled Nursing Facility   Discharge Delays: None known at this time            Please place Justification for DME        Lori Teixeira MD  Department of Hospital Medicine   [facility]

## 2025-01-22 NOTE — PLAN OF CARE
Problem: Adult Inpatient Plan of Care  Goal: Plan of Care Review  Outcome: Progressing  Goal: Patient-Specific Goal (Individualized)  Outcome: Progressing  Goal: Absence of Hospital-Acquired Illness or Injury  Outcome: Progressing  Goal: Optimal Comfort and Wellbeing  Outcome: Progressing  Goal: Readiness for Transition of Care  Outcome: Progressing     Problem: Skin Injury Risk Increased  Goal: Skin Health and Integrity  Outcome: Progressing     Problem: Diabetes Comorbidity  Goal: Blood Glucose Level Within Targeted Range  Outcome: Progressing     Problem: Wound  Goal: Optimal Coping  Outcome: Progressing  Goal: Optimal Functional Ability  Outcome: Progressing  Goal: Absence of Infection Signs and Symptoms  Outcome: Progressing  Goal: Improved Oral Intake  Outcome: Progressing  Goal: Optimal Pain Control and Function  Outcome: Progressing  Goal: Skin Health and Integrity  Outcome: Progressing  Goal: Optimal Wound Healing  Outcome: Progressing     Problem: Infection  Goal: Absence of Infection Signs and Symptoms  Outcome: Progressing     Problem: Fall Injury Risk  Goal: Absence of Fall and Fall-Related Injury  Outcome: Progressing     Problem: Comorbidity Management  Goal: Maintenance of Behavioral Health Symptom Control  Outcome: Progressing     Problem: VTE (Venous Thromboembolism)  Goal: Tissue Perfusion  Outcome: Progressing  Goal: Right Ventricular Function  Outcome: Progressing     Problem: Pain Acute  Goal: Optimal Pain Control and Function  Outcome: Progressing     Problem: Mobility Impairment  Goal: Optimal Mobility  Outcome: Progressing    Patient is AAOx4, NAD noted, VSS.  No complaints of nausea, vomiting throughout shift.  IV heparin infusing per mar, aptt in therapeutic range.  PRN  pain medication given per MAR.  Tolerating small amounts of diet, oral intake encouraged. Tele NSR. Turning q2h.  Stage 1 wound to sacrum, triad being used.  Labs and vitals being monitored.  Accuchecks being monitored.   Contact isolation maintained. Patient free from falls throughout shift, bed alarm on, bed is in lowest position, wheels locked, call light within reach, safety maintained.  Will continue to monitor.

## 2025-01-22 NOTE — PLAN OF CARE
MAILE contacted Selena [facility]/ [facility] to discuss dc planning. Auth approved for SNF. Selena expressed they can not accept any pts at this time until there coperate office gives them the clearance.     MAILE called in LOCET.     MAILE contacted pts son Osbaldo and there was no answer.      SW will continue to follow pt throughout her transitions of care and assist with any dc needs.     Future Appointments   Date Time Provider Department Center   1/29/2025 11:00 AM Dorene Combs [facility]        01/22/25 1317   Post-Acute Status   Post-Acute Authorization Placement   Post-Acute Placement Status Referrals Sent   Hospital Resources/Appts/Education Provided Appointments scheduled and added to AVS   Discharge Delays None known at this time   Discharge Plan   Discharge Plan A Skilled Nursing Facility

## 2025-01-23 LAB
ANION GAP SERPL CALC-SCNC: 10 MMOL/L (ref 8–16)
APTT PPP: 41.6 SEC (ref 21–32)
BASOPHILS # BLD AUTO: 0.03 K/UL (ref 0–0.2)
BASOPHILS NFR BLD: 0.4 % (ref 0–1.9)
BUN SERPL-MCNC: 7 MG/DL (ref 8–23)
CALCIUM SERPL-MCNC: 8.9 MG/DL (ref 8.7–10.5)
CHLORIDE SERPL-SCNC: 107 MMOL/L (ref 95–110)
CO2 SERPL-SCNC: 20 MMOL/L (ref 23–29)
CREAT SERPL-MCNC: 0.8 MG/DL (ref 0.5–1.4)
DIFFERENTIAL METHOD BLD: ABNORMAL
EOSINOPHIL # BLD AUTO: 0.1 K/UL (ref 0–0.5)
EOSINOPHIL NFR BLD: 1.1 % (ref 0–8)
ERYTHROCYTE [DISTWIDTH] IN BLOOD BY AUTOMATED COUNT: 17.2 % (ref 11.5–14.5)
EST. GFR  (NO RACE VARIABLE): >60 ML/MIN/1.73 M^2
GLUCOSE SERPL-MCNC: 77 MG/DL (ref 70–110)
HCT VFR BLD AUTO: 30.3 % (ref 37–48.5)
HGB BLD-MCNC: 10.2 G/DL (ref 12–16)
IMM GRANULOCYTES # BLD AUTO: 0.11 K/UL (ref 0–0.04)
IMM GRANULOCYTES NFR BLD AUTO: 1.3 % (ref 0–0.5)
LYMPHOCYTES # BLD AUTO: 2 K/UL (ref 1–4.8)
LYMPHOCYTES NFR BLD: 24.4 % (ref 18–48)
MCH RBC QN AUTO: 32.7 PG (ref 27–31)
MCHC RBC AUTO-ENTMCNC: 33.7 G/DL (ref 32–36)
MCV RBC AUTO: 97 FL (ref 82–98)
MONOCYTES # BLD AUTO: 0.8 K/UL (ref 0.3–1)
MONOCYTES NFR BLD: 9.3 % (ref 4–15)
NEUTROPHILS # BLD AUTO: 5.3 K/UL (ref 1.8–7.7)
NEUTROPHILS NFR BLD: 63.5 % (ref 38–73)
NRBC BLD-RTO: 0 /100 WBC
PLATELET # BLD AUTO: 257 K/UL (ref 150–450)
PMV BLD AUTO: 9.8 FL (ref 9.2–12.9)
POCT GLUCOSE: 104 MG/DL (ref 70–110)
POCT GLUCOSE: 78 MG/DL (ref 70–110)
POCT GLUCOSE: 95 MG/DL (ref 70–110)
POCT GLUCOSE: 99 MG/DL (ref 70–110)
POTASSIUM SERPL-SCNC: 3.6 MMOL/L (ref 3.5–5.1)
RBC # BLD AUTO: 3.12 M/UL (ref 4–5.4)
SODIUM SERPL-SCNC: 137 MMOL/L (ref 136–145)
WBC # BLD AUTO: 8.29 K/UL (ref 3.9–12.7)

## 2025-01-23 PROCEDURE — 85730 THROMBOPLASTIN TIME PARTIAL: CPT | Performed by: HOSPITALIST

## 2025-01-23 PROCEDURE — 25000003 PHARM REV CODE 250: Performed by: FAMILY MEDICINE

## 2025-01-23 PROCEDURE — 63600175 PHARM REV CODE 636 W HCPCS: Performed by: INTERNAL MEDICINE

## 2025-01-23 PROCEDURE — 63600175 PHARM REV CODE 636 W HCPCS: Performed by: FAMILY MEDICINE

## 2025-01-23 PROCEDURE — 25000003 PHARM REV CODE 250: Performed by: INTERNAL MEDICINE

## 2025-01-23 PROCEDURE — 25000003 PHARM REV CODE 250: Performed by: NURSE PRACTITIONER

## 2025-01-23 PROCEDURE — 80048 BASIC METABOLIC PNL TOTAL CA: CPT | Performed by: HOSPITALIST

## 2025-01-23 PROCEDURE — 27000207 HC ISOLATION

## 2025-01-23 PROCEDURE — 21400001 HC TELEMETRY ROOM

## 2025-01-23 PROCEDURE — S4991 NICOTINE PATCH NONLEGEND: HCPCS | Performed by: NURSE PRACTITIONER

## 2025-01-23 PROCEDURE — 63600175 PHARM REV CODE 636 W HCPCS: Performed by: STUDENT IN AN ORGANIZED HEALTH CARE EDUCATION/TRAINING PROGRAM

## 2025-01-23 PROCEDURE — 85025 COMPLETE CBC W/AUTO DIFF WBC: CPT | Performed by: EMERGENCY MEDICINE

## 2025-01-23 RX ORDER — LIDOCAINE HYDROCHLORIDE 10 MG/ML
INJECTION, SOLUTION INFILTRATION; PERINEURAL
Status: COMPLETED | OUTPATIENT
Start: 2025-01-23 | End: 2025-01-23

## 2025-01-23 RX ORDER — FENTANYL CITRATE 50 UG/ML
INJECTION, SOLUTION INTRAMUSCULAR; INTRAVENOUS
Status: COMPLETED | OUTPATIENT
Start: 2025-01-23 | End: 2025-01-23

## 2025-01-23 RX ORDER — OXYCODONE AND ACETAMINOPHEN 10; 325 MG/1; MG/1
1 TABLET ORAL EVERY 6 HOURS PRN
Status: DISCONTINUED | OUTPATIENT
Start: 2025-01-23 | End: 2025-01-25

## 2025-01-23 RX ADMIN — PIPERACILLIN SODIUM AND TAZOBACTAM SODIUM 4.5 G: 4; .5 INJECTION, POWDER, LYOPHILIZED, FOR SOLUTION INTRAVENOUS at 05:01

## 2025-01-23 RX ADMIN — LIDOCAINE HYDROCHLORIDE 5 ML: 10 INJECTION, SOLUTION INFILTRATION; PERINEURAL at 02:01

## 2025-01-23 RX ADMIN — MORPHINE SULFATE 2 MG: 2 INJECTION, SOLUTION INTRAMUSCULAR; INTRAVENOUS at 08:01

## 2025-01-23 RX ADMIN — PIPERACILLIN SODIUM AND TAZOBACTAM SODIUM 4.5 G: 4; .5 INJECTION, POWDER, LYOPHILIZED, FOR SOLUTION INTRAVENOUS at 12:01

## 2025-01-23 RX ADMIN — MORPHINE SULFATE 2 MG: 2 INJECTION, SOLUTION INTRAMUSCULAR; INTRAVENOUS at 12:01

## 2025-01-23 RX ADMIN — FENTANYL CITRATE 50 MCG: 50 INJECTION INTRAMUSCULAR; INTRAVENOUS at 02:01

## 2025-01-23 RX ADMIN — MORPHINE SULFATE 2 MG: 2 INJECTION, SOLUTION INTRAMUSCULAR; INTRAVENOUS at 06:01

## 2025-01-23 RX ADMIN — OXYCODONE AND ACETAMINOPHEN 1 TABLET: 10; 325 TABLET ORAL at 08:01

## 2025-01-23 RX ADMIN — LEVOTHYROXINE SODIUM 75 MCG: 25 TABLET ORAL at 05:01

## 2025-01-23 RX ADMIN — MORPHINE SULFATE 2 MG: 2 INJECTION, SOLUTION INTRAMUSCULAR; INTRAVENOUS at 04:01

## 2025-01-23 RX ADMIN — PANTOPRAZOLE SODIUM 40 MG: 40 TABLET, DELAYED RELEASE ORAL at 08:01

## 2025-01-23 RX ADMIN — ATORVASTATIN CALCIUM 40 MG: 40 TABLET, FILM COATED ORAL at 08:01

## 2025-01-23 RX ADMIN — PIPERACILLIN SODIUM AND TAZOBACTAM SODIUM 4.5 G: 4; .5 INJECTION, POWDER, LYOPHILIZED, FOR SOLUTION INTRAVENOUS at 08:01

## 2025-01-23 RX ADMIN — MORPHINE SULFATE 2 MG: 2 INJECTION, SOLUTION INTRAMUSCULAR; INTRAVENOUS at 10:01

## 2025-01-23 RX ADMIN — NICOTINE 1 PATCH: 21 PATCH, EXTENDED RELEASE TRANSDERMAL at 08:01

## 2025-01-23 NOTE — BRIEF OP NOTE
Radiology Post-Procedure Note    Pre Op Diagnosis: fluid collection    Post Op Diagnosis: same    Procedure: drain exchange     Procedure performed by: Chika Brown MD    Written Informed Consent Obtained: Yes    Specimen Removed: NO    Estimated Blood Loss: Minimal    Findings:   Exchange of now 14 fr drain. Contrast injection shows contrast flow into adjacent colon.    Patient tolerated procedure well.    Chika Brown MD  Interventional Radiology

## 2025-01-23 NOTE — SEDATION DOCUMENTATION
Pt arrived to ir suite for abscess drain exchange. Pt oriented to unit and staff, Pt safely transferred from stretcher to procedural table. Fall risk reviewed and comfort measures utilized with interventions. Safety strap applied, position pillows to minimize pressure points. Blankets applied. Pt prepped and draped utilizing standard sterile technique. Patient placed on continuous monitoring, as required by sedation policy. Timeouts implemented utilizing standard universal time-out per department and facility policy. RN to remain at bedside with continuous monitoring. Pt resting comfortably. Denies pain/discomfort. Will continue to monitor. See flow sheets for monitoring, medication administration, and updates. patient verbalizes understanding.

## 2025-01-23 NOTE — ASSESSMENT & PLAN NOTE
DDx:  Bowel obstruction ruled out: CT shows distended small bowel proximal to anastomosis, though Gastrografin has passed  Anastomotic stricture (ruled out)  Infectious process: Due to anterior abdominal wall abscess vs acute UTI. Possible pneumonia based on CT chest.     Dx:  IR drain placed 1/15. 20cc of bloody purulent fluid.   Abscess cultures - KLEBSIELLA OXYTOCA   Colonoscopy on 1/17: Patent end-to-side ileocolonic anastomosis with purulent drainage likely from abscess; anastomosis intact; ileum normal; no specimens collected.  CT chest abdomen pelvis: Patchy GGOs in lungs c/f asp or PNA; biliary duct dilation post-chad, no mass, patulous esoph w/air-fluid level c/f dysmotility/reflux/achalasia; 10.5 cm R abd wall collection, decreased  Follow up MRCP    Tx:  GI, ID, and Gen Surg consult  IV cipro escalated to Zosyn due to low grade fever, tachycardia which is now resolved  Cultures to guide therapy  Monitor output from IR drain - scant output.  Follow up imaging and consider drain removal if improvement in collection  Larger drain placed by IR on 1/23/25     Referred To Mid-Level For Closure Text (Leave Blank If You Do Not Want): After obtaining clear surgical margins the patient was sent to a mid-level provider for surgical repair.  The patient understands they will receive post-surgical care and follow-up from the mid-level provider.

## 2025-01-23 NOTE — PLAN OF CARE
Problem: Adult Inpatient Plan of Care  Goal: Plan of Care Review  Outcome: Progressing  Goal: Patient-Specific Goal (Individualized)  Outcome: Progressing  Goal: Absence of Hospital-Acquired Illness or Injury  Outcome: Progressing  Goal: Optimal Comfort and Wellbeing  Outcome: Progressing     Problem: Fall Injury Risk  Goal: Absence of Fall and Fall-Related Injury  Outcome: Progressing     Problem: Skin Injury Risk Increased  Goal: Skin Health and Integrity  Outcome: Progressing     Problem: Pain Acute  Goal: Optimal Pain Control and Function  Outcome: Progressing     Problem: Mobility Impairment  Goal: Optimal Mobility  Outcome: Progressing     Problem: UTI (Urinary Tract Infection)  Goal: Improved Infection Symptoms  Outcome: Progressing     Problem: Wound  Goal: Optimal Functional Ability  Outcome: Progressing  Goal: Optimal Pain Control and Function  Outcome: Progressing     Problem: Diabetes Comorbidity  Goal: Blood Glucose Level Within Targeted Range  Outcome: Progressing     Medication administered per MAR. IV heparin drip maintained at 18units/kg/hr. PRN morphine administered for pain per order. IV ABX. Zflex boots in place - BLE. Bed alarm active. Call light within reach.

## 2025-01-23 NOTE — PLAN OF CARE
Problem: Adult Inpatient Plan of Care  Goal: Plan of Care Review  Outcome: Progressing  Goal: Patient-Specific Goal (Individualized)  Outcome: Progressing  Goal: Absence of Hospital-Acquired Illness or Injury  Outcome: Progressing  Goal: Optimal Comfort and Wellbeing  Outcome: Progressing  Goal: Readiness for Transition of Care  Outcome: Progressing     Problem: Skin Injury Risk Increased  Goal: Skin Health and Integrity  Outcome: Progressing     Problem: Diabetes Comorbidity  Goal: Blood Glucose Level Within Targeted Range  Outcome: Progressing     Problem: Infection  Goal: Absence of Infection Signs and Symptoms  Outcome: Progressing     Problem: Wound  Goal: Optimal Coping  Outcome: Progressing  Goal: Optimal Functional Ability  Outcome: Progressing  Goal: Absence of Infection Signs and Symptoms  Outcome: Progressing  Goal: Improved Oral Intake  Outcome: Progressing  Goal: Optimal Pain Control and Function  Outcome: Progressing  Goal: Skin Health and Integrity  Outcome: Progressing  Goal: Optimal Wound Healing  Outcome: Progressing     Problem: Fall Injury Risk  Goal: Absence of Fall and Fall-Related Injury  Outcome: Progressing     Problem: VTE (Venous Thromboembolism)  Goal: Tissue Perfusion  Outcome: Progressing  Goal: Right Ventricular Function  Outcome: Progressing     Problem: Pain Acute  Goal: Optimal Pain Control and Function  Outcome: Progressing

## 2025-01-23 NOTE — SEDATION DOCUMENTATION
Procedure completed. Patient tolerated well; VSS. Site CDI. 10 fr drain replaced with 14 fr drain. Patient to be transported back to room on floor for recovery; report to be given at the bedside.

## 2025-01-23 NOTE — PROGRESS NOTES
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 She  presented to the ED with complaint of abdominal pain.  Diagnosis with colon cancer in Nov 2024 and underwent colon resection, however developed intra abdominal infection requiring operative intervention. She was admitted to SNF facility and underwent treatment with IV antibiotic. She continue to experiencing symptoms of abdominal pain, increase confusion. CT of abdominal showed  Operative change of partial colon resection and ileal colic anastomosis with suspected developing small bowel obstruction and transition point seen at the ileocolic anastomosis. Rim enhancing gas and fluid collection at the lateral right abdominal wall concerning for abscess. General Surgery consulted, with plan IR consultation for consideration of aspiration vs drain placement into her anterior abdominal wall collection. Lt leg verous ultrasound showed,.left lower extremity DVT in the common femoral vein. Patient being admitted under the care of Hospital Medicine.              Overview/Hospital Course:  No notes on file    Interval History:   NAEON  Patient reports she has diarrhea.   Patient reports persistent abdominal pain  No f/c/n/v  No other complaints    No surgical interventions rec'd. SNF pending?. IR replaced with a larger drain    Review of Systems  Objective:     Vital Signs (Most Recent):  Temp: 98.3 °F (36.8 °C) (01/23/25 1101)  Pulse: 100 (01/23/25 1402)  Resp: 18 (01/23/25 1402)  BP: (!) 123/58 (01/23/25 1402)  SpO2: 100 %  (01/23/25 1402) Vital Signs (24h Range):  Temp:  [98.2 °F (36.8 °C)-99.2 °F (37.3 °C)] 98.3 °F (36.8 °C)  Pulse:  [] 100  Resp:  [16-19] 18  SpO2:  [94 %-100 %] 100 %  BP: ()/(58-81) 123/58     Weight: 68.5 kg (151 lb 0.2 oz)  Body mass index is 27.62 kg/m².    Intake/Output Summary (Last 24 hours) at 1/23/2025 1447  Last data filed at 1/23/2025 1442  Gross per 24 hour   Intake 360 ml   Output 1500 ml   Net -1140 ml         Physical Exam  Constitutional:       General: She is not in acute distress.     Appearance: She is ill-appearing. She is not toxic-appearing.   HENT:      Mouth/Throat:      Mouth: Mucous membranes are moist.      Pharynx: Oropharynx is clear.   Eyes:      Extraocular Movements: Extraocular movements intact.      Conjunctiva/sclera: Conjunctivae normal.   Cardiovascular:      Rate and Rhythm: Normal rate and regular rhythm.   Pulmonary:      Effort: Pulmonary effort is normal. No respiratory distress.      Breath sounds: Normal breath sounds.   Abdominal:      General: Bowel sounds are normal. There is no distension.      Palpations: Abdomen is soft.      Tenderness: There is abdominal tenderness. There is no guarding.      Comments: R side drain in place, dark brown output, scant   Musculoskeletal:         General: Normal range of motion.      Right lower leg: No edema.      Left lower leg: No edema.   Skin:     General: Skin is warm and dry.   Neurological:      General: No focal deficit present.      Mental Status: She is alert and oriented to person, place, and time.   Psychiatric:         Mood and Affect: Mood normal.         Behavior: Behavior normal.             Significant Labs: All pertinent labs within the past 24 hours have been reviewed.    Significant Imaging: I have reviewed all pertinent imaging results/findings within the past 24 hours.    Assessment and Plan     * Epigastric pain  DDx:  Bowel obstruction ruled out: CT shows distended small bowel proximal to  anastomosis, though Gastrografin has passed  Anastomotic stricture (ruled out)  Infectious process: Due to anterior abdominal wall abscess vs acute UTI. Possible pneumonia based on CT chest.     Dx:  IR drain placed 1/15. 20cc of bloody purulent fluid.   Abscess cultures - KLEBSIELLA OXYTOCA   Colonoscopy on 1/17: Patent end-to-side ileocolonic anastomosis with purulent drainage likely from abscess; anastomosis intact; ileum normal; no specimens collected.  CT chest abdomen pelvis: Patchy GGOs in lungs c/f asp or PNA; biliary duct dilation post-chad, no mass, patulous esoph w/air-fluid level c/f dysmotility/reflux/achalasia; 10.5 cm R abd wall collection, decreased  Follow up MRCP    Tx:  GI, ID, and Gen Surg consult  IV cipro escalated to Zosyn due to low grade fever, tachycardia which is now resolved  Cultures to guide therapy  Monitor output from IR drain - scant output.  Follow up imaging and consider drain removal if improvement in collection  Larger drain placed by IR on 1/23/25      Hypoglycemia  Resolved    Tx:  Continue D10 1/2NS while NPO      PICC (peripherally inserted central catheter) in place  PICC placed at OSH on 11/3 due to difficulty with access    Tx:  PICC replaced on 1/18 this admission due to functional issues      Acute UTI  Dx:  Urine culture ENTEROCOCCUS FAECIUM     Tx:  Macrobid x5 day course    Acute DVT (deep venous thrombosis)  Likely due to recent hospitalization and sedentary risk factor    Left thigh veins: Occlusive thrombus in the common femoral    Tx:  Heparin drip   Consult cardiovascular   Transition to O[facility] at discharge        Tobacco dependency  Dangers of cigarette smoking were reviewed with patient in detail. Patient was Counseled for 3-10 minutes. Nicotine replacement options were discussed. Nicotine replacement was discussed-       Carotid arterial disease        Moderate malnutrition  Nutrition consulted. Most recent weight and BMI monitored-     Measurements:  Wt  Readings from Last 1 Encounters:   01/17/25 68.5 kg (151 lb 0.2 oz)   Body mass index is 27.62 kg/m².    Patient has been screened and assessed by RD.    Malnutrition Type:  Context: chronic illness  Level: moderate    Malnutrition Characteristic Summary:  Energy Intake (Malnutrition): less than or equal to 75% for greater than or equal to 1 month    Interventions/Recommendations (treatment strategy):  1. General Healthful diet  2. Commercial beverage medical food supplement therapy      Atherosclerosis of aorta  Continue Statin       Type 2 diabetes mellitus with hypertriglyceridemia  Accu checks every 6 hours with SSI       GERD (gastroesophageal reflux disease)  Continue PPI       Hypothyroidism    Continue Synthroid       VTE Risk Mitigation (From admission, onward)           Ordered     heparin 25,000 units in dextrose 5% (100 units/ml) IV bolus from bag HIGH INTENSITY nomogram - [facility]  As needed (PRN)        Question:  Heparin Infusion Adjustment (DO NOT MODIFY ANSWER)  Answer:  \\[email or web address]\epic\Images\Pharmacy\HeparinInfusions\heparin HIGH INTENSITY nomogram for [facility] [id number].pdf    01/14/25 1917     heparin 25,000 units in dextrose 5% (100 units/ml) IV bolus from bag HIGH INTENSITY nomogram - [facility]  As needed (PRN)        Question:  Heparin Infusion Adjustment (DO NOT MODIFY ANSWER)  Answer:  \\[email or web address]\epic\Images\Pharmacy\HeparinInfusions\heparin HIGH INTENSITY nomogram for [facility] [id number].pdf    01/14/25 1917     heparin 25,000 units in dextrose 5% 250 mL (100 units/mL) infusion HIGH INTENSITY nomogram - [facility]  Continuous        Question:  Begin at (units/kg/hr)  Answer:  18    01/14/25 1917                    Discharge Planning   MAHIN: 1/24/2025     Code Status: Full Code   Medical Readiness for Discharge Date:   Discharge Plan A: Skilled Nursing Facility   Discharge Delays: None known at this time            Please place Justification for DME        Lori Teixeira MD  Department of Hospital Medicine    [facility]

## 2025-01-23 NOTE — CONSULTS
Interventional Radiology Consult/Pre-Procedure Note      Chief Complaint/Reason for Consult: persistent fluid collection    History of Present Illness:  Thom Krishna is a 69 y.o. female with the PMH listed below who presents for drain exchange.    Admission H&P reviewed.    Past Medical History:   Diagnosis Date    Anxiety and depression 2015    Arthritis     Cervical radiculopathy 2016    Cirrhosis of liver     Colon cancer 2024    Colon polyps 2015    Diabetes mellitus type 2 with neurological manifestations 2015    no current medications, only one episode of elevated sugars with acute illness, will monitor     Encounter for blood transfusion     Hepatitis C     s/p treatment per patient report; managed by Dr. Perez    Hip fracture     Macrocytosis 2015    Thyroid disease     Transfusion reaction     hep c     Past Surgical History:   Procedure Laterality Date    APPENDECTOMY      BACK SURGERY      CAROTID ENDARTERECTOMY Right 2023    Procedure: ENDARTERECTOMY, CAROTID;  Surgeon: Juan James MD;  Location: [facility] OR;  Service: Cardiology;  Laterality: Right;  RIGHT    CAUDAL EPIDURAL STEROID INJECTION N/A 2018    Procedure: Injection-steroid-epidural-caudal;  Surgeon: Roxana Gu Jr., MD;  Location: [facility] OR;  Service: Pain Management;  Laterality: N/A;  with cath target L4-5    CAUDAL EPIDURAL STEROID INJECTION N/A 2019    Procedure: Injection-steroid-epidural-caudal;  Surgeon: Roxana Gu Jr., MD;  Location: [facility] PAIN MGT;  Service: Pain Management;  Laterality: N/A;     SECTION      CHOLECYSTECTOMY      COLONOSCOPY  3/31/10    2 polyps, tubular adenoma    COLONOSCOPY  2015    Dr. Washington    COLONOSCOPY N/A 10/10/2018    Procedure: COLONOSCOPY;  Surgeon: Reuben Miller Jr., MD;  Location: [facility] ENDO;  Service: Endoscopy;  Laterality: N/A;    COLONOSCOPY N/A 2025    Procedure: COLONOSCOPY;  Surgeon: Ruslan Alvarez MD;   "Location: [facility];  Service: Endoscopy;  Laterality: N/A;    ESOPHAGOGASTRODUODENOSCOPY N/A 10/10/2018    Procedure: EGD (ESOPHAGOGASTRODUODENOSCOPY);  Surgeon: Reuben Miller Jr., MD;  Location: [facility];  Service: Endoscopy;  Laterality: N/A;    ESOPHAGOGASTRODUODENOSCOPY N/A 12/10/2018    Procedure: EGD (ESOPHAGOGASTRODUODENOSCOPY)/poss emr;  Surgeon: Belle Kuo MD;  Location: [facility] (2ND FLR);  Service: Endoscopy;  Laterality: N/A;    ESOPHAGOGASTRODUODENOSCOPY N/A 3/12/2019    Procedure: EGD (ESOPHAGOGASTRODUODENOSCOPY);  Surgeon: Polo Keyes MD;  Location: [facility];  Service: Endoscopy;  Laterality: N/A;    ESOPHAGOGASTRODUODENOSCOPY N/A 11/20/2020    Procedure: ESOPHAGOGASTRODUODENOSCOPY (EGD);  Surgeon: Belle Kuo MD;  Location: [facility];  Service: Endoscopy;  Laterality: N/A;    HERNIA REPAIR      HYSTERECTOMY  1989    Uterus and both ovaries    INCISIONAL HERNIA REPAIR      x 2    JOINT REPLACEMENT      LIVER BIOPSY  12/2003    autoimmune hepatitis    ROTATOR CUFF REPAIR      right    STOMACH SURGERY  1980's    "took lymph node off of liver"    TOTAL HIP ARTHROPLASTY      left hip    UPPER GASTROINTESTINAL ENDOSCOPY  3/24/10    normal    UPPER GASTROINTESTINAL ENDOSCOPY  04/27/2015    Dr. Washington       Allergies:   Review of patient's allergies indicates:   Allergen Reactions    Cefaclor Hives    Gabapentin Swelling    Penicillins      Other reaction(s): Hives    Sulfa (sulfonamide antibiotics) Other (See Comments)     Other reaction(s): Hives       Scheduled Meds:    atorvastatin  40 mg Oral Daily    levothyroxine  75 mcg Oral Before breakfast    nicotine  1 patch Transdermal Daily    pantoprazole  40 mg Oral Daily    piperacillin-tazobactam (Zosyn) IV (PEDS and ADULTS) (extended infusion is not appropriate)  4.5 g Intravenous Q8H     Continuous Infusions:    heparin (porcine) in D5W  0-40 Units/kg/hr Intravenous Continuous 5.3 mL/hr at 01/22/25 1143 8 Units/kg/hr at 01/22/25 " "1143     PRN Meds:  Current Facility-Administered Medications:     acetaminophen, 650 mg, Oral, Q6H PRN    dextrose 50%, 12.5 g, Intravenous, PRN    dextrose 50%, 25 g, Intravenous, PRN    glucagon (human recombinant), 1 mg, Intramuscular, PRN    glucose, 16 g, Oral, PRN    glucose, 24 g, Oral, PRN    heparin (PORCINE), 60 Units/kg, Intravenous, PRN    heparin (PORCINE), 30 Units/kg, Intravenous, PRN    insulin aspart U-100, 0-5 Units, Subcutaneous, Q6H PRN    morphine, 2 mg, Intravenous, Q4H PRN    ondansetron, 8 mg, Oral, TID PRN    oxyCODONE-acetaminophen, 1 tablet, Oral, Q6H PRN    Flushing PICC/Midline Protocol, , , Until Discontinued **AND** sodium chloride 0.9%, 10 mL, Intravenous, Q12H PRN    Anticoagulation/Antiplatelet Meds:  as above    Review of Systems:   As documented in admission H&P.    Physical Exam:  Temp: 98.3 °F (36.8 °C) (01/23/25 1101)  Pulse: 86 (01/23/25 1101)  Resp: 17 (01/23/25 1206)  BP: 99/66 (01/23/25 1101)  SpO2: 98 % (01/23/25 1101)     General: WNWD, NAD  HEENT: Normocephalic, sclera anicteric,  Neck: Supple  Heart: RRR  Lungs: breathing unlabored  Abd: soft, tender along the right side  Extremities: no CCE on exposed skin  Neuro: oriented to person and place    Labs:  No results for input(s): "INR", "PT", "PTT" in the last 168 hours.    Recent Labs   Lab 01/23/25  0429   WBC 8.29   HGB 10.2*   HCT 30.3*   MCV 97         Recent Labs   Lab 01/23/25  0429   GLU 77      K 3.6      CO2 20*   BUN 7*   CREATININE 0.8   CALCIUM 8.9       Imaging:  MRI was reviewed.    Assessment/Plan:   Thom Krishna is a 69 y.o. female with the PMH listed who presents for drain exchange.    Sedation:  local    Risks (including, but not limited to, pain, bleeding, infection, damage to nearby structures, treatment failure/recurrence, and the need for additional procedures), potential benefits, and alternatives were discussed with the patient. All questions were answered to the best of my " abilities. The patient wishes to proceed. Written informed consent was obtained from son      Chika Brown MD

## 2025-01-23 NOTE — SUBJECTIVE & OBJECTIVE
Interval History:   ARCENIO  Patient reports she has diarrhea.   Patient reports persistent abdominal pain  No f/c/n/v  No other complaints    No surgical interventions rec'd. SNF pending?. IR replaced with a larger drain    Review of Systems  Objective:     Vital Signs (Most Recent):  Temp: 98.3 °F (36.8 °C) (01/23/25 1101)  Pulse: 100 (01/23/25 1402)  Resp: 18 (01/23/25 1402)  BP: (!) 123/58 (01/23/25 1402)  SpO2: 100 % (01/23/25 1402) Vital Signs (24h Range):  Temp:  [98.2 °F (36.8 °C)-99.2 °F (37.3 °C)] 98.3 °F (36.8 °C)  Pulse:  [] 100  Resp:  [16-19] 18  SpO2:  [94 %-100 %] 100 %  BP: ()/(58-81) 123/58     Weight: 68.5 kg (151 lb 0.2 oz)  Body mass index is 27.62 kg/m².    Intake/Output Summary (Last 24 hours) at 1/23/2025 1447  Last data filed at 1/23/2025 1442  Gross per 24 hour   Intake 360 ml   Output 1500 ml   Net -1140 ml         Physical Exam  Constitutional:       General: She is not in acute distress.     Appearance: She is ill-appearing. She is not toxic-appearing.   HENT:      Mouth/Throat:      Mouth: Mucous membranes are moist.      Pharynx: Oropharynx is clear.   Eyes:      Extraocular Movements: Extraocular movements intact.      Conjunctiva/sclera: Conjunctivae normal.   Cardiovascular:      Rate and Rhythm: Normal rate and regular rhythm.   Pulmonary:      Effort: Pulmonary effort is normal. No respiratory distress.      Breath sounds: Normal breath sounds.   Abdominal:      General: Bowel sounds are normal. There is no distension.      Palpations: Abdomen is soft.      Tenderness: There is abdominal tenderness. There is no guarding.      Comments: R side drain in place, dark brown output, scant   Musculoskeletal:         General: Normal range of motion.      Right lower leg: No edema.      Left lower leg: No edema.   Skin:     General: Skin is warm and dry.   Neurological:      General: No focal deficit present.      Mental Status: She is alert and oriented to person, place, and  time.   Psychiatric:         Mood and Affect: Mood normal.         Behavior: Behavior normal.             Significant Labs: All pertinent labs within the past 24 hours have been reviewed.    Significant Imaging: I have reviewed all pertinent imaging results/findings within the past 24 hours.

## 2025-01-24 ENCOUNTER — CLINICAL SUPPORT (OUTPATIENT)
Dept: SMOKING CESSATION | Facility: CLINIC | Age: 70
End: 2025-01-24

## 2025-01-24 DIAGNOSIS — F17.210 CIGARETTE SMOKER: Primary | ICD-10-CM

## 2025-01-24 LAB
ANION GAP SERPL CALC-SCNC: 9 MMOL/L (ref 8–16)
APTT PPP: 50.1 SEC (ref 21–32)
BASOPHILS # BLD AUTO: 0.04 K/UL (ref 0–0.2)
BASOPHILS NFR BLD: 0.5 % (ref 0–1.9)
BUN SERPL-MCNC: 12 MG/DL (ref 8–23)
CALCIUM SERPL-MCNC: 8.5 MG/DL (ref 8.7–10.5)
CHLORIDE SERPL-SCNC: 104 MMOL/L (ref 95–110)
CO2 SERPL-SCNC: 21 MMOL/L (ref 23–29)
CREAT SERPL-MCNC: 0.7 MG/DL (ref 0.5–1.4)
DIFFERENTIAL METHOD BLD: ABNORMAL
EOSINOPHIL # BLD AUTO: 0.1 K/UL (ref 0–0.5)
EOSINOPHIL NFR BLD: 1 % (ref 0–8)
ERYTHROCYTE [DISTWIDTH] IN BLOOD BY AUTOMATED COUNT: 17.2 % (ref 11.5–14.5)
EST. GFR  (NO RACE VARIABLE): >60 ML/MIN/1.73 M^2
FUNGUS SPEC CULT: ABNORMAL
GLUCOSE SERPL-MCNC: 84 MG/DL (ref 70–110)
HCT VFR BLD AUTO: 31.3 % (ref 37–48.5)
HGB BLD-MCNC: 10.3 G/DL (ref 12–16)
IMM GRANULOCYTES # BLD AUTO: 0.11 K/UL (ref 0–0.04)
IMM GRANULOCYTES NFR BLD AUTO: 1.4 % (ref 0–0.5)
LYMPHOCYTES # BLD AUTO: 1.6 K/UL (ref 1–4.8)
LYMPHOCYTES NFR BLD: 20.6 % (ref 18–48)
MCH RBC QN AUTO: 31.9 PG (ref 27–31)
MCHC RBC AUTO-ENTMCNC: 32.9 G/DL (ref 32–36)
MCV RBC AUTO: 97 FL (ref 82–98)
MONOCYTES # BLD AUTO: 0.7 K/UL (ref 0.3–1)
MONOCYTES NFR BLD: 8.3 % (ref 4–15)
NEUTROPHILS # BLD AUTO: 5.4 K/UL (ref 1.8–7.7)
NEUTROPHILS NFR BLD: 68.2 % (ref 38–73)
NRBC BLD-RTO: 0 /100 WBC
PLATELET # BLD AUTO: 258 K/UL (ref 150–450)
PMV BLD AUTO: 9.7 FL (ref 9.2–12.9)
POCT GLUCOSE: 104 MG/DL (ref 70–110)
POCT GLUCOSE: 109 MG/DL (ref 70–110)
POCT GLUCOSE: 80 MG/DL (ref 70–110)
POCT GLUCOSE: 91 MG/DL (ref 70–110)
POTASSIUM SERPL-SCNC: 3.4 MMOL/L (ref 3.5–5.1)
RBC # BLD AUTO: 3.23 M/UL (ref 4–5.4)
SODIUM SERPL-SCNC: 134 MMOL/L (ref 136–145)
WBC # BLD AUTO: 7.92 K/UL (ref 3.9–12.7)

## 2025-01-24 PROCEDURE — 63600175 PHARM REV CODE 636 W HCPCS: Performed by: FAMILY MEDICINE

## 2025-01-24 PROCEDURE — 97530 THERAPEUTIC ACTIVITIES: CPT

## 2025-01-24 PROCEDURE — 21400001 HC TELEMETRY ROOM

## 2025-01-24 PROCEDURE — 85730 THROMBOPLASTIN TIME PARTIAL: CPT | Performed by: FAMILY MEDICINE

## 2025-01-24 PROCEDURE — 97110 THERAPEUTIC EXERCISES: CPT

## 2025-01-24 PROCEDURE — 25000003 PHARM REV CODE 250: Performed by: INTERNAL MEDICINE

## 2025-01-24 PROCEDURE — 97535 SELF CARE MNGMENT TRAINING: CPT | Mod: CO

## 2025-01-24 PROCEDURE — 63600175 PHARM REV CODE 636 W HCPCS: Performed by: EMERGENCY MEDICINE

## 2025-01-24 PROCEDURE — 80048 BASIC METABOLIC PNL TOTAL CA: CPT | Performed by: HOSPITALIST

## 2025-01-24 PROCEDURE — 97530 THERAPEUTIC ACTIVITIES: CPT | Mod: CO

## 2025-01-24 PROCEDURE — 99406 BEHAV CHNG SMOKING 3-10 MIN: CPT | Mod: ,,,

## 2025-01-24 PROCEDURE — 25000003 PHARM REV CODE 250: Performed by: NURSE PRACTITIONER

## 2025-01-24 PROCEDURE — S4991 NICOTINE PATCH NONLEGEND: HCPCS | Performed by: NURSE PRACTITIONER

## 2025-01-24 PROCEDURE — 85025 COMPLETE CBC W/AUTO DIFF WBC: CPT | Performed by: EMERGENCY MEDICINE

## 2025-01-24 PROCEDURE — 25000003 PHARM REV CODE 250: Performed by: FAMILY MEDICINE

## 2025-01-24 PROCEDURE — 63600175 PHARM REV CODE 636 W HCPCS: Performed by: INTERNAL MEDICINE

## 2025-01-24 PROCEDURE — 27000207 HC ISOLATION

## 2025-01-24 RX ORDER — POTASSIUM CHLORIDE 20 MEQ/1
40 TABLET, EXTENDED RELEASE ORAL ONCE
Status: COMPLETED | OUTPATIENT
Start: 2025-01-24 | End: 2025-01-24

## 2025-01-24 RX ORDER — MORPHINE SULFATE 4 MG/ML
4 INJECTION, SOLUTION INTRAMUSCULAR; INTRAVENOUS ONCE
Status: COMPLETED | OUTPATIENT
Start: 2025-01-25 | End: 2025-01-25

## 2025-01-24 RX ADMIN — LEVOTHYROXINE SODIUM 75 MCG: 25 TABLET ORAL at 05:01

## 2025-01-24 RX ADMIN — PIPERACILLIN SODIUM AND TAZOBACTAM SODIUM 4.5 G: 4; .5 INJECTION, POWDER, LYOPHILIZED, FOR SOLUTION INTRAVENOUS at 12:01

## 2025-01-24 RX ADMIN — MORPHINE SULFATE 2 MG: 2 INJECTION, SOLUTION INTRAMUSCULAR; INTRAVENOUS at 12:01

## 2025-01-24 RX ADMIN — OXYCODONE AND ACETAMINOPHEN 1 TABLET: 10; 325 TABLET ORAL at 06:01

## 2025-01-24 RX ADMIN — MORPHINE SULFATE 2 MG: 2 INJECTION, SOLUTION INTRAMUSCULAR; INTRAVENOUS at 07:01

## 2025-01-24 RX ADMIN — MORPHINE SULFATE 2 MG: 2 INJECTION, SOLUTION INTRAMUSCULAR; INTRAVENOUS at 03:01

## 2025-01-24 RX ADMIN — NICOTINE 1 PATCH: 21 PATCH, EXTENDED RELEASE TRANSDERMAL at 08:01

## 2025-01-24 RX ADMIN — OXYCODONE AND ACETAMINOPHEN 1 TABLET: 10; 325 TABLET ORAL at 10:01

## 2025-01-24 RX ADMIN — PIPERACILLIN SODIUM AND TAZOBACTAM SODIUM 4.5 G: 4; .5 INJECTION, POWDER, LYOPHILIZED, FOR SOLUTION INTRAVENOUS at 08:01

## 2025-01-24 RX ADMIN — ATORVASTATIN CALCIUM 40 MG: 40 TABLET, FILM COATED ORAL at 08:01

## 2025-01-24 RX ADMIN — MORPHINE SULFATE 2 MG: 2 INJECTION, SOLUTION INTRAMUSCULAR; INTRAVENOUS at 08:01

## 2025-01-24 RX ADMIN — PIPERACILLIN SODIUM AND TAZOBACTAM SODIUM 4.5 G: 4; .5 INJECTION, POWDER, LYOPHILIZED, FOR SOLUTION INTRAVENOUS at 05:01

## 2025-01-24 RX ADMIN — PANTOPRAZOLE SODIUM 40 MG: 40 TABLET, DELAYED RELEASE ORAL at 08:01

## 2025-01-24 RX ADMIN — MORPHINE SULFATE 2 MG: 2 INJECTION, SOLUTION INTRAMUSCULAR; INTRAVENOUS at 04:01

## 2025-01-24 RX ADMIN — OXYCODONE AND ACETAMINOPHEN 1 TABLET: 10; 325 TABLET ORAL at 04:01

## 2025-01-24 RX ADMIN — POTASSIUM CHLORIDE 40 MEQ: 1500 TABLET, EXTENDED RELEASE ORAL at 10:01

## 2025-01-24 RX ADMIN — HEPARIN SODIUM 8 UNITS/KG/HR: 10000 INJECTION, SOLUTION INTRAVENOUS at 09:01

## 2025-01-24 NOTE — PROGRESS NOTES
Individual Follow-Up Form    1/24/2025    Quit Date: To be determined    Clinical Status of Patient: Inpatient    Length of Service: 30 minutes    Comments: Smoking cessation education follow-up: Pt denies nicotine withdrawal symptoms with patch in use. Order requested uon discharge for 21 mg nicotine patch Q day. Education provided on weaning NRT. Ambualtory referral to Smoking Cessation clinic.     Diagnosis: F17.210
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
## 2025-01-24 NOTE — SUBJECTIVE & OBJECTIVE
Interval History:   ARCENIO  Patient reports she has diarrhea.   Patient reports persistent abdominal pain  No f/c/n/v  No other complaints    No surgical interventions rec'd. SNF pending?. IR replaced with a larger drain but still having pain    Review of Systems  Objective:     Vital Signs (Most Recent):  Temp: 97.7 °F (36.5 °C) (01/24/25 1054)  Pulse: 92 (01/24/25 1243)  Resp: 19 (01/24/25 1246)  BP: 116/76 (01/24/25 1054)  SpO2: 99 % (01/24/25 1054) Vital Signs (24h Range):  Temp:  [97.7 °F (36.5 °C)-98.7 °F (37.1 °C)] 97.7 °F (36.5 °C)  Pulse:  [57-92] 92  Resp:  [16-20] 19  SpO2:  [97 %-99 %] 99 %  BP: ()/(57-76) 116/76     Weight: 68.5 kg (151 lb 0.2 oz)  Body mass index is 27.62 kg/m².    Intake/Output Summary (Last 24 hours) at 1/24/2025 1612  Last data filed at 1/24/2025 0700  Gross per 24 hour   Intake --   Output 1120 ml   Net -1120 ml         Physical Exam  Constitutional:       General: She is not in acute distress.     Appearance: She is ill-appearing. She is not toxic-appearing.   HENT:      Mouth/Throat:      Mouth: Mucous membranes are moist.      Pharynx: Oropharynx is clear.   Eyes:      Extraocular Movements: Extraocular movements intact.      Conjunctiva/sclera: Conjunctivae normal.   Cardiovascular:      Rate and Rhythm: Normal rate and regular rhythm.   Pulmonary:      Effort: Pulmonary effort is normal. No respiratory distress.      Breath sounds: Normal breath sounds.   Abdominal:      General: Bowel sounds are normal. There is no distension.      Palpations: Abdomen is soft.      Tenderness: There is abdominal tenderness. There is no guarding.      Comments: R side drain in place, dark brown output, scant   Musculoskeletal:         General: Normal range of motion.      Right lower leg: No edema.      Left lower leg: No edema.   Skin:     General: Skin is warm and dry.   Neurological:      General: No focal deficit present.      Mental Status: She is alert and oriented to person, place,  and time.   Psychiatric:         Mood and Affect: Mood normal.         Behavior: Behavior normal.             Significant Labs: All pertinent labs within the past 24 hours have been reviewed.    Significant Imaging: I have reviewed all pertinent imaging results/findings within the past 24 hours.

## 2025-01-24 NOTE — PLAN OF CARE
Problem: Adult Inpatient Plan of Care  Goal: Optimal Comfort and Wellbeing  Outcome: Progressing     Problem: Diabetes Comorbidity  Goal: Blood Glucose Level Within Targeted Range  Outcome: Progressing     Problem: Skin Injury Risk Increased  Goal: Skin Health and Integrity  Outcome: Progressing     Problem: Wound  Goal: Optimal Functional Ability  Outcome: Progressing    Blood glucose monitoring.  Turn q 2 hours.  Continuous Heparin drip.  Z flex boots in place.  Waffle mattress on.  Pain meds PRN.  Safety maintained.  Bed alarm on.  Safety maintained.

## 2025-01-24 NOTE — PROGRESS NOTES
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 She  presented to the ED with complaint of abdominal pain.  Diagnosis with colon cancer in Nov 2024 and underwent colon resection, however developed intra abdominal infection requiring operative intervention. She was admitted to SNF facility and underwent treatment with IV antibiotic. She continue to experiencing symptoms of abdominal pain, increase confusion. CT of abdominal showed  Operative change of partial colon resection and ileal colic anastomosis with suspected developing small bowel obstruction and transition point seen at the ileocolic anastomosis. Rim enhancing gas and fluid collection at the lateral right abdominal wall concerning for abscess. General Surgery consulted, with plan IR consultation for consideration of aspiration vs drain placement into her anterior abdominal wall collection. Lt leg verous ultrasound showed,.left lower extremity DVT in the common femoral vein. Patient being admitted under the care of Hospital Medicine.              Overview/Hospital Course:  No notes on file    Interval History:   NAEON  Patient reports she has diarrhea.   Patient reports persistent abdominal pain  No f/c/n/v  No other complaints    No surgical interventions rec'd. SNF pending?. IR replaced with a larger drain but still having pain    Review of Systems  Objective:     Vital Signs (Most Recent):  Temp: 97.7 °F (36.5 °C) (01/24/25 1054)  Pulse: 92 (01/24/25 1243)  Resp: 19 (01/24/25 1246)  BP: 116/76 (01/24/25  1054)  SpO2: 99 % (01/24/25 1054) Vital Signs (24h Range):  Temp:  [97.7 °F (36.5 °C)-98.7 °F (37.1 °C)] 97.7 °F (36.5 °C)  Pulse:  [57-92] 92  Resp:  [16-20] 19  SpO2:  [97 %-99 %] 99 %  BP: ()/(57-76) 116/76     Weight: 68.5 kg (151 lb 0.2 oz)  Body mass index is 27.62 kg/m².    Intake/Output Summary (Last 24 hours) at 1/24/2025 1612  Last data filed at 1/24/2025 0700  Gross per 24 hour   Intake --   Output 1120 ml   Net -1120 ml         Physical Exam  Constitutional:       General: She is not in acute distress.     Appearance: She is ill-appearing. She is not toxic-appearing.   HENT:      Mouth/Throat:      Mouth: Mucous membranes are moist.      Pharynx: Oropharynx is clear.   Eyes:      Extraocular Movements: Extraocular movements intact.      Conjunctiva/sclera: Conjunctivae normal.   Cardiovascular:      Rate and Rhythm: Normal rate and regular rhythm.   Pulmonary:      Effort: Pulmonary effort is normal. No respiratory distress.      Breath sounds: Normal breath sounds.   Abdominal:      General: Bowel sounds are normal. There is no distension.      Palpations: Abdomen is soft.      Tenderness: There is abdominal tenderness. There is no guarding.      Comments: R side drain in place, dark brown output, scant   Musculoskeletal:         General: Normal range of motion.      Right lower leg: No edema.      Left lower leg: No edema.   Skin:     General: Skin is warm and dry.   Neurological:      General: No focal deficit present.      Mental Status: She is alert and oriented to person, place, and time.   Psychiatric:         Mood and Affect: Mood normal.         Behavior: Behavior normal.             Significant Labs: All pertinent labs within the past 24 hours have been reviewed.    Significant Imaging: I have reviewed all pertinent imaging results/findings within the past 24 hours.    Assessment and Plan     * Epigastric pain  DDx:  Bowel obstruction ruled out: CT shows distended small bowel proximal to  anastomosis, though Gastrografin has passed  Anastomotic stricture (ruled out)  Infectious process: Due to anterior abdominal wall abscess vs acute UTI. Possible pneumonia based on CT chest.     Dx:  IR drain placed 1/15. 20cc of bloody purulent fluid.   Abscess cultures - KLEBSIELLA OXYTOCA   Colonoscopy on 1/17: Patent end-to-side ileocolonic anastomosis with purulent drainage likely from abscess; anastomosis intact; ileum normal; no specimens collected.  CT chest abdomen pelvis: Patchy GGOs in lungs c/f asp or PNA; biliary duct dilation post-chad, no mass, patulous esoph w/air-fluid level c/f dysmotility/reflux/achalasia; 10.5 cm R abd wall collection, decreased  Follow up MRCP    Tx:  GI, ID, and Gen Surg consult  IV cipro escalated to Zosyn due to low grade fever, tachycardia which is now resolved  Cultures to guide therapy  Monitor output from IR drain - scant output.  Follow up imaging and consider drain removal if improvement in collection  Larger drain placed by IR on 1/23/25      Hypoglycemia  Resolved        PICC (peripherally inserted central catheter) in place  PICC placed at OSH on 11/3 due to difficulty with access    Tx:  PICC replaced on 1/18 this admission due to functional issues      Acute UTI  Dx:  Urine culture ENTEROCOCCUS FAECIUM     Tx:  Macrobid x5 day course    Acute DVT (deep venous thrombosis)  Likely due to recent hospitalization and sedentary risk factor    Left thigh veins: Occlusive thrombus in the common femoral    Tx:  Heparin drip   Consult cardiovascular   Transition to OAC at discharge        Tobacco dependency  Dangers of cigarette smoking were reviewed with patient in detail. Patient was Counseled for 3-10 minutes. Nicotine replacement options were discussed. Nicotine replacement was discussed-       Carotid arterial disease        Moderate malnutrition  Nutrition consulted. Most recent weight and BMI monitored-     Measurements:  Wt Readings from Last 1 Encounters:    01/17/25 68.5 kg (151 lb 0.2 oz)   Body mass index is 27.62 kg/m².    Patient has been screened and assessed by RD.    Malnutrition Type:  Context: chronic illness  Level: moderate    Malnutrition Characteristic Summary:  Energy Intake (Malnutrition): less than or equal to 75% for greater than or equal to 1 month    Interventions/Recommendations (treatment strategy):  1. General Healthful diet  2. Commercial beverage medical food supplement therapy      Atherosclerosis of aorta  Continue Statin       Type 2 diabetes mellitus with hypertriglyceridemia  Accu checks every 6 hours with SSI       GERD (gastroesophageal reflux disease)  Continue PPI       Hypothyroidism    Continue Synthroid       VTE Risk Mitigation (From admission, onward)           Ordered     heparin 25,000 units in dextrose 5% (100 units/ml) IV bolus from bag HIGH INTENSITY nomogram - [facility]  As needed (PRN)        Question:  Heparin Infusion Adjustment (DO NOT MODIFY ANSWER)  Answer:  [email or web address]\[email or web address]\Images\Pharmacy\HeparinInfusions\heparin HIGH INTENSITY nomogram for [facility] [id number].pdf    01/14/25 1917     heparin 25,000 units in dextrose 5% (100 units/ml) IV bolus from bag HIGH INTENSITY nomogram - [facility]  As needed (PRN)        Question:  Heparin Infusion Adjustment (DO NOT MODIFY ANSWER)  Answer:  [email or web address]\epic\Images\Pharmacy\HeparinInfusions\heparin HIGH INTENSITY nomogram for [facility] [id number]    01/14/25 1917     heparin 25,000 units in dextrose 5% 250 mL (100 units/mL) infusion HIGH INTENSITY nomogram - [facility]  Continuous        Question:  Begin at (units/kg/hr)  Answer:  18    01/14/25 1917                    Discharge Planning   MAHIN: 1/24/2025     Code Status: Full Code   Medical Readiness for Discharge Date:   Discharge Plan A: Skilled Nursing Facility   Discharge Delays: None known at this time            Please place Justification for DME        Lori Teixeira MD  Department of Hospital Medicine   [facility]

## 2025-01-24 NOTE — PT/OT/SLP PROGRESS
"Occupational Therapy   Treatment    Name: Thom Krishna  MRN: [id number]  Admitting Diagnosis:  Epigastric pain  7 Days Post-Op    Recommendations:     Discharge Recommendations: Moderate Intensity Therapy  Discharge Equipment Recommendations:  to be determined by next level of care  Barriers to discharge:  None    Assessment:     Thom Krishna is a 69 y.o. female with a medical diagnosis of Epigastric pain.  Performance deficits affecting function are weakness, impaired endurance, impaired self care skills, impaired functional mobility, gait instability, impaired balance, decreased coordination, decreased upper extremity function, decreased lower extremity function, decreased safety awareness, pain, impaired coordination, decreased ROM.     Rehab Prognosis:  Good; patient would benefit from acute skilled OT services to address these deficits and reach maximum level of function.       Plan:     Patient to be seen 3 x/week to address the above listed problems via self-care/home management, therapeutic activities, therapeutic exercises  Plan of Care Expires: 02/15/25  Plan of Care Reviewed with: patient    Subjective     Chief Complaint: "they put a wider drain"  Patient/Family Comments/goals: return to PLOF  Pain/Comfort:  Pain Rating 1:  (not rated)  Location 1: abdomen    Objective:     Patient found left sidelying with bed alarm, telemetry, DEBBIE drain, PureWick, peripheral IV upon OT entry to room.    General Precautions: Standard, fall, diabetic    Orthopedic Precautions:N/A  Braces: N/A  Respiratory Status: Room air    Bed Mobility:    Patient completed Scooting/Bridging with contact guard assistance  Patient completed Supine to Sit with contact guard assistance, with side rail, and HOB elevated  Patient completed Sit to Supine with minimum assistance and with leg lift     Functional Mobility/Transfers:  Patient completed Sit <> Stand Transfer with contact guard assistance and minimum assistance  with  rolling walker "   Functional Mobility: SS to HOB /c CGA; VCs for postural awareness and correction    Activities of Daily Living:  Grooming: supervision set-up seated EOB  Lower Body Dressing: maximal assistance kimberly B socks  Toileting: purewick; not working properly, pad soiled with urine under patient      AMPAC 6 Click ADL: 16    Treatment & Education:  Continued education re: purpose/role of OT  Improved bed mobility/transitions noted this date  Sat EOB /c Sup and no LOB or posterior lean noted  Able to maintain stance /c CGA, while bed linens swapped and straightened  Z-flex boots donned upon return to supine for pressure relief to heels    Patient left HOB elevated with all lines intact, call button in reach, and bed alarm on    GOALS:   Multidisciplinary Problems       Occupational Therapy Goals          Problem: Occupational Therapy    Goal Priority Disciplines Outcome Interventions   Occupational Therapy Goal     OT, PT/OT Progressing    Description: Goals to be met by: 02/15/2025     Patient will increase functional independence with ADLs by performing:    Toileting from bedside commode with Minimal Assistance for hygiene and clothing management.   Sitting at edge of bed x10 minutes with Supervision. --GOAL MET 1/20  Supine to sit with Supervision.  Step transfer with Stand-by Assistance  Toilet transfer to bedside commode with Stand-by Assistance.                         DME Justifications:  No DME recommended requiring DME justifications    Time Tracking:     OT Date of Treatment: 01/24/25  OT Start Time: 0917  OT Stop Time: 0944  OT Total Time (min): 27 min    Billable Minutes:Self Care/Home Management 17  Therapeutic Activity 10          Number of ANA MARIA visits since last OT visit: 0    1/24/2025

## 2025-01-24 NOTE — PLAN OF CARE
SW received call from pts son expressing that he and his wife are going see  [facility]. SW expressed that referral has already been sent to that snf facility last week. SW expressed that pt already has accepting facility and auth is in process. Pts son expressed [facility] is in [place] and is closer to family to visit. SW expressed understanding. SW expressed pt is medically stable to dc from hospital and at this time we are pending auth. SW explained if [facility] can not accept pt on Monday pt will have to dc to [facility] facility. Pts son expressed he will only be visiting facility and is unsure if [facility] is an option at this time. SW expressed understaindg. SW will contineu to follow pt throughout her transitions of care and assist with any dc needs.       -At this time pts dc plan is for pt to dc to [facility] for SNF placement. SW awaiting updated clinicals to send to facility so they can resubmit for auth.       --MAILE received call from [facility] admission rep. MAILE sent updated clinicals as requested via epic.      01/24/25 8281   Post-Acute Status   Post-Acute Authorization Placement   Hospital Resources/Appts/Education Provided Appointments scheduled and added to AVS   Discharge Delays None known at this time   Discharge Plan   Discharge Plan A Skilled Nursing Facility

## 2025-01-25 LAB
ANION GAP SERPL CALC-SCNC: 10 MMOL/L (ref 8–16)
APTT PPP: 38.8 SEC (ref 21–32)
APTT PPP: 43.5 SEC (ref 21–32)
BASOPHILS # BLD AUTO: 0.05 K/UL (ref 0–0.2)
BASOPHILS NFR BLD: 0.6 % (ref 0–1.9)
BUN SERPL-MCNC: 11 MG/DL (ref 8–23)
CALCIUM SERPL-MCNC: 8.7 MG/DL (ref 8.7–10.5)
CHLORIDE SERPL-SCNC: 104 MMOL/L (ref 95–110)
CO2 SERPL-SCNC: 21 MMOL/L (ref 23–29)
CREAT SERPL-MCNC: 0.7 MG/DL (ref 0.5–1.4)
DIFFERENTIAL METHOD BLD: ABNORMAL
EOSINOPHIL # BLD AUTO: 0.1 K/UL (ref 0–0.5)
EOSINOPHIL NFR BLD: 1.3 % (ref 0–8)
ERYTHROCYTE [DISTWIDTH] IN BLOOD BY AUTOMATED COUNT: 17.3 % (ref 11.5–14.5)
EST. GFR  (NO RACE VARIABLE): >60 ML/MIN/1.73 M^2
GLUCOSE SERPL-MCNC: 86 MG/DL (ref 70–110)
HCT VFR BLD AUTO: 29.3 % (ref 37–48.5)
HGB BLD-MCNC: 9.7 G/DL (ref 12–16)
IMM GRANULOCYTES # BLD AUTO: 0.07 K/UL (ref 0–0.04)
IMM GRANULOCYTES NFR BLD AUTO: 0.9 % (ref 0–0.5)
LYMPHOCYTES # BLD AUTO: 1.8 K/UL (ref 1–4.8)
LYMPHOCYTES NFR BLD: 22.5 % (ref 18–48)
MCH RBC QN AUTO: 32.4 PG (ref 27–31)
MCHC RBC AUTO-ENTMCNC: 33.1 G/DL (ref 32–36)
MCV RBC AUTO: 98 FL (ref 82–98)
MONOCYTES # BLD AUTO: 0.6 K/UL (ref 0.3–1)
MONOCYTES NFR BLD: 7.3 % (ref 4–15)
NEUTROPHILS # BLD AUTO: 5.4 K/UL (ref 1.8–7.7)
NEUTROPHILS NFR BLD: 67.4 % (ref 38–73)
NRBC BLD-RTO: 0 /100 WBC
PLATELET # BLD AUTO: 288 K/UL (ref 150–450)
PMV BLD AUTO: 10.4 FL (ref 9.2–12.9)
POCT GLUCOSE: 105 MG/DL (ref 70–110)
POCT GLUCOSE: 140 MG/DL (ref 70–110)
POCT GLUCOSE: 167 MG/DL (ref 70–110)
POCT GLUCOSE: 73 MG/DL (ref 70–110)
POCT GLUCOSE: 90 MG/DL (ref 70–110)
POCT GLUCOSE: 91 MG/DL (ref 70–110)
POTASSIUM SERPL-SCNC: 3.8 MMOL/L (ref 3.5–5.1)
RBC # BLD AUTO: 2.99 M/UL (ref 4–5.4)
SODIUM SERPL-SCNC: 135 MMOL/L (ref 136–145)
WBC # BLD AUTO: 8 K/UL (ref 3.9–12.7)

## 2025-01-25 PROCEDURE — 25000003 PHARM REV CODE 250: Performed by: FAMILY MEDICINE

## 2025-01-25 PROCEDURE — 80048 BASIC METABOLIC PNL TOTAL CA: CPT | Performed by: HOSPITALIST

## 2025-01-25 PROCEDURE — 63600175 PHARM REV CODE 636 W HCPCS: Performed by: STUDENT IN AN ORGANIZED HEALTH CARE EDUCATION/TRAINING PROGRAM

## 2025-01-25 PROCEDURE — 85025 COMPLETE CBC W/AUTO DIFF WBC: CPT | Performed by: EMERGENCY MEDICINE

## 2025-01-25 PROCEDURE — 21400001 HC TELEMETRY ROOM

## 2025-01-25 PROCEDURE — 25000003 PHARM REV CODE 250: Performed by: NURSE PRACTITIONER

## 2025-01-25 PROCEDURE — 97530 THERAPEUTIC ACTIVITIES: CPT

## 2025-01-25 PROCEDURE — S4991 NICOTINE PATCH NONLEGEND: HCPCS | Performed by: NURSE PRACTITIONER

## 2025-01-25 PROCEDURE — 63600175 PHARM REV CODE 636 W HCPCS: Performed by: FAMILY MEDICINE

## 2025-01-25 PROCEDURE — 97535 SELF CARE MNGMENT TRAINING: CPT

## 2025-01-25 PROCEDURE — 85730 THROMBOPLASTIN TIME PARTIAL: CPT | Mod: 91 | Performed by: FAMILY MEDICINE

## 2025-01-25 PROCEDURE — 25000003 PHARM REV CODE 250: Performed by: INTERNAL MEDICINE

## 2025-01-25 PROCEDURE — 63600175 PHARM REV CODE 636 W HCPCS: Performed by: INTERNAL MEDICINE

## 2025-01-25 PROCEDURE — 85730 THROMBOPLASTIN TIME PARTIAL: CPT | Performed by: HOSPITALIST

## 2025-01-25 PROCEDURE — 27000207 HC ISOLATION

## 2025-01-25 RX ORDER — OXYCODONE AND ACETAMINOPHEN 5; 325 MG/1; MG/1
1 TABLET ORAL EVERY 4 HOURS PRN
Status: DISCONTINUED | OUTPATIENT
Start: 2025-01-25 | End: 2025-01-26

## 2025-01-25 RX ORDER — FENTANYL 12.5 UG/1
1 PATCH TRANSDERMAL
Status: DISCONTINUED | OUTPATIENT
Start: 2025-01-25 | End: 2025-01-28

## 2025-01-25 RX ADMIN — MORPHINE SULFATE 2 MG: 2 INJECTION, SOLUTION INTRAMUSCULAR; INTRAVENOUS at 08:01

## 2025-01-25 RX ADMIN — OXYCODONE AND ACETAMINOPHEN 1 TABLET: 10; 325 TABLET ORAL at 10:01

## 2025-01-25 RX ADMIN — MORPHINE SULFATE 2 MG: 2 INJECTION, SOLUTION INTRAMUSCULAR; INTRAVENOUS at 05:01

## 2025-01-25 RX ADMIN — PIPERACILLIN SODIUM AND TAZOBACTAM SODIUM 4.5 G: 4; .5 INJECTION, POWDER, LYOPHILIZED, FOR SOLUTION INTRAVENOUS at 08:01

## 2025-01-25 RX ADMIN — ATORVASTATIN CALCIUM 40 MG: 40 TABLET, FILM COATED ORAL at 09:01

## 2025-01-25 RX ADMIN — PIPERACILLIN SODIUM AND TAZOBACTAM SODIUM 4.5 G: 4; .5 INJECTION, POWDER, LYOPHILIZED, FOR SOLUTION INTRAVENOUS at 05:01

## 2025-01-25 RX ADMIN — FENTANYL 1 PATCH: 12 PATCH TRANSDERMAL at 02:01

## 2025-01-25 RX ADMIN — OXYCODONE HYDROCHLORIDE AND ACETAMINOPHEN 1 TABLET: 5; 325 TABLET ORAL at 05:01

## 2025-01-25 RX ADMIN — MORPHINE SULFATE 4 MG: 4 INJECTION INTRAVENOUS at 12:01

## 2025-01-25 RX ADMIN — LEVOTHYROXINE SODIUM 75 MCG: 25 TABLET ORAL at 05:01

## 2025-01-25 RX ADMIN — PIPERACILLIN SODIUM AND TAZOBACTAM SODIUM 4.5 G: 4; .5 INJECTION, POWDER, LYOPHILIZED, FOR SOLUTION INTRAVENOUS at 02:01

## 2025-01-25 RX ADMIN — PANTOPRAZOLE SODIUM 40 MG: 40 TABLET, DELAYED RELEASE ORAL at 09:01

## 2025-01-25 RX ADMIN — OXYCODONE AND ACETAMINOPHEN 1 TABLET: 10; 325 TABLET ORAL at 02:01

## 2025-01-25 RX ADMIN — NICOTINE 1 PATCH: 21 PATCH, EXTENDED RELEASE TRANSDERMAL at 09:01

## 2025-01-25 RX ADMIN — MORPHINE SULFATE 2 MG: 2 INJECTION, SOLUTION INTRAMUSCULAR; INTRAVENOUS at 01:01

## 2025-01-25 NOTE — SUBJECTIVE & OBJECTIVE
Interval History:   ARCENIO  Patient reports she has diarrhea.   Patient reports persistent abdominal pain  No f/c/n/v  No other complaints    No surgical interventions rec'd. SNF  on Monday?. IR replaced with a larger drain but still having pain. Fentanyl patch added    Review of Systems  Objective:     Vital Signs (Most Recent):  Temp: 98.5 °F (36.9 °C) (01/25/25 1117)  Pulse: 99 (01/25/25 1429)  Resp: 18 (01/25/25 1446)  BP: (!) 113/59 (01/25/25 1117)  SpO2: 99 % (01/25/25 1117) Vital Signs (24h Range):  Temp:  [98.1 °F (36.7 °C)-98.6 °F (37 °C)] 98.5 °F (36.9 °C)  Pulse:  [72-99] 99  Resp:  [16-20] 18  SpO2:  [97 %-99 %] 99 %  BP: ()/(56-76) 113/59     Weight: 68.5 kg (151 lb 0.2 oz)  Body mass index is 27.62 kg/m².    Intake/Output Summary (Last 24 hours) at 1/25/2025 1554  Last data filed at 1/25/2025 0655  Gross per 24 hour   Intake --   Output 850 ml   Net -850 ml         Physical Exam  Constitutional:       General: She is not in acute distress.     Appearance: She is ill-appearing. She is not toxic-appearing.   HENT:      Mouth/Throat:      Mouth: Mucous membranes are moist.      Pharynx: Oropharynx is clear.   Eyes:      Extraocular Movements: Extraocular movements intact.      Conjunctiva/sclera: Conjunctivae normal.   Cardiovascular:      Rate and Rhythm: Normal rate and regular rhythm.   Pulmonary:      Effort: Pulmonary effort is normal. No respiratory distress.      Breath sounds: Normal breath sounds.   Abdominal:      General: Bowel sounds are normal. There is no distension.      Palpations: Abdomen is soft.      Tenderness: There is abdominal tenderness. There is no guarding.      Comments: R side drain in place, dark brown output, scant   Musculoskeletal:         General: Normal range of motion.      Right lower leg: No edema.      Left lower leg: No edema.   Skin:     General: Skin is warm and dry.   Neurological:      General: No focal deficit present.      Mental Status: She is alert and  oriented to person, place, and time.   Psychiatric:         Mood and Affect: Mood normal.         Behavior: Behavior normal.             Significant Labs: All pertinent labs within the past 24 hours have been reviewed.    Significant Imaging: I have reviewed all pertinent imaging results/findings within the past 24 hours.

## 2025-01-25 NOTE — PT/OT/SLP PROGRESS
Physical Therapy Treatment    Patient Name:  Thom Krishna   MRN:  [id number]    Recommendations:     Discharge Recommendations: Moderate Intensity Therapy  Discharge Equipment Recommendations: to be determined by next level of care  Barriers to discharge: None    Assessment:     Thom Krishna is a 69 y.o. female admitted with a medical diagnosis of Epigastric pain.  She presents with the following impairments/functional limitations: weakness, impaired endurance, impaired self care skills, impaired functional mobility, gait instability, impaired balance, decreased coordination, decreased upper extremity function, decreased lower extremity function, decreased safety awareness, pain, impaired coordination, decreased ROM Patient with improvement in bed mobility and transfers/functional mobility; pt voicing fatigue with mobility.    Rehab Prognosis: Good; patient would benefit from acute skilled PT services to address these deficits and reach maximum level of function.    Recent Surgery: Procedure(s) (LRB):  COLONOSCOPY (N/A) 7 Days Post-Op    Plan:     During this hospitalization, patient to be seen 5 x/week to address the identified rehab impairments via gait training, therapeutic activities, therapeutic exercises, neuromuscular re-education and progress toward the following goals:    Plan of Care Expires:  02/15/25    Subjective     Chief Complaint: pain at site of drain  Patient/Family Comments/goals: return to PLOF  Pain/Comfort:  Pain Rating 1: 9/10  Location - Side 1: Right  Location 1: abdomen  Pain Addressed 1: Reposition, Distraction, Cessation of Activity, Nurse notified  Pain Rating Post-Intervention 1: 9/10      Objective:     Communicated with nurse prior to session.  Patient found HOB elevated with bed alarm, telemetry, DEBBIE drain, PureWick, peripheral IV upon PT entry to room.     General Precautions: Standard, fall, diabetic  Orthopedic Precautions: N/A  Braces: N/A  Respiratory Status: Room air     Functional  Mobility:  Bed Mobility:     Rolling Left:  contact guard assistance and use of side rail  Scooting: contact guard assistance  Supine to Sit: contact guard assistance and HOB elevated and use of side rail  Sit to Supine: minimum assistance with leg lift  Transfers:     Sit to Stand:  contact guard assistance, minimum assistance, and VCs or hand placement  with rolling walker  Gait: Patient performed 2 trials of amb using RW with CGA, VCs for more erect posture as patient amb with forward trunk flexion, slow shy/effortful; pt side stepped to HOB ~3ft, forward 2-3ft and back around x 3 ft for each trial; pt voicing fatigue    AM-PAC 6 CLICK MOBILITY  Turning over in bed (including adjusting bedclothes, sheets and blankets)?: 3  Sitting down on and standing up from a chair with arms (e.g., wheelchair, bedside commode, etc.): 3  Moving from lying on back to sitting on the side of the bed?: 3  Moving to and from a bed to a chair (including a wheelchair)?: 3  Need to walk in hospital room?: 3  Climbing 3-5 steps with a railing?: 1  Basic Mobility Total Score: 16       Treatment & Education:  -pt educated on role of PT and need for mobility  -performed bed mobility and transitions as above  -pt sat at EOB with supervision without LOB  -pt performed functional mobility as described above using RW with CG  -performed 2 x 10 reps APs, heel slides, hip abd/add, LAQ  -returned to supine with Reshma for leg lift  -donned Z-flex boots for pressure relief to heels  -informed nurse of leaking of urine 2/2 pure wick    Patient left HOB elevated with all lines intact, call button in reach, bed alarm on, and nurse notified..    GOALS:   Multidisciplinary Problems       Physical Therapy Goals          Problem: Physical Therapy    Goal Priority Disciplines Outcome Interventions   Physical Therapy Goal     PT, PT/OT Progressing    Description: Goals to be met by:  2/15/25    Patient will increase functional independence with mobility  by performin. Supine to sit with Modified [place]  2. Sit to supine with Modified [place]  3. Sit to stand transfer with Stand-by Assistance  4. Bed to chair transfer with Stand-by Assistance using Rolling Walker  5. Gait  x 50 feet with Stand-by Assistance using Rolling Walker.                          DME Justifications:  No DME recommended requiring DME justifications    Time Tracking:     PT Received On: 25  PT Start Time: 1437     PT Stop Time: 1506  PT Total Time (min): 29 min     Billable Minutes: Therapeutic Activity 17 and Therapeutic Exercise 12    Treatment Type: Treatment  PT/PTA: PT     Number of PTA visits since last PT visit: 0     2025

## 2025-01-25 NOTE — NURSING
Patient lying in bed awake, She is AAOX4, able to make all needs known. PICC noted to right upper arm, patent and flushes without difficultly. Patient requires extensive assistance with ADL's. She tolerated scheduled night medications with no problems. Patient received PRN Morphine 2mg at 20:53 rating a pain score at 9/10. Staff reassessed patient, pain rated a 7/10. At 23:30 patient calling out for help, staff responded, patient states she's in pain rated 9/10, staff placed a call to the provider on call, received an order for a  one time dose of Morphine 4mg, patient tolerated with no problems, staff educated patient on all medications received, safety precautions in place, call light within reach and encouraged to use, patient verbalized understanding, will continue with plan of care.

## 2025-01-25 NOTE — PLAN OF CARE
Problem: Adult Inpatient Plan of Care  Goal: Plan of Care Review  Outcome: Progressing  Goal: Patient-Specific Goal (Individualized)  Outcome: Progressing  Goal: Absence of Hospital-Acquired Illness or Injury  Outcome: Progressing  Goal: Optimal Comfort and Wellbeing  Outcome: Progressing  Goal: Readiness for Transition of Care  Outcome: Progressing     Problem: Fall Injury Risk  Goal: Absence of Fall and Fall-Related Injury  Outcome: Progressing     Problem: VTE (Venous Thromboembolism)  Goal: Tissue Perfusion  Outcome: Progressing  Goal: Right Ventricular Function  Outcome: Progressing     Problem: Comorbidity Management  Goal: Maintenance of Behavioral Health Symptom Control  Outcome: Progressing     Problem: Infection  Goal: Absence of Infection Signs and Symptoms  Outcome: Progressing     Problem: Skin Injury Risk Increased  Goal: Skin Health and Integrity  Outcome: Progressing     Problem: Pain Acute  Goal: Optimal Pain Control and Function  Outcome: Progressing     Problem: Activity Intolerance  Goal: Enhanced Capacity and Energy  Outcome: Progressing     Problem: Mobility Impairment  Goal: Optimal Mobility  Outcome: Progressing     Problem: Confusion Acute  Goal: Optimal Cognitive Function  Outcome: Progressing     Problem: Sepsis/Septic Shock  Goal: Optimal Coping  Outcome: Progressing  Goal: Absence of Bleeding  Outcome: Progressing  Goal: Blood Glucose Level Within Targeted Range  Outcome: Progressing  Goal: Absence of Infection Signs and Symptoms  Outcome: Progressing  Goal: Optimal Nutrition Intake  Outcome: Progressing     Problem: UTI (Urinary Tract Infection)  Goal: Improved Infection Symptoms  Outcome: Progressing     Problem: Constipation  Goal: Effective Bowel Elimination  Outcome: Progressing     Problem: Wound  Goal: Optimal Coping  Outcome: Progressing  Goal: Optimal Functional Ability  Outcome: Progressing  Goal: Absence of Infection Signs and Symptoms  Outcome: Progressing  Goal: Improved  Oral Intake  Outcome: Progressing  Goal: Optimal Pain Control and Function  Outcome: Progressing  Goal: Skin Health and Integrity  Outcome: Progressing  Goal: Optimal Wound Healing  Outcome: Progressing     Problem: Diabetes Comorbidity  Goal: Blood Glucose Level Within Targeted Range  Outcome: Progressing     Problem: Pneumonia  Goal: Fluid Balance  Outcome: Progressing  Goal: Resolution of Infection Signs and Symptoms  Outcome: Progressing  Goal: Effective Oxygenation and Ventilation  Outcome: Progressing

## 2025-01-25 NOTE — PROGRESS NOTES
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 She  presented to the ED with complaint of abdominal pain.  Diagnosis with colon cancer in Nov 2024 and underwent colon resection, however developed intra abdominal infection requiring operative intervention. She was admitted to SNF facility and underwent treatment with IV antibiotic. She continue to experiencing symptoms of abdominal pain, increase confusion. CT of abdominal showed  Operative change of partial colon resection and ileal colic anastomosis with suspected developing small bowel obstruction and transition point seen at the ileocolic anastomosis. Rim enhancing gas and fluid collection at the lateral right abdominal wall concerning for abscess. General Surgery consulted, with plan IR consultation for consideration of aspiration vs drain placement into her anterior abdominal wall collection. Lt leg verous ultrasound showed,.left lower extremity DVT in the common femoral vein. Patient being admitted under the care of Hospital Medicine.              Overview/Hospital Course:  No notes on file    Interval History:   NAEON  Patient reports she has diarrhea.   Patient reports persistent abdominal pain  No f/c/n/v  No other complaints    No surgical interventions rec'd. SNF  on Monday?. IR replaced with a larger drain but still having pain. Fentanyl patch added    Review of Systems  Objective:     Vital Signs (Most Recent):  Temp: 98.5 °F (36.9 °C) (01/25/25 1117)  Pulse: 99 (01/25/25 1429)  Resp: 18 (01/25/25  1446)  BP: (!) 113/59 (01/25/25 1117)  SpO2: 99 % (01/25/25 1117) Vital Signs (24h Range):  Temp:  [98.1 °F (36.7 °C)-98.6 °F (37 °C)] 98.5 °F (36.9 °C)  Pulse:  [72-99] 99  Resp:  [16-20] 18  SpO2:  [97 %-99 %] 99 %  BP: ()/(56-76) 113/59     Weight: 68.5 kg (151 lb 0.2 oz)  Body mass index is 27.62 kg/m².    Intake/Output Summary (Last 24 hours) at 1/25/2025 1554  Last data filed at 1/25/2025 0655  Gross per 24 hour   Intake --   Output 850 ml   Net -850 ml         Physical Exam  Constitutional:       General: She is not in acute distress.     Appearance: She is ill-appearing. She is not toxic-appearing.   HENT:      Mouth/Throat:      Mouth: Mucous membranes are moist.      Pharynx: Oropharynx is clear.   Eyes:      Extraocular Movements: Extraocular movements intact.      Conjunctiva/sclera: Conjunctivae normal.   Cardiovascular:      Rate and Rhythm: Normal rate and regular rhythm.   Pulmonary:      Effort: Pulmonary effort is normal. No respiratory distress.      Breath sounds: Normal breath sounds.   Abdominal:      General: Bowel sounds are normal. There is no distension.      Palpations: Abdomen is soft.      Tenderness: There is abdominal tenderness. There is no guarding.      Comments: R side drain in place, dark brown output, scant   Musculoskeletal:         General: Normal range of motion.      Right lower leg: No edema.      Left lower leg: No edema.   Skin:     General: Skin is warm and dry.   Neurological:      General: No focal deficit present.      Mental Status: She is alert and oriented to person, place, and time.   Psychiatric:         Mood and Affect: Mood normal.         Behavior: Behavior normal.             Significant Labs: All pertinent labs within the past 24 hours have been reviewed.    Significant Imaging: I have reviewed all pertinent imaging results/findings within the past 24 hours.    Assessment and Plan     * Epigastric pain  DDx:  Bowel obstruction ruled out: CT shows  distended small bowel proximal to anastomosis, though Gastrografin has passed  Anastomotic stricture (ruled out)  Infectious process: Due to anterior abdominal wall abscess vs acute UTI. Possible pneumonia based on CT chest.     Dx:  IR drain placed 1/15. 20cc of bloody purulent fluid.   Abscess cultures - KLEBSIELLA OXYTOCA   Colonoscopy on 1/17: Patent end-to-side ileocolonic anastomosis with purulent drainage likely from abscess; anastomosis intact; ileum normal; no specimens collected.  CT chest abdomen pelvis: Patchy GGOs in lungs c/f asp or PNA; biliary duct dilation post-chad, no mass, patulous esoph w/air-fluid level c/f dysmotility/reflux/achalasia; 10.5 cm R abd wall collection, decreased  Follow up MRCP    Tx:  GI, ID, and Gen Surg consult  IV cipro escalated to Zosyn due to low grade fever, tachycardia which is now resolved  Cultures to guide therapy  Monitor output from IR drain - scant output.  Follow up imaging and consider drain removal if improvement in collection  Larger drain placed by IR on 1/23/25      Hypoglycemia  Resolved        PICC (peripherally inserted central catheter) in place  PICC placed at OSH on 11/3 due to difficulty with access    Tx:  PICC replaced on 1/18 this admission due to functional issues      Acute UTI  Dx:  Urine culture ENTEROCOCCUS FAECIUM     Tx:  Macrobid x5 day course    Acute DVT (deep venous thrombosis)  Likely due to recent hospitalization and sedentary risk factor    Left thigh veins: Occlusive thrombus in the common femoral    Tx:  Heparin drip   Consult cardiovascular   Transition to [facility] at discharge        Tobacco dependency  Dangers of cigarette smoking were reviewed with patient in detail. Patient was Counseled for 3-10 minutes. Nicotine replacement options were discussed. Nicotine replacement was discussed-       Carotid arterial disease        Moderate malnutrition  Nutrition consulted. Most recent weight and BMI monitored-     Measurements:  Wt Readings  from Last 1 Encounters:   01/17/25 68.5 kg (151 lb 0.2 oz)   Body mass index is 27.62 kg/m².    Patient has been screened and assessed by RD.    Malnutrition Type:  Context: chronic illness  Level: moderate    Malnutrition Characteristic Summary:  Energy Intake (Malnutrition): less than or equal to 75% for greater than or equal to 1 month    Interventions/Recommendations (treatment strategy):  1. General Healthful diet  2. Commercial beverage medical food supplement therapy      Atherosclerosis of aorta  Continue Statin       Type 2 diabetes mellitus with hypertriglyceridemia  Accu checks every 6 hours with SSI       GERD (gastroesophageal reflux disease)  Continue PPI       Hypothyroidism    Continue Synthroid       VTE Risk Mitigation (From admission, onward)           Ordered     heparin 25,000 units in dextrose 5% (100 units/ml) IV bolus from bag HIGH INTENSITY nomogram - [facility]  As needed (PRN)        Question:  Heparin Infusion Adjustment (DO NOT MODIFY ANSWER)  Answer:  [email or web address]\epic\Images\Pharmacy\HeparinInfusions\heparin HIGH INTENSITY nomogram for [facility] [id number]    01/14/25 1917     heparin 25,000 units in dextrose 5% (100 units/ml) IV bolus from bag HIGH INTENSITY nomogram - [facility]  As needed (PRN)        Question:  Heparin Infusion Adjustment (DO NOT MODIFY ANSWER)  Answer:  \\[email or web address]\epic\Images\Pharmacy\HeparinInfusions\heparin HIGH INTENSITY nomogram for [facility] [id number].pdf    01/14/25 1917     heparin 25,000 units in dextrose 5% 250 mL (100 units/mL) infusion HIGH INTENSITY nomogram - [facility]  Continuous        Question:  Begin at (units/kg/hr)  Answer:  18    01/14/25 1917                    Discharge Planning   MAHIN: 1/24/2025     Code Status: Full Code   Medical Readiness for Discharge Date:   Discharge Plan A: Skilled Nursing Facility   Discharge Delays: None known at this time            Please place Justification for DME        Lori Teixeira MD  Department of Hospital Medicine   [facility]  Surg

## 2025-01-25 NOTE — PT/OT/SLP PROGRESS
Occupational Therapy   Treatment    Name: Thom Krishna  MRN: [id number]  Admitting Diagnosis:  Epigastric pain  8 Days Post-Op    Recommendations:     Discharge Recommendations: Moderate Intensity Therapy  Discharge Equipment Recommendations:  to be determined by next level of care  Barriers to discharge:  None    Assessment:     Thom Krishna is a 69 y.o. female with a medical diagnosis of Epigastric pain.  She presents with deconditioning, abdominal pain. Pt provided with fentanyl patch in session for pain relief; pt perseverative in session that she was worried about having fentanyl because her nephew  of a fentanyl overdose. RN explained to pt that pain patch is not a form that generally becomes abused and the dosage is safe for her. Pt ok'd application of patch but later reported that she did not know patch was placed. OT assisted to explain to pt several times that fentanyl in the hospital is highly regulated and that there should be no adverse effects from pain patch given in hospital but but intermittently expressing distress and reporting that she understands/accepts use and application of pain medication. Performance deficits affecting function are weakness, impaired endurance, impaired self care skills, impaired functional mobility, gait instability, impaired balance, decreased lower extremity function, decreased safety awareness, decreased upper extremity function, impaired cognition, impaired skin.     Rehab Prognosis:  Good; patient would benefit from acute skilled OT services to address these deficits and reach maximum level of function.       Plan:     Patient to be seen 3 x/week to address the above listed problems via self-care/home management, therapeutic activities, therapeutic exercises  Plan of Care Expires: 02/15/25  Plan of Care Reviewed with: patient    Subjective     Chief Complaint: Pt perseverating on everyone being mad at her. Pt reported that RN was mean to her when RN explaining pain  patch medication. OT in room while RN assisting to place pain patch and RN very polite and informative throughout session.   Patient/Family Comments/goals: Pain relief  Pain/Comfort:  Pain Rating 1:  (did not rate)  Location - Side 1: Right  Location - Orientation 1: generalized  Location 1: abdomen  Pain Addressed 1: Reposition, Distraction, Cessation of Activity  Pain Rating Post-Intervention 1:  (did not rate)    Objective:     Communicated with: nsg prior to session.  Patient found HOB elevated with bed alarm, telemetry, peripheral IV, PureWick, DEBBIE drain upon OT entry to room.    General Precautions: Standard, diabetic, fall    Orthopedic Precautions:N/A  Braces: N/A  Respiratory Status: Room air     Occupational Performance:     Bed Mobility:    Patient completed Rolling/Turning to Right with minimum assistance and moderate assistance  Patient completed Scooting/Bridging with moderate assistance  Patient completed Supine to Sit with minimum assistance and moderate assistance     Functional Mobility/Transfers:  Patient completed Sit <> Stand Transfer with minimum assistance  with  rolling walker   Patient completed Bed <> Chair Transfer using Stand Pivot technique with contact guard assistance with rolling walker  Functional Mobility: Pt with fair to fair- dynamic seated and standing balance. Pt returned to seated on bedside chair abruptly as she stated that she was beginning to urinate and unable to hold long enough to sit slowly in chair. Purewick on and active     Activities of Daily Living:  Grooming: stand by assistance to brush teeth seated upright in bedside chair  Upper Body Dressing: minimum assistance to don gown as robe seated EOB  Lower Body Dressing: moderate assistance to adjust B socks seated EOB      AMPAC 6 Click ADL: 16    Treatment & Education:  Pt educated on role of OT and POC.   Pt performing skills as listed above.     Patient left up in chair with all lines intact, call button in reach,  chair alarm on, and nsg notified    GOALS:   Multidisciplinary Problems       Occupational Therapy Goals          Problem: Occupational Therapy    Goal Priority Disciplines Outcome Interventions   Occupational Therapy Goal     OT, PT/OT Progressing    Description: Goals to be met by: 02/15/2025     Patient will increase functional independence with ADLs by performing:    Toileting from bedside commode with Minimal Assistance for hygiene and clothing management.   Sitting at edge of bed x10 minutes with Supervision. --GOAL MET 1/20  Supine to sit with Supervision.  Step transfer with Stand-by Assistance  Toilet transfer to bedside commode with Stand-by Assistance.                           Time Tracking:     OT Date of Treatment: 01/25/25  OT Start Time: 1448  OT Stop Time: 1511  OT Total Time (min): 23 min    Billable Minutes:Self Care/Home Management 13  Therapeutic Activity 10    OT/ANA MARIA: OT     Number of ANA MARIA visits since last OT visit: 0    1/25/2025

## 2025-01-26 LAB
ANION GAP SERPL CALC-SCNC: 10 MMOL/L (ref 8–16)
APTT PPP: 40 SEC (ref 21–32)
BASOPHILS # BLD AUTO: 0.04 K/UL (ref 0–0.2)
BASOPHILS NFR BLD: 0.5 % (ref 0–1.9)
BUN SERPL-MCNC: 11 MG/DL (ref 8–23)
CALCIUM SERPL-MCNC: 8.7 MG/DL (ref 8.7–10.5)
CHLORIDE SERPL-SCNC: 105 MMOL/L (ref 95–110)
CO2 SERPL-SCNC: 21 MMOL/L (ref 23–29)
CREAT SERPL-MCNC: 0.7 MG/DL (ref 0.5–1.4)
DIFFERENTIAL METHOD BLD: ABNORMAL
EOSINOPHIL # BLD AUTO: 0.1 K/UL (ref 0–0.5)
EOSINOPHIL NFR BLD: 1.2 % (ref 0–8)
ERYTHROCYTE [DISTWIDTH] IN BLOOD BY AUTOMATED COUNT: 17 % (ref 11.5–14.5)
EST. GFR  (NO RACE VARIABLE): >60 ML/MIN/1.73 M^2
GLUCOSE SERPL-MCNC: 90 MG/DL (ref 70–110)
HCT VFR BLD AUTO: 29.4 % (ref 37–48.5)
HGB BLD-MCNC: 9.7 G/DL (ref 12–16)
IMM GRANULOCYTES # BLD AUTO: 0.06 K/UL (ref 0–0.04)
IMM GRANULOCYTES NFR BLD AUTO: 0.7 % (ref 0–0.5)
LYMPHOCYTES # BLD AUTO: 1.9 K/UL (ref 1–4.8)
LYMPHOCYTES NFR BLD: 23.4 % (ref 18–48)
MCH RBC QN AUTO: 32 PG (ref 27–31)
MCHC RBC AUTO-ENTMCNC: 33 G/DL (ref 32–36)
MCV RBC AUTO: 97 FL (ref 82–98)
MONOCYTES # BLD AUTO: 0.5 K/UL (ref 0.3–1)
MONOCYTES NFR BLD: 6.7 % (ref 4–15)
NEUTROPHILS # BLD AUTO: 5.4 K/UL (ref 1.8–7.7)
NEUTROPHILS NFR BLD: 67.5 % (ref 38–73)
NRBC BLD-RTO: 0 /100 WBC
PLATELET # BLD AUTO: 273 K/UL (ref 150–450)
PMV BLD AUTO: 9.8 FL (ref 9.2–12.9)
POCT GLUCOSE: 122 MG/DL (ref 70–110)
POCT GLUCOSE: 123 MG/DL (ref 70–110)
POCT GLUCOSE: 127 MG/DL (ref 70–110)
POCT GLUCOSE: 89 MG/DL (ref 70–110)
POTASSIUM SERPL-SCNC: 3.6 MMOL/L (ref 3.5–5.1)
RBC # BLD AUTO: 3.03 M/UL (ref 4–5.4)
SODIUM SERPL-SCNC: 136 MMOL/L (ref 136–145)
WBC # BLD AUTO: 8.07 K/UL (ref 3.9–12.7)

## 2025-01-26 PROCEDURE — 85025 COMPLETE CBC W/AUTO DIFF WBC: CPT | Performed by: EMERGENCY MEDICINE

## 2025-01-26 PROCEDURE — 97530 THERAPEUTIC ACTIVITIES: CPT | Mod: CQ

## 2025-01-26 PROCEDURE — 25000003 PHARM REV CODE 250: Performed by: INTERNAL MEDICINE

## 2025-01-26 PROCEDURE — 63600175 PHARM REV CODE 636 W HCPCS: Performed by: EMERGENCY MEDICINE

## 2025-01-26 PROCEDURE — S4991 NICOTINE PATCH NONLEGEND: HCPCS | Performed by: NURSE PRACTITIONER

## 2025-01-26 PROCEDURE — 97110 THERAPEUTIC EXERCISES: CPT | Mod: CQ

## 2025-01-26 PROCEDURE — 63600175 PHARM REV CODE 636 W HCPCS: Performed by: INTERNAL MEDICINE

## 2025-01-26 PROCEDURE — 85730 THROMBOPLASTIN TIME PARTIAL: CPT | Performed by: FAMILY MEDICINE

## 2025-01-26 PROCEDURE — 80048 BASIC METABOLIC PNL TOTAL CA: CPT | Performed by: HOSPITALIST

## 2025-01-26 PROCEDURE — 25000003 PHARM REV CODE 250: Performed by: FAMILY MEDICINE

## 2025-01-26 PROCEDURE — 21400001 HC TELEMETRY ROOM

## 2025-01-26 PROCEDURE — 25000003 PHARM REV CODE 250: Performed by: NURSE PRACTITIONER

## 2025-01-26 PROCEDURE — 27000207 HC ISOLATION

## 2025-01-26 RX ORDER — ALPRAZOLAM 0.25 MG/1
0.5 TABLET ORAL DAILY PRN
Status: DISCONTINUED | OUTPATIENT
Start: 2025-01-26 | End: 2025-01-28 | Stop reason: HOSPADM

## 2025-01-26 RX ORDER — MORPHINE SULFATE 2 MG/ML
2 INJECTION, SOLUTION INTRAMUSCULAR; INTRAVENOUS EVERY 4 HOURS PRN
Status: DISCONTINUED | OUTPATIENT
Start: 2025-01-26 | End: 2025-01-28 | Stop reason: HOSPADM

## 2025-01-26 RX ORDER — OXYCODONE AND ACETAMINOPHEN 10; 325 MG/1; MG/1
1 TABLET ORAL EVERY 6 HOURS PRN
Status: DISCONTINUED | OUTPATIENT
Start: 2025-01-26 | End: 2025-01-28 | Stop reason: HOSPADM

## 2025-01-26 RX ORDER — ALPRAZOLAM 1 MG/1
1 TABLET ORAL NIGHTLY PRN
Status: DISCONTINUED | OUTPATIENT
Start: 2025-01-26 | End: 2025-01-28 | Stop reason: HOSPADM

## 2025-01-26 RX ORDER — LIDOCAINE 50 MG/G
1 PATCH TOPICAL DAILY
Status: DISCONTINUED | OUTPATIENT
Start: 2025-01-26 | End: 2025-01-28 | Stop reason: HOSPADM

## 2025-01-26 RX ADMIN — PIPERACILLIN SODIUM AND TAZOBACTAM SODIUM 4.5 G: 4; .5 INJECTION, POWDER, LYOPHILIZED, FOR SOLUTION INTRAVENOUS at 01:01

## 2025-01-26 RX ADMIN — LEVOTHYROXINE SODIUM 75 MCG: 25 TABLET ORAL at 05:01

## 2025-01-26 RX ADMIN — OXYCODONE HYDROCHLORIDE AND ACETAMINOPHEN 1 TABLET: 5; 325 TABLET ORAL at 05:01

## 2025-01-26 RX ADMIN — NICOTINE 1 PATCH: 21 PATCH, EXTENDED RELEASE TRANSDERMAL at 09:01

## 2025-01-26 RX ADMIN — OXYCODONE HYDROCHLORIDE AND ACETAMINOPHEN 1 TABLET: 5; 325 TABLET ORAL at 12:01

## 2025-01-26 RX ADMIN — PIPERACILLIN SODIUM AND TAZOBACTAM SODIUM 4.5 G: 4; .5 INJECTION, POWDER, LYOPHILIZED, FOR SOLUTION INTRAVENOUS at 09:01

## 2025-01-26 RX ADMIN — ALPRAZOLAM 0.5 MG: 0.25 TABLET ORAL at 08:01

## 2025-01-26 RX ADMIN — PANTOPRAZOLE SODIUM 40 MG: 40 TABLET, DELAYED RELEASE ORAL at 08:01

## 2025-01-26 RX ADMIN — OXYCODONE AND ACETAMINOPHEN 1 TABLET: 10; 325 TABLET ORAL at 08:01

## 2025-01-26 RX ADMIN — ATORVASTATIN CALCIUM 40 MG: 40 TABLET, FILM COATED ORAL at 08:01

## 2025-01-26 RX ADMIN — OXYCODONE AND ACETAMINOPHEN 1 TABLET: 10; 325 TABLET ORAL at 03:01

## 2025-01-26 RX ADMIN — ALPRAZOLAM 1 MG: 1 TABLET ORAL at 09:01

## 2025-01-26 RX ADMIN — PIPERACILLIN SODIUM AND TAZOBACTAM SODIUM 4.5 G: 4; .5 INJECTION, POWDER, LYOPHILIZED, FOR SOLUTION INTRAVENOUS at 05:01

## 2025-01-26 RX ADMIN — LIDOCAINE 1 PATCH: 50 PATCH CUTANEOUS at 09:01

## 2025-01-26 RX ADMIN — HEPARIN SODIUM 8 UNITS/KG/HR: 10000 INJECTION, SOLUTION INTRAVENOUS at 01:01

## 2025-01-26 NOTE — PLAN OF CARE
Problem: Adult Inpatient Plan of Care  Goal: Plan of Care Review  Outcome: Progressing  Goal: Patient-Specific Goal (Individualized)  Outcome: Progressing  Goal: Absence of Hospital-Acquired Illness or Injury  Outcome: Progressing  Goal: Optimal Comfort and Wellbeing  Outcome: Progressing  Goal: Readiness for Transition of Care  Outcome: Progressing     Problem: Wound  Goal: Optimal Coping  Outcome: Progressing  Goal: Optimal Functional Ability  Outcome: Progressing  Goal: Absence of Infection Signs and Symptoms  Outcome: Progressing    Pt turned Q2, on waffle, zflex boots re-applied and triad to buttock.      Problem: Pain Acute  Goal: Optimal Pain Control and Function  Outcome: Not Progressing    Pain poorly controlled. Fentanyl patch added to regimen.

## 2025-01-26 NOTE — SUBJECTIVE & OBJECTIVE
Interval History:   No surgical interventions rec'd. SNF pending.  IR replaced with a larger drain but still having pain. Fentanyl patch added without relief. Added lidocaine patch and inc oxycodone. Morphine prn as breakthrough . Explained to pt about respiratory depression with too much opioids use    Review of Systems  Objective:     Vital Signs (Most Recent):  Temp: 98.2 °F (36.8 °C) (01/26/25 1149)  Pulse: 94 (01/26/25 1149)  Resp: 16 (01/26/25 1149)  BP: (!) 93/54 (01/26/25 1149)  SpO2: 98 % (01/26/25 1149) Vital Signs (24h Range):  Temp:  [98.2 °F (36.8 °C)-99.2 °F (37.3 °C)] 98.2 °F (36.8 °C)  Pulse:  [] 94  Resp:  [16-20] 16  SpO2:  [97 %-100 %] 98 %  BP: ()/(53-67) 93/54     Weight: 68.5 kg (151 lb 0.2 oz)  Body mass index is 27.62 kg/m².    Intake/Output Summary (Last 24 hours) at 1/26/2025 1415  Last data filed at 1/26/2025 1000  Gross per 24 hour   Intake 2619.31 ml   Output 400 ml   Net 2219.31 ml         Physical Exam  Constitutional:       General: She is not in acute distress.     Appearance: She is ill-appearing. She is not toxic-appearing.   HENT:      Mouth/Throat:      Mouth: Mucous membranes are moist.      Pharynx: Oropharynx is clear.   Eyes:      Extraocular Movements: Extraocular movements intact.      Conjunctiva/sclera: Conjunctivae normal.   Cardiovascular:      Rate and Rhythm: Normal rate and regular rhythm.   Pulmonary:      Effort: Pulmonary effort is normal. No respiratory distress.      Breath sounds: Normal breath sounds.   Abdominal:      General: Bowel sounds are normal. There is no distension.      Palpations: Abdomen is soft.      Tenderness: There is abdominal tenderness. There is no guarding.      Comments: R side drain in place, dark brown output, scant   Musculoskeletal:         General: Normal range of motion.      Right lower leg: No edema.      Left lower leg: No edema.   Skin:     General: Skin is warm and dry.   Neurological:      General: No focal deficit  present.      Mental Status: She is alert and oriented to person, place, and time.   Psychiatric:         Mood and Affect: Mood normal.         Behavior: Behavior normal.             Significant Labs: All pertinent labs within the past 24 hours have been reviewed.    Significant Imaging: I have reviewed all pertinent imaging results/findings within the past 24 hours.

## 2025-01-26 NOTE — PT/OT/SLP PROGRESS
Physical Therapy Treatment    Patient Name:  Thom Krishna   MRN:  [id number]    Recommendations:     Discharge Recommendations: Moderate Intensity Therapy  Discharge Equipment Recommendations: to be determined by next level of care  Barriers to discharge:  decreased strength/functional mobility requiring increased assistance at this time    Assessment:     Thom Krishna is a 69 y.o. female admitted with a medical diagnosis of Epigastric pain.  She presents with the following impairments/functional limitations: weakness, impaired endurance, impaired self care skills, impaired functional mobility, impaired balance, gait instability, pain, decreased safety awareness, decreased lower extremity function, decreased upper extremity function, impaired coordination, decreased coordination, decreased ROM, impaired muscle length, impaired skin Pt would continue to benefit from P.T. To address impairments listed above.  .    Rehab Prognosis: Fair; patient would benefit from acute skilled PT services to address these deficits and reach maximum level of function.    Recent Surgery: Procedure(s) (LRB):  COLONOSCOPY (N/A) 9 Days Post-Op    Plan:     During this hospitalization, patient to be seen 5 x/week to address the identified rehab impairments via gait training, therapeutic activities, therapeutic exercises, neuromuscular re-education and progress toward the following goals:    Plan of Care Expires:  02/15/25    Subjective     Patient/Family Comments/goals: Pt agreed to tx  Pain/Comfort:  Pain Rating 1:  (did not rate)  Location - Orientation 1: generalized  Location 1: abdomen  Pain Addressed 1: Reposition, Distraction, Nurse notified, Cessation of Activity  Pain Rating Post-Intervention 1:  (did not rate)      Objective:     Communicated with RN prior to session.  Patient found HOB elevated with bed alarm, telemetry, peripheral IV, PureWick, DEBBIE drain upon PT entry to room.     General Precautions: Standard, fall,  diabetic  Orthopedic Precautions: N/A  Braces: N/A  Respiratory Status: Room air     Functional Mobility:  Bed Mobility:     Rolling Right: minimum assistance  Scooting: contact guard assist to scoot to EOB  Supine to Sit: moderate assistance with HOB elevated.  Increased time and effort  Sit to Supine: moderate assistance and maximal assistance  Transfers:     Sit to Stand:  minimum assistance with rolling walker and vc's for hand placement x 2 trials  Gait: 4ft fwd/back with Rw and Katherine with vc's for sequencing with RW use.  Pt ambulates with decreased shy, decreased step length, decreased foot to floor clearance with mild trunk flexion.  Unsteady especially when stepping backward to EOB. 3 small side steps to the right up to HOB with RW and Min A  Balance: sitting fair/fair+, standing fair/fair-, gait fair-      AM-PAC 6 CLICK MOBILITY  Turning over in bed (including adjusting bedclothes, sheets and blankets)?: 3  Sitting down on and standing up from a chair with arms (e.g., wheelchair, bedside commode, etc.): 3  Moving from lying on back to sitting on the side of the bed?: 2  Moving to and from a bed to a chair (including a wheelchair)?: 3  Need to walk in hospital room?: 3  Climbing 3-5 steps with a railing?: 1  Basic Mobility Total Score: 15       Treatment & Education:  Pt education on the role and POC of PT.  Pt sat EOB ~15 mins with SBA/CGA with one UE support holding onto R b/r.  VC's for upright posture as able.  Seated BLE therex: APs, LAQs, hip flexion 10 x 2 reps.  Gentle stretch to B hamstrings to pt tolerance.  Katherine for increased ROm secondary to pt with decreased strength.  Decreased knee extension secondary to tight hamstrings and decreased strength.  Gait as above.  Pt requested to return to bed at end of gait secondary to increased abdominal pain and fatigue.  Pressure relief boot doffed for tx and donned back after.    Patient left HOB elevated with all lines intact, call button in reach,  bed alarm on, and RN notified..    GOALS:   Multidisciplinary Problems       Physical Therapy Goals          Problem: Physical Therapy    Goal Priority Disciplines Outcome Interventions   Physical Therapy Goal     PT, PT/OT Progressing    Description: Goals to be met by:  2/15/25    Patient will increase functional independence with mobility by performin. Supine to sit with Modified [place]  2. Sit to supine with Modified [place]  3. Sit to stand transfer with Stand-by Assistance  4. Bed to chair transfer with Stand-by Assistance using Rolling Walker  5. Gait  x 50 feet with Stand-by Assistance using Rolling Walker.                            Time Tracking:     PT Received On: 25  PT Start Time: 1434     PT Stop Time: 1457  PT Total Time (min): 23 min     Billable Minutes: Therapeutic Activity 12 and Therapeutic Exercise 11    Treatment Type: Treatment  PT/PTA: PTA     Number of PTA visits since last PT visit: 2025

## 2025-01-26 NOTE — PLAN OF CARE
Problem: Adult Inpatient Plan of Care  Goal: Plan of Care Review  Outcome: Progressing  Goal: Absence of Hospital-Acquired Illness or Injury  Outcome: Progressing  Goal: Readiness for Transition of Care  Outcome: Progressing     Problem: Fall Injury Risk  Goal: Absence of Fall and Fall-Related Injury  Outcome: Progressing     Problem: VTE (Venous Thromboembolism)  Goal: Tissue Perfusion  Outcome: Progressing     Problem: Infection  Goal: Absence of Infection Signs and Symptoms  Outcome: Progressing     Problem: Skin Injury Risk Increased  Goal: Skin Health and Integrity  Outcome: Progressing     Problem: Pain Acute  Goal: Optimal Pain Control and Function  Outcome: Progressing     Problem: Confusion Acute  Goal: Optimal Cognitive Function  Outcome: Progressing     Problem: UTI (Urinary Tract Infection)  Goal: Improved Infection Symptoms  Outcome: Progressing     Problem: Wound  Goal: Improved Oral Intake  Outcome: Progressing  Goal: Optimal Pain Control and Function  Outcome: Progressing  Goal: Skin Health and Integrity  Outcome: Progressing     Problem: Diabetes Comorbidity  Goal: Blood Glucose Level Within Targeted Range  Outcome: Progressing

## 2025-01-26 NOTE — PROGRESS NOTES
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 She  presented to the ED with complaint of abdominal pain.  Diagnosis with colon cancer in Nov 2024 and underwent colon resection, however developed intra abdominal infection requiring operative intervention. She was admitted to SNF facility and underwent treatment with IV antibiotic. She continue to experiencing symptoms of abdominal pain, increase confusion. CT of abdominal showed  Operative change of partial colon resection and ileal colic anastomosis with suspected developing small bowel obstruction and transition point seen at the ileocolic anastomosis. Rim enhancing gas and fluid collection at the lateral right abdominal wall concerning for abscess. General Surgery consulted, with plan IR consultation for consideration of aspiration vs drain placement into her anterior abdominal wall collection. Lt leg verous ultrasound showed,.left lower extremity DVT in the common femoral vein. Patient being admitted under the care of Hospital Medicine.              Overview/Hospital Course:  No notes on file    Interval History:   No surgical interventions rec'd. SNF pending.  IR replaced with a larger drain but still having pain. Fentanyl patch added without relief. Added lidocaine patch and inc oxycodone. Morphine prn as breakthrough . Explained to pt about respiratory depression with too much opioids use    Review of Systems  Objective:     Vital Signs (Most Recent):  Temp: 98.2 °F (36.8 °C) (01/26/25 1149)  Pulse: 94  (01/26/25 1149)  Resp: 16 (01/26/25 1149)  BP: (!) 93/54 (01/26/25 1149)  SpO2: 98 % (01/26/25 1149) Vital Signs (24h Range):  Temp:  [98.2 °F (36.8 °C)-99.2 °F (37.3 °C)] 98.2 °F (36.8 °C)  Pulse:  [] 94  Resp:  [16-20] 16  SpO2:  [97 %-100 %] 98 %  BP: ()/(53-67) 93/54     Weight: 68.5 kg (151 lb 0.2 oz)  Body mass index is 27.62 kg/m².    Intake/Output Summary (Last 24 hours) at 1/26/2025 1415  Last data filed at 1/26/2025 1000  Gross per 24 hour   Intake 2619.31 ml   Output 400 ml   Net 2219.31 ml         Physical Exam  Constitutional:       General: She is not in acute distress.     Appearance: She is ill-appearing. She is not toxic-appearing.   HENT:      Mouth/Throat:      Mouth: Mucous membranes are moist.      Pharynx: Oropharynx is clear.   Eyes:      Extraocular Movements: Extraocular movements intact.      Conjunctiva/sclera: Conjunctivae normal.   Cardiovascular:      Rate and Rhythm: Normal rate and regular rhythm.   Pulmonary:      Effort: Pulmonary effort is normal. No respiratory distress.      Breath sounds: Normal breath sounds.   Abdominal:      General: Bowel sounds are normal. There is no distension.      Palpations: Abdomen is soft.      Tenderness: There is abdominal tenderness. There is no guarding.      Comments: R side drain in place, dark brown output, scant   Musculoskeletal:         General: Normal range of motion.      Right lower leg: No edema.      Left lower leg: No edema.   Skin:     General: Skin is warm and dry.   Neurological:      General: No focal deficit present.      Mental Status: She is alert and oriented to person, place, and time.   Psychiatric:         Mood and Affect: Mood normal.         Behavior: Behavior normal.             Significant Labs: All pertinent labs within the past 24 hours have been reviewed.    Significant Imaging: I have reviewed all pertinent imaging results/findings within the past 24 hours.    Assessment and Plan     * Epigastric  pain  DDx:  Bowel obstruction ruled out: CT shows distended small bowel proximal to anastomosis, though Gastrografin has passed  Anastomotic stricture (ruled out)  Infectious process: Due to anterior abdominal wall abscess vs acute UTI. Possible pneumonia based on CT chest.     Dx:  IR drain placed 1/15. 20cc of bloody purulent fluid.   Abscess cultures - KLEBSIELLA OXYTOCA   Colonoscopy on 1/17: Patent end-to-side ileocolonic anastomosis with purulent drainage likely from abscess; anastomosis intact; ileum normal; no specimens collected.  CT chest abdomen pelvis: Patchy GGOs in lungs c/f asp or PNA; biliary duct dilation post-chad, no mass, patulous esoph w/air-fluid level c/f dysmotility/reflux/achalasia; 10.5 cm R abd wall collection, decreased  Follow up MRCP    Tx:  GI, ID, and Gen Surg consult  IV cipro escalated to Zosyn due to low grade fever, tachycardia which is now resolved  Cultures to guide therapy  Monitor output from IR drain - scant output.  Follow up imaging and consider drain removal if improvement in collection  Larger drain placed by IR on 1/23/25    Cont with severe pain  On lidocaine and fentanyl patch with opioids po and iv prn    Hypoglycemia  Resolved        PICC (peripherally inserted central catheter) in place  PICC placed at OSH on 11/3 due to difficulty with access    Tx:  PICC replaced on 1/18 this admission due to functional issues      Acute UTI  Dx:  Urine culture ENTEROCOCCUS FAECIUM     Tx:  Macrobid x5 day course    Acute DVT (deep venous thrombosis)  Likely due to recent hospitalization and sedentary risk factor    Left thigh veins: Occlusive thrombus in the common femoral    Tx:  Heparin drip   Consult cardiovascular   Transition to [facility] at discharge        Tobacco dependency  Dangers of cigarette smoking were reviewed with patient in detail. Patient was Counseled for 3-10 minutes. Nicotine replacement options were discussed. Nicotine replacement was discussed-       Carotid  arterial disease        Moderate malnutrition  Nutrition consulted. Most recent weight and BMI monitored-     Measurements:  Wt Readings from Last 1 Encounters:   01/17/25 68.5 kg (151 lb 0.2 oz)   Body mass index is 27.62 kg/m².    Patient has been screened and assessed by RD.    Malnutrition Type:  Context: chronic illness  Level: moderate    Malnutrition Characteristic Summary:  Energy Intake (Malnutrition): less than or equal to 75% for greater than or equal to 1 month    Interventions/Recommendations (treatment strategy):  1. General Healthful diet  2. Commercial beverage medical food supplement therapy      Atherosclerosis of aorta  Continue Statin       Type 2 diabetes mellitus with hypertriglyceridemia  Accu checks every 6 hours with SSI       GERD (gastroesophageal reflux disease)  Continue PPI       Hypothyroidism    Continue Synthroid       VTE Risk Mitigation (From admission, onward)           Ordered     heparin 25,000 units in dextrose 5% (100 units/ml) IV bolus from bag HIGH INTENSITY nomogram - [facility]  As needed (PRN)        Question:  Heparin Infusion Adjustment (DO NOT MODIFY ANSWER)  Answer:  \\[email or web address]\[email or web address]\Images\Pharmacy\HeparinInfusions\heparin HIGH INTENSITY nomogram for [facility] [id number].pdf    01/14/25 1917     heparin 25,000 units in dextrose 5% (100 units/ml) IV bolus from bag HIGH INTENSITY nomogram - [facility]  As needed (PRN)        Question:  Heparin Infusion Adjustment (DO NOT MODIFY ANSWER)  Answer:  \\[email or web address]\epic\Images\Pharmacy\HeparinInfusions\heparin HIGH INTENSITY nomogram for [facility] [id number].pdf    01/14/25 1917     heparin 25,000 units in dextrose 5% 250 mL (100 units/mL) infusion HIGH INTENSITY nomogram - [facility]  Continuous        Question:  Begin at (units/kg/hr)  Answer:  18 01/14/25 1917                    Discharge Planning   MAHIN: 1/24/2025     Code Status: Full Code   Medical Readiness for Discharge Date:   Discharge Plan A: Skilled Nursing Facility   Discharge Delays: None known at  this time            Please place Justification for DME        Lori Teixeira MD  Department of [facility]   [facility] Surg

## 2025-01-26 NOTE — ASSESSMENT & PLAN NOTE
DDx:  Bowel obstruction ruled out: CT shows distended small bowel proximal to anastomosis, though Gastrografin has passed  Anastomotic stricture (ruled out)  Infectious process: Due to anterior abdominal wall abscess vs acute UTI. Possible pneumonia based on CT chest.     Dx:  IR drain placed 1/15. 20cc of bloody purulent fluid.   Abscess cultures - KLEBSIELLA OXYTOCA   Colonoscopy on 1/17: Patent end-to-side ileocolonic anastomosis with purulent drainage likely from abscess; anastomosis intact; ileum normal; no specimens collected.  CT chest abdomen pelvis: Patchy GGOs in lungs c/f asp or PNA; biliary duct dilation post-chad, no mass, patulous esoph w/air-fluid level c/f dysmotility/reflux/achalasia; 10.5 cm R abd wall collection, decreased  Follow up MRCP    Tx:  GI, ID, and Gen Surg consult  IV cipro escalated to Zosyn due to low grade fever, tachycardia which is now resolved  Cultures to guide therapy  Monitor output from IR drain - scant output.  Follow up imaging and consider drain removal if improvement in collection  Larger drain placed by IR on 1/23/25    Cont with severe pain  On lidocaine and fentanyl patch with opioids po and iv prn

## 2025-01-27 LAB
ANION GAP SERPL CALC-SCNC: 11 MMOL/L (ref 8–16)
APTT PPP: 34.2 SEC (ref 21–32)
APTT PPP: 55.3 SEC (ref 21–32)
BASOPHILS # BLD AUTO: 0.04 K/UL (ref 0–0.2)
BASOPHILS NFR BLD: 0.5 % (ref 0–1.9)
BUN SERPL-MCNC: 13 MG/DL (ref 8–23)
CALCIUM SERPL-MCNC: 8.9 MG/DL (ref 8.7–10.5)
CHLORIDE SERPL-SCNC: 104 MMOL/L (ref 95–110)
CO2 SERPL-SCNC: 20 MMOL/L (ref 23–29)
CREAT SERPL-MCNC: 0.7 MG/DL (ref 0.5–1.4)
DIFFERENTIAL METHOD BLD: ABNORMAL
EOSINOPHIL # BLD AUTO: 0.1 K/UL (ref 0–0.5)
EOSINOPHIL NFR BLD: 1.2 % (ref 0–8)
ERYTHROCYTE [DISTWIDTH] IN BLOOD BY AUTOMATED COUNT: 17.1 % (ref 11.5–14.5)
EST. GFR  (NO RACE VARIABLE): >60 ML/MIN/1.73 M^2
GLUCOSE SERPL-MCNC: 87 MG/DL (ref 70–110)
HCT VFR BLD AUTO: 30.3 % (ref 37–48.5)
HGB BLD-MCNC: 9.8 G/DL (ref 12–16)
IMM GRANULOCYTES # BLD AUTO: 0.05 K/UL (ref 0–0.04)
IMM GRANULOCYTES NFR BLD AUTO: 0.6 % (ref 0–0.5)
LYMPHOCYTES # BLD AUTO: 1.4 K/UL (ref 1–4.8)
LYMPHOCYTES NFR BLD: 15.9 % (ref 18–48)
MCH RBC QN AUTO: 31.7 PG (ref 27–31)
MCHC RBC AUTO-ENTMCNC: 32.3 G/DL (ref 32–36)
MCV RBC AUTO: 98 FL (ref 82–98)
MONOCYTES # BLD AUTO: 0.5 K/UL (ref 0.3–1)
MONOCYTES NFR BLD: 5.5 % (ref 4–15)
NEUTROPHILS # BLD AUTO: 6.5 K/UL (ref 1.8–7.7)
NEUTROPHILS NFR BLD: 76.3 % (ref 38–73)
NRBC BLD-RTO: 0 /100 WBC
PLATELET # BLD AUTO: 291 K/UL (ref 150–450)
PMV BLD AUTO: 10.5 FL (ref 9.2–12.9)
POCT GLUCOSE: 102 MG/DL (ref 70–110)
POCT GLUCOSE: 107 MG/DL (ref 70–110)
POCT GLUCOSE: 107 MG/DL (ref 70–110)
POCT GLUCOSE: 82 MG/DL (ref 70–110)
POTASSIUM SERPL-SCNC: 3.7 MMOL/L (ref 3.5–5.1)
RBC # BLD AUTO: 3.09 M/UL (ref 4–5.4)
SODIUM SERPL-SCNC: 135 MMOL/L (ref 136–145)
WBC # BLD AUTO: 8.53 K/UL (ref 3.9–12.7)

## 2025-01-27 PROCEDURE — 25000003 PHARM REV CODE 250: Performed by: FAMILY MEDICINE

## 2025-01-27 PROCEDURE — 63600175 PHARM REV CODE 636 W HCPCS: Performed by: EMERGENCY MEDICINE

## 2025-01-27 PROCEDURE — 97530 THERAPEUTIC ACTIVITIES: CPT | Mod: CO

## 2025-01-27 PROCEDURE — 80048 BASIC METABOLIC PNL TOTAL CA: CPT | Performed by: HOSPITALIST

## 2025-01-27 PROCEDURE — 25000003 PHARM REV CODE 250: Performed by: INTERNAL MEDICINE

## 2025-01-27 PROCEDURE — 25000003 PHARM REV CODE 250: Performed by: HOSPITALIST

## 2025-01-27 PROCEDURE — 85730 THROMBOPLASTIN TIME PARTIAL: CPT | Performed by: FAMILY MEDICINE

## 2025-01-27 PROCEDURE — S4991 NICOTINE PATCH NONLEGEND: HCPCS | Performed by: NURSE PRACTITIONER

## 2025-01-27 PROCEDURE — 63600175 PHARM REV CODE 636 W HCPCS: Performed by: INTERNAL MEDICINE

## 2025-01-27 PROCEDURE — 21400001 HC TELEMETRY ROOM

## 2025-01-27 PROCEDURE — 97530 THERAPEUTIC ACTIVITIES: CPT | Mod: CQ

## 2025-01-27 PROCEDURE — 85025 COMPLETE CBC W/AUTO DIFF WBC: CPT | Performed by: EMERGENCY MEDICINE

## 2025-01-27 PROCEDURE — 25000003 PHARM REV CODE 250: Performed by: NURSE PRACTITIONER

## 2025-01-27 PROCEDURE — 27000207 HC ISOLATION

## 2025-01-27 RX ORDER — FENTANYL 12.5 UG/1
1 PATCH TRANSDERMAL
Qty: 4 PATCH | Refills: 0 | Status: SHIPPED | OUTPATIENT
Start: 2025-01-28 | End: 2025-01-28 | Stop reason: HOSPADM

## 2025-01-27 RX ORDER — METRONIDAZOLE 500 MG/1
500 TABLET ORAL 3 TIMES DAILY
Start: 2025-01-27 | End: 2025-01-27

## 2025-01-27 RX ORDER — ALPRAZOLAM 1 MG/1
TABLET ORAL
Qty: 20 TABLET | Refills: 5 | Status: SHIPPED | OUTPATIENT
Start: 2025-01-27

## 2025-01-27 RX ORDER — METRONIDAZOLE 500 MG/1
500 TABLET ORAL 3 TIMES DAILY
Start: 2025-01-27 | End: 2025-02-10

## 2025-01-27 RX ORDER — CIPROFLOXACIN 500 MG/1
500 TABLET ORAL 2 TIMES DAILY
Start: 2025-01-27 | End: 2025-01-27

## 2025-01-27 RX ORDER — LIDOCAINE 50 MG/G
1 PATCH TOPICAL DAILY
Start: 2025-01-28

## 2025-01-27 RX ORDER — CIPROFLOXACIN 500 MG/1
500 TABLET ORAL 2 TIMES DAILY
Start: 2025-01-27 | End: 2025-02-10

## 2025-01-27 RX ORDER — OXYCODONE AND ACETAMINOPHEN 10; 325 MG/1; MG/1
1 TABLET ORAL EVERY 6 HOURS PRN
Qty: 20 TABLET | Refills: 0 | Status: SHIPPED | OUTPATIENT
Start: 2025-01-27

## 2025-01-27 RX ADMIN — PIPERACILLIN SODIUM AND TAZOBACTAM SODIUM 4.5 G: 4; .5 INJECTION, POWDER, LYOPHILIZED, FOR SOLUTION INTRAVENOUS at 10:01

## 2025-01-27 RX ADMIN — PIPERACILLIN SODIUM AND TAZOBACTAM SODIUM 4.5 G: 4; .5 INJECTION, POWDER, LYOPHILIZED, FOR SOLUTION INTRAVENOUS at 04:01

## 2025-01-27 RX ADMIN — OXYCODONE AND ACETAMINOPHEN 1 TABLET: 10; 325 TABLET ORAL at 05:01

## 2025-01-27 RX ADMIN — HEPARIN SODIUM 10 UNITS/KG/HR: 10000 INJECTION, SOLUTION INTRAVENOUS at 06:01

## 2025-01-27 RX ADMIN — OXYCODONE AND ACETAMINOPHEN 1 TABLET: 10; 325 TABLET ORAL at 11:01

## 2025-01-27 RX ADMIN — NICOTINE 1 PATCH: 21 PATCH, EXTENDED RELEASE TRANSDERMAL at 09:01

## 2025-01-27 RX ADMIN — LIDOCAINE 1 PATCH: 50 PATCH CUTANEOUS at 09:01

## 2025-01-27 RX ADMIN — ALPRAZOLAM 0.5 MG: 0.25 TABLET ORAL at 09:01

## 2025-01-27 RX ADMIN — ATORVASTATIN CALCIUM 40 MG: 40 TABLET, FILM COATED ORAL at 09:01

## 2025-01-27 RX ADMIN — PIPERACILLIN SODIUM AND TAZOBACTAM SODIUM 4.5 G: 4; .5 INJECTION, POWDER, LYOPHILIZED, FOR SOLUTION INTRAVENOUS at 01:01

## 2025-01-27 RX ADMIN — PANTOPRAZOLE SODIUM 40 MG: 40 TABLET, DELAYED RELEASE ORAL at 09:01

## 2025-01-27 RX ADMIN — APIXABAN 5 MG: 5 TABLET, FILM COATED ORAL at 10:01

## 2025-01-27 RX ADMIN — LEVOTHYROXINE SODIUM 75 MCG: 25 TABLET ORAL at 05:01

## 2025-01-27 NOTE — ASSESSMENT & PLAN NOTE
DDx:  Bowel obstruction ruled out: CT shows distended small bowel proximal to anastomosis, though Gastrografin has passed  Anastomotic stricture (ruled out)  Infectious process: Due to anterior abdominal wall abscess vs acute UTI. Possible pneumonia based on CT chest.     Dx:  IR drain placed 1/15. 20cc of bloody purulent fluid.   Abscess cultures - KLEBSIELLA OXYTOCA   Colonoscopy on 1/17: Patent end-to-side ileocolonic anastomosis with purulent drainage likely from abscess; anastomosis intact; ileum normal; no specimens collected.  CT chest abdomen pelvis: Patchy GGOs in lungs c/f asp or PNA; biliary duct dilation post-chad, no mass, patulous esoph w/air-fluid level c/f dysmotility/reflux/achalasia; 10.5 cm R abd wall collection, decreased  Follow up MRCP    Tx:  GI, ID, and Gen Surg consult  IV cipro escalated to Zosyn due to low grade fever, tachycardia which is now resolved  Cultures to guide therapy  Monitor output from IR drain - scant output.  Follow up imaging and consider drain removal if improvement in collection  Larger drain placed by IR on 1/23/25  Plan for transition to cipro/flagyl end 2/10/25    Cont with severe pain  On lidocaine and fentanyl patch with opioids po and iv prn

## 2025-01-27 NOTE — PLAN OF CARE
[facility]    FACILITY TRANSFER ORDERS      Patient Name: Thom Krishna  YOB: 1955    PCP: Brandy Dejesus MD   PCP Address: [address] / [place] [address]  PCP Phone Number: [phone]  PCP Fax: [phone]    Encounter Date: 01/28/2025    Admit to: SNF    Vital Signs:  Routine    Diagnoses:   Active Hospital Problems    Diagnosis  POA    *Epigastric pain [R10.13]  Yes    Acute UTI [N39.0]  Yes    PICC (peripherally inserted central catheter) in place [Z45.2]  Not Applicable    Hypoglycemia [E16.2]  Yes    Acute DVT (deep venous thrombosis) [I82.409]  Yes    Tobacco dependency [F17.200]  Yes    Carotid arterial disease [I77.9]  Yes    Moderate malnutrition [E44.0]  Yes    Atherosclerosis of aorta [I70.0]  Yes    Type 2 diabetes mellitus with hypertriglyceridemia [E11.69, E78.1]  Yes    GERD (gastroesophageal reflux disease) [K21.9]  Yes    Hypothyroidism [E03.9]  Yes      Resolved Hospital Problems   No resolved problems to display.       Allergies:  Review of patient's allergies indicates:   Allergen Reactions    Cefaclor Hives    Gabapentin Swelling    Penicillins      Other reaction(s): Hives    Sulfa (sulfonamide antibiotics) Other (See Comments)     Other reaction(s): Hives       Diet: regular diet; doe bid or equivalent    Activities: Activity as tolerated    Goals of Care Treatment Preferences:  Code Status: Full Code      Nursing: per facility protocol     Labs: per facility protocol    CONSULTS:    Physical Therapy to evaluate and treat. , Occupational Therapy to evaluate and treat., and  to evaluate for community resources/long-range planning.    MISCELLANEOUS CARE:  Routine Skin for Bedridden Patients: Apply moisture barrier cream to all skin folds and wet areas in perineal area daily and after baths and all bowel movements. and DEBBIE drain, empty every shift    WOUND CARE ORDERS  Yes:    Nurse to cleanse the buttocks/sacrum with cleansing  cloths, apply a thin layer of TRIAD BID and prn incontinent episode.     Medications: Review discharge medications with patient and family and provide education.         Medication List        START taking these medications      apixaban 5 mg Tab  Commonly known as: ELIQUIS  Take 1 tablet (5 mg total) by mouth 2 (two) times daily.     ciprofloxacin HCl 500 MG tablet  Commonly known as: CIPRO  Take 1 tablet (500 mg total) by mouth 2 (two) times daily. for 14 days     DULoxetine 30 MG capsule  Commonly known as: CYMBALTA  Take 1 capsule (30 mg total) by mouth once daily.  Start taking on: January 29, 2025     LIDOcaine 5 %  Commonly known as: LIDODERM  Place 1 patch onto the skin once daily. Remove & Discard patch within 12 hours or as directed by MD     metroNIDAZOLE 500 MG tablet  Commonly known as: FLAGYL  Take 1 tablet (500 mg total) by mouth 3 (three) times daily. for 14 days     oxyCODONE-acetaminophen  mg per tablet  Commonly known as: PERCOCET  Take 1 tablet by mouth every 6 (six) hours as needed for Pain.            CHANGE how you take these medications      ALPRAZolam 1 MG tablet  Commonly known as: XANAX  TAKE ONE (1) TABLET BY MOUTH TWICE A DAY AS NEEDED FR ANXIETY  What changed:   how to take this  when to take this  additional instructions     cholecalciferol (vitamin D3) 125 mcg (5,000 unit) capsule  Take 1 tablet  by mouth daily with breakfast.  What changed: how much to take     levothyroxine 75 MCG tablet  Commonly known as: SYNTHROID  TAKE 1 TABLET EVERY DAY ON AN EMPTY STOMACH  What changed: See the new instructions.            CONTINUE taking these medications      atorvastatin 40 MG tablet  Commonly known as: LIPITOR  Take 1 tablet (40 mg total) by mouth once daily.     ergocalciferol 50,000 unit Cap  Commonly known as: ERGOCALCIFEROL  Take 1 capsule (50,000 Units total) by mouth every 7 days.     nicotine 21 mg/24 hr  Commonly known as: NICODERM CQ  Place 1 patch onto the skin once  daily.     ondansetron 8 MG Tbdl  Commonly known as: ZOFRAN-ODT  Dissolve 1 tablet (8 mg total) by mouth under the tongue 3 (three) times daily as needed (nausea).     pantoprazole 40 MG tablet  Commonly known as: PROTONIX  TAKE 1 TABLET EVERY DAY            STOP taking these medications      amitriptyline 25 MG tablet  Commonly known as: ELAVIL     blood sugar diagnostic Strp     blood-glucose meter kit     HYDROcodone-acetaminophen  mg per tablet  Commonly known as: NORCO     lancets Misc     linaCLOtide 145 mcg Cap capsule  Commonly known as: LINZESS     naloxone 4 mg/actuation Spry  Commonly known as: NARCAN                Immunizations Administered as of 1/28/2025       Name Date Dose VIS Date Route Exp Date    COVID-19, MRNA, LN-S, PF (Pfizer) (Purple Cap) 6/11/2021 10:35 AM 0.3 mL 12/12/2020 Intramuscular 8/31/2021    Site: Left deltoid     Given By: Kane Ramirez     : [employer]     Lot: [id number]     COVID-19, MRNA, LN-S, PF (Pfizer) (Purple Cap) 5/21/2021 10:18 AM 0.3 mL 12/12/2020 Intramuscular 7/31/2021    Site: Left deltoid     Given By: Kathryn Hinojosa, Nurse Extern     : Pf[employer]     Lot: [id number]             This patient has had both covid vaccinations    Some patients may experience side effects after vaccination.  These may include fever, headache, muscle or joint aches.  Most symptoms resolve with 24-48 hours and do not require urgent medical evaluation unless they persist for more than 72 hours or symptoms are concerning for an unrelated medical condition.          _________________________________  Nilton Dooley MD  01/28/2025

## 2025-01-27 NOTE — ASSESSMENT & PLAN NOTE
Nutrition consulted. Most recent weight and BMI monitored-     Measurements:  Wt Readings from Last 1 Encounters:   01/21/25 68.5 kg (151 lb 0.2 oz)   Body mass index is 27.62 kg/m².    Patient has been screened and assessed by RD.    Malnutrition Type:  Context: chronic illness  Level: moderate    Malnutrition Characteristic Summary:  Weight Loss (Malnutrition):  (2% x 1 year)  Energy Intake (Malnutrition): less than or equal to 75% for greater than or equal to 1 month    Interventions/Recommendations (treatment strategy):  1. Continue to encourage intake at meals as tolerated. 2. Monitor weight/labs. 3. RD to continue to follow to monitor po intake

## 2025-01-27 NOTE — PT/OT/SLP PROGRESS
Pt edPhysical Therapy Treatment    Patient Name:  Thom Krishna   MRN:  [id number]    Recommendations:     Discharge Recommendations: Moderate Intensity Therapy  Discharge Equipment Recommendations: to be determined by next level of care  Barriers to discharge:  decreased strength/functional mobility requiring increased assistance at this time; fall risk    Assessment:     Thom Krishna is a 69 y.o. female admitted with a medical diagnosis of Epigastric pain.  She presents with the following impairments/functional limitations: weakness, impaired endurance, impaired self care skills, impaired functional mobility, gait instability, impaired balance, pain, decreased safety awareness, decreased lower extremity function, decreased upper extremity function, impaired skin, impaired coordination, decreased coordination, decreased ROM, impaired muscle length Pt would continue to benefit from P.T. To address impairments listed above.      Rehab Prognosis: Fair; patient would benefit from acute skilled PT services to address these deficits and reach maximum level of function.    Recent Surgery: Procedure(s) (LRB):  COLONOSCOPY (N/A) 10 Days Post-Op    Plan:     During this hospitalization, patient to be seen 5 x/week to address the identified rehab impairments via gait training, therapeutic activities, therapeutic exercises, neuromuscular re-education and progress toward the following goals:    Plan of Care Expires:  02/15/25    Subjective       Patient/Family Comments/goals: Pt agreed to tx  Pain/Comfort:  Pain Rating 1: 9/10  Location - Orientation 1: generalized  Location 1: abdomen  Pain Addressed 1: Reposition, Distraction, Cessation of Activity, Nurse notified  Pain Rating Post-Intervention 1: 9/10      Objective:     Communicated with RN prior to session.  Patient found HOB elevated with bed alarm, telemetry, DEBBIE drain, PureWick, peripheral IV, pressure relief boots upon PT entry to room.     General Precautions: Standard,  "fall, diabetic  Orthopedic Precautions: N/A  Braces: N/A  Respiratory Status: Room air     Functional Mobility:  Bed Mobility:     Rolling Right: minimum assistance and with b/r for assist  Scooting: maximal assistance and to EOB and back into chair  Supine to Sit: moderate assistance  Sit to Supine: moderate assistance  Transfers:     Sit to Stand: 1st trial CGA with RW, 2nd trial Katherine with RW, 3rd trial Mod A without A.D.  Bed to Chair: initially Katherine with RW, but half way into transfer, pt stated that "I can't do it my legs hurt."  PTA gave vcs and Max assist to complete transfer for safe seating.    B/s chair to EOB:  AFter a seated rest, pt transferred b/s chair to EOB without A.D., PTA in front of pt providing Max/Mod A for stand pivot transfer.    Gait: 2 small steps with RW and Katherine/close CGA from EOB to b/s chair, then pt stated her legs hurt and required Max A to complete stepping to b/s chair. VC's to keep R hand on RW for increased safety and stability secondary to pt letting go to attempt to reach for chair, but too far away from chair.    Balance: sitting fair+/fair, standing fair/fair- RW, gait fair- to poor      AM-PAC 6 CLICK MOBILITY  Turning over in bed (including adjusting bedclothes, sheets and blankets)?: 3  Sitting down on and standing up from a chair with arms (e.g., wheelchair, bedside commode, etc.): 2  Moving from lying on back to sitting on the side of the bed?: 2  Moving to and from a bed to a chair (including a wheelchair)?: 2  Need to walk in hospital room?: 2  Climbing 3-5 steps with a railing?: 1  Basic Mobility Total Score: 12       Treatment & Education:  Pt education on the role of PT.  Pt requires increased time and effort for all functional mobility  Static standing with RW and close CGA while assisting pt with hygiene needs secondary to BM. After a seated rest, pt transferred to b/s chair as above.   Pt demonstrates decreased safety awareness and required increase assistance to " complete transfer to chair today.  Pt returned to bed at end of tx.  Pressure relief boots donned and pt positioned for comfort.    Patient left HOB elevated with all lines intact, call button in reach, bed alarm on, and RN notified..    GOALS:   Multidisciplinary Problems       Physical Therapy Goals          Problem: Physical Therapy    Goal Priority Disciplines Outcome Interventions   Physical Therapy Goal     PT, PT/OT Progressing    Description: Goals to be met by:  2/15/25    Patient will increase functional independence with mobility by performin. Supine to sit with Modified Beltrami  2. Sit to supine with Modified Beltrami  3. Sit to stand transfer with Stand-by Assistance  4. Bed to chair transfer with Stand-by Assistance using Rolling Walker  5. Gait  x 50 feet with Stand-by Assistance using Rolling Walker.                              Time Tracking:     PT Received On: 25  PT Start Time: 1300     PT Stop Time: 1338  PT Total Time (min): 38 min     Billable Minutes: Therapeutic Activity 38    Treatment Type: Treatment  PT/PTA: PTA     Number of PTA visits since last PT visit: 2     2025

## 2025-01-27 NOTE — PLAN OF CARE
MAILE met with pt during rounding with MD. Pt pending auth at this time. SW will continue to follow pt throughout her transitions of care and assist with any dc needs. MAILE sent updated clinicals via [employer].     Future Appointments   Date Time Provider Department Center   1/29/2025 11:00 AM Dorene Combs [facility]        01/27/25 1052   Rounds   Attendance Provider;;Assigned nurse   Discharge Plan A Skilled Nursing Facility   Why the patient remains in the hospital Requires continued medical care   Transition of Care Barriers None

## 2025-01-27 NOTE — ASSESSMENT & PLAN NOTE
PICC placed at OSH on 11/3 due to difficulty with access    Tx:  PICC replaced on 1/18 this admission due to functional issues     Tremfya Counseling: I discussed with the patient the risks of guselkumab including but not limited to immunosuppression, serious infections, and drug reactions.  The patient understands that monitoring is required including a PPD at baseline and must alert us or the primary physician if symptoms of infection or other concerning signs are noted.

## 2025-01-27 NOTE — PT/OT/SLP PROGRESS
"Occupational Therapy   Treatment    Name: Thom Krishna  MRN: [id number]  Admitting Diagnosis:  Epigastric pain  10 Days Post-Op    Recommendations:     Discharge Recommendations: Moderate Intensity Therapy  Discharge Equipment Recommendations:  to be determined by next level of care  Barriers to discharge:  None    Assessment:     Thom Krishna is a 69 y.o. female with a medical diagnosis of Epigastric pain. Performance deficits affecting function are weakness, impaired endurance, impaired self care skills, impaired functional mobility, gait instability, impaired balance, decreased coordination, decreased upper extremity function, decreased lower extremity function, decreased safety awareness, pain, impaired coordination, decreased ROM.     Rehab Prognosis:  Fair; patient would benefit from acute skilled OT services to address these deficits and reach maximum level of function.       Plan:     Patient to be seen 3 x/week to address the above listed problems via self-care/home management, therapeutic activities, therapeutic exercises  Plan of Care Expires: 02/15/25  Plan of Care Reviewed with: patient    Subjective     Chief Complaint: "Y'all just don't understand how much pain I'm in"  Patient/Family Comments/goals: return to PLOF  Pain/Comfort:  Pain Rating 1: 9/10  Location - Side 1: Right  Location 1: abdomen  Pain Addressed 1: Reposition, Distraction, Cessation of Activity, Nurse notified  Pain Rating Post-Intervention 1: 9/10    Objective:     Communicated with: nurseMercedes prior to session.  Patient found HOB elevated with bed alarm, telemetry, DEBBIE drain, PureWick, peripheral IV, pressure relief boots upon OT entry to room.    General Precautions: Standard, diabetic, fall    Orthopedic Precautions:N/A  Braces: N/A  Respiratory Status: Room air    Bed Mobility:    Patient completed Scooting/Bridging with minimum assistance  Patient completed Supine to Sit with maximal assistance  Patient completed Sit to Supine " with maximal assistance and with leg lift     Functional Mobility/Transfers:  Patient completed Sit <> Stand Transfer with minimum assistance  with  rolling walker   Patient completed multiple stands using RW (x 3 reps)  Flexed posture, unable to correct; holding head in flexion as well  RW with increased distance from patient; max cues and assist to manage RW appropriately  Attempting to sit when not close enough to bed; maxA to return to bed safely    Activities of Daily Living:  Toileting: total assistance ap    [facility] 6 Click ADL: 16    Treatment & Education:  Patient with significant more difficulty today with bed mobility and transitional movements  Easily agitated/frustrated with therapy today    Patient left HOB elevated with all lines intact, call button in reach, and bed alarm on    GOALS:   Multidisciplinary Problems       Occupational Therapy Goals          Problem: Occupational Therapy    Goal Priority Disciplines Outcome Interventions   Occupational Therapy Goal     OT, PT/OT Progressing    Description: Goals to be met by: 02/15/2025     Patient will increase functional independence with ADLs by performing:    Toileting from bedside commode with Minimal Assistance for hygiene and clothing management.   Sitting at edge of bed x10 minutes with Supervision. --GOAL MET 1/20  Supine to sit with Supervision.  Step transfer with Stand-by Assistance  Toilet transfer to bedside commode with Stand-by Assistance.                         DME Justifications:  No DME recommended requiring DME justifications    Time Tracking:     OT Date of Treatment: 01/27/25  OT Start Time: 1526  OT Stop Time: 1544  OT Total Time (min): 18 min    Billable Minutes:Therapeutic Activity 18          Number of ANA MARIA visits since last OT visit: 0    1/27/2025

## 2025-01-27 NOTE — SUBJECTIVE & OBJECTIVE
Interval History: reports significant pain at drain site but does seem to be a little better controlled than previously and was able to sleep overnight per nursing. Awaiting SNF auth.    Review of Systems  Objective:     Vital Signs (Most Recent):  Temp: 99.4 °F (37.4 °C) (01/27/25 1112)  Pulse: 110 (01/27/25 1503)  Resp: 18 (01/27/25 1119)  BP: (!) 97/53 (01/27/25 1112)  SpO2: (!) 94 % (01/27/25 1112) Vital Signs (24h Range):  Temp:  [97.5 °F (36.4 °C)-99.4 °F (37.4 °C)] 99.4 °F (37.4 °C)  Pulse:  [] 110  Resp:  [18-20] 18  SpO2:  [94 %-99 %] 94 %  BP: ()/(53-65) 97/53     Weight: 68.5 kg (151 lb 0.2 oz)  Body mass index is 27.62 kg/m².    Intake/Output Summary (Last 24 hours) at 1/27/2025 1510  Last data filed at 1/27/2025 0900  Gross per 24 hour   Intake 778.94 ml   Output 0 ml   Net 778.94 ml         Physical Exam  Constitutional:       General: She is not in acute distress.     Appearance: She is ill-appearing. She is not toxic-appearing.   HENT:      Mouth/Throat:      Mouth: Mucous membranes are moist.      Pharynx: Oropharynx is clear.   Eyes:      Extraocular Movements: Extraocular movements intact.      Conjunctiva/sclera: Conjunctivae normal.   Cardiovascular:      Rate and Rhythm: Normal rate and regular rhythm.   Pulmonary:      Effort: Pulmonary effort is normal. No respiratory distress.      Breath sounds: Normal breath sounds.   Abdominal:      General: Bowel sounds are normal. There is no distension.      Palpations: Abdomen is soft.      Tenderness: There is abdominal tenderness. There is no guarding.      Comments: R side drain in place, dark brown output, scant   Musculoskeletal:         General: Normal range of motion.      Right lower leg: No edema.      Left lower leg: No edema.   Skin:     General: Skin is warm and dry.   Neurological:      General: No focal deficit present.      Mental Status: She is alert and oriented to person, place, and time.   Psychiatric:         Mood and  Affect: Mood normal.         Behavior: Behavior normal.             Significant Labs: All pertinent labs within the past 24 hours have been reviewed.    Significant Imaging: I have reviewed all pertinent imaging results/findings within the past 24 hours.

## 2025-01-27 NOTE — ASSESSMENT & PLAN NOTE
Likely due to recent hospitalization and sedentary risk factor    Left thigh veins: Occlusive thrombus in the common femoral    Tx:  Heparin drip   Consult cardiovascular   Transition to [facility]

## 2025-01-27 NOTE — PLAN OF CARE
Problem: Adult Inpatient Plan of Care  Goal: Plan of Care Review  Outcome: Progressing     VIRTUAL NURSE:  Labs, notes, orders, and careplan reviewed.

## 2025-01-27 NOTE — HOSPITAL COURSE
"Ms. Krishna presents with intra-abdominal abscess; IR drain placed 1/15 with bloody purulent fluid output.  Initiated on IV antibiotics.  She underwent colonoscopy on 01/17 2 evaluate ileocolonic anastomosis which revealed purulent drainage likely from abscess but anastomosis was intact.  Surgery followed and did not recommend any further inpatient procedures.  Drain developed decreasing output over the course of her stay and needed to be upsized on 01/23.  Throughout her stay, she has had significant difficulties with pain control and we have been up titrating her pain medication to affect; appears to be better controlled today.  Additionally, her hospitalization was complicated by development of acute DVT for which she was initially treated with heparin drip; will transition to apixaban at time of discharge.  Infectious Disease followed while in house; transitioned to ciprofloxacin and metronidazole at discharge.  She is now stable for discharge to skilled nursing facility.  Will need close follow-up with Infectious Disease and General surgery.     BP (!) 97/53   Pulse (!) 111   Temp 99.4 °F (37.4 °C)   Resp 18   Ht 5' 2" (1.575 m)   Wt 68.5 kg (151 lb 0.2 oz)   SpO2 (!) 94%   BMI 27.62 kg/m²   Physical Exam  Constitutional:       General: She is not in acute distress.     Appearance: She is ill-appearing. She is not toxic-appearing.   HENT:      Mouth/Throat:      Mouth: Mucous membranes are moist.      Pharynx: Oropharynx is clear.   Eyes:      Extraocular Movements: Extraocular movements intact.      Conjunctiva/sclera: Conjunctivae normal.   Cardiovascular:      Rate and Rhythm: Normal rate and regular rhythm.   Pulmonary:      Effort: Pulmonary effort is normal. No respiratory distress.      Breath sounds: Normal breath sounds.   Abdominal:      General: Bowel sounds are normal. There is no distension.      Palpations: Abdomen is soft.      Tenderness: There is abdominal tenderness. There is no guarding. "      Comments: R side drain in place, dark brown output, scant   Musculoskeletal:         General: Normal range of motion.      Right lower leg: No edema.      Left lower leg: No edema.   Skin:     General: Skin is warm and dry.   Neurological:      General: No focal deficit present.      Mental Status: She is alert and oriented to person, place, and time.   Psychiatric:         Mood and Affect: Mood normal.         Behavior: Behavior normal.

## 2025-01-27 NOTE — PLAN OF CARE
10:35 am MAILE called [facility] snf and there was no answer. [phone].     10:35 am SW received call from pts daughter in law Palmira expressing for update. SW ensured family that clinicals were sent on Friday and this morning via [employer] to SNF facility. SW provided admission cell phone number by daughter in law.     10:42 am MAILE called Sofie [phone] w/[facility]. Rep expressed that she has received updated clinicals. No covid test needed per rep. Rep requested  MAR and Facility transfer orders. SW sent documents as requested. Per Sofie she will submit for auth today.     2:49pm MAILE communicated with Sofie and there no auth at this time. If auth is not received by 3:30 pt will dc tomorrow when auth is received. MAILE hard faxed MAR, PASSR/142 to [phone].     2:50 pm MAILE contacted pts daughter in law Palmira and made her aware we are still pending auth at this time. Daughter in law aware if auth is not received around 3:30/3:45 pm we will anticipate dc tomorrow when auth has been received.     Pending auth at this time.     2:54 pm [facility] Admissions staff is not at her desk at this time. MAILE left message with staff and informed snf to close referral as pt will not be dc to there facility anymore.     4:14 pm MAILE contacted pts daughter in law and made her aware of no dc today. MAILE will follow up with pts family tomorrow. Still pending auth at this time.     Future Appointments   Date Time Provider Department Center   1/29/2025 11:00 AM Dorene Combs [facility]        01/27/25 1050   Post-Acute Status   Post-Acute Authorization Placement   Post-Acute Placement Status Referrals Sent   Hospital Resources/Appts/Education Provided Appointments scheduled and added to AVS   Discharge Delays None known at this time   Discharge Plan   Discharge Plan A Skilled Nursing Facility

## 2025-01-27 NOTE — DISCHARGE SUMMARY
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 She  presented to the ED with complaint of abdominal pain.  Diagnosis with colon cancer in Nov 2024 and underwent colon resection, however developed intra abdominal infection requiring operative intervention. She was admitted to SNF facility and underwent treatment with IV antibiotic. She continue to experiencing symptoms of abdominal pain, increase confusion. CT of abdominal showed  Operative change of partial colon resection and ileal colic anastomosis with suspected developing small bowel obstruction and transition point seen at the ileocolic anastomosis. Rim enhancing gas and fluid collection at the lateral right abdominal wall concerning for abscess. General Surgery consulted, with plan IR consultation for consideration of aspiration vs drain placement into her anterior abdominal wall collection. Lt leg verous ultrasound showed,.left lower extremity DVT in the common femoral vein. Patient being admitted under the care of Hospital Medicine.              Procedure(s) (LRB):  COLONOSCOPY (N/A)      Hospital Course:   Ms. Krishna presents with intra-abdominal abscess; IR drain placed 1/15 with bloody purulent fluid output.  Initiated on IV antibiotics.  She underwent colonoscopy on 01/17 2 evaluate ileocolonic anastomosis which revealed purulent drainage likely from abscess but anastomosis was  "intact.  Surgery followed and did not recommend any further inpatient procedures.  Drain developed decreasing output over the course of her stay and needed to be upsized on 01/23.  Throughout her stay, she has had significant difficulties with pain control and we have been up titrating her pain medication to affect; appears to be better controlled today.  Additionally, her hospitalization was complicated by development of acute DVT for which she was initially treated with heparin drip; will transition to apixaban at time of discharge.  Infectious Disease followed while in house; transitioned to ciprofloxacin and metronidazole at discharge.  She is now stable for discharge to skilled nursing facility.  Will need close follow-up with Infectious Disease and General surgery.     BP (!) 97/53   Pulse (!) 111   Temp 99.4 °F (37.4 °C)   Resp 18   Ht 5' 2" (1.575 m)   Wt 68.5 kg (151 lb 0.2 oz)   SpO2 (!) 94%   BMI 27.62 kg/m²   Physical Exam  Constitutional:       General: She is not in acute distress.     Appearance: She is ill-appearing. She is not toxic-appearing.   HENT:      Mouth/Throat:      Mouth: Mucous membranes are moist.      Pharynx: Oropharynx is clear.   Eyes:      Extraocular Movements: Extraocular movements intact.      Conjunctiva/sclera: Conjunctivae normal.   Cardiovascular:      Rate and Rhythm: Normal rate and regular rhythm.   Pulmonary:      Effort: Pulmonary effort is normal. No respiratory distress.      Breath sounds: Normal breath sounds.   Abdominal:      General: Bowel sounds are normal. There is no distension.      Palpations: Abdomen is soft.      Tenderness: There is abdominal tenderness. There is no guarding.      Comments: R side drain in place, dark brown output, scant   Musculoskeletal:         General: Normal range of motion.      Right lower leg: No edema.      Left lower leg: No edema.   Skin:     General: Skin is warm and dry.   Neurological:      General: No focal deficit " present.      Mental Status: She is alert and oriented to person, place, and time.   Psychiatric:         Mood and Affect: Mood normal.         Behavior: Behavior normal.      Goals of Care Treatment Preferences:  Code Status: Full Code      SDOH Screening:  The patient was screened for food insecurity, housing instability, transportation needs, utility difficulties, and interpersonal safety. The social determinant(s) of health identified as a concern this admission are:  Food insecurity  Utility difficulties    Will discuss with case management and/or community health workers.    Social Drivers of Health with Concerns     Food Insecurity: Food Insecurity Present (1/15/2025)   Utilities: At Risk (1/15/2025)        Consults:   Consults (From admission, onward)          Status Ordering Provider     Inpatient consult to Interventional Radiology  Once        Provider:  (Not yet assigned)    Completed ANKIT DUONG     Inpatient consult to General Surgery  Once        Provider:  (Not yet assigned)    Completed ADEEL SÁNCHEZ     Inpatient consult to Infectious Diseases  Once        Provider:  (Not yet assigned)    Completed ADEEL SÁNCHEZ     Inpatient consult to PICC team ([facility] [place]  Once        Provider:  (Not yet assigned)    Acknowledged ADEEL SÁNCHEZ     Inpatient consult to Gastroenterology-[facility]  Once        Provider:  (Not yet assigned)    Completed ADEEL SÁNCHEZ     Inpatient consult to Interventional Radiology  Once        Provider:  (Not yet assigned)    Completed ADEEL SÁNCHEZ     Inpatient consult to Cardiology-[facility]  Once        Provider:  (Not yet assigned)    Completed ROSALINA WOOD     Inpatient consult to General surgery  Once        Provider:  Ting Workman MD    Completed BRANDY MORLEY            * Epigastric pain  DDx:  Bowel obstruction ruled out: CT shows distended small bowel proximal to anastomosis, though Gastrografin has passed  Anastomotic stricture (ruled  out)  Infectious process: Due to anterior abdominal wall abscess vs acute UTI. Possible pneumonia based on CT chest.     Dx:  IR drain placed 1/15. 20cc of bloody purulent fluid.   Abscess cultures - KLEBSIELLA OXYTOCA   Colonoscopy on 1/17: Patent end-to-side ileocolonic anastomosis with purulent drainage likely from abscess; anastomosis intact; ileum normal; no specimens collected.  CT chest abdomen pelvis: Patchy GGOs in lungs c/f asp or PNA; biliary duct dilation post-chad, no mass, patulous esoph w/air-fluid level c/f dysmotility/reflux/achalasia; 10.5 cm R abd wall collection, decreased  Follow up MRCP    Tx:  GI, ID, and Gen Surg consult  IV cipro escalated to Zosyn due to low grade fever, tachycardia which is now resolved  Cultures to guide therapy  Monitor output from IR drain - scant output.  Follow up imaging and consider drain removal if improvement in collection  Larger drain placed by IR on 1/23/25    Cont with severe pain  On lidocaine and fentanyl patch with opioids po and iv prn    Hypoglycemia  Resolved        PICC (peripherally inserted central catheter) in place  PICC placed at [facility]SH on 11/3 due to difficulty with access    Tx:  PICC replaced on 1/18 this admission due to functional issues      Acute UTI  Dx:  Urine culture ENTEROCOCCUS FAECIUM     Tx:  Macrobid x5 day course    Acute DVT (deep venous thrombosis)  Likely due to recent hospitalization and sedentary risk factor    Left thigh veins: Occlusive thrombus in the common femoral    Tx:  Heparin drip   Consult cardiovascular   Transition to OAC at discharge        Tobacco dependency  Dangers of cigarette smoking were reviewed with patient in detail. Patient was Counseled for 3-10 minutes. Nicotine replacement options were discussed. Nicotine replacement was discussed-       Carotid arterial disease        Moderate malnutrition  Nutrition consulted. Most recent weight and BMI monitored-     Measurements:  Wt Readings from Last 1 Encounters:    01/17/25 68.5 kg (151 lb 0.2 oz)   Body mass index is 27.62 kg/m².    Patient has been screened and assessed by RD.    Malnutrition Type:  Context: chronic illness  Level: moderate    Malnutrition Characteristic Summary:  Energy Intake (Malnutrition): less than or equal to 75% for greater than or equal to 1 month    Interventions/Recommendations (treatment strategy):  1. General Healthful diet  2. Commercial beverage medical food supplement therapy      Atherosclerosis of aorta  Continue Statin       Type 2 diabetes mellitus with hypertriglyceridemia  Accu checks every 6 hours with SSI       GERD (gastroesophageal reflux disease)  Continue PPI       Hypothyroidism    Continue Synthroid       Final Active Diagnoses:    Diagnosis Date Noted POA    PRINCIPAL PROBLEM:  Epigastric pain [R10.13] 07/06/2018 Yes    Acute UTI [N39.0] 01/16/2025 Yes    PICC (peripherally inserted central catheter) in place [Z45.2] 01/16/2025 Not Applicable    Hypoglycemia [E16.2] 01/16/2025 Yes    Acute DVT (deep venous thrombosis) [I82.409] 01/15/2025 Yes    Tobacco dependency [F17.200] 01/14/2025 Yes    Carotid arterial disease [I77.9] 01/31/2024 Yes    Moderate malnutrition [E44.0] 01/14/2024 Yes    Atherosclerosis of aorta [I70.0] 05/10/2022 Yes    Type 2 diabetes mellitus with hypertriglyceridemia [E11.69, E78.1] 03/22/2016 Yes    GERD (gastroesophageal reflux disease) [K21.9] 08/08/2012 Yes    Hypothyroidism [E03.9] 08/08/2012 Yes      Problems Resolved During this Admission:       Discharged Condition: fair    Disposition: Skilled Nursing Facility    Follow Up:    Patient Instructions:      Ambulatory referral/consult to General Surgery   Standing Status: Future   Referral Priority: Routine Referral Type: Consultation   Referral Reason: Specialty Services Required   Requested Specialty: General Surgery   Number of Visits Requested: 1     Ambulatory referral/consult to Smoking Cessation Program   Standing Status: Future   Referral  Priority: Routine Referral Type: Consultation   Referral Reason: Specialty Services Required   Requested Specialty: CTTS   Number of Visits Requested: 1     Ambulatory referral/consult to Pain Clinic   Standing Status: Future   Referral Priority: Routine Referral Type: Consultation   Referral Reason: Specialty Services Required   Requested Specialty: Pain Medicine   Number of Visits Requested: 1     Diet Adult Regular     Notify your health care provider if you experience any of the following:  temperature >100.4     Notify your health care provider if you experience any of the following:  persistent nausea and vomiting or diarrhea     Notify your health care provider if you experience any of the following:  severe uncontrolled pain     Notify your health care provider if you experience any of the following:  difficulty breathing or increased cough     Notify your health care provider if you experience any of the following:  severe persistent headache     Notify your health care provider if you experience any of the following:  persistent dizziness, light-headedness, or visual disturbances     Notify your health care provider if you experience any of the following:  increased confusion or weakness     Activity as tolerated       Significant Diagnostic Studies: N/A    Pending Diagnostic Studies:       Procedure Component Value Units Date/Time    APTT [[id number]] Collected: 01/27/25 1237    Order Status: Sent Lab Status: In process Updated: 01/27/25 1239    Specimen: Blood            Medications:  Reconciled Home Medications:      Medication List        START taking these medications      apixaban 5 mg Tab  Commonly known as: ELIQUIS  Take 1 tablet (5 mg total) by mouth 2 (two) times daily.     ciprofloxacin HCl 500 MG tablet  Commonly known as: CIPRO  Take 1 tablet (500 mg total) by mouth 2 (two) times daily. for 10 days     fentaNYL 12 mcg/hr Pt72  Commonly known as: DURAGESIC  Place 1 patch onto the skin every 72  hours. for 14 days  Start taking on: January 28, 2025     LIDOcaine 5 %  Commonly known as: LIDODERM  Place 1 patch onto the skin once daily. Remove & Discard patch within 12 hours or as directed by MD  Start taking on: January 28, 2025     metroNIDAZOLE 500 MG tablet  Commonly known as: FLAGYL  Take 1 tablet (500 mg total) by mouth 3 (three) times daily. for 10 days     oxyCODONE-acetaminophen  mg per tablet  Commonly known as: PERCOCET  Take 1 tablet by mouth every 6 (six) hours as needed for Pain.            CHANGE how you take these medications      ALPRAZolam 1 MG tablet  Commonly known as: XANAX  TAKE ONE (1) TABLET BY MOUTH TWICE A DAY AS NEEDED FR ANXIETY  What changed:   how to take this  when to take this  additional instructions     cholecalciferol (vitamin D3) 125 mcg (5,000 unit) capsule  Take 1 tablet  by mouth daily with breakfast.  What changed: how much to take     levothyroxine 75 MCG tablet  Commonly known as: SYNTHROID  TAKE 1 TABLET EVERY DAY ON AN EMPTY STOMACH  What changed: See the new instructions.            CONTINUE taking these medications      atorvastatin 40 MG tablet  Commonly known as: LIPITOR  Take 1 tablet (40 mg total) by mouth once daily.     ergocalciferol 50,000 unit Cap  Commonly known as: ERGOCALCIFEROL  Take 1 capsule (50,000 Units total) by mouth every 7 days.     nicotine 21 mg/24 hr  Commonly known as: NICODERM CQ  Place 1 patch onto the skin once daily.     ondansetron 8 MG Tbdl  Commonly known as: ZOFRAN-ODT  Dissolve 1 tablet (8 mg total) by mouth under the tongue 3 (three) times daily as needed (nausea).     pantoprazole 40 MG tablet  Commonly known as: PROTONIX  TAKE 1 TABLET EVERY DAY            STOP taking these medications      amitriptyline 25 MG tablet  Commonly known as: ELAVIL     blood sugar diagnostic Strp     blood-glucose meter kit     HYDROcodone-acetaminophen  mg per tablet  Commonly known as: NORCO     lancets Misc     linaCLOtide 145 mcg Cap  capsule  Commonly known as: LINZESS     naloxone 4 mg/actuation Spry  Commonly known as: NARCAN              Indwelling Lines/Drains at time of discharge:   Lines/Drains/Airways       Peripherally Inserted Central Catheter Line  Duration             PICC Double Lumen 01/17/25 1214 right basilic 10 days              Drain  Duration                  Closed/Suction Drain 01/23/25 1403 Lateral RLQ Bulb 14 Fr. 3 days                    Time spent on the discharge of patient: 35 minutes         Nilton Dooley MD  Department of Hospital Medicine  [facility]

## 2025-01-27 NOTE — PLAN OF CARE
Infectious Disease Discharge Recommendations     Can transition to PO ciprofloxacin 500 mg BID plus PO metronidazole 500 mg TID ending on 2/10/2025.      Case discussed with the attending physician - attestation to follow.     Cipriano Yates MD   [facility], PGY-2  [facility] Infectious Disease Consults

## 2025-01-28 VITALS
WEIGHT: 151 LBS | BODY MASS INDEX: 27.79 KG/M2 | HEIGHT: 62 IN | TEMPERATURE: 99 F | DIASTOLIC BLOOD PRESSURE: 59 MMHG | HEART RATE: 101 BPM | SYSTOLIC BLOOD PRESSURE: 122 MMHG | OXYGEN SATURATION: 92 % | RESPIRATION RATE: 20 BRPM

## 2025-01-28 LAB
POCT GLUCOSE: 104 MG/DL (ref 70–110)
POCT GLUCOSE: 114 MG/DL (ref 70–110)
POCT GLUCOSE: 81 MG/DL (ref 70–110)

## 2025-01-28 PROCEDURE — 99223 1ST HOSP IP/OBS HIGH 75: CPT | Mod: ,,, | Performed by: STUDENT IN AN ORGANIZED HEALTH CARE EDUCATION/TRAINING PROGRAM

## 2025-01-28 PROCEDURE — 25000003 PHARM REV CODE 250: Performed by: INTERNAL MEDICINE

## 2025-01-28 PROCEDURE — 99499 UNLISTED E&M SERVICE: CPT | Mod: GC,,, | Performed by: STUDENT IN AN ORGANIZED HEALTH CARE EDUCATION/TRAINING PROGRAM

## 2025-01-28 PROCEDURE — 25000003 PHARM REV CODE 250: Performed by: HOSPITALIST

## 2025-01-28 PROCEDURE — 63600175 PHARM REV CODE 636 W HCPCS: Performed by: INTERNAL MEDICINE

## 2025-01-28 PROCEDURE — 25000003 PHARM REV CODE 250: Performed by: FAMILY MEDICINE

## 2025-01-28 PROCEDURE — S4991 NICOTINE PATCH NONLEGEND: HCPCS | Performed by: NURSE PRACTITIONER

## 2025-01-28 PROCEDURE — 25000003 PHARM REV CODE 250: Performed by: NURSE PRACTITIONER

## 2025-01-28 RX ORDER — DULOXETIN HYDROCHLORIDE 30 MG/1
30 CAPSULE, DELAYED RELEASE ORAL DAILY
Status: DISCONTINUED | OUTPATIENT
Start: 2025-01-28 | End: 2025-01-28 | Stop reason: HOSPADM

## 2025-01-28 RX ORDER — DULOXETIN HYDROCHLORIDE 30 MG/1
30 CAPSULE, DELAYED RELEASE ORAL DAILY
Start: 2025-01-29 | End: 2026-01-29

## 2025-01-28 RX ADMIN — PIPERACILLIN SODIUM AND TAZOBACTAM SODIUM 4.5 G: 4; .5 INJECTION, POWDER, LYOPHILIZED, FOR SOLUTION INTRAVENOUS at 05:01

## 2025-01-28 RX ADMIN — OXYCODONE AND ACETAMINOPHEN 1 TABLET: 10; 325 TABLET ORAL at 03:01

## 2025-01-28 RX ADMIN — ATORVASTATIN CALCIUM 40 MG: 40 TABLET, FILM COATED ORAL at 08:01

## 2025-01-28 RX ADMIN — NICOTINE 1 PATCH: 21 PATCH, EXTENDED RELEASE TRANSDERMAL at 08:01

## 2025-01-28 RX ADMIN — PIPERACILLIN SODIUM AND TAZOBACTAM SODIUM 4.5 G: 4; .5 INJECTION, POWDER, LYOPHILIZED, FOR SOLUTION INTRAVENOUS at 12:01

## 2025-01-28 RX ADMIN — APIXABAN 5 MG: 5 TABLET, FILM COATED ORAL at 08:01

## 2025-01-28 RX ADMIN — OXYCODONE AND ACETAMINOPHEN 1 TABLET: 10; 325 TABLET ORAL at 09:01

## 2025-01-28 RX ADMIN — LIDOCAINE 1 PATCH: 50 PATCH CUTANEOUS at 08:01

## 2025-01-28 RX ADMIN — DULOXETINE HYDROCHLORIDE 30 MG: 30 CAPSULE, DELAYED RELEASE ORAL at 09:01

## 2025-01-28 RX ADMIN — LEVOTHYROXINE SODIUM 75 MCG: 25 TABLET ORAL at 05:01

## 2025-01-28 RX ADMIN — PANTOPRAZOLE SODIUM 40 MG: 40 TABLET, DELAYED RELEASE ORAL at 08:01

## 2025-01-28 NOTE — PLAN OF CARE
8:44 am ESME contacted Sofie with [facility] and esme was informed that auth is still pending at this time.         ESME will continue to follow pt throughout her transitions of care and assist with any dc needs.     Future Appointments   Date Time Provider Department Center   1/29/2025 11:00 AM Dorene Combs [facility]        01/28/25 0975   Post-Acute Status   Post-Acute Authorization Placement   Hospital Resources/Appts/Education Provided Appointments scheduled and added to AVS   Discharge Delays None known at this time   Discharge Plan   Discharge Plan A Skilled Nursing Facility

## 2025-01-28 NOTE — PLAN OF CARE
Problem: Adult Inpatient Plan of Care  Goal: Plan of Care Review  Outcome: Progressing     Problem: Fall Injury Risk  Goal: Absence of Fall and Fall-Related Injury  Outcome: Progressing     Problem: VTE (Venous Thromboembolism)  Goal: Tissue Perfusion  Outcome: Progressing     Problem: Infection  Goal: Absence of Infection Signs and Symptoms  Outcome: Progressing     Problem: Skin Injury Risk Increased  Goal: Skin Health and Integrity  Outcome: Progressing     Problem: Pain Acute  Goal: Optimal Pain Control and Function  Outcome: Progressing     Problem: Mobility Impairment  Goal: Optimal Mobility  Outcome: Progressing     Problem: Wound  Goal: Skin Health and Integrity  Outcome: Progressing  Goal: Optimal Wound Healing  Outcome: Progressing     Problem: Diabetes Comorbidity  Goal: Blood Glucose Level Within Targeted Range  Outcome: Progressing

## 2025-01-28 NOTE — PLAN OF CARE
Medicare Message     Important Message from Medicare regarding Discharge Appeal Rights Explained to patient/caregiver; Other (comments)Important Message from Medicare regarding Discharge Appeal Rights. Explained to patient/caregiver; Other (comments). The comment is Patient gave verbal consent. Taken on 1/17/25 1548 Explained to patient/caregiver; Signed/date by patient/caregiver   Date IMM was signed 1/17/2025 1/27/2025   Time IMM was signed [phone]

## 2025-01-28 NOTE — PLAN OF CARE
Problem: Adult Inpatient Plan of Care  Goal: Plan of Care Review  Outcome: Progressing  Goal: Absence of Hospital-Acquired Illness or Injury  Outcome: Progressing  Goal: Readiness for Transition of Care  Outcome: Progressing     Problem: Fall Injury Risk  Goal: Absence of Fall and Fall-Related Injury  Outcome: Progressing     Problem: Skin Injury Risk Increased  Goal: Skin Health and Integrity  Outcome: Progressing     Problem: Confusion Acute  Goal: Optimal Cognitive Function  Outcome: Progressing     Problem: Sepsis/Septic Shock  Goal: Absence of Bleeding  Outcome: Progressing  Goal: Blood Glucose Level Within Targeted Range  Outcome: Progressing     Problem: Diabetes Comorbidity  Goal: Blood Glucose Level Within Targeted Range  Outcome: Progressing

## 2025-01-28 NOTE — DISCHARGE SUMMARY
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 She  presented to the ED with complaint of abdominal pain.  Diagnosis with colon cancer in Nov 2024 and underwent colon resection, however developed intra abdominal infection requiring operative intervention. She was admitted to SNF facility and underwent treatment with IV antibiotic. She continue to experiencing symptoms of abdominal pain, increase confusion. CT of abdominal showed  Operative change of partial colon resection and ileal colic anastomosis with suspected developing small bowel obstruction and transition point seen at the ileocolic anastomosis. Rim enhancing gas and fluid collection at the lateral right abdominal wall concerning for abscess. General Surgery consulted, with plan IR consultation for consideration of aspiration vs drain placement into her anterior abdominal wall collection. Lt leg verous ultrasound showed,.left lower extremity DVT in the common femoral vein. Patient being admitted under the care of Hospital Medicine.              Procedure(s) (LRB):  COLONOSCOPY (N/A)      Hospital Course:   Ms. Krishna presents with intra-abdominal abscess; IR drain placed 1/15 with bloody purulent fluid output.  Initiated on IV antibiotics.  She underwent colonoscopy on 01/17 2 evaluate ileocolonic anastomosis which revealed purulent drainage likely from abscess but anastomosis was  "intact.  Surgery followed and did not recommend any further inpatient procedures.  Drain developed decreasing output over the course of her stay and needed to be upsized on 01/23.  Throughout her stay, she has had significant difficulties with pain control and we have been up titrating her pain medication to affect; appears to be better controlled today.  Additionally, her hospitalization was complicated by development of acute DVT for which she was initially treated with heparin drip; will transition to apixaban at time of discharge.  Infectious Disease followed while in house; transitioned to ciprofloxacin and metronidazole at discharge.  She is now stable for discharge to skilled nursing facility.  Will need close follow-up with Infectious Disease and General surgery.     BP (!) 97/53   Pulse (!) 111   Temp 99.4 °F (37.4 °C)   Resp 18   Ht 5' 2" (1.575 m)   Wt 68.5 kg (151 lb 0.2 oz)   SpO2 (!) 94%   BMI 27.62 kg/m²   Physical Exam  Constitutional:       General: She is not in acute distress.     Appearance: She is ill-appearing. She is not toxic-appearing.   HENT:      Mouth/Throat:      Mouth: Mucous membranes are moist.      Pharynx: Oropharynx is clear.   Eyes:      Extraocular Movements: Extraocular movements intact.      Conjunctiva/sclera: Conjunctivae normal.   Cardiovascular:      Rate and Rhythm: Normal rate and regular rhythm.   Pulmonary:      Effort: Pulmonary effort is normal. No respiratory distress.      Breath sounds: Normal breath sounds.   Abdominal:      General: Bowel sounds are normal. There is no distension.      Palpations: Abdomen is soft.      Tenderness: There is abdominal tenderness. There is no guarding.      Comments: R side drain in place, dark brown output, scant   Musculoskeletal:         General: Normal range of motion.      Right lower leg: No edema.      Left lower leg: No edema.   Skin:     General: Skin is warm and dry.   Neurological:      General: No focal deficit " present.      Mental Status: She is alert and oriented to person, place, and time.   Psychiatric:         Mood and Affect: Mood normal.         Behavior: Behavior normal.      Goals of Care Treatment Preferences:  Code Status: Full Code      SDOH Screening:  The patient was screened for food insecurity, housing instability, transportation needs, utility difficulties, and interpersonal safety. The social determinant(s) of health identified as a concern this admission are:  Food insecurity  Utility difficulties    Will discuss with case management and/or community health workers.    Social Drivers of Health with Concerns     Food Insecurity: Food Insecurity Present (1/15/2025)   Utilities: At Risk (1/15/2025)        Consults:   Consults (From admission, onward)          Status Ordering Provider     Inpatient consult to Interventional Radiology  Once        Provider:  (Not yet assigned)    Completed ANKIT DUONG     Inpatient consult to General Surgery  Once        Provider:  (Not yet assigned)    Completed ADEEL SÁNCHEZ     Inpatient consult to Infectious Diseases  Once        Provider:  (Not yet assigned)    Completed ADEEL SÁNCHEZ     Inpatient consult to PICC team ([facility])  Once        Provider:  (Not yet assigned)    Acknowledged ADEEL SÁNCHEZ     Inpatient consult to Gastroenterology-[facility]  Once        Provider:  (Not yet assigned)    Completed ADEEL SÁNCHEZ     Inpatient consult to Interventional Radiology  Once        Provider:  (Not yet assigned)    Completed ADEEL SÁNCHEZ     Inpatient consult to Cardiology-[facility]  Once        Provider:  (Not yet assigned)    Completed ROSALINA WOOD     Inpatient consult to General surgery  Once        Provider:  Ting Workman MD    Completed BRANDY MORLEY            * Epigastric pain  DDx:  Bowel obstruction ruled out: CT shows distended small bowel proximal to anastomosis, though Gastrografin has passed  Anastomotic stricture (ruled  out)  Infectious process: Due to anterior abdominal wall abscess vs acute UTI. Possible pneumonia based on CT chest.     Dx:  IR drain placed 1/15. 20cc of bloody purulent fluid.   Abscess cultures - KLEBSIELLA OXYTOCA   Colonoscopy on 1/17: Patent end-to-side ileocolonic anastomosis with purulent drainage likely from abscess; anastomosis intact; ileum normal; no specimens collected.  CT chest abdomen pelvis: Patchy GGOs in lungs c/f asp or PNA; biliary duct dilation post-chad, no mass, patulous esoph w/air-fluid level c/f dysmotility/reflux/achalasia; 10.5 cm R abd wall collection, decreased  Follow up MRCP    Tx:  GI, ID, and Gen Surg consult  IV cipro escalated to Zosyn due to low grade fever, tachycardia which is now resolved  Cultures to guide therapy  Monitor output from IR drain - scant output.  Follow up imaging and consider drain removal if improvement in collection  Larger drain placed by IR on 1/23/25  Plan for transition to cipro/flagyl end 2/10/25    Cont with severe pain  On lidocaine and fentanyl patch with opioids po and iv prn    Hypoglycemia  Resolved        PICC (peripherally inserted central catheter) in place  PICC placed at OSH on 11/3 due to difficulty with access    Tx:  PICC replaced on 1/18 this admission due to functional issues      Acute UTI  Dx:  Urine culture ENTEROCOCCUS FAECIUM     Tx:  Macrobid x5 day course, complete    Acute DVT (deep venous thrombosis)  Likely due to recent hospitalization and sedentary risk factor    Left thigh veins: Occlusive thrombus in the common femoral    Tx:  Heparin drip   Consult cardiovascular   Transition to OAC    Tobacco dependency  Dangers of cigarette smoking were reviewed with patient in detail. Patient was Counseled for 3-10 minutes. Nicotine replacement options were discussed. Nicotine replacement was discussed-       Carotid arterial disease        Moderate malnutrition  Nutrition consulted. Most recent weight and BMI monitored-      Measurements:  Wt Readings from Last 1 Encounters:   01/21/25 68.5 kg (151 lb 0.2 oz)   Body mass index is 27.62 kg/m².    Patient has been screened and assessed by RD.    Malnutrition Type:  Context: chronic illness  Level: moderate    Malnutrition Characteristic Summary:  Weight Loss (Malnutrition):  (2% x 1 year)  Energy Intake (Malnutrition): less than or equal to 75% for greater than or equal to 1 month    Interventions/Recommendations (treatment strategy):  1. Continue to encourage intake at meals as tolerated. 2. Monitor weight/labs. 3. RD to continue to follow to monitor po intake      Atherosclerosis of aorta  Continue Statin       Type 2 diabetes mellitus with hypertriglyceridemia  Accu checks every 6 hours with SSI       GERD (gastroesophageal reflux disease)  Continue PPI       Hypothyroidism  Continue Synthroid       Final Active Diagnoses:    Diagnosis Date Noted POA    PRINCIPAL PROBLEM:  Epigastric pain [R10.13] 07/06/2018 Yes    Acute UTI [N39.0] 01/16/2025 Yes    PICC (peripherally inserted central catheter) in place [Z45.2] 01/16/2025 Not Applicable    Hypoglycemia [E16.2] 01/16/2025 Yes    Acute DVT (deep venous thrombosis) [I82.409] 01/15/2025 Yes    Tobacco dependency [F17.200] 01/14/2025 Yes    Carotid arterial disease [I77.9] 01/31/2024 Yes    Moderate malnutrition [E44.0] 01/14/2024 Yes    Atherosclerosis of aorta [I70.0] 05/10/2022 Yes    Type 2 diabetes mellitus with hypertriglyceridemia [E11.69, E78.1] 03/22/2016 Yes    GERD (gastroesophageal reflux disease) [K21.9] 08/08/2012 Yes    Hypothyroidism [E03.9] 08/08/2012 Yes      Problems Resolved During this Admission:       Discharged Condition: fair    Disposition: Skilled Nursing Facility    Follow Up:    Patient Instructions:      Ambulatory referral/consult to General Surgery   Standing Status: Future   Referral Priority: Routine Referral Type: Consultation   Referral Reason: Specialty Services Required   Requested Specialty:  General Surgery   Number of Visits Requested: 1     Ambulatory referral/consult to Smoking Cessation Program   Standing Status: Future   Referral Priority: Routine Referral Type: Consultation   Referral Reason: Specialty Services Required   Requested Specialty: CTTS   Number of Visits Requested: 1     Ambulatory referral/consult to Pain Clinic   Standing Status: Future   Referral Priority: Routine Referral Type: Consultation   Referral Reason: Specialty Services Required   Requested Specialty: Pain Medicine   Number of Visits Requested: 1     Diet Adult Regular     Notify your health care provider if you experience any of the following:  temperature >100.4     Notify your health care provider if you experience any of the following:  persistent nausea and vomiting or diarrhea     Notify your health care provider if you experience any of the following:  severe uncontrolled pain     Notify your health care provider if you experience any of the following:  difficulty breathing or increased cough     Notify your health care provider if you experience any of the following:  severe persistent headache     Notify your health care provider if you experience any of the following:  persistent dizziness, light-headedness, or visual disturbances     Notify your health care provider if you experience any of the following:  increased confusion or weakness     Activity as tolerated       Significant Diagnostic Studies: N/A    Pending Diagnostic Studies:       None           Medications:  Reconciled Home Medications:      Medication List        START taking these medications      apixaban 5 mg Tab  Commonly known as: ELIQUIS  Take 1 tablet (5 mg total) by mouth 2 (two) times daily.     ciprofloxacin HCl 500 MG tablet  Commonly known as: CIPRO  Take 1 tablet (500 mg total) by mouth 2 (two) times daily. for 14 days     DULoxetine 30 MG capsule  Commonly known as: CYMBALTA  Take 1 capsule (30 mg total) by mouth once daily.  Start  taking on: January 29, 2025     fentaNYL 12 mcg/hr Pt72  Commonly known as: DURAGESIC  Place 1 patch onto the skin every 72 hours. for 14 days     LIDOcaine 5 %  Commonly known as: LIDODERM  Place 1 patch onto the skin once daily. Remove & Discard patch within 12 hours or as directed by MD     metroNIDAZOLE 500 MG tablet  Commonly known as: FLAGYL  Take 1 tablet (500 mg total) by mouth 3 (three) times daily. for 14 days     oxyCODONE-acetaminophen  mg per tablet  Commonly known as: PERCOCET  Take 1 tablet by mouth every 6 (six) hours as needed for Pain.            CHANGE how you take these medications      ALPRAZolam 1 MG tablet  Commonly known as: XANAX  TAKE ONE (1) TABLET BY MOUTH TWICE A DAY AS NEEDED FR ANXIETY  What changed:   how to take this  when to take this  additional instructions     cholecalciferol (vitamin D3) 125 mcg (5,000 unit) capsule  Take 1 tablet  by mouth daily with breakfast.  What changed: how much to take     levothyroxine 75 MCG tablet  Commonly known as: SYNTHROID  TAKE 1 TABLET EVERY DAY ON AN EMPTY STOMACH  What changed: See the new instructions.            CONTINUE taking these medications      atorvastatin 40 MG tablet  Commonly known as: LIPITOR  Take 1 tablet (40 mg total) by mouth once daily.     ergocalciferol 50,000 unit Cap  Commonly known as: ERGOCALCIFEROL  Take 1 capsule (50,000 Units total) by mouth every 7 days.     nicotine 21 mg/24 hr  Commonly known as: NICODERM CQ  Place 1 patch onto the skin once daily.     ondansetron 8 MG Tbdl  Commonly known as: ZOFRAN-ODT  Dissolve 1 tablet (8 mg total) by mouth under the tongue 3 (three) times daily as needed (nausea).     pantoprazole 40 MG tablet  Commonly known as: PROTONIX  TAKE 1 TABLET EVERY DAY            STOP taking these medications      amitriptyline 25 MG tablet  Commonly known as: ELAVIL     blood sugar diagnostic Strp     blood-glucose meter kit     HYDROcodone-acetaminophen  mg per tablet  Commonly known  as: NORCO     lancets Misc     linaCLOtide 145 mcg Cap capsule  Commonly known as: LINZESS     naloxone 4 mg/actuation Spry  Commonly known as: NARCAN              Indwelling Lines/Drains at time of discharge:   Lines/Drains/Airways       Peripherally Inserted Central Catheter Line  Duration             PICC Double Lumen 01/17/25 1214 right basilic 10 days              Drain  Duration                  Closed/Suction Drain 01/23/25 1403 Lateral RLQ Bulb 14 Fr. 4 days                    Time spent on the discharge of patient: 32 minutes         Nilton Dooley MD  Department of Hospital Medicine  [facility]

## 2025-01-28 NOTE — PROGRESS NOTES
[place] - Med Surg  Adult Nutrition  Progress Note    SUMMARY       Recommendations    Recommendation:  1. Continue to encourage intake at meals as tolerated.   2. Monitor weight/labs.   3. RD to continue to follow to monitor po intake    Goals:  Pt will tolerate diet with at least 50-75% intake at meals by RD follow up  Nutrition Goal Status: progressing towards goal  Communication of RD Recs: reviewed with RN    Assessment and Plan  Nutrition Problem  Inadequate energy intake    Related to (etiology):   Colon cancer    Signs and Symptoms (as evidenced by):   Decreased po intake, abd pain, nausea     Interventions:  Collaboration with other providers    Nutrition Diagnosis Status:   Continues      Malnutrition Assessment  Malnutrition Context: chronic illness  Malnutrition Level: moderate  Skin (Micronutrient): wounds unhealed (Pressure injury to buttocks)  Musculoskeletal/Lower Extremities: edema   Micronutrient Evaluation Summary: suspected deficiency   Weight Loss (Malnutrition):  (2% x 1 year)  Energy Intake (Malnutrition): less than or equal to 75% for greater than or equal to 1 month       Reason for Assessment  Reason For Assessment: RD follow-up  Diagnosis: gastrointestinal disease  General Information Comments: Pt admitted with nausea and abdominal pain. Dx colon cancer Nov 2024. PMH partial colectomy. Pt on 2000 calorie ADA diet with Sidney. Noted 25-50% intake at meals. PICC line. Adebayo 17= buttocks wound. RLQ drain. s/p drain exchange 1/23. Noted 5lb weight loss x 1 year. Unable to assess NFPE 2/2 pt on contact isolation for VRE.  Nutrition Discharge Planning: pt to d/c on ADA diet    Past Medical History:   Diagnosis Date    Anxiety and depression 07/21/2015    Arthritis     Cervical radiculopathy 04/08/2016    Cirrhosis of liver     Colon cancer 11/2024    Colon polyps 04/27/2015    Diabetes mellitus type 2 with neurological manifestations 11/06/2015    no current medications, only one episode of  "elevated sugars with acute illness, will monitor     Encounter for blood transfusion     Hepatitis C     s/p treatment per patient report; managed by Dr. Perez    Hip fracture     Macrocytosis 2015    Thyroid disease     Transfusion reaction     hep c        Nutrition/Diet History  Food Preferences: no Yazidism or cultural food prefs identified  Spiritual, Cultural Beliefs, Yarsani Practices, Values that Affect Care: no  Food Allergies: NKFA  Factors Affecting Nutritional Intake: altered gastrointestinal function    Anthropometrics  Height: 5' 2" (157.5 cm)  Height (inches): 62 in  Height Method: Stated  Weight: 68.5 kg (151 lb 0.2 oz)  Weight (lb): 151.02 lb  Weight Method: Bed Scale  Ideal Body Weight (IBW), Female: 110 lb  % Ideal Body Weight, Female (lb): 137.29 %  BMI (Calculated): 27.6  BMI Grade: 25 - 29.9 - overweight  Usual Body Weight (UBW), k.4 kg (1/10/24)  % Usual Body Weight: 97.5  % Weight Change From Usual Weight: -2.7 %     Lab/Procedures/Meds  Pertinent Labs Reviewed: reviewed  Pertinent Labs Comments: reviewed  Pertinent Medications Reviewed: reviewed  Pertinent Medications Comments: fentanyl patch, pantoprazole, zosyn, heparin    Estimated/Assessed Needs  Weight Used For Calorie Calculations: 68.5 kg (151 lb 0.2 oz)  Energy Calorie Requirements (kcal): 1918 (28 kcal/kg)  Energy Need Method: Kcal/kg  Protein Requirements: 68g (1.0g/kg)  Weight Used For Protein Calculations: 68.5 kg (151 lb 0.2 oz)  Fluid Requirements (mL): 1ml/kcal  Estimated Fluid Requirement Method: RDA Method  RDA Method (mL): 1918  CHO Requirement: 250    Nutrition Prescription Ordered  Current Diet Order: 2000 calorie ADA  Oral Nutrition Supplement: Sidney    Evaluation of Received Nutrient/Fluid Intake  I/O: 533/0  Energy Calories Required: not meeting needs  Protein Required: not meeting needs  Fluid Required: not meeting needs  Comments: LBM   Tolerance: other (see comments) (clear liquids, monitoring " tolerance)  % Intake of Estimated Energy Needs: 25 - 50 %  % Meal Intake: 25 - 50 %    Nutrition Risk  Level of Risk/Frequency of Follow-up: moderate (follow up: 1-2x per week)     Monitor and Evaluation  Food and Nutrient Intake: energy intake, food and beverage intake  Food and Nutrient Adminstration: diet order  Anthropometric Measurements: height/length, weight, weight change, body mass index  Biochemical Data, Medical Tests and Procedures: electrolyte and renal panel, gastrointestinal profile, glucose/endocrine profile, inflammatory profile, lipid profile  Nutrition-Focused Physical Findings: skin     Nutrition Related Social Determinants of Health: SDOH: Adequate food in home environment     Nutrition Follow-Up  RD Follow-up?: Yes

## 2025-01-28 NOTE — NURSING
Daughter in law Palmira notified w/ pt permission on pt leaving facility at this time.  Verbalized understanding.

## 2025-01-28 NOTE — PLAN OF CARE
Recommendation:  1. Continue to encourage intake at meals as tolerated.   2. Monitor weight/labs.   3. RD to continue to follow to monitor po intake    Goals:  Pt will tolerate diet with at least 50-75% intake at meals by RD follow up  Nutrition Goal Status: progressing towards goal

## 2025-01-30 ENCOUNTER — TELEPHONE (OUTPATIENT)
Dept: GASTROENTEROLOGY | Facility: CLINIC | Age: 70
End: 2025-01-30
Payer: MEDICARE

## 2025-01-30 NOTE — TELEPHONE ENCOUNTER
Message was left with  at nursing home.       ----- Message from Jennyfer sent at 1/30/2025  2:08 PM CST -----  Type:  Patient Returning Call    Who Called:Vitor/ [facility]  Would the patient rather a call back or a response via [facility]? Call back   Best Call Back Number:[phone]   Additional Information: pt needs a f/u appointment for G tube

## 2025-02-04 ENCOUNTER — TELEPHONE (OUTPATIENT)
Dept: SMOKING CESSATION | Facility: CLINIC | Age: 70
End: 2025-02-04
Payer: MEDICARE

## 2025-03-01 DIAGNOSIS — E03.9 ACQUIRED HYPOTHYROIDISM: ICD-10-CM

## 2025-03-03 RX ORDER — LEVOTHYROXINE SODIUM 75 UG/1
TABLET ORAL
Qty: 90 TABLET | Refills: 0 | Status: SHIPPED | OUTPATIENT
Start: 2025-03-03

## 2025-03-03 NOTE — TELEPHONE ENCOUNTER
Refill Routing Note   Medication(s) are not appropriate for processing by [facility] for the following reason(s):        ED/Hospital Visit since last OV with provider  Required labs outdated    [facility] action(s):  Defer   Requires labs : Yes             Appointments  past 12m or future 3m with PCP    Date Provider   Last Visit   10/25/2024 Brandy Dejesus MD   Next Visit   Visit date not found Brandy Dejesus MD   ED visits in past 90 days: 0        Note composed:10:22 AM 03/03/2025

## 2025-03-03 NOTE — TELEPHONE ENCOUNTER
Care Due:                  Date            Visit Type   Department     Provider  --------------------------------------------------------------------------------                                ESTABLISHED                              PATIENT -    [facility] FAMILY  Last Visit: 10-      RAP Index      MEDICINE       Brandy Dejesus  Next Visit: None Scheduled  None         None Found                                                            Last  Test          Frequency    Reason                     Performed    Due Date  --------------------------------------------------------------------------------    Lipid Panel.  12 months..  atorvastatin.............  01-   01-    Vitamin D...  12 months..  cholecalciferol,,          01- 01-                             ergocalciferol...........    [employer] Embedded Care Due Messages. Reference number: [id number].   3/03/2025 10:17:46 AM CST

## 2025-04-30 DIAGNOSIS — Z78.0 MENOPAUSE: ICD-10-CM

## 2025-05-07 NOTE — TELEPHONE ENCOUNTER
----- Message from Esther Dejesus sent at 10/7/2019  9:07 AM CDT -----  Contact: [phone]/self  Patient requesting to speak with you regarding getting a stronger pain medication. Please advise.       No indicators present

## 2025-05-14 DIAGNOSIS — E11.9 TYPE 2 DIABETES MELLITUS WITHOUT COMPLICATION, UNSPECIFIED WHETHER LONG TERM INSULIN USE: ICD-10-CM

## 2025-05-15 DIAGNOSIS — E03.9 ACQUIRED HYPOTHYROIDISM: ICD-10-CM

## 2025-05-15 RX ORDER — LEVOTHYROXINE SODIUM 75 UG/1
TABLET ORAL
Qty: 90 TABLET | Refills: 3 | Status: SHIPPED | OUTPATIENT
Start: 2025-05-15

## 2025-07-10 DIAGNOSIS — E11.9 TYPE 2 DIABETES MELLITUS WITHOUT COMPLICATION: ICD-10-CM

## 2025-08-18 ENCOUNTER — PATIENT MESSAGE (OUTPATIENT)
Dept: ADMINISTRATIVE | Facility: HOSPITAL | Age: 70
End: 2025-08-18
Payer: MEDICARE

## (undated) DEVICE — DRESSING TRANS 6X8 TEGADERM

## (undated) DEVICE — SUT SILK 0 STRANDS 30IN BLK

## (undated) DEVICE — TAPE UMBILICAL 1/8X36IN WHITE

## (undated) DEVICE — NDL TUOHY EPIDURAL 20G X 3.5

## (undated) DEVICE — GLOVE SURG BIOGEL LATEX SZ 7.5

## (undated) DEVICE — NDL 27G X 1 1/4

## (undated) DEVICE — SOL NORMAL USPCA 0.9%

## (undated) DEVICE — INSTRUMENT FRAZIER 10FR W/VENT

## (undated) DEVICE — SPONGE DERMA 8PLY 2X2

## (undated) DEVICE — HEMOSTAT SURGICEL FIBRLR 2X4IN

## (undated) DEVICE — SUT PROLENE 6-0 C-1 30IN BL

## (undated) DEVICE — KIT SURGICAL TURNOVER

## (undated) DEVICE — GLOVE BIOGEL 7.5

## (undated) DEVICE — MARKER SKIN STND TIP BLUE BARR

## (undated) DEVICE — GAUZE VISTEC XR DTECT 16 4X4IN

## (undated) DEVICE — CATH UROLOGICAL 18FR RED

## (undated) DEVICE — YANKAUER OPEN TIP W/O VENT

## (undated) DEVICE — CLIP LIGATING MEDIUM

## (undated) DEVICE — TRAY NERVE BLOCK

## (undated) DEVICE — SUT SILK BLK BR. 2 2-60

## (undated) DEVICE — TUBING SUC UNIV W/CONN 12FT

## (undated) DEVICE — Device

## (undated) DEVICE — DRAPE FLUID WARMER 44X44IN

## (undated) DEVICE — SUT 3-0 12-18IN SILK

## (undated) DEVICE — GLOVE PROTEXIS PI SYN SURG 7.5

## (undated) DEVICE — RESERVOIR JACKSON-PRATT 100CC

## (undated) DEVICE — NDL SPINAL SPINOCAN 22GX3.5

## (undated) DEVICE — NDL HYPO A BEVEL 30X1/2

## (undated) DEVICE — DRESSING TELFA N ADH 3X8IN

## (undated) DEVICE — SUT PERMA-HAND SUTUPAK 18IN

## (undated) DEVICE — SUT 2-0 12-18IN SILK

## (undated) DEVICE — DRESSING TELFA N ADH 3X8

## (undated) DEVICE — DRESSING TRANS 4X4 TEGADERM

## (undated) DEVICE — GLOVE PROTEXIS HYDROGEL SZ6.5

## (undated) DEVICE — CHLORAPREP 10.5 ML APPLICATOR

## (undated) DEVICE — KIT SURGIFLO HEMOSTATIC MATRIX

## (undated) DEVICE — DRAIN FLAT JP 7X4MM FUL

## (undated) DEVICE — DRAPE INCISE IOBAN 2 23X23IN

## (undated) DEVICE — BLADE ELECTRO EDGE INSULATED

## (undated) DEVICE — TUBE SUCTION MEDI-VAC STERILE

## (undated) DEVICE — SUT VICRYL 2-0 36 CT-1

## (undated) DEVICE — SUT PROLENE 6-0 BV-1 30IN

## (undated) DEVICE — BAG MEDI-PLAST DECANTER C-FLOW

## (undated) DEVICE — GLOVE SURGICAL LATEX SZ 7

## (undated) DEVICE — SEE MEDLINE ITEM 152622

## (undated) DEVICE — SUT MONOCRYL PLUS UD 3-0 27

## (undated) DEVICE — ELECTRODE PATIENT RETURN DISP

## (undated) DEVICE — GLOVE PROTEXIS BLUE LATEX 7

## (undated) DEVICE — CLIP LIGATING HEMOCLP SMALL

## (undated) DEVICE — SUT VICRYL 3-0 27 SH